# Patient Record
Sex: MALE | Race: OTHER | HISPANIC OR LATINO
[De-identification: names, ages, dates, MRNs, and addresses within clinical notes are randomized per-mention and may not be internally consistent; named-entity substitution may affect disease eponyms.]

---

## 2014-06-04 RX ORDER — INSULIN GLARGINE 100 [IU]/ML
21 INJECTION, SOLUTION SUBCUTANEOUS
Qty: 0 | Refills: 0 | DISCHARGE
Start: 2014-06-04

## 2017-01-04 ENCOUNTER — APPOINTMENT (OUTPATIENT)
Dept: CARDIOLOGY | Facility: CLINIC | Age: 77
End: 2017-01-04

## 2017-01-04 ENCOUNTER — NON-APPOINTMENT (OUTPATIENT)
Age: 77
End: 2017-01-04

## 2017-01-04 VITALS
SYSTOLIC BLOOD PRESSURE: 142 MMHG | WEIGHT: 176 LBS | HEART RATE: 68 BPM | OXYGEN SATURATION: 98 % | DIASTOLIC BLOOD PRESSURE: 68 MMHG | BODY MASS INDEX: 30.05 KG/M2 | HEIGHT: 64 IN

## 2017-01-05 ENCOUNTER — APPOINTMENT (OUTPATIENT)
Dept: FAMILY MEDICINE | Facility: CLINIC | Age: 77
End: 2017-01-05

## 2017-01-05 VITALS
HEIGHT: 64 IN | WEIGHT: 176 LBS | BODY MASS INDEX: 30.05 KG/M2 | OXYGEN SATURATION: 98 % | SYSTOLIC BLOOD PRESSURE: 124 MMHG | DIASTOLIC BLOOD PRESSURE: 76 MMHG | HEART RATE: 68 BPM | RESPIRATION RATE: 13 BRPM

## 2017-01-05 DIAGNOSIS — Z23 ENCOUNTER FOR IMMUNIZATION: ICD-10-CM

## 2017-01-05 LAB
BILIRUB UR QL STRIP: NEGATIVE
CLARITY UR: NORMAL
COLLECTION METHOD: NORMAL
GLUCOSE UR-MCNC: NEGATIVE
HCG UR QL: 0.2 EU/DL
HGB UR QL STRIP.AUTO: NORMAL
KETONES UR-MCNC: NEGATIVE
LEUKOCYTE ESTERASE UR QL STRIP: NORMAL
NITRITE UR QL STRIP: POSITIVE
PH UR STRIP: 5.5
PROT UR STRIP-MCNC: NORMAL
SP GR UR STRIP: 1.01

## 2017-01-05 RX ORDER — RIVAROXABAN 20 MG/1
20 TABLET, FILM COATED ORAL
Qty: 90 | Refills: 3 | Status: DISCONTINUED | COMMUNITY
Start: 2017-01-04 | End: 2017-01-05

## 2017-01-07 LAB
24R-OH-CALCIDIOL SERPL-MCNC: 15.5 PG/ML
ALBUMIN SERPL ELPH-MCNC: 4.4 G/DL
ALP BLD-CCNC: 75 U/L
ALT SERPL-CCNC: 4 U/L
ANION GAP SERPL CALC-SCNC: 19 MMOL/L
AST SERPL-CCNC: 8 U/L
BASOPHILS # BLD AUTO: 0.03 K/UL
BASOPHILS NFR BLD AUTO: 0.3 %
BILIRUB SERPL-MCNC: 0.3 MG/DL
BUN SERPL-MCNC: 44 MG/DL
CALCIUM SERPL-MCNC: 9.5 MG/DL
CHLORIDE SERPL-SCNC: 103 MMOL/L
CHOLEST SERPL-MCNC: 136 MG/DL
CHOLEST/HDLC SERPL: 3.7 RATIO
CO2 SERPL-SCNC: 20 MMOL/L
CREAT SERPL-MCNC: 2.76 MG/DL
EOSINOPHIL # BLD AUTO: 0.21 K/UL
EOSINOPHIL NFR BLD AUTO: 2 %
ERYTHROCYTE [SEDIMENTATION RATE] IN BLOOD BY WESTERGREN METHOD: 14 MM/HR
FOLATE SERPL-MCNC: 7.8 NG/ML
GLUCOSE SERPL-MCNC: 147 MG/DL
HBA1C MFR BLD HPLC: 9.4 %
HCT VFR BLD CALC: 35.8 %
HDLC SERPL-MCNC: 37 MG/DL
HGB BLD-MCNC: 11.4 G/DL
IMM GRANULOCYTES NFR BLD AUTO: 0.2 %
LDLC SERPL CALC-MCNC: 77 MG/DL
LYMPHOCYTES # BLD AUTO: 2 K/UL
LYMPHOCYTES NFR BLD AUTO: 18.9 %
MAN DIFF?: NORMAL
MCHC RBC-ENTMCNC: 28.3 PG
MCHC RBC-ENTMCNC: 31.8 GM/DL
MCV RBC AUTO: 88.8 FL
MONOCYTES # BLD AUTO: 0.99 K/UL
MONOCYTES NFR BLD AUTO: 9.4 %
NEUTROPHILS # BLD AUTO: 7.33 K/UL
NEUTROPHILS NFR BLD AUTO: 69.2 %
PLATELET # BLD AUTO: 247 K/UL
POTASSIUM SERPL-SCNC: 4.2 MMOL/L
PROT SERPL-MCNC: 7.3 G/DL
RBC # BLD: 4.03 M/UL
RBC # FLD: 13.6 %
SODIUM SERPL-SCNC: 142 MMOL/L
T3 SERPL-MCNC: 106 NG/DL
T4 SERPL-MCNC: 10.3 UG/DL
TRIGL SERPL-MCNC: 108 MG/DL
TSH SERPL-ACNC: 6.98 UU/ML
VIT B12 SERPL-MCNC: 334 PG/ML
WBC # FLD AUTO: 10.58 K/UL

## 2017-01-08 LAB — GLYCOMARK.: 3.7 UG/ML

## 2017-01-17 ENCOUNTER — APPOINTMENT (OUTPATIENT)
Dept: ELECTROPHYSIOLOGY | Facility: CLINIC | Age: 77
End: 2017-01-17

## 2017-01-23 ENCOUNTER — APPOINTMENT (OUTPATIENT)
Dept: CARDIOLOGY | Facility: CLINIC | Age: 77
End: 2017-01-23

## 2017-02-17 ENCOUNTER — APPOINTMENT (OUTPATIENT)
Dept: ELECTROPHYSIOLOGY | Facility: CLINIC | Age: 77
End: 2017-02-17

## 2017-02-21 ENCOUNTER — RX RENEWAL (OUTPATIENT)
Age: 77
End: 2017-02-21

## 2017-02-27 ENCOUNTER — APPOINTMENT (OUTPATIENT)
Dept: CARDIOLOGY | Facility: CLINIC | Age: 77
End: 2017-02-27

## 2017-03-02 ENCOUNTER — APPOINTMENT (OUTPATIENT)
Dept: CARDIOLOGY | Facility: CLINIC | Age: 77
End: 2017-03-02

## 2017-03-17 ENCOUNTER — APPOINTMENT (OUTPATIENT)
Dept: ELECTROPHYSIOLOGY | Facility: CLINIC | Age: 77
End: 2017-03-17

## 2017-04-07 ENCOUNTER — APPOINTMENT (OUTPATIENT)
Dept: ELECTROPHYSIOLOGY | Facility: CLINIC | Age: 77
End: 2017-04-07

## 2017-04-12 ENCOUNTER — APPOINTMENT (OUTPATIENT)
Dept: CARDIOLOGY | Facility: CLINIC | Age: 77
End: 2017-04-12

## 2017-04-24 ENCOUNTER — RX RENEWAL (OUTPATIENT)
Age: 77
End: 2017-04-24

## 2017-05-01 ENCOUNTER — RX RENEWAL (OUTPATIENT)
Age: 77
End: 2017-05-01

## 2017-05-08 ENCOUNTER — APPOINTMENT (OUTPATIENT)
Dept: ELECTROPHYSIOLOGY | Facility: CLINIC | Age: 77
End: 2017-05-08

## 2017-05-22 ENCOUNTER — APPOINTMENT (OUTPATIENT)
Dept: FAMILY MEDICINE | Facility: CLINIC | Age: 77
End: 2017-05-22

## 2017-05-24 ENCOUNTER — APPOINTMENT (OUTPATIENT)
Dept: FAMILY MEDICINE | Facility: CLINIC | Age: 77
End: 2017-05-24

## 2017-05-24 ENCOUNTER — LABORATORY RESULT (OUTPATIENT)
Age: 77
End: 2017-05-24

## 2017-05-24 VITALS
BODY MASS INDEX: 30.22 KG/M2 | OXYGEN SATURATION: 98 % | HEIGHT: 64 IN | DIASTOLIC BLOOD PRESSURE: 75 MMHG | TEMPERATURE: 97.7 F | SYSTOLIC BLOOD PRESSURE: 138 MMHG | RESPIRATION RATE: 12 BRPM | HEART RATE: 67 BPM | WEIGHT: 177 LBS

## 2017-05-25 LAB
ALBUMIN SERPL ELPH-MCNC: 4 G/DL
ALP BLD-CCNC: 80 U/L
ALT SERPL-CCNC: 5 U/L
ANION GAP SERPL CALC-SCNC: 23 MMOL/L
APPEARANCE: ABNORMAL
AST SERPL-CCNC: 7 U/L
BASOPHILS # BLD AUTO: 0.02 K/UL
BASOPHILS NFR BLD AUTO: 0.2 %
BILIRUB SERPL-MCNC: 0.3 MG/DL
BILIRUBIN URINE: NEGATIVE
BLOOD URINE: ABNORMAL
BUN SERPL-MCNC: 53 MG/DL
CALCIUM SERPL-MCNC: 9.1 MG/DL
CHLORIDE SERPL-SCNC: 98 MMOL/L
CHOLEST SERPL-MCNC: 112 MG/DL
CHOLEST/HDLC SERPL: 3.2 RATIO
CO2 SERPL-SCNC: 17 MMOL/L
COLOR: YELLOW
CREAT SERPL-MCNC: 3.09 MG/DL
EOSINOPHIL # BLD AUTO: 0.14 K/UL
EOSINOPHIL NFR BLD AUTO: 1.2 %
ERYTHROCYTE [SEDIMENTATION RATE] IN BLOOD BY WESTERGREN METHOD: 31 MM/HR
GLUCOSE QUALITATIVE U: NORMAL MG/DL
GLUCOSE SERPL-MCNC: 106 MG/DL
HBA1C MFR BLD HPLC: 9.7 %
HCT VFR BLD CALC: 32.6 %
HDLC SERPL-MCNC: 35 MG/DL
HGB BLD-MCNC: 10.5 G/DL
IMM GRANULOCYTES NFR BLD AUTO: 0.3 %
KETONES URINE: NEGATIVE
LDLC SERPL CALC-MCNC: 58 MG/DL
LEUKOCYTE ESTERASE URINE: ABNORMAL
LYMPHOCYTES # BLD AUTO: 1.15 K/UL
LYMPHOCYTES NFR BLD AUTO: 9.8 %
MAN DIFF?: NORMAL
MCHC RBC-ENTMCNC: 27.8 PG
MCHC RBC-ENTMCNC: 32.2 GM/DL
MCV RBC AUTO: 86.2 FL
MONOCYTES # BLD AUTO: 1.21 K/UL
MONOCYTES NFR BLD AUTO: 10.3 %
NEUTROPHILS # BLD AUTO: 9.18 K/UL
NEUTROPHILS NFR BLD AUTO: 78.2 %
NITRITE URINE: NEGATIVE
PH URINE: 6.5
PLATELET # BLD AUTO: 221 K/UL
POTASSIUM SERPL-SCNC: 4.9 MMOL/L
PROT SERPL-MCNC: 7.4 G/DL
PROTEIN URINE: 100 MG/DL
RBC # BLD: 3.78 M/UL
RBC # FLD: 14.1 %
SODIUM SERPL-SCNC: 138 MMOL/L
SPECIFIC GRAVITY URINE: 1.01
TRIGL SERPL-MCNC: 94 MG/DL
TSH SERPL-ACNC: 4.98 UIU/ML
UROBILINOGEN URINE: NORMAL MG/DL
WBC # FLD AUTO: 11.73 K/UL

## 2017-06-09 ENCOUNTER — APPOINTMENT (OUTPATIENT)
Dept: ELECTROPHYSIOLOGY | Facility: CLINIC | Age: 77
End: 2017-06-09

## 2017-06-13 ENCOUNTER — APPOINTMENT (OUTPATIENT)
Dept: CARDIOLOGY | Facility: CLINIC | Age: 77
End: 2017-06-13
Payer: MEDICARE

## 2017-06-13 ENCOUNTER — NON-APPOINTMENT (OUTPATIENT)
Age: 77
End: 2017-06-13

## 2017-06-13 VITALS
HEIGHT: 64 IN | DIASTOLIC BLOOD PRESSURE: 60 MMHG | OXYGEN SATURATION: 98 % | SYSTOLIC BLOOD PRESSURE: 142 MMHG | BODY MASS INDEX: 29.37 KG/M2 | HEART RATE: 60 BPM | WEIGHT: 172 LBS

## 2017-06-13 VITALS — SYSTOLIC BLOOD PRESSURE: 140 MMHG | HEART RATE: 60 BPM | DIASTOLIC BLOOD PRESSURE: 60 MMHG

## 2017-06-13 VITALS — SYSTOLIC BLOOD PRESSURE: 140 MMHG | DIASTOLIC BLOOD PRESSURE: 60 MMHG

## 2017-06-13 PROCEDURE — 93000 ELECTROCARDIOGRAM COMPLETE: CPT

## 2017-06-13 PROCEDURE — 99215 OFFICE O/P EST HI 40 MIN: CPT | Mod: 25

## 2017-06-19 ENCOUNTER — APPOINTMENT (OUTPATIENT)
Dept: FAMILY MEDICINE | Facility: CLINIC | Age: 77
End: 2017-06-19

## 2017-06-19 VITALS
WEIGHT: 173 LBS | DIASTOLIC BLOOD PRESSURE: 82 MMHG | TEMPERATURE: 98 F | SYSTOLIC BLOOD PRESSURE: 142 MMHG | HEIGHT: 64 IN | RESPIRATION RATE: 12 BRPM | BODY MASS INDEX: 29.53 KG/M2 | OXYGEN SATURATION: 98 % | HEART RATE: 62 BPM

## 2017-07-03 ENCOUNTER — RX RENEWAL (OUTPATIENT)
Age: 77
End: 2017-07-03

## 2017-07-10 ENCOUNTER — APPOINTMENT (OUTPATIENT)
Dept: ELECTROPHYSIOLOGY | Facility: CLINIC | Age: 77
End: 2017-07-10

## 2017-08-11 ENCOUNTER — APPOINTMENT (OUTPATIENT)
Dept: ELECTROPHYSIOLOGY | Facility: CLINIC | Age: 77
End: 2017-08-11
Payer: MEDICARE

## 2017-08-11 PROCEDURE — 93299: CPT

## 2017-08-11 PROCEDURE — 93298 REM INTERROG DEV EVAL SCRMS: CPT

## 2017-09-05 ENCOUNTER — RX RENEWAL (OUTPATIENT)
Age: 77
End: 2017-09-05

## 2017-09-11 ENCOUNTER — RX RENEWAL (OUTPATIENT)
Age: 77
End: 2017-09-11

## 2017-09-15 ENCOUNTER — APPOINTMENT (OUTPATIENT)
Dept: ELECTROPHYSIOLOGY | Facility: CLINIC | Age: 77
End: 2017-09-15
Payer: MEDICARE

## 2017-09-15 PROCEDURE — 93299: CPT

## 2017-09-15 PROCEDURE — 93298 REM INTERROG DEV EVAL SCRMS: CPT

## 2017-09-27 ENCOUNTER — LABORATORY RESULT (OUTPATIENT)
Age: 77
End: 2017-09-27

## 2017-09-27 ENCOUNTER — APPOINTMENT (OUTPATIENT)
Dept: FAMILY MEDICINE | Facility: CLINIC | Age: 77
End: 2017-09-27
Payer: MEDICARE

## 2017-09-27 VITALS
BODY MASS INDEX: 28.16 KG/M2 | RESPIRATION RATE: 12 BRPM | SYSTOLIC BLOOD PRESSURE: 148 MMHG | OXYGEN SATURATION: 98 % | HEIGHT: 65 IN | HEART RATE: 53 BPM | TEMPERATURE: 98.9 F | DIASTOLIC BLOOD PRESSURE: 80 MMHG | WEIGHT: 169 LBS

## 2017-09-27 PROCEDURE — G0008: CPT | Mod: 59

## 2017-09-27 PROCEDURE — 90662 IIV NO PRSV INCREASED AG IM: CPT

## 2017-09-27 PROCEDURE — 99214 OFFICE O/P EST MOD 30 MIN: CPT | Mod: 25

## 2017-09-27 PROCEDURE — 36415 COLL VENOUS BLD VENIPUNCTURE: CPT

## 2017-09-28 LAB
ALBUMIN SERPL ELPH-MCNC: 4.8 G/DL
ALP BLD-CCNC: 75 U/L
ALT SERPL-CCNC: 8 U/L
ANION GAP SERPL CALC-SCNC: 19 MMOL/L
APPEARANCE: ABNORMAL
AST SERPL-CCNC: 12 U/L
BASOPHILS # BLD AUTO: 0.02 K/UL
BASOPHILS NFR BLD AUTO: 0.3 %
BILIRUB SERPL-MCNC: 0.2 MG/DL
BILIRUBIN URINE: NEGATIVE
BLOOD URINE: ABNORMAL
BUN SERPL-MCNC: 42 MG/DL
CALCIUM SERPL-MCNC: 9.4 MG/DL
CHLORIDE SERPL-SCNC: 94 MMOL/L
CHOLEST SERPL-MCNC: 149 MG/DL
CHOLEST/HDLC SERPL: 3.6 RATIO
CO2 SERPL-SCNC: 20 MMOL/L
COLOR: YELLOW
CREAT SERPL-MCNC: 2.78 MG/DL
EOSINOPHIL # BLD AUTO: 0.2 K/UL
EOSINOPHIL NFR BLD AUTO: 2.6 %
ERYTHROCYTE [SEDIMENTATION RATE] IN BLOOD BY WESTERGREN METHOD: 9 MM/HR
GLUCOSE QUALITATIVE U: NORMAL MG/DL
GLUCOSE SERPL-MCNC: 144 MG/DL
HBA1C MFR BLD HPLC: 8.2 %
HCT VFR BLD CALC: 36.3 %
HDLC SERPL-MCNC: 41 MG/DL
HGB BLD-MCNC: 11.8 G/DL
IMM GRANULOCYTES NFR BLD AUTO: 0.5 %
KETONES URINE: NEGATIVE
LDLC SERPL CALC-MCNC: 89 MG/DL
LEUKOCYTE ESTERASE URINE: ABNORMAL
LYMPHOCYTES # BLD AUTO: 1.55 K/UL
LYMPHOCYTES NFR BLD AUTO: 20.3 %
MAN DIFF?: NORMAL
MCHC RBC-ENTMCNC: 28.4 PG
MCHC RBC-ENTMCNC: 32.5 GM/DL
MCV RBC AUTO: 87.3 FL
MONOCYTES # BLD AUTO: 0.73 K/UL
MONOCYTES NFR BLD AUTO: 9.6 %
NEUTROPHILS # BLD AUTO: 5.09 K/UL
NEUTROPHILS NFR BLD AUTO: 66.7 %
NITRITE URINE: POSITIVE
PH URINE: 6.5
PLATELET # BLD AUTO: 200 K/UL
POTASSIUM SERPL-SCNC: 4.7 MMOL/L
PROT SERPL-MCNC: 7.7 G/DL
PROTEIN URINE: 30 MG/DL
RBC # BLD: 4.16 M/UL
RBC # FLD: 13.9 %
SODIUM SERPL-SCNC: 133 MMOL/L
SPECIFIC GRAVITY URINE: 1.01
TRIGL SERPL-MCNC: 96 MG/DL
TSH SERPL-ACNC: 6.54 UIU/ML
UROBILINOGEN URINE: NORMAL MG/DL
WBC # FLD AUTO: 7.63 K/UL

## 2017-10-07 ENCOUNTER — APPOINTMENT (OUTPATIENT)
Dept: FAMILY MEDICINE | Facility: CLINIC | Age: 77
End: 2017-10-07

## 2017-10-20 ENCOUNTER — APPOINTMENT (OUTPATIENT)
Dept: ELECTROPHYSIOLOGY | Facility: CLINIC | Age: 77
End: 2017-10-20
Payer: MEDICARE

## 2017-10-20 PROCEDURE — 93299: CPT

## 2017-10-20 PROCEDURE — 93298 REM INTERROG DEV EVAL SCRMS: CPT

## 2017-11-16 ENCOUNTER — RX RENEWAL (OUTPATIENT)
Age: 77
End: 2017-11-16

## 2017-11-20 ENCOUNTER — APPOINTMENT (OUTPATIENT)
Dept: ELECTROPHYSIOLOGY | Facility: CLINIC | Age: 77
End: 2017-11-20
Payer: MEDICARE

## 2017-11-20 ENCOUNTER — RX RENEWAL (OUTPATIENT)
Age: 77
End: 2017-11-20

## 2017-11-20 PROCEDURE — 93298 REM INTERROG DEV EVAL SCRMS: CPT

## 2017-11-20 PROCEDURE — 93299: CPT

## 2017-12-11 ENCOUNTER — RX RENEWAL (OUTPATIENT)
Age: 77
End: 2017-12-11

## 2017-12-22 ENCOUNTER — APPOINTMENT (OUTPATIENT)
Dept: ELECTROPHYSIOLOGY | Facility: CLINIC | Age: 77
End: 2017-12-22
Payer: MEDICARE

## 2017-12-22 PROCEDURE — 93299: CPT

## 2017-12-22 PROCEDURE — 93298 REM INTERROG DEV EVAL SCRMS: CPT

## 2018-01-03 ENCOUNTER — APPOINTMENT (OUTPATIENT)
Dept: CARDIOLOGY | Facility: CLINIC | Age: 78
End: 2018-01-03
Payer: MEDICARE

## 2018-01-03 ENCOUNTER — NON-APPOINTMENT (OUTPATIENT)
Age: 78
End: 2018-01-03

## 2018-01-03 VITALS
HEIGHT: 65 IN | BODY MASS INDEX: 28.49 KG/M2 | WEIGHT: 171 LBS | DIASTOLIC BLOOD PRESSURE: 66 MMHG | HEART RATE: 64 BPM | SYSTOLIC BLOOD PRESSURE: 151 MMHG | OXYGEN SATURATION: 98 %

## 2018-01-03 PROCEDURE — 93000 ELECTROCARDIOGRAM COMPLETE: CPT

## 2018-01-03 PROCEDURE — 99215 OFFICE O/P EST HI 40 MIN: CPT

## 2018-01-08 ENCOUNTER — RX RENEWAL (OUTPATIENT)
Age: 78
End: 2018-01-08

## 2018-01-22 ENCOUNTER — APPOINTMENT (OUTPATIENT)
Dept: ELECTROPHYSIOLOGY | Facility: CLINIC | Age: 78
End: 2018-01-22
Payer: MEDICARE

## 2018-01-22 PROCEDURE — 93298 REM INTERROG DEV EVAL SCRMS: CPT

## 2018-01-22 PROCEDURE — 93299: CPT

## 2018-01-27 ENCOUNTER — RX RENEWAL (OUTPATIENT)
Age: 78
End: 2018-01-27

## 2018-02-09 ENCOUNTER — RX RENEWAL (OUTPATIENT)
Age: 78
End: 2018-02-09

## 2018-02-16 ENCOUNTER — RX RENEWAL (OUTPATIENT)
Age: 78
End: 2018-02-16

## 2018-02-19 ENCOUNTER — RX RENEWAL (OUTPATIENT)
Age: 78
End: 2018-02-19

## 2018-02-22 ENCOUNTER — RX RENEWAL (OUTPATIENT)
Age: 78
End: 2018-02-22

## 2018-02-23 ENCOUNTER — APPOINTMENT (OUTPATIENT)
Dept: CARDIOLOGY | Facility: CLINIC | Age: 78
End: 2018-02-23
Payer: MEDICARE

## 2018-02-23 ENCOUNTER — APPOINTMENT (OUTPATIENT)
Dept: ELECTROPHYSIOLOGY | Facility: CLINIC | Age: 78
End: 2018-02-23
Payer: MEDICARE

## 2018-02-23 PROCEDURE — 93880 EXTRACRANIAL BILAT STUDY: CPT

## 2018-02-23 PROCEDURE — 93299: CPT

## 2018-02-23 PROCEDURE — 93306 TTE W/DOPPLER COMPLETE: CPT

## 2018-02-23 PROCEDURE — 93298 REM INTERROG DEV EVAL SCRMS: CPT

## 2018-03-02 ENCOUNTER — LABORATORY RESULT (OUTPATIENT)
Age: 78
End: 2018-03-02

## 2018-03-02 ENCOUNTER — APPOINTMENT (OUTPATIENT)
Dept: FAMILY MEDICINE | Facility: CLINIC | Age: 78
End: 2018-03-02
Payer: MEDICARE

## 2018-03-02 VITALS
HEART RATE: 83 BPM | TEMPERATURE: 97.7 F | DIASTOLIC BLOOD PRESSURE: 78 MMHG | HEIGHT: 65 IN | OXYGEN SATURATION: 97 % | SYSTOLIC BLOOD PRESSURE: 152 MMHG | RESPIRATION RATE: 12 BRPM | WEIGHT: 171 LBS | BODY MASS INDEX: 28.49 KG/M2

## 2018-03-02 LAB — HBA1C MFR BLD HPLC: 9.1

## 2018-03-02 PROCEDURE — 69210 REMOVE IMPACTED EAR WAX UNI: CPT | Mod: 59

## 2018-03-02 PROCEDURE — 83036 HEMOGLOBIN GLYCOSYLATED A1C: CPT | Mod: QW

## 2018-03-02 PROCEDURE — 36415 COLL VENOUS BLD VENIPUNCTURE: CPT

## 2018-03-02 PROCEDURE — 99214 OFFICE O/P EST MOD 30 MIN: CPT | Mod: 25

## 2018-03-02 RX ORDER — AZITHROMYCIN 250 MG/1
250 TABLET, FILM COATED ORAL
Qty: 6 | Refills: 0 | Status: DISCONTINUED | COMMUNITY
Start: 2017-05-24 | End: 2018-03-02

## 2018-03-04 LAB
ALBUMIN SERPL ELPH-MCNC: 4.5 G/DL
ALP BLD-CCNC: 84 U/L
ALT SERPL-CCNC: 8 U/L
ANION GAP SERPL CALC-SCNC: 19 MMOL/L
AST SERPL-CCNC: 13 U/L
BASOPHILS # BLD AUTO: 0.02 K/UL
BASOPHILS NFR BLD AUTO: 0.2 %
BILIRUB SERPL-MCNC: 0.2 MG/DL
BUN SERPL-MCNC: 45 MG/DL
CALCIUM SERPL-MCNC: 9.3 MG/DL
CHLORIDE SERPL-SCNC: 98 MMOL/L
CHOLEST SERPL-MCNC: 153 MG/DL
CHOLEST/HDLC SERPL: 3.3 RATIO
CO2 SERPL-SCNC: 20 MMOL/L
CREAT SERPL-MCNC: 3.02 MG/DL
EOSINOPHIL # BLD AUTO: 0.15 K/UL
EOSINOPHIL NFR BLD AUTO: 1.4 %
ERYTHROCYTE [SEDIMENTATION RATE] IN BLOOD BY WESTERGREN METHOD: 22 MM/HR
GLUCOSE SERPL-MCNC: 72 MG/DL
HCT VFR BLD CALC: 35.7 %
HDLC SERPL-MCNC: 46 MG/DL
HGB BLD-MCNC: 11.8 G/DL
IMM GRANULOCYTES NFR BLD AUTO: 0.2 %
LDLC SERPL CALC-MCNC: 87 MG/DL
LYMPHOCYTES # BLD AUTO: 1.22 K/UL
LYMPHOCYTES NFR BLD AUTO: 11.4 %
MAN DIFF?: NORMAL
MCHC RBC-ENTMCNC: 28.9 PG
MCHC RBC-ENTMCNC: 33.1 GM/DL
MCV RBC AUTO: 87.5 FL
MONOCYTES # BLD AUTO: 1.05 K/UL
MONOCYTES NFR BLD AUTO: 9.8 %
NEUTROPHILS # BLD AUTO: 8.26 K/UL
NEUTROPHILS NFR BLD AUTO: 77 %
PLATELET # BLD AUTO: 221 K/UL
POTASSIUM SERPL-SCNC: 4.3 MMOL/L
PROT SERPL-MCNC: 7.6 G/DL
RBC # BLD: 4.08 M/UL
RBC # FLD: 13.3 %
SODIUM SERPL-SCNC: 137 MMOL/L
TRIGL SERPL-MCNC: 98 MG/DL
TSH SERPL-ACNC: 5.94 UIU/ML
WBC # FLD AUTO: 10.72 K/UL

## 2018-03-16 ENCOUNTER — APPOINTMENT (OUTPATIENT)
Dept: FAMILY MEDICINE | Facility: CLINIC | Age: 78
End: 2018-03-16

## 2018-03-26 ENCOUNTER — APPOINTMENT (OUTPATIENT)
Dept: ELECTROPHYSIOLOGY | Facility: CLINIC | Age: 78
End: 2018-03-26
Payer: MEDICARE

## 2018-03-26 PROCEDURE — 93298 REM INTERROG DEV EVAL SCRMS: CPT

## 2018-03-26 PROCEDURE — 93299: CPT

## 2018-04-05 LAB — HBA1C MFR BLD HPLC: 9.1

## 2018-04-07 ENCOUNTER — RX RENEWAL (OUTPATIENT)
Age: 78
End: 2018-04-07

## 2018-04-27 ENCOUNTER — APPOINTMENT (OUTPATIENT)
Dept: ELECTROPHYSIOLOGY | Facility: CLINIC | Age: 78
End: 2018-04-27
Payer: MEDICARE

## 2018-04-27 PROCEDURE — 93299: CPT

## 2018-04-27 PROCEDURE — 93298 REM INTERROG DEV EVAL SCRMS: CPT

## 2018-05-11 ENCOUNTER — RX RENEWAL (OUTPATIENT)
Age: 78
End: 2018-05-11

## 2018-05-14 ENCOUNTER — RX RENEWAL (OUTPATIENT)
Age: 78
End: 2018-05-14

## 2018-05-21 ENCOUNTER — RX RENEWAL (OUTPATIENT)
Age: 78
End: 2018-05-21

## 2018-05-29 ENCOUNTER — APPOINTMENT (OUTPATIENT)
Dept: ELECTROPHYSIOLOGY | Facility: CLINIC | Age: 78
End: 2018-05-29
Payer: MEDICARE

## 2018-05-29 ENCOUNTER — RX RENEWAL (OUTPATIENT)
Age: 78
End: 2018-05-29

## 2018-05-29 PROCEDURE — 93299: CPT

## 2018-05-29 PROCEDURE — 93298 REM INTERROG DEV EVAL SCRMS: CPT

## 2018-05-30 ENCOUNTER — RX RENEWAL (OUTPATIENT)
Age: 78
End: 2018-05-30

## 2018-05-31 ENCOUNTER — RX RENEWAL (OUTPATIENT)
Age: 78
End: 2018-05-31

## 2018-06-05 ENCOUNTER — RX RENEWAL (OUTPATIENT)
Age: 78
End: 2018-06-05

## 2018-06-13 ENCOUNTER — RX RENEWAL (OUTPATIENT)
Age: 78
End: 2018-06-13

## 2018-06-16 ENCOUNTER — APPOINTMENT (OUTPATIENT)
Dept: FAMILY MEDICINE | Facility: CLINIC | Age: 78
End: 2018-06-16
Payer: MEDICARE

## 2018-06-16 VITALS
HEIGHT: 65 IN | OXYGEN SATURATION: 99 % | TEMPERATURE: 98 F | BODY MASS INDEX: 28.99 KG/M2 | HEART RATE: 70 BPM | WEIGHT: 174 LBS | SYSTOLIC BLOOD PRESSURE: 162 MMHG | DIASTOLIC BLOOD PRESSURE: 78 MMHG | RESPIRATION RATE: 12 BRPM

## 2018-06-16 DIAGNOSIS — Z86.69 PERSONAL HISTORY OF OTHER DISEASES OF THE NERVOUS SYSTEM AND SENSE ORGANS: ICD-10-CM

## 2018-06-16 PROCEDURE — 99214 OFFICE O/P EST MOD 30 MIN: CPT

## 2018-06-16 NOTE — REVIEW OF SYSTEMS
[Patient Intake Form Reviewed] : Patient intake form was reviewed [Negative] : Cardiovascular [FreeTextEntry5] : HTN,HLD [FreeTextEntry7] : GERD [FreeTextEntry8] : BPH w LUTS, Chronic Renal Failure [FreeTextEntry1] : DM, Low Vit. d

## 2018-06-29 ENCOUNTER — APPOINTMENT (OUTPATIENT)
Dept: ELECTROPHYSIOLOGY | Facility: CLINIC | Age: 78
End: 2018-06-29
Payer: MEDICARE

## 2018-06-29 PROCEDURE — 93298 REM INTERROG DEV EVAL SCRMS: CPT

## 2018-06-29 PROCEDURE — 93299: CPT

## 2018-06-30 ENCOUNTER — APPOINTMENT (OUTPATIENT)
Dept: FAMILY MEDICINE | Facility: CLINIC | Age: 78
End: 2018-06-30
Payer: MEDICARE

## 2018-06-30 ENCOUNTER — LABORATORY RESULT (OUTPATIENT)
Age: 78
End: 2018-06-30

## 2018-06-30 VITALS
HEART RATE: 70 BPM | SYSTOLIC BLOOD PRESSURE: 140 MMHG | TEMPERATURE: 98.1 F | RESPIRATION RATE: 12 BRPM | HEIGHT: 65 IN | WEIGHT: 160 LBS | OXYGEN SATURATION: 97 % | BODY MASS INDEX: 26.66 KG/M2 | DIASTOLIC BLOOD PRESSURE: 70 MMHG

## 2018-06-30 PROCEDURE — 99214 OFFICE O/P EST MOD 30 MIN: CPT

## 2018-07-02 LAB
APPEARANCE: ABNORMAL
BILIRUBIN URINE: NEGATIVE
BLOOD URINE: ABNORMAL
COLOR: YELLOW
GLUCOSE QUALITATIVE U: NEGATIVE MG/DL
KETONES URINE: NEGATIVE
LEUKOCYTE ESTERASE URINE: ABNORMAL
NITRITE URINE: POSITIVE
PH URINE: 5.5
PROTEIN URINE: 100 MG/DL
SPECIFIC GRAVITY URINE: 1.01
UROBILINOGEN URINE: NEGATIVE MG/DL

## 2018-07-03 LAB — URINE CULTURE <10: ABNORMAL

## 2018-07-13 ENCOUNTER — APPOINTMENT (OUTPATIENT)
Dept: CARDIOLOGY | Facility: CLINIC | Age: 78
End: 2018-07-13

## 2018-07-17 ENCOUNTER — APPOINTMENT (OUTPATIENT)
Dept: FAMILY MEDICINE | Facility: CLINIC | Age: 78
End: 2018-07-17

## 2018-07-27 ENCOUNTER — APPOINTMENT (OUTPATIENT)
Dept: FAMILY MEDICINE | Facility: CLINIC | Age: 78
End: 2018-07-27
Payer: MEDICARE

## 2018-07-27 VITALS
HEART RATE: 69 BPM | RESPIRATION RATE: 13 BRPM | BODY MASS INDEX: 26.66 KG/M2 | WEIGHT: 160 LBS | DIASTOLIC BLOOD PRESSURE: 76 MMHG | HEIGHT: 65 IN | SYSTOLIC BLOOD PRESSURE: 158 MMHG | TEMPERATURE: 99 F | OXYGEN SATURATION: 98 %

## 2018-07-27 LAB
BILIRUB UR QL STRIP: NORMAL
CLARITY UR: CLEAR
COLLECTION METHOD: NORMAL
GLUCOSE UR-MCNC: NORMAL
HCG UR QL: 0.2 EU/DL
HGB UR QL STRIP.AUTO: NORMAL
KETONES UR-MCNC: NORMAL
LEUKOCYTE ESTERASE UR QL STRIP: NORMAL
NITRITE UR QL STRIP: POSITIVE
PH UR STRIP: 6
PROT UR STRIP-MCNC: NORMAL
SP GR UR STRIP: 1.01

## 2018-07-27 PROCEDURE — 81003 URINALYSIS AUTO W/O SCOPE: CPT | Mod: QW

## 2018-07-27 PROCEDURE — 99214 OFFICE O/P EST MOD 30 MIN: CPT | Mod: 25

## 2018-07-27 RX ORDER — CIPROFLOXACIN HYDROCHLORIDE 500 MG/1
500 TABLET, FILM COATED ORAL TWICE DAILY
Qty: 14 | Refills: 1 | Status: DISCONTINUED | COMMUNITY
Start: 2018-06-30 | End: 2018-07-27

## 2018-07-30 ENCOUNTER — APPOINTMENT (OUTPATIENT)
Dept: ELECTROPHYSIOLOGY | Facility: CLINIC | Age: 78
End: 2018-07-30
Payer: MEDICARE

## 2018-07-30 PROCEDURE — 93298 REM INTERROG DEV EVAL SCRMS: CPT

## 2018-07-30 PROCEDURE — 93299: CPT

## 2018-08-01 LAB
ALBUMIN SERPL ELPH-MCNC: 4.5 G/DL
ALP BLD-CCNC: 70 U/L
ALT SERPL-CCNC: 6 U/L
ANION GAP SERPL CALC-SCNC: 18 MMOL/L
AST SERPL-CCNC: 12 U/L
BASOPHILS # BLD AUTO: 0.01 K/UL
BASOPHILS NFR BLD AUTO: 0.1 %
BILIRUB SERPL-MCNC: <0.2 MG/DL
BUN SERPL-MCNC: 47 MG/DL
CALCIUM SERPL-MCNC: 8.7 MG/DL
CHLORIDE SERPL-SCNC: 93 MMOL/L
CO2 SERPL-SCNC: 20 MMOL/L
CREAT SERPL-MCNC: 3.02 MG/DL
EOSINOPHIL # BLD AUTO: 0.04 K/UL
EOSINOPHIL NFR BLD AUTO: 0.5 %
GLUCOSE SERPL-MCNC: 170 MG/DL
HBA1C MFR BLD HPLC: 7.5 %
HCT VFR BLD CALC: 31.6 %
HGB BLD-MCNC: 10.4 G/DL
IMM GRANULOCYTES NFR BLD AUTO: 0.4 %
LYMPHOCYTES # BLD AUTO: 0.74 K/UL
LYMPHOCYTES NFR BLD AUTO: 9.3 %
MAN DIFF?: NORMAL
MCHC RBC-ENTMCNC: 27.5 PG
MCHC RBC-ENTMCNC: 32.9 GM/DL
MCV RBC AUTO: 83.6 FL
MONOCYTES # BLD AUTO: 1.04 K/UL
MONOCYTES NFR BLD AUTO: 13 %
NEUTROPHILS # BLD AUTO: 6.11 K/UL
NEUTROPHILS NFR BLD AUTO: 76.7 %
PLATELET # BLD AUTO: 192 K/UL
POTASSIUM SERPL-SCNC: 4.5 MMOL/L
PROT SERPL-MCNC: 7 G/DL
RBC # BLD: 3.78 M/UL
RBC # FLD: 13.6 %
SODIUM SERPL-SCNC: 131 MMOL/L
WBC # FLD AUTO: 7.97 K/UL

## 2018-08-06 ENCOUNTER — RX RENEWAL (OUTPATIENT)
Age: 78
End: 2018-08-06

## 2018-08-08 ENCOUNTER — RX RENEWAL (OUTPATIENT)
Age: 78
End: 2018-08-08

## 2018-08-10 ENCOUNTER — LABORATORY RESULT (OUTPATIENT)
Age: 78
End: 2018-08-10

## 2018-08-10 ENCOUNTER — APPOINTMENT (OUTPATIENT)
Dept: FAMILY MEDICINE | Facility: CLINIC | Age: 78
End: 2018-08-10
Payer: MEDICARE

## 2018-08-10 VITALS
RESPIRATION RATE: 13 BRPM | BODY MASS INDEX: 26.66 KG/M2 | HEART RATE: 76 BPM | DIASTOLIC BLOOD PRESSURE: 78 MMHG | SYSTOLIC BLOOD PRESSURE: 132 MMHG | WEIGHT: 160 LBS | HEIGHT: 65 IN | OXYGEN SATURATION: 98 %

## 2018-08-10 PROCEDURE — 36415 COLL VENOUS BLD VENIPUNCTURE: CPT

## 2018-08-10 PROCEDURE — 99214 OFFICE O/P EST MOD 30 MIN: CPT | Mod: 25

## 2018-08-15 LAB
ALBUMIN SERPL ELPH-MCNC: 5 G/DL
ALP BLD-CCNC: 81 U/L
ALT SERPL-CCNC: 7 U/L
ANION GAP SERPL CALC-SCNC: 20 MMOL/L
APPEARANCE: ABNORMAL
AST SERPL-CCNC: 14 U/L
BASOPHILS # BLD AUTO: 0.05 K/UL
BASOPHILS NFR BLD AUTO: 0.7 %
BILIRUB SERPL-MCNC: 0.2 MG/DL
BILIRUBIN URINE: NEGATIVE
BLOOD URINE: ABNORMAL
BUN SERPL-MCNC: 75 MG/DL
CALCIUM SERPL-MCNC: 9.1 MG/DL
CHLORIDE SERPL-SCNC: 99 MMOL/L
CO2 SERPL-SCNC: 16 MMOL/L
COLOR: YELLOW
CREAT SERPL-MCNC: 4.4 MG/DL
EOSINOPHIL # BLD AUTO: 0.25 K/UL
EOSINOPHIL NFR BLD AUTO: 3.6 %
GLUCOSE QUALITATIVE U: NEGATIVE MG/DL
GLUCOSE SERPL-MCNC: 103 MG/DL
HCT VFR BLD CALC: 32 %
HGB BLD-MCNC: 10.4 G/DL
IMM GRANULOCYTES NFR BLD AUTO: 0.3 %
KETONES URINE: NEGATIVE
LEUKOCYTE ESTERASE URINE: ABNORMAL
LYMPHOCYTES # BLD AUTO: 0.83 K/UL
LYMPHOCYTES NFR BLD AUTO: 11.8 %
MAN DIFF?: NORMAL
MCHC RBC-ENTMCNC: 28 PG
MCHC RBC-ENTMCNC: 32.5 GM/DL
MCV RBC AUTO: 86 FL
MONOCYTES # BLD AUTO: 1.04 K/UL
MONOCYTES NFR BLD AUTO: 14.8 %
NEUTROPHILS # BLD AUTO: 4.84 K/UL
NEUTROPHILS NFR BLD AUTO: 68.8 %
NITRITE URINE: POSITIVE
PH URINE: 5.5
PLATELET # BLD AUTO: 177 K/UL
POTASSIUM SERPL-SCNC: 4.8 MMOL/L
PROT SERPL-MCNC: 7.3 G/DL
PROTEIN URINE: 100 MG/DL
RBC # BLD: 3.72 M/UL
RBC # FLD: 14.7 %
SODIUM SERPL-SCNC: 135 MMOL/L
SPECIFIC GRAVITY URINE: 1.01
UROBILINOGEN URINE: NEGATIVE MG/DL
WBC # FLD AUTO: 7.03 K/UL

## 2018-08-17 NOTE — PLAN
[FreeTextEntry1] : Patient comes in with wife for follow up of labs and repeat for CRF and DM HTN HLD djd, Care plan and meds reviewed\par Diet and hydration feels fatigued has appointment with renal rending no CP or SOB \par Meds and care plan reviewed

## 2018-08-17 NOTE — HISTORY OF PRESENT ILLNESS
[FreeTextEntry1] : Patient comes in with wife for follow up of labs and repeat for CRF and DM HTN HLD djd, Care plan and meds reviewed\par Diet and hydration feels fatigued has appointment with renal rending no CP or SOB

## 2018-08-17 NOTE — COUNSELING
[Weight management counseling provided] : Weight management [Healthy eating counseling provided] : healthy eating [Activity counseling provided] : activity [Behavioral health counseling provided] : behavioral health  [Weight Self Once Weekly] : Weight self once weekly [Keep Food Diary] : Keep food diary [Decrease Portions] : Decrease food portions [Low Salt Diet] : Low salt diet [Walking] : Walking [Engage in a relaxing activity] : Engage in a relaxing activity [Plan in advance] : Plan in advance [None] : None

## 2018-08-17 NOTE — REVIEW OF SYSTEMS
[Fatigue] : fatigue [Diarrhea] : diarrhea [Incontinence] : incontinence [Hesitancy] : hesitancy [Nocturia] : nocturia [Poor Libido] : poor libido [Joint Pain] : joint pain [Back Pain] : back pain [Negative] : Heme/Lymph [Fever] : no fever [Chills] : no chills [Night Sweats] : no night sweats [Discharge] : no discharge [Pain] : no pain [Vision Problems] : no vision problems [Itching] : no itching [Chest Pain] : no chest pain [Palpitations] : no palpitations [Lower Ext Edema] : no lower extremity edema [Shortness Of Breath] : no shortness of breath [Dyspnea on Exertion] : not dyspnea on exertion [Abdominal Pain] : no abdominal pain [Nausea] : no nausea [Constipation] : no constipation [Vomiting] : no vomiting [Heartburn] : no heartburn [Dysuria] : no dysuria [Impotence] : no impotency [Joint Stiffness] : no joint stiffness [Mole Changes] : no mole changes [Nail Changes] : no nail changes [Hair Changes] : no hair changes

## 2018-08-17 NOTE — HEALTH RISK ASSESSMENT
[No falls in past year] : Patient reported no falls in the past year [0] : 2) Feeling down, depressed, or hopeless: Not at all (0) [] : No [[id number]] : 0

## 2018-08-23 ENCOUNTER — APPOINTMENT (OUTPATIENT)
Dept: NEPHROLOGY | Facility: CLINIC | Age: 78
End: 2018-08-23

## 2018-08-24 ENCOUNTER — APPOINTMENT (OUTPATIENT)
Dept: FAMILY MEDICINE | Facility: CLINIC | Age: 78
End: 2018-08-24

## 2018-08-28 ENCOUNTER — APPOINTMENT (OUTPATIENT)
Dept: NEPHROLOGY | Facility: CLINIC | Age: 78
End: 2018-08-28
Payer: MEDICARE

## 2018-08-28 VITALS
SYSTOLIC BLOOD PRESSURE: 146 MMHG | HEIGHT: 65 IN | HEART RATE: 62 BPM | OXYGEN SATURATION: 98 % | BODY MASS INDEX: 27.49 KG/M2 | DIASTOLIC BLOOD PRESSURE: 70 MMHG | WEIGHT: 165 LBS

## 2018-08-28 PROCEDURE — 36415 COLL VENOUS BLD VENIPUNCTURE: CPT

## 2018-08-28 PROCEDURE — 99205 OFFICE O/P NEW HI 60 MIN: CPT | Mod: 25

## 2018-08-29 LAB
24R-OH-CALCIDIOL SERPL-MCNC: 25 PG/ML
25(OH)D3 SERPL-MCNC: 26.4 NG/ML
ALBUMIN SERPL ELPH-MCNC: 4.8 G/DL
ANION GAP SERPL CALC-SCNC: 18 MMOL/L
APPEARANCE: ABNORMAL
BACTERIA: ABNORMAL
BASOPHILS # BLD AUTO: 0.04 K/UL
BASOPHILS NFR BLD AUTO: 0.5 %
BILIRUBIN URINE: NEGATIVE
BLOOD URINE: NEGATIVE
BUN SERPL-MCNC: 46 MG/DL
CALCIUM SERPL-MCNC: 9.3 MG/DL
CALCIUM SERPL-MCNC: 9.3 MG/DL
CHLORIDE SERPL-SCNC: 100 MMOL/L
CO2 SERPL-SCNC: 21 MMOL/L
COLOR: YELLOW
CREAT SERPL-MCNC: 2.73 MG/DL
CREAT SPEC-SCNC: 34 MG/DL
CREAT/PROT UR: 1.2 RATIO
DEPRECATED KAPPA LC FREE/LAMBDA SER: 1.08 RATIO
EOSINOPHIL # BLD AUTO: 0.12 K/UL
EOSINOPHIL NFR BLD AUTO: 1.6 %
GLUCOSE QUALITATIVE U: NEGATIVE MG/DL
GLUCOSE SERPL-MCNC: 99 MG/DL
HCT VFR BLD CALC: 33.4 %
HGB BLD-MCNC: 10.5 G/DL
HYALINE CASTS: 2 /LPF
IMM GRANULOCYTES NFR BLD AUTO: 0.4 %
KAPPA LC CSF-MCNC: 5.02 MG/DL
KAPPA LC SERPL-MCNC: 5.43 MG/DL
KETONES URINE: NEGATIVE
LEUKOCYTE ESTERASE URINE: ABNORMAL
LYMPHOCYTES # BLD AUTO: 1.68 K/UL
LYMPHOCYTES NFR BLD AUTO: 22.4 %
M PROTEIN SPEC IFE-MCNC: NORMAL
MAN DIFF?: NORMAL
MCHC RBC-ENTMCNC: 27.9 PG
MCHC RBC-ENTMCNC: 31.4 GM/DL
MCV RBC AUTO: 88.6 FL
MICROSCOPIC-UA: NORMAL
MONOCYTES # BLD AUTO: 0.55 K/UL
MONOCYTES NFR BLD AUTO: 7.3 %
NEUTROPHILS # BLD AUTO: 5.07 K/UL
NEUTROPHILS NFR BLD AUTO: 67.8 %
NITRITE URINE: NEGATIVE
PARATHYROID HORMONE INTACT: 108 PG/ML
PH URINE: 6.5
PHOSPHATE SERPL-MCNC: 3.6 MG/DL
PLATELET # BLD AUTO: 203 K/UL
POTASSIUM SERPL-SCNC: 4.7 MMOL/L
PROT UR-MCNC: 41 MG/DL
PROTEIN URINE: 30 MG/DL
RBC # BLD: 3.77 M/UL
RBC # FLD: 14.8 %
RED BLOOD CELLS URINE: 2 /HPF
SODIUM SERPL-SCNC: 139 MMOL/L
SPECIFIC GRAVITY URINE: 1.01
SQUAMOUS EPITHELIAL CELLS: 0 /HPF
UROBILINOGEN URINE: NEGATIVE MG/DL
WBC # FLD AUTO: 7.49 K/UL
WHITE BLOOD CELLS URINE: 603 /HPF

## 2018-08-31 ENCOUNTER — APPOINTMENT (OUTPATIENT)
Dept: ELECTROPHYSIOLOGY | Facility: CLINIC | Age: 78
End: 2018-08-31
Payer: MEDICARE

## 2018-08-31 PROCEDURE — 93298 REM INTERROG DEV EVAL SCRMS: CPT

## 2018-08-31 PROCEDURE — 93299: CPT

## 2018-09-07 ENCOUNTER — RX RENEWAL (OUTPATIENT)
Age: 78
End: 2018-09-07

## 2018-09-24 ENCOUNTER — LABORATORY RESULT (OUTPATIENT)
Age: 78
End: 2018-09-24

## 2018-09-24 ENCOUNTER — APPOINTMENT (OUTPATIENT)
Dept: FAMILY MEDICINE | Facility: CLINIC | Age: 78
End: 2018-09-24
Payer: MEDICARE

## 2018-09-24 VITALS
RESPIRATION RATE: 13 BRPM | TEMPERATURE: 98.2 F | OXYGEN SATURATION: 98 % | WEIGHT: 165 LBS | HEIGHT: 65 IN | DIASTOLIC BLOOD PRESSURE: 76 MMHG | BODY MASS INDEX: 27.49 KG/M2 | SYSTOLIC BLOOD PRESSURE: 140 MMHG | HEART RATE: 68 BPM

## 2018-09-24 PROCEDURE — 36415 COLL VENOUS BLD VENIPUNCTURE: CPT

## 2018-09-24 PROCEDURE — 99214 OFFICE O/P EST MOD 30 MIN: CPT | Mod: 25

## 2018-09-24 PROCEDURE — 90662 IIV NO PRSV INCREASED AG IM: CPT

## 2018-09-24 PROCEDURE — G0008: CPT | Mod: 59

## 2018-09-24 RX ORDER — SULFAMETHOXAZOLE AND TRIMETHOPRIM 800; 160 MG/1; MG/1
800-160 TABLET ORAL TWICE DAILY
Qty: 28 | Refills: 0 | Status: DISCONTINUED | COMMUNITY
Start: 2018-07-27 | End: 2018-09-24

## 2018-09-24 NOTE — HISTORY OF PRESENT ILLNESS
[FreeTextEntry1] : Patient with HX DM HTN HLD and Anemia   Glucose this   feels good compliant of meds  has followed by renal for CRF needs labs \par has seen renal in past weeks not that active   advised  optho

## 2018-09-24 NOTE — PLAN
[FreeTextEntry1] : Patient with HX DM HTN HLD and Anemia   Glucose this   feels good compliant of meds  has followed by renal for CRF needs labs \par has seen renal in past weeks not that active   advised  optho \par Labs and Meds and flu shot advised follow up and needs 3 month visit

## 2018-09-24 NOTE — HEALTH RISK ASSESSMENT
[No falls in past year] : Patient reported no falls in the past year [0] : 2) Feeling down, depressed, or hopeless: Not at all (0) [] : No [[id number]] : 0

## 2018-09-24 NOTE — COUNSELING
[Activity counseling provided] : activity [Behavioral health counseling provided] : behavioral health  [Keep Food Diary] : Keep food diary [Low Salt Diet] : Low salt diet [Walking] : Walking [Engage in a relaxing activity] : Engage in a relaxing activity [None] : None

## 2018-09-25 LAB
ALBUMIN SERPL ELPH-MCNC: 4.6 G/DL
ALP BLD-CCNC: 77 U/L
ALT SERPL-CCNC: 7 U/L
ANION GAP SERPL CALC-SCNC: 17 MMOL/L
APPEARANCE: ABNORMAL
AST SERPL-CCNC: 11 U/L
BASOPHILS # BLD AUTO: 0.05 K/UL
BASOPHILS NFR BLD AUTO: 0.5 %
BILIRUB SERPL-MCNC: 0.2 MG/DL
BILIRUBIN URINE: NEGATIVE
BLOOD URINE: ABNORMAL
BUN SERPL-MCNC: 39 MG/DL
CALCIUM SERPL-MCNC: 9.3 MG/DL
CHLORIDE SERPL-SCNC: 99 MMOL/L
CHOLEST SERPL-MCNC: 146 MG/DL
CHOLEST/HDLC SERPL: 4.1 RATIO
CO2 SERPL-SCNC: 22 MMOL/L
COLOR: YELLOW
CREAT SERPL-MCNC: 2.41 MG/DL
CREAT SPEC-SCNC: 42 MG/DL
EOSINOPHIL # BLD AUTO: 0.23 K/UL
EOSINOPHIL NFR BLD AUTO: 2.5 %
ERYTHROCYTE [SEDIMENTATION RATE] IN BLOOD BY WESTERGREN METHOD: 28 MM/HR
GLUCOSE QUALITATIVE U: 100 MG/DL
GLUCOSE SERPL-MCNC: 195 MG/DL
HBA1C MFR BLD HPLC: 7.7 %
HCT VFR BLD CALC: 32.2 %
HDLC SERPL-MCNC: 36 MG/DL
HGB BLD-MCNC: 10 G/DL
IMM GRANULOCYTES NFR BLD AUTO: 0.3 %
KETONES URINE: NEGATIVE
LDLC SERPL CALC-MCNC: 82 MG/DL
LEUKOCYTE ESTERASE URINE: ABNORMAL
LYMPHOCYTES # BLD AUTO: 1.69 K/UL
LYMPHOCYTES NFR BLD AUTO: 18.4 %
MAN DIFF?: NORMAL
MCHC RBC-ENTMCNC: 26.5 PG
MCHC RBC-ENTMCNC: 31.1 GM/DL
MCV RBC AUTO: 85.2 FL
MICROALBUMIN 24H UR DL<=1MG/L-MCNC: 56.3 MG/DL
MICROALBUMIN/CREAT 24H UR-RTO: 1355 MG/G
MONOCYTES # BLD AUTO: 0.73 K/UL
MONOCYTES NFR BLD AUTO: 8 %
NEUTROPHILS # BLD AUTO: 6.44 K/UL
NEUTROPHILS NFR BLD AUTO: 70.3 %
NITRITE URINE: POSITIVE
PH URINE: 6
PLATELET # BLD AUTO: 236 K/UL
POTASSIUM SERPL-SCNC: 4.7 MMOL/L
PROT SERPL-MCNC: 7.4 G/DL
PROTEIN URINE: 100 MG/DL
RBC # BLD: 3.78 M/UL
RBC # FLD: 15.1 %
SODIUM SERPL-SCNC: 138 MMOL/L
SPECIFIC GRAVITY URINE: 1.01
TRIGL SERPL-MCNC: 138 MG/DL
TSH SERPL-ACNC: 5.28 UIU/ML
UROBILINOGEN URINE: NEGATIVE MG/DL
WBC # FLD AUTO: 9.17 K/UL

## 2018-10-01 ENCOUNTER — APPOINTMENT (OUTPATIENT)
Dept: ELECTROPHYSIOLOGY | Facility: CLINIC | Age: 78
End: 2018-10-01
Payer: MEDICARE

## 2018-10-01 PROCEDURE — 93299: CPT

## 2018-10-01 PROCEDURE — 93298 REM INTERROG DEV EVAL SCRMS: CPT

## 2018-10-02 ENCOUNTER — APPOINTMENT (OUTPATIENT)
Dept: NEPHROLOGY | Facility: CLINIC | Age: 78
End: 2018-10-02

## 2018-10-03 ENCOUNTER — APPOINTMENT (OUTPATIENT)
Dept: FAMILY MEDICINE | Facility: CLINIC | Age: 78
End: 2018-10-03
Payer: MEDICARE

## 2018-10-03 VITALS
DIASTOLIC BLOOD PRESSURE: 70 MMHG | SYSTOLIC BLOOD PRESSURE: 126 MMHG | OXYGEN SATURATION: 99 % | RESPIRATION RATE: 13 BRPM | WEIGHT: 165 LBS | HEART RATE: 76 BPM | TEMPERATURE: 98.6 F | HEIGHT: 65 IN | BODY MASS INDEX: 27.49 KG/M2

## 2018-10-03 PROCEDURE — 99214 OFFICE O/P EST MOD 30 MIN: CPT | Mod: 25

## 2018-10-03 NOTE — HISTORY OF PRESENT ILLNESS
[No episodes] : No hypoglycemic episodes since the last visit. [Check glucose ___ x/day] : Patient checks  blood glucose [unfilled]  times a day [Regular podiatrist visits] : Patient had regular podiatrist visits [Understanding of foot care] : Patient expressed understanding of foot care [Most Recent A1C: ___] : Most recent A1C was [unfilled] [Near target goal] : A1C near target goal [EyeExamDate] : 03/11 [Spouse] : spouse [de-identified] : Patient presents for follow up of health care. Dietis good and drinking water daily. Compliant with medications.

## 2018-10-03 NOTE — HEALTH RISK ASSESSMENT
[No falls in past year] : Patient reported no falls in the past year [0] : 2) Feeling down, depressed, or hopeless: Not at all (0) [] : No [de-identified] : social

## 2018-10-22 ENCOUNTER — RX RENEWAL (OUTPATIENT)
Age: 78
End: 2018-10-22

## 2018-10-25 ENCOUNTER — RX RENEWAL (OUTPATIENT)
Age: 78
End: 2018-10-25

## 2018-11-01 ENCOUNTER — RX RENEWAL (OUTPATIENT)
Age: 78
End: 2018-11-01

## 2018-11-02 ENCOUNTER — APPOINTMENT (OUTPATIENT)
Dept: ELECTROPHYSIOLOGY | Facility: CLINIC | Age: 78
End: 2018-11-02
Payer: MEDICARE

## 2018-11-02 PROCEDURE — 93299: CPT

## 2018-11-02 PROCEDURE — 93298 REM INTERROG DEV EVAL SCRMS: CPT

## 2018-11-05 ENCOUNTER — RX RENEWAL (OUTPATIENT)
Age: 78
End: 2018-11-05

## 2018-11-13 ENCOUNTER — APPOINTMENT (OUTPATIENT)
Dept: CARDIOLOGY | Facility: CLINIC | Age: 78
End: 2018-11-13

## 2018-12-03 ENCOUNTER — APPOINTMENT (OUTPATIENT)
Dept: ELECTROPHYSIOLOGY | Facility: CLINIC | Age: 78
End: 2018-12-03
Payer: MEDICARE

## 2018-12-03 ENCOUNTER — RX RENEWAL (OUTPATIENT)
Age: 78
End: 2018-12-03

## 2018-12-03 PROCEDURE — 93299: CPT

## 2018-12-03 PROCEDURE — 93298 REM INTERROG DEV EVAL SCRMS: CPT

## 2018-12-05 ENCOUNTER — RX RENEWAL (OUTPATIENT)
Age: 78
End: 2018-12-05

## 2018-12-19 ENCOUNTER — APPOINTMENT (OUTPATIENT)
Dept: CARDIOLOGY | Facility: CLINIC | Age: 78
End: 2018-12-19

## 2019-01-04 ENCOUNTER — APPOINTMENT (OUTPATIENT)
Dept: ELECTROPHYSIOLOGY | Facility: CLINIC | Age: 79
End: 2019-01-04
Payer: MEDICARE

## 2019-01-04 PROCEDURE — 93299: CPT

## 2019-01-04 PROCEDURE — 93298 REM INTERROG DEV EVAL SCRMS: CPT

## 2019-01-11 ENCOUNTER — RX RENEWAL (OUTPATIENT)
Age: 79
End: 2019-01-11

## 2019-01-18 ENCOUNTER — NON-APPOINTMENT (OUTPATIENT)
Age: 79
End: 2019-01-18

## 2019-01-18 ENCOUNTER — APPOINTMENT (OUTPATIENT)
Dept: CARDIOLOGY | Facility: CLINIC | Age: 79
End: 2019-01-18
Payer: MEDICARE

## 2019-01-18 VITALS
SYSTOLIC BLOOD PRESSURE: 177 MMHG | HEIGHT: 64 IN | HEART RATE: 75 BPM | RESPIRATION RATE: 18 BRPM | OXYGEN SATURATION: 100 % | DIASTOLIC BLOOD PRESSURE: 75 MMHG

## 2019-01-18 VITALS — DIASTOLIC BLOOD PRESSURE: 69 MMHG | SYSTOLIC BLOOD PRESSURE: 178 MMHG

## 2019-01-18 VITALS — WEIGHT: 166 LBS | BODY MASS INDEX: 28.49 KG/M2

## 2019-01-18 PROCEDURE — 93000 ELECTROCARDIOGRAM COMPLETE: CPT

## 2019-01-18 PROCEDURE — 99215 OFFICE O/P EST HI 40 MIN: CPT

## 2019-01-18 NOTE — PHYSICAL EXAM
[General Appearance - Well Developed] : well developed [Normal Appearance] : normal appearance [Well Groomed] : well groomed [General Appearance - Well Nourished] : well nourished [No Deformities] : no deformities [General Appearance - In No Acute Distress] : no acute distress [Normal Conjunctiva] : the conjunctiva exhibited no abnormalities [Eyelids - No Xanthelasma] : the eyelids demonstrated no xanthelasmas [Normal Oral Mucosa] : normal oral mucosa [No Oral Pallor] : no oral pallor [No Oral Cyanosis] : no oral cyanosis [Normal Jugular Venous A Waves Present] : normal jugular venous A waves present [Normal Jugular Venous V Waves Present] : normal jugular venous V waves present [No Jugular Venous Perez A Waves] : no jugular venous perez A waves [Respiration, Rhythm And Depth] : normal respiratory rhythm and effort [Exaggerated Use Of Accessory Muscles For Inspiration] : no accessory muscle use [Auscultation Breath Sounds / Voice Sounds] : lungs were clear to auscultation bilaterally [Heart Rate And Rhythm] : heart rate and rhythm were normal [Heart Sounds] : normal S1 and S2 [Edema] : no peripheral edema present [Veins - Varicosity Changes] : no varicosital changes were noted in the lower extremities [Abdomen Soft] : soft [Abdomen Tenderness] : non-tender [Abdomen Mass (___ Cm)] : no abdominal mass palpated [Nail Clubbing] : no clubbing of the fingernails [Cyanosis, Localized] : no localized cyanosis [Petechial Hemorrhages (___cm)] : no petechial hemorrhages [Skin Color & Pigmentation] : normal skin color and pigmentation [] : no rash [No Venous Stasis] : no venous stasis [Skin Lesions] : no skin lesions [No Skin Ulcers] : no skin ulcer [No Xanthoma] : no  xanthoma was observed [Oriented To Time, Place, And Person] : oriented to person, place, and time [Affect] : the affect was normal [Mood] : the mood was normal [No Anxiety] : not feeling anxious [FreeTextEntry1] : gait insuffienct for exercise due to arhtritis.

## 2019-01-18 NOTE — REASON FOR VISIT
[Follow-Up - From Hospitalization] : follow-up of a recent hospitalization for [FreeTextEntry2] : HTN, atrial fibrillation [FreeTextEntry1] : HTN, atrial fibrillation

## 2019-01-18 NOTE — CARDIOLOGY SUMMARY
[LVEF ___%] : LVEF [unfilled]% [None] : normal LV function [Enlarged] : enlarged LA size [Mild] : mild mitral regurgitation [___] : [unfilled] [de-identified] : feb 2018 : carotid US: mild atherosclerosis. no stenosis.

## 2019-01-18 NOTE — DISCUSSION/SUMMARY
[Patient] : the patient [Risks] : risks [Benefits] : benefits [Alternatives] : alternatives [___ Week(s)] : [unfilled] week(s) [With ___] : with [unfilled] [FreeTextEntry1] : 75 M with HTN, DM, HLD, paroxsymal atrial fibrillation with Supra therapeutic INR and acute severe GI bleeding now off coumadin on ILR to evaluate for Afib burden.\par 1) Paroxysmal Afib: s/p cardioversion jan 2015, CHADSVASC >=3..  =  xarelto not approved for life long or high co pay.  no coumadin as patient had bleeding on supra therapeutic INR.  ct  aspirin 325  mg daily. F/u ILR. low afib burdern. ct follow up with ILR. \par 2) HTN: Uncontrolled. ct hydralazine 25 Q8 ct  chlorthalidone 25 mg daily, ct cardizem 120 Daily.  intiate losartan 50 mg daily. f/u in 2 weeks. BP check and BMP check. \par 4) GI bleeding. F/u with his PMD. no more bleeding. \par 5) CAD evaluation: risk factors for CAD. HTN, Uncontrolled DM, CKD and HLD aspirin, statins. nuclear stress test and echo. \par 6) MODerate aortic stenosis: 2 D echo. asymtomatic. repeat echo. \par 7_ Bilateral carotid bruitL: US carotid arteries. : mild atherosclerosis. no stenosis. likely aortic stensis murmur. however, exam appears holosystolic and more bruit than radiation. repeat US carotid Arteries. \par \par patient does not remember his meds. did not brin g his meds. Wife said he will bring the meds next time.

## 2019-01-18 NOTE — HISTORY OF PRESENT ILLNESS
[FreeTextEntry1] : HTN, atrial fibrillation\par compliant with meds. no syncope. nod izzines.s no palptiatiosn. no falls. no bleeding. no headaches. no chest pain,. no dyspena. \par \par \par ol dnote: no chest pain. no dyspnea. no headaches. no dizziness. \par \par old note: no chest pain. no dyspnea. routine follow up. compliant with meds. no fall. no bleeding. \par \par \par prior note: last visit, did not bring his pills. BP high, added Benica-HCTZ.  Compliant with emds. Did not bring his pills today again.  Also, said that did not  Benicar-HCTZ due to high co pay. Only taking one BP pill. Not compliant with diet. \par No chest pain. No dyspnea.\par \par OLD NOTE: This is a 74 year old man with history of HTN, DM, CKD (Stage 3) Atrial fibrillation, prior pneumonia and pleural effusions and pericardial effusion, recently admitted to hospital for rectal bleeding and Elevated INR Hb < 4 , INR =8 and acute on chronic renal failure. CT abd showed, diverticular disease.  Colonoscopy showed, diverticulitis, EGD showed  Patient is off AC. \par

## 2019-01-28 ENCOUNTER — APPOINTMENT (OUTPATIENT)
Dept: CARDIOLOGY | Facility: CLINIC | Age: 79
End: 2019-01-28
Payer: MEDICARE

## 2019-02-04 ENCOUNTER — APPOINTMENT (OUTPATIENT)
Dept: CARDIOLOGY | Facility: CLINIC | Age: 79
End: 2019-02-04
Payer: MEDICARE

## 2019-02-04 PROCEDURE — 78452 HT MUSCLE IMAGE SPECT MULT: CPT

## 2019-02-04 PROCEDURE — A9500: CPT

## 2019-02-04 PROCEDURE — 93015 CV STRESS TEST SUPVJ I&R: CPT

## 2019-02-05 ENCOUNTER — RX RENEWAL (OUTPATIENT)
Age: 79
End: 2019-02-05

## 2019-02-08 ENCOUNTER — APPOINTMENT (OUTPATIENT)
Dept: CARDIOLOGY | Facility: CLINIC | Age: 79
End: 2019-02-08
Payer: MEDICARE

## 2019-02-08 PROCEDURE — 93306 TTE W/DOPPLER COMPLETE: CPT

## 2019-02-08 PROCEDURE — 93880 EXTRACRANIAL BILAT STUDY: CPT

## 2019-02-11 ENCOUNTER — APPOINTMENT (OUTPATIENT)
Dept: ELECTROPHYSIOLOGY | Facility: CLINIC | Age: 79
End: 2019-02-11
Payer: MEDICARE

## 2019-02-11 PROCEDURE — 93299: CPT

## 2019-02-11 PROCEDURE — 93298 REM INTERROG DEV EVAL SCRMS: CPT

## 2019-02-12 ENCOUNTER — APPOINTMENT (OUTPATIENT)
Dept: CARDIOLOGY | Facility: CLINIC | Age: 79
End: 2019-02-12
Payer: MEDICARE

## 2019-02-12 VITALS
WEIGHT: 169 LBS | SYSTOLIC BLOOD PRESSURE: 187 MMHG | DIASTOLIC BLOOD PRESSURE: 64 MMHG | BODY MASS INDEX: 28.85 KG/M2 | OXYGEN SATURATION: 100 % | HEIGHT: 64 IN | HEART RATE: 70 BPM

## 2019-02-12 VITALS — SYSTOLIC BLOOD PRESSURE: 160 MMHG | DIASTOLIC BLOOD PRESSURE: 64 MMHG

## 2019-02-12 PROCEDURE — 99214 OFFICE O/P EST MOD 30 MIN: CPT

## 2019-02-15 ENCOUNTER — APPOINTMENT (OUTPATIENT)
Dept: FAMILY MEDICINE | Facility: CLINIC | Age: 79
End: 2019-02-15
Payer: MEDICARE

## 2019-02-15 ENCOUNTER — LABORATORY RESULT (OUTPATIENT)
Age: 79
End: 2019-02-15

## 2019-02-15 VITALS
TEMPERATURE: 98 F | BODY MASS INDEX: 28.85 KG/M2 | RESPIRATION RATE: 14 BRPM | HEIGHT: 64 IN | SYSTOLIC BLOOD PRESSURE: 120 MMHG | WEIGHT: 169 LBS | OXYGEN SATURATION: 98 % | HEART RATE: 73 BPM | DIASTOLIC BLOOD PRESSURE: 70 MMHG

## 2019-02-15 PROCEDURE — 99214 OFFICE O/P EST MOD 30 MIN: CPT | Mod: 25

## 2019-02-15 PROCEDURE — 36415 COLL VENOUS BLD VENIPUNCTURE: CPT

## 2019-02-15 NOTE — REVIEW OF SYSTEMS
[Fever] : no fever [Chills] : no chills [Fatigue] : fatigue [Night Sweats] : no night sweats [Discharge] : no discharge [Pain] : no pain [Vision Problems] : no vision problems [Itching] : no itching [Chest Pain] : no chest pain [Palpitations] : no palpitations [Lower Ext Edema] : no lower extremity edema [Shortness Of Breath] : no shortness of breath [Dyspnea on Exertion] : not dyspnea on exertion [Abdominal Pain] : no abdominal pain [Nausea] : no nausea [Constipation] : no constipation [Diarrhea] : diarrhea [Vomiting] : no vomiting [Heartburn] : no heartburn [Dysuria] : no dysuria [Incontinence] : incontinence [Hesitancy] : hesitancy [Nocturia] : nocturia [Impotence] : no impotency [Poor Libido] : poor libido [Joint Pain] : joint pain [Joint Stiffness] : no joint stiffness [Back Pain] : back pain [Mole Changes] : no mole changes [Nail Changes] : no nail changes [Hair Changes] : no hair changes [Negative] : Heme/Lymph

## 2019-02-15 NOTE — HISTORY OF PRESENT ILLNESS
[Spouse] : spouse [FreeTextEntry1] : Patient in for follow up visit  for CAD HTN HLD Valvular heart disease DM, Afib, BPH and DJD, comes with wife no pain or palpitation , needs meds and labs

## 2019-02-15 NOTE — HEALTH RISK ASSESSMENT
[] : No [No falls in past year] : Patient reported no falls in the past year [0] : 2) Feeling down, depressed, or hopeless: Not at all (0) [[id number]] : 0

## 2019-02-15 NOTE — PLAN
[FreeTextEntry1] : Patient in for follow up visit  for CAD HTN HLD Valvular heart disease DM, Afib, BPH and DJD, comes with wife no pain or palpitation , needs meds and labs \par \par Labs and diabetic care plan reviewed  Patient declines Pneumonia shot will consider optho \par RTC 3 month

## 2019-02-16 LAB
ALBUMIN SERPL ELPH-MCNC: 4.4 G/DL
ALP BLD-CCNC: 77 U/L
ALT SERPL-CCNC: 7 U/L
ANION GAP SERPL CALC-SCNC: 13 MMOL/L
APPEARANCE: ABNORMAL
AST SERPL-CCNC: 10 U/L
BASOPHILS # BLD AUTO: 0.06 K/UL
BASOPHILS NFR BLD AUTO: 0.6 %
BILIRUB SERPL-MCNC: 0.2 MG/DL
BILIRUBIN URINE: NEGATIVE
BLOOD URINE: ABNORMAL
BUN SERPL-MCNC: 60 MG/DL
CALCIUM SERPL-MCNC: 8.6 MG/DL
CHLORIDE SERPL-SCNC: 108 MMOL/L
CHOLEST SERPL-MCNC: 113 MG/DL
CHOLEST/HDLC SERPL: 3.3 RATIO
CO2 SERPL-SCNC: 19 MMOL/L
COLOR: YELLOW
CREAT SERPL-MCNC: 2.94 MG/DL
CREAT SPEC-SCNC: 38 MG/DL
EOSINOPHIL # BLD AUTO: 0.38 K/UL
EOSINOPHIL NFR BLD AUTO: 3.8 %
ERYTHROCYTE [SEDIMENTATION RATE] IN BLOOD BY WESTERGREN METHOD: 35 MM/HR
GLUCOSE QUALITATIVE U: NEGATIVE MG/DL
GLUCOSE SERPL-MCNC: 85 MG/DL
HBA1C MFR BLD HPLC: 12.4 %
HCT VFR BLD CALC: 29.2 %
HDLC SERPL-MCNC: 34 MG/DL
HGB BLD-MCNC: 9.2 G/DL
IMM GRANULOCYTES NFR BLD AUTO: 0.2 %
KETONES URINE: NEGATIVE
LDLC SERPL CALC-MCNC: 65 MG/DL
LEUKOCYTE ESTERASE URINE: ABNORMAL
LYMPHOCYTES # BLD AUTO: 1.27 K/UL
LYMPHOCYTES NFR BLD AUTO: 12.7 %
MAN DIFF?: NORMAL
MCHC RBC-ENTMCNC: 26.2 PG
MCHC RBC-ENTMCNC: 31.5 GM/DL
MCV RBC AUTO: 83.2 FL
MICROALBUMIN 24H UR DL<=1MG/L-MCNC: 35.5 MG/DL
MICROALBUMIN/CREAT 24H UR-RTO: 935 MG/G
MONOCYTES # BLD AUTO: 1.31 K/UL
MONOCYTES NFR BLD AUTO: 13.1 %
NEUTROPHILS # BLD AUTO: 6.97 K/UL
NEUTROPHILS NFR BLD AUTO: 69.6 %
NITRITE URINE: NEGATIVE
PH URINE: 6
PLATELET # BLD AUTO: 234 K/UL
POTASSIUM SERPL-SCNC: 4.3 MMOL/L
PROT SERPL-MCNC: 6.8 G/DL
PROTEIN URINE: 100 MG/DL
RBC # BLD: 3.51 M/UL
RBC # FLD: 14.6 %
SODIUM SERPL-SCNC: 140 MMOL/L
SPECIFIC GRAVITY URINE: 1.01
TRIGL SERPL-MCNC: 68 MG/DL
UROBILINOGEN URINE: NEGATIVE MG/DL
WBC # FLD AUTO: 10.01 K/UL

## 2019-02-18 LAB
PSA SERPL-MCNC: 0.62 NG/ML
TSH SERPL-ACNC: 6.05 UIU/ML

## 2019-02-19 LAB
ALBUMIN SERPL ELPH-MCNC: 4.6 G/DL
ALP BLD-CCNC: 79 U/L
ALT SERPL-CCNC: 8 U/L
ANION GAP SERPL CALC-SCNC: 11 MMOL/L
AST SERPL-CCNC: 10 U/L
BILIRUB SERPL-MCNC: 0.2 MG/DL
BUN SERPL-MCNC: 62 MG/DL
CALCIUM SERPL-MCNC: 8.6 MG/DL
CHLORIDE SERPL-SCNC: 105 MMOL/L
CO2 SERPL-SCNC: 19 MMOL/L
CREAT SERPL-MCNC: 2.99 MG/DL
GLUCOSE SERPL-MCNC: 348 MG/DL
POTASSIUM SERPL-SCNC: 4.4 MMOL/L
PROT SERPL-MCNC: 6.8 G/DL
SODIUM SERPL-SCNC: 135 MMOL/L

## 2019-03-15 ENCOUNTER — APPOINTMENT (OUTPATIENT)
Dept: ELECTROPHYSIOLOGY | Facility: CLINIC | Age: 79
End: 2019-03-15
Payer: MEDICARE

## 2019-03-15 PROCEDURE — 93299: CPT

## 2019-03-15 PROCEDURE — 93298 REM INTERROG DEV EVAL SCRMS: CPT

## 2019-03-18 ENCOUNTER — APPOINTMENT (OUTPATIENT)
Dept: ELECTROPHYSIOLOGY | Facility: CLINIC | Age: 79
End: 2019-03-18

## 2019-03-18 ENCOUNTER — RX RENEWAL (OUTPATIENT)
Age: 79
End: 2019-03-18

## 2019-03-18 ENCOUNTER — APPOINTMENT (OUTPATIENT)
Dept: FAMILY MEDICINE | Facility: CLINIC | Age: 79
End: 2019-03-18
Payer: MEDICARE

## 2019-03-18 VITALS
TEMPERATURE: 98 F | DIASTOLIC BLOOD PRESSURE: 62 MMHG | RESPIRATION RATE: 13 BRPM | OXYGEN SATURATION: 99 % | SYSTOLIC BLOOD PRESSURE: 110 MMHG | BODY MASS INDEX: 28.85 KG/M2 | HEIGHT: 64 IN | HEART RATE: 78 BPM | WEIGHT: 169 LBS

## 2019-03-18 PROCEDURE — 36415 COLL VENOUS BLD VENIPUNCTURE: CPT

## 2019-03-18 PROCEDURE — 99214 OFFICE O/P EST MOD 30 MIN: CPT | Mod: 25

## 2019-03-18 NOTE — REVIEW OF SYSTEMS
[Fever] : no fever [Chills] : no chills [Night Sweats] : no night sweats [Fatigue] : fatigue [Discharge] : no discharge [Pain] : no pain [Vision Problems] : no vision problems [Chest Pain] : no chest pain [Itching] : no itching [Palpitations] : no palpitations [Lower Ext Edema] : no lower extremity edema [Shortness Of Breath] : no shortness of breath [Abdominal Pain] : no abdominal pain [Dyspnea on Exertion] : not dyspnea on exertion [Nausea] : no nausea [Constipation] : no constipation [Diarrhea] : diarrhea [Vomiting] : no vomiting [Heartburn] : no heartburn [Dysuria] : no dysuria [Incontinence] : incontinence [Hesitancy] : hesitancy [Nocturia] : nocturia [Poor Libido] : poor libido [Impotence] : no impotency [Joint Pain] : joint pain [Back Pain] : back pain [Joint Stiffness] : no joint stiffness [Mole Changes] : no mole changes [Nail Changes] : no nail changes [Hair Changes] : no hair changes [Negative] : Heme/Lymph

## 2019-03-18 NOTE — HEALTH RISK ASSESSMENT
[] : No [No falls in past year] : Patient reported no falls in the past year [0] : 2) Feeling down, depressed, or hopeless: Not at all (0) [[id number]] : 0

## 2019-03-18 NOTE — PHYSICAL EXAM
[No Acute Distress] : no acute distress [Well Nourished] : well nourished [Well-Appearing] : well-appearing [Well Developed] : well developed [Normal Sclera/Conjunctiva] : normal sclera/conjunctiva [PERRL] : pupils equal round and reactive to light [EOMI] : extraocular movements intact [Normal Outer Ear/Nose] : the outer ears and nose were normal in appearance [Normal Oropharynx] : the oropharynx was normal [No JVD] : no jugular venous distention [Supple] : supple [No Lymphadenopathy] : no lymphadenopathy [Thyroid Normal, No Nodules] : the thyroid was normal and there were no nodules present [No Respiratory Distress] : no respiratory distress  [Clear to Auscultation] : lungs were clear to auscultation bilaterally [No Accessory Muscle Use] : no accessory muscle use [Normal Rate] : normal rate  [Regular Rhythm] : with a regular rhythm [Normal S1, S2] : normal S1 and S2 [No Murmur] : no murmur heard [No Carotid Bruits] : no carotid bruits [No Abdominal Bruit] : a ~M bruit was not heard ~T in the abdomen [Pedal Pulses Present] : the pedal pulses are present [No Varicosities] : no varicosities [No Edema] : there was no peripheral edema [No Palpable Aorta] : no palpable aorta [No Extremity Clubbing/Cyanosis] : no extremity clubbing/cyanosis [Non Tender] : non-tender [Soft] : abdomen soft [Non-distended] : non-distended [No Masses] : no abdominal mass palpated [No HSM] : no HSM [Normal Bowel Sounds] : normal bowel sounds [Normal Posterior Cervical Nodes] : no posterior cervical lymphadenopathy [Normal Anterior Cervical Nodes] : no anterior cervical lymphadenopathy [No CVA Tenderness] : no CVA  tenderness [No Spinal Tenderness] : no spinal tenderness [No Joint Swelling] : no joint swelling [Grossly Normal Strength/Tone] : grossly normal strength/tone [No Rash] : no rash [Coordination Grossly Intact] : coordination grossly intact [Normal Gait] : normal gait [No Focal Deficits] : no focal deficits [Deep Tendon Reflexes (DTR)] : deep tendon reflexes were 2+ and symmetric [Normal Affect] : the affect was normal [Normal Insight/Judgement] : insight and judgment were intact

## 2019-03-18 NOTE — HISTORY OF PRESENT ILLNESS
[FreeTextEntry1] : Patient in for poor control of DM HGA!C  12.4 wife states patient not eating much and has glucose in 100s Patient not always injecting, not eating well. only 2 meals  wife made aware of worsening  diabetic control

## 2019-03-18 NOTE — PLAN
[FreeTextEntry1] : Patient in for poor control of DM HGA!C  12.4 wife states patient not eating much and has glucose in 100s Patient not always injecting, not eating well. only 2 meals  wife made aware of worsening  diabetic control\par Care plan reinforced Diabetic testing advised  consider care MGT

## 2019-03-19 LAB
ALBUMIN SERPL ELPH-MCNC: 4.4 G/DL
ALP BLD-CCNC: 77 U/L
ALT SERPL-CCNC: 7 U/L
ANION GAP SERPL CALC-SCNC: 18 MMOL/L
AST SERPL-CCNC: 12 U/L
BASOPHILS # BLD AUTO: 0.08 K/UL
BASOPHILS NFR BLD AUTO: 0.9 %
BILIRUB SERPL-MCNC: <0.2 MG/DL
BUN SERPL-MCNC: 56 MG/DL
CALCIUM SERPL-MCNC: 8.6 MG/DL
CHLORIDE SERPL-SCNC: 104 MMOL/L
CO2 SERPL-SCNC: 16 MMOL/L
CREAT SERPL-MCNC: 3.19 MG/DL
EOSINOPHIL # BLD AUTO: 0.3 K/UL
EOSINOPHIL NFR BLD AUTO: 3.5 %
GLUCOSE SERPL-MCNC: 146 MG/DL
HCT VFR BLD CALC: 29.1 %
HGB BLD-MCNC: 8.3 G/DL
IMM GRANULOCYTES NFR BLD AUTO: 0.3 %
LYMPHOCYTES # BLD AUTO: 1.26 K/UL
LYMPHOCYTES NFR BLD AUTO: 14.5 %
MAN DIFF?: NORMAL
MCHC RBC-ENTMCNC: 25.2 PG
MCHC RBC-ENTMCNC: 28.5 GM/DL
MCV RBC AUTO: 88.2 FL
MONOCYTES # BLD AUTO: 0.82 K/UL
MONOCYTES NFR BLD AUTO: 9.5 %
NEUTROPHILS # BLD AUTO: 6.17 K/UL
NEUTROPHILS NFR BLD AUTO: 71.3 %
PLATELET # BLD AUTO: 231 K/UL
POTASSIUM SERPL-SCNC: 5.9 MMOL/L
PROT SERPL-MCNC: 7 G/DL
RBC # BLD: 3.3 M/UL
RBC # FLD: 14.9 %
SODIUM SERPL-SCNC: 138 MMOL/L
WBC # FLD AUTO: 8.66 K/UL

## 2019-03-20 ENCOUNTER — EMERGENCY (EMERGENCY)
Facility: HOSPITAL | Age: 79
LOS: 1 days | Discharge: HOME | End: 2019-03-20
Attending: EMERGENCY MEDICINE
Payer: MEDICARE

## 2019-03-20 ENCOUNTER — APPOINTMENT (OUTPATIENT)
Dept: FAMILY MEDICINE | Facility: CLINIC | Age: 79
End: 2019-03-20
Payer: MEDICARE

## 2019-03-20 VITALS
SYSTOLIC BLOOD PRESSURE: 193 MMHG | TEMPERATURE: 98 F | DIASTOLIC BLOOD PRESSURE: 75 MMHG | HEIGHT: 64 IN | WEIGHT: 169.98 LBS | OXYGEN SATURATION: 100 % | RESPIRATION RATE: 18 BRPM | HEART RATE: 71 BPM

## 2019-03-20 VITALS
RESPIRATION RATE: 13 BRPM | OXYGEN SATURATION: 98 % | HEART RATE: 62 BPM | BODY MASS INDEX: 28.85 KG/M2 | DIASTOLIC BLOOD PRESSURE: 72 MMHG | SYSTOLIC BLOOD PRESSURE: 116 MMHG | HEIGHT: 64 IN | TEMPERATURE: 97.7 F | WEIGHT: 169 LBS

## 2019-03-20 VITALS
HEART RATE: 74 BPM | OXYGEN SATURATION: 99 % | RESPIRATION RATE: 18 BRPM | DIASTOLIC BLOOD PRESSURE: 84 MMHG | SYSTOLIC BLOOD PRESSURE: 172 MMHG

## 2019-03-20 DIAGNOSIS — Z96.0 PRESENCE OF UROGENITAL IMPLANTS: Chronic | ICD-10-CM

## 2019-03-20 LAB
ALBUMIN SERPL ELPH-MCNC: 4.5 G/DL — SIGNIFICANT CHANGE UP (ref 3.3–5.2)
ALP SERPL-CCNC: 74 U/L — SIGNIFICANT CHANGE UP (ref 40–120)
ALT FLD-CCNC: 6 U/L — SIGNIFICANT CHANGE UP
ANION GAP SERPL CALC-SCNC: 13 MMOL/L — SIGNIFICANT CHANGE UP (ref 5–17)
APPEARANCE UR: ABNORMAL
AST SERPL-CCNC: 9 U/L — SIGNIFICANT CHANGE UP
BACTERIA # UR AUTO: ABNORMAL
BILIRUB SERPL-MCNC: <0.2 MG/DL — LOW (ref 0.4–2)
BILIRUB UR-MCNC: NEGATIVE — SIGNIFICANT CHANGE UP
BUN SERPL-MCNC: 45
BUN SERPL-MCNC: 52 MG/DL — HIGH (ref 8–20)
CA-I SERPL-SCNC: 1.1
CALCIUM SERPL-MCNC: 8.6 MG/DL — SIGNIFICANT CHANGE UP (ref 8.6–10.2)
CHLORIDE SERPL-SCNC: 106 MMOL/L — SIGNIFICANT CHANGE UP (ref 98–107)
CHLORIDE SERPL-SCNC: 107
CO2 BLDA-SCNC: 19
CO2 SERPL-SCNC: 18 MMOL/L — LOW (ref 22–29)
COLOR SPEC: YELLOW — SIGNIFICANT CHANGE UP
CREAT ?TM UR-MCNC: 26 MG/DL — SIGNIFICANT CHANGE UP
CREAT SERPL-MCNC: 3.02 MG/DL — HIGH (ref 0.5–1.3)
CREAT SERPL-MCNC: 3.2
DIFF PNL FLD: ABNORMAL
EPI CELLS # UR: SIGNIFICANT CHANGE UP
GLUCOSE SERPL-MCNC: 124
GLUCOSE SERPL-MCNC: 87 MG/DL — SIGNIFICANT CHANGE UP (ref 70–115)
GLUCOSE UR QL: NEGATIVE MG/DL — SIGNIFICANT CHANGE UP
HCT VFR BLD CALC: 26.2 % — LOW (ref 42–52)
HEMOGLOBIN: 8.5
HGB BLD-MCNC: 8.4 G/DL — LOW (ref 14–18)
KETONES UR-MCNC: NEGATIVE — SIGNIFICANT CHANGE UP
LEUKOCYTE ESTERASE UR-ACNC: ABNORMAL
MCHC RBC-ENTMCNC: 25.7 PG — LOW (ref 27–31)
MCHC RBC-ENTMCNC: 32.1 G/DL — SIGNIFICANT CHANGE UP (ref 32–36)
MCV RBC AUTO: 80.1 FL — SIGNIFICANT CHANGE UP (ref 80–94)
NITRITE UR-MCNC: NEGATIVE — SIGNIFICANT CHANGE UP
NT-PROBNP SERPL-MCNC: NORMAL
OSMOLALITY UR: 227 MOSM/KG — LOW (ref 300–1000)
PH UR: 6 — SIGNIFICANT CHANGE UP (ref 5–8)
PLATELET # BLD AUTO: 202 K/UL — SIGNIFICANT CHANGE UP (ref 150–400)
POTASSIUM SERPL-MCNC: 4.7 MMOL/L — SIGNIFICANT CHANGE UP (ref 3.5–5.3)
POTASSIUM SERPL-SCNC: 4.7 MMOL/L — SIGNIFICANT CHANGE UP (ref 3.5–5.3)
POTASSIUM SERPL-SCNC: 4.9
PROT SERPL-MCNC: 7.2 G/DL — SIGNIFICANT CHANGE UP (ref 6.6–8.7)
PROT UR-MCNC: 100 MG/DL
RBC # BLD: 3.27 M/UL — LOW (ref 4.6–6.2)
RBC # FLD: 14.8 % — SIGNIFICANT CHANGE UP (ref 11–15.6)
RBC CASTS # UR COMP ASSIST: ABNORMAL /HPF (ref 0–4)
SODIUM SERPL-SCNC: 137
SODIUM SERPL-SCNC: 137 MMOL/L — SIGNIFICANT CHANGE UP (ref 135–145)
SODIUM UR-SCNC: 62 MMOL/L — SIGNIFICANT CHANGE UP
SP GR SPEC: 1 — LOW (ref 1.01–1.02)
UROBILINOGEN FLD QL: NEGATIVE MG/DL — SIGNIFICANT CHANGE UP
WBC # BLD: 7.6 K/UL — SIGNIFICANT CHANGE UP (ref 4.8–10.8)
WBC # FLD AUTO: 7.6 K/UL — SIGNIFICANT CHANGE UP (ref 4.8–10.8)
WBC UR QL: >50

## 2019-03-20 PROCEDURE — 80053 COMPREHEN METABOLIC PANEL: CPT

## 2019-03-20 PROCEDURE — 36415 COLL VENOUS BLD VENIPUNCTURE: CPT

## 2019-03-20 PROCEDURE — 83935 ASSAY OF URINE OSMOLALITY: CPT

## 2019-03-20 PROCEDURE — 99284 EMERGENCY DEPT VISIT MOD MDM: CPT | Mod: 25

## 2019-03-20 PROCEDURE — 83735 ASSAY OF MAGNESIUM: CPT

## 2019-03-20 PROCEDURE — 93010 ELECTROCARDIOGRAM REPORT: CPT

## 2019-03-20 PROCEDURE — 80047 BASIC METABLC PNL IONIZED CA: CPT | Mod: QW

## 2019-03-20 PROCEDURE — 99214 OFFICE O/P EST MOD 30 MIN: CPT | Mod: 25

## 2019-03-20 PROCEDURE — 85027 COMPLETE CBC AUTOMATED: CPT

## 2019-03-20 PROCEDURE — 93005 ELECTROCARDIOGRAM TRACING: CPT

## 2019-03-20 PROCEDURE — 81001 URINALYSIS AUTO W/SCOPE: CPT

## 2019-03-20 PROCEDURE — 82570 ASSAY OF URINE CREATININE: CPT

## 2019-03-20 PROCEDURE — 93000 ELECTROCARDIOGRAM COMPLETE: CPT | Mod: 59

## 2019-03-20 PROCEDURE — 99284 EMERGENCY DEPT VISIT MOD MDM: CPT

## 2019-03-20 PROCEDURE — 83930 ASSAY OF BLOOD OSMOLALITY: CPT

## 2019-03-20 PROCEDURE — 84300 ASSAY OF URINE SODIUM: CPT

## 2019-03-20 NOTE — ED PROVIDER NOTE - PROGRESS NOTE DETAILS
no hyperkalemia, Cr near baseline, improved from recent lab work. chronic anemia not symptomatic. will d/c with renal Follow up -Dion DO spoke to emily, labs ok for discharge, will see in office

## 2019-03-20 NOTE — ED STATDOCS - NS_ ATTENDINGSCRIBEDETAILS _ED_A_ED_FT
I, Irvin Johnson, performed the initial face to face bedside interview with this patient regarding history of present illness, review of symptoms and relevant past medical, social and family history.  I completed an independent physical examination.    The history, relevant review of systems, past medical and surgical history, medical decision making, and physical examination was documented by the scribe in my presence and I attest to the accuracy of the documentation.

## 2019-03-20 NOTE — ED STATDOCS - PROGRESS NOTE DETAILS
79 y/o M pt with hx of ADI, BPH, CKD, DM, HLD, HTN, hyperkalemia presents to ED by PCP for abnormal lab results of kidney function with elevated K of 5.1 as well as BUN, Cr and decreased blood count of 8. Pt states he is currently feeling okay, mostly asymptomatic. His wife states pt memory is not that great. Denies swelling of LE, urinary retention, dizziness, syncope, CP or palpitation. Pt will be sent to Main to monitor kidney function, current BP of 193/75. Order EKG

## 2019-03-20 NOTE — ED STATDOCS - PMH
ADI (acute kidney injury)    BPH (benign prostatic hyperplasia)    CKD (chronic kidney disease)    Diabetes    Hyperkalemia    Hyperlipemia    Hypertension

## 2019-03-20 NOTE — ED PROVIDER NOTE - ATTENDING CONTRIBUTION TO CARE
I, Rayne Ashley, performed a face to face bedside interview with this patient regarding history of present illness, review of symptoms and relevant past medical, social and family history.  I completed an independent physical examination. Medical decision making, follow-up on ordered tests (ie labs, radiologic studies) and re-evaluation of the patient's status has been communicated to the ACP.  Disposition of the patient will be based on test outcome and response to ED interventions.     79yo M with HTN, CKD, recemntly started on losartan, PCP noticed elevation in Cr and K told to stop. patient has no complaints. normal urination. no weakness. no CP/SOB     Gen: NAD, AOx3  Head: NCAT  HEENT: PERRL, EOMI, oral mucosa moist, normal conjunctiva, neck supple  Lung: CTAB, no respiratory distress  CV: rrr, no murmur, Normal perfusion  Abd: soft, NTND  MSK: No edema, no visible deformities  Neuro: No focal neurologic deficits  Skin: No rash   Psych: normal affect     will repeat labs, urine testing, treat HyperK if needed. reasses

## 2019-03-20 NOTE — ED ADULT NURSE NOTE - OBJECTIVE STATEMENT
78 yom presents to ed from md office pt was told to be evaluated at hospital for elevated potassium and BUN and Cr levels. pt does not report n/v/d denies headache denies fever denies chills denies burning on urination moves all extremities. denies frequency with void.

## 2019-03-21 LAB
BASOPHILS # BLD AUTO: 0.07 K/UL
BASOPHILS NFR BLD AUTO: 0.8 %
EOSINOPHIL # BLD AUTO: 0.26 K/UL
EOSINOPHIL NFR BLD AUTO: 3.1 %
HCT VFR BLD CALC: 28.5 %
HGB BLD-MCNC: 8.3 G/DL
IMM GRANULOCYTES NFR BLD AUTO: 0.4 %
LYMPHOCYTES # BLD AUTO: 1.27 K/UL
LYMPHOCYTES NFR BLD AUTO: 15.2 %
MAN DIFF?: NORMAL
MCHC RBC-ENTMCNC: 25.2 PG
MCHC RBC-ENTMCNC: 29.1 GM/DL
MCV RBC AUTO: 86.6 FL
MONOCYTES # BLD AUTO: 0.87 K/UL
MONOCYTES NFR BLD AUTO: 10.4 %
NEUTROPHILS # BLD AUTO: 5.84 K/UL
NEUTROPHILS NFR BLD AUTO: 70.1 %
PLATELET # BLD AUTO: 232 K/UL
RBC # BLD: 3.29 M/UL
RBC # FLD: 14.7 %
WBC # FLD AUTO: 8.34 K/UL

## 2019-03-21 RX ORDER — CEPHALEXIN 500 MG
1 CAPSULE ORAL
Qty: 10 | Refills: 0
Start: 2019-03-21 | End: 2019-03-25

## 2019-03-24 NOTE — PLAN
[FreeTextEntry1] : Patient in for follow up CRF going to stage 3 to 4 Patient with elevated K and prior EKG changes, wife states that she and  not managing his DM well needs follow up of labs from [facility] ER  patient to be re evaluated by RENAL  ISTAT reviewed

## 2019-03-24 NOTE — HISTORY OF PRESENT ILLNESS
[FreeTextEntry1] : Patient in for follow up CRF going to stage 3 to 4 Patient with elevated K and prior EKG changes, wife states that she and  not managing his DM well needs follow up of labs from [facility] ER  patient to be re evaluated by RENAL

## 2019-03-24 NOTE — HEALTH RISK ASSESSMENT
[] : No [No falls in past year] : Patient reported no falls in the past year [0] : 2) Feeling down, depressed, or hopeless: Not at all (0) [[id number]] : 0

## 2019-03-25 ENCOUNTER — APPOINTMENT (OUTPATIENT)
Dept: FAMILY MEDICINE | Facility: CLINIC | Age: 79
End: 2019-03-25
Payer: MEDICARE

## 2019-03-25 VITALS
RESPIRATION RATE: 13 BRPM | HEIGHT: 64 IN | BODY MASS INDEX: 28.85 KG/M2 | SYSTOLIC BLOOD PRESSURE: 122 MMHG | HEART RATE: 60 BPM | DIASTOLIC BLOOD PRESSURE: 70 MMHG | TEMPERATURE: 97.2 F | WEIGHT: 169 LBS | OXYGEN SATURATION: 98 %

## 2019-03-25 PROCEDURE — 99214 OFFICE O/P EST MOD 30 MIN: CPT | Mod: 25

## 2019-03-25 PROCEDURE — 36415 COLL VENOUS BLD VENIPUNCTURE: CPT

## 2019-03-25 NOTE — HEALTH RISK ASSESSMENT
[] : No [No falls in past year] : Patient reported no falls in the past year [0] : 2) Feeling down, depressed, or hopeless: Not at all (0) [[id number]] : 0

## 2019-03-25 NOTE — HISTORY OF PRESENT ILLNESS
[FreeTextEntry1] : Patient in for follow up of DM and CRF in [facility] ER was sent for elevated K, also has Renal eval , Wife says DM has improved with diet and Insulin

## 2019-03-25 NOTE — PLAN
[FreeTextEntry1] : Patient in for follow up of DM and CRF in [facility] ER was sent for elevated K, also has Renal eval , Wife says DM has improved with diet and Insulin \par Labs and Care plan reviewed  Hold K supplement  RTC 1 week

## 2019-03-27 LAB
BASOPHILS # BLD AUTO: 0.05 K/UL
BASOPHILS NFR BLD AUTO: 0.6 %
EOSINOPHIL # BLD AUTO: 0.31 K/UL
EOSINOPHIL NFR BLD AUTO: 3.6 %
GLYCOMARK.: 2.9 UG/ML
HCT VFR BLD CALC: 26.3 %
HGB BLD-MCNC: 8 G/DL
IMM GRANULOCYTES NFR BLD AUTO: 0.5 %
LYMPHOCYTES # BLD AUTO: 1.33 K/UL
LYMPHOCYTES NFR BLD AUTO: 15.3 %
MAN DIFF?: NORMAL
MCHC RBC-ENTMCNC: 26.1 PG
MCHC RBC-ENTMCNC: 30.4 GM/DL
MCV RBC AUTO: 85.9 FL
MONOCYTES # BLD AUTO: 0.87 K/UL
MONOCYTES NFR BLD AUTO: 10 %
NEUTROPHILS # BLD AUTO: 6.08 K/UL
NEUTROPHILS NFR BLD AUTO: 70 %
PLATELET # BLD AUTO: 216 K/UL
RBC # BLD: 3.06 M/UL
RBC # FLD: 14.6 %
WBC # FLD AUTO: 8.68 K/UL

## 2019-04-02 LAB
ALBUMIN SERPL ELPH-MCNC: 4.6 G/DL
ALP BLD-CCNC: 80 U/L
ALT SERPL-CCNC: 7 U/L
ANION GAP SERPL CALC-SCNC: 16 MMOL/L
AST SERPL-CCNC: 12 U/L
BILIRUB SERPL-MCNC: <0.2 MG/DL
BUN SERPL-MCNC: 48 MG/DL
CALCIUM SERPL-MCNC: 8.8 MG/DL
CHLORIDE SERPL-SCNC: 99 MMOL/L
CO2 SERPL-SCNC: 18 MMOL/L
CREAT SERPL-MCNC: 3.12 MG/DL
GLUCOSE SERPL-MCNC: 230 MG/DL
POTASSIUM SERPL-SCNC: 4.8 MMOL/L
PROT SERPL-MCNC: 7 G/DL
SODIUM SERPL-SCNC: 133 MMOL/L

## 2019-04-04 LAB
PLAT MORPH BLD: NORMAL
RBC BLD AUTO: NORMAL

## 2019-04-09 ENCOUNTER — APPOINTMENT (OUTPATIENT)
Dept: NEPHROLOGY | Facility: CLINIC | Age: 79
End: 2019-04-09
Payer: MEDICARE

## 2019-04-09 VITALS
WEIGHT: 174 LBS | HEART RATE: 69 BPM | SYSTOLIC BLOOD PRESSURE: 140 MMHG | BODY MASS INDEX: 29.71 KG/M2 | OXYGEN SATURATION: 97 % | DIASTOLIC BLOOD PRESSURE: 72 MMHG | HEIGHT: 64 IN

## 2019-04-09 PROCEDURE — 99215 OFFICE O/P EST HI 40 MIN: CPT

## 2019-04-11 LAB
APPEARANCE: ABNORMAL
BACTERIA: ABNORMAL
BILIRUBIN URINE: NEGATIVE
BLOOD URINE: ABNORMAL
COLOR: ABNORMAL
CREAT SPEC-SCNC: 39 MG/DL
CREAT/PROT UR: 2.9 RATIO
GLUCOSE QUALITATIVE U: NORMAL
HYALINE CASTS: 4 /LPF
KETONES URINE: NEGATIVE
LEUKOCYTE ESTERASE URINE: ABNORMAL
MICROSCOPIC-UA: NORMAL
NITRITE URINE: NEGATIVE
PH URINE: 5.5
PROT UR-MCNC: 113 MG/DL
PROTEIN URINE: ABNORMAL
RED BLOOD CELLS URINE: 24 /HPF
SPECIFIC GRAVITY URINE: 1.01
SQUAMOUS EPITHELIAL CELLS: 5 /HPF
UROBILINOGEN URINE: NORMAL
WHITE BLOOD CELLS URINE: 588 /HPF

## 2019-04-15 ENCOUNTER — APPOINTMENT (OUTPATIENT)
Dept: ELECTROPHYSIOLOGY | Facility: CLINIC | Age: 79
End: 2019-04-15
Payer: MEDICARE

## 2019-04-15 PROCEDURE — 93299: CPT

## 2019-04-15 PROCEDURE — 93298 REM INTERROG DEV EVAL SCRMS: CPT

## 2019-04-19 ENCOUNTER — APPOINTMENT (OUTPATIENT)
Dept: ELECTROPHYSIOLOGY | Facility: CLINIC | Age: 79
End: 2019-04-19

## 2019-05-14 ENCOUNTER — APPOINTMENT (OUTPATIENT)
Dept: CARDIOLOGY | Facility: CLINIC | Age: 79
End: 2019-05-14
Payer: MEDICARE

## 2019-05-14 ENCOUNTER — APPOINTMENT (OUTPATIENT)
Dept: NEPHROLOGY | Facility: CLINIC | Age: 79
End: 2019-05-14
Payer: MEDICARE

## 2019-05-14 ENCOUNTER — NON-APPOINTMENT (OUTPATIENT)
Age: 79
End: 2019-05-14

## 2019-05-14 VITALS
RESPIRATION RATE: 18 BRPM | HEART RATE: 70 BPM | HEIGHT: 64 IN | SYSTOLIC BLOOD PRESSURE: 124 MMHG | OXYGEN SATURATION: 98 % | DIASTOLIC BLOOD PRESSURE: 62 MMHG | BODY MASS INDEX: 29.37 KG/M2 | WEIGHT: 172 LBS

## 2019-05-14 VITALS
HEIGHT: 64 IN | DIASTOLIC BLOOD PRESSURE: 60 MMHG | BODY MASS INDEX: 30.39 KG/M2 | SYSTOLIC BLOOD PRESSURE: 162 MMHG | HEART RATE: 72 BPM | WEIGHT: 178 LBS

## 2019-05-14 PROCEDURE — 93000 ELECTROCARDIOGRAM COMPLETE: CPT

## 2019-05-14 PROCEDURE — 99215 OFFICE O/P EST HI 40 MIN: CPT | Mod: 25

## 2019-05-14 PROCEDURE — 99215 OFFICE O/P EST HI 40 MIN: CPT

## 2019-05-14 PROCEDURE — 36415 COLL VENOUS BLD VENIPUNCTURE: CPT

## 2019-05-14 RX ORDER — LOSARTAN POTASSIUM 50 MG/1
50 TABLET, FILM COATED ORAL DAILY
Qty: 90 | Refills: 3 | Status: DISCONTINUED | COMMUNITY
Start: 2019-01-18 | End: 2019-05-14

## 2019-05-14 NOTE — REVIEW OF SYSTEMS
[Chills] : no chills [Eye Pain] : no eye pain [Fever] : no fever [Loss Of Hearing] : no hearing loss [Red Eyes] : eyes not red [Earache] : no earache [Wheezing] : no wheezing [Heart Rate Is Fast] : the heart rate was not fast [Shortness Of Breath] : no shortness of breath [Heart Rate Is Slow] : the heart rate was not slow [Vomiting] : no vomiting [Abdominal Pain] : no abdominal pain [Arthralgias] : no arthralgias [Skin Lesions] : skin lesion [Joint Pain] : no joint pain [Hot Flashes] : no hot flashes [Proptosis] : no proptosis [Itching] : itching [Easy Bleeding] : no tendency for easy bleeding [Easy Bruising] : no tendency for easy bruising [Negative] : Heme/Lymph

## 2019-05-14 NOTE — CARDIOLOGY SUMMARY
[No Ischemia] : no Ischemia [LVEF ___%] : LVEF [unfilled]% [None] : normal LV function [Enlarged] : enlarged LA size [Mild] : mild mitral regurgitation [___] : [unfilled] [de-identified] : feb 2018 : carotid US: mild atherosclerosis. no stenosis.

## 2019-05-14 NOTE — HISTORY OF PRESENT ILLNESS
[FreeTextEntry1] : Patient is a 77 year old male with history of IDDM for 20-25 years, HTN, arthritis; AFib; here for initial evaluation of CKD. Patient has had "stable" CKD 4 with Cr of 3 for past 2-3 years; presenting in early August with Cr of 4. Pt accompanied by his wife; no other complaints at this time; no edema; no SOB. Feels well. GFR ~12 Pt seen and examined; no complaints; feels well. Was at [facility] ER March 20th for hyperkalemia; was recently restarted on losartan 25mg daily by cardiology in January; found to have K of 6; repeat 5.9 at ER; taken off. BP stable; taken off losartan; however pt and wife don’t have complete medication list with them to verify which meds he is taking/not taking; they state he has been off losartan since ER visit.\par \par Current: Pt seen/examined; no complaints; brought in medications; reviewed and reconciled.

## 2019-05-14 NOTE — PHYSICAL EXAM
[General Appearance - Alert] : alert [Sclera] : the sclera and conjunctiva were normal [General Appearance - Well Nourished] : well nourished [General Appearance - In No Acute Distress] : in no acute distress [Neck Appearance] : the appearance of the neck was normal [Outer Ear] : the ears and nose were normal in appearance [Respiration, Rhythm And Depth] : normal respiratory rhythm and effort [] : no respiratory distress [Heart Sounds] : normal S1 and S2 [Auscultation Breath Sounds / Voice Sounds] : lungs were clear to auscultation bilaterally [Heart Rate And Rhythm] : heart rate was normal and rhythm regular [Bowel Sounds] : normal bowel sounds [Abdomen Soft] : soft [Arterial Pulses Carotid] : carotid pulses were normal with no bruits [Abdomen Tenderness] : non-tender [Abnormal Walk] : normal gait [Cervical Lymph Nodes Enlarged Posterior Bilaterally] : posterior cervical [No CVA Tenderness] : no ~M costovertebral angle tenderness [Cranial Nerves] : cranial nerves 2-12 were intact [Skin Color & Pigmentation] : normal skin color and pigmentation [Skin Turgor] : normal skin turgor [Oriented To Time, Place, And Person] : oriented to person, place, and time

## 2019-05-14 NOTE — PHYSICAL EXAM
[General Appearance - Well Developed] : well developed [Normal Appearance] : normal appearance [Well Groomed] : well groomed [General Appearance - In No Acute Distress] : no acute distress [No Deformities] : no deformities [General Appearance - Well Nourished] : well nourished [Eyelids - No Xanthelasma] : the eyelids demonstrated no xanthelasmas [Normal Oral Mucosa] : normal oral mucosa [Normal Conjunctiva] : the conjunctiva exhibited no abnormalities [No Oral Pallor] : no oral pallor [Normal Jugular Venous A Waves Present] : normal jugular venous A waves present [No Oral Cyanosis] : no oral cyanosis [Normal Jugular Venous V Waves Present] : normal jugular venous V waves present [No Jugular Venous Perez A Waves] : no jugular venous perez A waves [Respiration, Rhythm And Depth] : normal respiratory rhythm and effort [Exaggerated Use Of Accessory Muscles For Inspiration] : no accessory muscle use [Auscultation Breath Sounds / Voice Sounds] : lungs were clear to auscultation bilaterally [Heart Rate And Rhythm] : heart rate and rhythm were normal [Heart Sounds] : normal S1 and S2 [Edema] : no peripheral edema present [Veins - Varicosity Changes] : no varicosital changes were noted in the lower extremities [Abdomen Soft] : soft [Abdomen Tenderness] : non-tender [Abdomen Mass (___ Cm)] : no abdominal mass palpated [Nail Clubbing] : no clubbing of the fingernails [Petechial Hemorrhages (___cm)] : no petechial hemorrhages [Cyanosis, Localized] : no localized cyanosis [FreeTextEntry1] : 2+ Bilateral Edema  [Skin Color & Pigmentation] : normal skin color and pigmentation [] : no rash [Skin Lesions] : no skin lesions [No Venous Stasis] : no venous stasis [No Skin Ulcers] : no skin ulcer [No Xanthoma] : no  xanthoma was observed [Oriented To Time, Place, And Person] : oriented to person, place, and time [Affect] : the affect was normal [Mood] : the mood was normal [No Anxiety] : not feeling anxious

## 2019-05-14 NOTE — ASSESSMENT
[FreeTextEntry1] : 1) ATN ?new baseline \par 2) CKD IV\par 3) HTN\par 4) IDDM\par \par Patient here with worsening of SCr;\par Brought meds\par I am stopping pantoprazole;\par I am stopping chlorthalidone\par Starting lasix 40mg po daily\par Starting metoprolol 25mg ER daily;\par Will start on sodium bicarbonate 650mg TID ; re-sending; pt still not started\par May require AVF/vascular consult by next visit\par To get Renal US\par \par RTC 1 month

## 2019-05-14 NOTE — DISCUSSION/SUMMARY
[Patient] : the patient [Risks] : risks [Benefits] : benefits [Alternatives] : alternatives [___ Month(s)] : [unfilled] month(s) [With ___] : with [unfilled] [FreeTextEntry1] : 75 M with HTN, DM, HLD, paroxsymal atrial fibrillation with Supra therapeutic INR and acute severe GI bleeding now off coumadin on ILR to evaluate for Afib burden.\par 1) Bilateral LE edema: Unclear cause. Patient is not short of breathe. CKD. lasix 40 mg daily. BNP check. HFPEF.  f/u in 1 mths,  US venous Duplex of legs with refflux. Elevated legs.\par 2) Paroxysmal Afib: s/p cardioversion jan 2015, CHADSVASC >=3..  =  xarelto not approved for life long or high co pay.  no coumadin as patient had bleeding on supra therapeutic INR.  ct  aspirin 325  mg daily. F/u ILR. low afib burdern. ct follow up with ILR. \par 3) HTN: controlled. ct hydralazine 25 Q8 ct  chlorthalidone 25 mg daily, ct cardizem 120 Daily.  off losartan  due to high potassium. \par 4) CAD evaluation: risk factors for CAD. HTN, Uncontrolled DM, CKD and HLD aspirin, statins. nuclear stress test and echo. \par 5) MODerate aortic stenosis: asymptaomtic. \par 6_ Bilateral carotid bruitL: US carotid arteries. : mild atherosclerosis. no stenosis

## 2019-05-14 NOTE — HISTORY OF PRESENT ILLNESS
[FreeTextEntry1] : HTN, atrial fibrillation\par \par No syncope. nod izzines.s no palpitations. no falls. no bleeding. no headaches. no chest pain,. no dyspena. \par : compliant with meds.  patient had worsneing potassium, and now off losartna. BP controlled. \par \par \par old note: compliant with meds. no syncope. nod izzines.s no palptiatiosn. no falls. no bleeding. no headaches. no chest pain,. no dyspena. \par \par \par ol dnote: no chest pain. no dyspnea. no headaches. no dizziness. \par \par old note: no chest pain. no dyspnea. routine follow up. compliant with meds. no fall. no bleeding. \par \par \par prior note: last visit, did not bring his pills. BP high, added Benica-HCTZ.  Compliant with emds. Did not bring his pills today again.  Also, said that did not  Benicar-HCTZ due to high co pay. Only taking one BP pill. Not compliant with diet. \par No chest pain. No dyspnea.\par \par OLD NOTE: This is a 74 year old man with history of HTN, DM, CKD (Stage 3) Atrial fibrillation, prior pneumonia and pleural effusions and pericardial effusion, recently admitted to hospital for rectal bleeding and Elevated INR Hb < 4 , INR =8 and acute on chronic renal failure. CT abd showed, diverticular disease.  Colonoscopy showed, diverticulitis, EGD showed  Patient is off AC. \par

## 2019-05-16 LAB
24R-OH-CALCIDIOL SERPL-MCNC: 32.1 PG/ML
25(OH)D3 SERPL-MCNC: 22.8 NG/ML
ALBUMIN SERPL ELPH-MCNC: 4.5 G/DL
ANION GAP SERPL CALC-SCNC: 14 MMOL/L
BASOPHILS # BLD AUTO: 0.09 K/UL
BASOPHILS NFR BLD AUTO: 1.2 %
BUN SERPL-MCNC: 51 MG/DL
CALCIUM SERPL-MCNC: 8.7 MG/DL
CHLORIDE SERPL-SCNC: 103 MMOL/L
CO2 SERPL-SCNC: 19 MMOL/L
CREAT SERPL-MCNC: 3.06 MG/DL
EOSINOPHIL # BLD AUTO: 0.25 K/UL
EOSINOPHIL NFR BLD AUTO: 3.3 %
GLUCOSE SERPL-MCNC: 85 MG/DL
HCT VFR BLD CALC: 26.1 %
HGB BLD-MCNC: 7.7 G/DL
IMM GRANULOCYTES NFR BLD AUTO: 0.3 %
LYMPHOCYTES # BLD AUTO: 1.24 K/UL
LYMPHOCYTES NFR BLD AUTO: 16.5 %
MAN DIFF?: NORMAL
MCHC RBC-ENTMCNC: 24.2 PG
MCHC RBC-ENTMCNC: 29.5 GM/DL
MCV RBC AUTO: 82.1 FL
MONOCYTES # BLD AUTO: 0.94 K/UL
MONOCYTES NFR BLD AUTO: 12.5 %
NEUTROPHILS # BLD AUTO: 4.97 K/UL
NEUTROPHILS NFR BLD AUTO: 66.2 %
PHOSPHATE SERPL-MCNC: 4 MG/DL
PLATELET # BLD AUTO: 246 K/UL
POTASSIUM SERPL-SCNC: 4.9 MMOL/L
RBC # BLD: 3.18 M/UL
RBC # FLD: 13.9 %
SODIUM SERPL-SCNC: 136 MMOL/L
WBC # FLD AUTO: 7.51 K/UL

## 2019-05-17 ENCOUNTER — APPOINTMENT (OUTPATIENT)
Dept: ELECTROPHYSIOLOGY | Facility: CLINIC | Age: 79
End: 2019-05-17
Payer: MEDICARE

## 2019-05-17 PROCEDURE — 93298 REM INTERROG DEV EVAL SCRMS: CPT

## 2019-05-17 PROCEDURE — 93299: CPT

## 2019-05-28 ENCOUNTER — APPOINTMENT (OUTPATIENT)
Dept: CARDIOLOGY | Facility: CLINIC | Age: 79
End: 2019-05-28

## 2019-05-29 ENCOUNTER — RX RENEWAL (OUTPATIENT)
Age: 79
End: 2019-05-29

## 2019-05-31 ENCOUNTER — APPOINTMENT (OUTPATIENT)
Dept: FAMILY MEDICINE | Facility: CLINIC | Age: 79
End: 2019-05-31

## 2019-06-09 ENCOUNTER — RX RENEWAL (OUTPATIENT)
Age: 79
End: 2019-06-09

## 2019-06-11 ENCOUNTER — RX RENEWAL (OUTPATIENT)
Age: 79
End: 2019-06-11

## 2019-06-13 ENCOUNTER — RX RENEWAL (OUTPATIENT)
Age: 79
End: 2019-06-13

## 2019-06-17 ENCOUNTER — APPOINTMENT (OUTPATIENT)
Dept: ELECTROPHYSIOLOGY | Facility: CLINIC | Age: 79
End: 2019-06-17
Payer: MEDICARE

## 2019-06-17 PROCEDURE — 93299: CPT

## 2019-06-17 PROCEDURE — 93298 REM INTERROG DEV EVAL SCRMS: CPT

## 2019-06-18 ENCOUNTER — APPOINTMENT (OUTPATIENT)
Dept: NEPHROLOGY | Facility: CLINIC | Age: 79
End: 2019-06-18

## 2019-06-19 ENCOUNTER — NON-APPOINTMENT (OUTPATIENT)
Age: 79
End: 2019-06-19

## 2019-06-19 ENCOUNTER — APPOINTMENT (OUTPATIENT)
Dept: CARDIOLOGY | Facility: CLINIC | Age: 79
End: 2019-06-19
Payer: MEDICARE

## 2019-06-19 VITALS
BODY MASS INDEX: 29.7 KG/M2 | OXYGEN SATURATION: 99 % | SYSTOLIC BLOOD PRESSURE: 150 MMHG | DIASTOLIC BLOOD PRESSURE: 70 MMHG | RESPIRATION RATE: 18 BRPM | HEART RATE: 69 BPM | WEIGHT: 173 LBS

## 2019-06-19 PROCEDURE — 99215 OFFICE O/P EST HI 40 MIN: CPT

## 2019-06-19 PROCEDURE — 93000 ELECTROCARDIOGRAM COMPLETE: CPT

## 2019-06-19 RX ORDER — METOPROLOL SUCCINATE 25 MG/1
25 TABLET, EXTENDED RELEASE ORAL DAILY
Qty: 90 | Refills: 3 | Status: DISCONTINUED | COMMUNITY
Start: 2019-05-14 | End: 2019-06-19

## 2019-06-19 RX ORDER — DILTIAZEM HYDROCHLORIDE 120 MG/1
120 TABLET ORAL
Qty: 30 | Refills: 3 | Status: DISCONTINUED | COMMUNITY
Start: 2019-02-12 | End: 2019-06-19

## 2019-06-19 NOTE — CARDIOLOGY SUMMARY
[No Ischemia] : no Ischemia [LVEF ___%] : LVEF [unfilled]% [Enlarged] : enlarged LA size [None] : normal LV function [Mild] : mild mitral regurgitation [___] : [unfilled] [de-identified] : feb 2018 : carotid US: mild atherosclerosis. no stenosis. \par feb 2019: Mild plaque . no stenosis.

## 2019-06-19 NOTE — DISCUSSION/SUMMARY
[Patient] : the patient [Risks] : risks [Benefits] : benefits [___ Month(s)] : [unfilled] month(s) [Alternatives] : alternatives [With ___] : with [unfilled] [FreeTextEntry1] : 75 M with HTN, DM, HLD, paroxsymal atrial fibrillation with Supra therapeutic INR and acute severe GI bleeding now off coumadin on ILR to evaluate for Afib burden.\par 1) LE skin changes: ELENA to evaluate for Peripheral vascular disease \par 2) Bilateral LE edema: Unclear cause. Patient is not short of breathe. CKD. lasix 40 mg daily.  CKD. HFPEF.  \par 2) Paroxysmal Afib: s/p cardioversion jan 2015, CHADSVASC >=3..  =  xarelto not approved for life long or high co pay.  no coumadin as patient had bleeding on supra therapeutic INR.  ct  aspirin 325  mg daily. F/u ILR. low afib burden. ct follow up with ILR. \par 3) HTN: Uncontrolled. ct hydralazine 25 Q8 hr.   off losartan  due to high potassium. change toprol to coreg 12.5 Q12 for better blood pressure control \par 4) CAD evaluation: risk factors for CAD. HTN, controlled DM, CKD and HLD aspirin, statins. nuclear stress test and echo. : no ischemia. \par 5) MODerate aortic stenosis: asymptaomtic. \par 6_ Bilateral carotid bruitL: US carotid arteries. : mild atherosclerosis. no stenosis

## 2019-06-19 NOTE — HISTORY OF PRESENT ILLNESS
[FreeTextEntry1] : HTN, atrial fibrillation\par \par no headaches. no LE edema. No dyspnea. no chest pain. no dizziness. walks. physicall active. \par \par old note: No syncope. nod izzines.s no palpitations. no falls. no bleeding. no headaches. no chest pain,. no dyspena. \par : compliant with meds.  patient had worsneing potassium, and now off losartna. BP controlled. \par \par \par old note: compliant with meds. no syncope. nod izzines.s no palptiatiosn. no falls. no bleeding. no headaches. no chest pain,. no dyspena. \par \par \par ol dnote: no chest pain. no dyspnea. no headaches. no dizziness. \par \par old note: no chest pain. no dyspnea. routine follow up. compliant with meds. no fall. no bleeding. \par \par \par prior note: last visit, did not bring his pills. BP high, added Benica-HCTZ.  Compliant with emds. Did not bring his pills today again.  Also, said that did not  Benicar-HCTZ due to high co pay. Only taking one BP pill. Not compliant with diet. \par No chest pain. No dyspnea.\par \par OLD NOTE: This is a 74 year old man with history of HTN, DM, CKD (Stage 3) Atrial fibrillation, prior pneumonia and pleural effusions and pericardial effusion, recently admitted to hospital for rectal bleeding and Elevated INR Hb < 4 , INR =8 and acute on chronic renal failure. CT abd showed, diverticular disease.  Colonoscopy showed, diverticulitis, EGD showed  Patient is off AC. \par

## 2019-06-19 NOTE — PHYSICAL EXAM
[Normal Appearance] : normal appearance [General Appearance - Well Developed] : well developed [No Deformities] : no deformities [General Appearance - Well Nourished] : well nourished [Well Groomed] : well groomed [Normal Conjunctiva] : the conjunctiva exhibited no abnormalities [General Appearance - In No Acute Distress] : no acute distress [Eyelids - No Xanthelasma] : the eyelids demonstrated no xanthelasmas [Normal Oral Mucosa] : normal oral mucosa [No Oral Pallor] : no oral pallor [No Oral Cyanosis] : no oral cyanosis [Normal Jugular Venous A Waves Present] : normal jugular venous A waves present [Normal Jugular Venous V Waves Present] : normal jugular venous V waves present [No Jugular Venous Perez A Waves] : no jugular venous perez A waves [Respiration, Rhythm And Depth] : normal respiratory rhythm and effort [Exaggerated Use Of Accessory Muscles For Inspiration] : no accessory muscle use [Auscultation Breath Sounds / Voice Sounds] : lungs were clear to auscultation bilaterally [Heart Rate And Rhythm] : heart rate and rhythm were normal [Heart Sounds] : normal S1 and S2 [Edema] : no peripheral edema present [Veins - Varicosity Changes] : no varicosital changes were noted in the lower extremities [Abdomen Tenderness] : non-tender [Abdomen Soft] : soft [Abdomen Mass (___ Cm)] : no abdominal mass palpated [Nail Clubbing] : no clubbing of the fingernails [Cyanosis, Localized] : no localized cyanosis [Petechial Hemorrhages (___cm)] : no petechial hemorrhages [Skin Color & Pigmentation] : normal skin color and pigmentation [] : no rash [No Skin Ulcers] : no skin ulcer [Oriented To Time, Place, And Person] : oriented to person, place, and time [Affect] : the affect was normal [Mood] : the mood was normal [No Anxiety] : not feeling anxious [FreeTextEntry1] : Dry hairless skin lack of lustere in the legs . Edema.

## 2019-06-26 ENCOUNTER — APPOINTMENT (OUTPATIENT)
Dept: NEPHROLOGY | Facility: CLINIC | Age: 79
End: 2019-06-26
Payer: MEDICARE

## 2019-06-26 VITALS
BODY MASS INDEX: 30.73 KG/M2 | HEART RATE: 69 BPM | HEIGHT: 64 IN | WEIGHT: 180 LBS | SYSTOLIC BLOOD PRESSURE: 149 MMHG | DIASTOLIC BLOOD PRESSURE: 52 MMHG

## 2019-06-26 PROCEDURE — 36415 COLL VENOUS BLD VENIPUNCTURE: CPT

## 2019-06-26 PROCEDURE — 99215 OFFICE O/P EST HI 40 MIN: CPT | Mod: 25

## 2019-06-26 NOTE — HISTORY OF PRESENT ILLNESS
[FreeTextEntry1] : Patient is a 77 year old male with history of IDDM for 20-25 years, HTN, arthritis; AFib; here for initial evaluation of CKD. Patient has had "stable" CKD 4 with Cr of 3 for past 2-3 years; presenting in early August with Cr of 4. Pt accompanied by his wife; no other complaints at this time; no edema; no SOB. Feels well. GFR ~12 Pt seen and examined; no complaints; feels well. Was at [facility] ER March 20th for hyperkalemia; was recently restarted on losartan 25mg daily by cardiology in January; found to have K of 6; repeat 5.9 at ER; taken off. BP stable; taken off losartan; however pt and wife don’t have complete medication list with them to verify which meds he is taking/not taking; they state he has been off losartan since ER visit.\par \par Current: Pt seen/examined; no complaints; brought in medications; reviewed and reconciled.

## 2019-06-26 NOTE — PHYSICAL EXAM
[General Appearance - Alert] : alert [General Appearance - In No Acute Distress] : in no acute distress [General Appearance - Well Nourished] : well nourished [Sclera] : the sclera and conjunctiva were normal [Outer Ear] : the ears and nose were normal in appearance [Neck Appearance] : the appearance of the neck was normal [] : no respiratory distress [Respiration, Rhythm And Depth] : normal respiratory rhythm and effort [Auscultation Breath Sounds / Voice Sounds] : lungs were clear to auscultation bilaterally [Heart Rate And Rhythm] : heart rate was normal and rhythm regular [Heart Sounds] : normal S1 and S2 [Arterial Pulses Carotid] : carotid pulses were normal with no bruits [Bowel Sounds] : normal bowel sounds [Abdomen Soft] : soft [Abdomen Tenderness] : non-tender [Cervical Lymph Nodes Enlarged Posterior Bilaterally] : posterior cervical [No CVA Tenderness] : no ~M costovertebral angle tenderness [Abnormal Walk] : normal gait [Skin Color & Pigmentation] : normal skin color and pigmentation [Skin Turgor] : normal skin turgor [Cranial Nerves] : cranial nerves 2-12 were intact [Oriented To Time, Place, And Person] : oriented to person, place, and time

## 2019-06-26 NOTE — ASSESSMENT
[FreeTextEntry1] : 1) CKD V; \par 2) Vitamin D def\par 3) HTN\par 4) IDDM\par 5) Acidosis\par \par Stopped  pantoprazole;\par Stopped chlorthalidone\par Starting lasix 40mg po daily\par cw metoprolol 25mg ER daily;\par Will start on sodium bicarbonate 650mg BID; did not start yet\par Will require AVF; family and pt in denial still; held long discussion re: need for it\par Still has not had renal sonogram\par Increasing lasix to 80mg daily from 6/27-7/4\par Starting vitamin d 50k units for 6 wks\par \par RTC 1 month

## 2019-06-26 NOTE — REVIEW OF SYSTEMS
[Fever] : no fever [Chills] : no chills [Eye Pain] : no eye pain [Red Eyes] : eyes not red [Earache] : no earache [Loss Of Hearing] : no hearing loss [Heart Rate Is Slow] : the heart rate was not slow [Heart Rate Is Fast] : the heart rate was not fast [Shortness Of Breath] : no shortness of breath [Wheezing] : no wheezing [Abdominal Pain] : no abdominal pain [Vomiting] : no vomiting [Arthralgias] : no arthralgias [Joint Pain] : no joint pain [Skin Lesions] : skin lesion [Itching] : itching [Proptosis] : no proptosis [Hot Flashes] : no hot flashes [Easy Bleeding] : no tendency for easy bleeding [Easy Bruising] : no tendency for easy bruising [Negative] : Heme/Lymph

## 2019-07-03 ENCOUNTER — APPOINTMENT (OUTPATIENT)
Dept: CARDIOLOGY | Facility: CLINIC | Age: 79
End: 2019-07-03

## 2019-07-15 ENCOUNTER — RX RENEWAL (OUTPATIENT)
Age: 79
End: 2019-07-15

## 2019-07-16 ENCOUNTER — RX RENEWAL (OUTPATIENT)
Age: 79
End: 2019-07-16

## 2019-07-18 ENCOUNTER — RX CHANGE (OUTPATIENT)
Age: 79
End: 2019-07-18

## 2019-07-19 ENCOUNTER — APPOINTMENT (OUTPATIENT)
Dept: ELECTROPHYSIOLOGY | Facility: CLINIC | Age: 79
End: 2019-07-19

## 2019-08-06 ENCOUNTER — APPOINTMENT (OUTPATIENT)
Dept: NEPHROLOGY | Facility: CLINIC | Age: 79
End: 2019-08-06

## 2019-08-19 ENCOUNTER — APPOINTMENT (OUTPATIENT)
Dept: ELECTROPHYSIOLOGY | Facility: CLINIC | Age: 79
End: 2019-08-19

## 2019-09-17 LAB
ALBUMIN SERPL ELPH-MCNC: 4.5 G/DL
ANION GAP SERPL CALC-SCNC: 14 MMOL/L
APPEARANCE: ABNORMAL
BACTERIA: ABNORMAL
BASOPHILS # BLD AUTO: 0.03 K/UL
BASOPHILS NFR BLD AUTO: 0.4 %
BILIRUBIN URINE: NEGATIVE
BLOOD URINE: ABNORMAL
BUN SERPL-MCNC: 61 MG/DL
CALCIUM SERPL-MCNC: 8.7 MG/DL
CHLORIDE SERPL-SCNC: 101 MMOL/L
CO2 SERPL-SCNC: 20 MMOL/L
COLOR: NORMAL
CREAT SERPL-MCNC: 3.09 MG/DL
CREAT SPEC-SCNC: 31 MG/DL
CREAT/PROT UR: 1.5 RATIO
EOSINOPHIL # BLD AUTO: 0.22 K/UL
EOSINOPHIL NFR BLD AUTO: 3.2 %
GLUCOSE QUALITATIVE U: NEGATIVE
GLUCOSE SERPL-MCNC: 131 MG/DL
HCT VFR BLD CALC: 24.3 %
HGB BLD-MCNC: 7.2 G/DL
HYALINE CASTS: 1 /LPF
IMM GRANULOCYTES NFR BLD AUTO: 0.4 %
KETONES URINE: NEGATIVE
LEUKOCYTE ESTERASE URINE: ABNORMAL
LYMPHOCYTES # BLD AUTO: 1.54 K/UL
LYMPHOCYTES NFR BLD AUTO: 22.2 %
MAN DIFF?: NORMAL
MCHC RBC-ENTMCNC: 23.5 PG
MCHC RBC-ENTMCNC: 29.6 GM/DL
MCV RBC AUTO: 79.2 FL
MICROSCOPIC-UA: NORMAL
MONOCYTES # BLD AUTO: 1.03 K/UL
MONOCYTES NFR BLD AUTO: 14.8 %
NEUTROPHILS # BLD AUTO: 4.1 K/UL
NEUTROPHILS NFR BLD AUTO: 59 %
NITRITE URINE: POSITIVE
PH URINE: 6
PHOSPHATE SERPL-MCNC: 4.2 MG/DL
PLATELET # BLD AUTO: 212 K/UL
POTASSIUM SERPL-SCNC: 5 MMOL/L
PROT UR-MCNC: 47 MG/DL
PROTEIN URINE: ABNORMAL
RBC # BLD: 3.07 M/UL
RBC # FLD: 15.1 %
RED BLOOD CELLS URINE: 2 /HPF
SODIUM SERPL-SCNC: 134 MMOL/L
SPECIFIC GRAVITY URINE: 1.01
SQUAMOUS EPITHELIAL CELLS: 1 /HPF
UROBILINOGEN URINE: NORMAL
WBC # FLD AUTO: 6.95 K/UL
WHITE BLOOD CELLS URINE: 380 /HPF

## 2019-09-18 ENCOUNTER — RX RENEWAL (OUTPATIENT)
Age: 79
End: 2019-09-18

## 2019-09-20 ENCOUNTER — APPOINTMENT (OUTPATIENT)
Dept: ELECTROPHYSIOLOGY | Facility: CLINIC | Age: 79
End: 2019-09-20

## 2019-09-27 ENCOUNTER — RX RENEWAL (OUTPATIENT)
Age: 79
End: 2019-09-27

## 2019-10-21 ENCOUNTER — APPOINTMENT (OUTPATIENT)
Dept: ELECTROPHYSIOLOGY | Facility: CLINIC | Age: 79
End: 2019-10-21

## 2019-11-08 ENCOUNTER — APPOINTMENT (OUTPATIENT)
Dept: FAMILY MEDICINE | Facility: CLINIC | Age: 79
End: 2019-11-08
Payer: MEDICARE

## 2019-11-08 ENCOUNTER — LABORATORY RESULT (OUTPATIENT)
Age: 79
End: 2019-11-08

## 2019-11-08 VITALS
HEART RATE: 86 BPM | BODY MASS INDEX: 30.73 KG/M2 | DIASTOLIC BLOOD PRESSURE: 68 MMHG | WEIGHT: 180 LBS | OXYGEN SATURATION: 95 % | HEIGHT: 64 IN | SYSTOLIC BLOOD PRESSURE: 122 MMHG | RESPIRATION RATE: 13 BRPM

## 2019-11-08 PROCEDURE — 99214 OFFICE O/P EST MOD 30 MIN: CPT | Mod: 25

## 2019-11-08 PROCEDURE — 36415 COLL VENOUS BLD VENIPUNCTURE: CPT

## 2019-11-08 NOTE — REVIEW OF SYSTEMS
[Fatigue] : fatigue [Diarrhea] : diarrhea [Incontinence] : incontinence [Hesitancy] : hesitancy [Nocturia] : nocturia [Joint Pain] : joint pain [Poor Libido] : poor libido [Back Pain] : back pain [Negative] : Psychiatric [Fever] : no fever [Night Sweats] : no night sweats [Chills] : no chills [Discharge] : no discharge [Vision Problems] : no vision problems [Pain] : no pain [Itching] : no itching [Chest Pain] : no chest pain [Palpitations] : no palpitations [Lower Ext Edema] : no lower extremity edema [Shortness Of Breath] : no shortness of breath [Abdominal Pain] : no abdominal pain [Nausea] : no nausea [Dyspnea on Exertion] : not dyspnea on exertion [Vomiting] : no vomiting [Constipation] : no constipation [Dysuria] : no dysuria [Heartburn] : no heartburn [Impotence] : no impotency [Nail Changes] : no nail changes [Mole Changes] : no mole changes [Joint Stiffness] : no joint stiffness [Hair Changes] : no hair changes

## 2019-11-08 NOTE — PLAN
[FreeTextEntry1] : Patient in for follow up of DM and CRF i Wife says DM has improved with diet and Insulin But now has memory issues wants testing and neuro, has strong family hx of Dementia \par Neuro eval \par \par Labs and Care plan reviewed  \par Neuro eval all labs done

## 2019-11-08 NOTE — HEALTH RISK ASSESSMENT
[No falls in past year] : Patient reported no falls in the past year [0] : 2) Feeling down, depressed, or hopeless: Not at all (0) [[id number]] : 0 [] : No

## 2019-11-08 NOTE — HISTORY OF PRESENT ILLNESS
[FreeTextEntry1] : Patient in for follow up of DM and CRF i Wife says DM has improved with diet and Insulin But now has memory issues wants testing and neuro, has strong family hx of Dementia

## 2019-11-08 NOTE — PHYSICAL EXAM
[Well Nourished] : well nourished [No Acute Distress] : no acute distress [Well-Appearing] : well-appearing [Well Developed] : well developed [Normal Sclera/Conjunctiva] : normal sclera/conjunctiva [PERRL] : pupils equal round and reactive to light [Normal Outer Ear/Nose] : the outer ears and nose were normal in appearance [EOMI] : extraocular movements intact [No JVD] : no jugular venous distention [Normal Oropharynx] : the oropharynx was normal [No Lymphadenopathy] : no lymphadenopathy [Supple] : supple [Thyroid Normal, No Nodules] : the thyroid was normal and there were no nodules present [No Accessory Muscle Use] : no accessory muscle use [Clear to Auscultation] : lungs were clear to auscultation bilaterally [No Respiratory Distress] : no respiratory distress  [Regular Rhythm] : with a regular rhythm [Normal Rate] : normal rate  [No Murmur] : no murmur heard [Normal S1, S2] : normal S1 and S2 [No Carotid Bruits] : no carotid bruits [No Abdominal Bruit] : a ~M bruit was not heard ~T in the abdomen [No Varicosities] : no varicosities [Pedal Pulses Present] : the pedal pulses are present [No Edema] : there was no peripheral edema [No Extremity Clubbing/Cyanosis] : no extremity clubbing/cyanosis [No Palpable Aorta] : no palpable aorta [Soft] : abdomen soft [Non Tender] : non-tender [Non-distended] : non-distended [No Masses] : no abdominal mass palpated [No HSM] : no HSM [Normal Bowel Sounds] : normal bowel sounds [Normal Posterior Cervical Nodes] : no posterior cervical lymphadenopathy [Normal Anterior Cervical Nodes] : no anterior cervical lymphadenopathy [No CVA Tenderness] : no CVA  tenderness [No Spinal Tenderness] : no spinal tenderness [Grossly Normal Strength/Tone] : grossly normal strength/tone [No Joint Swelling] : no joint swelling [Coordination Grossly Intact] : coordination grossly intact [No Rash] : no rash [Normal Gait] : normal gait [No Focal Deficits] : no focal deficits [Deep Tendon Reflexes (DTR)] : deep tendon reflexes were 2+ and symmetric [Normal Insight/Judgement] : insight and judgment were intact [Normal Affect] : the affect was normal

## 2019-11-08 NOTE — COUNSELING
[Fall prevention counseling provided] : Fall prevention counseling provided [No throw rugs] : No throw rugs [Adequate lighting] : Adequate lighting [Use proper foot wear] : Use proper foot wear [Use recommended devices] : Use recommended devices [None] : None [Engage in a relaxing activity] : Engage in a relaxing activity [Good understanding] : Patient has a good understanding of lifestyle changes and steps needed to achieve self management goal

## 2019-11-11 LAB
ALBUMIN SERPL ELPH-MCNC: 4.1 G/DL
ALP BLD-CCNC: 64 U/L
ALT SERPL-CCNC: 6 U/L
ANION GAP SERPL CALC-SCNC: 14 MMOL/L
AST SERPL-CCNC: 9 U/L
B BURGDOR IGG+IGM SER QL IB: NORMAL
BASOPHILS # BLD AUTO: 0.07 K/UL
BASOPHILS NFR BLD AUTO: 0.9 %
BILIRUB SERPL-MCNC: 0.2 MG/DL
BUN SERPL-MCNC: 39 MG/DL
C TRACH RRNA SPEC QL NAA+PROBE: NOT DETECTED
CALCIUM SERPL-MCNC: 8.7 MG/DL
CHLORIDE SERPL-SCNC: 104 MMOL/L
CHOLEST SERPL-MCNC: 132 MG/DL
CHOLEST/HDLC SERPL: 3.3 RATIO
CO2 SERPL-SCNC: 22 MMOL/L
CREAT SERPL-MCNC: 3.01 MG/DL
CREAT SPEC-SCNC: 51 MG/DL
EOSINOPHIL # BLD AUTO: 0.22 K/UL
EOSINOPHIL NFR BLD AUTO: 3 %
ERYTHROCYTE [SEDIMENTATION RATE] IN BLOOD BY WESTERGREN METHOD: 30 MM/HR
ESTIMATED AVERAGE GLUCOSE: 200 MG/DL
FERRITIN SERPL-MCNC: 17 NG/ML
FOLATE SERPL-MCNC: 9 NG/ML
GLUCOSE SERPL-MCNC: 88 MG/DL
HBA1C MFR BLD HPLC: 8.6 %
HCT VFR BLD CALC: 30.9 %
HDLC SERPL-MCNC: 40 MG/DL
HGB BLD-MCNC: 8.9 G/DL
HIV1+2 AB SPEC QL IA.RAPID: NONREACTIVE
HSV 1+2 IGG SER IA-IMP: NEGATIVE
HSV 1+2 IGG SER IA-IMP: NEGATIVE
HSV1 IGG SER QL: 0.59 INDEX
HSV2 IGG SER QL: 0.16 INDEX
IMM GRANULOCYTES NFR BLD AUTO: 0.3 %
IRON SATN MFR SERPL: 8 %
IRON SERPL-MCNC: 27 UG/DL
LDLC SERPL CALC-MCNC: 81 MG/DL
LYMPHOCYTES # BLD AUTO: 1.61 K/UL
LYMPHOCYTES NFR BLD AUTO: 21.8 %
MAN DIFF?: NORMAL
MCHC RBC-ENTMCNC: 22.5 PG
MCHC RBC-ENTMCNC: 28.8 GM/DL
MCV RBC AUTO: 78 FL
MICROALBUMIN 24H UR DL<=1MG/L-MCNC: 167 MG/DL
MICROALBUMIN/CREAT 24H UR-RTO: 3276 MG/G
MONOCYTES # BLD AUTO: 1.09 K/UL
MONOCYTES NFR BLD AUTO: 14.8 %
N GONORRHOEA RRNA SPEC QL NAA+PROBE: NOT DETECTED
NEUTROPHILS # BLD AUTO: 4.37 K/UL
NEUTROPHILS NFR BLD AUTO: 59.2 %
NT-PROBNP SERPL-MCNC: ABNORMAL PG/ML
PLATELET # BLD AUTO: 211 K/UL
POTASSIUM SERPL-SCNC: 4.4 MMOL/L
PROT SERPL-MCNC: 6.8 G/DL
RBC # BLD: 3.96 M/UL
RBC # FLD: 18.4 %
RHEUMATOID FACT SER QL: <10 IU/ML
SODIUM SERPL-SCNC: 140 MMOL/L
SOURCE AMPLIFICATION: NORMAL
T PALLIDUM AB SER QL IA: NEGATIVE
TIBC SERPL-MCNC: 333 UG/DL
TRIGL SERPL-MCNC: 53 MG/DL
TSH SERPL-ACNC: 7.55 UIU/ML
UIBC SERPL-MCNC: 306 UG/DL
VIT B12 SERPL-MCNC: 298 PG/ML
WBC # FLD AUTO: 7.38 K/UL

## 2019-11-12 LAB
ANAPLASMA PHAGOCYTO IGM COMENT: NORMAL
ANAPLASMA PHAGOCYTO IGM COMMENT: NORMAL
ANAPLASMA PHAGOCYTOPHILIA IGG ANTIBODIES: NORMAL
ANAPLASMA PHAGOCYTOPHILIA IGM ANTIBODIES: NORMAL
B MICROTI AB SER QL: NORMAL
BABESIA ANTIBODIES, IGG: NORMAL
BABESIA ANTIBODIES, IGM: NORMAL
EHRLICIA CHAFFEENIS IGG ANTIBODIES: NORMAL
EHRLICIA CHAFFEENIS IGG COMMENT: NORMAL
EHRLICIA CHAFFEENIS IGG INTERP: NORMAL
EHRLICIA CHAFFEENIS IGM ANTIBODIES: NORMAL
R RICKETTSI IGG CSF-ACNC: NEGATIVE
R RICKETTSI IGM CSF-ACNC: 0.27 INDEX

## 2019-11-13 ENCOUNTER — APPOINTMENT (OUTPATIENT)
Dept: CARDIOLOGY | Facility: CLINIC | Age: 79
End: 2019-11-13

## 2019-11-18 ENCOUNTER — APPOINTMENT (OUTPATIENT)
Dept: CARDIOLOGY | Facility: CLINIC | Age: 79
End: 2019-11-18

## 2019-11-18 ENCOUNTER — APPOINTMENT (OUTPATIENT)
Dept: FAMILY MEDICINE | Facility: CLINIC | Age: 79
End: 2019-11-18

## 2019-11-18 LAB
ANA PAT FLD IF-IMP: ABNORMAL
ANA SER IF-ACNC: ABNORMAL

## 2019-11-22 ENCOUNTER — APPOINTMENT (OUTPATIENT)
Dept: ELECTROPHYSIOLOGY | Facility: CLINIC | Age: 79
End: 2019-11-22

## 2019-11-25 ENCOUNTER — APPOINTMENT (OUTPATIENT)
Dept: CARDIOLOGY | Facility: CLINIC | Age: 79
End: 2019-11-25
Payer: MEDICARE

## 2019-11-25 PROCEDURE — 93923 UPR/LXTR ART STDY 3+ LVLS: CPT

## 2019-12-03 ENCOUNTER — APPOINTMENT (OUTPATIENT)
Dept: NEPHROLOGY | Facility: CLINIC | Age: 79
End: 2019-12-03
Payer: MEDICARE

## 2019-12-03 VITALS
HEIGHT: 64 IN | DIASTOLIC BLOOD PRESSURE: 83 MMHG | HEART RATE: 83 BPM | BODY MASS INDEX: 31.58 KG/M2 | SYSTOLIC BLOOD PRESSURE: 174 MMHG | WEIGHT: 185 LBS

## 2019-12-03 PROCEDURE — 99215 OFFICE O/P EST HI 40 MIN: CPT

## 2019-12-03 NOTE — ASSESSMENT
[FreeTextEntry1] : 1) CKD V; \par 2) Vitamin D def\par 3) HTN\par 4) IDDM\par 5) Acidosis\par \par Uncontrolled HTN\par cw lasix 40mg po daily\par cw carvedilol per cards\par Starting amlodipine 10mg daily\par cw sodium bicarbonate 650mg BID; unsure of compliance; however acidosis improved on BMP\par Will require AVF; family and pt in denial still; held long discussion re: need for it\par Still has not had renal sonogram\par Urine culture; abx\par \par RTC 1 month

## 2019-12-03 NOTE — PHYSICAL EXAM
[General Appearance - Alert] : alert [General Appearance - In No Acute Distress] : in no acute distress [Sclera] : the sclera and conjunctiva were normal [General Appearance - Well Nourished] : well nourished [Neck Appearance] : the appearance of the neck was normal [Outer Ear] : the ears and nose were normal in appearance [] : the neck was supple [Auscultation Breath Sounds / Voice Sounds] : lungs were clear to auscultation bilaterally [Respiration, Rhythm And Depth] : normal respiratory rhythm and effort [Arterial Pulses Carotid] : carotid pulses were normal with no bruits [Heart Rate And Rhythm] : heart rate was normal and rhythm regular [Heart Sounds] : normal S1 and S2 [Abdomen Soft] : soft [Bowel Sounds] : normal bowel sounds [Abdomen Tenderness] : non-tender [No CVA Tenderness] : no ~M costovertebral angle tenderness [Cervical Lymph Nodes Enlarged Posterior Bilaterally] : posterior cervical [Skin Turgor] : normal skin turgor [Abnormal Walk] : normal gait [Skin Color & Pigmentation] : normal skin color and pigmentation [Oriented To Time, Place, And Person] : oriented to person, place, and time [Cranial Nerves] : cranial nerves 2-12 were intact

## 2019-12-03 NOTE — REVIEW OF SYSTEMS
[Fever] : no fever [Chills] : no chills [Eye Pain] : no eye pain [Earache] : no earache [Red Eyes] : eyes not red [Loss Of Hearing] : no hearing loss [Heart Rate Is Slow] : the heart rate was not slow [Heart Rate Is Fast] : the heart rate was not fast [Shortness Of Breath] : no shortness of breath [Wheezing] : no wheezing [Abdominal Pain] : no abdominal pain [Vomiting] : no vomiting [Arthralgias] : no arthralgias [Joint Pain] : no joint pain [Skin Lesions] : skin lesion [Itching] : itching [Hot Flashes] : no hot flashes [Proptosis] : no proptosis [Easy Bleeding] : no tendency for easy bleeding [Easy Bruising] : no tendency for easy bruising [Negative] : Heme/Lymph

## 2019-12-03 NOTE — HISTORY OF PRESENT ILLNESS
[FreeTextEntry1] : Patient is a 77 year old male with history of IDDM for 20-25 years, HTN, arthritis; AFib; here for initial evaluation of CKD. Patient has had "stable" CKD 4 with Cr of 3 for past 2-3 years; presenting in early August with Cr of 4. Pt accompanied by his wife; no other complaints at this time; no edema; no SOB. Feels well. GFR ~12 Pt seen and examined; no complaints; feels well. Was at [facility] ER March 20th for hyperkalemia; was recently restarted on losartan 25mg daily by cardiology in January; found to have K of 6; repeat 5.9 at ER; taken off. BP stable; taken off losartan; however pt and wife don’t have complete medication list with them to verify which meds he is taking/not taking; they state he has been off losartan since ER visit. Pt seen/examined; no complaints; brought in medications; reviewed and reconciled. \par \par Current: Pt seen/examined; having urinary incontinence ; cloudy foul smelling urine per wife;

## 2019-12-09 ENCOUNTER — INPATIENT (INPATIENT)
Facility: HOSPITAL | Age: 79
LOS: 6 days | Discharge: HOME | DRG: 291 | End: 2019-12-16
Attending: INTERNAL MEDICINE | Admitting: HOSPITALIST
Payer: MEDICARE

## 2019-12-09 VITALS
SYSTOLIC BLOOD PRESSURE: 207 MMHG | TEMPERATURE: 98 F | HEIGHT: 64 IN | RESPIRATION RATE: 30 BRPM | HEART RATE: 74 BPM | OXYGEN SATURATION: 100 % | WEIGHT: 179.9 LBS | DIASTOLIC BLOOD PRESSURE: 91 MMHG

## 2019-12-09 DIAGNOSIS — Z96.0 PRESENCE OF UROGENITAL IMPLANTS: Chronic | ICD-10-CM

## 2019-12-09 PROCEDURE — 99285 EMERGENCY DEPT VISIT HI MDM: CPT

## 2019-12-09 RX ORDER — SODIUM CHLORIDE 9 MG/ML
3 INJECTION INTRAMUSCULAR; INTRAVENOUS; SUBCUTANEOUS ONCE
Refills: 0 | Status: COMPLETED | OUTPATIENT
Start: 2019-12-09 | End: 2019-12-09

## 2019-12-09 RX ADMIN — SODIUM CHLORIDE 3 MILLILITER(S): 9 INJECTION INTRAMUSCULAR; INTRAVENOUS; SUBCUTANEOUS at 23:59

## 2019-12-09 NOTE — ED PROVIDER NOTE - CARE PLAN
Principal Discharge DX:	CHF (congestive heart failure)  Secondary Diagnosis:	Pneumonia  Secondary Diagnosis:	Uncontrolled hypertension

## 2019-12-09 NOTE — ED PROVIDER NOTE - OBJECTIVE STATEMENT
78 y/o M pt with significant PMHx of  HLD, BPH, CKD, HTN, presents to the ED c/o SOB with associated cough and edema that started a week ago. Pt's wife is concerned he may have pneumonia again, and that he almost  of it 3 years ago. Denies fever, chills, CP, HA and sick contacts. No further complaints at this time.

## 2019-12-09 NOTE — ED ADULT TRIAGE NOTE - CHIEF COMPLAINT QUOTE
Patient A&Ox4 complaining of shortness of breath that began this evening. Presents with labored respirations, positive accessory muscle use, difficulty speaking in full sentences. EMS reports patient O2 sat upon their arrival was 77% room air, placed on O2 & increased to 98%. EMS stated patients BP was 260/160. Remains hypertensive in ED. Has edema to BLLE. 20g to right hand by EMS. No medications other than oxygen administered as per report. Patient placed in critical care area, Dr. Farmer at bedside for evaluation.

## 2019-12-09 NOTE — ED PROVIDER NOTE - MUSCULOSKELETAL, MLM
Spine appears normal, range of motion is not limited, no muscle or joint tenderness 2+ lower extremity edema

## 2019-12-09 NOTE — ED PROVIDER NOTE - PROGRESS NOTE DETAILS
Labs as noted. elevated BnP and possible pneumonia, pleural effusion on CXR.  Case d/w Hospitalist and will Rx BP, start IV Abx and IV Lasix

## 2019-12-10 DIAGNOSIS — I16.0 HYPERTENSIVE URGENCY: ICD-10-CM

## 2019-12-10 DIAGNOSIS — I50.9 HEART FAILURE, UNSPECIFIED: ICD-10-CM

## 2019-12-10 DIAGNOSIS — N18.9 CHRONIC KIDNEY DISEASE, UNSPECIFIED: ICD-10-CM

## 2019-12-10 DIAGNOSIS — D64.9 ANEMIA, UNSPECIFIED: ICD-10-CM

## 2019-12-10 DIAGNOSIS — E11.9 TYPE 2 DIABETES MELLITUS WITHOUT COMPLICATIONS: ICD-10-CM

## 2019-12-10 DIAGNOSIS — J18.9 PNEUMONIA, UNSPECIFIED ORGANISM: ICD-10-CM

## 2019-12-10 DIAGNOSIS — J96.91 RESPIRATORY FAILURE, UNSPECIFIED WITH HYPOXIA: ICD-10-CM

## 2019-12-10 LAB
ALBUMIN SERPL ELPH-MCNC: 3.8 G/DL — SIGNIFICANT CHANGE UP (ref 3.3–5.2)
ALP SERPL-CCNC: 80 U/L — SIGNIFICANT CHANGE UP (ref 40–120)
ALT FLD-CCNC: 16 U/L — SIGNIFICANT CHANGE UP
ANION GAP SERPL CALC-SCNC: 16 MMOL/L — SIGNIFICANT CHANGE UP (ref 5–17)
ANION GAP SERPL CALC-SCNC: 22 MMOL/L — HIGH (ref 5–17)
APPEARANCE: ABNORMAL
APTT BLD: 31.6 SEC — SIGNIFICANT CHANGE UP (ref 27.5–36.3)
AST SERPL-CCNC: 24 U/L — SIGNIFICANT CHANGE UP
BACTERIA UR CULT: ABNORMAL
BACTERIA: ABNORMAL
BASE EXCESS BLDA CALC-SCNC: -3.8 MMOL/L — LOW (ref -2–2)
BASOPHILS # BLD AUTO: 0.06 K/UL — SIGNIFICANT CHANGE UP (ref 0–0.2)
BASOPHILS NFR BLD AUTO: 0.5 % — SIGNIFICANT CHANGE UP (ref 0–2)
BILIRUB SERPL-MCNC: 0.2 MG/DL — LOW (ref 0.4–2)
BILIRUBIN URINE: NEGATIVE
BLOOD GAS COMMENTS ARTERIAL: SIGNIFICANT CHANGE UP
BLOOD URINE: ABNORMAL
BUN SERPL-MCNC: 41 MG/DL — HIGH (ref 8–20)
BUN SERPL-MCNC: 46 MG/DL — HIGH (ref 8–20)
CALCIUM SERPL-MCNC: 8.2 MG/DL — LOW (ref 8.6–10.2)
CALCIUM SERPL-MCNC: 8.3 MG/DL — LOW (ref 8.6–10.2)
CHLORIDE SERPL-SCNC: 101 MMOL/L — SIGNIFICANT CHANGE UP (ref 98–107)
CHLORIDE SERPL-SCNC: 104 MMOL/L — SIGNIFICANT CHANGE UP (ref 98–107)
CK SERPL-CCNC: 79 U/L — SIGNIFICANT CHANGE UP (ref 30–200)
CO2 SERPL-SCNC: 16 MMOL/L — LOW (ref 22–29)
CO2 SERPL-SCNC: 19 MMOL/L — LOW (ref 22–29)
COLOR: NORMAL
CREAT SERPL-MCNC: 2.75 MG/DL — HIGH (ref 0.5–1.3)
CREAT SERPL-MCNC: 2.97 MG/DL — HIGH (ref 0.5–1.3)
EOSINOPHIL # BLD AUTO: 0.26 K/UL — SIGNIFICANT CHANGE UP (ref 0–0.5)
EOSINOPHIL NFR BLD AUTO: 2.4 % — SIGNIFICANT CHANGE UP (ref 0–6)
GAS PNL BLDA: SIGNIFICANT CHANGE UP
GLUCOSE BLDC GLUCOMTR-MCNC: 113 MG/DL — HIGH (ref 70–99)
GLUCOSE BLDC GLUCOMTR-MCNC: 114 MG/DL — HIGH (ref 70–99)
GLUCOSE BLDC GLUCOMTR-MCNC: 119 MG/DL — HIGH (ref 70–99)
GLUCOSE BLDC GLUCOMTR-MCNC: 146 MG/DL — HIGH (ref 70–99)
GLUCOSE BLDC GLUCOMTR-MCNC: 204 MG/DL — HIGH (ref 70–99)
GLUCOSE QUALITATIVE U: ABNORMAL
GLUCOSE SERPL-MCNC: 189 MG/DL — HIGH (ref 70–115)
GLUCOSE SERPL-MCNC: 223 MG/DL — HIGH (ref 70–115)
HCO3 BLDA-SCNC: 21 MMOL/L — SIGNIFICANT CHANGE UP (ref 20–26)
HCT VFR BLD CALC: 27.2 % — LOW (ref 39–50)
HCT VFR BLD CALC: 30.2 % — LOW (ref 39–50)
HGB BLD-MCNC: 7.9 G/DL — LOW (ref 13–17)
HGB BLD-MCNC: 8.8 G/DL — LOW (ref 13–17)
HOROWITZ INDEX BLDA+IHG-RTO: SIGNIFICANT CHANGE UP
HYALINE CASTS: 0 /LPF
IMM GRANULOCYTES NFR BLD AUTO: 0.5 % — SIGNIFICANT CHANGE UP (ref 0–1.5)
INR BLD: 1.14 RATIO — SIGNIFICANT CHANGE UP (ref 0.88–1.16)
KETONES URINE: NEGATIVE
LACTATE BLDV-MCNC: 0.6 MMOL/L — SIGNIFICANT CHANGE UP (ref 0.5–2)
LEUKOCYTE ESTERASE URINE: ABNORMAL
LYMPHOCYTES # BLD AUTO: 1.28 K/UL — SIGNIFICANT CHANGE UP (ref 1–3.3)
LYMPHOCYTES # BLD AUTO: 11.6 % — LOW (ref 13–44)
MAGNESIUM SERPL-MCNC: 2.2 MG/DL — SIGNIFICANT CHANGE UP (ref 1.8–2.6)
MCHC RBC-ENTMCNC: 22.4 PG — LOW (ref 27–34)
MCHC RBC-ENTMCNC: 23.1 PG — LOW (ref 27–34)
MCHC RBC-ENTMCNC: 29 GM/DL — LOW (ref 32–36)
MCHC RBC-ENTMCNC: 29.1 GM/DL — LOW (ref 32–36)
MCV RBC AUTO: 77.3 FL — LOW (ref 80–100)
MCV RBC AUTO: 79.3 FL — LOW (ref 80–100)
MICROSCOPIC-UA: NORMAL
MONOCYTES # BLD AUTO: 0.92 K/UL — HIGH (ref 0–0.9)
MONOCYTES NFR BLD AUTO: 8.4 % — SIGNIFICANT CHANGE UP (ref 2–14)
NEUTROPHILS # BLD AUTO: 8.43 K/UL — HIGH (ref 1.8–7.4)
NEUTROPHILS NFR BLD AUTO: 76.6 % — SIGNIFICANT CHANGE UP (ref 43–77)
NITRITE URINE: POSITIVE
NT-PROBNP SERPL-SCNC: HIGH PG/ML (ref 0–300)
PCO2 BLDA: 35 MMHG — SIGNIFICANT CHANGE UP (ref 35–45)
PH BLDA: 7.38 — SIGNIFICANT CHANGE UP (ref 7.35–7.45)
PH URINE: 6
PHOSPHATE SERPL-MCNC: 3.9 MG/DL — SIGNIFICANT CHANGE UP (ref 2.4–4.7)
PLATELET # BLD AUTO: 161 K/UL — SIGNIFICANT CHANGE UP (ref 150–400)
PLATELET # BLD AUTO: 181 K/UL — SIGNIFICANT CHANGE UP (ref 150–400)
PO2 BLDA: 90 MMHG — SIGNIFICANT CHANGE UP (ref 83–108)
POTASSIUM SERPL-MCNC: 4.3 MMOL/L — SIGNIFICANT CHANGE UP (ref 3.5–5.3)
POTASSIUM SERPL-MCNC: 4.6 MMOL/L — SIGNIFICANT CHANGE UP (ref 3.5–5.3)
POTASSIUM SERPL-SCNC: 4.3 MMOL/L — SIGNIFICANT CHANGE UP (ref 3.5–5.3)
POTASSIUM SERPL-SCNC: 4.6 MMOL/L — SIGNIFICANT CHANGE UP (ref 3.5–5.3)
PROCALCITONIN SERPL-MCNC: 0.18 NG/ML — HIGH (ref 0.02–0.1)
PROT SERPL-MCNC: 7 G/DL — SIGNIFICANT CHANGE UP (ref 6.6–8.7)
PROTEIN URINE: ABNORMAL
PROTHROM AB SERPL-ACNC: 13.2 SEC — HIGH (ref 10–12.9)
RBC # BLD: 3.52 M/UL — LOW (ref 4.2–5.8)
RBC # BLD: 3.81 M/UL — LOW (ref 4.2–5.8)
RBC # FLD: 17.3 % — HIGH (ref 10.3–14.5)
RBC # FLD: 17.3 % — HIGH (ref 10.3–14.5)
RED BLOOD CELLS URINE: 5 /HPF
SAO2 % BLDA: 96 % — SIGNIFICANT CHANGE UP (ref 95–99)
SODIUM SERPL-SCNC: 139 MMOL/L — SIGNIFICANT CHANGE UP (ref 135–145)
SODIUM SERPL-SCNC: 139 MMOL/L — SIGNIFICANT CHANGE UP (ref 135–145)
SPECIFIC GRAVITY URINE: 1.02
SQUAMOUS EPITHELIAL CELLS: NORMAL
TROPONIN T SERPL-MCNC: 0.03 NG/ML — SIGNIFICANT CHANGE UP (ref 0–0.06)
TROPONIN T SERPL-MCNC: 0.03 NG/ML — SIGNIFICANT CHANGE UP (ref 0–0.06)
UROBILINOGEN URINE: NORMAL
WBC # BLD: 11 K/UL — HIGH (ref 3.8–10.5)
WBC # BLD: 8.74 K/UL — SIGNIFICANT CHANGE UP (ref 3.8–10.5)
WBC # FLD AUTO: 11 K/UL — HIGH (ref 3.8–10.5)
WBC # FLD AUTO: 8.74 K/UL — SIGNIFICANT CHANGE UP (ref 3.8–10.5)
WHITE BLOOD CELLS URINE: >720 /HPF

## 2019-12-10 PROCEDURE — 71250 CT THORAX DX C-: CPT | Mod: 26

## 2019-12-10 PROCEDURE — 93306 TTE W/DOPPLER COMPLETE: CPT | Mod: 26

## 2019-12-10 PROCEDURE — 93010 ELECTROCARDIOGRAM REPORT: CPT

## 2019-12-10 PROCEDURE — 71045 X-RAY EXAM CHEST 1 VIEW: CPT | Mod: 26

## 2019-12-10 PROCEDURE — 99223 1ST HOSP IP/OBS HIGH 75: CPT

## 2019-12-10 PROCEDURE — 12345: CPT | Mod: NC

## 2019-12-10 PROCEDURE — 76775 US EXAM ABDO BACK WALL LIM: CPT | Mod: 26

## 2019-12-10 RX ORDER — CARVEDILOL PHOSPHATE 80 MG/1
12.5 CAPSULE, EXTENDED RELEASE ORAL EVERY 12 HOURS
Refills: 0 | Status: DISCONTINUED | OUTPATIENT
Start: 2019-12-10 | End: 2019-12-12

## 2019-12-10 RX ORDER — HEPARIN SODIUM 5000 [USP'U]/ML
5000 INJECTION INTRAVENOUS; SUBCUTANEOUS EVERY 8 HOURS
Refills: 0 | Status: DISCONTINUED | OUTPATIENT
Start: 2019-12-10 | End: 2019-12-16

## 2019-12-10 RX ORDER — HYDRALAZINE HCL 50 MG
10 TABLET ORAL EVERY 6 HOURS
Refills: 0 | Status: DISCONTINUED | OUTPATIENT
Start: 2019-12-10 | End: 2019-12-16

## 2019-12-10 RX ORDER — DEXTROSE 50 % IN WATER 50 %
12.5 SYRINGE (ML) INTRAVENOUS ONCE
Refills: 0 | Status: DISCONTINUED | OUTPATIENT
Start: 2019-12-10 | End: 2019-12-16

## 2019-12-10 RX ORDER — SODIUM BICARBONATE 1 MEQ/ML
650 SYRINGE (ML) INTRAVENOUS
Refills: 0 | Status: DISCONTINUED | OUTPATIENT
Start: 2019-12-10 | End: 2019-12-16

## 2019-12-10 RX ORDER — ASPIRIN/CALCIUM CARB/MAGNESIUM 324 MG
325 TABLET ORAL DAILY
Refills: 0 | Status: DISCONTINUED | OUTPATIENT
Start: 2019-12-10 | End: 2019-12-16

## 2019-12-10 RX ORDER — INSULIN LISPRO 100/ML
VIAL (ML) SUBCUTANEOUS AT BEDTIME
Refills: 0 | Status: DISCONTINUED | OUTPATIENT
Start: 2019-12-10 | End: 2019-12-16

## 2019-12-10 RX ORDER — HYDRALAZINE HCL 50 MG
20 TABLET ORAL ONCE
Refills: 0 | Status: COMPLETED | OUTPATIENT
Start: 2019-12-10 | End: 2019-12-10

## 2019-12-10 RX ORDER — IPRATROPIUM/ALBUTEROL SULFATE 18-103MCG
3 AEROSOL WITH ADAPTER (GRAM) INHALATION ONCE
Refills: 0 | Status: COMPLETED | OUTPATIENT
Start: 2019-12-10 | End: 2019-12-10

## 2019-12-10 RX ORDER — INSULIN LISPRO 100/ML
VIAL (ML) SUBCUTANEOUS
Refills: 0 | Status: DISCONTINUED | OUTPATIENT
Start: 2019-12-10 | End: 2019-12-16

## 2019-12-10 RX ORDER — DEXTROSE 50 % IN WATER 50 %
25 SYRINGE (ML) INTRAVENOUS ONCE
Refills: 0 | Status: DISCONTINUED | OUTPATIENT
Start: 2019-12-10 | End: 2019-12-16

## 2019-12-10 RX ORDER — OXYBUTYNIN CHLORIDE 5 MG
5 TABLET ORAL THREE TIMES A DAY
Refills: 0 | Status: DISCONTINUED | OUTPATIENT
Start: 2019-12-10 | End: 2019-12-14

## 2019-12-10 RX ORDER — PIPERACILLIN AND TAZOBACTAM 4; .5 G/20ML; G/20ML
3.38 INJECTION, POWDER, LYOPHILIZED, FOR SOLUTION INTRAVENOUS ONCE
Refills: 0 | Status: COMPLETED | OUTPATIENT
Start: 2019-12-10 | End: 2019-12-10

## 2019-12-10 RX ORDER — FUROSEMIDE 40 MG
40 TABLET ORAL THREE TIMES A DAY
Refills: 0 | Status: DISCONTINUED | OUTPATIENT
Start: 2019-12-10 | End: 2019-12-10

## 2019-12-10 RX ORDER — HYDRALAZINE HCL 50 MG
50 TABLET ORAL ONCE
Refills: 0 | Status: COMPLETED | OUTPATIENT
Start: 2019-12-10 | End: 2019-12-10

## 2019-12-10 RX ORDER — INSULIN GLARGINE 100 [IU]/ML
10 INJECTION, SOLUTION SUBCUTANEOUS AT BEDTIME
Refills: 0 | Status: DISCONTINUED | OUTPATIENT
Start: 2019-12-10 | End: 2019-12-12

## 2019-12-10 RX ORDER — SODIUM CHLORIDE 9 MG/ML
1000 INJECTION, SOLUTION INTRAVENOUS
Refills: 0 | Status: DISCONTINUED | OUTPATIENT
Start: 2019-12-10 | End: 2019-12-16

## 2019-12-10 RX ORDER — CARVEDILOL PHOSPHATE 80 MG/1
6.25 CAPSULE, EXTENDED RELEASE ORAL EVERY 12 HOURS
Refills: 0 | Status: DISCONTINUED | OUTPATIENT
Start: 2019-12-10 | End: 2019-12-10

## 2019-12-10 RX ORDER — VANCOMYCIN HCL 1 G
1000 VIAL (EA) INTRAVENOUS ONCE
Refills: 0 | Status: COMPLETED | OUTPATIENT
Start: 2019-12-10 | End: 2019-12-10

## 2019-12-10 RX ORDER — AMLODIPINE BESYLATE 2.5 MG/1
10 TABLET ORAL DAILY
Refills: 0 | Status: DISCONTINUED | OUTPATIENT
Start: 2019-12-10 | End: 2019-12-16

## 2019-12-10 RX ORDER — INSULIN GLARGINE 100 [IU]/ML
10 INJECTION, SOLUTION SUBCUTANEOUS ONCE
Refills: 0 | Status: COMPLETED | OUTPATIENT
Start: 2019-12-10 | End: 2019-12-10

## 2019-12-10 RX ORDER — HYDRALAZINE HCL 50 MG
25 TABLET ORAL ONCE
Refills: 0 | Status: COMPLETED | OUTPATIENT
Start: 2019-12-10 | End: 2019-12-10

## 2019-12-10 RX ORDER — IPRATROPIUM/ALBUTEROL SULFATE 18-103MCG
3 AEROSOL WITH ADAPTER (GRAM) INHALATION EVERY 6 HOURS
Refills: 0 | Status: DISCONTINUED | OUTPATIENT
Start: 2019-12-10 | End: 2019-12-16

## 2019-12-10 RX ORDER — FUROSEMIDE 40 MG
60 TABLET ORAL ONCE
Refills: 0 | Status: COMPLETED | OUTPATIENT
Start: 2019-12-10 | End: 2019-12-10

## 2019-12-10 RX ORDER — DEXTROSE 50 % IN WATER 50 %
15 SYRINGE (ML) INTRAVENOUS ONCE
Refills: 0 | Status: DISCONTINUED | OUTPATIENT
Start: 2019-12-10 | End: 2019-12-16

## 2019-12-10 RX ORDER — GLUCAGON INJECTION, SOLUTION 0.5 MG/.1ML
1 INJECTION, SOLUTION SUBCUTANEOUS ONCE
Refills: 0 | Status: DISCONTINUED | OUTPATIENT
Start: 2019-12-10 | End: 2019-12-16

## 2019-12-10 RX ORDER — HYDRALAZINE HCL 50 MG
10 TABLET ORAL ONCE
Refills: 0 | Status: COMPLETED | OUTPATIENT
Start: 2019-12-10 | End: 2019-12-10

## 2019-12-10 RX ORDER — FUROSEMIDE 40 MG
40 TABLET ORAL
Refills: 0 | Status: DISCONTINUED | OUTPATIENT
Start: 2019-12-11 | End: 2019-12-12

## 2019-12-10 RX ORDER — CARVEDILOL PHOSPHATE 80 MG/1
6.25 CAPSULE, EXTENDED RELEASE ORAL ONCE
Refills: 0 | Status: COMPLETED | OUTPATIENT
Start: 2019-12-10 | End: 2019-12-10

## 2019-12-10 RX ORDER — HYDRALAZINE HCL 50 MG
25 TABLET ORAL EVERY 8 HOURS
Refills: 0 | Status: DISCONTINUED | OUTPATIENT
Start: 2019-12-10 | End: 2019-12-11

## 2019-12-10 RX ORDER — ATORVASTATIN CALCIUM 80 MG/1
10 TABLET, FILM COATED ORAL AT BEDTIME
Refills: 0 | Status: DISCONTINUED | OUTPATIENT
Start: 2019-12-10 | End: 2019-12-16

## 2019-12-10 RX ADMIN — Medication 40 MILLIGRAM(S): at 06:00

## 2019-12-10 RX ADMIN — Medication 10 MILLIGRAM(S): at 01:02

## 2019-12-10 RX ADMIN — HEPARIN SODIUM 5000 UNIT(S): 5000 INJECTION INTRAVENOUS; SUBCUTANEOUS at 06:00

## 2019-12-10 RX ADMIN — Medication 650 MILLIGRAM(S): at 18:57

## 2019-12-10 RX ADMIN — HEPARIN SODIUM 5000 UNIT(S): 5000 INJECTION INTRAVENOUS; SUBCUTANEOUS at 21:13

## 2019-12-10 RX ADMIN — Medication 250 MILLIGRAM(S): at 03:15

## 2019-12-10 RX ADMIN — Medication 25 MILLIGRAM(S): at 21:14

## 2019-12-10 RX ADMIN — Medication 3 MILLILITER(S): at 03:27

## 2019-12-10 RX ADMIN — PIPERACILLIN AND TAZOBACTAM 200 GRAM(S): 4; .5 INJECTION, POWDER, LYOPHILIZED, FOR SOLUTION INTRAVENOUS at 01:24

## 2019-12-10 RX ADMIN — Medication 20 MILLIGRAM(S): at 01:35

## 2019-12-10 RX ADMIN — Medication 25 MILLIGRAM(S): at 06:06

## 2019-12-10 RX ADMIN — CARVEDILOL PHOSPHATE 6.25 MILLIGRAM(S): 80 CAPSULE, EXTENDED RELEASE ORAL at 18:57

## 2019-12-10 RX ADMIN — ATORVASTATIN CALCIUM 10 MILLIGRAM(S): 80 TABLET, FILM COATED ORAL at 21:13

## 2019-12-10 RX ADMIN — Medication 650 MILLIGRAM(S): at 06:06

## 2019-12-10 RX ADMIN — Medication 3 MILLILITER(S): at 08:39

## 2019-12-10 RX ADMIN — Medication 25 MILLIGRAM(S): at 15:33

## 2019-12-10 RX ADMIN — Medication 5 MILLIGRAM(S): at 21:14

## 2019-12-10 RX ADMIN — Medication 60 MILLIGRAM(S): at 01:02

## 2019-12-10 RX ADMIN — CARVEDILOL PHOSPHATE 6.25 MILLIGRAM(S): 80 CAPSULE, EXTENDED RELEASE ORAL at 06:00

## 2019-12-10 RX ADMIN — AMLODIPINE BESYLATE 10 MILLIGRAM(S): 2.5 TABLET ORAL at 06:06

## 2019-12-10 RX ADMIN — Medication 50 MILLIGRAM(S): at 01:35

## 2019-12-10 RX ADMIN — CARVEDILOL PHOSPHATE 6.25 MILLIGRAM(S): 80 CAPSULE, EXTENDED RELEASE ORAL at 21:13

## 2019-12-10 RX ADMIN — HEPARIN SODIUM 5000 UNIT(S): 5000 INJECTION INTRAVENOUS; SUBCUTANEOUS at 15:33

## 2019-12-10 RX ADMIN — INSULIN GLARGINE 10 UNIT(S): 100 INJECTION, SOLUTION SUBCUTANEOUS at 22:49

## 2019-12-10 RX ADMIN — Medication 40 MILLIGRAM(S): at 15:33

## 2019-12-10 RX ADMIN — INSULIN GLARGINE 10 UNIT(S): 100 INJECTION, SOLUTION SUBCUTANEOUS at 03:15

## 2019-12-10 RX ADMIN — Medication 3 MILLILITER(S): at 20:01

## 2019-12-10 NOTE — H&P ADULT - NSHPREVIEWOFSYSTEMS_GEN_ALL_CORE
REVIEW OF SYSTEMS:    CONSTITUTIONAL: No weakness, fevers or chills  EYES/ENT: No visual changes;  No vertigo or throat pain   NECK: No pain or stiffness  RESPIRATORY: see HPI  CARDIOVASCULAR: No chest pain or palpitations  GASTROINTESTINAL: No abdominal or epigastric pain. No nausea, vomiting, or hematemesis; No diarrhea or constipation. No melena or hematochezia.  GENITOURINARY: No dysuria, frequency or hematuria  NEUROLOGICAL: No numbness or weakness  SKIN: No itching, burning, rashes, or lesions   All other review of systems is negative unless indicated above.

## 2019-12-10 NOTE — H&P ADULT - ASSESSMENT
79M hx CKD IV, HTN, DM2, BPH presents with worsening shortness of breath found to be hypoxic with pulmonary edema. 79M hx CKD IV, HTN, DM2, BPH presents with worsening shortness of breath found to be hypoxic with likely pulm edema 2/2 chf, hypertensive urgency.

## 2019-12-10 NOTE — H&P ADULT - NSHPLABSRESULTS_GEN_ALL_CORE
8.8    11.00 )-----------( 181      ( 10 Dec 2019 00:15 )             30.2       12-10    139  |  104  |  41.0<H>  ----------------------------<  189<H>  4.6   |  19.0<L>  |  2.75<H>    Ca    8.3<L>      10 Dec 2019 00:15    TPro  7.0  /  Alb  3.8  /  TBili  0.2<L>  /  DBili  x   /  AST  24  /  ALT  16  /  AlkPhos  80  12-10            PT/INR - ( 10 Dec 2019 00:15 )   PT: 13.2 sec;   INR: 1.14 ratio         PTT - ( 10 Dec 2019 00:15 )  PTT:31.6 sec    Lactate Trend      CARDIAC MARKERS ( 10 Dec 2019 00:15 )  x     / 0.03 ng/mL / 79 U/L / x     / x            CAPILLARY BLOOD GLUCOSE      POCT Blood Glucose.: 204 mg/dL (10 Dec 2019 02:53)        RADIOLOGY, EKG & ADDITIONAL TESTS: Reviewed.     EKG- nsr, no st changes or twi    CXR- b/l pleural effusions and pulm edema

## 2019-12-10 NOTE — H&P ADULT - NSICDXPASTMEDICALHX_GEN_ALL_CORE_FT
PAST MEDICAL HISTORY:  ADI (acute kidney injury)     BPH (benign prostatic hyperplasia)     CKD (chronic kidney disease)     Diabetes     Hyperkalemia     Hyperlipemia     Hypertension

## 2019-12-10 NOTE — CONSULT NOTE ADULT - ASSESSMENT
Pt is a 78 y/o male with medical history of DM, HTN, CKD, BPH, pAfib with no AC, who presents to [facility]-ED for c/o SOB. Pt is not a good historian d/t possible dementia, wife at bedside. Pt wife states that pt has been experiencing SOB for past week. He has been unable to lie flat to sleep. Pt wife also noticed that pt has lower extremity swelling. Yesterday pt verbalized to wife that he could not breathe, and she brought him to [facility]-ED. Pt denies fever, chills, cough, phlegm production, chest pain,  palpitations, inausea, vomiting, diarrhea melena,  lightheadedness, dizziness, syncope, sick contacts. At baseline, pt can walk one flight of stairs 2-3x a day with no assistive ambulation devices as per spouse.     Plan:     1.SOB  -BNP- 28520  -Tele/ECG- SR HR @ 75  - Chest Xray- result pending, visual assessment of film, bilateral infiltrate noted  -CT Chest-pending  -Lasix 40mh IVP TID currently ordered by primary team  -Monitor Potassium, Maintain above 4, Replenish PRN  -Echo ordered for evaluation of worsening AS, or EF  -ILR to be interrogated to r/o arrythmia event    2. HTN  -recent /78  -Continue current antihypertensive medication regimen     3. CKD  -BUN/CreatninE- 46/2.97  -Consider Nephrology consult d/t increased trend Pt is a 78 y/o male with medical history of DM, HTN, CKD, BPH, pAfib with no AC, who presents to [facility]-ED for c/o SOB. Pt is not a good historian d/t possible dementia, wife at bedside. Pt wife states that pt has been experiencing SOB for past week. He has been unable to lie flat to sleep. Pt wife also noticed that pt has lower extremity swelling. Yesterday pt verbalized to wife that he could not breathe, and she brought him to [facility]-ED. Pt denies fever, chills, cough, phlegm production, chest pain,  palpitations, inausea, vomiting, diarrhea melena,  lightheadedness, dizziness, syncope, sick contacts. At baseline, pt can walk one flight of stairs 2-3x a day with no assistive ambulation devices as per spouse.     Plan:     1.SOB  -BNP- 56009  -Tele/ECG- SR HR @ 75  - Chest Xray- small pleural effusions, air space disease in right lung  -CT Chest-groundglass opacities in both upper lobes, moderate bilateral effusions  - Lasix 40mh IVP BID  -Monitor Potassium, Maintain above 4, Replenish PRN  -Echo -EF 60-65%, mod-severe AS, possible SCAR as per attending   -ILR to be interrogated to r/o arrythmia event    2. HTN  -recent /78  -Continue current antihypertensive medication regimen     3. CKD  -BUN/CreatninE- 46/2.97  -Consider Nephrology consult d/t increased trend Pt is a 78 y/o male with medical history of DM, HTN, CKD, BPH, pAfib with no AC, who presents to [facility]-ED for c/o SOB. Pt is not a good historian d/t possible dementia, wife at bedside. Pt wife states that pt has been experiencing SOB for past week. He has been unable to lie flat to sleep. Pt wife also noticed that pt has lower extremity swelling. Yesterday pt verbalized to wife that he could not breathe, and she brought him to [facility]-ED. Pt denies fever, chills, cough, phlegm production, chest pain,  palpitations, inausea, vomiting, diarrhea melena,  lightheadedness, dizziness, syncope, sick contacts. At baseline, pt can walk one flight of stairs 2-3x a day with no assistive ambulation devices as per spouse.     Plan:     1. Aortic Stenosis with SOB  -BNP- 17391  -Tele/ECG- SR HR @ 75  - Chest Xray- small pleural effusions, air space disease in right lung  -CT Chest-groundglass opacities in both upper lobes, moderate bilateral effusions  - Lasix 40mh IVP BID  -Monitor Potassium, Maintain above 4, Replenish PRN  -Echo -EF 60-65%, mod-severe AS, possible SCAR as per attending   -ILR to be interrogated to r/o arrythmia event    2. HTN  -recent /78  -Continue current antihypertensive medication regimen     3. CKD  -BUN/CreatninE- 46/2.97  -Consider Nephrology consult d/t increased trend

## 2019-12-10 NOTE — CONSULT NOTE ADULT - PROBLEM SELECTOR RECOMMENDATION 9
2/2 acute decompensated heart failure/pulmonary edema. BNP elevated (2505). Received 60mg IV Lasix and placed on BiPAP w/ improvement in respiratory status. Transitioned from BiPAP to 3L NC. Obtain ABG. Continue w/ Lasix and BiPAP PRN. Obtain TTE. Cardiology consult. 2/2 acute decompensated heart failure/pulmonary edema. BNP elevated (2505). Received 60mg IV Lasix and placed on BiPAP w/ improvement in respiratory status. Transitioned from BiPAP to 3L NC. ABG 7.38/35/90/21. Continue w/ Lasix and BiPAP PRN. Obtain TTE. Cardiology consult. 2/2 acute decompensated heart failure/pulmonary edema. BNP elevated (2505). Received 60mg IV Lasix and placed on BiPAP w/ improvement in respiratory status. Transitioned from BiPAP to 3L NC. ABG 7.38/35/90/21. re-evaluated patient 1 hour off of bipap and he is resting comfortably. "belly breathing" likely due to OHS. Continue w/ Lasix and BiPAP PRN. Obtain TTE. Cardiology consult.

## 2019-12-10 NOTE — H&P ADULT - PROBLEM SELECTOR PLAN 2
hypertensive urgency now improving s/p IV hydralazine  will restart patient on home amlodipine, hydralazine and carvedilol  if remains elevated consider adding IMDUR

## 2019-12-10 NOTE — H&P ADULT - HISTORY OF PRESENT ILLNESS
79M hx CKD IV, HTN, DM2, BPH presents with worsening shortness of breath. According to wife patient has had difficulty breathing for last week especially at night. He has also had dry cough. Pt also with worsening LE edema. Breathing worse with laying down. Today breathing suddenly worsened and EMS called. No fever, chill, chest pain, n/v/d, abdominal pain or sick contacts. No recent travel.     In ED, pt afebrile, pulse 74, bp elevated to 239/105, pt given hydralazine 10 mg iv, then 20 mg iv and 50 mg PO with improvement of bp to 182/84. Pt given lasix 60 mg iv. Per EMS pt saturating 77% on room air, Improved to 96% on 3L NC.

## 2019-12-10 NOTE — ED ADULT NURSE REASSESSMENT NOTE - NS ED NURSE REASSESS COMMENT FT1
Pt sitting upright in stretcher in no apparent signs of distress. Pt remains on cardiac monitor, PIV sites WNL. Pt status improved, work of breathing decreased. Refer to flowsheet and chart, pt ID band in place, pt safety maintained, pt hemodynamically stable, updated on plan of care. Awaiting clean bed. Call light provided, fall safety reinforced. Will continue to monitor Pt sitting upright in stretcher in no apparent signs of distress. Pt remains on cardiac monitor, PIV sites WNL. Pt status improved, work of breathing decreased. Refer to flowsheet and chart, pt ID band in place, pt safety maintained, pt hemodynamically stable, updated on plan of care. Awaiting clean bed. Call light provided, fall safety reinforced. Report given to CDU RN GHULAM. Pt to be moved to main.

## 2019-12-10 NOTE — CONSULT NOTE ADULT - SUBJECTIVE AND OBJECTIVE BOX
[facility]                                                               Office:  [address] [address]                                                              Telephone: [phone]. Fax:[phone]                                                                        CARDIOLOGY CONSULTATION NOTE                                                                                             Consult requested by:  Dr. Rashid  Reason for Consultation: Pulmonary Edema  History obtained by: Patient and medical record   obtained: No  Primary Cardiologist: Dr. Faustin  Chief complaint:    Patient is a 79y old  Male who presents with a chief complaint of pulmonary edema (10 Dec 2019 05:53)        HPI: Pt is a 78 y/o male with medical history of DM, HTN, CKD, BPH, pAfib with no AC, who presents to [facility]-ED for c/o SOB. Pt is not a good historian d/t possible dementia, wife at bedside. Pt wife states that pt has been experiencing SOB for past week. He has been unable to lie flat to sleep. Pt wife also noticed that pt has lower extremity swelling. Yesterday pt verbalized to wife that he could not breathe, and she brought him to [facility]-ED. Pt denies fever, chills, cough, phlegm production, chest pain,  palpitations, inausea, vomiting, diarrhea melena,  lightheadedness, dizziness, syncope, sick contacts. At baseline, pt can walk one flight of stairs 2-3x a day with no assistive ambulation devices as per spouse.       REVIEW OF SYMPTOMS:     CONSTITUTIONAL: No fever, weight loss, or fatigue  ENMT:  No difficulty hearing, tinnitus, vertigo; No sinus or throat pain  NECK: No pain or stiffness  CARDIOVASCULAR: No chest pain, dyspnea, syncope, palpitations, dizziness, Orthopnea, Paroxsymal nocturnal dyspnea  RESPIRATORY: No Dyspnea on exertion, Shortness of breath, cough, wheezing  : No dysuria, no hematuria   GI: No dark color stool, no melena, no diarrhea, no constipation, no abdominal pain   NEURO: No headache, no dizziness, no slurred speech   MUSCULOSKELETAL: No joint pain or swelling; No muscle, back, or extremity pain  PSYCH: No agitation, no anxiety.    ALL OTHER REVIEW OF SYSTEMS ARE NEGATIVE.      PREVIOUS DIAGNOSTIC TESTING  ECHO FINDINGS: 2019- EF 73%, no ischemia, moderate AS, mild pulm HTN    CAROTID U/S: Mild plaque bilaterally        ALLERGIES: Allergies    No Known Allergies    Intolerances          PAST MEDICAL HISTORY  Hyperkalemia  ADI (acute kidney injury)  BPH (benign prostatic hyperplasia)  CKD (chronic kidney disease)  Hyperlipemia  Hypertension  Diabetes      PAST SURGICAL HISTORY  Ureteral stent retained      FAMILY HISTORY:  Mother- alive and well, HTN, DM  Sister-  age 85 DM, HTN  Borhter- Alive age 74, DM, HTN, lower extremity amputation       SOCIAL HISTORY:  Denies smoking/alcohol/drugs  CIGARETTES:   past as a teenager  ALCOHOL: previous ETOH abuse (hard liquor), quit one year ago  DRUGS: none      CURRENT MEDICATIONS:  amLODIPine   Tablet 10 milliGRAM(s) Oral daily  carvedilol 6.25 milliGRAM(s) Oral every 12 hours  furosemide   Injectable 40 milliGRAM(s) IV Push three times a day  hydrALAZINE 25 milliGRAM(s) Oral every 8 hours  albuterol/ipratropium for Nebulization   atorvastatin  dextrose 5%.  dextrose 50% Injectable  dextrose 50% Injectable  dextrose 50% Injectable  heparin  Injectable  insulin glargine Injectable (LANTUS)  insulin lispro (HumaLOG) corrective regimen sliding scale  insulin lispro (HumaLOG) corrective regimen sliding scale  sodium bicarbonate        HOME MEDICATIONS:    amLODIPine 10 mg oral tablet: 1 tab(s) orally once a day (10 Dec 2019 02:16)  carvedilol 12.5 mg oral tablet: 1 tab(s) orally 2 times a day (10 Dec 2019 02:16)  furosemide 40 mg oral tablet: 1 tab(s) orally once a day (10 Dec 2019 02:16)  hydrALAZINE 25 mg oral tablet: 1 tab(s) orally 2 times a day (10 Dec 2019 02:16)  insulin glargine 100 units/mL subcutaneous solution: 21 unit(s) subcutaneous once a day (at bedtime) (10 Dec 2019 02:16)  insulin lispro 100 units/mL subcutaneous solution: 3 unit(s) subcutaneous 3 times a day (before meals) ; 0 (10 Dec 2019 02:16)  pravastatin 40 mg oral tablet: 1 tab(s) orally once a day (10 Dec 2019 02:16)  sodium bicarbonate 650 mg oral tablet: 1 tab(s) orally 2 times a day (10 Dec 2019 02:16)      Vital Signs Last 24 Hrs  T(C): 36.1 (10 Dec 2019 09:37), Max: 36.6 (09 Dec 2019 23:39)  T(F): 96.9 (10 Dec 2019 09:37), Max: 97.9 (09 Dec 2019 23:39)  HR: 70 (10 Dec 2019 09:37) (64 - 80)  BP: 157/78 (10 Dec 2019 09:37) (157/78 - 239/105)  RR: 22 (10 Dec 2019 09:37) (20 - 30)  SpO2: 98% (10 Dec 2019 09:37) (96% - 100%)      PHYSICAL EXAM:  Constitutional: Comfortable . No acute distress.   HEENT: Atraumatic and normocephalic , neck is supple . no JVD. No carotid bruit. PEERL   CNS: A&Ox1-2. No focal deficits. EOMI.   Lymph Nodes: Cervical : Not palpable.  Respiratory: Left lower lobe rales, cleared with cough  Cardiovascular: + Grade 3 systolic mumur,   Gastrointestinal: +distended, Soft non-tender, +Bowel sounds. negative Shirley's sign.  Extremities: +bilateral lower extremity edema  Psychiatric: Calm . no agitation.  Skin: No skin rash/ulcers visualized to face, hands or feet.    Intake and output:    @ 07:01  -  12-10 @ 07:00  --------------------------------------------------------  IN: 60 mL / OUT: 0 mL / NET: 60 mL        LABS:                        7.9    8.74  )-----------( 161      ( 10 Dec 2019 07:36 )             27.2     12-10    139  |  101  |  46.0<H>  ----------------------------<  223<H>  4.3   |  16.0<L>  |  2.97<H>    Ca    8.2<L>      10 Dec 2019 06:32  Phos  3.9     12-10  Mg     2.2     12-10    TPro  7.0  /  Alb  3.8  /  TBili  0.2<L>  /  DBili  x   /  AST  24  /  ALT  16  /  AlkPhos  80  12-10    CARDIAC MARKERS ( 10 Dec 2019 06:32 )  x     / 0.03 ng/mL / x     / x     / x      CARDIAC MARKERS ( 10 Dec 2019 00:15 )  x     / 0.03 ng/mL / 79 U/L / x     / x        ;p-BNP=Serum Pro-Brain Natriuretic Peptide: 53833 pg/mL (-10 @ 00:15)    PT/INR - ( 10 Dec 2019 00:15 )   PT: 13.2 sec;   INR: 1.14 ratio         PTT - ( 10 Dec 2019 00:15 )  PTT:31.6 sec      INTERPRETATION OF TELEMETRY: Reviewed by me.   ECG: SR HR @ 75    RADIOLOGY & ADDITIONAL STUDIES:    X-ray:  Results pending   CT scan: pending [facility]                                                               Office:  [address] [address]                                                              Telephone: [phone]. Fax:[phone]                                                                        CARDIOLOGY CONSULTATION NOTE                                                                                             Consult requested by:  Dr. Rashid  Reason for Consultation: Pulmonary Edema  History obtained by: Patient and medical record   obtained: No  Primary Cardiologist: Dr. Faustin  Chief complaint:    Patient is a 79y old  Male who presents with a chief complaint of pulmonary edema (10 Dec 2019 05:53)        HPI: Pt is a 80 y/o male with medical history of DM, HTN, CKD, BPH, pAfib with no AC, who presents to [facility]-ED for c/o SOB. Pt is not a good historian d/t possible dementia, wife at bedside. Pt wife states that pt has been experiencing SOB for past week. He has been unable to lie flat to sleep. Pt wife also noticed that pt has lower extremity swelling. Yesterday pt verbalized to wife that he could not breathe, and she brought him to [facility]-ED. Pt denies fever, chills, cough, phlegm production, chest pain,  palpitations, inausea, vomiting, diarrhea melena,  lightheadedness, dizziness, syncope, sick contacts. At baseline, pt can walk one flight of stairs 2-3x a day with no assistive ambulation devices as per spouse.       REVIEW OF SYMPTOMS:     CONSTITUTIONAL: No fever, weight loss, or fatigue  ENMT:  No difficulty hearing, tinnitus, vertigo; No sinus or throat pain  NECK: No pain or stiffness  CARDIOVASCULAR: No chest pain, dyspnea, syncope, palpitations, dizziness, Orthopnea, Paroxsymal nocturnal dyspnea  RESPIRATORY: No Dyspnea on exertion, Shortness of breath, cough, wheezing  : No dysuria, no hematuria   GI: No dark color stool, no melena, no diarrhea, no constipation, no abdominal pain   NEURO: No headache, no dizziness, no slurred speech   MUSCULOSKELETAL: No joint pain or swelling; No muscle, back, or extremity pain  PSYCH: No agitation, no anxiety.    ALL OTHER REVIEW OF SYSTEMS ARE NEGATIVE.      PREVIOUS DIAGNOSTIC TESTING  ECHO FINDINGS: < from: TTE Echo Complete w/Doppler (12.10.19 @ 13:21) >  Summary:   1. Technically difficult study.   2. Normal global left ventricular systolic function.   3. Left ventricular ejection fraction, by visual estimation, is 60 to   65%.   4. Spectral Doppler shows pseudonormal pattern of left ventricular   myocardial filling (Grade II diastolic dysfunction).   5. Elevated mean left atrial pressure. Mean LAP estimated at 22 mmHg.   6. Mild mitral annular calcification.   7. Mild thickening and calcification of the anterior and posterior   mitralvalve leaflets.   8. Mild mitral valve regurgitation.   9. Mild aortic regurgitation.  10. Moderate to severe aortic valve stenosis. Recommend SCAR for further   evaluation.  11. Moderate pleural effusion in both left and right lateral regions.  12. There is no evidence of pericardial effusion.      < end of copied text >    CAROTID U/S: Mild plaque bilaterally        ALLERGIES: Allergies    No Known Allergies    Intolerances          PAST MEDICAL HISTORY  Hyperkalemia  ADI (acute kidney injury)  BPH (benign prostatic hyperplasia)  CKD (chronic kidney disease)  Hyperlipemia  Hypertension  Diabetes      PAST SURGICAL HISTORY  Ureteral stent retained      FAMILY HISTORY:  Mother- alive and well, HTN, DM  Sister-  age 85 DM, HTN  Borhter- Alive age 74, DM, HTN, lower extremity amputation       SOCIAL HISTORY:  Denies smoking/alcohol/drugs  CIGARETTES:   past as a teenager  ALCOHOL: previous ETOH abuse (hard liquor), quit one year ago  DRUGS: none      CURRENT MEDICATIONS:  amLODIPine   Tablet 10 milliGRAM(s) Oral daily  carvedilol 6.25 milliGRAM(s) Oral every 12 hours  furosemide   Injectable 40 milliGRAM(s) IV Push three times a day  hydrALAZINE 25 milliGRAM(s) Oral every 8 hours  albuterol/ipratropium for Nebulization   atorvastatin  dextrose 5%.  dextrose 50% Injectable  dextrose 50% Injectable  dextrose 50% Injectable  heparin  Injectable  insulin glargine Injectable (LANTUS)  insulin lispro (HumaLOG) corrective regimen sliding scale  insulin lispro (HumaLOG) corrective regimen sliding scale  sodium bicarbonate        HOME MEDICATIONS:    amLODIPine 10 mg oral tablet: 1 tab(s) orally once a day (10 Dec 2019 02:16)  carvedilol 12.5 mg oral tablet: 1 tab(s) orally 2 times a day (10 Dec 2019 02:16)  furosemide 40 mg oral tablet: 1 tab(s) orally once a day (10 Dec 2019 02:16)  hydrALAZINE 25 mg oral tablet: 1 tab(s) orally 2 times a day (10 Dec 2019 02:16)  insulin glargine 100 units/mL subcutaneous solution: 21 unit(s) subcutaneous once a day (at bedtime) (10 Dec 2019 02:16)  insulin lispro 100 units/mL subcutaneous solution: 3 unit(s) subcutaneous 3 times a day (before meals) ; 0 (10 Dec 2019 02:16)  pravastatin 40 mg oral tablet: 1 tab(s) orally once a day (10 Dec 2019 02:16)  sodium bicarbonate 650 mg oral tablet: 1 tab(s) orally 2 times a day (10 Dec 2019 02:16)      Vital Signs Last 24 Hrs  T(C): 36.1 (10 Dec 2019 09:37), Max: 36.6 (09 Dec 2019 23:39)  T(F): 96.9 (10 Dec 2019 09:37), Max: 97.9 (09 Dec 2019 23:39)  HR: 70 (10 Dec 2019 09:37) (64 - 80)  BP: 157/78 (10 Dec 2019 09:37) (157/78 - 239/105)  RR: 22 (10 Dec 2019 09:37) (20 - 30)  SpO2: 98% (10 Dec 2019 09:37) (96% - 100%)      PHYSICAL EXAM:  Constitutional: Comfortable . No acute distress.   HEENT: Atraumatic and normocephalic , neck is supple . no JVD. No carotid bruit. PEERL   CNS: A&Ox1-2. No focal deficits. EOMI.   Lymph Nodes: Cervical : Not palpable.  Respiratory: Left lower lobe rales, cleared with cough  Cardiovascular: + Grade 3 systolic mumur,   Gastrointestinal: +distended, Soft non-tender, +Bowel sounds. negative Shirley's sign.  Extremities: +bilateral lower extremity edema  Psychiatric: Calm . no agitation.  Skin: No skin rash/ulcers visualized to face, hands or feet.    Intake and output:    @ 07:01  -  12-10 @ 07:00  --------------------------------------------------------  IN: 60 mL / OUT: 0 mL / NET: 60 mL        LABS:                        7.9    8.74  )-----------( 161      ( 10 Dec 2019 07:36 )             27.2     12-10    139  |  101  |  46.0<H>  ----------------------------<  223<H>  4.3   |  16.0<L>  |  2.97<H>    Ca    8.2<L>      10 Dec 2019 06:32  Phos  3.9     12-10  Mg     2.2     12-10    TPro  7.0  /  Alb  3.8  /  TBili  0.2<L>  /  DBili  x   /  AST  24  /  ALT  16  /  AlkPhos  80  12-10    CARDIAC MARKERS ( 10 Dec 2019 06:32 )  x     / 0.03 ng/mL / x     / x     / x      CARDIAC MARKERS ( 10 Dec 2019 00:15 )  x     / 0.03 ng/mL / 79 U/L / x     / x        ;p-BNP=Serum Pro-Brain Natriuretic Peptide: 66683 pg/mL (12-10 @ 00:15)    PT/INR - ( 10 Dec 2019 00:15 )   PT: 13.2 sec;   INR: 1.14 ratio         PTT - ( 10 Dec 2019 00:15 )  PTT:31.6 sec      INTERPRETATION OF TELEMETRY: Reviewed by me.   ECG: SR HR @ 75    RADIOLOGY & ADDITIONAL STUDIES:      X-ray: < from: Xray Chest 1 View- PORTABLE-Urgent (12.10.19 @ 00:51) >  Impression:  Cardiomegaly. Small bilateral pleural effusions. Airspace disease in the   right lung..    < end of copied text >    CT scan: < from: CT Chest No Cont (12.10.19 @ 12:39) >  IMPRESSION:     Moderate bilateral pleural effusions, larger on the right with associated   compressive atelectasis in both lower lobes.    Groundglass opacities in both upper lobes with additional areas of   nodularity in the right upper lobe; differential includes pulmonary edema   or less likely infection.    < end of copied text >

## 2019-12-10 NOTE — CONSULT NOTE ADULT - SUBJECTIVE AND OBJECTIVE BOX
Patient is a 79y old  Male who presents with a chief complaint of pulmonary edema (10 Dec 2019 03:25)    BRIEF HOSPITAL COURSE: 80 y/o M w/ a PMHx of type II DM, HTN, CKD, and BPH who presented to the ED c/o of SOB. Hx obtained from pt's daughter. Pt's daughter states that pt has been feeling SOB for approx 1 week. SOB worsens when lying down. Pt also w/ increasing lower extremity edema. On arrival to the ED, pt was hypertensive (239/105) and hypoxic. Pt received a total of 30mg IV and 50mg PO hydralazine w/ improvement in BP (182/84). Pt was placed on 3L nasal cannula w/ improvement in SpO2 (96%). Pt received 60mg IV Lasix and was placed on BiPAP. MICU consult called.    Events last 24 hours: Pt seen and evaluated at the bedside in the ED. Upon my evaluation, pt appears comfortable on BiPAP and was able to speak in full sentences. Pt states that his breathing has improved since his arrival to the ED. Pt transitioned off BiPAP to 3L nasal cannula. Pt's SpO2 remained in the 90s.    PAST MEDICAL & SURGICAL HISTORY:  Hyperkalemia  ADI (acute kidney injury)  BPH (benign prostatic hyperplasia)  CKD (chronic kidney disease)  Hyperlipemia  Hypertension  Diabetes  Ureteral stent retained    Allergies  No Known Allergies    FAMILY HISTORY:  Family history otherwise noncontributory.    Review of Systems:  CONSTITUTIONAL: No fever, chills, or fatigue  EYES: No eye pain, visual disturbances, or discharge  ENMT:  No difficulty hearing, tinnitus, vertigo; No sinus or throat pain  NECK: No pain or stiffness  RESPIRATORY: (+) SOB, orthopnea No cough, wheezing, chills or hemoptysis.  CARDIOVASCULAR: No chest pain, palpitations, dizziness, or leg swelling  GASTROINTESTINAL: No abdominal or epigastric pain. No nausea, vomiting, or hematemesis; No diarrhea or constipation. No melena or hematochezia.  GENITOURINARY: No dysuria, frequency, hematuria, or incontinence  NEUROLOGICAL: No headaches, memory loss, loss of strength, numbness, or tremors  SKIN: No itching, burning, rashes, or lesions   MUSCULOSKELETAL: No joint pain or swelling; No muscle, back, or extremity pain  PSYCHIATRIC: No depression, anxiety, mood swings, or difficulty sleeping    All other review of systems negative except as mentioned in HPI.    Social History:  Briefly smoked tobacco in his teenage years. Denies hx of EtOH and illicit drug abuse.    Medications:  amLODIPine   Tablet 10 milliGRAM(s) Oral daily  carvedilol 6.25 milliGRAM(s) Oral every 12 hours  furosemide   Injectable 40 milliGRAM(s) IV Push three times a day  hydrALAZINE 25 milliGRAM(s) Oral every 8 hours  albuterol/ipratropium for Nebulization 3 milliLiter(s) Nebulizer every 6 hours  heparin  Injectable 5000 Unit(s) SubCutaneous every 8 hours  atorvastatin 10 milliGRAM(s) Oral at bedtime  dextrose 40% Gel 15 Gram(s) Oral once PRN  dextrose 50% Injectable 12.5 Gram(s) IV Push once  dextrose 50% Injectable 25 Gram(s) IV Push once  dextrose 50% Injectable 25 Gram(s) IV Push once  glucagon  Injectable 1 milliGRAM(s) IntraMuscular once PRN  insulin glargine Injectable (LANTUS) 10 Unit(s) SubCutaneous at bedtime  insulin lispro (HumaLOG) corrective regimen sliding scale   SubCutaneous three times a day before meals  insulin lispro (HumaLOG) corrective regimen sliding scale   SubCutaneous at bedtime  dextrose 5%. 1000 milliLiter(s) IV Continuous <Continuous>  sodium bicarbonate 650 milliGRAM(s) Oral two times a day    ICU Vital Signs Last 24 Hrs  T(C): 36.6 (09 Dec 2019 23:39), Max: 36.6 (09 Dec 2019 23:39)  T(F): 97.9 (09 Dec 2019 23:39), Max: 97.9 (09 Dec 2019 23:39)  HR: 75 (10 Dec 2019 05:21) (74 - 78)  BP: 197/91 (10 Dec 2019 04:21) (182/84 - 239/105)  BP(mean): --  ABP: --  ABP(mean): --  RR: 20 (10 Dec 2019 02:29) (20 - 30)  SpO2: 98% (10 Dec 2019 05:21) (96% - 100%)    Vital Signs Last 24 Hrs  T(C): 36.6 (09 Dec 2019 23:39), Max: 36.6 (09 Dec 2019 23:39)  T(F): 97.9 (09 Dec 2019 23:39), Max: 97.9 (09 Dec 2019 23:39)  HR: 75 (10 Dec 2019 05:21) (74 - 78)  BP: 197/91 (10 Dec 2019 04:21) (182/84 - 239/105)  BP(mean): --  RR: 20 (10 Dec 2019 02:29) (20 - 30)  SpO2: 98% (10 Dec 2019 05:21) (96% - 100%)    I&O's Detail    LABS:                        8.8    11.00 )-----------( 181      ( 10 Dec 2019 00:15 )             30.2     12-10    139  |  104  |  41.0<H>  ----------------------------<  189<H>  4.6   |  19.0<L>  |  2.75<H>    Ca    8.3<L>      10 Dec 2019 00:15    TPro  7.0  /  Alb  3.8  /  TBili  0.2<L>  /  DBili  x   /  AST  24  /  ALT  16  /  AlkPhos  80  12-10    CARDIAC MARKERS ( 10 Dec 2019 00:15 )  x     / 0.03 ng/mL / 79 U/L / x     / x        CAPILLARY BLOOD GLUCOSE    POCT Blood Glucose.: 204 mg/dL (10 Dec 2019 02:53)    PT/INR - ( 10 Dec 2019 00:15 )   PT: 13.2 sec;   INR: 1.14 ratio    PTT - ( 10 Dec 2019 00:15 )  PTT:31.6 sec    CULTURES:    Physical Examination:    GENERAL: Appears comfortable on BiPAP, able to speak in full sentences.    EYES: Pupils equal, reactive to light. Symmetric.    EARS, NOSE, THROAT: Normal; supple neck, no lymphadenopathy; trachea midline.    PULM: Clear to auscultation bilaterally, no significant sputum production.    CVS: Regular rate and rhythm. No murmurs, rubs, or gallops appreciated.    GI: Soft, nondistended, nontender, normoactive bowel sounds, no masses, no guarding    EXTREMITIES: 2+ pitting pedal edema B/L. DP 2+ B/L.    SKIN: Warm and well perfused, no rashes noted.    NEURO: A&Ox2, interactive, nonfocal.    DEVICES: X    RADIOLOGY:  Pending CXR.

## 2019-12-10 NOTE — CONSULT NOTE ADULT - ASSESSMENT
78 y/o M w/ a PMHx of type II DM, HTN, CKD, and BPH admitted to medicine w/ acute hypoxic respiratory failure 2/2 acute decompensated heart failure/pulmonary edema, hypertensive urgency, and right middle lobe PNA. 80 y/o M w/ a PMHx of type II DM, HTN, CKD, and BPH admitted to medicine w/ acute hypoxic respiratory failure 2/2 acute decompensated heart failure/pulmonary edema, hypertensive urgency, and right middle lobe PNA.    At this time, pt does not require ICU level of care. Please reconsult should pt deteriorate. 80 y/o M w/ a PMHx of type II DM, HTN, CKD, and BPH admitted to medicine w/ acute hypoxic respiratory failure 2/2 acute decompensated heart failure/pulmonary edema, hypertensive urgency, and right middle lobe PNA.    At this time, pt does not require ICU level of care. Please reconsult should pt deteriorate.    discussed with eICU attending Dr. Gillespie.

## 2019-12-10 NOTE — PATIENT PROFILE ADULT - FUNCTIONAL SCREEN CURRENT LEVEL: EATING, MLM
0 = independent Island Pedicle Flap Text: The defect edges were debeveled with a #15 scalpel blade.  Given the location of the defect, shape of the defect and the proximity to free margins an island pedicle advancement flap was deemed most appropriate.  Using a sterile surgical marker, an appropriate advancement flap was drawn incorporating the defect, outlining the appropriate donor tissue and placing the expected incisions within the relaxed skin tension lines where possible.    The area thus outlined was incised deep to adipose tissue with a #15 scalpel blade.  The skin margins were undermined to an appropriate distance in all directions around the primary defect and laterally outward around the island pedicle utilizing iris scissors.  There was minimal undermining beneath the pedicle flap.

## 2019-12-10 NOTE — H&P ADULT - NSHPPHYSICALEXAM_GEN_ALL_CORE
Vital Signs Last 24 Hrs  T(C): 36.6 (09 Dec 2019 23:39), Max: 36.6 (09 Dec 2019 23:39)  T(F): 97.9 (09 Dec 2019 23:39), Max: 97.9 (09 Dec 2019 23:39)  HR: 78 (10 Dec 2019 02:29) (74 - 78)  BP: 182/84 (10 Dec 2019 02:29) (182/84 - 239/105)  BP(mean): --  RR: 20 (10 Dec 2019 02:29) (20 - 30)  SpO2: 98% (10 Dec 2019 02:29) (96% - 100%)    GENERAL: labored breathing  HEENT:  Atraumatic, Normocephalic, MMM, no oropharyngeal lesions  EYES: EOMI, PERRLA, conjunctiva and sclera clear  NECK: Supple, No JVD, no throat tenderness.  CHEST/LUNG: absent BS at bases, b/l rales worse in rll  HEART: Regular rate and rhythm; No murmurs, rubs, or gallops  ABDOMEN: Soft, Nontender, Nondistended; Bowel sounds present  EXTREMITIES:  2+ Peripheral Pulses ,b/l 2+ pitting edema  PSYCH: AAOx3, normal affect  NEUROLOGY: moves all extremities spontaneously. no sensory deficits  SKIN: No rashes or lesions Vital Signs Last 24 Hrs  T(C): 36.6 (09 Dec 2019 23:39), Max: 36.6 (09 Dec 2019 23:39)  T(F): 97.9 (09 Dec 2019 23:39), Max: 97.9 (09 Dec 2019 23:39)  HR: 78 (10 Dec 2019 02:29) (74 - 78)  BP: 182/84 (10 Dec 2019 02:29) (182/84 - 239/105)  BP(mean): --  RR: 20 (10 Dec 2019 02:29) (20 - 30)  SpO2: 98% (10 Dec 2019 02:29) (96% - 100%)    GENERAL: labored breathing  HEENT:  Atraumatic, Normocephalic, MMM, no oropharyngeal lesions  EYES: EOMI, PERRLA, conjunctiva and sclera clear  NECK: Supple, No JVD, no throat tenderness.  CHEST/LUNG: absent BS at bases, b/l rales worse in rll, +accessory muscle use  HEART: Regular rate and rhythm; No murmurs, rubs, or gallops  ABDOMEN: Soft, Nontender, Nondistended; Bowel sounds present  EXTREMITIES:  2+ Peripheral Pulses ,b/l 2+ pitting edema  PSYCH: AAOx3, normal affect  NEUROLOGY: moves all extremities spontaneously. no sensory deficits  SKIN: No rashes or lesions

## 2019-12-10 NOTE — CHART NOTE - NSCHARTNOTEFT_GEN_A_CORE
Pt seen/ examined at bedside before 3PM, admitted early morning for respiratory failure 2/2 CHF.   Pt clinically feels better, less SOB, still has 2+ LE edema. No other complaints.   Exam- Vitals noted- BP not at goal, AOX3, resting comfortably, heart sounds regular- + murmur, crackles on lung exam, LE edema 2+, abd soft, obese.   >Respiratory failure 2/2 CHF- Clinically improving, on O2 NC now, C/w CHF pathway with IV diuresis. Cardiology/ MICU consult noted. No signs of PNA- opacities likely related to effusion. Monitor off Abx for now.   >HTN- BP not at goal, increased coreg dose, add hydralazine IVP as needed.  Please refer to early morning H&P for full details.

## 2019-12-10 NOTE — H&P ADULT - PROBLEM SELECTOR PLAN 1
pt with hypoxia, pulm edema, elevated bnp likely 2/2 ADHF  last tte in 2016 with stage ii diastolic dysfunction and moderate AS  will repeat TTE  c/w lasix 40 mg iv bid, oxygen supplementation, strict I's and O's  monitor electrolytes  if remains tachypneic may need bipap.   [facility] with hypoxia, pulm edema, elevated bnp likely 2/2 ADHF  last tte in 2016 with stage ii diastolic dysfunction and moderate AS  will repeat TTE  c/w lasix 40 mg iv bid, oxygen supplementation, strict I's and O's  monitor electrolytes  will consult [place] cardiology  given tachypnea will start patient on bipap  get chest ct to assess for pna, check procalcitonin

## 2019-12-11 DIAGNOSIS — I50.9 HEART FAILURE, UNSPECIFIED: ICD-10-CM

## 2019-12-11 DIAGNOSIS — E78.5 HYPERLIPIDEMIA, UNSPECIFIED: ICD-10-CM

## 2019-12-11 DIAGNOSIS — I35.0 NONRHEUMATIC AORTIC (VALVE) STENOSIS: ICD-10-CM

## 2019-12-11 DIAGNOSIS — R39.81 FUNCTIONAL URINARY INCONTINENCE: ICD-10-CM

## 2019-12-11 LAB
ALBUMIN SERPL ELPH-MCNC: 3.4 G/DL — SIGNIFICANT CHANGE UP (ref 3.3–5.2)
ALP SERPL-CCNC: 63 U/L — SIGNIFICANT CHANGE UP (ref 40–120)
ALT FLD-CCNC: 11 U/L — SIGNIFICANT CHANGE UP
ANION GAP SERPL CALC-SCNC: 15 MMOL/L — SIGNIFICANT CHANGE UP (ref 5–17)
AST SERPL-CCNC: 13 U/L — SIGNIFICANT CHANGE UP
BILIRUB SERPL-MCNC: 0.3 MG/DL — LOW (ref 0.4–2)
BUN SERPL-MCNC: 46 MG/DL — HIGH (ref 8–20)
CALCIUM SERPL-MCNC: 8.3 MG/DL — LOW (ref 8.6–10.2)
CHLORIDE SERPL-SCNC: 104 MMOL/L — SIGNIFICANT CHANGE UP (ref 98–107)
CO2 SERPL-SCNC: 23 MMOL/L — SIGNIFICANT CHANGE UP (ref 22–29)
CREAT SERPL-MCNC: 3.02 MG/DL — HIGH (ref 0.5–1.3)
FERRITIN SERPL-MCNC: 25 NG/ML — LOW (ref 30–400)
FOLATE SERPL-MCNC: 9.1 NG/ML — SIGNIFICANT CHANGE UP
GLUCOSE BLDC GLUCOMTR-MCNC: 101 MG/DL — HIGH (ref 70–99)
GLUCOSE BLDC GLUCOMTR-MCNC: 143 MG/DL — HIGH (ref 70–99)
GLUCOSE BLDC GLUCOMTR-MCNC: 158 MG/DL — HIGH (ref 70–99)
GLUCOSE BLDC GLUCOMTR-MCNC: 224 MG/DL — HIGH (ref 70–99)
GLUCOSE BLDC GLUCOMTR-MCNC: 227 MG/DL — HIGH (ref 70–99)
GLUCOSE BLDC GLUCOMTR-MCNC: 36 MG/DL — CRITICAL LOW (ref 70–99)
GLUCOSE SERPL-MCNC: 34 MG/DL — CRITICAL LOW (ref 70–115)
HBA1C BLD-MCNC: 8.1 % — HIGH (ref 4–5.6)
HCT VFR BLD CALC: 26.4 % — LOW (ref 39–50)
HGB BLD-MCNC: 8.1 G/DL — LOW (ref 13–17)
IRON SATN MFR SERPL: 25 UG/DL — LOW (ref 59–158)
IRON SATN MFR SERPL: 9 % — LOW (ref 16–55)
MCHC RBC-ENTMCNC: 23.3 PG — LOW (ref 27–34)
MCHC RBC-ENTMCNC: 30.7 GM/DL — LOW (ref 32–36)
MCV RBC AUTO: 75.9 FL — LOW (ref 80–100)
PLATELET # BLD AUTO: 183 K/UL — SIGNIFICANT CHANGE UP (ref 150–400)
POTASSIUM SERPL-MCNC: 3.6 MMOL/L — SIGNIFICANT CHANGE UP (ref 3.5–5.3)
POTASSIUM SERPL-SCNC: 3.6 MMOL/L — SIGNIFICANT CHANGE UP (ref 3.5–5.3)
PROT SERPL-MCNC: 6.1 G/DL — LOW (ref 6.6–8.7)
RAPID RVP RESULT: SIGNIFICANT CHANGE UP
RBC # BLD: 3.48 M/UL — LOW (ref 4.2–5.8)
RBC # FLD: 17.2 % — HIGH (ref 10.3–14.5)
SODIUM SERPL-SCNC: 142 MMOL/L — SIGNIFICANT CHANGE UP (ref 135–145)
TIBC SERPL-MCNC: 285 UG/DL — SIGNIFICANT CHANGE UP (ref 220–430)
TRANSFERRIN SERPL-MCNC: 199 MG/DL — SIGNIFICANT CHANGE UP (ref 180–329)
VIT B12 SERPL-MCNC: 306 PG/ML — SIGNIFICANT CHANGE UP (ref 232–1245)
WBC # BLD: 7.86 K/UL — SIGNIFICANT CHANGE UP (ref 3.8–10.5)
WBC # FLD AUTO: 7.86 K/UL — SIGNIFICANT CHANGE UP (ref 3.8–10.5)

## 2019-12-11 PROCEDURE — 99223 1ST HOSP IP/OBS HIGH 75: CPT

## 2019-12-11 PROCEDURE — 99233 SBSQ HOSP IP/OBS HIGH 50: CPT

## 2019-12-11 PROCEDURE — 99231 SBSQ HOSP IP/OBS SF/LOW 25: CPT

## 2019-12-11 RX ORDER — ERYTHROPOIETIN 10000 [IU]/ML
8000 INJECTION, SOLUTION INTRAVENOUS; SUBCUTANEOUS
Refills: 0 | Status: DISCONTINUED | OUTPATIENT
Start: 2019-12-11 | End: 2019-12-11

## 2019-12-11 RX ORDER — CEFPODOXIME PROXETIL 100 MG
200 TABLET ORAL EVERY 12 HOURS
Refills: 0 | Status: DISCONTINUED | OUTPATIENT
Start: 2019-12-11 | End: 2019-12-16

## 2019-12-11 RX ORDER — ISOSORBIDE DINITRATE 5 MG/1
5 TABLET ORAL THREE TIMES A DAY
Refills: 0 | Status: DISCONTINUED | OUTPATIENT
Start: 2019-12-11 | End: 2019-12-12

## 2019-12-11 RX ORDER — DEXTROSE 50 % IN WATER 50 %
15 SYRINGE (ML) INTRAVENOUS ONCE
Refills: 0 | Status: COMPLETED | OUTPATIENT
Start: 2019-12-11 | End: 2019-12-12

## 2019-12-11 RX ORDER — HYDRALAZINE HCL 50 MG
50 TABLET ORAL THREE TIMES A DAY
Refills: 0 | Status: DISCONTINUED | OUTPATIENT
Start: 2019-12-11 | End: 2019-12-16

## 2019-12-11 RX ORDER — DEXTROSE 50 % IN WATER 50 %
25 SYRINGE (ML) INTRAVENOUS ONCE
Refills: 0 | Status: COMPLETED | OUTPATIENT
Start: 2019-12-11 | End: 2019-12-11

## 2019-12-11 RX ADMIN — AMLODIPINE BESYLATE 10 MILLIGRAM(S): 2.5 TABLET ORAL at 06:41

## 2019-12-11 RX ADMIN — Medication 40 MILLIGRAM(S): at 16:29

## 2019-12-11 RX ADMIN — Medication 25 MILLIGRAM(S): at 11:45

## 2019-12-11 RX ADMIN — Medication 40 MILLIGRAM(S): at 06:41

## 2019-12-11 RX ADMIN — ATORVASTATIN CALCIUM 10 MILLIGRAM(S): 80 TABLET, FILM COATED ORAL at 22:03

## 2019-12-11 RX ADMIN — CARVEDILOL PHOSPHATE 12.5 MILLIGRAM(S): 80 CAPSULE, EXTENDED RELEASE ORAL at 16:29

## 2019-12-11 RX ADMIN — HEPARIN SODIUM 5000 UNIT(S): 5000 INJECTION INTRAVENOUS; SUBCUTANEOUS at 22:05

## 2019-12-11 RX ADMIN — Medication 325 MILLIGRAM(S): at 11:47

## 2019-12-11 RX ADMIN — Medication 3 MILLILITER(S): at 02:57

## 2019-12-11 RX ADMIN — Medication 50 MILLIGRAM(S): at 22:03

## 2019-12-11 RX ADMIN — ISOSORBIDE DINITRATE 5 MILLIGRAM(S): 5 TABLET ORAL at 22:03

## 2019-12-11 RX ADMIN — Medication 650 MILLIGRAM(S): at 06:41

## 2019-12-11 RX ADMIN — ISOSORBIDE DINITRATE 5 MILLIGRAM(S): 5 TABLET ORAL at 16:29

## 2019-12-11 RX ADMIN — HEPARIN SODIUM 5000 UNIT(S): 5000 INJECTION INTRAVENOUS; SUBCUTANEOUS at 06:41

## 2019-12-11 RX ADMIN — Medication 3 MILLILITER(S): at 08:31

## 2019-12-11 RX ADMIN — Medication 5 MILLIGRAM(S): at 11:45

## 2019-12-11 RX ADMIN — Medication 650 MILLIGRAM(S): at 16:29

## 2019-12-11 RX ADMIN — Medication 5 MILLIGRAM(S): at 06:41

## 2019-12-11 RX ADMIN — Medication 200 MILLIGRAM(S): at 18:36

## 2019-12-11 RX ADMIN — Medication 25 MILLIGRAM(S): at 06:41

## 2019-12-11 RX ADMIN — HEPARIN SODIUM 5000 UNIT(S): 5000 INJECTION INTRAVENOUS; SUBCUTANEOUS at 11:45

## 2019-12-11 RX ADMIN — Medication 5 MILLIGRAM(S): at 22:03

## 2019-12-11 RX ADMIN — Medication 4: at 16:29

## 2019-12-11 RX ADMIN — Medication 25 GRAM(S): at 07:22

## 2019-12-11 RX ADMIN — INSULIN GLARGINE 10 UNIT(S): 100 INJECTION, SOLUTION SUBCUTANEOUS at 22:05

## 2019-12-11 RX ADMIN — Medication 3 MILLILITER(S): at 15:28

## 2019-12-11 NOTE — PROGRESS NOTE ADULT - ASSESSMENT
Pt is a 78 y/o male with medical history of DM, HTN, CKD, BPH, pAfib with no AC, who presents to [facility]-ED for c/o SOB. Pt is not a good historian d/t possible dementia, wife at bedside. Pt wife states that pt has been experiencing SOB for past week. He has been unable to lie flat to sleep. Pt wife also noticed that pt has lower extremity swelling. Yesterday pt verbalized to wife that he could not breathe, and she brought him to [facility]-ED. Pt denies fever, chills, cough, phlegm production, chest pain,  palpitations, inausea, vomiting, diarrhea melena,  lightheadedness, dizziness, syncope, sick contacts. At baseline, pt can walk one flight of stairs 2-3x a day with no assistive ambulation devices as per spouse.   As of 12/11/19, pt has no c/o SOB, chest pain, palpitations. Pt was hypoglycemic this AM, but asymptomatic. Hypoglycemic protocol activated and is now resolved. Pt yielding clear straw colored urine in  hollis catheter.         Plan:     1. Aortic Stenosis with SOB  -BNP- 58052  -Tele/ECG- SR HR @ 75  - Chest Xray- small pleural effusions, air space disease in right lung  -CT Chest-groundglass opacities in both upper lobes, moderate bilateral effusions  - Continue Lasix 40mh IVP BID  -Monitor Potassium, Maintain above 4, Replenish PRN  -Echo -EF 60-65%, mod-severe AS,   -ILR to be interrogated to r/o arrythmia event    2. HTN  -recent /71  -Continue Amlodipine 10mg   - Increase Hydralizine to 50mg TID  -Start Isosorbide Dinitrate 5MG TID    3. CKD  -BUN/CreatninE- 46/3.02  -Pt nephrologist made aware of pt hospitalization and plans to round today as per pt wife Pt is a 78 y/o male with medical history of DM, HTN, CKD, BPH, pAfib with no AC, who presents to [facility]-ED for c/o SOB. Pt is not a good historian d/t possible dementia, wife at bedside. Pt wife states that pt has been experiencing SOB for past week. He has been unable to lie flat to sleep. Pt wife also noticed that pt has lower extremity swelling. Yesterday pt verbalized to wife that he could not breathe, and she brought him to [facility]-ED. Pt denies fever, chills, cough, phlegm production, chest pain,  palpitations, inausea, vomiting, diarrhea melena,  lightheadedness, dizziness, syncope, sick contacts. At baseline, pt can walk one flight of stairs 2-3x a day with no assistive ambulation devices as per spouse.   As of 12/11/19, pt has no c/o SOB, chest pain, palpitations. Pt was hypoglycemic this AM, but asymptomatic. Hypoglycemic protocol activated and is now resolved. Pt yielding clear straw colored urine in  hollis catheter.         Plan:     1. Aortic Stenosis with SOB  -BNP- 06049  -Tele/ECG- SR HR @ 75  - Chest Xray- small pleural effusions, air space disease in right lung  -CT Chest-groundglass opacities in both upper lobes, moderate bilateral effusions  - Continue Lasix 40mh IVP BID  -Monitor Potassium, Maintain above 4, Replenish PRN  -Echo -EF 60-65%, mod-severe AS,   -ILR to be interrogated to r/o arrythmia event  -F/U outpatient cardiac catheterization for ischemic evaluation and evaluation of aortic valve-will perform procedure once kidney function stable  -F/U CT SX outpatient to evaluate for possible valve replacement  -F/U  GI for endoscopy  2. HTN  -recent /71  -Continue Amlodipine 10mg   - Increase Hydralizine to 50mg TID  -Start Isosorbide Dinitrate 5MG TID    3. CKD  -BUN/CreatninE- 46/3.02  -Pt nephrologist made aware of pt hospitalization and plans to round today as per pt wife

## 2019-12-11 NOTE — CONSULT NOTE ADULT - SUBJECTIVE AND OBJECTIVE BOX
[facility]  INFECTIOUS DISEASES AND INTERNAL MEDICINE at [place]  =======================================================  Donato Banegas MD  Diplomates American Board of Internal Medicine and Infectious Diseases  Tel: [phone]      Fax: [phone]  =======================================================      N-[id number]  JEMIMA SANTOS     CC: Patient is a 79y old  Male who presents with a chief complaint of pulmonary edema (11 Dec 2019 15:42)    78y/o  Male with h/o CKD IV, HTN, DM2, BPH. Patient comes in with worsening shortness of breath x 1 week. Worse at night. He has also had dry cough and worsening LE edema. Breathing worse with laying down. No sick contacts. No fever or chills. In ED he was afebrile, no leukocytosis, hypertensive, hypoxic saturating 77% on room air, Improved to 96% on 3L NC. He was seen by nephrology on 12/3 and had UA and urine culture done. UA with pyuria and urine culture with klebsiella. He was started on Levaquin which he has been taking. ID input requested for UTI.       Past Medical & Surgical Hx:  Hyperkalemia  ADI (acute kidney injury)  BPH (benign prostatic hyperplasia)  CKD (chronic kidney disease)  Hyperlipemia  Hypertension  Diabetes  Ureteral stent retained      Social Hx:  DM - mother and brother  CAD - mother  CVA - brother and sister  HTN - Sister  HLD- sister       FAMILY HISTORY:  No pertinent family history in first degree relatives      Allergies  No Known Allergies      ANTIBIOTICS:   Vancomycin x1  Zosyn x1       REVIEW OF SYSTEMS:  CONSTITUTIONAL:  No Fever or chills  HEENT:  No diplopia or blurred vision.  No earache, sore throat or runny nose.  CARDIOVASCULAR:  No pressure, squeezing, strangling, tightness, heaviness or aching about the chest, neck, axilla or epigastrium.  RESPIRATORY: + shortness of breath  GASTROINTESTINAL:  No nausea, vomiting   GENITOURINARY:  + Urinary retention  MUSCULOSKELETAL:  no joint aches, no muscle pain  SKIN:  No change in skin, hair or nails.  NEUROLOGIC:  No Headaches, seizures   PSYCHIATRIC:  No disorder of thought or mood.  ENDOCRINE:  No heat or cold intolerance  HEMATOLOGICAL:  No easy bruising or bleeding.       Physical Exam:  Vital Signs Last 24 Hrs  T(C): 36.2 (11 Dec 2019 16:19), Max: 36.8 (10 Dec 2019 21:07)  T(F): 97.1 (11 Dec 2019 16:19), Max: 98.2 (10 Dec 2019 21:07)  HR: 73 (11 Dec 2019 16:19) (58 - 74)  BP: 164/76 (11 Dec 2019 16:19) (153/71 - 182/84)  RR: 18 (11 Dec 2019 16:19) (16 - 24)  SpO2: 100% (11 Dec 2019 16:19) (98% - 100%)      GEN: NAD, pleasant  HEENT: normocephalic and atraumatic. EOMI. PERRL.  Anicteric  NECK: Supple.   LUNGS: Decreased basal BS B/L  HEART: Regular rate and rhythm   ABDOMEN: Soft, nontender, and nondistended.  Positive bowel sounds.    : No CVA tenderness  EXTREMITIES: trace edema.  MSK: No joint swelling  NEUROLOGIC: No Focal Deficits  PSYCHIATRIC: Appropriate affect .  SKIN: No Rash      Labs:  12-11    142  |  104  |  46.0<H>  ----------------------------<  34<LL>  3.6   |  23.0  |  3.02<H>    Ca    8.3<L>      11 Dec 2019 06:47  Phos  3.9     12-10  Mg     2.2     12-10    TPro  6.1<L>  /  Alb  3.4  /  TBili  0.3<L>  /  DBili  x   /  AST  13  /  ALT  11  /  AlkPhos  63  12-11                          8.1    7.86  )-----------( 183      ( 11 Dec 2019 06:47 )             26.4       PT/INR - ( 10 Dec 2019 00:15 )   PT: 13.2 sec;   INR: 1.14 ratio         PTT - ( 10 Dec 2019 00:15 )  PTT:31.6 sec    LIVER FUNCTIONS - ( 11 Dec 2019 06:47 )  Alb: 3.4 g/dL / Pro: 6.1 g/dL / ALK PHOS: 63 U/L / ALT: 11 U/L / AST: 13 U/L / GGT: x           CARDIAC MARKERS ( 10 Dec 2019 06:32 )  x     / 0.03 ng/mL / x     / x     / x      CARDIAC MARKERS ( 10 Dec 2019 00:15 )  x     / 0.03 ng/mL / 79 U/L / x     / x          ABG - ( 10 Dec 2019 06:16 )  pH, Arterial: 7.38  pH, Blood: x     /  pCO2: 35    /  pO2: 90    / HCO3: 21    / Base Excess: -3.8  /  SaO2: 96            < from: CT Chest No Cont (12.10.19 @ 12:39) >  EXAM:  CT CHEST                          PROCEDURE DATE:  12/10/2019      INTERPRETATION:  CLINICAL INFORMATION: History of chronic kidney disease.   Shortness of breath. Pulmonary edema.    COMPARISON: Chest x-ray 12/10/2019 and CT chest 1/11/2015    PROCEDURE:   CT of the Chest was performed without intravenous contrast.  Sagittal and coronal reformats were performed.    FINDINGS:    LUNGS AND AIRWAYS: Patent central airways.  Compressive atelectasis in   both lower lobes. Evaluation of the lungs is mildly degraded by   respiratory motion. Groundglass opacities in both upper lobes with   additional areas of nodularity in the right upper lobe.    PLEURA: Moderate bilateral pleural effusions, larger on the right.    MEDIASTINUM AND PRUDENCIO: No lymphadenopathy.    VESSELS: Thoracic aorta normal in caliber with mild calcified plaque.   Coronary artery calcium secretions.    HEART: Heart size is normal. No pericardial effusion. Aortic root   calcification.    CHEST WALL AND LOWER NECK:A loop recorder is noted. No enlarged axillary   lymph nodes. Bilateral gynecomastia.    VISUALIZED UPPER ABDOMEN: Unremarkable.    BONES: Degenerative changes in the spine. Old fractures of the left   third-fifth ribs.    IMPRESSION:     Moderate bilateral pleural effusions, larger on the right with associated   compressive atelectasis in both lower lobes.    Groundglass opacities in both upper lobes with additional areas of   nodularity in the right upper lobe; differential includes pulmonary edema   or less likely infection.    < end of copied text >

## 2019-12-11 NOTE — ED ADULT NURSE REASSESSMENT NOTE - NS ED NURSE REASSESS COMMENT FT1
Received pt from [facility]. PT resting in stretcher. PT in NAD at this time. Family at bedside. CTM

## 2019-12-11 NOTE — PROGRESS NOTE ADULT - SUBJECTIVE AND OBJECTIVE BOX
[facility]                                                               Office: [address] [address]                                                              Telephone: [phone]. Fax:[phone]                                                                             PROGRESS NOTE  Reason for follow up:   Overnight: No new events.   Update: Pt was asymptomatic hypoglycemic this morning-resolved, Assessed by attending,     Subjective: " I'm ok"      Pt is a 78 y/o male with medical history of DM, HTN, CKD, BPH, pAfib with no AC, who presents to [facility]ED for c/o SOB. Pt is not a good historian d/t possible dementia, wife at bedside. Pt wife states that pt has been experiencing SOB for past week. He has been unable to lie flat to sleep. Pt wife also noticed that pt has lower extremity swelling. Yesterday pt verbalized to wife that he could not breathe, and she brought him to [facility]ED. Pt denies fever, chills, cough, phlegm production, chest pain,  palpitations, inausea, vomiting, diarrhea melena,  lightheadedness, dizziness, syncope, sick contacts. At baseline, pt can walk one flight of stairs 2-3x a day with no assistive ambulation devices as per spouse.  As of 12/11/19, pt has no c/o SOB, chest pain, palpitations. Pt was hypoglycemic this AM, but asymptomatic. Hypoglycemic protocol activated and is now resolved. Pt yielding clear straw colored urine in  hollis catheter.       	  Vitals:  T(C): 36.8 (12-11-19 @ 04:07), Max: 36.8 (12-10-19 @ 21:07)  HR: 67 (12-11-19 @ 11:44) (58 - 74)  BP: 158/72 (12-11-19 @ 11:44) (153/71 - 182/84)  RR: 18 (12-11-19 @ 11:44) (16 - 24)  SpO2: 100% (12-11-19 @ 11:44) (98% - 100%)  Wt(kg): --  I&O's Summary    10 Dec 2019 07:01  -  11 Dec 2019 07:00  --------------------------------------------------------  IN: 0 mL / OUT: 2225 mL / NET: -2225 mL      Weight (kg): 81.6 (12-09 @ 23:39)      PHYSICAL EXAM:  Appearance: Comfortable. No acute distress  HEENT:  Head and neck: Atraumatic. Normocephalic.  Normal oral mucosa, PERRL, Neck is supple. No JVD, No carotid bruit.   Neurologic: A & O x 3, no focal deficits. EOMI.  Lymphatic: No cervical lymphadenopathy  Cardiovascular: +grade III systolic murmur, Normal S1 S2,   Respiratory: Lungs rales left lower lobe noted  Gastrointestinal:  Soft, Non-tender, + BS  Lower Extremities: +1/+1 bilateral edema  Psychiatry: Patient is calm. No agitation. Mood & affect appropriate  Skin: No rashes/ ecchymoses/cyanosis/ulcers visualized on the face, hands or feet.      CURRENT MEDICATIONS:  amLODIPine   Tablet 10 milliGRAM(s) Oral daily  carvedilol 12.5 milliGRAM(s) Oral every 12 hours  furosemide   Injectable 40 milliGRAM(s) IV Push two times a day  hydrALAZINE 25 milliGRAM(s) Oral every 8 hours  hydrALAZINE Injectable 10 milliGRAM(s) IV Push every 6 hours PRN  albuterol/ipratropium for Nebulization  aspirin  atorvastatin  dextrose 50% Injectable  dextrose 50% Injectable  dextrose 50% Injectable  insulin glargine Injectable (LANTUS)  insulin lispro (HumaLOG) corrective regimen sliding scale  insulin lispro (HumaLOG) corrective regimen sliding scale  dextrose 5%.  heparin  Injectable  oxybutynin  sodium bicarbonate      DIAGNOSTIC TESTING:  [ ] Echocardiogram: < from: TTE Echo Complete w/Doppler (12.10.19 @ 13:21) >  Summary:   1. Technically difficult study.   2. Normal global left ventricular systolic function.   3. Left ventricular ejection fraction, by visual estimation, is 60 to   65%.   4. Spectral Doppler shows pseudonormal pattern of left ventricular   myocardial filling (Grade II diastolic dysfunction).   5. Elevated mean left atrial pressure. Mean LAP estimated at 22 mmHg.   6. Mild mitral annular calcification.   7. Mild thickening and calcification of the anterior and posterior   mitralvalve leaflets.   8. Mild mitral valve regurgitation.   9. Mild aortic regurgitation.  10. Moderate to severe aortic valve stenosis. Recommend SCAR for further   evaluation.  11. Moderate pleural effusion in both left and right lateral regions.  12. There is no evidence of pericardial effusion.    < end of copied text >    OTHER: 	  U/S: < from: US Renal (12.10.19 @ 13:25) >  IMPRESSION:    No hydronephrosis.    Kidneys increased in echogenicity suggestive of medical renal disease.    < end of copied text >    CT: < from: CT Chest No Cont (12.10.19 @ 12:39) >  IMPRESSION:     Moderate bilateral pleural effusions, larger on the right with associated   compressive atelectasis in both lower lobes.    Groundglass opacities in both upper lobes with additional areas of   nodularity in the right upper lobe; differential includes pulmonary edema   or less likely infection.    < end of copied text >    < from: CT Head w/o Cont (01.08.16 @ 12:59) >  IMPRESSION: Moderate to severe chronic microvascular changes without   evidence of an acute transcortical infarction or hemorrhage. MR is a more   sensitive imaging modality for the evaluation of an acute infarction.    < end of copied text >      LABS:	 	  CARDIAC MARKERS ( 10 Dec 2019 06:32 )  x     / 0.03 ng/mL / x     / x     / x      p-BNP 10 Dec 2019 06:32: x    , CARDIAC MARKERS ( 10 Dec 2019 00:15 )  x     / 0.03 ng/mL / 79 U/L / x     / x      p-BNP 10 Dec 2019 00:15: 39324 pg/mL                          8.1    7.86  )-----------( 183      ( 11 Dec 2019 06:47 )             26.4     12-11    142  |  104  |  46.0<H>  ----------------------------<  34<LL>  3.6   |  23.0  |  3.02<H>    Ca    8.3<L>      11 Dec 2019 06:47  Phos  3.9     12-10  Mg     2.2     12-10    TPro  6.1<L>  /  Alb  3.4  /  TBili  0.3<L>  /  DBili  x   /  AST  13  /  ALT  11  /  AlkPhos  63  12-11    proBNP: Serum Pro-Brain Natriuretic Peptide: 98284 pg/mL (12-10 @ 00:15)    Lipid Profile:   HgA1c: Hemoglobin A1C, Whole Blood: 8.1 %    TSH:       TELEMETRY:   ECG:  SR HR @ 75

## 2019-12-11 NOTE — CONSULT NOTE ADULT - ASSESSMENT
Advanced CKD,    Fluid Overload, Anemia,    Diuresing well,    Anemia, Uncontrolled  HTN,      Will Optimize,

## 2019-12-11 NOTE — PROGRESS NOTE ADULT - SUBJECTIVE AND OBJECTIVE BOX
1) CKD V;   2) Vitamin D def  3) HTN  4) IDDM  5) Acidosis    Uncontrolled HTN  cw lasix 40mg po daily  cw carvedilol per cards  Starting amlodipine 10mg daily  cw sodium bicarbonate 650mg BID; unsure of compliance; however acidosis improved on BMP  Will require AVF; family and pt in denial still; held long discussion re: need for it  Still has not had renal sonogram  Urine culture; abx    RTC 1 month.     Above office note per Dr. Adams, Never maintained OP F.U,    D/W Wife, Pharmacy,    To Begin Anemia Management,     US Kidneys & Arrange for an AVF During this admission,

## 2019-12-11 NOTE — CONSULT NOTE ADULT - SUBJECTIVE AND OBJECTIVE BOX
Patient is a 79y old  Male who presents with a chief complaint of pulmonary edema (11 Dec 2019 12:50)    HPI:  79M hx CKD IV, HTN, DM2, BPH presents with worsening shortness of breath. According to wife patient has had difficulty breathing for last week especially at night. He has also had dry cough. Pt also with worsening LE edema. Breathing worse with laying down. Today breathing suddenly worsened and EMS called. No fever, chill, chest pain, n/v/d, abdominal pain or sick contacts. No recent travel.     In ED, pt afebrile, pulse 74, bp elevated to 239/105, pt given hydralazine 10 mg iv, then 20 mg iv and 50 mg PO with improvement of bp to 182/84. Pt given lasix 60 mg iv. Per EMS pt saturating 77% on room air, Improved to 96% on 3L NC. (10 Dec 2019 03:25)    PAST MEDICAL & SURGICAL HISTORY:  Hyperkalemia  ADI (acute kidney injury)  BPH (benign prostatic hyperplasia)  CKD (chronic kidney disease)  Hyperlipemia  Hypertension  Diabetes    Ureteral stent retained    FAMILY HISTORY:  No pertinent family history in first degree relatives    Social History: Non Smoker,     MEDICATIONS  (STANDING):  albuterol/ipratropium for Nebulization 3 milliLiter(s) Nebulizer every 6 hours  amLODIPine   Tablet 10 milliGRAM(s) Oral daily  aspirin 325 milliGRAM(s) Oral daily  atorvastatin 10 milliGRAM(s) Oral at bedtime  carvedilol 12.5 milliGRAM(s) Oral every 12 hours  dextrose 5%. 1000 milliLiter(s) (50 mL/Hr) IV Continuous <Continuous>  dextrose 50% Injectable 12.5 Gram(s) IV Push once  dextrose 50% Injectable 25 Gram(s) IV Push once  dextrose 50% Injectable 25 Gram(s) IV Push once  furosemide   Injectable 40 milliGRAM(s) IV Push two times a day  heparin  Injectable 5000 Unit(s) SubCutaneous every 8 hours  hydrALAZINE 50 milliGRAM(s) Oral three times a day  insulin glargine Injectable (LANTUS) 10 Unit(s) SubCutaneous at bedtime  insulin lispro (HumaLOG) corrective regimen sliding scale   SubCutaneous three times a day before meals  insulin lispro (HumaLOG) corrective regimen sliding scale   SubCutaneous at bedtime  isosorbide   dinitrate Tablet (ISORDIL) 5 milliGRAM(s) Oral three times a day  oxybutynin 5 milliGRAM(s) Oral three times a day  sodium bicarbonate 650 milliGRAM(s) Oral two times a day    MEDICATIONS  (PRN):  dextrose 40% Gel 15 Gram(s) Oral once PRN Blood Glucose LESS THAN 70 milliGRAM(s)/deciliter  dextrose 40% Gel 15 Gram(s) Oral once PRN Blood Glucose LESS THAN 70 milliGRAM(s)/deciliter  glucagon  Injectable 1 milliGRAM(s) IntraMuscular once PRN Glucose LESS THAN 70 milligrams/deciliter  hydrALAZINE Injectable 10 milliGRAM(s) IV Push every 6 hours PRN SBP>170    Allergies    No Known Allergies    REVIEW OF SYSTEMS:    CONSTITUTIONAL: No fever, weight loss, or fatigue  EYES: No eye pain, visual disturbances, or discharge  ENMT:  No difficulty hearing, tinnitus, vertigo; No sinus or throat pain  NECK: No pain or stiffness    RESPIRATORY: + cough, wheezing,  No chills or hemoptysis; + shortness of breath  CARDIOVASCULAR: No chest pain, palpitations, dizziness, ++leg swelling  GASTROINTESTINAL: No abdominal or epigastric pain. No nausea, vomiting, or hematemesis; No diarrhea or constipation. No melena or hematochezia.  GENITOURINARY: No dysuria, frequency, hematuria, or incontinence  NEUROLOGICAL: No headaches, memory loss, loss of strength, numbness, or tremors  SKIN: No itching, burning, rashes, or lesions   LYMPH NODES: No enlarged glands  ENDOCRINE: No heat or cold intolerance; No hair loss  MUSCULOSKELETAL: No joint pain or swelling; No muscle, back, or extremity pain  PSYCHIATRIC: No depression, anxiety, mood swings, or difficulty sleeping  HEME/LYMPH: No easy bruising, or bleeding gums  ALLERGY AND IMMUNOLOGIC: No hives or eczema    Vital Signs Last 24 Hrs  T(C): 36.8 (11 Dec 2019 04:07), Max: 36.8 (10 Dec 2019 21:07)  T(F): 98.2 (11 Dec 2019 04:07), Max: 98.2 (10 Dec 2019 21:07)  HR: 67 (11 Dec 2019 11:44) (58 - 74)  BP: 158/72 (11 Dec 2019 11:44) (153/71 - 182/84)  RR: 18 (11 Dec 2019 11:44) (16 - 24)  SpO2: 100% (11 Dec 2019 11:44) (98% - 100%)    PHYSICAL EXAM:    GENERAL: NAD, well-groomed, well-developed  HEAD:  Atraumatic, Normocephalic  EYES: EOMI, PERRLA, conjunctiva and sclera clear  ENMT: No tonsillar erythema, exudates, or enlargement; Moist mucous membranes, Good dentition, No lesions  NECK: Supple, No JVD, Normal thyroid  NERVOUS SYSTEM:  Alert & Oriented X3, Good concentration; Motor Strength 5/5 B/L upper and lower extremities; DTRs 2+ intact and symmetric  CHEST/LUNG: Clear to percussion bilaterally; No rales, rhonchi, wheezing, or rubs  HEART: Regular rate and rhythm; No murmurs, rubs, or gallops  ABDOMEN: Soft, Nontender, Nondistended; Bowel sounds present  EXTREMITIES:  2+ Peripheral Pulses, No clubbing, cyanosis, or edema  LYMPH: No lymphadenopathy noted  SKIN: No rashes or lesions      LABS:                        8.1    7.86  )-----------( 183      ( 11 Dec 2019 06:47 )             26.4     12-11    142  |  104  |  46.0<H>  ----------------------------<  34<LL>  3.6   |  23.0  |  3.02<H>    Ca    8.3<L>      11 Dec 2019 06:47  Phos  3.9     12-10  Mg     2.2     12-10    TPro  6.1<L>  /  Alb  3.4  /  TBili  0.3<L>  /  DBili  x   /  AST  13  /  ALT  11  /  AlkPhos  63  12-11    PT/INR - ( 10 Dec 2019 00:15 )   PT: 13.2 sec;   INR: 1.14 ratio         PTT - ( 10 Dec 2019 00:15 )  PTT:31.6 sec    RADIOLOGY & ADDITIONAL TESTS: 40

## 2019-12-11 NOTE — CONSULT NOTE ADULT - CONSULT REASON
[FreeTextEntry1] : 40 yo M with ED\par \par - Discussed the underlying mechanism for erections, pathophysiology of erectile dysfunction (ED) and treatment options. Role of smoking, diabetes, hypertension, hyperlipidemia, coronary artery disease and treatment for benign and malignant prostate conditions was discussed as some of the more common causes of ED. Exercise, weight loss and a healthy lifestyle can be beneficial. Medications in this category include Viagra, Levitra, Staxyn, Cialis and Stendra. As needed versus daily dosing was discussed. Also, use of vacuum erection device, urethral suppository (MUSE), intracavernosal injections (Edex, Trimix, Caverject) and surgical placement of inflatable penile prosthesis were discussed in detail. Side effects of each treatment was reviewed and questions were answered.\par - Sidlenafil rx transmitted. Side effect profile reviewed\par  CKD

## 2019-12-11 NOTE — PROVIDER CONTACT NOTE (CRITICAL VALUE NOTIFICATION) - ACTION/TREATMENT ORDERED:
Followed hypoglycemic protocol as per MD order. MD Saldana made aware. Patient stable. Will continue to monitor.

## 2019-12-11 NOTE — PROGRESS NOTE ADULT - ASSESSMENT
Pt is a 80 y/o male with medical history of DM, HTN, CKD stage 3, BPH, pAfib with no AC, Aortic stenosis, HFpEF, memory problem with possible dementia and not good historian, presents to ED with c/o SOB, orthopnea, leg edema. In ED pt had uncontrolled BP and hypoxia to 77%. CT chest with effusion. Pt admitted for respiratory failure 2/2 HFpEF with underlying severe AS.     >Acute hypoxic respiratory failure:  - 2/2 HFpEF and underlying moderate to severe AS.   - C/w CHF pathway, I/Os, daily weight, fluid restriction   - IV Diuresis lasix 40 mg IVP BID, coreg, Imdur hydralazine.  - Supplements o2 as needed, bronchodilator.    - Cardiology on board.   - Seen by CT surgery for TAVR work up in future as o/p.     >HTN urgency:  - Elevated on admission, BP better now but no at goal.  - C/w Coreg, Norvasc, hydralazine. Imdur.   - Cardiology yon board.     >DM- C/w Lantus, ZEINAB + FS monitoring.     >CKD stage 3- Creatinine stable, nephrology on board.     >Anemia- Likely AOCD, hgb stable.     >DVT ppx- Heparin

## 2019-12-11 NOTE — PROGRESS NOTE ADULT - SUBJECTIVE AND OBJECTIVE BOX
[facility] For Visit  JEMIMA SANTOS is a 79 year old male being seen for an initial evaluation.      History of Present Illness  Patient is a 77 year old male with history of IDDM for 20-25 years, HTN, arthritis; AFib; here for initial evaluation of CKD. Patient has had "stable" CKD 4 with Cr of 3 for past 2-3 years; presenting in early August with Cr of 4. Pt accompanied by his wife; no other complaints at this time; no edema; no SOB. Feels well. GFR ~12 Pt seen and examined; no complaints; feels well. Was at [facility] ER March 20th for hyperkalemia; was recently restarted on losartan 25mg daily by cardiology in January; found to have K of 6; repeat 5.9 at ER; taken off. BP stable; taken off losartan; however pt and wife don’t have complete medication list with them to verify which meds he is taking/not taking; they state he has been off losartan since ER visit. Pt seen/examined; no complaints; brought in medications; reviewed and reconciled.     Current: Pt seen/examined; having urinary incontinence ; cloudy foul smelling urine per wife;      Active Problems  Anemia (285.9) (D64.9)  Atrial fibrillation, currently in sinus rhythm (427.31) (Z86.79)  Bilateral lower extremity edema (782.3) (R60.0)  Bruit of left carotid artery (785.9) (R09.89)  Bruit of right carotid artery (785.9) (R09.89)  Change of skin color (782.9) (R23.8)  Changes in skin texture (782.8) (R23.4)  Chronic GERD (530.81) (K21.9)  Chronic kidney disease (CKD) (585.9) (N18.9)  Chronic renal failure, stage 3 (moderate) (585.3) (N18.3)  Dementia (294.20) (F03.90)  Enlarged prostate without lower urinary tract symptoms (luts) (600.00) (N40.0)  Heart failure with preserved left ventricular function (HFpEF) (428.9) (I50.30)  Hyperkalemia (276.7) (E87.5)  Hyperlipidemia (272.4) (E78.5)  Hypertension (401.9) (I10)  Hypertrophy, bladder (596.89) (N32.89)  IDDM (insulin dependent diabetes mellitus) (250.00,V58.67) (E11.9,Z79.4)  Moderate aortic stenosis (424.1) (I35.0)  Onychomycosis (110.1) (B35.1)  Status post placement of implantable loop recorder (V45.09) (Z95.818)    Past Medical History  History of Chronic UTI (599.0) (N39.0)  History of Flu vaccine need (V04.81) (Z23)  History of acute bronchitis (V12.69) (Z87.09)  History of hematuria (V13.09) (Z87.448)  History of influenza vaccination (V49.89) (Z92.29)  History of influenza vaccination (V49.89) (Z92.29)  History of pneumonia (V12.61) (Z87.01)  History of Hydroureteronephrosis (591) (N13.30)  History of Kidney infection (590.9) (N15.9)  History of UTI symptoms (788.99) (R39.9)    Current Meds  Aspirin 325 MG Oral Tablet  BD Pen Needle Miriam U/F 32G X 4 MM; USE 1 NEW PEN NEEDLE ONE TIME DAILY  Carvedilol 12.5 MG Oral Tablet; TAKE 1 TABLET TWICE DAILY WITH MEALS  Furosemide 40 MG Oral Tablet; TAKE 1 TABLET DAILY  hydrALAZINE HCl - 25 MG Oral Tablet; TAKE 1 TABLET BY MOUTH IN THE AM AND 2  TABLETS IN PM  NovoFine 32G X 6 MM; USE AS DIRECTED  OneTouch Ultra Blue In Vitro Strip; TEST THREE TO FOUR TIMES A DAY  Oxybutynin Chloride 5 MG Oral Tablet; TAKE 1 TABLET BY MOUTH EVERYDAY AT  BEDTIME  Pravastatin Sodium 40 MG Oral Tablet; TAKE 1 TABLET BY MOUTH EVERY DAY  Sodium Bicarbonate 650 MG Oral Tablet; Take 1 tablet 2 times daily with meals  Tresiba FlexTouch 200 UNIT/ML Subcutaneous Solution Pen-injector; INJECT 44 UNITS  AT BEDTIME SUBCUTANEOUSLY  Vitamin D (Ergocalciferol) 1.25 MG (45731 UT) Oral Capsule; TAKE 1 CAPSULE WEEKLY    Allergies  No Known Allergies    Review of Systems    Constitutional: no fever and no chills.   Eyes: no eye pain and eyes not red.   ENT: no earache and no hearing loss.   Cardiovascular: the heart rate was not slow and the heart rate was not fast.   Respiratory: no shortness of breath and no wheezing.   Gastrointestinal: no abdominal pain and no vomiting.   Musculoskeletal: no arthralgias and no joint pain.   Integumentary: skin lesion and itching.   Endocrine: no proptosis and no hot flashes.   Hematologic/Lymphatic: no tendency for easy bleeding and no tendency for easy bruising.   Constitutional, Eyes, ENT, Cardiovascular, Respiratory, Gastrointestinal, Musculoskeletal, Neurological, Endocrine and Heme/Lymph are otherwise negative.      Vitals  Vital Signs   	Recorded: 26Res7133 02:52PM	  Systolic	174, RUE, Sitting	  Diastolic	83, RUE, Sitting	  Height	5 ft 4 in	  Weight	185 lb 	  BMI Calculated	31.76	  BSA Calculated	1.89	  Height Comment	Stated	  Heart Rate	83	    Physical Exam  Constitutional: alert, in no acute distress and well nourished.   Eyes: the sclera and conjunctiva were normal.   ENT: the ears and nose were normal in appearance.   Neck: the appearance of the neck was normal and the neck was supple.   Pulmonary: no respiratory distress, normal respiratory rhythm and effort and lungs were clear to auscultation bilaterally.   Heart: heart rate was normal and rhythm regular and normal S1 and S2.   Vascular: carotid pulses were normal with no bruits.   Abdomen: normal bowel sounds, soft and non-tender.   Lymphatics: The posterior cervical nodes were non-tender and normal size.   Back: no costovertebral angle tenderness.   Musculoskeletal: normal gait.   Skin: normal skin color and pigmentation and normal skin turgor.   Neurological: cranial nerves 2-12 were intact.   Psychiatric: oriented to person, place, and time.      Assessment    1) CKD V;   2) Vitamin D def  3) HTN  4) IDDM  5) Acidosis    Uncontrolled HTN  cw lasix 40mg po daily  cw carvedilol per cards  Starting amlodipine 10mg daily  cw sodium bicarbonate 650mg BID; unsure of compliance; however acidosis improved on BMP  Will require AVF; family and pt in denial still; held long discussion re: need for it  Still has not had renal sonogram  Urine culture; abx    RTC 1 month.     [id number]                             PROC: Urine Culture                     SOURCE: .Urine       COL: 12/03/2019 15:06         REC: 12/04/2019 05:49                   Freetext:         ACC: [id number]         *** FINAL REPORT ***         ----Final Report----       Verified:12/06/2019 09:18       >100,000 CFU/ml Klebsiella pneumoniae         ***Susceptibility***                                    Klebsiella                                    pneumoniae                                      MDIL      MINT                                    ____      ______       Amikacin                     <=16      S       Ampicillin(1)                >16       R       Ampicillin/Sulbactam(2)      <=8/4     S       Aztreonam                    <=4       S       Cefazolin(3)                 <=8       S       Cefepime                     <=4       S       Cefoxitin                    <=8       S       Ceftriaxone(4)               <=1       S       Ciprofloxacin                <=1       S       Gentamicin                   <=4       S       Imipenem                     <=1       S       Levofloxacin                 <=2       S       Meropenem                    <=1       S       Nitrofurantoin(5)            <=32      S       Piperacillin/Tazobactam      <=16      S       Trimethoprim/Sulfa           >2/38     R       Tigecycline                  <=2       S       Tobramycin                   <=4       S                                                       PROC: Urine Culture                     SOURCE: .Urine       COL: 12/03/2019 15:06         REC: 12/04/2019 05:49                   Freetext:         ACC: [id number]           S=Sensitive, R=Resistant, I=Intermediate, N/A=Not Applicable         *** FOOTNOTES ***       (1)       These ampicillin results predict results for amoxicillin       (2)       Enterobacter, Citrobacter, and Serratia may develop       resistance during prolonged therapy (3-4 days)       (3)       (MIC_CL_COM_ENTERIC_CEFAZU) For uncomplicated UTI with K.       pneumoniae, E. coli, or P. mirablis: JOSE DANIEL <=16       is sensitive and JOSE DANIEL >=32 is resistant. This also predicts       results for oral agents cefaclor, cefdinir,       cefpodoxime, cefprozil, cefuroxime axetil, cephalexin and       locarbef for uncomplicated UTI. Note that some       isolates may be susceptible to these agents while testing       resistant to cefazolin.       (4)       Enterobacter, Citrobacter, and Serratia may develop       resistance during prolonged therapy       (5)       Should not be used to treat pyelonephritis      OUTPATIENT NOTE FROM 12/3 ; DR GALINDO TO F/U TODAY ONCE SEEN

## 2019-12-11 NOTE — CONSULT NOTE ADULT - ASSESSMENT
78y/o  Male with h/o CKD IV, HTN, DM2, BPH. Patient comes in with worsening shortness of breath x 1 week. Worse at night. He has also had dry cough and worsening LE edema. Breathing worse with laying down. No sick contacts. No fever or chills. In ED he was afebrile, no leukocytosis, hypertensive, hypoxic saturating 77% on room air, Improved to 96% on 3L NC. He was seen by nephrology on 12/3 and had UA and urine culture done. UA with pyuria and urine culture with klebsiella. He was started on Levaquin which he has been taking. ID input requested for UTI.       UTI with klebsiella  Urinary retention   Acute hypoxic respiratory failure  Fluid overload  ADI on CKD stage 4      - Outpatient UA with pyuria   - Outpatient Urine culture with Klebsiella  - Blood cultures pending  - Start Vantin 200mg PO BID  - RVP ordered  - CT Chest with pulm edema  - nephrology following  - Cardiology following  - Trend Fever  - Trend Leukocytosis      Will Follow

## 2019-12-11 NOTE — CONSULT NOTE ADULT - ATTENDING COMMENTS
Patient seen and examined by me.  I have discussed my recommendation with the PA which are outlined above.  Will be followed by Dr Faustin in am.
Above H& P reviewed and independently verified. He was admitted with shortness of breath. He has multiple comorbidities including advanced CKD. He has moderate to severe aortic stenosis on echo.     He will need further workup including SCAR to assess aortic valve. He is currently being admitted to optimize his medical condition.    This was discussed with the patient in detail, and all questions were answered.

## 2019-12-11 NOTE — PROGRESS NOTE ADULT - SUBJECTIVE AND OBJECTIVE BOX
JEMIMA SANTOS Male 79y [id number]    Patient is a 79y old  Male who presents with a chief complaint of pulmonary edema (11 Dec 2019 14:56)      Subjective/objective:  Pt seen and examined at bedside, no over night event reported by night staff. Pt slowly improving, still has SOB, leg edema improving, no other complaints. Pt has some memory problem lately per wife. Hypoglycemia noted in morning.     Review of system:  No fever, chills, nausea, vomiting, headache, dizziness, chest pain.      PHYSICAL EXAM:    Vital Signs Last 24 Hrs  T(C): 36.8 (11 Dec 2019 04:07), Max: 36.8 (10 Dec 2019 21:07)  T(F): 98.2 (11 Dec 2019 04:07), Max: 98.2 (10 Dec 2019 21:07)  HR: 67 (11 Dec 2019 11:44) (58 - 74)  BP: 158/72 (11 Dec 2019 11:44) (153/71 - 182/84)  BP(mean): --  RR: 18 (11 Dec 2019 11:44) (16 - 24)  SpO2: 100% (11 Dec 2019 11:44) (98% - 100%)    GENERAL: Pt lying comfortably, NAD.  CHEST/LUNG: Rales +, good air entry.   HEART: S1S2+, Regular rate and rhythm; + Murmur.   ABDOMEN: Soft, Nontender, Nondistended; Bowel sounds present.  Extremities: LE edema +, pulses+  NEURO: AAOX3, no focal deficits, no motor r sensory loss.  PSYCH: normal mood.          MEDICATIONS  (STANDING):  albuterol/ipratropium for Nebulization 3 milliLiter(s) Nebulizer every 6 hours  amLODIPine   Tablet 10 milliGRAM(s) Oral daily  aspirin 325 milliGRAM(s) Oral daily  atorvastatin 10 milliGRAM(s) Oral at bedtime  carvedilol 12.5 milliGRAM(s) Oral every 12 hours  dextrose 5%. 1000 milliLiter(s) (50 mL/Hr) IV Continuous <Continuous>  dextrose 50% Injectable 12.5 Gram(s) IV Push once  dextrose 50% Injectable 25 Gram(s) IV Push once  dextrose 50% Injectable 25 Gram(s) IV Push once  epoetin nereida Injectable 8000 Unit(s) SubCutaneous <User Schedule>  furosemide   Injectable 40 milliGRAM(s) IV Push two times a day  heparin  Injectable 5000 Unit(s) SubCutaneous every 8 hours  hydrALAZINE 50 milliGRAM(s) Oral three times a day  insulin glargine Injectable (LANTUS) 10 Unit(s) SubCutaneous at bedtime  insulin lispro (HumaLOG) corrective regimen sliding scale   SubCutaneous three times a day before meals  insulin lispro (HumaLOG) corrective regimen sliding scale   SubCutaneous at bedtime  isosorbide   dinitrate Tablet (ISORDIL) 5 milliGRAM(s) Oral three times a day  oxybutynin 5 milliGRAM(s) Oral three times a day  sodium bicarbonate 650 milliGRAM(s) Oral two times a day    MEDICATIONS  (PRN):  dextrose 40% Gel 15 Gram(s) Oral once PRN Blood Glucose LESS THAN 70 milliGRAM(s)/deciliter  dextrose 40% Gel 15 Gram(s) Oral once PRN Blood Glucose LESS THAN 70 milliGRAM(s)/deciliter  glucagon  Injectable 1 milliGRAM(s) IntraMuscular once PRN Glucose LESS THAN 70 milligrams/deciliter  hydrALAZINE Injectable 10 milliGRAM(s) IV Push every 6 hours PRN SBP>170        Labs:  LABS:                        8.1    7.86  )-----------( 183      ( 11 Dec 2019 06:47 )             26.4     12-11    142  |  104  |  46.0<H>  ----------------------------<  34<LL>  3.6   |  23.0  |  3.02<H>    Ca    8.3<L>      11 Dec 2019 06:47  Phos  3.9     12-10  Mg     2.2     12-10    TPro  6.1<L>  /  Alb  3.4  /  TBili  0.3<L>  /  DBili  x   /  AST  13  /  ALT  11  /  AlkPhos  63  12-11    PT/INR - ( 10 Dec 2019 00:15 )   PT: 13.2 sec;   INR: 1.14 ratio         PTT - ( 10 Dec 2019 00:15 )  PTT:31.6 sec    LIVER FUNCTIONS - ( 11 Dec 2019 06:47 )  Alb: 3.4 g/dL / Pro: 6.1 g/dL / ALK PHOS: 63 U/L / ALT: 11 U/L / AST: 13 U/L / GGT: x             ABG - ( 10 Dec 2019 06:16 )  pH, Arterial: 7.38  pH, Blood: x     /  pCO2: 35    /  pO2: 90    / HCO3: 21    / Base Excess: -3.8  /  SaO2: 96                CARDIAC MARKERS ( 10 Dec 2019 06:32 )  x     / 0.03 ng/mL / x     / x     / x      CARDIAC MARKERS ( 10 Dec 2019 00:15 )  x     / 0.03 ng/mL / 79 U/L / x     / x

## 2019-12-11 NOTE — CONSULT NOTE ADULT - SUBJECTIVE AND OBJECTIVE BOX
Surgeon:  Gabino 	    Consult requesting by: William     PCP: Dr Garza  Uro: Dr wells   Cardio: William     HISTORY OF PRESENT ILLNESS:  79M hx CKD IV, HTN, DM2, BPH presents with worsening shortness of breath. According to wife patient has had difficulty breathing for last week especially at night. He has also had dry cough. Pt also with worsening LE edema. Breathing worse with laying down. Today breathing suddenly worsened and EMS called. No fever, chill, chest pain, n/v/d, abdominal pain or sick contacts. No recent travel.     In ED, pt afebrile, pulse 74, bp elevated to 239/105, pt given hydralazine 10 mg iv, then 20 mg iv and 50 mg PO with improvement of bp to 182/84. Pt given lasix 60 mg iv. Per EMS pt saturating 77% on room air, Improved to 96% on 3L NC.     Pt had ECHO  < from: TTE Echo Complete w/Doppler (12.10.19 @ 13:21) >  Summary:   1. Technically difficult study.   2. Normal global left ventricular systolic function.   3. Left ventricular ejection fraction, by visual estimation, is 60 to   65%.   4. Spectral Doppler shows pseudonormal pattern of left ventricular   myocardial filling (Grade II diastolic dysfunction).   5. Elevated mean left atrial pressure. Mean LAP estimated at 22 mmHg.   6. Mild mitral annular calcification.   7. Mild thickening and calcification of the anterior and posterior   mitralvalve leaflets.   8. Mild mitral valve regurgitation.   9. Mild aortic regurgitation.  10. Moderate to severe aortic valve stenosis. Recommend SCAR for further   evaluation.  11. Moderate pleural effusion in both left and right lateral regions.  12. There is no evidence of pericardial effusion.    < end of copied text >      PAST MEDICAL & SURGICAL HISTORY:  Hyperkalemia  ADI (acute kidney injury)  BPH (benign prostatic hyperplasia)  CKD (chronic kidney disease)  Hyperlipemia  Hypertension  Diabetes  Ureteral stent retained      MEDICATIONS  (STANDING):  albuterol/ipratropium for Nebulization 3 milliLiter(s) Nebulizer every 6 hours  amLODIPine   Tablet 10 milliGRAM(s) Oral daily  aspirin 325 milliGRAM(s) Oral daily  atorvastatin 10 milliGRAM(s) Oral at bedtime  carvedilol 12.5 milliGRAM(s) Oral every 12 hours  dextrose 5%. 1000 milliLiter(s) (50 mL/Hr) IV Continuous <Continuous>  dextrose 50% Injectable 12.5 Gram(s) IV Push once  dextrose 50% Injectable 25 Gram(s) IV Push once  dextrose 50% Injectable 25 Gram(s) IV Push once  epoetin nereida Injectable 8000 Unit(s) SubCutaneous <User Schedule>  furosemide   Injectable 40 milliGRAM(s) IV Push two times a day  heparin  Injectable 5000 Unit(s) SubCutaneous every 8 hours  hydrALAZINE 50 milliGRAM(s) Oral three times a day  insulin glargine Injectable (LANTUS) 10 Unit(s) SubCutaneous at bedtime  insulin lispro (HumaLOG) corrective regimen sliding scale   SubCutaneous three times a day before meals  insulin lispro (HumaLOG) corrective regimen sliding scale   SubCutaneous at bedtime  isosorbide   dinitrate Tablet (ISORDIL) 5 milliGRAM(s) Oral three times a day  oxybutynin 5 milliGRAM(s) Oral three times a day  sodium bicarbonate 650 milliGRAM(s) Oral two times a day    MEDICATIONS  (PRN):  dextrose 40% Gel 15 Gram(s) Oral once PRN Blood Glucose LESS THAN 70 milliGRAM(s)/deciliter  dextrose 40% Gel 15 Gram(s) Oral once PRN Blood Glucose LESS THAN 70 milliGRAM(s)/deciliter  glucagon  Injectable 1 milliGRAM(s) IntraMuscular once PRN Glucose LESS THAN 70 milligrams/deciliter  hydrALAZINE Injectable 10 milliGRAM(s) IV Push every 6 hours PRN SBP>170    Antiplatelet therapy:   HSQ ASA                          Last dose/amt:    Allergies    No Known Allergies    Intolerances        SOCIAL HISTORY:  Smoker: [ ] Yes  [x ] No        PACK YEARS:                         WHEN QUIT?  ETOH use: [x ] Yes  [ ] No              FREQUENCY / QUANTITY:  Quit 1 year ago   Ilicit Drug use:  [ ] Yes  [x ] No  Occupation: n/a  Live with: wife   Assisted device use: no      FAMILY HISTORY:  No pertinent family history in first degree relatives      Review of Systems  CONSTITUTIONAL:  Fevers[ ] chills[ ] sweats[ ] fatigue[ ] weight loss[ ] weight gain [ ]                                     NEGATIVE [ x]   NEURO:  parathesias[ ] seizures [ ]  syncope [ ]  confusion [ ]                                                                                NEGATIVE[x ]   EYES: glasses[ ]  blurry vision[ ]  discharge[ ] pain[ ] glaucoma [ ]                                                                          NEGATIVE[ x]   ENMT:  difficulty hearing [ ]  vertigo[ ]  dysphagia[ ] epistaxis[ ] recent dental work [ ]                                    NEGATIVE[ x]   CV:  chest pain[ ] palpitations[ ] DEJESUS [x ] diaphoresis [ ]                                                                                           NEGATIVE[ ]   RESPIRATORY:  wheezing[ ] SOB[x ] cough [ ] sputum[ ] hemoptysis[ ]                                                                  NEGATIVE[ ]   GI:  nausea[ ]  vommiting [ ]  diarrhea[ ] constipation [ ] melena [ ]                                                                      NEGATIVE[x ]   : hematuria[ ]  dysuria[ ] urgency[ ] incontinence[ x]                                                                                            NEGATIVE[ ]   MUSKULOSKELETAL:  arthritis[ ]  joint swelling [ ] muscle weakness [ ]                                                                NEGATIVE[x ]   SKIN/BREAST:  rash[ ] itching [ ]  hair loss[ ] masses[ ]                                                                                              NEGATIVE[x ]   PSYCH:  dementia [ ] depresion [ ] anxiety[ ]                                                                                                               NEGATIVE[ x]   HEME/LYMPH:  bruises easily[ ] enlarged lymph nodes[ ] tender lymph nodes[ ]                                               NEGATIVE[ x]   ENDOCRINE:  cold intolerance[ ] heat intolerance[ ] polydipsia[ ]                                                                          NEGATIVE[x ]     PHYSICAL EXAM  Vital Signs Last 24 Hrs  T(C): 36.8 (11 Dec 2019 04:07), Max: 36.8 (10 Dec 2019 21:07)  T(F): 98.2 (11 Dec 2019 04:07), Max: 98.2 (10 Dec 2019 21:07)  HR: 67 (11 Dec 2019 11:44) (58 - 74)  BP: 158/72 (11 Dec 2019 11:44) (153/71 - 182/84)  BP(mean): --  RR: 18 (11 Dec 2019 11:44) (16 - 24)  SpO2: 100% (11 Dec 2019 11:44) (98% - 100%)    CONSTITUTIONAL:                                                                          WNL[ x]   Neuro: WNL[ x] Normal exam oriented to person/place & time with no focal motor or sensory  deficits. Other                     Eyes: WNL[x ]   Normal exam of conjunctiva & lids, pupils equally reactive. Other     ENT: WNL[ x]    Normal exam of nasal/oral mucosa with absence of cyanosis. Other  Neck: WNL[x ]  Normal exam of jugular veins, trachea & thyroid. Other  Chest: WNL[x ] Normal lung exam with good air movement absence of wheezes, rales, or rhonchi: Other                                                                                CV:  Auscultation: normal [x ] S3[ ] S4[ ] Irregular [ ] Rub[ ] Clicks[ ]    Murmurs none:[ ]systolic [x ]  diastolic [ ] holosystolic [ ]  Carotids: No Bruits[ x] Other                 Abdominal Aorta: normal [ ] nonpalpable[x ]Other                                                                                      GI:           WNL[ x] Normal exam of abdomen, liver & spleen with no noted masses or tenderness. Other                                                                                                        Extremities: WNL[ x] Normal no evidence of cyanosis or deformity Edema: none[ ]trace[ ]1+[ x]2+[ ]3+[ ]4+[ ]  Lower Extremity Pulses: Right[2+ ] Left[2+ ]Varicosities[ ]  SKIN :WNL[x ] Normal exam to inspection & palation. Other:                                                          LABS:                        8.1    7.86  )-----------( 183      ( 11 Dec 2019 06:47 )             26.4     12-11    142  |  104  |  46.0<H>  ----------------------------<  34<LL>  3.6   |  23.0  |  3.02<H>    Ca    8.3<L>      11 Dec 2019 06:47  Phos  3.9     12-10  Mg     2.2     12-10    TPro  6.1<L>  /  Alb  3.4  /  TBili  0.3<L>  /  DBili  x   /  AST  13  /  ALT  11  /  AlkPhos  63  12-11    PT/INR - ( 10 Dec 2019 00:15 )   PT: 13.2 sec;   INR: 1.14 ratio         PTT - ( 10 Dec 2019 00:15 )  PTT:31.6 sec    CARDIAC MARKERS ( 10 Dec 2019 06:32 )  x     / 0.03 ng/mL / x     / x     / x      CARDIAC MARKERS ( 10 Dec 2019 00:15 )  x     / 0.03 ng/mL / 79 U/L / x     / x              Cardiac Cath: NA    TTE / SCAR:    < from: TTE Echo Complete w/Doppler (12.10.19 @ 13:21) >  Summary:   1. Technically difficult study.   2. Normal global left ventricular systolic function.   3. Left ventricular ejection fraction, by visual estimation, is 60 to   65%.   4. Spectral Doppler shows pseudonormal pattern of left ventricular   myocardial filling (Grade II diastolic dysfunction).   5. Elevated mean left atrial pressure. Mean LAP estimated at 22 mmHg.   6. Mild mitral annular calcification.   7. Mild thickening and calcification of the anterior and posterior   mitralvalve leaflets.   8. Mild mitral valve regurgitation.   9. Mild aortic regurgitation.  10. Moderate to severe aortic valve stenosis. Recommend SCAR for further   evaluation.  11. Moderate pleural effusion in both left and right lateral regions.  12. There is no evidence of pericardial effusion.    < end of copied text > Surgeon:  Gabino 	    Consult requesting by: Roxie     PCP: Dr Garza  Uro: Dr wells   Cardio: William     HISTORY OF PRESENT ILLNESS:  79M hx CKD IV, HTN, DM2, BPH presents with worsening shortness of breath. According to wife patient has had difficulty breathing for last week especially at night. He has also had dry cough. Pt also with worsening LE edema. Breathing worse with laying down. Today breathing suddenly worsened and EMS called. No fever, chill, chest pain, n/v/d, abdominal pain or sick contacts. No recent travel.     In ED, pt afebrile, pulse 74, bp elevated to 239/105, pt given hydralazine 10 mg iv, then 20 mg iv and 50 mg PO with improvement of bp to 182/84. Pt given lasix 60 mg iv. Per EMS pt saturating 77% on room air, Improved to 96% on 3L NC.     Pt had ECHO  < from: TTE Echo Complete w/Doppler (12.10.19 @ 13:21) >  Summary:   1. Technically difficult study.   2. Normal global left ventricular systolic function.   3. Left ventricular ejection fraction, by visual estimation, is 60 to   65%.   4. Spectral Doppler shows pseudonormal pattern of left ventricular   myocardial filling (Grade II diastolic dysfunction).   5. Elevated mean left atrial pressure. Mean LAP estimated at 22 mmHg.   6. Mild mitral annular calcification.   7. Mild thickening and calcification of the anterior and posterior   mitralvalve leaflets.   8. Mild mitral valve regurgitation.   9. Mild aortic regurgitation.  10. Moderate to severe aortic valve stenosis. Recommend SCAR for further   evaluation.  11. Moderate pleural effusion in both left and right lateral regions.  12. There is no evidence of pericardial effusion.    < end of copied text >      PAST MEDICAL & SURGICAL HISTORY:  Hyperkalemia  ADI (acute kidney injury)  BPH (benign prostatic hyperplasia)  CKD (chronic kidney disease)  Hyperlipemia  Hypertension  Diabetes  Ureteral stent retained      MEDICATIONS  (STANDING):  albuterol/ipratropium for Nebulization 3 milliLiter(s) Nebulizer every 6 hours  amLODIPine   Tablet 10 milliGRAM(s) Oral daily  aspirin 325 milliGRAM(s) Oral daily  atorvastatin 10 milliGRAM(s) Oral at bedtime  carvedilol 12.5 milliGRAM(s) Oral every 12 hours  dextrose 5%. 1000 milliLiter(s) (50 mL/Hr) IV Continuous <Continuous>  dextrose 50% Injectable 12.5 Gram(s) IV Push once  dextrose 50% Injectable 25 Gram(s) IV Push once  dextrose 50% Injectable 25 Gram(s) IV Push once  epoetin nereida Injectable 8000 Unit(s) SubCutaneous <User Schedule>  furosemide   Injectable 40 milliGRAM(s) IV Push two times a day  heparin  Injectable 5000 Unit(s) SubCutaneous every 8 hours  hydrALAZINE 50 milliGRAM(s) Oral three times a day  insulin glargine Injectable (LANTUS) 10 Unit(s) SubCutaneous at bedtime  insulin lispro (HumaLOG) corrective regimen sliding scale   SubCutaneous three times a day before meals  insulin lispro (HumaLOG) corrective regimen sliding scale   SubCutaneous at bedtime  isosorbide   dinitrate Tablet (ISORDIL) 5 milliGRAM(s) Oral three times a day  oxybutynin 5 milliGRAM(s) Oral three times a day  sodium bicarbonate 650 milliGRAM(s) Oral two times a day    MEDICATIONS  (PRN):  dextrose 40% Gel 15 Gram(s) Oral once PRN Blood Glucose LESS THAN 70 milliGRAM(s)/deciliter  dextrose 40% Gel 15 Gram(s) Oral once PRN Blood Glucose LESS THAN 70 milliGRAM(s)/deciliter  glucagon  Injectable 1 milliGRAM(s) IntraMuscular once PRN Glucose LESS THAN 70 milligrams/deciliter  hydrALAZINE Injectable 10 milliGRAM(s) IV Push every 6 hours PRN SBP>170    Antiplatelet therapy:   HSQ ASA                          Last dose/amt:    Allergies    No Known Allergies    Intolerances        SOCIAL HISTORY:  Smoker: [ ] Yes  [x ] No        PACK YEARS:                         WHEN QUIT?  ETOH use: [x ] Yes  [ ] No              FREQUENCY / QUANTITY:  Quit 1 year ago   Ilicit Drug use:  [ ] Yes  [x ] No  Occupation: n/a  Live with: wife   Assisted device use: no      FAMILY HISTORY:  No pertinent family history in first degree relatives      Review of Systems  CONSTITUTIONAL:  Fevers[ ] chills[ ] sweats[ ] fatigue[ ] weight loss[ ] weight gain [ ]                                     NEGATIVE [ x]   NEURO:  parathesias[ ] seizures [ ]  syncope [ ]  confusion [ ]                                                                                NEGATIVE[x ]   EYES: glasses[ ]  blurry vision[ ]  discharge[ ] pain[ ] glaucoma [ ]                                                                          NEGATIVE[ x]   ENMT:  difficulty hearing [ ]  vertigo[ ]  dysphagia[ ] epistaxis[ ] recent dental work [ ]                                    NEGATIVE[ x]   CV:  chest pain[ ] palpitations[ ] DEJESUS [x ] diaphoresis [ ]                                                                                           NEGATIVE[ ]   RESPIRATORY:  wheezing[ ] SOB[x ] cough [ ] sputum[ ] hemoptysis[ ]                                                                  NEGATIVE[ ]   GI:  nausea[ ]  vommiting [ ]  diarrhea[ ] constipation [ ] melena [ ]                                                                      NEGATIVE[x ]   : hematuria[ ]  dysuria[ ] urgency[ ] incontinence[ x]                                                                                            NEGATIVE[ ]   MUSKULOSKELETAL:  arthritis[ ]  joint swelling [ ] muscle weakness [ ]                                                                NEGATIVE[x ]   SKIN/BREAST:  rash[ ] itching [ ]  hair loss[ ] masses[ ]                                                                                              NEGATIVE[x ]   PSYCH:  dementia [ ] depresion [ ] anxiety[ ]                                                                                                               NEGATIVE[ x]   HEME/LYMPH:  bruises easily[ ] enlarged lymph nodes[ ] tender lymph nodes[ ]                                               NEGATIVE[ x]   ENDOCRINE:  cold intolerance[ ] heat intolerance[ ] polydipsia[ ]                                                                          NEGATIVE[x ]     PHYSICAL EXAM  Vital Signs Last 24 Hrs  T(C): 36.8 (11 Dec 2019 04:07), Max: 36.8 (10 Dec 2019 21:07)  T(F): 98.2 (11 Dec 2019 04:07), Max: 98.2 (10 Dec 2019 21:07)  HR: 67 (11 Dec 2019 11:44) (58 - 74)  BP: 158/72 (11 Dec 2019 11:44) (153/71 - 182/84)  BP(mean): --  RR: 18 (11 Dec 2019 11:44) (16 - 24)  SpO2: 100% (11 Dec 2019 11:44) (98% - 100%)    CONSTITUTIONAL:                                                                          WNL[ x]   Neuro: WNL[ x] Normal exam oriented to person/place & time with no focal motor or sensory  deficits. Other                     Eyes: WNL[x ]   Normal exam of conjunctiva & lids, pupils equally reactive. Other     ENT: WNL[ x]    Normal exam of nasal/oral mucosa with absence of cyanosis. Other  Neck: WNL[x ]  Normal exam of jugular veins, trachea & thyroid. Other  Chest: WNL[x ] Normal lung exam with good air movement absence of wheezes, rales, or rhonchi: Other                                                                                CV:  Auscultation: normal [x ] S3[ ] S4[ ] Irregular [ ] Rub[ ] Clicks[ ]    Murmurs none:[ ]systolic [x ]  diastolic [ ] holosystolic [ ]  Carotids: No Bruits[ x] Other                 Abdominal Aorta: normal [ ] nonpalpable[x ]Other                                                                                      GI:           WNL[ x] Normal exam of abdomen, liver & spleen with no noted masses or tenderness. Other                                                                                                        Extremities: WNL[ x] Normal no evidence of cyanosis or deformity Edema: none[ ]trace[ ]1+[ x]2+[ ]3+[ ]4+[ ]  Lower Extremity Pulses: Right[2+ ] Left[2+ ]Varicosities[ ]  SKIN :WNL[x ] Normal exam to inspection & palation. Other:                                                          LABS:                        8.1    7.86  )-----------( 183      ( 11 Dec 2019 06:47 )             26.4     12-11    142  |  104  |  46.0<H>  ----------------------------<  34<LL>  3.6   |  23.0  |  3.02<H>    Ca    8.3<L>      11 Dec 2019 06:47  Phos  3.9     12-10  Mg     2.2     12-10    TPro  6.1<L>  /  Alb  3.4  /  TBili  0.3<L>  /  DBili  x   /  AST  13  /  ALT  11  /  AlkPhos  63  12-11    PT/INR - ( 10 Dec 2019 00:15 )   PT: 13.2 sec;   INR: 1.14 ratio         PTT - ( 10 Dec 2019 00:15 )  PTT:31.6 sec    CARDIAC MARKERS ( 10 Dec 2019 06:32 )  x     / 0.03 ng/mL / x     / x     / x      CARDIAC MARKERS ( 10 Dec 2019 00:15 )  x     / 0.03 ng/mL / 79 U/L / x     / x              Cardiac Cath: NA    TTE / SCAR:    < from: TTE Echo Complete w/Doppler (12.10.19 @ 13:21) >  Summary:   1. Technically difficult study.   2. Normal global left ventricular systolic function.   3. Left ventricular ejection fraction, by visual estimation, is 60 to   65%.   4. Spectral Doppler shows pseudonormal pattern of left ventricular   myocardial filling (Grade II diastolic dysfunction).   5. Elevated mean left atrial pressure. Mean LAP estimated at 22 mmHg.   6. Mild mitral annular calcification.   7. Mild thickening and calcification of the anterior and posterior   mitralvalve leaflets.   8. Mild mitral valve regurgitation.   9. Mild aortic regurgitation.  10. Moderate to severe aortic valve stenosis. Recommend SCAR for further   evaluation.  11. Moderate pleural effusion in both left and right lateral regions.  12. There is no evidence of pericardial effusion.    < end of copied text >

## 2019-12-12 LAB
24R-OH-CALCIDIOL SERPL-MCNC: 20.3 NG/ML — LOW (ref 30–80)
ANION GAP SERPL CALC-SCNC: 15 MMOL/L — SIGNIFICANT CHANGE UP (ref 5–17)
BUN SERPL-MCNC: 50 MG/DL — HIGH (ref 8–20)
CALCIUM SERPL-MCNC: 8.3 MG/DL — LOW (ref 8.4–10.5)
CALCIUM SERPL-MCNC: 8.3 MG/DL — LOW (ref 8.6–10.2)
CHLORIDE SERPL-SCNC: 103 MMOL/L — SIGNIFICANT CHANGE UP (ref 98–107)
CO2 SERPL-SCNC: 23 MMOL/L — SIGNIFICANT CHANGE UP (ref 22–29)
CORTIS AM PEAK SERPL-MCNC: 12.1 UG/DL — SIGNIFICANT CHANGE UP (ref 6–18.4)
CREAT SERPL-MCNC: 3.26 MG/DL — HIGH (ref 0.5–1.3)
GLUCOSE BLDC GLUCOMTR-MCNC: 117 MG/DL — HIGH (ref 70–99)
GLUCOSE BLDC GLUCOMTR-MCNC: 159 MG/DL — HIGH (ref 70–99)
GLUCOSE BLDC GLUCOMTR-MCNC: 46 MG/DL — LOW (ref 70–99)
GLUCOSE BLDC GLUCOMTR-MCNC: 47 MG/DL — LOW (ref 70–99)
GLUCOSE BLDC GLUCOMTR-MCNC: 98 MG/DL — SIGNIFICANT CHANGE UP (ref 70–99)
GLUCOSE SERPL-MCNC: 73 MG/DL — SIGNIFICANT CHANGE UP (ref 70–115)
HCT VFR BLD CALC: 26.6 % — LOW (ref 39–50)
HGB BLD-MCNC: 7.9 G/DL — LOW (ref 13–17)
MCHC RBC-ENTMCNC: 22.8 PG — LOW (ref 27–34)
MCHC RBC-ENTMCNC: 29.7 GM/DL — LOW (ref 32–36)
MCV RBC AUTO: 76.7 FL — LOW (ref 80–100)
PLATELET # BLD AUTO: 157 K/UL — SIGNIFICANT CHANGE UP (ref 150–400)
POTASSIUM SERPL-MCNC: 3.9 MMOL/L — SIGNIFICANT CHANGE UP (ref 3.5–5.3)
POTASSIUM SERPL-SCNC: 3.9 MMOL/L — SIGNIFICANT CHANGE UP (ref 3.5–5.3)
PTH-INTACT FLD-MCNC: 164 PG/ML — HIGH (ref 15–65)
RBC # BLD: 3.47 M/UL — LOW (ref 4.2–5.8)
RBC # FLD: 16.9 % — HIGH (ref 10.3–14.5)
SODIUM SERPL-SCNC: 141 MMOL/L — SIGNIFICANT CHANGE UP (ref 135–145)
WBC # BLD: 7.72 K/UL — SIGNIFICANT CHANGE UP (ref 3.8–10.5)
WBC # FLD AUTO: 7.72 K/UL — SIGNIFICANT CHANGE UP (ref 3.8–10.5)

## 2019-12-12 PROCEDURE — 99232 SBSQ HOSP IP/OBS MODERATE 35: CPT

## 2019-12-12 PROCEDURE — 99233 SBSQ HOSP IP/OBS HIGH 50: CPT

## 2019-12-12 PROCEDURE — 93880 EXTRACRANIAL BILAT STUDY: CPT | Mod: 26

## 2019-12-12 PROCEDURE — 76770 US EXAM ABDO BACK WALL COMP: CPT | Mod: 26

## 2019-12-12 RX ORDER — ERYTHROPOIETIN 10000 [IU]/ML
8000 INJECTION, SOLUTION INTRAVENOUS; SUBCUTANEOUS
Refills: 0 | Status: DISCONTINUED | OUTPATIENT
Start: 2019-12-12 | End: 2019-12-16

## 2019-12-12 RX ORDER — CARVEDILOL PHOSPHATE 80 MG/1
12.5 CAPSULE, EXTENDED RELEASE ORAL ONCE
Refills: 0 | Status: COMPLETED | OUTPATIENT
Start: 2019-12-12 | End: 2019-12-12

## 2019-12-12 RX ORDER — ISOSORBIDE DINITRATE 5 MG/1
10 TABLET ORAL THREE TIMES A DAY
Refills: 0 | Status: DISCONTINUED | OUTPATIENT
Start: 2019-12-12 | End: 2019-12-16

## 2019-12-12 RX ORDER — FUROSEMIDE 40 MG
40 TABLET ORAL DAILY
Refills: 0 | Status: DISCONTINUED | OUTPATIENT
Start: 2019-12-13 | End: 2019-12-13

## 2019-12-12 RX ORDER — CARVEDILOL PHOSPHATE 80 MG/1
25 CAPSULE, EXTENDED RELEASE ORAL EVERY 12 HOURS
Refills: 0 | Status: DISCONTINUED | OUTPATIENT
Start: 2019-12-12 | End: 2019-12-16

## 2019-12-12 RX ADMIN — Medication 325 MILLIGRAM(S): at 13:13

## 2019-12-12 RX ADMIN — ERYTHROPOIETIN 8000 UNIT(S): 10000 INJECTION, SOLUTION INTRAVENOUS; SUBCUTANEOUS at 13:15

## 2019-12-12 RX ADMIN — Medication 2: at 18:29

## 2019-12-12 RX ADMIN — CARVEDILOL PHOSPHATE 25 MILLIGRAM(S): 80 CAPSULE, EXTENDED RELEASE ORAL at 18:29

## 2019-12-12 RX ADMIN — HEPARIN SODIUM 5000 UNIT(S): 5000 INJECTION INTRAVENOUS; SUBCUTANEOUS at 05:32

## 2019-12-12 RX ADMIN — HEPARIN SODIUM 5000 UNIT(S): 5000 INJECTION INTRAVENOUS; SUBCUTANEOUS at 13:15

## 2019-12-12 RX ADMIN — Medication 5 MILLIGRAM(S): at 05:31

## 2019-12-12 RX ADMIN — CARVEDILOL PHOSPHATE 12.5 MILLIGRAM(S): 80 CAPSULE, EXTENDED RELEASE ORAL at 05:31

## 2019-12-12 RX ADMIN — CARVEDILOL PHOSPHATE 12.5 MILLIGRAM(S): 80 CAPSULE, EXTENDED RELEASE ORAL at 13:14

## 2019-12-12 RX ADMIN — Medication 650 MILLIGRAM(S): at 18:29

## 2019-12-12 RX ADMIN — ISOSORBIDE DINITRATE 5 MILLIGRAM(S): 5 TABLET ORAL at 05:31

## 2019-12-12 RX ADMIN — Medication 15 GRAM(S): at 09:06

## 2019-12-12 RX ADMIN — HEPARIN SODIUM 5000 UNIT(S): 5000 INJECTION INTRAVENOUS; SUBCUTANEOUS at 23:48

## 2019-12-12 RX ADMIN — AMLODIPINE BESYLATE 10 MILLIGRAM(S): 2.5 TABLET ORAL at 05:32

## 2019-12-12 RX ADMIN — ATORVASTATIN CALCIUM 10 MILLIGRAM(S): 80 TABLET, FILM COATED ORAL at 23:49

## 2019-12-12 RX ADMIN — Medication 3 MILLILITER(S): at 20:35

## 2019-12-12 RX ADMIN — Medication 40 MILLIGRAM(S): at 05:32

## 2019-12-12 RX ADMIN — ISOSORBIDE DINITRATE 10 MILLIGRAM(S): 5 TABLET ORAL at 23:48

## 2019-12-12 RX ADMIN — Medication 650 MILLIGRAM(S): at 05:31

## 2019-12-12 RX ADMIN — Medication 200 MILLIGRAM(S): at 18:29

## 2019-12-12 RX ADMIN — Medication 5 MILLIGRAM(S): at 23:48

## 2019-12-12 RX ADMIN — Medication 50 MILLIGRAM(S): at 13:15

## 2019-12-12 RX ADMIN — ISOSORBIDE DINITRATE 10 MILLIGRAM(S): 5 TABLET ORAL at 13:16

## 2019-12-12 RX ADMIN — Medication 200 MILLIGRAM(S): at 05:31

## 2019-12-12 RX ADMIN — Medication 50 MILLIGRAM(S): at 05:31

## 2019-12-12 RX ADMIN — Medication 50 MILLIGRAM(S): at 23:48

## 2019-12-12 NOTE — ED ADULT NURSE REASSESSMENT NOTE - NS ED NURSE REASSESS COMMENT FT1
Assumed pt care at 1130, handoff report received.  pt received resting in bed offering no complaints at this time.  Medicated as ordered, educated on epogen injections.  Lunch provided.  Family and patient aware of plan of care.  Will continue to monitor and reassess.

## 2019-12-12 NOTE — ED ADULT NURSE REASSESSMENT NOTE - NS ED NURSE REASSESS COMMENT FT1
Assuming care from previous RN, pt A&O x's 4, resp even and unlabored, tolerating room air well, denies chest pain, denies SOB, NAD noted, color good, showing NSR on monitor, offers no complaints at this time, pt rec'd with 16Fr hollis in place draining clear, yellow urine, denies pain at site, site clean and dry, awaiting admit bed, pt aware of plan of care and verbalizes understanding, call bell provided to pt, will continue to monitor for safety and comfort

## 2019-12-12 NOTE — PROGRESS NOTE ADULT - SUBJECTIVE AND OBJECTIVE BOX
[place] [facility]                                                               Office: [address] [address]                                                              Telephone: [phone]. Fax:[phone]                                                                             PROGRESS NOTE  Reason for follow up: pulmonary edema  Overnight: No new events.   Update: Pt states that he feels betetr than    Subjective: " I feel good"    Pt is a 80 y/o male with medical history of DM, HTN, CKD, BPH, pAfib with no AC, who presents to [facility]-ED for c/o SOB. Pt is not a good historian d/t possible dementia, wife at bedside. Pt wife states that pt has been experiencing SOB for past week. He has been unable to lie flat to sleep. Pt wife also noticed that pt has lower extremity swelling. Yesterday pt verbalized to wife that he could not breathe, and she brought him to [facility]-ED. Pt denies fever, chills, cough, phlegm production, chest pain,  palpitations, inausea, vomiting, diarrhea melena,  lightheadedness, dizziness, syncope, sick contacts. At baseline, pt can walk one flight of stairs 2-3x a day with no assistive ambulation devices as per spouse.     	  Vitals:  T(C): 36.7 (12-12-19 @ 11:57), Max: 36.9 (12-11-19 @ 19:53)  HR: 67 (12-12-19 @ 11:57) (64 - 77)  BP: 137/62 (12-12-19 @ 11:57) (137/62 - 182/84)  RR: 18 (12-12-19 @ 11:57) (17 - 20)  SpO2: 98% (12-12-19 @ 11:57) (96% - 100%)  Wt(kg): --  I&O's Summary    11 Dec 2019 07:01  -  12 Dec 2019 07:00  --------------------------------------------------------  IN: 0 mL / OUT: 1350 mL / NET: -1350 mL    12 Dec 2019 07:01  -  12 Dec 2019 12:31  --------------------------------------------------------  IN: 0 mL / OUT: 1200 mL / NET: -1200 mL      Weight (kg): 81.6 (12-09 @ 23:39)      PHYSICAL EXAM:  Appearance: Comfortable. No acute distress  HEENT:  Head and neck: Atraumatic. Normocephalic.  Normal oral mucosa, PERRL, Neck is supple. No JVD, No carotid bruit.   Neurologic: A & O x 3, no focal deficits. EOMI.  Lymphatic: No cervical lymphadenopathy  Cardiovascular: Normal S1 S2, No murmur, rubs/gallops. No JVD, No edema  Respiratory: Lungs clear to auscultation  Gastrointestinal:  Soft, Non-tender, + BS  Lower Extremities: No edema  Psychiatry: Patient is calm. No agitation. Mood & affect appropriate  Skin: No rashes/ ecchymoses/cyanosis/ulcers visualized on the face, hands or feet.      CURRENT MEDICATIONS:  amLODIPine   Tablet 10 milliGRAM(s) Oral daily  carvedilol 25 milliGRAM(s) Oral every 12 hours  carvedilol 12.5 milliGRAM(s) Oral once  furosemide   Injectable 40 milliGRAM(s) IV Push two times a day  hydrALAZINE 50 milliGRAM(s) Oral three times a day  hydrALAZINE Injectable 10 milliGRAM(s) IV Push every 6 hours PRN  isosorbide   dinitrate Tablet (ISORDIL) 10 milliGRAM(s) Oral three times a day    albuterol/ipratropium for Nebulization  cefpodoxime  aspirin  atorvastatin  dextrose 50% Injectable  dextrose 50% Injectable  dextrose 50% Injectable  insulin glargine Injectable (LANTUS)  insulin lispro (HumaLOG) corrective regimen sliding scale  insulin lispro (HumaLOG) corrective regimen sliding scale  dextrose 5%.  epoetin nereida Injectable  heparin  Injectable  oxybutynin  sodium bicarbonate      DIAGNOSTIC TESTING:  [ ] Echocardiogram:   [ ]  Catheterization:  [ ] Stress Test:    OTHER: 	      LABS:	 	  CARDIAC MARKERS ( 10 Dec 2019 06:32 )  x     / 0.03 ng/mL / x     / x     / x      p-BNP 10 Dec 2019 06:32: x    , CARDIAC MARKERS ( 10 Dec 2019 00:15 )  x     / 0.03 ng/mL / 79 U/L / x     / x      p-BNP 10 Dec 2019 00:15: 61062 pg/mL                          7.9    7.72  )-----------( 157      ( 12 Dec 2019 05:34 )             26.6     12-12    141  |  103  |  50.0<H>  ----------------------------<  73  3.9   |  23.0  |  3.26<H>    Ca    8.3<L>      12 Dec 2019 05:34    TPro  6.1<L>  /  Alb  3.4  /  TBili  0.3<L>  /  DBili  x   /  AST  13  /  ALT  11  /  AlkPhos  63  12-11    proBNP: Serum Pro-Brain Natriuretic Peptide: 12064 pg/mL (12-10 @ 00:15)    Lipid Profile:   HgA1c: Hemoglobin A1C, Whole Blood: 8.1 %    TSH:       TELEMETRY: Reviewed    ECG:  Reviewed by me. [place] [facility]                                                               Office: [address] [address]                                                              Telephone: [phone]. Fax:[phone]                                                                             PROGRESS NOTE  Reason for follow up: pulmonary edema  Overnight: No new events.   Update: Pt states that he feels betetr than    Subjective: " I feel good"    Pt is a 80 y/o male with medical history of DM, HTN, CKD, BPH, pAfib with no AC, who presents to [facility]-ED for c/o SOB. Pt is not a good historian d/t possible dementia, wife at bedside. Pt wife states that pt has been experiencing SOB for past week. He has been unable to lie flat to sleep. Pt wife also noticed that pt has lower extremity swelling. Yesterday pt verbalized to wife that he could not breathe, and she brought him to [facility]-ED. Pt denies fever, chills, cough, phlegm production, chest pain,  palpitations, inausea, vomiting, diarrhea melena,  lightheadedness, dizziness, syncope, sick contacts. At baseline, pt can walk one flight of stairs 2-3x a day with no assistive ambulation devices as per spouse.   As of 12/12/19, pt has no c/o SOB, chest pain, palpitations. Pt yielding clear straw colored urine in  hollis catheter. Significant improvement in bilateral lower extremity swelling.  	  Vitals:  T(C): 36.7 (12-12-19 @ 11:57), Max: 36.9 (12-11-19 @ 19:53)  HR: 67 (12-12-19 @ 11:57) (64 - 77)  BP: 137/62 (12-12-19 @ 11:57) (137/62 - 182/84)  RR: 18 (12-12-19 @ 11:57) (17 - 20)  SpO2: 98% (12-12-19 @ 11:57) (96% - 100%)  I&O's Summary    11 Dec 2019 07:01  -  12 Dec 2019 07:00  --------------------------------------------------------  IN: 0 mL / OUT: 1350 mL / NET: -1350 mL    12 Dec 2019 07:01  -  12 Dec 2019 12:31  --------------------------------------------------------  IN: 0 mL / OUT: 1200 mL / NET: -1200 mL      Weight (kg): 81.6 (12-09 @ 23:39)      PHYSICAL EXAM:  Appearance: Comfortable. No acute distress  HEENT:  Head and neck: Atraumatic. Normocephalic.  Normal oral mucosa, PERRL, Neck is supple. No JVD, No carotid bruit.   Neurologic: A & O x 3, no focal deficits. EOMI.  Lymphatic: No cervical lymphadenopathy  Cardiovascular: Normal S1 S2, No murmur, rubs/gallops. No JVD, No edema  Respiratory: Lungs clear to auscultation  Gastrointestinal:  Soft, Non-tender, + BS  Lower Extremities: No edema  Psychiatry: Patient is calm. No agitation. Mood & affect appropriate  Skin: No rashes/ ecchymoses/cyanosis/ulcers visualized on the face, hands or feet.      CURRENT MEDICATIONS:  amLODIPine   Tablet 10 milliGRAM(s) Oral daily  carvedilol 25 milliGRAM(s) Oral every 12 hours  carvedilol 12.5 milliGRAM(s) Oral once  furosemide   Injectable 40 milliGRAM(s) IV Push two times a day  hydrALAZINE 50 milliGRAM(s) Oral three times a day  hydrALAZINE Injectable 10 milliGRAM(s) IV Push every 6 hours PRN  isosorbide   dinitrate Tablet (ISORDIL) 10 milliGRAM(s) Oral three times a day  albuterol/ipratropium for Nebulization  cefpodoxime  aspirin  atorvastatin  dextrose 50% Injectable  dextrose 50% Injectable  dextrose 50% Injectable  insulin glargine Injectable (LANTUS)  insulin lispro (HumaLOG) corrective regimen sliding scale  insulin lispro (HumaLOG) corrective regimen sliding scale  dextrose 5%.  epoetin nereida Injectable  heparin  Injectable  oxybutynin  sodium bicarbonate      DIAGNOSTIC TESTING:  [ ] Echocardiogram: < from: TTE Echo Complete w/Doppler (12.10.19 @ 13:21) >  Summary:   1. Technically difficult study.   2. Normal global left ventricular systolic function.   3. Left ventricular ejection fraction, by visual estimation, is 60 to   65%.   4. Spectral Doppler shows pseudonormal pattern of left ventricular   myocardial filling (Grade II diastolic dysfunction).   5. Elevated mean left atrial pressure. Mean LAP estimated at 22 mmHg.   6. Mild mitral annular calcification.   7. Mild thickening and calcification of the anterior and posterior   mitralvalve leaflets.   8. Mild mitral valve regurgitation.   9. Mild aortic regurgitation.  10. Moderate to severe aortic valve stenosis. Recommend SCAR for further   evaluation.  11. Moderate pleural effusion in both left and right lateral regions.  12. There is no evidence of pericardial effusion.    < end of copied text >      OTHER:     XRAY: < from: Xray Chest 1 View- PORTABLE-Urgent (12.10.19 @ 00:51) >  Impression:  Cardiomegaly. Small bilateral pleural effusions. Airspace disease in the   right lung..    < end of copied text >  	  U/S: < from: US Kidney and Bladder (12.12.19 @ 11:00) >  MPRESSION:     Findings compatible with medical renal disease. No hydronephrosis.      < end of copied text >  < from: US Renal (12.10.19 @ 13:25) >  IMPRESSION:    No hydronephrosis.    Kidneys increased in echogenicity suggestive of medical renal disease.    < end of copied text >      LABS:	 	  CARDIAC MARKERS ( 10 Dec 2019 06:32 )  x     / 0.03 ng/mL / x     / x     / x      p-BNP 10 Dec 2019 06:32: x    , CARDIAC MARKERS ( 10 Dec 2019 00:15 )  x     / 0.03 ng/mL / 79 U/L / x     / x      p-BNP 10 Dec 2019 00:15: 16320 pg/mL                          7.9    7.72  )-----------( 157      ( 12 Dec 2019 05:34 )             26.6     12-12    141  |  103  |  50.0<H>  ----------------------------<  73  3.9   |  23.0  |  3.26<H>    Ca    8.3<L>      12 Dec 2019 05:34    TPro  6.1<L>  /  Alb  3.4  /  TBili  0.3<L>  /  DBili  x   /  AST  13  /  ALT  11  /  AlkPhos  63  12-11    proBNP: Serum Pro-Brain Natriuretic Peptide: 18067 pg/mL (12-10 @ 00:15)    Lipid Profile:   HgA1c: Hemoglobin A1C, Whole Blood: 8.1 %          TELEMETRY: SR HR @ 64 [place] [facility]                                                               Office: [address] [address]                                                              Telephone: [phone]. Fax:[phone]                                                                             PROGRESS NOTE  Reason for follow up: pulmonary edema  Overnight: No new events.   Update: Pt states that he feels betetr than    Subjective: " I feel good"    Pt is a 78 y/o male with medical history of DM, HTN, CKD, BPH, pAfib with no AC, who presents to [facility]-ED for c/o SOB. Pt is not a good historian d/t possible dementia, wife at bedside. Pt wife states that pt has been experiencing SOB for past week. He has been unable to lie flat to sleep. Pt wife also noticed that pt has lower extremity swelling. Yesterday pt verbalized to wife that he could not breathe, and she brought him to [facility]-ED. Pt denies fever, chills, cough, phlegm production, chest pain,  palpitations, inausea, vomiting, diarrhea melena,  lightheadedness, dizziness, syncope, sick contacts. At baseline, pt can walk one flight of stairs 2-3x a day with no assistive ambulation devices as per spouse.   As of 12/12/19, pt has no c/o SOB, chest pain, palpitations. Pt yielding clear straw colored urine in  hollis catheter. Significant improvement in bilateral lower extremity swelling.  	  Vitals:  T(C): 36.7 (12-12-19 @ 11:57), Max: 36.9 (12-11-19 @ 19:53)  HR: 67 (12-12-19 @ 11:57) (64 - 77)  BP: 137/62 (12-12-19 @ 11:57) (137/62 - 182/84)  RR: 18 (12-12-19 @ 11:57) (17 - 20)  SpO2: 98% (12-12-19 @ 11:57) (96% - 100%)  I&O's Summary    11 Dec 2019 07:01  -  12 Dec 2019 07:00  --------------------------------------------------------  IN: 0 mL / OUT: 1350 mL / NET: -1350 mL    12 Dec 2019 07:01  -  12 Dec 2019 12:31  --------------------------------------------------------  IN: 0 mL / OUT: 1200 mL / NET: -1200 mL      Weight (kg): 81.6 (12-09 @ 23:39)      PHYSICAL EXAM:  Appearance: Comfortable. No acute distress  HEENT:  Head and neck: Atraumatic. Normocephalic.  Normal oral mucosa, PERRL, Neck is supple. No JVD, No carotid bruit.   Neurologic: A & O x 3, no focal deficits. EOMI.  Lymphatic: No cervical lymphadenopathy  Cardiovascular: Normal S1 S2, No murmur, rubs/gallops. No JVD, No edema  Respiratory: bilateral rales lower lobes-resolving  Gastrointestinal:  Soft, Non-tender, + BS  Lower Extremities: No edema  Psychiatry: Patient is calm. No agitation. Mood & affect appropriate  Skin: No rashes/ ecchymoses/cyanosis/ulcers visualized on the face, hands or feet.      CURRENT MEDICATIONS:  amLODIPine   Tablet 10 milliGRAM(s) Oral daily  carvedilol 25 milliGRAM(s) Oral every 12 hours  carvedilol 12.5 milliGRAM(s) Oral once  furosemide   Injectable 40 milliGRAM(s) IV Push two times a day  hydrALAZINE 50 milliGRAM(s) Oral three times a day  hydrALAZINE Injectable 10 milliGRAM(s) IV Push every 6 hours PRN  isosorbide   dinitrate Tablet (ISORDIL) 10 milliGRAM(s) Oral three times a day  albuterol/ipratropium for Nebulization  cefpodoxime  aspirin  atorvastatin  dextrose 50% Injectable  dextrose 50% Injectable  dextrose 50% Injectable  insulin glargine Injectable (LANTUS)  insulin lispro (HumaLOG) corrective regimen sliding scale  insulin lispro (HumaLOG) corrective regimen sliding scale  dextrose 5%.  epoetin nereida Injectable  heparin  Injectable  oxybutynin  sodium bicarbonate      DIAGNOSTIC TESTING:  [ ] Echocardiogram: < from: TTE Echo Complete w/Doppler (12.10.19 @ 13:21) >  Summary:   1. Technically difficult study.   2. Normal global left ventricular systolic function.   3. Left ventricular ejection fraction, by visual estimation, is 60 to   65%.   4. Spectral Doppler shows pseudonormal pattern of left ventricular   myocardial filling (Grade II diastolic dysfunction).   5. Elevated mean left atrial pressure. Mean LAP estimated at 22 mmHg.   6. Mild mitral annular calcification.   7. Mild thickening and calcification of the anterior and posterior   mitralvalve leaflets.   8. Mild mitral valve regurgitation.   9. Mild aortic regurgitation.  10. Moderate to severe aortic valve stenosis. Recommend SCAR for further   evaluation.  11. Moderate pleural effusion in both left and right lateral regions.  12. There is no evidence of pericardial effusion.    < end of copied text >      OTHER:     XRAY: < from: Xray Chest 1 View- PORTABLE-Urgent (12.10.19 @ 00:51) >  Impression:  Cardiomegaly. Small bilateral pleural effusions. Airspace disease in the   right lung..    < end of copied text >  	  U/S: < from: US Kidney and Bladder (12.12.19 @ 11:00) >  MPRESSION:     Findings compatible with medical renal disease. No hydronephrosis.      < end of copied text >  < from: US Renal (12.10.19 @ 13:25) >  IMPRESSION:    No hydronephrosis.    Kidneys increased in echogenicity suggestive of medical renal disease.    < end of copied text >      LABS:	 	  CARDIAC MARKERS ( 10 Dec 2019 06:32 )  x     / 0.03 ng/mL / x     / x     / x      p-BNP 10 Dec 2019 06:32: x    , CARDIAC MARKERS ( 10 Dec 2019 00:15 )  x     / 0.03 ng/mL / 79 U/L / x     / x      p-BNP 10 Dec 2019 00:15: 44900 pg/mL                          7.9    7.72  )-----------( 157      ( 12 Dec 2019 05:34 )             26.6     12-12    141  |  103  |  50.0<H>  ----------------------------<  73  3.9   |  23.0  |  3.26<H>    Ca    8.3<L>      12 Dec 2019 05:34    TPro  6.1<L>  /  Alb  3.4  /  TBili  0.3<L>  /  DBili  x   /  AST  13  /  ALT  11  /  AlkPhos  63  12-11    proBNP: Serum Pro-Brain Natriuretic Peptide: 52078 pg/mL (12-10 @ 00:15)    Lipid Profile:   HgA1c: Hemoglobin A1C, Whole Blood: 8.1 %          TELEMETRY: SR HR @ 64

## 2019-12-12 NOTE — PROGRESS NOTE ADULT - ASSESSMENT
80y/o  Male with h/o CKD IV, HTN, DM2, BPH. Patient comes in with worsening shortness of breath x 1 week. Worse at night. He has also had dry cough and worsening LE edema. Breathing worse with laying down. No sick contacts. No fever or chills. In ED he was afebrile, no leukocytosis, hypertensive, hypoxic saturating 77% on room air, Improved to 96% on 3L NC. He was seen by nephrology on 12/3 and had UA and urine culture done. UA with pyuria and urine culture with klebsiella. He was started on Levaquin which he has been taking. ID input requested for UTI.       UTI with klebsiella  Urinary retention   Acute hypoxic respiratory failure  Fluid overload  ADI on CKD stage 4      - Outpatient UA with pyuria   - Outpatient Urine culture with Klebsiella  - Blood cultures pending  - Continue Vantin 200mg PO BID till 12/17/19  - RVP ordered  - CT Chest with pulm edema  - nephrology following  - Cardiology following  - Trend Fever  - Trend Leukocytosis      Will Follow 80y/o  Male with h/o CKD IV, HTN, DM2, BPH. Patient comes in with worsening shortness of breath x 1 week. Worse at night. He has also had dry cough and worsening LE edema. Breathing worse with laying down. No sick contacts. No fever or chills. In ED he was afebrile, no leukocytosis, hypertensive, hypoxic saturating 77% on room air, Improved to 96% on 3L NC. He was seen by nephrology on 12/3 and had UA and urine culture done. UA with pyuria and urine culture with klebsiella. He was started on Levaquin which he has been taking. ID input requested for UTI.       UTI with klebsiella  Urinary retention   Acute hypoxic respiratory failure  Fluid overload  ADI on CKD stage 4      - Outpatient UA with pyuria   - Outpatient Urine culture with Klebsiella  - Blood cultures no growth  - Continue Vantin 200mg PO BID till 12/17/19  - RVP negative  - CT Chest with pulm edema  - SOB improved with Lasix  - Follows with Dr Gómez (Urology)    - nephrology following  - Cardiology following  - Trend Fever  - Trend Leukocytosis      Will Follow

## 2019-12-12 NOTE — ED ADULT NURSE REASSESSMENT NOTE - NS ED NURSE REASSESS COMMENT FT1
Pt with low BS and medicated as per MD orders, call placed to MD and awaiting call back, pt provided with breakfast tray, offers no complaints, pt A&O x's 4, denies dizziness

## 2019-12-12 NOTE — CHART NOTE - NSCHARTNOTEFT_GEN_A_CORE
79M with moderate to severe AS a/w acute decompensated heart failure needs preop workup for TAVR.  -carotid ultrasound and PFTs ordered  -PT frailty evaluation ordered  -MRSA and labs ordered  -Will need SCAR  -Will need cath and TAVR CTA pending creatinine stabilizations  -D/W Dr Lloyd  -Will continue to follow

## 2019-12-12 NOTE — PROGRESS NOTE ADULT - SUBJECTIVE AND OBJECTIVE BOX
JEMIMA SANTOS Male 79y [id number]    Patient is a 79y old  Male who presents with a chief complaint of pulmonary edema (12 Dec 2019 12:30)      Subjective/objective:  Pt seen and examined at bedside before 12PM, no over night event reported by night staff. SOB/ leg edema improving.  Hypoglycemia in morning noted.     Review of system:  No fever, chills, nausea, vomiting, headache, dizziness, chest pain.      PHYSICAL EXAM:    Vital Signs Last 24 Hrs  T(C): 36.8 (12 Dec 2019 15:28), Max: 36.9 (11 Dec 2019 19:53)  T(F): 98.3 (12 Dec 2019 15:28), Max: 98.5 (11 Dec 2019 19:53)  HR: 67 (12 Dec 2019 15:28) (64 - 77)  BP: 137/71 (12 Dec 2019 15:28) (137/62 - 182/84)  BP(mean): --  RR: 20 (12 Dec 2019 15:28) (17 - 20)  SpO2: 93% (12 Dec 2019 15:28) (93% - 100%)    GENERAL: Pt lying comfortably, NAD.  CHEST/LUNG: Good air entry, no Rales today.    HEART: S1S2+, Regular rate and rhythm; + Murmur.   ABDOMEN: Soft, Nontender, Nondistended; Bowel sounds present.  Extremities: Trace LE edema, pulses+  NEURO: AAOX3, no focal deficits, no motor r sensory loss.  PSYCH: normal mood.            MEDICATIONS  (STANDING):  albuterol/ipratropium for Nebulization 3 milliLiter(s) Nebulizer every 6 hours  amLODIPine   Tablet 10 milliGRAM(s) Oral daily  aspirin 325 milliGRAM(s) Oral daily  atorvastatin 10 milliGRAM(s) Oral at bedtime  carvedilol 25 milliGRAM(s) Oral every 12 hours  cefpodoxime 200 milliGRAM(s) Oral every 12 hours  dextrose 5%. 1000 milliLiter(s) (50 mL/Hr) IV Continuous <Continuous>  dextrose 50% Injectable 12.5 Gram(s) IV Push once  dextrose 50% Injectable 25 Gram(s) IV Push once  dextrose 50% Injectable 25 Gram(s) IV Push once  epoetin nereida Injectable 8000 Unit(s) SubCutaneous <User Schedule>  heparin  Injectable 5000 Unit(s) SubCutaneous every 8 hours  hydrALAZINE 50 milliGRAM(s) Oral three times a day  insulin lispro (HumaLOG) corrective regimen sliding scale   SubCutaneous three times a day before meals  insulin lispro (HumaLOG) corrective regimen sliding scale   SubCutaneous at bedtime  isosorbide   dinitrate Tablet (ISORDIL) 10 milliGRAM(s) Oral three times a day  oxybutynin 5 milliGRAM(s) Oral three times a day  sodium bicarbonate 650 milliGRAM(s) Oral two times a day    MEDICATIONS  (PRN):  dextrose 40% Gel 15 Gram(s) Oral once PRN Blood Glucose LESS THAN 70 milliGRAM(s)/deciliter  glucagon  Injectable 1 milliGRAM(s) IntraMuscular once PRN Glucose LESS THAN 70 milligrams/deciliter  hydrALAZINE Injectable 10 milliGRAM(s) IV Push every 6 hours PRN SBP>170        Labs:  LABS:                        7.9    7.72  )-----------( 157      ( 12 Dec 2019 05:34 )             26.6     12-12    141  |  103  |  50.0<H>  ----------------------------<  73  3.9   |  23.0  |  3.26<H>    Ca    8.3<L>      12 Dec 2019 05:34    TPro  6.1<L>  /  Alb  3.4  /  TBili  0.3<L>  /  DBili  x   /  AST  13  /  ALT  11  /  AlkPhos  63  12-11        LIVER FUNCTIONS - ( 11 Dec 2019 06:47 )  Alb: 3.4 g/dL / Pro: 6.1 g/dL / ALK PHOS: 63 U/L / ALT: 11 U/L / AST: 13 U/L / GGT: x

## 2019-12-12 NOTE — PROGRESS NOTE ADULT - SUBJECTIVE AND OBJECTIVE BOX
1) CKD V;   2) Vitamin D def  3) HTN  4) IDDM  5) Acidosis    Uncontrolled HTN  cw lasix 40mg po daily  cw carvedilol per cards  Starting amlodipine 10mg daily  cw sodium bicarbonate 650mg BID; unsure of compliance; however acidosis improved on BMP  Will require AVF; family and pt in denial still; held long discussion re: need for it  Still has not had renal sonogram  Urine culture; abx    RTC 1 month.     Above office note per Dr. Adams, Never maintained OP F.U,    HPI:  79M hx CKD IV, HTN, DM2, BPH presents 12/10/19 with worsening shortness of breath. According to wife patient has had difficulty breathing for last week especially at night. He has also had dry cough. Pt also with worsening LE edema. Breathing worse with laying down. Today breathing suddenly worsened and EMS called. No fever, chill, chest pain, n/v/d, abdominal pain or sick contacts. No recent travel.   In ED, pt afebrile, pulse 74, bp elevated to 239/105, pt given hydralazine 10 mg iv, then 20 mg iv and 50 mg PO with improvement of bp to 182/84. Pt given lasix 60 mg iv. Per EMS pt saturating 77% on room air, Improved to 96% on 3L NC. (10 Dec 2019 03:25)    PAST MEDICAL & SURGICAL HISTORY:  Hyperkalemia  ADI (acute kidney injury)  BPH (benign prostatic hyperplasia)  CKD (chronic kidney disease)  Hyperlipemia  Hypertension  Diabetes  Ureteral stent retained    FAMILY HISTORY:  No pertinent family history in first degree relatives    SOCIAL HISTORY:  Non Smoker,     MEDICATIONS  (STANDING):  albuterol/ipratropium for Nebulization 3 milliLiter(s) Nebulizer every 6 hours  amLODIPine   Tablet 10 milliGRAM(s) Oral daily  aspirin 325 milliGRAM(s) Oral daily  atorvastatin 10 milliGRAM(s) Oral at bedtime  carvedilol 12.5 milliGRAM(s) Oral every 12 hours  cefpodoxime 200 milliGRAM(s) Oral every 12 hours  dextrose 5%. 1000 milliLiter(s) (50 mL/Hr) IV Continuous <Continuous>  dextrose 50% Injectable 12.5 Gram(s) IV Push once  dextrose 50% Injectable 25 Gram(s) IV Push once  dextrose 50% Injectable 25 Gram(s) IV Push once  epoetin nereida Injectable 8000 Unit(s) SubCutaneous <User Schedule>  furosemide   Injectable 40 milliGRAM(s) IV Push two times a day  heparin  Injectable 5000 Unit(s) SubCutaneous every 8 hours  hydrALAZINE 50 milliGRAM(s) Oral three times a day  insulin glargine Injectable (LANTUS) 10 Unit(s) SubCutaneous at bedtime  insulin lispro (HumaLOG) corrective regimen sliding scale   SubCutaneous three times a day before meals  insulin lispro (HumaLOG) corrective regimen sliding scale   SubCutaneous at bedtime  isosorbide   dinitrate Tablet (ISORDIL) 5 milliGRAM(s) Oral three times a day  oxybutynin 5 milliGRAM(s) Oral three times a day  sodium bicarbonate 650 milliGRAM(s) Oral two times a day    MEDICATIONS  (PRN):  dextrose 40% Gel 15 Gram(s) Oral once PRN Blood Glucose LESS THAN 70 milliGRAM(s)/deciliter  glucagon  Injectable 1 milliGRAM(s) IntraMuscular once PRN Glucose LESS THAN 70 milligrams/deciliter  hydrALAZINE Injectable 10 milliGRAM(s) IV Push every 6 hours PRN SBP>170    Allergies  No Known Allergies    REVIEW OF SYSTEMS:  CONSTITUTIONAL: No fever, weight loss, or fatigue  EYES: No eye pain, visual disturbances, or discharge  ENMT:  No difficulty hearing, tinnitus, vertigo  NECK: No pain or stiffness  RESPIRATORY: + cough, wheezing,  No chills or hemoptysis; + shortness of breath  CARDIOVASCULAR: No chest pain, palpitations, dizziness, leg swelling  GASTROINTESTINAL: No abdominal or epigastric pain. No nausea, vomiting, or hematemesis; No diarrhea or constipation. No melena or hematochezia.  GENITOURINARY: No dysuria, frequency, hematuria, or incontinence  NEUROLOGICAL: No headaches, memory loss, loss of strength, numbness, or tremors  SKIN: No itching, burning, rashes, or lesions   LYMPH NODES: No enlarged glands  ENDOCRINE: No heat or cold intolerance; No hair loss  MUSCULOSKELETAL: No joint pain or swelling; No muscle, back, or extremity pain  PSYCHIATRIC: No depression, anxiety, mood swings, or difficulty sleeping  HEME/LYMPH: No easy bruising, or bleeding gums  ALLERGY AND IMMUNOLOGIC: No hives or eczema    Vital Signs Last 24 Hrs  T(C): 36.7 (12 Dec 2019 05:30), Max: 36.9 (11 Dec 2019 19:53)  T(F): 98.1 (12 Dec 2019 05:30), Max: 98.5 (11 Dec 2019 19:53)  HR: 64 (12 Dec 2019 08:46) (64 - 77)  BP: 144/66 (12 Dec 2019 08:46) (141/64 - 182/84)  RR: 20 (12 Dec 2019 08:46) (17 - 20)  SpO2: 96% (12 Dec 2019 08:46) (96% - 100%)    PHYSICAL EXAM:  GENERAL: NAD, well-groomed, well-developed  HEAD:  Atraumatic, Normocephalic  EYES: EOMI, PERRLA, conjunctiva and sclera clear  ENMT:  Moist mucous membranes, Good dentition, No lesions  NECK: Supple, No JVD, Normal thyroid  NERVOUS SYSTEM:  Alert & Oriented X3, Good concentration; Motor Strength 5/5 B/L upper and lower extremities; DTRs 2+ intact and symmetric  CHEST/LUNG: Clear to percussion bilaterally; No rales, rhonchi, wheezing, or rubs  HEART: Regular rate and rhythm; 3/6 systolic murmur. No rubs or gallops  ABDOMEN: Soft, Nontender, Nondistended; Bowel sounds present  GENITOURINARY +Medina catheter. Clear, yellow urine  EXTREMITIES:  2+ Peripheral Pulses, No clubbing, cyanosis, or edema  LYMPH: No lymphadenopathy noted  SKIN: No rashes or lesions    LABS:  Basic Metabolic Panel in AM (12.12.19 @ 05:34)    Sodium, Serum: 141 mmol/L    Potassium, Serum: 3.9 mmol/L    Chloride, Serum: 103 mmol/L    Carbon Dioxide, Serum: 23.0 mmol/L    Anion Gap, Serum: 15 mmol/L    Blood Urea Nitrogen, Serum: 50.0 mg/dL    Creatinine, Serum: 3.26 mg/dL    Glucose, Serum: 73 mg/dL    Calcium, Total Serum: 8.3 mg/dL    eGFR if Non African American: 17    Creatinine Trend:  Creatinine, Serum: 3.26 mg/dL (12.12.19 @ 05:34)  Creatinine, Serum: 3.02 mg/dL (12.11.19 @ 06:47)  Creatinine, Serum: 2.97 mg/dL (12.10.19 @ 06:32)    Complete Blood Count in AM (12.12.19 @ 05:34)    WBC Count: 7.72 K/uL    RBC Count: 3.47 M/uL    Hemoglobin: 7.9 g/dL    Hematocrit: 26.6 %    Mean Cell Volume: 76.7 fl    Mean Cell Hemoglobin: 22.8 pg    Mean Cell Hemoglobin Conc: 29.7 gm/dL    Red Cell Distrib Width: 16.9 %    Platelet Count - Automated: 157 K/uL    Transferrin, Serum (12.11.19 @ 17:50)   Transferrin, Serum: 199 mg/dL  Vitamin B12, Serum (12.11.19 @ 17:50)    Vitamin B12, Serum: 306 pg/mL  Folate, Serum (12.11.19 @ 17:50)    Folate, Serum: 9.1 ng/mL  Ferritin, Serum (12.11.19 @ 17:50)    Ferritin, Serum: 25 ng/mL  Iron with Total Binding Capacity (12.11.19 @ 17:50)    Iron - Total Binding Capacity.: 285 ug/dL    % Saturation, Iron: 9 %    Iron Total, Serum: 25 ug/dL 1) CKD V;   2) Vitamin D def  3) HTN  4) IDDM  5) Acidosis    Uncontrolled HTN  cw lasix 40mg po daily  cw carvedilol per cards  Starting amlodipine 10mg daily  cw sodium bicarbonate 650mg BID; unsure of compliance; however acidosis improved on BMP  Will require AVF; family and pt in denial still; held long discussion re: need for it  Still has not had renal sonogram  Urine culture; abx    RTC 1 month.     Above office note per Dr. Adams, Never maintained OP F.U,    HPI:  79M hx CKD IV, HTN, DM2, BPH presents 12/10/19 with worsening shortness of breath. According to wife patient has had difficulty breathing for last week especially at night. He has also had dry cough. Pt also with worsening LE edema. Breathing worse with laying down. Today breathing suddenly worsened and EMS called. No fever, chill, chest pain, n/v/d, abdominal pain or sick contacts. No recent travel.   In ED, pt afebrile, pulse 74, bp elevated to 239/105, pt given hydralazine 10 mg iv, then 20 mg iv and 50 mg PO with improvement of bp to 182/84. Pt given lasix 60 mg iv. Per EMS pt saturating 77% on room air, Improved to 96% on 3L NC. (10 Dec 2019 03:25)    PAST MEDICAL & SURGICAL HISTORY:  Hyperkalemia  ADI (acute kidney injury)  BPH (benign prostatic hyperplasia)  CKD (chronic kidney disease)  Hyperlipemia  Hypertension  Diabetes  Ureteral stent retained    FAMILY HISTORY:  No pertinent family history in first degree relatives    SOCIAL HISTORY:  Non Smoker,     MEDICATIONS  (STANDING):  albuterol/ipratropium for Nebulization 3 milliLiter(s) Nebulizer every 6 hours  amLODIPine   Tablet 10 milliGRAM(s) Oral daily  aspirin 325 milliGRAM(s) Oral daily  atorvastatin 10 milliGRAM(s) Oral at bedtime  carvedilol 12.5 milliGRAM(s) Oral every 12 hours  cefpodoxime 200 milliGRAM(s) Oral every 12 hours  dextrose 5%. 1000 milliLiter(s) (50 mL/Hr) IV Continuous <Continuous>  dextrose 50% Injectable 12.5 Gram(s) IV Push once  dextrose 50% Injectable 25 Gram(s) IV Push once  dextrose 50% Injectable 25 Gram(s) IV Push once  epoetin nereida Injectable 8000 Unit(s) SubCutaneous <User Schedule>  furosemide   Injectable 40 milliGRAM(s) IV Push two times a day  heparin  Injectable 5000 Unit(s) SubCutaneous every 8 hours  hydrALAZINE 50 milliGRAM(s) Oral three times a day  insulin glargine Injectable (LANTUS) 10 Unit(s) SubCutaneous at bedtime  insulin lispro (HumaLOG) corrective regimen sliding scale   SubCutaneous three times a day before meals  insulin lispro (HumaLOG) corrective regimen sliding scale   SubCutaneous at bedtime  isosorbide   dinitrate Tablet (ISORDIL) 5 milliGRAM(s) Oral three times a day  oxybutynin 5 milliGRAM(s) Oral three times a day  sodium bicarbonate 650 milliGRAM(s) Oral two times a day    MEDICATIONS  (PRN):  dextrose 40% Gel 15 Gram(s) Oral once PRN Blood Glucose LESS THAN 70 milliGRAM(s)/deciliter  glucagon  Injectable 1 milliGRAM(s) IntraMuscular once PRN Glucose LESS THAN 70 milligrams/deciliter  hydrALAZINE Injectable 10 milliGRAM(s) IV Push every 6 hours PRN SBP>170    Allergies  No Known Allergies    REVIEW OF SYSTEMS:  CONSTITUTIONAL: No fever, weight loss, or fatigue  EYES: No eye pain, visual disturbances, or discharge  ENMT:  No difficulty hearing, tinnitus, vertigo  NECK: No pain or stiffness  RESPIRATORY: + cough, wheezing,  No chills or hemoptysis; + shortness of breath  CARDIOVASCULAR: No chest pain, palpitations, dizziness, leg swelling  GASTROINTESTINAL: No abdominal or epigastric pain. No nausea, vomiting, or hematemesis; No diarrhea or constipation. No melena or hematochezia.  GENITOURINARY: No dysuria, frequency, hematuria, or incontinence  NEUROLOGICAL: No headaches, memory loss, loss of strength, numbness, or tremors  SKIN: No itching, burning, rashes, or lesions   LYMPH NODES: No enlarged glands  ENDOCRINE: No heat or cold intolerance; No hair loss  MUSCULOSKELETAL: No joint pain or swelling; No muscle, back, or extremity pain  PSYCHIATRIC: No depression, anxiety, mood swings, or difficulty sleeping  HEME/LYMPH: No easy bruising, or bleeding gums  ALLERGY AND IMMUNOLOGIC: No hives or eczema    Vital Signs Last 24 Hrs  T(C): 36.7 (12 Dec 2019 05:30), Max: 36.9 (11 Dec 2019 19:53)  T(F): 98.1 (12 Dec 2019 05:30), Max: 98.5 (11 Dec 2019 19:53)  HR: 64 (12 Dec 2019 08:46) (64 - 77)  BP: 144/66 (12 Dec 2019 08:46) (141/64 - 182/84)  RR: 20 (12 Dec 2019 08:46) (17 - 20)  SpO2: 96% (12 Dec 2019 08:46) (96% - 100%)    PHYSICAL EXAM:  GENERAL: NAD, well-groomed, well-developed  HEAD:  Atraumatic, Normocephalic  EYES: EOMI, PERRLA, conjunctiva and sclera clear  ENMT:  Moist mucous membranes, Good dentition, No lesions  NECK: Supple, No JVD  NERVOUS SYSTEM:  Alert & Oriented X3, Good concentration; Motor intact  CHEST/LUNG: Clear to auscultation bilaterally; No rales, rhonchi, wheezing, or rubs  HEART: Regular rate and rhythm; 3/6 systolic murmur. No rubs or gallops  ABDOMEN: Soft, Nontender, Nondistended; Bowel sounds present  GENITOURINARY +Medina catheter. Clear, yellow urine  EXTREMITIES:  2+ Peripheral Pulses, No clubbing, cyanosis, or edema  LYMPH: No lymphadenopathy noted  SKIN: No rashes or lesions    LABS:  Basic Metabolic Panel in AM (12.12.19 @ 05:34)    Sodium, Serum: 141 mmol/L    Potassium, Serum: 3.9 mmol/L    Chloride, Serum: 103 mmol/L    Carbon Dioxide, Serum: 23.0 mmol/L    Anion Gap, Serum: 15 mmol/L    Blood Urea Nitrogen, Serum: 50.0 mg/dL    Creatinine, Serum: 3.26 mg/dL    Glucose, Serum: 73 mg/dL    Calcium, Total Serum: 8.3 mg/dL    eGFR if Non African American: 17    Creatinine Trend:  Creatinine, Serum: 3.26 mg/dL (12.12.19 @ 05:34)  Creatinine, Serum: 3.02 mg/dL (12.11.19 @ 06:47)  Creatinine, Serum: 2.97 mg/dL (12.10.19 @ 06:32)    Complete Blood Count in AM (12.12.19 @ 05:34)    WBC Count: 7.72 K/uL    RBC Count: 3.47 M/uL    Hemoglobin: 7.9 g/dL    Hematocrit: 26.6 %    Mean Cell Volume: 76.7 fl    Mean Cell Hemoglobin: 22.8 pg    Mean Cell Hemoglobin Conc: 29.7 gm/dL    Red Cell Distrib Width: 16.9 %    Platelet Count - Automated: 157 K/uL    Transferrin, Serum (12.11.19 @ 17:50)   Transferrin, Serum: 199 mg/dL  Vitamin B12, Serum (12.11.19 @ 17:50)    Vitamin B12, Serum: 306 pg/mL  Folate, Serum (12.11.19 @ 17:50)    Folate, Serum: 9.1 ng/mL  Ferritin, Serum (12.11.19 @ 17:50)    Ferritin, Serum: 25 ng/mL  Iron with Total Binding Capacity (12.11.19 @ 17:50)    Iron - Total Binding Capacity.: 285 ug/dL    % Saturation, Iron: 9 %    Iron Total, Serum: 25 ug/dL

## 2019-12-12 NOTE — PROGRESS NOTE ADULT - ASSESSMENT
Pt is a 80 y/o male with medical history of DM, HTN, CKD, BPH, pAfib with no AC, who presents to [facility]-ED for c/o SOB. Pt is not a good historian d/t possible dementia, wife at bedside. Pt wife states that pt has been experiencing SOB for past week. He has been unable to lie flat to sleep. Pt wife also noticed that pt has lower extremity swelling. Yesterday pt verbalized to wife that he could not breathe, and she brought him to [facility]-ED. Pt denies fever, chills, cough, phlegm production, chest pain,  palpitations, inausea, vomiting, diarrhea melena,  lightheadedness, dizziness, syncope, sick contacts. At baseline, pt can walk one flight of stairs 2-3x a day with no assistive ambulation devices as per spouse.     Plan:     1. Aortic Stenosis with SOB  -BNP- 55391  -Tele/ECG- SR HR @ 75  - Chest Xray- small pleural effusions, air space disease in right lung  -CT Chest-groundglass opacities in both upper lobes, moderate bilateral effusions  - Lasix 40mh IVP BID  -Monitor Potassium, Maintain above 4, Replenish PRN  -Echo -EF 60-65%, mod-severe AS, possible SCAR as per attending   -ILR to be interrogated to r/o arrythmia event    2. HTN  -recent /78  -Continue current antihypertensive medication regimen     3. CKD  -BUN/CreatninE- 46/2.97  -Consider Nephrology consult d/t increased trend Pt is a 80 y/o male with medical history of DM, HTN, CKD, BPH, pAfib with no AC, who presents to [facility]-ED for c/o SOB. Pt is not a good historian d/t possible dementia, wife at bedside. Pt wife states that pt has been experiencing SOB for past week. He has been unable to lie flat to sleep. Pt wife also noticed that pt has lower extremity swelling. Yesterday pt verbalized to wife that he could not breathe, and she brought him to [facility]-ED. Pt denies fever, chills, cough, phlegm production, chest pain,  palpitations, inausea, vomiting, diarrhea melena,  lightheadedness, dizziness, syncope, sick contacts. At baseline, pt can walk one flight of stairs 2-3x a day with no assistive ambulation devices as per spouse.   As of 12/12/19, pt has no c/o SOB, chest pain, palpitations. Pt yielding clear straw colored urine in  hollis catheter. Significant improvement in bilateral lower extremity swelling.         Plan:     1. Aortic Stenosis with SOB  -BNP- 87823  -Tele/ECG- SR HR @ 75  - Chest Xray- small pleural effusions, air space disease in right lung  -CT Chest-groundglass opacities in both upper lobes, moderate bilateral effusions  - Continue Lasix 40mh IVP once daily  -DC on 40mg PO BID  -Monitor Potassium, Maintain above 4, Replenish PRN  -Echo -EF 60-65%, mod-severe AS,   -ILR to be interrogated - device CARLOS since April 2019  -F/U outpatient cardiac catheterization for ischemic evaluation and evaluation of aortic valve-will perform procedure once kidney function stable  -F/U CT SX outpatient TAVR workup  -F/U  GI for endoscopy     2. HTN  -recent /62  -Continue Amlodipine 10mg   - Continue Hydralizine to 50mg TID  -Continue Isosorbide Dinitrate 5MG TID    3. CKD  -BUN/CreatninE- 50/3.26  -Pt nephrologist made aware of pt hospitalization and plans to round today as per pt wife

## 2019-12-12 NOTE — PROGRESS NOTE ADULT - ASSESSMENT
DX: CKD 4    PLAN:  US Kidneys & Arrange for an AVF During this admission  Anemia: Epogen ordered  HTN: Continue current medications  Metabolic acidosis: Resolved- Continue Na bicarbonate  Fluid overload: Maintain 1 L fluid restriction, Continue Lasix, Strict Is and Os DX: CKD 4    PLAN:  US Kidneys & Arrange for an AVF During this admission  Anemia: Epogen ordered  HTN: Continue current medications  Metabolic acidosis: Resolved- Continue Na bicarbonate  Fluid overload: Maintain 1 L fluid restriction, Continue Lasix, Strict Is and Os, Continue to monitor GFR

## 2019-12-12 NOTE — PROGRESS NOTE ADULT - ASSESSMENT
Pt is a 78 y/o male with medical history of DM, HTN, CKD stage 3, BPH, pAfib with no AC, Aortic stenosis, HFpEF, memory problem with possible dementia and not good historian, presents to ED with c/o SOB, orthopnea, leg edema. In ED pt had uncontrolled BP and hypoxia to 77%. CT chest with effusion. Pt admitted for respiratory failure 2/2 HFpEF with underlying severe AS. Pt kept on IV diuresis and all other home medication resumed. Seen in consultation with cardiology.     >Acute hypoxic respiratory failure: Improving.  - 2/2 HFpEF and underlying moderate to severe AS.   - C/w CHF pathway, I/Os, daily weight, fluid restriction   - Decrease IV Diuresis lasix 40 mg IVP BID-->QD, c/w coreg, Imdur, hydralazine.  - Supplements o2 as needed, bronchodilator.    - Cardiology on board.   - Seen by CT surgery for TAVR work up in future as o/p.     >HTN urgency:  - BP not at goal yet.   - Increase coreg 25bid, increase Imdur to 10 mg, c/w Norvasc, hydralazine.    - Cardiology yon board.     >UTI:  - O/p urine cx positive in nephrology office.   - Seen by ID here- On vantin 12/17/19    >DM- Hypoglycemia noted today. D/c Lantus, c/w ZEINAB + FS monitoring.     >CKD stage 3- Creatinine stable, nephrology on board.     >Anemia- Likely AOCD, hgb stable.     >DVT ppx- Heparin

## 2019-12-12 NOTE — PROGRESS NOTE ADULT - SUBJECTIVE AND OBJECTIVE BOX
[facility]  INFECTIOUS DISEASES AND INTERNAL MEDICINE at [place]  =======================================================  Donato Banegas MD  Diplomates American Board of Internal Medicine and Infectious Diseases  Tel: [phone]      Fax: [phone]  =======================================================    JEMIMA SANTOS [id number]    Follow up:    Allergies:  No Known Allergies      Medications:  albuterol/ipratropium for Nebulization 3 milliLiter(s) Nebulizer every 6 hours  amLODIPine   Tablet 10 milliGRAM(s) Oral daily  aspirin 325 milliGRAM(s) Oral daily  atorvastatin 10 milliGRAM(s) Oral at bedtime  carvedilol 12.5 milliGRAM(s) Oral every 12 hours  cefpodoxime 200 milliGRAM(s) Oral every 12 hours  dextrose 40% Gel 15 Gram(s) Oral once PRN  dextrose 5%. 1000 milliLiter(s) IV Continuous <Continuous>  dextrose 50% Injectable 12.5 Gram(s) IV Push once  dextrose 50% Injectable 25 Gram(s) IV Push once  dextrose 50% Injectable 25 Gram(s) IV Push once  epoetin nereida Injectable 8000 Unit(s) SubCutaneous <User Schedule>  furosemide   Injectable 40 milliGRAM(s) IV Push two times a day  glucagon  Injectable 1 milliGRAM(s) IntraMuscular once PRN  heparin  Injectable 5000 Unit(s) SubCutaneous every 8 hours  hydrALAZINE 50 milliGRAM(s) Oral three times a day  hydrALAZINE Injectable 10 milliGRAM(s) IV Push every 6 hours PRN  insulin glargine Injectable (LANTUS) 10 Unit(s) SubCutaneous at bedtime  insulin lispro (HumaLOG) corrective regimen sliding scale   SubCutaneous three times a day before meals  insulin lispro (HumaLOG) corrective regimen sliding scale   SubCutaneous at bedtime  isosorbide   dinitrate Tablet (ISORDIL) 5 milliGRAM(s) Oral three times a day  oxybutynin 5 milliGRAM(s) Oral three times a day  sodium bicarbonate 650 milliGRAM(s) Oral two times a day      REVIEW OF SYSTEMS:  CONSTITUTIONAL:  No Fever or chills  HEENT:  No diplopia or blurred vision.  No earache, sore throat or runny nose.  CARDIOVASCULAR:  No pressure, squeezing, strangling, tightness, heaviness or aching about the chest, neck, axilla or epigastrium.  RESPIRATORY: + shortness of breath  GASTROINTESTINAL:  No nausea, vomiting   GENITOURINARY:  + Urinary retention  MUSCULOSKELETAL:  no joint aches, no muscle pain  SKIN:  No change in skin, hair or nails.  NEUROLOGIC:  No Headaches, seizures   PSYCHIATRIC:  No disorder of thought or mood.  ENDOCRINE:  No heat or cold intolerance  HEMATOLOGICAL:  No easy bruising or bleeding.       Physical Exam:  Vital Signs Last 24 Hrs  T(C): 36.7 (12 Dec 2019 05:30), Max: 36.9 (11 Dec 2019 19:53)  T(F): 98.1 (12 Dec 2019 05:30), Max: 98.5 (11 Dec 2019 19:53)  HR: 64 (12 Dec 2019 08:46) (64 - 77)  BP: 144/66 (12 Dec 2019 08:46) (141/64 - 182/84)  RR: 20 (12 Dec 2019 08:46) (17 - 20)  SpO2: 96% (12 Dec 2019 08:46) (96% - 100%)      GEN: NAD, pleasant  HEENT: normocephalic and atraumatic. EOMI. PERRL.  Anicteric  NECK: Supple.   LUNGS: Decreased basal BS B/L  HEART: Regular rate and rhythm   ABDOMEN: Soft, nontender, and nondistended.  Positive bowel sounds.    : No CVA tenderness  EXTREMITIES: trace edema.  MSK: No joint swelling  NEUROLOGIC: No Focal Deficits  PSYCHIATRIC: Appropriate affect .  SKIN: No Rash      Labs:  12-12    141  |  103  |  50.0<H>  ----------------------------<  73  3.9   |  23.0  |  3.26<H>    Ca    8.3<L>      12 Dec 2019 05:34    TPro  6.1<L>  /  Alb  3.4  /  TBili  0.3<L>  /  DBili  x   /  AST  13  /  ALT  11  /  AlkPhos  63  12-11                          7.9    7.72  )-----------( 157      ( 12 Dec 2019 05:34 )             26.6           LIVER FUNCTIONS - ( 11 Dec 2019 06:47 )  Alb: 3.4 g/dL / Pro: 6.1 g/dL / ALK PHOS: 63 U/L / ALT: 11 U/L / AST: 13 U/L / GGT: x               RECENT CULTURES:  12-11 @ 18:33    [facility]  [facility]      12-10 @ 01:33 .Blood     No growth at 48 hours      12-10 @ 01:32 .Blood     No growth at 48 hours [facility]  INFECTIOUS DISEASES AND INTERNAL MEDICINE [facility]  =======================================================  Donato Banegas MD  Diplomates American Board of Internal Medicine and Infectious Diseases  Tel: [phone]      Fax: [phone]  =======================================================    JEMIMA SANTOS [id number]    Follow up: UTI with klebsiella    No fever or chills  No diarrhea  Improved SOB      Allergies:  No Known Allergies      Antibiotics:  cefpodoxime 200 milliGRAM(s) Oral every 12 hours      REVIEW OF SYSTEMS:  CONSTITUTIONAL:  No Fever or chills  HEENT:  No diplopia or blurred vision.  No earache, sore throat or runny nose.  CARDIOVASCULAR:  No pressure, squeezing, strangling, tightness, heaviness or aching about the chest, neck, axilla or epigastrium.  RESPIRATORY: + shortness of breath  GASTROINTESTINAL:  No nausea, vomiting   GENITOURINARY:  + Urinary retention  MUSCULOSKELETAL:  no joint aches, no muscle pain  SKIN:  No change in skin, hair or nails.  NEUROLOGIC:  No Headaches, seizures   PSYCHIATRIC:  No disorder of thought or mood.  ENDOCRINE:  No heat or cold intolerance  HEMATOLOGICAL:  No easy bruising or bleeding.       Physical Exam:  Vital Signs Last 24 Hrs  T(C): 36.7 (12 Dec 2019 05:30), Max: 36.9 (11 Dec 2019 19:53)  T(F): 98.1 (12 Dec 2019 05:30), Max: 98.5 (11 Dec 2019 19:53)  HR: 64 (12 Dec 2019 08:46) (64 - 77)  BP: 144/66 (12 Dec 2019 08:46) (141/64 - 182/84)  RR: 20 (12 Dec 2019 08:46) (17 - 20)  SpO2: 96% (12 Dec 2019 08:46) (96% - 100%)      GEN: NAD, pleasant  HEENT: normocephalic and atraumatic. EOMI. PERRL.  Anicteric  NECK: Supple.   LUNGS: Decreased basal BS B/L  HEART: Regular rate and rhythm   ABDOMEN: Soft, nontender, and nondistended.  Positive bowel sounds.    : No CVA tenderness  EXTREMITIES: trace edema.  MSK: No joint swelling  NEUROLOGIC: No Focal Deficits  PSYCHIATRIC: Appropriate affect .  SKIN: No Rash      Labs:  12-12    141  |  103  |  50.0<H>  ----------------------------<  73  3.9   |  23.0  |  3.26<H>    Ca    8.3<L>      12 Dec 2019 05:34    TPro  6.1<L>  /  Alb  3.4  /  TBili  0.3<L>  /  DBili  x   /  AST  13  /  ALT  11  /  AlkPhos  63  12-11                          7.9    7.72  )-----------( 157      ( 12 Dec 2019 05:34 )             26.6           LIVER FUNCTIONS - ( 11 Dec 2019 06:47 )  Alb: 3.4 g/dL / Pro: 6.1 g/dL / ALK PHOS: 63 U/L / ALT: 11 U/L / AST: 13 U/L / GGT: x               RECENT CULTURES:  12-11 @ 18:33    RVP  Not[facility]      12-10 @ 01:33 .Blood     No growth at 48 hours      12-10 @ 01:32 .Blood     No growth at 48 hours

## 2019-12-13 ENCOUNTER — APPOINTMENT (OUTPATIENT)
Dept: CARDIOLOGY | Facility: CLINIC | Age: 79
End: 2019-12-13

## 2019-12-13 LAB
ANION GAP SERPL CALC-SCNC: 14 MMOL/L — SIGNIFICANT CHANGE UP (ref 5–17)
BUN SERPL-MCNC: 57 MG/DL — HIGH (ref 8–20)
CALCIUM SERPL-MCNC: 8.2 MG/DL — LOW (ref 8.6–10.2)
CHLORIDE SERPL-SCNC: 101 MMOL/L — SIGNIFICANT CHANGE UP (ref 98–107)
CO2 SERPL-SCNC: 24 MMOL/L — SIGNIFICANT CHANGE UP (ref 22–29)
CREAT SERPL-MCNC: 3.3 MG/DL — HIGH (ref 0.5–1.3)
GLUCOSE BLDC GLUCOMTR-MCNC: 126 MG/DL — HIGH (ref 70–99)
GLUCOSE BLDC GLUCOMTR-MCNC: 158 MG/DL — HIGH (ref 70–99)
GLUCOSE BLDC GLUCOMTR-MCNC: 181 MG/DL — HIGH (ref 70–99)
GLUCOSE BLDC GLUCOMTR-MCNC: 187 MG/DL — HIGH (ref 70–99)
GLUCOSE BLDC GLUCOMTR-MCNC: 216 MG/DL — HIGH (ref 70–99)
GLUCOSE SERPL-MCNC: 127 MG/DL — HIGH (ref 70–115)
HCT VFR BLD CALC: 26 % — LOW (ref 39–50)
HGB BLD-MCNC: 7.9 G/DL — LOW (ref 13–17)
MCHC RBC-ENTMCNC: 23.2 PG — LOW (ref 27–34)
MCHC RBC-ENTMCNC: 30.4 GM/DL — LOW (ref 32–36)
MCV RBC AUTO: 76.2 FL — LOW (ref 80–100)
PLATELET # BLD AUTO: 163 K/UL — SIGNIFICANT CHANGE UP (ref 150–400)
POTASSIUM SERPL-MCNC: 4.1 MMOL/L — SIGNIFICANT CHANGE UP (ref 3.5–5.3)
POTASSIUM SERPL-SCNC: 4.1 MMOL/L — SIGNIFICANT CHANGE UP (ref 3.5–5.3)
PREALB SERPL-MCNC: 15 MG/DL — LOW (ref 18–38)
RBC # BLD: 3.41 M/UL — LOW (ref 4.2–5.8)
RBC # FLD: 17 % — HIGH (ref 10.3–14.5)
SODIUM SERPL-SCNC: 139 MMOL/L — SIGNIFICANT CHANGE UP (ref 135–145)
T3 SERPL-MCNC: 83 NG/DL — SIGNIFICANT CHANGE UP (ref 80–200)
T4 AB SER-ACNC: 8.5 UG/DL — SIGNIFICANT CHANGE UP (ref 4.5–12)
TSH SERPL-MCNC: 6.14 UIU/ML — HIGH (ref 0.27–4.2)
WBC # BLD: 6 K/UL — SIGNIFICANT CHANGE UP (ref 3.8–10.5)
WBC # FLD AUTO: 6 K/UL — SIGNIFICANT CHANGE UP (ref 3.8–10.5)

## 2019-12-13 PROCEDURE — 99232 SBSQ HOSP IP/OBS MODERATE 35: CPT

## 2019-12-13 PROCEDURE — 99233 SBSQ HOSP IP/OBS HIGH 50: CPT

## 2019-12-13 RX ORDER — TAMSULOSIN HYDROCHLORIDE 0.4 MG/1
0.4 CAPSULE ORAL AT BEDTIME
Refills: 0 | Status: DISCONTINUED | OUTPATIENT
Start: 2019-12-13 | End: 2019-12-14

## 2019-12-13 RX ORDER — FUROSEMIDE 40 MG
40 TABLET ORAL DAILY
Refills: 0 | Status: DISCONTINUED | OUTPATIENT
Start: 2019-12-14 | End: 2019-12-14

## 2019-12-13 RX ORDER — IRON SUCROSE 20 MG/ML
100 INJECTION, SOLUTION INTRAVENOUS EVERY 24 HOURS
Refills: 0 | Status: DISCONTINUED | OUTPATIENT
Start: 2019-12-13 | End: 2019-12-16

## 2019-12-13 RX ORDER — ERGOCALCIFEROL 1.25 MG/1
50000 CAPSULE ORAL
Refills: 0 | Status: DISCONTINUED | OUTPATIENT
Start: 2019-12-13 | End: 2019-12-16

## 2019-12-13 RX ADMIN — CARVEDILOL PHOSPHATE 25 MILLIGRAM(S): 80 CAPSULE, EXTENDED RELEASE ORAL at 17:02

## 2019-12-13 RX ADMIN — Medication 3 MILLILITER(S): at 15:15

## 2019-12-13 RX ADMIN — Medication 200 MILLIGRAM(S): at 17:02

## 2019-12-13 RX ADMIN — Medication 3 MILLILITER(S): at 19:44

## 2019-12-13 RX ADMIN — Medication 4: at 11:52

## 2019-12-13 RX ADMIN — TAMSULOSIN HYDROCHLORIDE 0.4 MILLIGRAM(S): 0.4 CAPSULE ORAL at 11:45

## 2019-12-13 RX ADMIN — Medication 5 MILLIGRAM(S): at 14:44

## 2019-12-13 RX ADMIN — HEPARIN SODIUM 5000 UNIT(S): 5000 INJECTION INTRAVENOUS; SUBCUTANEOUS at 22:24

## 2019-12-13 RX ADMIN — CARVEDILOL PHOSPHATE 25 MILLIGRAM(S): 80 CAPSULE, EXTENDED RELEASE ORAL at 05:42

## 2019-12-13 RX ADMIN — HEPARIN SODIUM 5000 UNIT(S): 5000 INJECTION INTRAVENOUS; SUBCUTANEOUS at 05:43

## 2019-12-13 RX ADMIN — Medication 50 MILLIGRAM(S): at 14:44

## 2019-12-13 RX ADMIN — ISOSORBIDE DINITRATE 10 MILLIGRAM(S): 5 TABLET ORAL at 22:11

## 2019-12-13 RX ADMIN — Medication 650 MILLIGRAM(S): at 05:42

## 2019-12-13 RX ADMIN — Medication 325 MILLIGRAM(S): at 11:42

## 2019-12-13 RX ADMIN — ISOSORBIDE DINITRATE 10 MILLIGRAM(S): 5 TABLET ORAL at 05:42

## 2019-12-13 RX ADMIN — Medication 5 MILLIGRAM(S): at 05:42

## 2019-12-13 RX ADMIN — Medication 3 MILLILITER(S): at 09:17

## 2019-12-13 RX ADMIN — Medication 5 MILLIGRAM(S): at 22:11

## 2019-12-13 RX ADMIN — Medication 40 MILLIGRAM(S): at 11:41

## 2019-12-13 RX ADMIN — IRON SUCROSE 210 MILLIGRAM(S): 20 INJECTION, SOLUTION INTRAVENOUS at 11:38

## 2019-12-13 RX ADMIN — ERGOCALCIFEROL 50000 UNIT(S): 1.25 CAPSULE ORAL at 11:42

## 2019-12-13 RX ADMIN — ATORVASTATIN CALCIUM 10 MILLIGRAM(S): 80 TABLET, FILM COATED ORAL at 22:24

## 2019-12-13 RX ADMIN — Medication 50 MILLIGRAM(S): at 22:11

## 2019-12-13 RX ADMIN — Medication 50 MILLIGRAM(S): at 05:43

## 2019-12-13 RX ADMIN — Medication 3 MILLILITER(S): at 04:15

## 2019-12-13 RX ADMIN — AMLODIPINE BESYLATE 10 MILLIGRAM(S): 2.5 TABLET ORAL at 05:42

## 2019-12-13 RX ADMIN — HEPARIN SODIUM 5000 UNIT(S): 5000 INJECTION INTRAVENOUS; SUBCUTANEOUS at 14:44

## 2019-12-13 RX ADMIN — Medication 650 MILLIGRAM(S): at 17:02

## 2019-12-13 RX ADMIN — ISOSORBIDE DINITRATE 10 MILLIGRAM(S): 5 TABLET ORAL at 14:44

## 2019-12-13 RX ADMIN — Medication 200 MILLIGRAM(S): at 05:42

## 2019-12-13 RX ADMIN — Medication 2: at 16:56

## 2019-12-13 NOTE — PROGRESS NOTE ADULT - SUBJECTIVE AND OBJECTIVE BOX
[facility]  INFECTIOUS DISEASES AND INTERNAL MEDICINE at [place]  =======================================================  Donato Banegas MD  Diplomates American Board of Internal Medicine and Infectious Diseases  Tel: [phone]      Fax: [phone]  =======================================================    JEMIMA SANTOS [id number]    Follow up: UTI with klebsiella    No fever or chills  No diarrhea  Improved SOB      Allergies:  No Known Allergies      Antibiotics:  cefpodoxime 200 milliGRAM(s) Oral every 12 hours      REVIEW OF SYSTEMS:  CONSTITUTIONAL:  No Fever or chills  HEENT:  No diplopia or blurred vision.  No earache, sore throat or runny nose.  CARDIOVASCULAR:  No pressure, squeezing, strangling, tightness, heaviness or aching about the chest, neck, axilla or epigastrium.  RESPIRATORY: + shortness of breath  GASTROINTESTINAL:  No nausea, vomiting   GENITOURINARY:  + Urinary retention  MUSCULOSKELETAL:  no joint aches, no muscle pain  SKIN:  No change in skin, hair or nails.  NEUROLOGIC:  No Headaches, seizures   PSYCHIATRIC:  No disorder of thought or mood.  ENDOCRINE:  No heat or cold intolerance  HEMATOLOGICAL:  No easy bruising or bleeding.       Physical Exam:  Vital Signs Last 24 Hrs  T(C): 36.4 (13 Dec 2019 08:40), Max: 36.9 (12 Dec 2019 19:12)  T(F): 97.5 (13 Dec 2019 08:40), Max: 98.4 (12 Dec 2019 19:12)  HR: 63 (13 Dec 2019 09:18) (61 - 72)  BP: 134/63 (13 Dec 2019 08:40) (127/62 - 165/80)  RR: 18 (13 Dec 2019 08:40) (18 - 20)  SpO2: 96% (13 Dec 2019 09:18) (93% - 98%)      GEN: NAD, pleasant  HEENT: normocephalic and atraumatic. EOMI. PERRL.  Anicteric  NECK: Supple.   LUNGS: Decreased basal BS B/L  HEART: Regular rate and rhythm   ABDOMEN: Soft, nontender, and nondistended.  Positive bowel sounds.    : No CVA tenderness  EXTREMITIES: trace edema.  MSK: No joint swelling  NEUROLOGIC: No Focal Deficits  PSYCHIATRIC: Appropriate affect .  SKIN: No Rash      Labs:  12-13    139  |  101  |  57.0<H>  ----------------------------<  127<H>  4.1   |  24.0  |  3.30<H>    Ca    8.2<L>      13 Dec 2019 07:42                            7.9    6.00  )-----------( 163      ( 13 Dec 2019 07:42 )             26.0         RECENT CULTURES:  12-11 @ 18:33    [facility]      12-10 @ 01:33 .Blood     No growth at 48 hours      12-10 @ 01:32 .Blood     No growth at 48 hours

## 2019-12-13 NOTE — PROGRESS NOTE ADULT - ASSESSMENT
Pt is a 78 y/o male with medical history of DM, HTN, CKD stage 3, BPH, pAfib with no AC, Aortic stenosis, HFpEF, memory problem with possible dementia and not good historian, presents to ED with c/o SOB, orthopnea, leg edema. In ED pt had uncontrolled BP and hypoxia to 77%. CT chest with effusion. Pt admitted for respiratory failure 2/2 HFpEF with underlying severe AS. Pt kept on IV diuresis and all other home medication resumed. Seen in consultation with cardiology.     >Acute hypoxic respiratory failure: Improving.  - 2/2 HFpEF and underlying moderate to severe AS.   - C/w CHF pathway, I/Os, daily weight, fluid restriction   - Iv lasix today and changed to PO in AM per cardiology. C/w coreg, Imdur, hydralazine.  - Supplements o2 as needed, bronchodilator.    - Cardiology on board.   - Seen by CT surgery for TAVR work up in future as o/p- discussed with Dr proctor- work up will be done o/p in future, not on this admission.     >HTN urgency:  - BP better now  - c/w coreg 25bid, Imdur 10 mg, Norvasc, hydralazine.    - Cardiology yon board.     >UTI:  - O/p urine cx positive in nephrology office.   - Seen by ID here- On vantin 12/17/19    >Urinary retention with BPH- [place] in place, start Flomax. Pt follows with o/p urology.     >DM- No more Hypoglycemia, C/w ZEINAB + FS monitoring.     >CKD stage 3- Creatinine stable, nephrology on board.     >Anemia- Likely AOCD, hgb stable.     >DVT ppx- Heparin     >dispo- Home, anticipate d/c in AM

## 2019-12-13 NOTE — PHYSICAL THERAPY INITIAL EVALUATION ADULT - ADDITIONAL COMMENTS
Pt reports lives in 2 story house, but has one bedroom on main level that he can stay in, 3 steps to enter home wit 2 rails, lives with wife and she is able to assist as needed.  Pt does not own and DME.

## 2019-12-13 NOTE — PROGRESS NOTE ADULT - ASSESSMENT
US Kidneys & consider an AVF During this admission  Anemia: Epogen ordered  HTN: Continue current medications  Metabolic acidosis: Resolved- Continue Na bicarbonate  Fluid overload: Maintain 1 L fluid restriction, Continue Lasix, Strict Is and Os, Continue to monitor GFR

## 2019-12-13 NOTE — PROGRESS NOTE ADULT - SUBJECTIVE AND OBJECTIVE BOX
[facility] DIVISION OF KIDNEY DISEASES AND HYPERTENSION -- FOLLOW UP NOTE  --------------------------------------------------------------------------------  Chief Complaint:  CKD IV    24 hour events/subjective:  Pt seen/examined  Lying in bed in NAD  BP improved;      PAST HISTORY  --------------------------------------------------------------------------------  No significant changes to PMH, PSH, FHx, SHx, unless otherwise noted    ALLERGIES & MEDICATIONS  --------------------------------------------------------------------------------  Allergies    No Known Allergies    Intolerances      Standing Inpatient Medications  albuterol/ipratropium for Nebulization 3 milliLiter(s) Nebulizer every 6 hours  amLODIPine   Tablet 10 milliGRAM(s) Oral daily  aspirin 325 milliGRAM(s) Oral daily  atorvastatin 10 milliGRAM(s) Oral at bedtime  carvedilol 25 milliGRAM(s) Oral every 12 hours  cefpodoxime 200 milliGRAM(s) Oral every 12 hours  dextrose 5%. 1000 milliLiter(s) IV Continuous <Continuous>  dextrose 50% Injectable 12.5 Gram(s) IV Push once  dextrose 50% Injectable 25 Gram(s) IV Push once  dextrose 50% Injectable 25 Gram(s) IV Push once  epoetin nereida Injectable 8000 Unit(s) SubCutaneous <User Schedule>  ergocalciferol 31645 Unit(s) Oral every week  heparin  Injectable 5000 Unit(s) SubCutaneous every 8 hours  hydrALAZINE 50 milliGRAM(s) Oral three times a day  insulin lispro (HumaLOG) corrective regimen sliding scale   SubCutaneous three times a day before meals  insulin lispro (HumaLOG) corrective regimen sliding scale   SubCutaneous at bedtime  iron sucrose IVPB 100 milliGRAM(s) IV Intermittent every 24 hours  isosorbide   dinitrate Tablet (ISORDIL) 10 milliGRAM(s) Oral three times a day  oxybutynin 5 milliGRAM(s) Oral three times a day  sodium bicarbonate 650 milliGRAM(s) Oral two times a day  tamsulosin 0.4 milliGRAM(s) Oral at bedtime    PRN Inpatient Medications  dextrose 40% Gel 15 Gram(s) Oral once PRN  glucagon  Injectable 1 milliGRAM(s) IntraMuscular once PRN  hydrALAZINE Injectable 10 milliGRAM(s) IV Push every 6 hours PRN      REVIEW OF SYSTEMS  --------------------------------------------------------------------------------  Gen: No weight changes, fatigue, fevers/chills, weakness  Skin: No rashes  Head/Eyes/Ears/Mouth: No headache; Normal hearing; Normal vision w/o blurriness; No sinus pain/discomfort, sore throat  Respiratory: No dyspnea, cough, wheezing, hemoptysis  CV: No chest pain, PND, orthopnea  GI: No abdominal pain, diarrhea, constipation, nausea, vomiting, melena, hematochezia  : No increased frequency, dysuria, hematuria, nocturia  MSK: No joint pain/swelling; no back pain; no edema  Neuro: No dizziness/lightheadedness, weakness, seizures, numbness, tingling  Heme: No easy bruising or bleeding  Endo: No heat/cold intolerance  Psych: No significant nervousness, anxiety, stress, depression    All other systems were reviewed and are negative, except as noted.    VITALS/PHYSICAL EXAM  --------------------------------------------------------------------------------  T(C): 36.4 (12-13-19 @ 18:00), Max: 36.9 (12-12-19 @ 23:20)  HR: 64 (12-13-19 @ 19:45) (61 - 71)  BP: 148/73 (12-13-19 @ 18:00) (127/62 - 160/71)  RR: 18 (12-13-19 @ 18:00) (16 - 18)  SpO2: 94% (12-13-19 @ 19:45) (92% - 96%)  Wt(kg): --        12-12-19 @ 07:01  -  12-13-19 @ 07:00  --------------------------------------------------------  IN: 240 mL / OUT: 1550 mL / NET: -1310 mL      Physical Exam:  	Gen: NAD, well-appearing  	HEENT: PERRL, supple neck, clear oropharynx  	Pulm: CTA B/L  	CV: RRR, S1S2; no rub  	Back: No spinal or CVA tenderness; no sacral edema  	Abd: +BS, soft, nontender/nondistended  	: No suprapubic tenderness  	UE: Warm, FROM, no clubbing, intact strength; no edema; no asterixis  	LE: Warm, FROM, no clubbing, intact strength; no edema  	Neuro: No focal deficits, intact gait  	Psych: Normal affect and mood  	Skin: Warm, without rashes  	Vascular access:    LABS/STUDIES  --------------------------------------------------------------------------------              7.9    6.00  >-----------<  163      [12-13-19 @ 07:42]              26.0     139  |  101  |  57.0  ----------------------------<  127      [12-13-19 @ 07:42]  4.1   |  24.0  |  3.30        Ca     8.2     [12-13-19 @ 07:42]            Creatinine Trend:  SCr 3.30 [12-13 @ 07:42]  SCr 3.26 [12-12 @ 05:34]  SCr 3.02 [12-11 @ 06:47]  SCr 2.97 [12-10 @ 06:32]  SCr 2.75 [12-10 @ 00:15]        Iron 25, TIBC 285, %sat 9      [12-11-19 @ 17:50]  Ferritin 25      [12-11-19 @ 17:50]  PTH -- (Ca 8.3)      [12-12-19 @ 19:36]   164  Vitamin D (25OH) 20.3      [12-12-19 @ 19:36]  HbA1c 8.1      [12-11-19 @ 06:47]  TSH 6.14      [12-13-19 @ 07:42]

## 2019-12-13 NOTE — PROGRESS NOTE ADULT - ASSESSMENT
80y/o  Male with h/o CKD IV, HTN, DM2, BPH. Patient comes in with worsening shortness of breath x 1 week. Worse at night. He has also had dry cough and worsening LE edema. Breathing worse with laying down. No sick contacts. No fever or chills. In ED he was afebrile, no leukocytosis, hypertensive, hypoxic saturating 77% on room air, Improved to 96% on 3L NC. He was seen by nephrology on 12/3 and had UA and urine culture done. UA with pyuria and urine culture with klebsiella. He was started on Levaquin which he has been taking. ID input requested for UTI.       UTI with klebsiella  Urinary retention   Acute hypoxic respiratory failure  Fluid overload  ADI on CKD stage 4      - Outpatient UA with pyuria   - Outpatient Urine culture with Klebsiella  - Blood cultures no growth  - Continue Vantin 200mg PO BID till 12/17/19  - RVP negative  - CT Chest with pulm edema  - SOB improved with Lasix  - Follows with Dr Gómez (Urology)    - nephrology following  - Cardiology following  - Trend Fever  - Trend Leukocytosis      Will sign off. Please call PRN.

## 2019-12-13 NOTE — PHYSICAL THERAPY INITIAL EVALUATION ADULT - GENERAL OBSERVATIONS, REHAB EVAL
Pt received lying in stretcher, (+) IV lock, NAD, agreeable to PT, wife present at bedside Pt received lying in stretcher, (+) IV lock, (+) hollis, NAD, agreeable to PT, wife present at bedside

## 2019-12-13 NOTE — PROGRESS NOTE ADULT - SUBJECTIVE AND OBJECTIVE BOX
JEMIMA SANTOS Male 79y [id number]    Patient is a 79y old  Male who presents with a chief complaint of pulmonary edema (12 Dec 2019 16:33)      Subjective/objective:  Pt seen and examined at bedside, no over night event reported by night staff. Pt clinically improved, less SOB and leg edema improving.     Review of system:  No fever, chills, nausea, vomiting, headache, dizziness, chest pain.      PHYSICAL EXAM:    Vital Signs Last 24 Hrs  T(C): 36.3 (13 Dec 2019 12:53), Max: 36.9 (12 Dec 2019 19:12)  T(F): 97.4 (13 Dec 2019 12:53), Max: 98.4 (12 Dec 2019 19:12)  HR: 63 (13 Dec 2019 15:15) (61 - 72)  BP: 146/63 (13 Dec 2019 12:53) (127/62 - 165/80)  BP(mean): --  RR: 16 (13 Dec 2019 12:53) (16 - 18)  SpO2: 92% (13 Dec 2019 15:15) (92% - 96%)    GENERAL: Pt lying comfortably, NAD.  CHEST/LUNG: Good air entry, no Rales today.    HEART: S1S2+, Regular rate and rhythm; + Murmur.   ABDOMEN: Soft, Nontender, Nondistended; Bowel sounds present.  Extremities: Trace LE edema, pulses+  NEURO: AAOX3, no focal deficits, no motor r sensory loss.  PSYCH: normal mood.      MEDICATIONS  (STANDING):  albuterol/ipratropium for Nebulization 3 milliLiter(s) Nebulizer every 6 hours  amLODIPine   Tablet 10 milliGRAM(s) Oral daily  aspirin 325 milliGRAM(s) Oral daily  atorvastatin 10 milliGRAM(s) Oral at bedtime  carvedilol 25 milliGRAM(s) Oral every 12 hours  cefpodoxime 200 milliGRAM(s) Oral every 12 hours  dextrose 5%. 1000 milliLiter(s) (50 mL/Hr) IV Continuous <Continuous>  dextrose 50% Injectable 12.5 Gram(s) IV Push once  dextrose 50% Injectable 25 Gram(s) IV Push once  dextrose 50% Injectable 25 Gram(s) IV Push once  epoetin nereida Injectable 8000 Unit(s) SubCutaneous <User Schedule>  ergocalciferol 73573 Unit(s) Oral every week  furosemide   Injectable 40 milliGRAM(s) IV Push daily  heparin  Injectable 5000 Unit(s) SubCutaneous every 8 hours  hydrALAZINE 50 milliGRAM(s) Oral three times a day  insulin lispro (HumaLOG) corrective regimen sliding scale   SubCutaneous three times a day before meals  insulin lispro (HumaLOG) corrective regimen sliding scale   SubCutaneous at bedtime  iron sucrose IVPB 100 milliGRAM(s) IV Intermittent every 24 hours  isosorbide   dinitrate Tablet (ISORDIL) 10 milliGRAM(s) Oral three times a day  oxybutynin 5 milliGRAM(s) Oral three times a day  sodium bicarbonate 650 milliGRAM(s) Oral two times a day  tamsulosin 0.4 milliGRAM(s) Oral at bedtime    MEDICATIONS  (PRN):  dextrose 40% Gel 15 Gram(s) Oral once PRN Blood Glucose LESS THAN 70 milliGRAM(s)/deciliter  glucagon  Injectable 1 milliGRAM(s) IntraMuscular once PRN Glucose LESS THAN 70 milligrams/deciliter  hydrALAZINE Injectable 10 milliGRAM(s) IV Push every 6 hours PRN SBP>170        Labs:  LABS:                        7.9    6.00  )-----------( 163      ( 13 Dec 2019 07:42 )             26.0     12-13    139  |  101  |  57.0<H>  ----------------------------<  127<H>  4.1   |  24.0  |  3.30<H>    Ca    8.2<L>      13 Dec 2019 07:42

## 2019-12-13 NOTE — PHYSICAL THERAPY INITIAL EVALUATION ADULT - GAIT DISTANCE, PT EVAL
trial 1: 50 feet no device supervision with (+) LOB with self correction, 75 feet x2 modified independent with RW

## 2019-12-14 LAB
ANION GAP SERPL CALC-SCNC: 13 MMOL/L — SIGNIFICANT CHANGE UP (ref 5–17)
APPEARANCE UR: CLEAR — SIGNIFICANT CHANGE UP
BACTERIA # UR AUTO: ABNORMAL
BILIRUB UR-MCNC: NEGATIVE — SIGNIFICANT CHANGE UP
BUN SERPL-MCNC: 60 MG/DL — HIGH (ref 8–20)
CALCIUM SERPL-MCNC: 8.2 MG/DL — LOW (ref 8.6–10.2)
CHLORIDE SERPL-SCNC: 97 MMOL/L — LOW (ref 98–107)
CO2 SERPL-SCNC: 25 MMOL/L — SIGNIFICANT CHANGE UP (ref 22–29)
COLOR SPEC: YELLOW — SIGNIFICANT CHANGE UP
CREAT ?TM UR-MCNC: 56 MG/DL — SIGNIFICANT CHANGE UP
CREAT SERPL-MCNC: 3.48 MG/DL — HIGH (ref 0.5–1.3)
DIFF PNL FLD: ABNORMAL
EPI CELLS # UR: SIGNIFICANT CHANGE UP
GLUCOSE BLDC GLUCOMTR-MCNC: 136 MG/DL — HIGH (ref 70–99)
GLUCOSE BLDC GLUCOMTR-MCNC: 201 MG/DL — HIGH (ref 70–99)
GLUCOSE BLDC GLUCOMTR-MCNC: 238 MG/DL — HIGH (ref 70–99)
GLUCOSE BLDC GLUCOMTR-MCNC: 245 MG/DL — HIGH (ref 70–99)
GLUCOSE SERPL-MCNC: 145 MG/DL — HIGH (ref 70–115)
GLUCOSE UR QL: 50 MG/DL
KETONES UR-MCNC: NEGATIVE — SIGNIFICANT CHANGE UP
LEUKOCYTE ESTERASE UR-ACNC: ABNORMAL
MRSA PCR RESULT.: SIGNIFICANT CHANGE UP
NITRITE UR-MCNC: NEGATIVE — SIGNIFICANT CHANGE UP
PH UR: 6.5 — SIGNIFICANT CHANGE UP (ref 5–8)
PHOSPHATE SERPL-MCNC: 4.6 MG/DL — SIGNIFICANT CHANGE UP (ref 2.4–4.7)
POTASSIUM SERPL-MCNC: 4.3 MMOL/L — SIGNIFICANT CHANGE UP (ref 3.5–5.3)
POTASSIUM SERPL-SCNC: 4.3 MMOL/L — SIGNIFICANT CHANGE UP (ref 3.5–5.3)
PROT ?TM UR-MCNC: 95 MG/DL — HIGH (ref 0–12)
PROT UR-MCNC: 100 MG/DL
PROT/CREAT UR-RTO: 1.7 RATIO — HIGH
RBC CASTS # UR COMP ASSIST: ABNORMAL /HPF (ref 0–4)
S AUREUS DNA NOSE QL NAA+PROBE: SIGNIFICANT CHANGE UP
SODIUM SERPL-SCNC: 135 MMOL/L — SIGNIFICANT CHANGE UP (ref 135–145)
SP GR SPEC: 1.01 — SIGNIFICANT CHANGE UP (ref 1.01–1.02)
UROBILINOGEN FLD QL: NEGATIVE MG/DL — SIGNIFICANT CHANGE UP
WBC UR QL: ABNORMAL

## 2019-12-14 PROCEDURE — 99233 SBSQ HOSP IP/OBS HIGH 50: CPT

## 2019-12-14 PROCEDURE — 99232 SBSQ HOSP IP/OBS MODERATE 35: CPT

## 2019-12-14 RX ORDER — DOXAZOSIN MESYLATE 4 MG
4 TABLET ORAL AT BEDTIME
Refills: 0 | Status: DISCONTINUED | OUTPATIENT
Start: 2019-12-14 | End: 2019-12-16

## 2019-12-14 RX ADMIN — Medication 200 MILLIGRAM(S): at 05:35

## 2019-12-14 RX ADMIN — Medication 325 MILLIGRAM(S): at 12:45

## 2019-12-14 RX ADMIN — Medication 4: at 12:44

## 2019-12-14 RX ADMIN — ISOSORBIDE DINITRATE 10 MILLIGRAM(S): 5 TABLET ORAL at 05:35

## 2019-12-14 RX ADMIN — CARVEDILOL PHOSPHATE 25 MILLIGRAM(S): 80 CAPSULE, EXTENDED RELEASE ORAL at 17:15

## 2019-12-14 RX ADMIN — Medication 50 MILLIGRAM(S): at 22:14

## 2019-12-14 RX ADMIN — Medication 5 MILLIGRAM(S): at 05:35

## 2019-12-14 RX ADMIN — Medication 4 MILLIGRAM(S): at 22:29

## 2019-12-14 RX ADMIN — Medication 50 MILLIGRAM(S): at 15:07

## 2019-12-14 RX ADMIN — Medication 200 MILLIGRAM(S): at 17:15

## 2019-12-14 RX ADMIN — Medication 650 MILLIGRAM(S): at 05:35

## 2019-12-14 RX ADMIN — IRON SUCROSE 210 MILLIGRAM(S): 20 INJECTION, SOLUTION INTRAVENOUS at 15:06

## 2019-12-14 RX ADMIN — ISOSORBIDE DINITRATE 10 MILLIGRAM(S): 5 TABLET ORAL at 15:06

## 2019-12-14 RX ADMIN — HEPARIN SODIUM 5000 UNIT(S): 5000 INJECTION INTRAVENOUS; SUBCUTANEOUS at 22:14

## 2019-12-14 RX ADMIN — Medication 650 MILLIGRAM(S): at 17:15

## 2019-12-14 RX ADMIN — ATORVASTATIN CALCIUM 10 MILLIGRAM(S): 80 TABLET, FILM COATED ORAL at 22:14

## 2019-12-14 RX ADMIN — Medication 3 MILLILITER(S): at 03:04

## 2019-12-14 RX ADMIN — Medication 3 MILLILITER(S): at 21:04

## 2019-12-14 RX ADMIN — HEPARIN SODIUM 5000 UNIT(S): 5000 INJECTION INTRAVENOUS; SUBCUTANEOUS at 15:07

## 2019-12-14 RX ADMIN — ERYTHROPOIETIN 8000 UNIT(S): 10000 INJECTION, SOLUTION INTRAVENOUS; SUBCUTANEOUS at 15:09

## 2019-12-14 RX ADMIN — Medication 50 MILLIGRAM(S): at 05:35

## 2019-12-14 RX ADMIN — Medication 3 MILLILITER(S): at 09:10

## 2019-12-14 RX ADMIN — AMLODIPINE BESYLATE 10 MILLIGRAM(S): 2.5 TABLET ORAL at 05:35

## 2019-12-14 RX ADMIN — HEPARIN SODIUM 5000 UNIT(S): 5000 INJECTION INTRAVENOUS; SUBCUTANEOUS at 05:38

## 2019-12-14 RX ADMIN — Medication 4: at 17:15

## 2019-12-14 RX ADMIN — ISOSORBIDE DINITRATE 10 MILLIGRAM(S): 5 TABLET ORAL at 22:14

## 2019-12-14 RX ADMIN — Medication 40 MILLIGRAM(S): at 05:39

## 2019-12-14 RX ADMIN — CARVEDILOL PHOSPHATE 25 MILLIGRAM(S): 80 CAPSULE, EXTENDED RELEASE ORAL at 05:36

## 2019-12-14 RX ADMIN — Medication 3 MILLILITER(S): at 15:26

## 2019-12-14 NOTE — PROGRESS NOTE ADULT - ASSESSMENT
Pt is a 80 y/o male with medical history of DM, HTN, CKD stage 3, BPH, pAfib with no AC, Aortic stenosis, HFpEF, memory problem with possible dementia and not good historian, presents to ED with c/o SOB, orthopnea, leg edema. In ED pt had uncontrolled BP and hypoxia to 77%. CT chest with effusion. Pt admitted for respiratory failure 2/2 HFpEF with underlying severe AS. Pt kept on IV diuresis and all other home medication resumed. Seen in consultation with cardiology, clinically improving. Course complicated by ADI on ckd, seen in consultation with nephrology.      >Acute hypoxic respiratory failure: Improving.  - 2/2 HFpEF and underlying moderate to severe AS. Non-complaints with fluid/ diet and medication  - C/w CHF pathway, I/Os, daily weight, fluid restriction   - Lasix held by nephrology due to worsening renal function.   - C/w coreg, Imdur, hydralazine.  - Supplements o2 as needed, bronchodilator.    - Cardiology on board.   - Seen by CT surgery for TAVR work up in future as o/p- discussed with Dr Faustin- work up will be done o/p in future, not on this admission.     >HTN urgency:  - BP better now  - c/w coreg 25 BID, Imdur 10 mg, Norvasc, hydralazine.    - Cardiology yon board.     >UTI:  - O/p urine cx positive in nephrology office.   - Seen by ID here- On Vantin  12/17/19    >Urinary retention with BPH- TOV today, Flomax changed to Cardura by renal. Pt follows with o/p urology.     >DM- No more Hypoglycemia, C/w ZEINAB + FS monitoring.     >CKD stage 3- Creatinine stable, nephrology on board.     >Anemia- Likely AOCD, hgb stable. On IV Venofer by renal.      >DVT ppx- Heparin     >Dispo- Eventual Home, ADI on CKD- Creatinine 2.7 to 3.4 today, discussed with renal, not cleared by renal today- discussed with nephrology.

## 2019-12-14 NOTE — PROGRESS NOTE ADULT - ASSESSMENT
Pt is a 78 y/o male with medical history of DM, HTN, CKD, BPH, pAfib with no AC, who presents to [facility]ED for c/o SOB. Pt is not a good historian d/t possible dementia, wife at bedside. Pt wife states that pt has been experiencing SOB for past week. He has been unable to lie flat to sleep. Pt wife also noticed that pt has lower extremity swelling. Yesterday pt verbalized to wife that he could not breathe, and she brought him to [facility]ED. Pt denies fever, chills, cough, phlegm production, chest pain,  palpitations, inausea, vomiting, diarrhea melena,  lightheadedness, dizziness, syncope, sick contacts. At baseline, pt can walk one flight of stairs 2-3x a day with no assistive ambulation devices as per spouse.   As of 12/12/19, pt has no c/o SOB, chest pain, palpitations. Pt yielding clear straw colored urine in  hollis catheter. Significant improvement in bilateral lower extremity swelling.         Plan:     1. Aortic Stenosis with SOB  off lasi   -Echo -EF 60-65%, mod-severe AS,   -ILR to be interrogated - device CARLOS since April 2019  -     2. HTN  -recent /62  -Continue Amlodipine 10mg   - Continue Hydralizine to 50mg TID  -Continue Isosorbide Dinitrate 5MG TID    3. CKD  -BUN/CreatninE- 50/3.26  -Pt nephrologist made aware of pt hospitalization and plans to round today as per pt wife

## 2019-12-14 NOTE — PROGRESS NOTE ADULT - SUBJECTIVE AND OBJECTIVE BOX
JEMIMA SANTOS Male 79y [id number]    Patient is a 79y old  Male who presents with a chief complaint of pulmonary edema (14 Dec 2019 13:59)      Subjective/objective:  Pt seen and examined at bedside, no over night event reported by night staff. Pt clinically better, reports no more SOB, leg edema also improved. Renal function worsening.      Review of system:  No fever, chills, nausea, vomiting, headache, dizziness, chest pain.      PHYSICAL EXAM:    Vital Signs Last 24 Hrs  T(C): 36.8 (14 Dec 2019 11:52), Max: 36.8 (14 Dec 2019 11:52)  T(F): 98.3 (14 Dec 2019 11:52), Max: 98.3 (14 Dec 2019 11:52)  HR: 65 (14 Dec 2019 11:52) (63 - 74)  BP: 138/63 (14 Dec 2019 11:52) (138/63 - 155/72)  BP(mean): --  RR: 20 (14 Dec 2019 11:52) (16 - 20)  SpO2: 99% (14 Dec 2019 11:52) (92% - 99%)    GENERAL: Pt lying comfortably, NAD.  CHEST/LUNG: Good air entry, no Rales today.    HEART: S1S2+, Regular rate and rhythm; + Murmur.   ABDOMEN: Soft, Nontender, Nondistended; Bowel sounds present.  Extremities: Trace LE edema, pulses+  NEURO: AAOX3, no focal deficits, no motor r sensory loss.  PSYCH: normal mood.      MEDICATIONS  (STANDING):  albuterol/ipratropium for Nebulization 3 milliLiter(s) Nebulizer every 6 hours  amLODIPine   Tablet 10 milliGRAM(s) Oral daily  aspirin 325 milliGRAM(s) Oral daily  atorvastatin 10 milliGRAM(s) Oral at bedtime  carvedilol 25 milliGRAM(s) Oral every 12 hours  cefpodoxime 200 milliGRAM(s) Oral every 12 hours  dextrose 5%. 1000 milliLiter(s) (50 mL/Hr) IV Continuous <Continuous>  dextrose 50% Injectable 12.5 Gram(s) IV Push once  dextrose 50% Injectable 25 Gram(s) IV Push once  dextrose 50% Injectable 25 Gram(s) IV Push once  doxazosin 4 milliGRAM(s) Oral at bedtime  epoetin nereida Injectable 8000 Unit(s) SubCutaneous <User Schedule>  ergocalciferol 55580 Unit(s) Oral every week  heparin  Injectable 5000 Unit(s) SubCutaneous every 8 hours  hydrALAZINE 50 milliGRAM(s) Oral three times a day  insulin lispro (HumaLOG) corrective regimen sliding scale   SubCutaneous three times a day before meals  insulin lispro (HumaLOG) corrective regimen sliding scale   SubCutaneous at bedtime  iron sucrose IVPB 100 milliGRAM(s) IV Intermittent every 24 hours  isosorbide   dinitrate Tablet (ISORDIL) 10 milliGRAM(s) Oral three times a day  sodium bicarbonate 650 milliGRAM(s) Oral two times a day    MEDICATIONS  (PRN):  dextrose 40% Gel 15 Gram(s) Oral once PRN Blood Glucose LESS THAN 70 milliGRAM(s)/deciliter  glucagon  Injectable 1 milliGRAM(s) IntraMuscular once PRN Glucose LESS THAN 70 milligrams/deciliter  hydrALAZINE Injectable 10 milliGRAM(s) IV Push every 6 hours PRN SBP>170        Labs:  LABS:                        7.9    6.00  )-----------( 163      ( 13 Dec 2019 07:42 )             26.0     12-14    135  |  97<L>  |  60.0<H>  ----------------------------<  145<H>  4.3   |  25.0  |  3.48<H>    Ca    8.2<L>      14 Dec 2019 06:45  Phos  4.6     12-14            Urinalysis Basic - ( 14 Dec 2019 07:12 )    Color: Yellow / Appearance: Clear / S.010 / pH: x  Gluc: x / Ketone: Negative  / Bili: Negative / Urobili: Negative mg/dL   Blood: x / Protein: 100 mg/dL / Nitrite: Negative   Leuk Esterase: Moderate / RBC: 3-5 /HPF / WBC 26-50   Sq Epi: x / Non Sq Epi: Few / Bacteria: Few

## 2019-12-14 NOTE — PROGRESS NOTE ADULT - ASSESSMENT
US Kidneys reviewed & consider an AVF During this admission  Anemia: Epogen ordered  HTN: Continue current medications  Metabolic acidosis: Resolved- Continue Na bicarbonate  Fluid overload: Holding lasix now; improved respiratory status;   Urinary incontinence and some degree of retention: Will hold BOTH oxybutynin and flomax at this time. Will start doxazosin 4mg daily for HTN control; had UTI from retention; will find acceptable balance;    d/w family and Dr Saldana

## 2019-12-14 NOTE — PROGRESS NOTE ADULT - SUBJECTIVE AND OBJECTIVE BOX
[facility] DIVISION OF KIDNEY DISEASES AND HYPERTENSION -- FOLLOW UP NOTE  --------------------------------------------------------------------------------  Chief Complaint:  ADI on CKD    24 hour events/subjective:  Pt seen/examined  Held long discussion with patient, wife, iris Barcenas at bedside;  Has been on oxybutynin for years; still with urinary incontinence;  Also on flomax for urinary retention;  Now holding both;      PAST HISTORY  --------------------------------------------------------------------------------  No significant changes to PMH, PSH, FHx, SHx, unless otherwise noted    ALLERGIES & MEDICATIONS  --------------------------------------------------------------------------------  Allergies    No Known Allergies    Intolerances      Standing Inpatient Medications  albuterol/ipratropium for Nebulization 3 milliLiter(s) Nebulizer every 6 hours  amLODIPine   Tablet 10 milliGRAM(s) Oral daily  aspirin 325 milliGRAM(s) Oral daily  atorvastatin 10 milliGRAM(s) Oral at bedtime  carvedilol 25 milliGRAM(s) Oral every 12 hours  cefpodoxime 200 milliGRAM(s) Oral every 12 hours  dextrose 5%. 1000 milliLiter(s) IV Continuous <Continuous>  dextrose 50% Injectable 12.5 Gram(s) IV Push once  dextrose 50% Injectable 25 Gram(s) IV Push once  dextrose 50% Injectable 25 Gram(s) IV Push once  doxazosin 4 milliGRAM(s) Oral at bedtime  epoetin nereida Injectable 8000 Unit(s) SubCutaneous <User Schedule>  ergocalciferol 94643 Unit(s) Oral every week  heparin  Injectable 5000 Unit(s) SubCutaneous every 8 hours  hydrALAZINE 50 milliGRAM(s) Oral three times a day  insulin lispro (HumaLOG) corrective regimen sliding scale   SubCutaneous three times a day before meals  insulin lispro (HumaLOG) corrective regimen sliding scale   SubCutaneous at bedtime  iron sucrose IVPB 100 milliGRAM(s) IV Intermittent every 24 hours  isosorbide   dinitrate Tablet (ISORDIL) 10 milliGRAM(s) Oral three times a day  sodium bicarbonate 650 milliGRAM(s) Oral two times a day    PRN Inpatient Medications  dextrose 40% Gel 15 Gram(s) Oral once PRN  glucagon  Injectable 1 milliGRAM(s) IntraMuscular once PRN  hydrALAZINE Injectable 10 milliGRAM(s) IV Push every 6 hours PRN      REVIEW OF SYSTEMS  --------------------------------------------------------------------------------  Gen: No weight changes, fatigue, fevers/chills, weakness  Skin: No rashes  Head/Eyes/Ears/Mouth: No headache; Normal hearing; Normal vision w/o blurriness; No sinus pain/discomfort, sore throat  Respiratory: No dyspnea, cough, wheezing, hemoptysis  CV: No chest pain, PND, orthopnea  GI: No abdominal pain, diarrhea, constipation, nausea, vomiting, melena, hematochezia  : No increased frequency, dysuria, hematuria, nocturia  MSK: No joint pain/swelling; no back pain; no edema  Neuro: No dizziness/lightheadedness, weakness, seizures, numbness, tingling  Heme: No easy bruising or bleeding  Endo: No heat/cold intolerance  Psych: No significant nervousness, anxiety, stress, depression    All other systems were reviewed and are negative, except as noted.    VITALS/PHYSICAL EXAM  --------------------------------------------------------------------------------  T(C): 36.8 (12-14-19 @ 11:52), Max: 36.8 (12-14-19 @ 11:52)  HR: 65 (12-14-19 @ 11:52) (63 - 74)  BP: 138/63 (12-14-19 @ 11:52) (138/63 - 155/72)  RR: 20 (12-14-19 @ 11:52) (16 - 20)  SpO2: 99% (12-14-19 @ 11:52) (92% - 99%)  Wt(kg): --        12-13-19 @ 07:01  -  12-14-19 @ 07:00  --------------------------------------------------------  IN: 180 mL / OUT: 900 mL / NET: -720 mL      Physical Exam:  	Gen: NAD, well-appearing  	HEENT: PERRL, supple neck, clear oropharynx  	Pulm: CTA B/L  	CV: RRR, S1S2; no rub  	Back: No spinal or CVA tenderness; no sacral edema  	Abd: +BS, soft, nontender/nondistended  	: No suprapubic tenderness  	UE: Warm, FROM, no clubbing, intact strength; no edema; no asterixis  	LE: Warm, FROM, no clubbing, intact strength; no edema  	Neuro: No focal deficits, intact gait  	Psych: Normal affect and mood  	Skin: Warm, without rashes  	Vascular access:    LABS/STUDIES  --------------------------------------------------------------------------------              7.9    6.00  >-----------<  163      [12-13-19 @ 07:42]              26.0     135  |  97  |  60.0  ----------------------------<  145      [12-14-19 @ 06:45]  4.3   |  25.0  |  3.48        Ca     8.2     [12-14-19 @ 06:45]      Phos  4.6     [12-14-19 @ 06:45]            Creatinine Trend:  SCr 3.48 [12-14 @ 06:45]  SCr 3.30 [12-13 @ 07:42]  SCr 3.26 [12-12 @ 05:34]  SCr 3.02 [12-11 @ 06:47]  SCr 2.97 [12-10 @ 06:32]    Urinalysis - [12-14-19 @ 07:12]      Color Yellow / Appearance Clear / SG 1.010 / pH 6.5      Gluc 50 / Ketone Negative  / Bili Negative / Urobili Negative       Blood Trace / Protein 100 / Leuk Est Moderate / Nitrite Negative      RBC 3-5 / WBC 26-50 / Hyaline  / Gran  / Sq Epi  / Non Sq Epi Few / Bacteria Few    Urine Creatinine 56      [12-14-19 @ 07:10]  Urine Protein 95.0      [12-14-19 @ 07:10]    Iron 25, TIBC 285, %sat 9      [12-11-19 @ 17:50]  Ferritin 25      [12-11-19 @ 17:50]  PTH -- (Ca 8.3)      [12-12-19 @ 19:36]   164  Vitamin D (25OH) 20.3      [12-12-19 @ 19:36]  HbA1c 8.1      [12-11-19 @ 06:47]  TSH 6.14      [12-13-19 @ 07:42]

## 2019-12-14 NOTE — PROGRESS NOTE ADULT - SUBJECTIVE AND OBJECTIVE BOX
[place] [facility]                                                       [facility]                                                               Office: [address] [address]                                                              Telephone: [phone]. Fax:[phone]                                                                             PROGRESS NOTE  Reason for follow up: pulmonary edema; aortic stneosis.   Overnight: No new events.  worsneing renal failure. diuretics on hold.   Update: Pt states that he feels betetr than    Subjective: " I feel good"  'Comp[lauren: No headaches. no new symptoms.   ROS:   cardiac: no ches tpain. dyspnea improvedl  Resp: No cough.   CNS: no headaches. no dizzines.     est pain, palpitations. Pt yielding clear straw colored urine in  hollis catheter. Significant improvement in bilateral lower extremity swelling.  	  Vitals:  [email or web address]       PHYSICAL EXAM:  Appearance: Comfortable. No acute distress  HEENT:  Head and neck: Atraumatic. Normocephalic.  Normal oral mucosa, PERRL, Neck is supple. No JVD, No carotid bruit.   Neurologic: A & O x 3, no focal deficits. EOMI.  Lymphatic: No cervical lymphadenopathy  Cardiovascular: Normal S1 S2, No murmur, rubs/gallops. No JVD, No edema  Respiratory: bilateral rales lower lobes-resolving  Gastrointestinal:  Soft, Non-tender, + BS  Lower Extremities: No edema  Psychiatry: Patient is calm. No agitation. Mood & affect appropriate  Skin: No rashes/ ecchymoses/cyanosis/ulcers visualized on the face, hands or feet.      CURRENT MEDICATIONS:   meds.     DIAGNOSTIC TESTING:  [ ] Echocardiogram: < from: TTE Echo Complete w/Doppler (12.10.19 @ 13:21) >  Summary:   1. Technically difficult study.   2. Normal global left ventricular systolic function.   3. Left ventricular ejection fraction, by visual estimation, is 60 to   65%.   4. Spectral Doppler shows pseudonormal pattern of left ventricular   myocardial filling (Grade II diastolic dysfunction).   5. Elevated mean left atrial pressure. Mean LAP estimated at 22 mmHg.   6. Mild mitral annular calcification.   7. Mild thickening and calcification of the anterior and posterior   mitralvalve leaflets.   8. Mild mitral valve regurgitation.   9. Mild aortic regurgitation.  10. Moderate to severe aortic valve stenosis. Recommend SCAR for further   evaluation.  11. Moderate pleural effusion in both left and right lateral regions.  12. There is no evidence of pericardial effusion.    < end of copied text >      OTHER:     XRAY: < from: Xray Chest 1 View- PORTABLE-Urgent (12.10.19 @ 00:51) >  Impression:  Cardiomegaly. Small bilateral pleural effusions. Airspace disease in the   right lung..    < end of copied text >  	  U/S: < from: US Kidney and Bladder (12.12.19 @ 11:00) >  MPRESSION:     Findings compatible with medical renal disease. No hydronephrosis.      < end of copied text >  < from: US Renal (12.10.19 @ 13:25) >  IMPRESSION:    No hydronephrosis.    Kidneys increased in echogenicity suggestive of medical renal disease.    < end of copied text >       labs   reviewed/.       Lipid Profile:   HgA1c: Hemoglobin A1C, Whole Blood: 8.1 %          TELEMETRY: SR HR @ 64

## 2019-12-15 LAB
ANION GAP SERPL CALC-SCNC: 14 MMOL/L — SIGNIFICANT CHANGE UP (ref 5–17)
BUN SERPL-MCNC: 60 MG/DL — HIGH (ref 8–20)
CALCIUM SERPL-MCNC: 8.2 MG/DL — LOW (ref 8.6–10.2)
CHLORIDE SERPL-SCNC: 95 MMOL/L — LOW (ref 98–107)
CO2 SERPL-SCNC: 24 MMOL/L — SIGNIFICANT CHANGE UP (ref 22–29)
CREAT SERPL-MCNC: 3.28 MG/DL — HIGH (ref 0.5–1.3)
CULTURE RESULTS: SIGNIFICANT CHANGE UP
CULTURE RESULTS: SIGNIFICANT CHANGE UP
GLUCOSE BLDC GLUCOMTR-MCNC: 208 MG/DL — HIGH (ref 70–99)
GLUCOSE BLDC GLUCOMTR-MCNC: 208 MG/DL — HIGH (ref 70–99)
GLUCOSE BLDC GLUCOMTR-MCNC: 238 MG/DL — HIGH (ref 70–99)
GLUCOSE BLDC GLUCOMTR-MCNC: 347 MG/DL — HIGH (ref 70–99)
GLUCOSE SERPL-MCNC: 233 MG/DL — HIGH (ref 70–115)
POTASSIUM SERPL-MCNC: 4.3 MMOL/L — SIGNIFICANT CHANGE UP (ref 3.5–5.3)
POTASSIUM SERPL-SCNC: 4.3 MMOL/L — SIGNIFICANT CHANGE UP (ref 3.5–5.3)
SODIUM SERPL-SCNC: 133 MMOL/L — LOW (ref 135–145)
SPECIMEN SOURCE: SIGNIFICANT CHANGE UP
SPECIMEN SOURCE: SIGNIFICANT CHANGE UP

## 2019-12-15 PROCEDURE — 99233 SBSQ HOSP IP/OBS HIGH 50: CPT

## 2019-12-15 PROCEDURE — 99232 SBSQ HOSP IP/OBS MODERATE 35: CPT

## 2019-12-15 RX ADMIN — Medication 3 MILLILITER(S): at 08:40

## 2019-12-15 RX ADMIN — Medication 3 MILLILITER(S): at 20:37

## 2019-12-15 RX ADMIN — CARVEDILOL PHOSPHATE 25 MILLIGRAM(S): 80 CAPSULE, EXTENDED RELEASE ORAL at 05:17

## 2019-12-15 RX ADMIN — Medication 4 MILLIGRAM(S): at 21:43

## 2019-12-15 RX ADMIN — Medication 200 MILLIGRAM(S): at 05:16

## 2019-12-15 RX ADMIN — Medication 650 MILLIGRAM(S): at 17:29

## 2019-12-15 RX ADMIN — AMLODIPINE BESYLATE 10 MILLIGRAM(S): 2.5 TABLET ORAL at 05:16

## 2019-12-15 RX ADMIN — Medication 3 MILLILITER(S): at 15:19

## 2019-12-15 RX ADMIN — Medication 50 MILLIGRAM(S): at 21:43

## 2019-12-15 RX ADMIN — ISOSORBIDE DINITRATE 10 MILLIGRAM(S): 5 TABLET ORAL at 13:00

## 2019-12-15 RX ADMIN — Medication 4: at 13:00

## 2019-12-15 RX ADMIN — Medication 50 MILLIGRAM(S): at 13:00

## 2019-12-15 RX ADMIN — HEPARIN SODIUM 5000 UNIT(S): 5000 INJECTION INTRAVENOUS; SUBCUTANEOUS at 21:43

## 2019-12-15 RX ADMIN — Medication 3 MILLILITER(S): at 03:30

## 2019-12-15 RX ADMIN — Medication 325 MILLIGRAM(S): at 09:55

## 2019-12-15 RX ADMIN — ISOSORBIDE DINITRATE 10 MILLIGRAM(S): 5 TABLET ORAL at 05:16

## 2019-12-15 RX ADMIN — ISOSORBIDE DINITRATE 10 MILLIGRAM(S): 5 TABLET ORAL at 21:43

## 2019-12-15 RX ADMIN — Medication 50 MILLIGRAM(S): at 05:16

## 2019-12-15 RX ADMIN — CARVEDILOL PHOSPHATE 25 MILLIGRAM(S): 80 CAPSULE, EXTENDED RELEASE ORAL at 17:29

## 2019-12-15 RX ADMIN — Medication 8: at 17:30

## 2019-12-15 RX ADMIN — Medication 650 MILLIGRAM(S): at 05:17

## 2019-12-15 RX ADMIN — HEPARIN SODIUM 5000 UNIT(S): 5000 INJECTION INTRAVENOUS; SUBCUTANEOUS at 13:00

## 2019-12-15 RX ADMIN — HEPARIN SODIUM 5000 UNIT(S): 5000 INJECTION INTRAVENOUS; SUBCUTANEOUS at 05:17

## 2019-12-15 RX ADMIN — ATORVASTATIN CALCIUM 10 MILLIGRAM(S): 80 TABLET, FILM COATED ORAL at 21:43

## 2019-12-15 RX ADMIN — Medication 4: at 08:57

## 2019-12-15 RX ADMIN — Medication 200 MILLIGRAM(S): at 17:30

## 2019-12-15 RX ADMIN — IRON SUCROSE 210 MILLIGRAM(S): 20 INJECTION, SOLUTION INTRAVENOUS at 13:00

## 2019-12-15 NOTE — DIETITIAN INITIAL EVALUATION ADULT. - ADD RECOMMEND
RX: Add glucerna BID to optimize po intake and provide an additional 220kcal, 10g protein per serving, encourage po intake, monitor weights and labs

## 2019-12-15 NOTE — DIETITIAN INITIAL EVALUATION ADULT. - PROBLEM SELECTOR PLAN 1
pt with hypoxia, pulm edema, elevated bnp likely 2/2 ADHF  last tte in 2016 with stage ii diastolic dysfunction and moderate AS  will repeat TTE  c/w lasix 40 mg iv bid, oxygen supplementation, strict I's and O's  monitor electrolytes  will consult [facility]  given tachypnea will start patient on bipap  get chest ct to assess for pna, check procalcitonin

## 2019-12-15 NOTE — DIETITIAN INITIAL EVALUATION ADULT. - MALNUTRITION
NFPE performed. Moderate muscle wasting at temples. Moderate fat loss in orbital region, buccal pads. Severe (acute)

## 2019-12-15 NOTE — PROGRESS NOTE ADULT - SUBJECTIVE AND OBJECTIVE BOX
[place] [facility]                                                        Office: [address] [address]                                                       Telephone: [phone]. Fax:[phone]                                                                             PROGRESS NOTE   Reason for follow up: Pulmonary Edema.                              Overnight: No new events.   Update:   Kidney function has stabilized and may discharge Lasix 40mo po daily and Lasix 40mg po bid on the weekends.   Telemetry ST 60's no acute alarms.   Pedal edema improving.    Subjective: "I doing Ok"   Complains of:  Nothing  Review of symptoms: Cardiac:  No chest pain. No dyspnea. No palpitations.  Respiratory: no cough. No dyspnea  Gastrointestinal: No diarrhea. No abdominal pain. No bleeding.     Past medical history: No updates.   Chronic conditions: HEALTH ISSUES - PROBLEM Dx:  Functional urinary incontinence: Functional urinary incontinence  Hyperlipemia: Hyperlipemia  Aortic stenosis: Aortic stenosis  Pneumonia: Pneumonia  Respiratory failure with hypoxia: Respiratory failure with hypoxia  Anemia: Anemia  Diabetes: Diabetes  CKD (chronic kidney disease): CKD (chronic kidney disease)  Hypertensive urgency: Hypertensive urgency  Acute decompensated heart failure: Acute decompensated heart failure    Vitals:  T(C): 36.6 (12-15-19 @ 08:35), Max: 36.8 (12-14-19 @ 22:00)  HR: 62 (12-15-19 @ 08:41) (62 - 76)  BP: 122/57 (12-15-19 @ 08:35) (122/50 - 168/68)  RR: 20 (12-15-19 @ 08:35) (18 - 24)  SpO2: 97% (12-15-19 @ 08:35) (95% - 98%)  I&O's Summary  14 Dec 2019 07:01  -  15 Dec 2019 07:00  --------------------------------------------------------  IN: 760 mL / OUT: 1375 mL / NET: -615 mL  15 Dec 2019 07:01  -  15 Dec 2019 13:02  --------------------------------------------------------  IN: 0 mL / OUT: 475 mL / NET: -475 mL    PHYSICAL EXAM:  Appearance: Comfortable. No acute distress  HEENT:  Head and neck: Atraumatic. Normocephalic.  Normal oral mucosa, PERRL, Neck is supple. No JVD, No carotid bruit.   Neurologic: A & O x 3, no focal deficits. EOMI , Cranial nerves are intact.  Lymphatic: No cervical lymphadenopathy  Cardiovascular: Normal S1 S2, No murmur, rubs/gallops. No JVD, No edema  Respiratory: Lungs clear to auscultation  Gastrointestinal:  Soft, Non-tender, + BS  Lower Extremities: + pedial edema  Psychiatry: Patient is calm. No agitation. Mood & affect appropriate  Skin: No rashes/ ecchymoses/cyanosis/ulcers visualized on the face, hands or feet.    CURRENT MEDICATIONS:  amLODIPine   Tablet 10 milliGRAM(s) Oral daily  carvedilol 25 milliGRAM(s) Oral every 12 hours  doxazosin 4 milliGRAM(s) Oral at bedtime  hydrALAZINE 50 milliGRAM(s) Oral three times a day  hydrALAZINE Injectable 10 milliGRAM(s) IV Push every 6 hours PRN  isosorbide   dinitrate Tablet (ISORDIL) 10 milliGRAM(s) Oral three times a day  albuterol/ipratropium for Nebulization  cefpodoxime  aspirin  atorvastatin  insulin lispro (HumaLOG) corrective regimen sliding scale  dextrose 5%.  epoetin nereida Injectable  ergocalciferol  heparin  Injectable  iron sucrose IVPB  sodium bicarbonate    LABS:	 	    12-15  133<L>  |  95<L>  |  60.0<H>  ----------------------------<  233<H>  4.3   |  24.0  |  3.28<H>  Ca    8.2<L>      15 Dec 2019 06:36  Phos  4.6     12-14  HgA1c: Hemoglobin A1C, Whole Blood: 8.1 %  TSH: Thyroid Stimulating Hormone, Serum: 6.14 uIU/mL    TELEMETRY: Reviewed   SR 60's no acute alarms.    :

## 2019-12-15 NOTE — DIETITIAN INITIAL EVALUATION ADULT. - OTHER INFO
Pt is a 80 y/o male with medical history of DM, HTN, CKD stage 3, BPH, pAfib with no AC, Aortic stenosis, HFpEF, memory problem with possible dementia and not good historian, presents to ED with c/o SOB, orthopnea, leg edema. Pt lethargic during interview. Nursing reported pt is lethargic at baseline and only is consuming 25-30% of meals. Aware of trace generalized edema left and right leg.

## 2019-12-15 NOTE — PROGRESS NOTE ADULT - ASSESSMENT
Pt is a 78 y/o male with medical history of DM, HTN, CKD, BPH, pAfib with no AC, who presents to [facility]ED for c/o SOB. Pt is not a good historian d/t possible dementia, wife at bedside. Pt wife states that pt has been experiencing SOB for past week. He has been unable to lie flat to sleep. Pt wife also noticed that pt has lower extremity swelling. Yesterday pt verbalized to wife that he could not breathe, and she brought him to [facility]ED. Pt denies fever, chills, cough, phlegm production, chest pain,  palpitations, nausea, vomiting, diarrhea melena,  lightheadedness, dizziness, syncope, sick contacts. At baseline, pt can walk one flight of stairs 2-3x a day with no assistive ambulation devices as per spouse.  Kidney function has stabilized and may discharge Lasix 40mo po daily and Lasix 40mg po bid on the weekends.   Telemetry ST 60's no acute alarms.   Pedal edema improving.      Pulmonary Edema - improvement  May discharge home on Lasix 40mg po daily weeks days, and 40mg po bid weekends.  Follow up with cardiologist outpatient.      1. Aortic Stenosis  -Tele/ECG- SR 60' no acute alarms noted.    -Monitor Potassium, Maintain above 4, Replenish PRN.  4.3  -Echo -EF 60-65%, mod-severe AS,   -ILR to be interrogated - device CARLOS since April 2019  -F/U outpatient cardiac catheterization for ischemic evaluation and evaluation of aortic valve-will perform procedure once kidney function stable  -F/U CT SX outpatient TAVR workup  -F/U  GI for endoscopy     2. HTN  -recent /50  -Continue Amlodipine 10mg   - Continue Hydralizine to 50mg TID  -Continue Isosorbide Dinitrate 5MG TID    3. CKD  -BUN/CreatninE- 60/3.28 stabilized    -Plan as per Nephrologist.

## 2019-12-15 NOTE — PROGRESS NOTE ADULT - ASSESSMENT
Pt is a 78 y/o male with medical history of DM, HTN, CKD stage 3, BPH, pAfib with no AC, Aortic stenosis, HFpEF, memory problem with possible dementia and not good historian, presents to ED with c/o SOB, orthopnea, leg edema. In ED pt had uncontrolled BP and hypoxia to 77%. CT chest with effusion. Pt admitted for respiratory failure 2/2 HFpEF with underlying severe AS. Pt kept on IV diuresis and all other home medication resumed. Seen in consultation with cardiology, clinically improving. Course complicated by ADI on ckd, seen in consultation with nephrology.      >Acute hypoxic respiratory failure: Improving. turn off O2 on 12/15 and assess need for Home O2  - 2/2 HFpEF and underlying moderate to severe AS. Non-complaints with fluid/ diet and medication  - C/w CHF pathway, I/Os, daily weight, fluid restriction   - Lasix held by nephrology due to worsening renal function.   - C/w coreg, Imdur, hydralazine.  - Supplements o2 as needed, bronchodilator.    - Cardiology on board.   - Seen by CT surgery for TAVR work up in future as o/p- discussed with Dr Faustin- work up will be done o/p in future, not on this admission.     > ADI sec to diuresing  - trending down Crt off diuretics    >HTN urgency:  - Normotensive now  - c/w coreg 25 BID, Imdur 10 mg, Norvasc, hydralazine.    - Cardiology yon board.     >UTI:  - O/p urine cx positive in nephrology office.   - Seen by ID here- On Vantin  12/17/19    >Urinary retention with BPH- TOV today, Flomax changed to Cardura by renal. Pt follows with o/p urology.     >DM- No more Hypoglycemia, C/w ZEINAB + FS monitoring.     >CKD stage 3- Creatinine stable, nephrology on board.     >Anemia- Likely AOCD, hgb stable. On IV Venofer by renal. Change to PO at discharge    >DVT ppx- Heparin     >Dispo- Eventual Home, possibly 1-2 days

## 2019-12-15 NOTE — CHART NOTE - NSCHARTNOTEFT_GEN_A_CORE
Upon Nutritional Assessment by the Registered Dietitian your patient was determined to meet criteria / has evidence of the following diagnosis/diagnoses:            [x ] Severe Protein Calorie Malnutrition        Findings as based on:  •  Comprehensive nutrition assessment and consultation  •  Calorie counts (nutrient intake analysis)  •  Food acceptance and intake status from observations by staff  •  Follow up  •  Patient education  •  Intervention secondary to interdisciplinary rounds  •   concerns      Treatment:    The following diet has been recommended:      PROVIDER Section:     By signing this assessment you are acknowledging and agree with the diagnosis/diagnoses assigned by the Registered Dietitian    Comments:    RX: Add glucerna BID to optimize po intake and provide an additional 220kcal, 10g protein per serving, encourage po intake, monitor weights and labs

## 2019-12-15 NOTE — DIETITIAN INITIAL EVALUATION ADULT. - PERTINENT LABORATORY DATA
12-15 Na133 mmol/L<L> Glu 233 mg/dL<H> K+ 4.3 mmol/L Cr  3.28 mg/dL<H> BUN 60.0 mg/dL<H> Phos n/a   Alb n/a   PAB n/a

## 2019-12-15 NOTE — PROGRESS NOTE ADULT - SUBJECTIVE AND OBJECTIVE BOX
HEALTH ISSUES - PROBLEM Dx:    Acute hypoxic respiratory failure    INTERVAL HPI/ OVERNIGHT EVENTS:    comfortable lying in bed  states he does not use home O2  turned off O2 today    REVIEW OF SYSTEMS:    denies SOB/ cough    Vital Signs Last 24 Hrs  T(C): 36.6 (15 Dec 2019 08:35), Max: 36.8 (14 Dec 2019 22:00)  T(F): 97.8 (15 Dec 2019 08:35), Max: 98.3 (14 Dec 2019 22:00)  HR: 64 (15 Dec 2019 13:03) (62 - 76)  BP: 130/50 (15 Dec 2019 13:03) (122/50 - 168/68)  BP(mean): --  RR: 20 (15 Dec 2019 08:35) (18 - 24)  SpO2: 97% (15 Dec 2019 08:35) (95% - 98%)    PHYSICAL EXAM-  GENERAL: Pt lying comfortably, NAD.  CHEST/LUNG: Good air entry, no Rales today.    HEART: S1S2+, Regular rate and rhythm; + Murmur.   ABDOMEN: Soft, Nontender, Nondistended; Bowel sounds present.  Extremities: Trace LE edema, pulses+  NEURO: AAOX3, no focal deficits, no motor r sensory loss.  PSYCH: normal mood.    MEDICATIONS  (STANDING):  albuterol/ipratropium for Nebulization 3 milliLiter(s) Nebulizer every 6 hours  amLODIPine   Tablet 10 milliGRAM(s) Oral daily  aspirin 325 milliGRAM(s) Oral daily  atorvastatin 10 milliGRAM(s) Oral at bedtime  carvedilol 25 milliGRAM(s) Oral every 12 hours  cefpodoxime 200 milliGRAM(s) Oral every 12 hours  dextrose 5%. 1000 milliLiter(s) (50 mL/Hr) IV Continuous <Continuous>  dextrose 50% Injectable 12.5 Gram(s) IV Push once  dextrose 50% Injectable 25 Gram(s) IV Push once  dextrose 50% Injectable 25 Gram(s) IV Push once  doxazosin 4 milliGRAM(s) Oral at bedtime  epoetin nereida Injectable 8000 Unit(s) SubCutaneous <User Schedule>  ergocalciferol 18081 Unit(s) Oral every week  heparin  Injectable 5000 Unit(s) SubCutaneous every 8 hours  hydrALAZINE 50 milliGRAM(s) Oral three times a day  insulin lispro (HumaLOG) corrective regimen sliding scale   SubCutaneous three times a day before meals  insulin lispro (HumaLOG) corrective regimen sliding scale   SubCutaneous at bedtime  iron sucrose IVPB 100 milliGRAM(s) IV Intermittent every 24 hours  isosorbide   dinitrate Tablet (ISORDIL) 10 milliGRAM(s) Oral three times a day  sodium bicarbonate 650 milliGRAM(s) Oral two times a day    MEDICATIONS  (PRN):  dextrose 40% Gel 15 Gram(s) Oral once PRN Blood Glucose LESS THAN 70 milliGRAM(s)/deciliter  glucagon  Injectable 1 milliGRAM(s) IntraMuscular once PRN Glucose LESS THAN 70 milligrams/deciliter  hydrALAZINE Injectable 10 milliGRAM(s) IV Push every 6 hours PRN SBP>170      LABS:    12-15    133<L>  |  95<L>  |  60.0<H>  ----------------------------<  233<H>  4.3   |  24.0  |  3.28<H>    Ca    8.2<L>      15 Dec 2019 06:36  Phos  4.6     12-14        Urinalysis Basic - ( 14 Dec 2019 07:12 )    Color: Yellow / Appearance: Clear / S.010 / pH: x  Gluc: x / Ketone: Negative  / Bili: Negative / Urobili: Negative mg/dL   Blood: x / Protein: 100 mg/dL / Nitrite: Negative   Leuk Esterase: Moderate / RBC: 3-5 /HPF / WBC 26-50   Sq Epi: x / Non Sq Epi: Few / Bacteria: Few

## 2019-12-15 NOTE — DIETITIAN INITIAL EVALUATION ADULT. - ETIOLOGY
related to inability to meet sufficient protein-energy in setting of poor po intake secondary to lethargic state

## 2019-12-16 ENCOUNTER — TRANSCRIPTION ENCOUNTER (OUTPATIENT)
Age: 79
End: 2019-12-16

## 2019-12-16 ENCOUNTER — INBOUND DOCUMENT (OUTPATIENT)
Age: 79
End: 2019-12-16

## 2019-12-16 VITALS — DIASTOLIC BLOOD PRESSURE: 957 MMHG | HEART RATE: 62 BPM | SYSTOLIC BLOOD PRESSURE: 14 MMHG

## 2019-12-16 LAB
ANION GAP SERPL CALC-SCNC: 15 MMOL/L — SIGNIFICANT CHANGE UP (ref 5–17)
BUN SERPL-MCNC: 54 MG/DL — HIGH (ref 8–20)
CALCIUM SERPL-MCNC: 8.5 MG/DL — LOW (ref 8.6–10.2)
CHLORIDE SERPL-SCNC: 96 MMOL/L — LOW (ref 98–107)
CO2 SERPL-SCNC: 23 MMOL/L — SIGNIFICANT CHANGE UP (ref 22–29)
CREAT SERPL-MCNC: 3.24 MG/DL — HIGH (ref 0.5–1.3)
GLUCOSE BLDC GLUCOMTR-MCNC: 167 MG/DL — HIGH (ref 70–99)
GLUCOSE BLDC GLUCOMTR-MCNC: 208 MG/DL — HIGH (ref 70–99)
GLUCOSE BLDC GLUCOMTR-MCNC: 241 MG/DL — HIGH (ref 70–99)
GLUCOSE BLDC GLUCOMTR-MCNC: 265 MG/DL — HIGH (ref 70–99)
GLUCOSE SERPL-MCNC: 158 MG/DL — HIGH (ref 70–115)
HCT VFR BLD CALC: 26.7 % — LOW (ref 39–50)
HGB BLD-MCNC: 8.1 G/DL — LOW (ref 13–17)
MCHC RBC-ENTMCNC: 23.4 PG — LOW (ref 27–34)
MCHC RBC-ENTMCNC: 30.3 GM/DL — LOW (ref 32–36)
MCV RBC AUTO: 77.2 FL — LOW (ref 80–100)
PLATELET # BLD AUTO: 169 K/UL — SIGNIFICANT CHANGE UP (ref 150–400)
POTASSIUM SERPL-MCNC: 4.6 MMOL/L — SIGNIFICANT CHANGE UP (ref 3.5–5.3)
POTASSIUM SERPL-SCNC: 4.6 MMOL/L — SIGNIFICANT CHANGE UP (ref 3.5–5.3)
RBC # BLD: 3.46 M/UL — LOW (ref 4.2–5.8)
RBC # FLD: 17.1 % — HIGH (ref 10.3–14.5)
SODIUM SERPL-SCNC: 134 MMOL/L — LOW (ref 135–145)
WBC # BLD: 7.68 K/UL — SIGNIFICANT CHANGE UP (ref 3.8–10.5)
WBC # FLD AUTO: 7.68 K/UL — SIGNIFICANT CHANGE UP (ref 3.8–10.5)

## 2019-12-16 PROCEDURE — 76775 US EXAM ABDO BACK WALL LIM: CPT

## 2019-12-16 PROCEDURE — 87798 DETECT AGENT NOS DNA AMP: CPT

## 2019-12-16 PROCEDURE — 84480 ASSAY TRIIODOTHYRONINE (T3): CPT

## 2019-12-16 PROCEDURE — 84436 ASSAY OF TOTAL THYROXINE: CPT

## 2019-12-16 PROCEDURE — 81001 URINALYSIS AUTO W/SCOPE: CPT

## 2019-12-16 PROCEDURE — 84156 ASSAY OF PROTEIN URINE: CPT

## 2019-12-16 PROCEDURE — 99285 EMERGENCY DEPT VISIT HI MDM: CPT | Mod: 25

## 2019-12-16 PROCEDURE — 87640 STAPH A DNA AMP PROBE: CPT

## 2019-12-16 PROCEDURE — 94010 BREATHING CAPACITY TEST: CPT

## 2019-12-16 PROCEDURE — 87641 MR-STAPH DNA AMP PROBE: CPT

## 2019-12-16 PROCEDURE — 96374 THER/PROPH/DIAG INJ IV PUSH: CPT

## 2019-12-16 PROCEDURE — 87633 RESP VIRUS 12-25 TARGETS: CPT

## 2019-12-16 PROCEDURE — 82570 ASSAY OF URINE CREATININE: CPT

## 2019-12-16 PROCEDURE — 87040 BLOOD CULTURE FOR BACTERIA: CPT

## 2019-12-16 PROCEDURE — 83550 IRON BINDING TEST: CPT

## 2019-12-16 PROCEDURE — 99239 HOSP IP/OBS DSCHRG MGMT >30: CPT

## 2019-12-16 PROCEDURE — 93005 ELECTROCARDIOGRAM TRACING: CPT

## 2019-12-16 PROCEDURE — 84100 ASSAY OF PHOSPHORUS: CPT

## 2019-12-16 PROCEDURE — 97163 PT EVAL HIGH COMPLEX 45 MIN: CPT

## 2019-12-16 PROCEDURE — 87486 CHLMYD PNEUM DNA AMP PROBE: CPT

## 2019-12-16 PROCEDURE — 84484 ASSAY OF TROPONIN QUANT: CPT

## 2019-12-16 PROCEDURE — 82310 ASSAY OF CALCIUM: CPT

## 2019-12-16 PROCEDURE — 84443 ASSAY THYROID STIM HORMONE: CPT

## 2019-12-16 PROCEDURE — 99233 SBSQ HOSP IP/OBS HIGH 50: CPT

## 2019-12-16 PROCEDURE — 82728 ASSAY OF FERRITIN: CPT

## 2019-12-16 PROCEDURE — 82306 VITAMIN D 25 HYDROXY: CPT

## 2019-12-16 PROCEDURE — 82607 VITAMIN B-12: CPT

## 2019-12-16 PROCEDURE — 83880 ASSAY OF NATRIURETIC PEPTIDE: CPT

## 2019-12-16 PROCEDURE — 93306 TTE W/DOPPLER COMPLETE: CPT

## 2019-12-16 PROCEDURE — 85027 COMPLETE CBC AUTOMATED: CPT

## 2019-12-16 PROCEDURE — 80048 BASIC METABOLIC PNL TOTAL CA: CPT

## 2019-12-16 PROCEDURE — 93880 EXTRACRANIAL BILAT STUDY: CPT

## 2019-12-16 PROCEDURE — 71045 X-RAY EXAM CHEST 1 VIEW: CPT

## 2019-12-16 PROCEDURE — 82533 TOTAL CORTISOL: CPT

## 2019-12-16 PROCEDURE — 80053 COMPREHEN METABOLIC PANEL: CPT

## 2019-12-16 PROCEDURE — 84145 PROCALCITONIN (PCT): CPT

## 2019-12-16 PROCEDURE — 82962 GLUCOSE BLOOD TEST: CPT

## 2019-12-16 PROCEDURE — 83970 ASSAY OF PARATHORMONE: CPT

## 2019-12-16 PROCEDURE — 83540 ASSAY OF IRON: CPT

## 2019-12-16 PROCEDURE — 85730 THROMBOPLASTIN TIME PARTIAL: CPT

## 2019-12-16 PROCEDURE — 71250 CT THORAX DX C-: CPT

## 2019-12-16 PROCEDURE — 94660 CPAP INITIATION&MGMT: CPT

## 2019-12-16 PROCEDURE — 84466 ASSAY OF TRANSFERRIN: CPT

## 2019-12-16 PROCEDURE — 96375 TX/PRO/DX INJ NEW DRUG ADDON: CPT

## 2019-12-16 PROCEDURE — 94640 AIRWAY INHALATION TREATMENT: CPT

## 2019-12-16 PROCEDURE — 83605 ASSAY OF LACTIC ACID: CPT

## 2019-12-16 PROCEDURE — 82550 ASSAY OF CK (CPK): CPT

## 2019-12-16 PROCEDURE — 83036 HEMOGLOBIN GLYCOSYLATED A1C: CPT

## 2019-12-16 PROCEDURE — 87581 M.PNEUMON DNA AMP PROBE: CPT

## 2019-12-16 PROCEDURE — 83735 ASSAY OF MAGNESIUM: CPT

## 2019-12-16 PROCEDURE — 36415 COLL VENOUS BLD VENIPUNCTURE: CPT

## 2019-12-16 PROCEDURE — 82530 CORTISOL FREE: CPT

## 2019-12-16 PROCEDURE — 85610 PROTHROMBIN TIME: CPT

## 2019-12-16 PROCEDURE — 76770 US EXAM ABDO BACK WALL COMP: CPT

## 2019-12-16 PROCEDURE — 84134 ASSAY OF PREALBUMIN: CPT

## 2019-12-16 PROCEDURE — 82803 BLOOD GASES ANY COMBINATION: CPT

## 2019-12-16 PROCEDURE — 82746 ASSAY OF FOLIC ACID SERUM: CPT

## 2019-12-16 RX ORDER — ISOSORBIDE DINITRATE 5 MG/1
1 TABLET ORAL
Qty: 0 | Refills: 0 | DISCHARGE
Start: 2019-12-16

## 2019-12-16 RX ORDER — CARVEDILOL PHOSPHATE 80 MG/1
1 CAPSULE, EXTENDED RELEASE ORAL
Qty: 0 | Refills: 0 | DISCHARGE
Start: 2019-12-16

## 2019-12-16 RX ORDER — FUROSEMIDE 40 MG
1 TABLET ORAL
Qty: 0 | Refills: 0 | DISCHARGE
Start: 2019-12-16

## 2019-12-16 RX ORDER — CARVEDILOL PHOSPHATE 80 MG/1
1 CAPSULE, EXTENDED RELEASE ORAL
Qty: 60 | Refills: 0
Start: 2019-12-16 | End: 2020-01-14

## 2019-12-16 RX ORDER — CARVEDILOL PHOSPHATE 80 MG/1
1 CAPSULE, EXTENDED RELEASE ORAL
Qty: 0 | Refills: 0 | DISCHARGE

## 2019-12-16 RX ORDER — FUROSEMIDE 40 MG
1 TABLET ORAL
Qty: 10 | Refills: 0
Start: 2019-12-16 | End: 2019-12-20

## 2019-12-16 RX ORDER — ERGOCALCIFEROL 1.25 MG/1
1 CAPSULE ORAL
Qty: 0 | Refills: 0 | DISCHARGE
Start: 2019-12-16

## 2019-12-16 RX ORDER — ASPIRIN/CALCIUM CARB/MAGNESIUM 324 MG
1 TABLET ORAL
Qty: 30 | Refills: 0
Start: 2019-12-16 | End: 2020-01-14

## 2019-12-16 RX ORDER — FUROSEMIDE 40 MG
1 TABLET ORAL
Qty: 0 | Refills: 0 | DISCHARGE

## 2019-12-16 RX ORDER — DOXAZOSIN MESYLATE 4 MG
1 TABLET ORAL
Qty: 0 | Refills: 0 | DISCHARGE
Start: 2019-12-16

## 2019-12-16 RX ORDER — CHOLECALCIFEROL (VITAMIN D3) 125 MCG
2000 CAPSULE ORAL DAILY
Refills: 0 | Status: DISCONTINUED | OUTPATIENT
Start: 2019-12-16 | End: 2019-12-16

## 2019-12-16 RX ORDER — ASPIRIN/CALCIUM CARB/MAGNESIUM 324 MG
1 TABLET ORAL
Qty: 0 | Refills: 0 | DISCHARGE
Start: 2019-12-16

## 2019-12-16 RX ORDER — FERROUS SULFATE 325(65) MG
1 TABLET ORAL
Qty: 0 | Refills: 0 | DISCHARGE

## 2019-12-16 RX ORDER — ISOSORBIDE DINITRATE 5 MG/1
1 TABLET ORAL
Qty: 90 | Refills: 0
Start: 2019-12-16 | End: 2020-01-14

## 2019-12-16 RX ORDER — CEFPODOXIME PROXETIL 100 MG
1 TABLET ORAL
Qty: 0 | Refills: 0 | DISCHARGE
Start: 2019-12-16

## 2019-12-16 RX ORDER — INSULIN LISPRO 100/ML
0 VIAL (ML) SUBCUTANEOUS
Qty: 0 | Refills: 0 | DISCHARGE
Start: 2019-12-16

## 2019-12-16 RX ORDER — DOXAZOSIN MESYLATE 4 MG
1 TABLET ORAL
Qty: 30 | Refills: 0
Start: 2019-12-16 | End: 2020-01-14

## 2019-12-16 RX ORDER — ERGOCALCIFEROL 1.25 MG/1
1 CAPSULE ORAL
Qty: 30 | Refills: 0
Start: 2019-12-16 | End: 2020-01-14

## 2019-12-16 RX ORDER — TAMSULOSIN HYDROCHLORIDE 0.4 MG/1
1 CAPSULE ORAL
Qty: 30 | Refills: 0
Start: 2019-12-16 | End: 2020-01-14

## 2019-12-16 RX ORDER — FUROSEMIDE 40 MG
40 TABLET ORAL
Refills: 0 | Status: DISCONTINUED | OUTPATIENT
Start: 2019-12-16 | End: 2019-12-16

## 2019-12-16 RX ORDER — FERROUS SULFATE 325(65) MG
1 TABLET ORAL
Qty: 30 | Refills: 0
Start: 2019-12-16 | End: 2020-01-14

## 2019-12-16 RX ORDER — TAMSULOSIN HYDROCHLORIDE 0.4 MG/1
0.4 CAPSULE ORAL AT BEDTIME
Refills: 0 | Status: DISCONTINUED | OUTPATIENT
Start: 2019-12-16 | End: 2019-12-16

## 2019-12-16 RX ORDER — FUROSEMIDE 40 MG
40 TABLET ORAL DAILY
Refills: 0 | Status: DISCONTINUED | OUTPATIENT
Start: 2019-12-16 | End: 2019-12-16

## 2019-12-16 RX ORDER — CEFPODOXIME PROXETIL 100 MG
1 TABLET ORAL
Qty: 2 | Refills: 0
Start: 2019-12-16 | End: 2019-12-16

## 2019-12-16 RX ORDER — TAMSULOSIN HYDROCHLORIDE 0.4 MG/1
1 CAPSULE ORAL
Qty: 0 | Refills: 0 | DISCHARGE
Start: 2019-12-16

## 2019-12-16 RX ORDER — HYDRALAZINE HCL 50 MG
1 TABLET ORAL
Qty: 0 | Refills: 0 | DISCHARGE
Start: 2019-12-16

## 2019-12-16 RX ORDER — HYDRALAZINE HCL 50 MG
1 TABLET ORAL
Qty: 0 | Refills: 0 | DISCHARGE

## 2019-12-16 RX ADMIN — Medication 4: at 17:24

## 2019-12-16 RX ADMIN — Medication 50 MILLIGRAM(S): at 05:30

## 2019-12-16 RX ADMIN — Medication 50 MILLIGRAM(S): at 15:35

## 2019-12-16 RX ADMIN — Medication 650 MILLIGRAM(S): at 05:30

## 2019-12-16 RX ADMIN — ISOSORBIDE DINITRATE 10 MILLIGRAM(S): 5 TABLET ORAL at 15:35

## 2019-12-16 RX ADMIN — Medication 40 MILLIGRAM(S): at 17:25

## 2019-12-16 RX ADMIN — HEPARIN SODIUM 5000 UNIT(S): 5000 INJECTION INTRAVENOUS; SUBCUTANEOUS at 14:00

## 2019-12-16 RX ADMIN — Medication 200 MILLIGRAM(S): at 05:30

## 2019-12-16 RX ADMIN — Medication 2: at 09:26

## 2019-12-16 RX ADMIN — IRON SUCROSE 210 MILLIGRAM(S): 20 INJECTION, SOLUTION INTRAVENOUS at 13:42

## 2019-12-16 RX ADMIN — Medication 650 MILLIGRAM(S): at 17:23

## 2019-12-16 RX ADMIN — Medication 6: at 13:38

## 2019-12-16 RX ADMIN — Medication 3 MILLILITER(S): at 09:11

## 2019-12-16 RX ADMIN — Medication 200 MILLIGRAM(S): at 17:23

## 2019-12-16 RX ADMIN — Medication 325 MILLIGRAM(S): at 09:26

## 2019-12-16 RX ADMIN — ISOSORBIDE DINITRATE 10 MILLIGRAM(S): 5 TABLET ORAL at 05:30

## 2019-12-16 RX ADMIN — CARVEDILOL PHOSPHATE 25 MILLIGRAM(S): 80 CAPSULE, EXTENDED RELEASE ORAL at 17:24

## 2019-12-16 RX ADMIN — HEPARIN SODIUM 5000 UNIT(S): 5000 INJECTION INTRAVENOUS; SUBCUTANEOUS at 05:30

## 2019-12-16 RX ADMIN — CARVEDILOL PHOSPHATE 25 MILLIGRAM(S): 80 CAPSULE, EXTENDED RELEASE ORAL at 05:30

## 2019-12-16 RX ADMIN — AMLODIPINE BESYLATE 10 MILLIGRAM(S): 2.5 TABLET ORAL at 05:30

## 2019-12-16 NOTE — PROGRESS NOTE ADULT - ASSESSMENT
Advanced CKD, Fluid Overload, Anemia, HTN    Vitamin D deficiency  Vit D supplementation    Anemia  Epogen with dialysis    Urinary Retention  Continue Flomax and Cardura  Monitor PVR    HTN  continue current antihypertensives Advanced CKD  Vitamin D def  Anemia  HTN      Advanced CKD  follows with Dr. Wade  Scr elevated/ stable      Vitamin D deficiency  Vit D supplementation    Anemia  Epogen and Iv iron    Urinary Retention  Continue Flomax and Cardura  Monitor PVR    HTN  continue current antihypertensives

## 2019-12-16 NOTE — PROGRESS NOTE ADULT - PROBLEM SELECTOR PLAN 3
Anemia of chronic renal disease  consider checking iron stores  (Microcytic anemia)  Creat appears stable  to be recheck once diuretics are restarted

## 2019-12-16 NOTE — DISCHARGE NOTE PROVIDER - NSDCCPCAREPLAN_GEN_ALL_CORE_FT
PRINCIPAL DISCHARGE DIAGNOSIS  Diagnosis: CHF (congestive heart failure)  Assessment and Plan of Treatment:       SECONDARY DISCHARGE DIAGNOSES  Diagnosis: Diabetes  Assessment and Plan of Treatment: Diabetes    Diagnosis: Hypertensive urgency  Assessment and Plan of Treatment: Hypertensive urgency    Diagnosis: CKD (chronic kidney disease)  Assessment and Plan of Treatment: CKD (chronic kidney disease)    Diagnosis: Respiratory failure with hypoxia  Assessment and Plan of Treatment: Respiratory failure with hypoxia    Diagnosis: Aortic stenosis  Assessment and Plan of Treatment: Aortic stenosis    Diagnosis: Functional urinary incontinence  Assessment and Plan of Treatment: Functional urinary incontinence    Diagnosis: Uncontrolled hypertension  Assessment and Plan of Treatment:     Diagnosis: Pneumonia  Assessment and Plan of Treatment:

## 2019-12-16 NOTE — DISCHARGE NOTE NURSING/CASE MANAGEMENT/SOCIAL WORK - PATIENT PORTAL LINK FT
You can access the FollowMyHealth Patient Portal offered by [facility] by registering at the following website: [email or web address]. By joining [facility]’s FollowMyHealth portal, you will also be able to view your health information using other applications (apps) compatible with our system.

## 2019-12-16 NOTE — DISCHARGE NOTE PROVIDER - NSDCFUADDINST_GEN_ALL_CORE_FT
Follow with cardiology in < 5 days and assess the diuretics and outpatient CTS eval  Follow with renal in < 5 days and assess the diuretics  Follow with PMD and monitor DM management

## 2019-12-16 NOTE — PROGRESS NOTE ADULT - SUBJECTIVE AND OBJECTIVE BOX
[place] [facility]  [facility]  [address] [address]    REASON FOR VISIT:  Follow up  UPDATE:  TELEMETRY MONITORIN-16    134<L>  |  96<L>  |  54.0<H>  ----------------------------<  158<H>  4.6   |  23.0  |  3.24<H>    Ca    8.5<L>      16 Dec 2019 06:16    12-15-19 @ 07:01  -  19 @ 07:00  --------------------------------------------------------  IN: 240 mL / OUT: 955 mL / NET: -715 mL    19 @ 07:01  19 @ 10:00  --------------------------------------------------------  IN: 0 mL / OUT: 410 mL / NET: -410 mL    MEDICATIONS  (STANDING):  albuterol/ipratropium for Nebulization 3 milliLiter(s) Nebulizer every 6 hours  amLODIPine   Tablet 10 milliGRAM(s) Oral daily  aspirin 325 milliGRAM(s) Oral daily  atorvastatin 10 milliGRAM(s) Oral at bedtime  carvedilol 25 milliGRAM(s) Oral every 12 hours  cefpodoxime 200 milliGRAM(s) Oral every 12 hours  doxazosin 4 milliGRAM(s) Oral at bedtime  epoetin nereida Injectable 8000 Unit(s) SubCutaneous <User Schedule>  ergocalciferol 14784 Unit(s) Oral every week  heparin  Injectable 5000 Unit(s) SubCutaneous every 8 hours  hydrALAZINE 50 milliGRAM(s) Oral three times a day  insulin lispro (HumaLOG) corrective regimen sliding scale   SubCutaneous three times a day before meals  insulin lispro (HumaLOG) corrective regimen sliding scale   SubCutaneous at bedtime  iron sucrose IVPB 100 milliGRAM(s) IV Intermittent every 24 hours  isosorbide   dinitrate Tablet (ISORDIL) 10 milliGRAM(s) Oral three times a day  sodium bicarbonate 650 milliGRAM(s) Oral two times a day    ROS:  No fever chills  Cardiac  No cp sob or palp  Resp  no cough no mucus production  Gi  no abd pain no melana  Ext No calf tenderness, no edema    Vital Signs Last 24 Hrs  T(C): 36.6 (16 Dec 2019 04:07), Max: 36.7 (15 Dec 2019 21:41)  T(F): 97.8 (16 Dec 2019 04:07), Max: 98 (15 Dec 2019 21:41)  HR: 63 (16 Dec 2019 09:12) (63 - 67)  BP: 141/60 (16 Dec 2019 04:07) (130/50 - 147/63)  BP(mean): --  RR: 18 (16 Dec 2019 04:07) (18 - 20)  SpO2: 95% (16 Dec 2019 09:12) (92% - 100%)  T(C): 36.6 (19 @ 04:07), Max: 36.7 (12-15-19 @ 21:41)  HR: 63 (19 @ 09:12) (63 - 67)  BP: 141/60 (19 @ 04:07) (130/50 - 147/63)  RR: 18 (19 @ 04:07) (18 - 20)  SpO2: 95% (19 @ 09:12) (92% - 100%)    HEENT Head atraumatic eomi, oral mucosa moist  CV S1&S2      No r/g/ m   RESP    GI  Soft active bowel sounds non tender  EXT  No clubbing/Cyanosis /Edema  NEURO  Alert oriented  No gross motor or sensory deficits [place] [facility]  [facility]  [address], [place] [address]    REASON FOR VISIT:  Follow up congestive heart failure  UPDATE:  Diuretics on hold, unfortunately no improvement in renal function  TELEMETRY MONITORING:  Sinus at 63  IN: 240 mL / OUT: 955 mL / NET: -715 mL      12-16    134<L>  |  96<L>  |  54.0<H>  ----------------------------<  158<H>  4.6   |  23.0  |  3.24<H>    Ca    8.5<L>      16 Dec 2019 06:16    12-15-19 @ 07:01  -  12-16-19 @ 07:00  --------------------------------------------------------  IN: 240 mL / OUT: 955 mL / NET: -715 mL    12-16-19 @ 07:01  -  12-16-19 @ 10:00  --------------------------------------------------------  IN: 0 mL / OUT: 410 mL / NET: -410 mL    MEDICATIONS  (STANDING):  albuterol/ipratropium for Nebulization 3 milliLiter(s) Nebulizer every 6 hours  amLODIPine   Tablet 10 milliGRAM(s) Oral daily  aspirin 325 milliGRAM(s) Oral daily  atorvastatin 10 milliGRAM(s) Oral at bedtime  carvedilol 25 milliGRAM(s) Oral every 12 hours  cefpodoxime 200 milliGRAM(s) Oral every 12 hours  doxazosin 4 milliGRAM(s) Oral at bedtime  epoetin nereida Injectable 8000 Unit(s) SubCutaneous <User Schedule>  ergocalciferol 21812 Unit(s) Oral every week  heparin  Injectable 5000 Unit(s) SubCutaneous every 8 hours  hydrALAZINE 50 milliGRAM(s) Oral three times a day  insulin lispro (HumaLOG) corrective regimen sliding scale   SubCutaneous three times a day before meals  insulin lispro (HumaLOG) corrective regimen sliding scale   SubCutaneous at bedtime  iron sucrose IVPB 100 milliGRAM(s) IV Intermittent every 24 hours  isosorbide   dinitrate Tablet (ISORDIL) 10 milliGRAM(s) Oral three times a day  sodium bicarbonate 650 milliGRAM(s) Oral two times a day    ROS:  No fever chills  Cardiac  No cp sob or palp  Resp  no cough no mucus production  Gi  no abd pain no melana  Ext No calf tenderness, no edema    Vital Signs Last 24 Hrs  T(C): 36.6 (16 Dec 2019 04:07), Max: 36.7 (15 Dec 2019 21:41)  T(F): 97.8 (16 Dec 2019 04:07), Max: 98 (15 Dec 2019 21:41)  HR: 63 (16 Dec 2019 09:12) (63 - 67)  BP: 141/60 (16 Dec 2019 04:07) (130/50 - 147/63)  BP(mean): --  RR: 18 (16 Dec 2019 04:07) (18 - 20)  SpO2: 95% (16 Dec 2019 09:12) (92% - 100%)  T(C): 36.6 (12-16-19 @ 04:07), Max: 36.7 (12-15-19 @ 21:41)  HR: 63 (12-16-19 @ 09:12) (63 - 67)  BP: 141/60 (12-16-19 @ 04:07) (130/50 - 147/63)  RR: 18 (12-16-19 @ 04:07) (18 - 20)  SpO2: 95% (12-16-19 @ 09:12) (92% - 100%)    HEENT Head atraumatic eomi, oral mucosa moist  CV S1&S2  regular  RESP  CTA  GI  Soft active bowel sounds non tender  EXT  No clubbing/Cyanosis /Edema  NEURO  Alert oriented  No gross motor or sensory deficits    < from: TTE Echo Complete w/Doppler (12.10.19 @ 13:21) >   1. Technically difficult study.   2. Normal global left ventricular systolic function.   3. Left ventricular ejection fraction, by visual estimation, is 60 to   65%.   4. Spectral Doppler shows pseudonormal pattern of left ventricular   myocardial filling (Grade II diastolic dysfunction).   5. Elevated mean left atrial pressure. Mean LAP estimated at 22 mmHg.   6. Mild mitral annular calcification.   7. Mild thickening and calcification of the anterior and posterior   mitralvalve leaflets.   8. Mild mitral valve regurgitation.   9. Mild aortic regurgitation.  10. Moderate to severe aortic valve stenosis. Recommend SCAR for further   evaluation.  11. Moderate pleural effusion in both left and right lateral regions.  12. There is no evidence of pericardial effusion. [place] [facility]  [facility]  [address], [place] [address]    REASON FOR VISIT:  Follow up congestive heart failure  UPDATE:  Diuretics on hold, unfortunately no improvement in renal function  TELEMETRY MONITORING:  Sinus at 63  IN: 240 mL / OUT: 955 mL / NET: -715 mL  12-16    134<L>  |  96<L>  |  54.0<H>  ----------------------------<  158<H>  4.6   |  23.0  |  3.24<H>    Ca    8.5<L>      16 Dec 2019 06:16    12-15-19 @ 07:01  -  12-16-19 @ 07:00  --------------------------------------------------------  IN: 240 mL / OUT: 955 mL / NET: -715 mL    12-16-19 @ 07:01  -  12-16-19 @ 10:00  --------------------------------------------------------  IN: 0 mL / OUT: 410 mL / NET: -410 mL    MEDICATIONS  (STANDING):  albuterol/ipratropium for Nebulization 3 milliLiter(s) Nebulizer every 6 hours  amLODIPine   Tablet 10 milliGRAM(s) Oral daily  aspirin 325 milliGRAM(s) Oral daily  atorvastatin 10 milliGRAM(s) Oral at bedtime  carvedilol 25 milliGRAM(s) Oral every 12 hours  cefpodoxime 200 milliGRAM(s) Oral every 12 hours  doxazosin 4 milliGRAM(s) Oral at bedtime  epoetin nereida Injectable 8000 Unit(s) SubCutaneous <User Schedule>  ergocalciferol 07492 Unit(s) Oral every week  heparin  Injectable 5000 Unit(s) SubCutaneous every 8 hours  hydrALAZINE 50 milliGRAM(s) Oral three times a day  insulin lispro (HumaLOG) corrective regimen sliding scale   SubCutaneous three times a day before meals  insulin lispro (HumaLOG) corrective regimen sliding scale   SubCutaneous at bedtime  iron sucrose IVPB 100 milliGRAM(s) IV Intermittent every 24 hours  isosorbide   dinitrate Tablet (ISORDIL) 10 milliGRAM(s) Oral three times a day  sodium bicarbonate 650 milliGRAM(s) Oral two times a day    ROS:  No fever chills  Cardiac  No cp sob or palp  Resp  no cough no mucus production  Gi  no abd pain no melana  Ext No calf tenderness, no edema    Vital Signs Last 24 Hrs  T(C): 36.6 (16 Dec 2019 04:07), Max: 36.7 (15 Dec 2019 21:41)  T(F): 97.8 (16 Dec 2019 04:07), Max: 98 (15 Dec 2019 21:41)  HR: 63 (16 Dec 2019 09:12) (63 - 67)  BP: 141/60 (16 Dec 2019 04:07) (130/50 - 147/63)  BP(mean): --  RR: 18 (16 Dec 2019 04:07) (18 - 20)  SpO2: 95% (16 Dec 2019 09:12) (92% - 100%)  T(C): 36.6 (12-16-19 @ 04:07), Max: 36.7 (12-15-19 @ 21:41)  HR: 63 (12-16-19 @ 09:12) (63 - 67)  BP: 141/60 (12-16-19 @ 04:07) (130/50 - 147/63)  RR: 18 (12-16-19 @ 04:07) (18 - 20)  SpO2: 95% (12-16-19 @ 09:12) (92% - 100%)    HEENT Head atraumatic eomi, oral mucosa moist  CV S1&S2  regular  RESP  CTA  GI  Soft active bowel sounds non tender  EXT  No clubbing/Cyanosis /Edema  NEURO  Alert oriented  No gross motor or sensory deficits    < from: TTE Echo Complete w/Doppler (12.10.19 @ 13:21) >   1. Technically difficult study.   2. Normal global left ventricular systolic function.   3. Left ventricular ejection fraction, by visual estimation, is 60 to   65%.   4. Spectral Doppler shows pseudonormal pattern of left ventricular   myocardial filling (Grade II diastolic dysfunction).   5. Elevated mean left atrial pressure. Mean LAP estimated at 22 mmHg.   6. Mild mitral annular calcification.   7. Mild thickening and calcification of the anterior and posterior   mitralvalve leaflets.   8. Mild mitral valve regurgitation.   9. Mild aortic regurgitation.  10. Moderate to severe aortic valve stenosis. Recommend SCAR for further   evaluation.  11. Moderate pleural effusion in both left and right lateral regions.  12. There is no evidence of pericardial effusion. [place] [facility]  [facility]  [address], [place] [address]    REASON FOR VISIT:  Follow up congestive heart failure  UPDATE:  Diuretics on hold, unfortunately no improvement in renal function  Requiring straight cath for urination.  off flomax  TELEMETRY MONITORING:  Sinus at 63  IN: 240 mL / OUT: 955 mL / NET: -715 mL      12-16    134<L>  |  96<L>  |  54.0<H>  ----------------------------<  158<H>  4.6   |  23.0  |  3.24<H>    Ca    8.5<L>      16 Dec 2019 06:16    12-15-19 @ 07:01  -  12-16-19 @ 07:00  --------------------------------------------------------  IN: 240 mL / OUT: 955 mL / NET: -715 mL    12-16-19 @ 07:01  -  12-16-19 @ 10:00  --------------------------------------------------------  IN: 0 mL / OUT: 410 mL / NET: -410 mL    MEDICATIONS  (STANDING):  albuterol/ipratropium for Nebulization 3 milliLiter(s) Nebulizer every 6 hours  amLODIPine   Tablet 10 milliGRAM(s) Oral daily  aspirin 325 milliGRAM(s) Oral daily  atorvastatin 10 milliGRAM(s) Oral at bedtime  carvedilol 25 milliGRAM(s) Oral every 12 hours  cefpodoxime 200 milliGRAM(s) Oral every 12 hours  doxazosin 4 milliGRAM(s) Oral at bedtime  epoetin nereida Injectable 8000 Unit(s) SubCutaneous <User Schedule>  ergocalciferol 03391 Unit(s) Oral every week  heparin  Injectable 5000 Unit(s) SubCutaneous every 8 hours  hydrALAZINE 50 milliGRAM(s) Oral three times a day  insulin lispro (HumaLOG) corrective regimen sliding scale   SubCutaneous three times a day before meals  insulin lispro (HumaLOG) corrective regimen sliding scale   SubCutaneous at bedtime  iron sucrose IVPB 100 milliGRAM(s) IV Intermittent every 24 hours  isosorbide   dinitrate Tablet (ISORDIL) 10 milliGRAM(s) Oral three times a day  sodium bicarbonate 650 milliGRAM(s) Oral two times a day    ROS:  No fever chills  Cardiac  No cp sob or palp  Resp  no cough no mucus production  Gi  no abd pain no melana  Ext No calf tenderness, no edema    Vital Signs Last 24 Hrs  T(C): 36.6 (16 Dec 2019 04:07), Max: 36.7 (15 Dec 2019 21:41)  T(F): 97.8 (16 Dec 2019 04:07), Max: 98 (15 Dec 2019 21:41)  HR: 63 (16 Dec 2019 09:12) (63 - 67)  BP: 141/60 (16 Dec 2019 04:07) (130/50 - 147/63)  BP(mean): --  RR: 18 (16 Dec 2019 04:07) (18 - 20)  SpO2: 95% (16 Dec 2019 09:12) (92% - 100%)  T(C): 36.6 (12-16-19 @ 04:07), Max: 36.7 (12-15-19 @ 21:41)  HR: 63 (12-16-19 @ 09:12) (63 - 67)  BP: 141/60 (12-16-19 @ 04:07) (130/50 - 147/63)  RR: 18 (12-16-19 @ 04:07) (18 - 20)  SpO2: 95% (12-16-19 @ 09:12) (92% - 100%)    HEENT Head atraumatic eomi, oral mucosa moist  CV S1&S2  regular  RESP  CTA  GI  Soft active bowel sounds non tender  EXT  No clubbing/Cyanosis /Edema  NEURO  Alert oriented  No gross motor or sensory deficits    < from: TTE Echo Complete w/Doppler (12.10.19 @ 13:21) >   1. Technically difficult study.   2. Normal global left ventricular systolic function.   3. Left ventricular ejection fraction, by visual estimation, is 60 to   65%.   4. Spectral Doppler shows pseudonormal pattern of left ventricular   myocardial filling (Grade II diastolic dysfunction).   5. Elevated mean left atrial pressure. Mean LAP estimated at 22 mmHg.   6. Mild mitral annular calcification.   7. Mild thickening and calcification of the anterior and posterior   mitralvalve leaflets.   8. Mild mitral valve regurgitation.   9. Mild aortic regurgitation.  10. Moderate to severe aortic valve stenosis. Recommend SCAR for further   evaluation.  11. Moderate pleural effusion in both left and right lateral regions.  12. There is no evidence of pericardial effusion.

## 2019-12-16 NOTE — DISCHARGE NOTE PROVIDER - PROVIDER TOKENS
PROVIDER:[TOKEN:[[id number]]],PROVIDER:[TOKEN:[[id number]]],PROVIDER:[TOKEN:[[id number]]]

## 2019-12-16 NOTE — PROGRESS NOTE ADULT - SUBJECTIVE AND OBJECTIVE BOX
[facility] DIVISION OF KIDNEY DISEASES AND HYPERTENSION -- FOLLOW UP NOTE  --------------------------------------------------------------------------------  Chief Complaint:   ADI on CKD    24 hour events/subjective:    PAST HISTORY  --------------------------------------------------------------------------------  No significant changes to PMH, PSH, FHx, SHx, unless otherwise noted    ALLERGIES & MEDICATIONS  --------------------------------------------------------------------------------  Allergies  No Known Allergies    Standing Inpatient Medications  albuterol/ipratropium for Nebulization 3 milliLiter(s) Nebulizer every 6 hours  amLODIPine   Tablet 10 milliGRAM(s) Oral daily  aspirin 325 milliGRAM(s) Oral daily  atorvastatin 10 milliGRAM(s) Oral at bedtime  carvedilol 25 milliGRAM(s) Oral every 12 hours  cefpodoxime 200 milliGRAM(s) Oral every 12 hours  dextrose 5%. 1000 milliLiter(s) IV Continuous <Continuous>  dextrose 50% Injectable 12.5 Gram(s) IV Push once  dextrose 50% Injectable 25 Gram(s) IV Push once  dextrose 50% Injectable 25 Gram(s) IV Push once  doxazosin 4 milliGRAM(s) Oral at bedtime  epoetin nereida Injectable 8000 Unit(s) SubCutaneous <User Schedule>  ergocalciferol 80201 Unit(s) Oral every week  furosemide    Tablet 40 milliGRAM(s) Oral two times a day  heparin  Injectable 5000 Unit(s) SubCutaneous every 8 hours  hydrALAZINE 50 milliGRAM(s) Oral three times a day  insulin lispro (HumaLOG) corrective regimen sliding scale   SubCutaneous three times a day before meals  insulin lispro (HumaLOG) corrective regimen sliding scale   SubCutaneous at bedtime  iron sucrose IVPB 100 milliGRAM(s) IV Intermittent every 24 hours  isosorbide   dinitrate Tablet (ISORDIL) 10 milliGRAM(s) Oral three times a day  sodium bicarbonate 650 milliGRAM(s) Oral two times a day  tamsulosin 0.4 milliGRAM(s) Oral at bedtime    PRN Inpatient Medications  dextrose 40% Gel 15 Gram(s) Oral once PRN  glucagon  Injectable 1 milliGRAM(s) IntraMuscular once PRN  hydrALAZINE Injectable 10 milliGRAM(s) IV Push every 6 hours PRN    REVIEW OF SYSTEMS  --------------------------------------------------------------------------------  Gen: No weight changes, fatigue, fevers/chills, weakness  Skin: No rashes  Head/Eyes/Ears/Mouth: No headache; Normal hearing; Normal vision w/o blurriness;   Respiratory: No dyspnea, cough, wheezing, hemoptysis  CV: No chest pain, PND, orthopnea  GI: No abdominal pain, diarrhea, constipation, nausea, vomiting, melena, hematochezia  : No increased frequency, dysuria, hematuria, nocturia  MSK: No joint pain/swelling; no back pain; no edema  Neuro: No dizziness/lightheadedness, weakness, seizures, numbness, tingling  Heme: No easy bruising or bleeding  Endo: No heat/cold intolerance  Psych: No significant nervousness, anxiety, stress, depression    All other systems were reviewed and are negative, except as noted.    VITALS/PHYSICAL EXAM  --------------------------------------------------------------------------------  T(C): 36.3 (12-16-19 @ 10:30), Max: 36.7 (12-15-19 @ 21:41)  HR: 62 (12-16-19 @ 15:33) (62 - 67)  BP: 160/68 (12-16-19 @ 15:33) (140/60 - 160/68)  RR: 18 (12-16-19 @ 10:30) (18 - 18)  SpO2: 93% (12-16-19 @ 10:30) (92% - 100%)      12-15-19 @ 07:01  -  12-16-19 @ 07:00  --------------------------------------------------------  IN: 240 mL / OUT: 955 mL / NET: -715 mL    12-16-19 @ 07:01  -  12-16-19 @ 16:21  --------------------------------------------------------  IN: 240 mL / OUT: 410 mL / NET: -170 mL    Physical Exam:  Gen: NAD, well-appearing  HEENT: PERRL, supple neck  Pulm: CTA B/L  CV: RRR, S1S2; no rub  Back: No spinal or CVA tenderness; no sacral edema  Abd: +BS, soft, nontender/nondistended  : No suprapubic tenderness  UE: Warm, no clubbing, no edema; no asterixis  LE: Warm, no clubbing, no edema  Neuro: No focal deficits, intact gait  Psych: Normal affect and mood  Skin: Warm, without rashes  Vascular access:    LABS/STUDIES  --------------------------------------------------------------------------------              8.1    7.68  >-----------<  169      [12-16-19 @ 06:16]              26.7     134  |  96  |  54.0  ----------------------------<  158      [12-16-19 @ 06:16]  4.6   |  23.0  |  3.24        Ca     8.5     [12-16-19 @ 06:16]    Creatinine Trend:  SCr 3.24 [12-16 @ 06:16]  SCr 3.28 [12-15 @ 06:36]  SCr 3.48 [12-14 @ 06:45]  SCr 3.30 [12-13 @ 07:42]  SCr 3.26 [12-12 @ 05:34]    Urinalysis - [12-14-19 @ 07:12]      Color Yellow / Appearance Clear / SG 1.010 / pH 6.5      Gluc 50 / Ketone Negative  / Bili Negative / Urobili Negative       Blood Trace / Protein 100 / Leuk Est Moderate / Nitrite Negative      RBC 3-5 / WBC 26-50 / Hyaline  / Gran  / Sq Epi  / Non Sq Epi Few / Bacteria Few    Urine Creatinine 56      [12-14-19 @ 07:10]  Urine Protein 95.0      [12-14-19 @ 07:10]    Iron 25, TIBC 285, %sat 9      [12-11-19 @ 17:50]  Ferritin 25      [12-11-19 @ 17:50    PTH -- (Ca 8.3)      [12-12-19 @ 19:36]   164  Vitamin D (25OH) 20.3      [12-12-19 @ 19:36]    HbA1c 8.1      [12-11-19 @ 06:47]  TSH 6.14      [12-13-19 @ 07:42] [facility] DIVISION OF KIDNEY DISEASES AND HYPERTENSION -- FOLLOW UP NOTE  --------------------------------------------------------------------------------  Chief Complaint:   ADI on CKD    24 hour events/subjective:  Pt seen and examined with wife at bed side  Pt states he feels well      PAST HISTORY  --------------------------------------------------------------------------------  No significant changes to PMH, PSH, FHx, SHx, unless otherwise noted    ALLERGIES & MEDICATIONS  --------------------------------------------------------------------------------  Allergies  No Known Allergies    Standing Inpatient Medications  albuterol/ipratropium for Nebulization 3 milliLiter(s) Nebulizer every 6 hours  amLODIPine   Tablet 10 milliGRAM(s) Oral daily  aspirin 325 milliGRAM(s) Oral daily  atorvastatin 10 milliGRAM(s) Oral at bedtime  carvedilol 25 milliGRAM(s) Oral every 12 hours  cefpodoxime 200 milliGRAM(s) Oral every 12 hours  dextrose 5%. 1000 milliLiter(s) IV Continuous <Continuous>  dextrose 50% Injectable 12.5 Gram(s) IV Push once  dextrose 50% Injectable 25 Gram(s) IV Push once  dextrose 50% Injectable 25 Gram(s) IV Push once  doxazosin 4 milliGRAM(s) Oral at bedtime  epoetin nereida Injectable 8000 Unit(s) SubCutaneous <User Schedule>  ergocalciferol 76503 Unit(s) Oral every week  furosemide    Tablet 40 milliGRAM(s) Oral two times a day  heparin  Injectable 5000 Unit(s) SubCutaneous every 8 hours  hydrALAZINE 50 milliGRAM(s) Oral three times a day  insulin lispro (HumaLOG) corrective regimen sliding scale   SubCutaneous three times a day before meals  insulin lispro (HumaLOG) corrective regimen sliding scale   SubCutaneous at bedtime  iron sucrose IVPB 100 milliGRAM(s) IV Intermittent every 24 hours  isosorbide   dinitrate Tablet (ISORDIL) 10 milliGRAM(s) Oral three times a day  sodium bicarbonate 650 milliGRAM(s) Oral two times a day  tamsulosin 0.4 milliGRAM(s) Oral at bedtime    PRN Inpatient Medications  dextrose 40% Gel 15 Gram(s) Oral once PRN  glucagon  Injectable 1 milliGRAM(s) IntraMuscular once PRN  hydrALAZINE Injectable 10 milliGRAM(s) IV Push every 6 hours PRN    REVIEW OF SYSTEMS  --------------------------------------------------------------------------------  Gen: No weight changes, fatigue, fevers/chills, weakness  Skin: No rashes  Head/Eyes/Ears/Mouth: No headache; Normal hearing; Normal vision w/o blurriness;   Respiratory: No dyspnea, cough, wheezing, hemoptysis  CV: No chest pain, PND, orthopnea  GI: No abdominal pain, diarrhea, constipation, nausea, vomiting, melena, hematochezia  : No increased frequency, dysuria, hematuria, nocturia  MSK: No joint pain/swelling; no back pain; no edema  Neuro: No dizziness/lightheadedness, weakness, seizures, numbness, tingling  Heme: No easy bruising or bleeding  Endo: No heat/cold intolerance  Psych: No significant nervousness, anxiety, stress, depression    All other systems were reviewed and are negative, except as noted.    VITALS/PHYSICAL EXAM  --------------------------------------------------------------------------------  T(C): 36.3 (12-16-19 @ 10:30), Max: 36.7 (12-15-19 @ 21:41)  HR: 62 (12-16-19 @ 15:33) (62 - 67)  BP: 160/68 (12-16-19 @ 15:33) (140/60 - 160/68)  RR: 18 (12-16-19 @ 10:30) (18 - 18)  SpO2: 93% (12-16-19 @ 10:30) (92% - 100%)      12-15-19 @ 07:01  -  12-16-19 @ 07:00  --------------------------------------------------------  IN: 240 mL / OUT: 955 mL / NET: -715 mL    12-16-19 @ 07:01  -  12-16-19 @ 16:21  --------------------------------------------------------  IN: 240 mL / OUT: 410 mL / NET: -170 mL    Physical Exam:  Gen: NAD, well-appearing  HEENT: PERRL, supple neck  Pulm: left lung field crackles  CV: RRR, S1S2; no rub  Back: No spinal or CVA tenderness; no sacral edema  Abd: +BS, soft, nontender/nondistended  : No suprapubic tenderness  UE: Warm, no clubbing, no edema; no asterixis  LE: Warm, no clubbing, no edema  Neuro: No focal deficits  Psych: Normal affect and mood  Skin: Warm, without rashes      LABS/STUDIES  --------------------------------------------------------------------------------              8.1    7.68  >-----------<  169      [12-16-19 @ 06:16]              26.7     134  |  96  |  54.0  ----------------------------<  158      [12-16-19 @ 06:16]  4.6   |  23.0  |  3.24        Ca     8.5     [12-16-19 @ 06:16]    Creatinine Trend:  SCr 3.24 [12-16 @ 06:16]  SCr 3.28 [12-15 @ 06:36]  SCr 3.48 [12-14 @ 06:45]  SCr 3.30 [12-13 @ 07:42]  SCr 3.26 [12-12 @ 05:34]    Urinalysis - [12-14-19 @ 07:12]      Color Yellow / Appearance Clear / SG 1.010 / pH 6.5      Gluc 50 / Ketone Negative  / Bili Negative / Urobili Negative       Blood Trace / Protein 100 / Leuk Est Moderate / Nitrite Negative      RBC 3-5 / WBC 26-50 / Hyaline  / Gran  / Sq Epi  / Non Sq Epi Few / Bacteria Few    Urine Creatinine 56      [12-14-19 @ 07:10]  Urine Protein 95.0      [12-14-19 @ 07:10]    Iron 25, TIBC 285, %sat 9      [12-11-19 @ 17:50]  Ferritin 25      [12-11-19 @ 17:50    PTH -- (Ca 8.3)      [12-12-19 @ 19:36]   164  Vitamin D (25OH) 20.3      [12-12-19 @ 19:36]    HbA1c 8.1      [12-11-19 @ 06:47]  TSH 6.14      [12-13-19 @ 07:42]

## 2019-12-16 NOTE — DISCHARGE NOTE PROVIDER - CARE PROVIDER_API CALL
Roz Faustin)  Cardiology; Internal Medicine  [address] [facility], [address]  [place], [place] [address]  Phone: [phone]  Fax: [phone]  Follow Up Time:     Lior Adams ()  Internal Medicine  [address] [place] [address], [address] [place] [address]  Phone: [phone]  Fax: [phone]  Follow Up Time:     Christiano Torres)  [address], [facility]  [place] [address]  Phone: [phone]  Fax: [phone]  Follow Up Time:

## 2019-12-16 NOTE — DISCHARGE NOTE PROVIDER - HOSPITAL COURSE
admitted with hypoxic resp failure 2/2 HFpEF and underlying moderate to severe AS. Non-complaints with fluid/ diet and medication. course complicated by ADI sec to diuresis, urinary retention- now resolved.        Medically stable and agreeable with discharge and follow up plan. Patient was advised to return to ED if any symptoms occur or worsen.        time 40 mins admitted with hypoxic resp failure 2/2 HFpEF and underlying moderate to severe AS. Non-complaints with fluid/ diet and medication. course complicated by ADI sec to diuresis, urinary retention- Voiding well. pt known to have urinary retention with residual urine post void. This is chronic and is comfortable without any symptoms.         Medically stable and agreeable with discharge and follow up plan. Patient was advised to return to ED if any symptoms occur or worsen.        time 40 mins

## 2019-12-16 NOTE — PROGRESS NOTE ADULT - ASSESSMENT
78 y/o with PMH of  DM, HTN, CKD, BPH, pAfib (no AC) and cognitive dysfunction who presents with f c/o SOB. CT of chest with Moderate bilateral pleural effusions with ground glass infiltrates upper lobe  Echo with EF 50%, moderate to severe AS with elevated Mean left atrial pressure at 22 mm hg grade one diastolic dysfunction and Bnp > 25,000 80 y/o with PMH of  DM, HTN, CKD, BPH, pAfib (no AC) and cognitive dysfunction who presents with f c/o SOB. CT of chest with Moderate bilateral pleural effusions with ground glass infiltrates upper lobe  Echo with EF 50%, moderate to severe AS with elevated Mean left atrial pressure at 22 mm hg grade one diastolic dysfunction and Bnp > 25,000

## 2019-12-16 NOTE — DISCHARGE NOTE PROVIDER - CARE PROVIDERS DIRECT ADDRESSES
[email or web address]

## 2019-12-16 NOTE — PROGRESS NOTE ADULT - ATTENDING COMMENTS
Moderate to severe aortic stenosis.   congestive heart failure . decrease diuresis lasix 40 iv daily. PO BID on discharge.   outpatient tavr work up.  Hypoglycemia, - PMD to follow up.
moderate to sevre aortic stneosis.  acute on chronic renal failure.  Diuretis on hold.  patient bed bound  Out of Bed to Chair ambulate.   restart diuretic on discharge  outpaitent SCAR and tavr work up.
congestive heart failure diastolic , CKD stage 4. UTI.- moderate to severe aortic stenosis.   ct IV diuresis.  BP control. Isosorbide. ct CCBs and beta-blocker not on ARB due to unstable cr.   Patient has proteinuria. May benefit from low dose ARB once cr is stable. discuss with nephrology.  UTI- did not tolerate flomax well. had episodes of orthostasis.  Called CT surgery Dr vernon for outpaitent TAVR evaluation and follow up. once cr is stable and patient had a cath.    Probably some where in jan-feb
Moderate to severe aortic stenosis.   congestive heart failure . decrease diuresis lasix 40 iv daily. PO BID on discharge.   outpatient tavr work up.  Hypoglycemia, - PMD to follow up.
restart direusis PO daily and BID on the weekend.s  Outpaitnet cath and SCAR for tavr work up.  Out of Bed to Chair ambulate.   No further in-patient cardiac work-up/management is needed.  Follow-up in cardiology office in 2 weeks.

## 2019-12-16 NOTE — DISCHARGE NOTE PROVIDER - NSDCFUSCHEDAPPT_GEN_ALL_CORE_FT
JEMIMA SANTOS ; 12/23/2019 ; [facility] Cardio Electro [address]  JEMIMA SANTOS ; 01/02/2020 ; [facility]PP Neurology [address]  JEMIMA SANTOS ; 01/08/2020 ; NPP Nephro [address]

## 2019-12-16 NOTE — DISCHARGE NOTE PROVIDER - NSDCMRMEDTOKEN_GEN_ALL_CORE_FT
amLODIPine 10 mg oral tablet: 1 tab(s) orally once a day  aspirin 325 mg oral tablet: 1 tab(s) orally once a day  carvedilol 25 mg oral tablet: 1 tab(s) orally every 12 hours  doxazosin 4 mg oral tablet: 1 tab(s) orally once a day (at bedtime)  ergocalciferol 2000 intl units oral capsule: 1 cap(s) orally once a day  ferrous sulfate 325 mg (65 mg elemental iron) oral tablet: 1 tab(s) orally once a day with meals  furosemide 40 mg oral tablet: 1 tab(s) orally once a day  hydrALAZINE 50 mg oral tablet: 1 tab(s) orally 3 times a day  insulin lispro 100 units/mL injectable solution: 3 times a day before meals    2 Unit(s) if Glucose 151 - 200  4 Unit(s) if Glucose 201 - 250  6 Unit(s) if Glucose 251 - 300  8 Unit(s) if Glucose 301 - 350  10 Unit(s) if Glucose 351 - 400  12 Unit(s) if Glucose Greater Than 400  isosorbide dinitrate 10 mg oral tablet: 1 tab(s) orally 3 times a day  pravastatin 40 mg oral tablet: 1 tab(s) orally once a day  sodium bicarbonate 650 mg oral tablet: 1 tab(s) orally 2 times a day  tamsulosin 0.4 mg oral capsule: 1 cap(s) orally once a day (at bedtime) amLODIPine 10 mg oral tablet: 1 tab(s) orally once a day  aspirin 325 mg oral tablet: 1 tab(s) orally once a day  carvedilol 25 mg oral tablet: 1 tab(s) orally every 12 hours  doxazosin 4 mg oral tablet: 1 tab(s) orally once a day (at bedtime)  ergocalciferol 2000 intl units oral capsule: 1 cap(s) orally once a day  ferrous sulfate 325 mg (65 mg elemental iron) oral tablet: 1 tab(s) orally once a day with meals  furosemide 40 mg oral tablet: 1 tab(s) orally once a day on weekdays  and 1 tablet bid on week ends    hydrALAZINE 50 mg oral tablet: 1 tab(s) orally 3 times a day  insulin lispro 100 units/mL injectable solution: 3 times a day before meals    2 Unit(s) if Glucose 151 - 200  4 Unit(s) if Glucose 201 - 250  6 Unit(s) if Glucose 251 - 300  8 Unit(s) if Glucose 301 - 350  10 Unit(s) if Glucose 351 - 400  12 Unit(s) if Glucose Greater Than 400  isosorbide dinitrate 10 mg oral tablet: 1 tab(s) orally 3 times a day  pravastatin 40 mg oral tablet: 1 tab(s) orally once a day  sodium bicarbonate 650 mg oral tablet: 1 tab(s) orally 2 times a day  tamsulosin 0.4 mg oral capsule: 1 cap(s) orally once a day (at bedtime)

## 2019-12-16 NOTE — DISCHARGE NOTE PROVIDER - NSDCPNSUBOBJ_GEN_ALL_CORE
HEALTH ISSUES - PROBLEM Dx:        Acute hypoxic respiratory failure        INTERVAL HPI/ OVERNIGHT EVENTS:        comfortable lying in bed    states he does not use home O2    turned off O2 today        REVIEW OF SYSTEMS:        denies SOB/ cough        ICU Vital Signs Last 24 Hrs    T(C): 36.3 (16 Dec 2019 10:30), Max: 36.7 (15 Dec 2019 21:41)    T(F): 97.4 (16 Dec 2019 10:30), Max: 98 (15 Dec 2019 21:41)    HR: 65 (16 Dec 2019 10:30) (63 - 67)    BP: 142/64 (16 Dec 2019 10:30) (130/50 - 147/63)    BP(mean): --    ABP: --    ABP(mean): --    RR: 18 (16 Dec 2019 10:30) (18 - 20)    SpO2: 93% (16 Dec 2019 10:30) (92% - 100%)            PHYSICAL EXAM-    GENERAL: Pt lying comfortably, NAD.    CHEST/LUNG: Good air entry, no Rales today.      HEART: S1S2+, Regular rate and rhythm; + Murmur.     ABDOMEN: Soft, Nontender, Nondistended; Bowel sounds present.    Extremities: Trace LE edema, pulses+    NEURO: AAOX3, no focal deficits, no motor r sensory loss.    PSYCH: normal mood.        MEDICATIONS  (STANDING):    albuterol/ipratropium for Nebulization 3 milliLiter(s) Nebulizer every 6 hours    amLODIPine   Tablet 10 milliGRAM(s) Oral daily    aspirin 325 milliGRAM(s) Oral daily    atorvastatin 10 milliGRAM(s) Oral at bedtime    carvedilol 25 milliGRAM(s) Oral every 12 hours    cefpodoxime 200 milliGRAM(s) Oral every 12 hours    dextrose 5%. 1000 milliLiter(s) (50 mL/Hr) IV Continuous <Continuous>    dextrose 50% Injectable 12.5 Gram(s) IV Push once    dextrose 50% Injectable 25 Gram(s) IV Push once    dextrose 50% Injectable 25 Gram(s) IV Push once    doxazosin 4 milliGRAM(s) Oral at bedtime    epoetin nereida Injectable 8000 Unit(s) SubCutaneous <User Schedule>    ergocalciferol 87082 Unit(s) Oral every week    heparin  Injectable 5000 Unit(s) SubCutaneous every 8 hours    hydrALAZINE 50 milliGRAM(s) Oral three times a day    insulin lispro (HumaLOG) corrective regimen sliding scale   SubCutaneous three times a day before meals    insulin lispro (HumaLOG) corrective regimen sliding scale   SubCutaneous at bedtime    iron sucrose IVPB 100 milliGRAM(s) IV Intermittent every 24 hours    isosorbide   dinitrate Tablet (ISORDIL) 10 milliGRAM(s) Oral three times a day    sodium bicarbonate 650 milliGRAM(s) Oral two times a day        MEDICATIONS  (PRN):    dextrose 40% Gel 15 Gram(s) Oral once PRN Blood Glucose LESS THAN 70 milliGRAM(s)/deciliter    glucagon  Injectable 1 milliGRAM(s) IntraMuscular once PRN Glucose LESS THAN 70 milligrams/deciliter    hydrALAZINE Injectable 10 milliGRAM(s) IV Push every 6 hours PRN SBP>170            LABS:        12-15        133<L>  |  95<L>  |  60.0<H>    ----------------------------<  233<H>    4.3   |  24.0  |  3.28<H>        Ca    8.2<L>      15 Dec 2019 06:36    Phos  4.6                     Urinalysis Basic - ( 14 Dec 2019 07:12 )        Color: Yellow / Appearance: Clear / S.010 / pH: x    Gluc: x / Ketone: Negative  / Bili: Negative / Urobili: Negative mg/dL     Blood: x / Protein: 100 mg/dL / Nitrite: Negative     Leuk Esterase: Moderate / RBC: 3-5 /HPF / WBC 26-50     Sq Epi: x / Non Sq Epi: Few / Bacteria: Few                Assessment and Plan:     · Assessment	    Pt is a 80 y/o male with medical history of DM, HTN, CKD stage 3, BPH, pAfib with no AC, Aortic stenosis, HFpEF, memory problem with possible dementia and not good historian, presents to ED with c/o SOB, orthopnea, leg edema. In ED pt had uncontrolled BP and hypoxia to 77%. CT chest with effusion. Pt admitted for respiratory failure 2/2 HFpEF with underlying severe AS. Pt kept on IV diuresis and all other home medication resumed. Seen in consultation with cardiology, clinically improving. Course complicated by ADI on ckd, seen in consultation with nephrology.          >Acute hypoxic respiratory failure: Improving. turn off O2 on 12/15 and assess need for Home O2    - 2/2 HFpEF and underlying moderate to severe AS. Non-complaints with fluid/ diet and medication    - C/w CHF pathway, I/Os, daily weight, fluid restriction     - Lasix held by nephrology due to worsening renal function.     - C/w coreg, Imdur, hydralazine.    - Supplements o2 as needed, bronchodilator.      - Cardiology on board.     - Seen by CT surgery for TAVR work up in future as o/p- discussed with Dr Faustin- work up will be done o/p in future, not on this admission.         > ADI sec to diuresing    - trending down Crt off diuretics    - resume daily dosing at home        >HTN urgency:    - Normotensive now    - c/w coreg 25 BID, Imdur 10 mg, Norvasc, hydralazine.      - Cardiology yon board.         >UTI:    - O/p urine cx positive in nephrology office.     - Seen by ID here- On Vantin  19        >Urinary retention with BPH- TOV today, Flomax changed to Cardura by renal. Pt follows with o/p urology.         >DM- No more Hypoglycemia, C/w ZEINAB + FS monitoring.         >CKD stage 3- Creatinine stable, nephrology on board.         >Anemia- Likely AOCD, hgb stable. On IV Venofer by renal. Change to PO at discharge        >DVT ppx- Heparin         >Dispo- home with home care HEALTH ISSUES - PROBLEM Dx:        Acute hypoxic respiratory failure        INTERVAL HPI/ OVERNIGHT EVENTS:        comfortable lying in bed    states he does not use home O2    turned off O2 today        REVIEW OF SYSTEMS:        denies SOB/ cough        ICU Vital Signs Last 24 Hrs    T(C): 36.3 (16 Dec 2019 10:30), Max: 36.7 (15 Dec 2019 21:41)    T(F): 97.4 (16 Dec 2019 10:30), Max: 98 (15 Dec 2019 21:41)    HR: 65 (16 Dec 2019 10:30) (63 - 67)    BP: 142/64 (16 Dec 2019 10:30) (130/50 - 147/63)    BP(mean): --    ABP: --    ABP(mean): --    RR: 18 (16 Dec 2019 10:30) (18 - 20)    SpO2: 93% (16 Dec 2019 10:30) (92% - 100%)            PHYSICAL EXAM-    GENERAL: Pt lying comfortably, NAD.    CHEST/LUNG: Good air entry, no Rales today.      HEART: S1S2+, Regular rate and rhythm; + Murmur.     ABDOMEN: Soft, Nontender, Nondistended; Bowel sounds present.    Extremities: Trace LE edema, pulses+    NEURO: AAOX3, no focal deficits, no motor r sensory loss.    PSYCH: normal mood.        MEDICATIONS  (STANDING):    albuterol/ipratropium for Nebulization 3 milliLiter(s) Nebulizer every 6 hours    amLODIPine   Tablet 10 milliGRAM(s) Oral daily    aspirin 325 milliGRAM(s) Oral daily    atorvastatin 10 milliGRAM(s) Oral at bedtime    carvedilol 25 milliGRAM(s) Oral every 12 hours    cefpodoxime 200 milliGRAM(s) Oral every 12 hours    dextrose 5%. 1000 milliLiter(s) (50 mL/Hr) IV Continuous <Continuous>    dextrose 50% Injectable 12.5 Gram(s) IV Push once    dextrose 50% Injectable 25 Gram(s) IV Push once    dextrose 50% Injectable 25 Gram(s) IV Push once    doxazosin 4 milliGRAM(s) Oral at bedtime    epoetin nereida Injectable 8000 Unit(s) SubCutaneous <User Schedule>    ergocalciferol 95357 Unit(s) Oral every week    heparin  Injectable 5000 Unit(s) SubCutaneous every 8 hours    hydrALAZINE 50 milliGRAM(s) Oral three times a day    insulin lispro (HumaLOG) corrective regimen sliding scale   SubCutaneous three times a day before meals    insulin lispro (HumaLOG) corrective regimen sliding scale   SubCutaneous at bedtime    iron sucrose IVPB 100 milliGRAM(s) IV Intermittent every 24 hours    isosorbide   dinitrate Tablet (ISORDIL) 10 milliGRAM(s) Oral three times a day    sodium bicarbonate 650 milliGRAM(s) Oral two times a day        MEDICATIONS  (PRN):    dextrose 40% Gel 15 Gram(s) Oral once PRN Blood Glucose LESS THAN 70 milliGRAM(s)/deciliter    glucagon  Injectable 1 milliGRAM(s) IntraMuscular once PRN Glucose LESS THAN 70 milligrams/deciliter    hydrALAZINE Injectable 10 milliGRAM(s) IV Push every 6 hours PRN SBP>170            LABS:        12-15        133<L>  |  95<L>  |  60.0<H>    ----------------------------<  233<H>    4.3   |  24.0  |  3.28<H>        Ca    8.2<L>      15 Dec 2019 06:36    Phos  4.6                     Urinalysis Basic - ( 14 Dec 2019 07:12 )        Color: Yellow / Appearance: Clear / S.010 / pH: x    Gluc: x / Ketone: Negative  / Bili: Negative / Urobili: Negative mg/dL     Blood: x / Protein: 100 mg/dL / Nitrite: Negative     Leuk Esterase: Moderate / RBC: 3-5 /HPF / WBC 26-50     Sq Epi: x / Non Sq Epi: Few / Bacteria: Few                Assessment and Plan:     · Assessment	    Pt is a 80 y/o male with medical history of DM, HTN, CKD stage 3, BPH, pAfib with no AC, Aortic stenosis, HFpEF, memory problem with possible dementia and not good historian, presents to ED with c/o SOB, orthopnea, leg edema. In ED pt had uncontrolled BP and hypoxia to 77%. CT chest with effusion. Pt admitted for respiratory failure 2/2 HFpEF with underlying severe AS. Pt kept on IV diuresis and all other home medication resumed. Seen in consultation with cardiology, clinically improving. Course complicated by ADI on ckd, seen in consultation with nephrology.          >Acute hypoxic respiratory failure: Improving. turn off O2 on 12/15 and assess need for Home O2    - 2/2 HFpEF and underlying moderate to severe AS. Non-complaints with fluid/ diet and medication    - C/w CHF pathway, I/Os, daily weight, fluid restriction     - Lasix held by nephrology due to worsening renal function.     - C/w coreg, Imdur, hydralazine.    - Supplements o2 as needed, bronchodilator.      - Cardiology on board.     - Seen by CT surgery for TAVR work up in future as o/p- discussed with Dr Faustin- work up will be done o/p in future, not on this admission.         > ADI sec to diuresing    - trending down Crt off diuretics    - resume daily dosing of lasix at home        >HTN urgency:    - Normotensive now    - c/w coreg 25 BID, Imdur 10 mg, Norvasc, hydralazine.      - Cardiology yon board.         >UTI:    - O/p urine cx positive in nephrology office.     - Seen by ID here- On Vantin  19        >Urinary retention with BPH-  Flomax changed to Cardura by renal. Pt follows with o/p urology. He is voidign well now and is known to have post void residual        >DM- No more Hypoglycemia, C/w ZEINAB + FS monitoring.         >CKD stage 3- Creatinine stable, nephrology on board.         >Anemia- Likely AOCD, hgb stable. On IV Venofer by renal. Change to PO at discharge        >DVT ppx- Heparin         >Dispo- home with home care

## 2019-12-16 NOTE — PROGRESS NOTE ADULT - REASON FOR ADMISSION
Pulmonary Edema
Pulmonary edema/heart failure
pulmonary edema

## 2019-12-18 LAB
CORT UR RND CORT/CREAT RATIO: 7.8 MCG/G CR — SIGNIFICANT CHANGE UP (ref 1–119)
CORT UR RNDM CREAT CONC: 49 MG/DL — SIGNIFICANT CHANGE UP

## 2019-12-19 ENCOUNTER — APPOINTMENT (OUTPATIENT)
Dept: FAMILY MEDICINE | Facility: CLINIC | Age: 79
End: 2019-12-19
Payer: MEDICARE

## 2019-12-19 ENCOUNTER — LABORATORY RESULT (OUTPATIENT)
Age: 79
End: 2019-12-19

## 2019-12-19 VITALS
OXYGEN SATURATION: 97 % | SYSTOLIC BLOOD PRESSURE: 140 MMHG | HEIGHT: 64 IN | BODY MASS INDEX: 31.24 KG/M2 | HEART RATE: 79 BPM | RESPIRATION RATE: 12 BRPM | TEMPERATURE: 97.6 F | DIASTOLIC BLOOD PRESSURE: 60 MMHG | WEIGHT: 183 LBS

## 2019-12-19 PROCEDURE — 99496 TRANSJ CARE MGMT HIGH F2F 7D: CPT

## 2019-12-19 RX ORDER — LEVOFLOXACIN 250 MG/1
250 TABLET, FILM COATED ORAL DAILY
Qty: 7 | Refills: 0 | Status: COMPLETED | COMMUNITY
Start: 2017-10-04 | End: 2019-12-19

## 2019-12-19 NOTE — HISTORY OF PRESENT ILLNESS
[Spouse] : spouse [Post-hospitalization from ___ Hospital] : Post-hospitalization from [unfilled] Hospital [Admitted on: ___] : The patient was admitted on [unfilled] [Discharged on ___] : discharged on [unfilled] [Discharge Med List] : discharge medication list [Patient Contacted By: ____] : and contacted by [unfilled] [FreeTextEntry3] : Patient admitted for CHF, DM, Hypertensive Crisis, CKD, Resp. Failure w/hypoxia, Aortic stenosis, urinary incontinence, uncontrolled htn, and pneumonia [FreeTextEntry2] : Patient has had extreme fluctuating blood sugars since he is home.  Wife states his sugars yesterday were 195 in morning, 249 lunchtime, and bedtime 459.  Home care nurse called office yesterday stating that wife had given Tresiba 3x/day to try and control sugars stating that it's the only thing that works.  Meds are a financial burden also. [de-identified] : Patient has had the last two days extreme fluctuating blood sugars.  Before breakfast yesterday 195, lunchtime 249, before bed 459.  Patient's wife states he takes just Tresiba, but is taking it 3x/day.  It's either  [FreeTextEntry1] : Patient in for follow up of DM and CRF

## 2019-12-19 NOTE — HEALTH RISK ASSESSMENT
[No falls in past year] : Patient reported no falls in the past year [Intercurrent hospitalizations] : was admitted to the hospital  [Never (0 pts)] : Never (0 points) [No] : In the past 12 months have you used drugs other than those required for medical reasons? No [0] : 2) Feeling down, depressed, or hopeless: Not at all (0) [Patient reported colonoscopy was normal] : Patient reported colonoscopy was normal [Change in mental status noted] : Change in mental status noted [Learning/Retaining New Information] : difficulty learning/retaining new information [Access to Medications] : access to medications [Health Literacy] : health literacy [With Significant Other] : lives with significant other [# of Members in Household ___] :  household currently consist of [unfilled] member(s) [Retired] : retired [Less Than High School] : less than high school [] :  [# Of Children ___] : has [unfilled] children [Feels Safe at Home] : Feels safe at home [Smoke Detector] : smoke detector [Carbon Monoxide Detector] : carbon monoxide detector [Safety elements used in home] : safety elements used in home [Seat Belt] :  uses seat belt [Sunscreen] : uses sunscreen [Reviewed no changes] : Reviewed no changes [Audit-CScore] : 0 [de-identified] : Rice, beans, does not eat much meat [de-identified] : Patient tripped and fell last night over throw rug, states left knee is sore today [[id number]] : 0 [Language] : denies difficulty with language [Behavior] : denies difficulty with behavior [Handling Complex Tasks] : denies difficulty handling complex tasks [Reasoning] : denies difficulty with reasoning [Reports changes in hearing] : Reports no changes in hearing [Spatial Ability and Orientation] : denies difficulty with spatial ability and orientation [Reports normal functional visual acuity (ie: able to read med bottle)] : Reports poor functional visual acuity.  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

## 2019-12-19 NOTE — HEALTH RISK ASSESSMENT
[No falls in past year] : Patient reported no falls in the past year [Intercurrent hospitalizations] : was admitted to the hospital  [Never (0 pts)] : Never (0 points) [No] : In the past 12 months have you used drugs other than those required for medical reasons? No [0] : 2) Feeling down, depressed, or hopeless: Not at all (0) [Patient reported colonoscopy was normal] : Patient reported colonoscopy was normal [Change in mental status noted] : Change in mental status noted [Learning/Retaining New Information] : difficulty learning/retaining new information [Access to Medications] : access to medications [With Significant Other] : lives with significant other [Health Literacy] : health literacy [# of Members in Household ___] :  household currently consist of [unfilled] member(s) [Retired] : retired [Less Than High School] : less than high school [] :  [# Of Children ___] : has [unfilled] children [Feels Safe at Home] : Feels safe at home [Smoke Detector] : smoke detector [Carbon Monoxide Detector] : carbon monoxide detector [Safety elements used in home] : safety elements used in home [Seat Belt] :  uses seat belt [Sunscreen] : uses sunscreen [Reviewed no changes] : Reviewed no changes [Audit-CScore] : 0 [de-identified] : Rice, beans, does not eat much meat [de-identified] : Patient tripped and fell last night over throw rug, states left knee is sore today [[id number]] : 0 [Behavior] : denies difficulty with behavior [Language] : denies difficulty with language [Handling Complex Tasks] : denies difficulty handling complex tasks [Reasoning] : denies difficulty with reasoning [Spatial Ability and Orientation] : denies difficulty with spatial ability and orientation [Reports changes in hearing] : Reports no changes in hearing [Reports changes in dental health] : Reports no changes in dental health [Reports normal functional visual acuity (ie: able to read med bottle)] : Reports poor functional visual acuity.  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

## 2019-12-19 NOTE — HISTORY OF PRESENT ILLNESS
[Spouse] : spouse [Post-hospitalization from ___ Hospital] : Post-hospitalization from [unfilled] Hospital [Admitted on: ___] : The patient was admitted on [unfilled] [Discharged on ___] : discharged on [unfilled] [Discharge Med List] : discharge medication list [Patient Contacted By: ____] : and contacted by [unfilled] [FreeTextEntry3] : Patient admitted for CHF, DM, Hypertensive Crisis, CKD, Resp. Failure w/hypoxia, Aortic stenosis, urinary incontinence, uncontrolled htn, and pneumonia [FreeTextEntry2] : Patient has had extreme fluctuating blood sugars since he is home.  Wife states his sugars yesterday were 195 in morning, 249 lunchtime, and bedtime 459.  Home care nurse called office yesterday stating that wife had given Tresiba 3x/day to try and control sugars stating that it's the only thing that works.  Meds are a financial burden also. [de-identified] : Patient has had the last two days extreme fluctuating blood sugars.  Before breakfast yesterday 195, lunchtime 249, before bed 459.  Patient's wife states he takes just Tresiba, but is taking it 3x/day.  It's either  [FreeTextEntry1] : Patient in for follow up of DM and CRF

## 2019-12-19 NOTE — COUNSELING
[Adequate lighting] : Adequate lighting [Use proper foot wear] : Use proper foot wear [Use recommended devices] : Use recommended devices [Engage in a relaxing activity] : Engage in a relaxing activity [Fall prevention counseling provided] : Fall prevention counseling provided [No throw rugs] : No throw rugs [Benefits of weight loss discussed] : Benefits of weight loss discussed [Encouraged to increase physical activity] : Encouraged to increase physical activity [None] : None [Encouraged to use exercise tracking device] : Encouraged to use exercise tracking device [Good understanding] : Patient has a good understanding of lifestyle changes and steps needed to achieve self management goal

## 2019-12-19 NOTE — PHYSICAL EXAM
[No Acute Distress] : no acute distress [Well Developed] : well developed [Well Nourished] : well nourished [Normal Sclera/Conjunctiva] : normal sclera/conjunctiva [PERRL] : pupils equal round and reactive to light [Well-Appearing] : well-appearing [EOMI] : extraocular movements intact [Normal Outer Ear/Nose] : the outer ears and nose were normal in appearance [Normal Oropharynx] : the oropharynx was normal [No JVD] : no jugular venous distention [No Lymphadenopathy] : no lymphadenopathy [Supple] : supple [Thyroid Normal, No Nodules] : the thyroid was normal and there were no nodules present [No Respiratory Distress] : no respiratory distress  [No Accessory Muscle Use] : no accessory muscle use [Clear to Auscultation] : lungs were clear to auscultation bilaterally [Normal Rate] : normal rate  [Regular Rhythm] : with a regular rhythm [Normal S1, S2] : normal S1 and S2 [No Murmur] : no murmur heard [No Carotid Bruits] : no carotid bruits [No Abdominal Bruit] : a ~M bruit was not heard ~T in the abdomen [No Varicosities] : no varicosities [Pedal Pulses Present] : the pedal pulses are present [No Edema] : there was no peripheral edema [No Extremity Clubbing/Cyanosis] : no extremity clubbing/cyanosis [Soft] : abdomen soft [No Palpable Aorta] : no palpable aorta [Non-distended] : non-distended [Non Tender] : non-tender [No Masses] : no abdominal mass palpated [No HSM] : no HSM [Normal Anterior Cervical Nodes] : no anterior cervical lymphadenopathy [Normal Posterior Cervical Nodes] : no posterior cervical lymphadenopathy [Normal Bowel Sounds] : normal bowel sounds [No CVA Tenderness] : no CVA  tenderness [No Spinal Tenderness] : no spinal tenderness [No Joint Swelling] : no joint swelling [Grossly Normal Strength/Tone] : grossly normal strength/tone [No Rash] : no rash [Coordination Grossly Intact] : coordination grossly intact [No Focal Deficits] : no focal deficits [Normal Gait] : normal gait [Deep Tendon Reflexes (DTR)] : deep tendon reflexes were 2+ and symmetric [Normal Affect] : the affect was normal [Normal Insight/Judgement] : insight and judgment were intact

## 2019-12-19 NOTE — PHYSICAL EXAM
[No Acute Distress] : no acute distress [Well Developed] : well developed [Well Nourished] : well nourished [PERRL] : pupils equal round and reactive to light [Well-Appearing] : well-appearing [Normal Sclera/Conjunctiva] : normal sclera/conjunctiva [EOMI] : extraocular movements intact [No JVD] : no jugular venous distention [Normal Outer Ear/Nose] : the outer ears and nose were normal in appearance [Normal Oropharynx] : the oropharynx was normal [No Lymphadenopathy] : no lymphadenopathy [Thyroid Normal, No Nodules] : the thyroid was normal and there were no nodules present [Supple] : supple [No Accessory Muscle Use] : no accessory muscle use [No Respiratory Distress] : no respiratory distress  [Clear to Auscultation] : lungs were clear to auscultation bilaterally [Normal Rate] : normal rate  [Regular Rhythm] : with a regular rhythm [Normal S1, S2] : normal S1 and S2 [No Murmur] : no murmur heard [No Carotid Bruits] : no carotid bruits [No Abdominal Bruit] : a ~M bruit was not heard ~T in the abdomen [No Varicosities] : no varicosities [Pedal Pulses Present] : the pedal pulses are present [No Edema] : there was no peripheral edema [Soft] : abdomen soft [No Palpable Aorta] : no palpable aorta [No Extremity Clubbing/Cyanosis] : no extremity clubbing/cyanosis [Non Tender] : non-tender [Non-distended] : non-distended [No Masses] : no abdominal mass palpated [No HSM] : no HSM [Normal Anterior Cervical Nodes] : no anterior cervical lymphadenopathy [Normal Bowel Sounds] : normal bowel sounds [Normal Posterior Cervical Nodes] : no posterior cervical lymphadenopathy [No CVA Tenderness] : no CVA  tenderness [No Spinal Tenderness] : no spinal tenderness [No Joint Swelling] : no joint swelling [Grossly Normal Strength/Tone] : grossly normal strength/tone [No Rash] : no rash [Coordination Grossly Intact] : coordination grossly intact [No Focal Deficits] : no focal deficits [Normal Gait] : normal gait [Deep Tendon Reflexes (DTR)] : deep tendon reflexes were 2+ and symmetric [Normal Affect] : the affect was normal [Normal Insight/Judgement] : insight and judgment were intact

## 2019-12-19 NOTE — HEALTH RISK ASSESSMENT
[No falls in past year] : Patient reported no falls in the past year [Intercurrent hospitalizations] : was admitted to the hospital  [Never (0 pts)] : Never (0 points) [No] : In the past 12 months have you used drugs other than those required for medical reasons? No [0] : 2) Feeling down, depressed, or hopeless: Not at all (0) [Patient reported colonoscopy was normal] : Patient reported colonoscopy was normal [Change in mental status noted] : Change in mental status noted [Learning/Retaining New Information] : difficulty learning/retaining new information [Access to Medications] : access to medications [Health Literacy] : health literacy [With Significant Other] : lives with significant other [Retired] : retired [# of Members in Household ___] :  household currently consist of [unfilled] member(s) [Less Than High School] : less than high school [] :  [# Of Children ___] : has [unfilled] children [Feels Safe at Home] : Feels safe at home [Smoke Detector] : smoke detector [Carbon Monoxide Detector] : carbon monoxide detector [Seat Belt] :  uses seat belt [Safety elements used in home] : safety elements used in home [Sunscreen] : uses sunscreen [Reviewed no changes] : Reviewed no changes [de-identified] : Rice, beans, does not eat much meat [Audit-CScore] : 0 [de-identified] : Patient tripped and fell last night over throw rug, states left knee is sore today [[id number]] : 0 [Behavior] : denies difficulty with behavior [Language] : denies difficulty with language [Handling Complex Tasks] : denies difficulty handling complex tasks [Reasoning] : denies difficulty with reasoning [Spatial Ability and Orientation] : denies difficulty with spatial ability and orientation [Reports changes in hearing] : Reports no changes in hearing [Reports normal functional visual acuity (ie: able to read med bottle)] : Reports poor functional visual acuity.  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

## 2019-12-19 NOTE — PHYSICAL EXAM
[No Acute Distress] : no acute distress [Well Developed] : well developed [Well Nourished] : well nourished [Well-Appearing] : well-appearing [Normal Sclera/Conjunctiva] : normal sclera/conjunctiva [PERRL] : pupils equal round and reactive to light [EOMI] : extraocular movements intact [No JVD] : no jugular venous distention [Normal Outer Ear/Nose] : the outer ears and nose were normal in appearance [Normal Oropharynx] : the oropharynx was normal [No Lymphadenopathy] : no lymphadenopathy [Thyroid Normal, No Nodules] : the thyroid was normal and there were no nodules present [Supple] : supple [No Accessory Muscle Use] : no accessory muscle use [No Respiratory Distress] : no respiratory distress  [Normal Rate] : normal rate  [Clear to Auscultation] : lungs were clear to auscultation bilaterally [Normal S1, S2] : normal S1 and S2 [Regular Rhythm] : with a regular rhythm [No Murmur] : no murmur heard [No Carotid Bruits] : no carotid bruits [No Abdominal Bruit] : a ~M bruit was not heard ~T in the abdomen [Pedal Pulses Present] : the pedal pulses are present [No Varicosities] : no varicosities [No Edema] : there was no peripheral edema [No Palpable Aorta] : no palpable aorta [Soft] : abdomen soft [No Extremity Clubbing/Cyanosis] : no extremity clubbing/cyanosis [Non Tender] : non-tender [Non-distended] : non-distended [No Masses] : no abdominal mass palpated [No HSM] : no HSM [Normal Anterior Cervical Nodes] : no anterior cervical lymphadenopathy [Normal Posterior Cervical Nodes] : no posterior cervical lymphadenopathy [Normal Bowel Sounds] : normal bowel sounds [No CVA Tenderness] : no CVA  tenderness [No Spinal Tenderness] : no spinal tenderness [No Joint Swelling] : no joint swelling [Grossly Normal Strength/Tone] : grossly normal strength/tone [No Rash] : no rash [Coordination Grossly Intact] : coordination grossly intact [No Focal Deficits] : no focal deficits [Normal Gait] : normal gait [Deep Tendon Reflexes (DTR)] : deep tendon reflexes were 2+ and symmetric [Normal Affect] : the affect was normal [Normal Insight/Judgement] : insight and judgment were intact

## 2019-12-19 NOTE — COUNSELING
[Adequate lighting] : Adequate lighting [Use recommended devices] : Use recommended devices [Use proper foot wear] : Use proper foot wear [Engage in a relaxing activity] : Engage in a relaxing activity [Fall prevention counseling provided] : Fall prevention counseling provided [No throw rugs] : No throw rugs [Benefits of weight loss discussed] : Benefits of weight loss discussed [Encouraged to increase physical activity] : Encouraged to increase physical activity [Encouraged to use exercise tracking device] : Encouraged to use exercise tracking device [None] : None [Good understanding] : Patient has a good understanding of lifestyle changes and steps needed to achieve self management goal

## 2019-12-19 NOTE — REVIEW OF SYSTEMS
[Poor Libido] : poor libido [Negative] : Heme/Lymph [Fatigue] : fatigue [Hot Flashes] : no hot flashes [Recent Change In Weight] : ~T no recent weight change [Redness] : no redness [Dryness] : no dryness [Earache] : no earache [Nosebleeds] : no nosebleeds [Hearing Loss] : no hearing loss [Postnasal Drip] : no postnasal drip [Nasal Discharge] : no nasal discharge [Sore Throat] : no sore throat [Hoarseness] : no hoarseness [Claudication] : no  leg claudication [Orthopena] : no orthopnea [Cough] : no cough [Wheezing] : no wheezing [Diarrhea] : no diarrhea [Melena] : no melena [Hesitancy] : no hesitancy [Incontinence] : incontinence [Nocturia] : no nocturia [Hematuria] : no hematuria [Joint Pain] : no joint pain [Frequency] : no frequency [Muscle Pain] : no muscle pain [Joint Swelling] : no joint swelling [Muscle Weakness] : muscle weakness [Back Pain] : no back pain [Itching] : no itching [Skin Rash] : no skin rash [Headache] : no headache [Fainting] : no fainting [Confusion] : confusion [Dizziness] : no dizziness [Memory Loss] : memory loss [Unsteady Walk] : ataxia [Insomnia] : no insomnia [Suicidal] : not suicidal [Anxiety] : no anxiety [Depression] : no depression [Easy Bleeding] : no easy bleeding [Easy Bruising] : no easy bruising [Swollen Glands] : no swollen glands [de-identified] : dry skin legs [Fever] : no fever [Chills] : no chills [Discharge] : no discharge [Night Sweats] : no night sweats [Pain] : no pain [Itching] : no itching [Vision Problems] : no vision problems [Chest Pain] : no chest pain [Palpitations] : no palpitations [Lower Ext Edema] : no lower extremity edema [Shortness Of Breath] : no shortness of breath [Dyspnea on Exertion] : not dyspnea on exertion [Constipation] : no constipation [Nausea] : no nausea [Abdominal Pain] : no abdominal pain [Vomiting] : no vomiting [Impotence] : no impotency [Heartburn] : no heartburn [Dysuria] : no dysuria [Joint Stiffness] : no joint stiffness [Mole Changes] : no mole changes [Nail Changes] : no nail changes [Hair Changes] : no hair changes

## 2019-12-19 NOTE — HISTORY OF PRESENT ILLNESS
[Spouse] : spouse [Post-hospitalization from ___ Hospital] : Post-hospitalization from [unfilled] Hospital [Admitted on: ___] : The patient was admitted on [unfilled] [Discharged on ___] : discharged on [unfilled] [Discharge Med List] : discharge medication list [Patient Contacted By: ____] : and contacted by [unfilled] [FreeTextEntry3] : Patient admitted for CHF, DM, Hypertensive Crisis, CKD, Resp. Failure w/hypoxia, Aortic stenosis, urinary incontinence, uncontrolled htn, and pneumonia [FreeTextEntry2] : Patient has had extreme fluctuating blood sugars since he is home.  Wife states his sugars yesterday were 195 in morning, 249 lunchtime, and bedtime 459.  Home care nurse called office yesterday stating that wife had given Tresiba 3x/day to try and control sugars stating that it's the only thing that works.  Meds are a financial burden also. [de-identified] : Patient has had the last two days extreme fluctuating blood sugars.  Before breakfast yesterday 195, lunchtime 249, before bed 459.  Patient's wife states he takes just Tresiba, but is taking it 3x/day.  It's either  [FreeTextEntry1] : Patient in for follow up of DM and CRF

## 2019-12-19 NOTE — REVIEW OF SYSTEMS
[Poor Libido] : poor libido [Negative] : Heme/Lymph [Fatigue] : fatigue [Hot Flashes] : no hot flashes [Recent Change In Weight] : ~T no recent weight change [Redness] : no redness [Dryness] : no dryness [Earache] : no earache [Nosebleeds] : no nosebleeds [Hearing Loss] : no hearing loss [Postnasal Drip] : no postnasal drip [Nasal Discharge] : no nasal discharge [Sore Throat] : no sore throat [Hoarseness] : no hoarseness [Claudication] : no  leg claudication [Orthopena] : no orthopnea [Wheezing] : no wheezing [Cough] : no cough [Diarrhea] : no diarrhea [Melena] : no melena [Incontinence] : incontinence [Hesitancy] : no hesitancy [Hematuria] : no hematuria [Nocturia] : no nocturia [Joint Pain] : no joint pain [Frequency] : no frequency [Muscle Pain] : no muscle pain [Joint Swelling] : no joint swelling [Muscle Weakness] : muscle weakness [Back Pain] : no back pain [Itching] : no itching [Skin Rash] : no skin rash [Headache] : no headache [Fainting] : no fainting [Dizziness] : no dizziness [Confusion] : confusion [Memory Loss] : memory loss [Unsteady Walk] : ataxia [Suicidal] : not suicidal [Insomnia] : no insomnia [Anxiety] : no anxiety [Easy Bleeding] : no easy bleeding [Depression] : no depression [Easy Bruising] : no easy bruising [Swollen Glands] : no swollen glands [de-identified] : dry skin legs [Fever] : no fever [Chills] : no chills [Discharge] : no discharge [Night Sweats] : no night sweats [Pain] : no pain [Vision Problems] : no vision problems [Itching] : no itching [Chest Pain] : no chest pain [Palpitations] : no palpitations [Lower Ext Edema] : no lower extremity edema [Shortness Of Breath] : no shortness of breath [Dyspnea on Exertion] : not dyspnea on exertion [Abdominal Pain] : no abdominal pain [Nausea] : no nausea [Constipation] : no constipation [Vomiting] : no vomiting [Impotence] : no impotency [Heartburn] : no heartburn [Dysuria] : no dysuria [Joint Stiffness] : no joint stiffness [Mole Changes] : no mole changes [Nail Changes] : no nail changes [Hair Changes] : no hair changes

## 2019-12-19 NOTE — COUNSELING
[Adequate lighting] : Adequate lighting [Use recommended devices] : Use recommended devices [Use proper foot wear] : Use proper foot wear [Engage in a relaxing activity] : Engage in a relaxing activity [Fall prevention counseling provided] : Fall prevention counseling provided [No throw rugs] : No throw rugs [Benefits of weight loss discussed] : Benefits of weight loss discussed [Encouraged to increase physical activity] : Encouraged to increase physical activity [None] : None [Encouraged to use exercise tracking device] : Encouraged to use exercise tracking device [Good understanding] : Patient has a good understanding of lifestyle changes and steps needed to achieve self management goal

## 2019-12-19 NOTE — REVIEW OF SYSTEMS
[Poor Libido] : poor libido [Negative] : Heme/Lymph [Fatigue] : fatigue [Hot Flashes] : no hot flashes [Recent Change In Weight] : ~T no recent weight change [Redness] : no redness [Dryness] : no dryness [Earache] : no earache [Hearing Loss] : no hearing loss [Nosebleeds] : no nosebleeds [Postnasal Drip] : no postnasal drip [Nasal Discharge] : no nasal discharge [Sore Throat] : no sore throat [Hoarseness] : no hoarseness [Claudication] : no  leg claudication [Orthopena] : no orthopnea [Wheezing] : no wheezing [Cough] : no cough [Diarrhea] : no diarrhea [Melena] : no melena [Incontinence] : incontinence [Hesitancy] : no hesitancy [Hematuria] : no hematuria [Nocturia] : no nocturia [Frequency] : no frequency [Joint Pain] : no joint pain [Muscle Pain] : no muscle pain [Muscle Weakness] : muscle weakness [Joint Swelling] : no joint swelling [Back Pain] : no back pain [Itching] : no itching [Skin Rash] : no skin rash [Headache] : no headache [Dizziness] : no dizziness [Confusion] : confusion [Fainting] : no fainting [Unsteady Walk] : ataxia [Memory Loss] : memory loss [Insomnia] : no insomnia [Suicidal] : not suicidal [Anxiety] : no anxiety [Easy Bleeding] : no easy bleeding [Depression] : no depression [Easy Bruising] : no easy bruising [Swollen Glands] : no swollen glands [de-identified] : dry skin legs [Chills] : no chills [Fever] : no fever [Discharge] : no discharge [Night Sweats] : no night sweats [Pain] : no pain [Itching] : no itching [Vision Problems] : no vision problems [Chest Pain] : no chest pain [Lower Ext Edema] : no lower extremity edema [Palpitations] : no palpitations [Shortness Of Breath] : no shortness of breath [Dyspnea on Exertion] : not dyspnea on exertion [Nausea] : no nausea [Abdominal Pain] : no abdominal pain [Constipation] : no constipation [Vomiting] : no vomiting [Heartburn] : no heartburn [Impotence] : no impotency [Dysuria] : no dysuria [Joint Stiffness] : no joint stiffness [Mole Changes] : no mole changes [Nail Changes] : no nail changes [Hair Changes] : no hair changes

## 2019-12-20 ENCOUNTER — LABORATORY RESULT (OUTPATIENT)
Age: 79
End: 2019-12-20

## 2019-12-20 LAB
ALBUMIN SERPL ELPH-MCNC: 3.9 G/DL
ALP BLD-CCNC: 77 U/L
ALT SERPL-CCNC: 37 U/L
ANION GAP SERPL CALC-SCNC: 15 MMOL/L
APPEARANCE: CLEAR
AST SERPL-CCNC: 39 U/L
BILIRUB SERPL-MCNC: 0.2 MG/DL
BILIRUBIN URINE: NEGATIVE
BLOOD URINE: ABNORMAL
BUN SERPL-MCNC: 59 MG/DL
CALCIUM SERPL-MCNC: 8.3 MG/DL
CHLORIDE SERPL-SCNC: 94 MMOL/L
CO2 SERPL-SCNC: 24 MMOL/L
COLOR: NORMAL
CREAT SERPL-MCNC: 3 MG/DL
ESTIMATED AVERAGE GLUCOSE: 180 MG/DL
GLUCOSE QUALITATIVE U: NEGATIVE
GLUCOSE SERPL-MCNC: 109 MG/DL
HBA1C MFR BLD HPLC: 7.9 %
KETONES URINE: NEGATIVE
LEUKOCYTE ESTERASE URINE: ABNORMAL
NITRITE URINE: NEGATIVE
PH URINE: 6.5
POTASSIUM SERPL-SCNC: 4.7 MMOL/L
PROT SERPL-MCNC: 6.6 G/DL
PROTEIN URINE: ABNORMAL
SODIUM SERPL-SCNC: 133 MMOL/L
SPECIFIC GRAVITY URINE: 1.01
UROBILINOGEN URINE: NORMAL

## 2019-12-23 ENCOUNTER — APPOINTMENT (OUTPATIENT)
Dept: ELECTROPHYSIOLOGY | Facility: CLINIC | Age: 79
End: 2019-12-23

## 2019-12-25 ENCOUNTER — RESULT REVIEW (OUTPATIENT)
Age: 79
End: 2019-12-25

## 2019-12-25 ENCOUNTER — INPATIENT (INPATIENT)
Facility: HOSPITAL | Age: 79
LOS: 14 days | Discharge: HOME | DRG: 264 | End: 2020-01-09
Attending: INTERNAL MEDICINE | Admitting: INTERNAL MEDICINE
Payer: MEDICARE

## 2019-12-25 VITALS
RESPIRATION RATE: 18 BRPM | DIASTOLIC BLOOD PRESSURE: 63 MMHG | WEIGHT: 179.9 LBS | SYSTOLIC BLOOD PRESSURE: 139 MMHG | OXYGEN SATURATION: 100 % | HEIGHT: 64 IN | HEART RATE: 56 BPM

## 2019-12-25 DIAGNOSIS — Z96.0 PRESENCE OF UROGENITAL IMPLANTS: Chronic | ICD-10-CM

## 2019-12-25 DIAGNOSIS — R06.02 SHORTNESS OF BREATH: ICD-10-CM

## 2019-12-25 LAB
ALBUMIN SERPL ELPH-MCNC: 3.9 G/DL — SIGNIFICANT CHANGE UP (ref 3.3–5.2)
ALP SERPL-CCNC: 88 U/L — SIGNIFICANT CHANGE UP (ref 40–120)
ALT FLD-CCNC: 34 U/L — SIGNIFICANT CHANGE UP
ANION GAP SERPL CALC-SCNC: 20 MMOL/L — HIGH (ref 5–17)
APPEARANCE UR: CLEAR — SIGNIFICANT CHANGE UP
AST SERPL-CCNC: 50 U/L — HIGH
B PERT IGG+IGM PNL SER: CLEAR — SIGNIFICANT CHANGE UP
BASOPHILS # BLD AUTO: 0.02 K/UL — SIGNIFICANT CHANGE UP (ref 0–0.2)
BASOPHILS NFR BLD AUTO: 0.3 % — SIGNIFICANT CHANGE UP (ref 0–2)
BILIRUB SERPL-MCNC: 0.3 MG/DL — LOW (ref 0.4–2)
BILIRUB UR-MCNC: NEGATIVE — SIGNIFICANT CHANGE UP
BUN SERPL-MCNC: 68 MG/DL — HIGH (ref 8–20)
CALCIUM SERPL-MCNC: 8 MG/DL — LOW (ref 8.6–10.2)
CHLORIDE SERPL-SCNC: 96 MMOL/L — LOW (ref 98–107)
CK SERPL-CCNC: 73 U/L — SIGNIFICANT CHANGE UP (ref 30–200)
CO2 SERPL-SCNC: 18 MMOL/L — LOW (ref 22–29)
COLOR FLD: YELLOW
COLOR SPEC: YELLOW — SIGNIFICANT CHANGE UP
CREAT SERPL-MCNC: 3.69 MG/DL — HIGH (ref 0.5–1.3)
DIFF PNL FLD: ABNORMAL
EOSINOPHIL # BLD AUTO: 0.02 K/UL — SIGNIFICANT CHANGE UP (ref 0–0.5)
EOSINOPHIL NFR BLD AUTO: 0.3 % — SIGNIFICANT CHANGE UP (ref 0–6)
FLUID INTAKE SUBSTANCE CLASS: SIGNIFICANT CHANGE UP
FLUID SEGMENTED GRANULOCYTES: 22 % — SIGNIFICANT CHANGE UP
GLUCOSE SERPL-MCNC: 117 MG/DL — HIGH (ref 70–115)
GLUCOSE UR QL: NEGATIVE MG/DL — SIGNIFICANT CHANGE UP
HCT VFR BLD CALC: 29.3 % — LOW (ref 39–50)
HGB BLD-MCNC: 9 G/DL — LOW (ref 13–17)
IMM GRANULOCYTES NFR BLD AUTO: 0.3 % — SIGNIFICANT CHANGE UP (ref 0–1.5)
KETONES UR-MCNC: NEGATIVE — SIGNIFICANT CHANGE UP
LACTATE BLDV-MCNC: 1.1 MMOL/L — SIGNIFICANT CHANGE UP (ref 0.5–2)
LEUKOCYTE ESTERASE UR-ACNC: ABNORMAL
LYMPHOCYTES # BLD AUTO: 0.63 K/UL — LOW (ref 1–3.3)
LYMPHOCYTES # BLD AUTO: 9.2 % — LOW (ref 13–44)
LYMPHOCYTES # FLD: 18 % — SIGNIFICANT CHANGE UP
MCHC RBC-ENTMCNC: 24.4 PG — LOW (ref 27–34)
MCHC RBC-ENTMCNC: 30.7 GM/DL — LOW (ref 32–36)
MCV RBC AUTO: 79.4 FL — LOW (ref 80–100)
MESOTHL CELL # FLD: 8 % — SIGNIFICANT CHANGE UP
MONOCYTES # BLD AUTO: 0.35 K/UL — SIGNIFICANT CHANGE UP (ref 0–0.9)
MONOCYTES NFR BLD AUTO: 5.1 % — SIGNIFICANT CHANGE UP (ref 2–14)
MONOS+MACROS # FLD: 51 % — SIGNIFICANT CHANGE UP
NEUTROPHILS # BLD AUTO: 5.82 K/UL — SIGNIFICANT CHANGE UP (ref 1.8–7.4)
NEUTROPHILS NFR BLD AUTO: 84.8 % — HIGH (ref 43–77)
NITRITE UR-MCNC: NEGATIVE — SIGNIFICANT CHANGE UP
NT-PROBNP SERPL-SCNC: HIGH PG/ML (ref 0–300)
OTHER CELLS FLD MANUAL: 1 % — SIGNIFICANT CHANGE UP
PH FLD: 8 — SIGNIFICANT CHANGE UP
PH UR: 5 — SIGNIFICANT CHANGE UP (ref 5–8)
PLATELET # BLD AUTO: 186 K/UL — SIGNIFICANT CHANGE UP (ref 150–400)
POTASSIUM SERPL-MCNC: 4.5 MMOL/L — SIGNIFICANT CHANGE UP (ref 3.5–5.3)
POTASSIUM SERPL-SCNC: 4.5 MMOL/L — SIGNIFICANT CHANGE UP (ref 3.5–5.3)
PROT SERPL-MCNC: 7 G/DL — SIGNIFICANT CHANGE UP (ref 6.6–8.7)
PROT UR-MCNC: 100 MG/DL
RAPID RVP RESULT: SIGNIFICANT CHANGE UP
RBC # BLD: 3.69 M/UL — LOW (ref 4.2–5.8)
RBC # FLD: 19.9 % — HIGH (ref 10.3–14.5)
RCV VOL RI: 29 /UL — HIGH (ref 0–0)
SODIUM SERPL-SCNC: 134 MMOL/L — LOW (ref 135–145)
SP GR SPEC: 1.02 — SIGNIFICANT CHANGE UP (ref 1.01–1.02)
SPECIMEN SOURCE FLD: SIGNIFICANT CHANGE UP
SPECIMEN SOURCE FLD: SIGNIFICANT CHANGE UP
T3 SERPL-MCNC: 60 NG/DL — LOW (ref 80–200)
T4 AB SER-ACNC: 9.1 UG/DL — SIGNIFICANT CHANGE UP (ref 4.5–12)
TOTAL NUCLEATED CELL COUNT, BODY FLUID: 82 /UL — SIGNIFICANT CHANGE UP
TROPONIN T SERPL-MCNC: 0.02 NG/ML — SIGNIFICANT CHANGE UP (ref 0–0.06)
TSH SERPL-MCNC: 6.54 UIU/ML — HIGH (ref 0.27–4.2)
TUBE TYPE: SIGNIFICANT CHANGE UP
UROBILINOGEN FLD QL: NEGATIVE MG/DL — SIGNIFICANT CHANGE UP
WBC # BLD: 6.86 K/UL — SIGNIFICANT CHANGE UP (ref 3.8–10.5)
WBC # FLD AUTO: 6.86 K/UL — SIGNIFICANT CHANGE UP (ref 3.8–10.5)

## 2019-12-25 PROCEDURE — 99285 EMERGENCY DEPT VISIT HI MDM: CPT

## 2019-12-25 PROCEDURE — 99223 1ST HOSP IP/OBS HIGH 75: CPT

## 2019-12-25 PROCEDURE — 99231 SBSQ HOSP IP/OBS SF/LOW 25: CPT | Mod: 25

## 2019-12-25 PROCEDURE — 93010 ELECTROCARDIOGRAM REPORT: CPT

## 2019-12-25 PROCEDURE — 88112 CYTOPATH CELL ENHANCE TECH: CPT | Mod: 26

## 2019-12-25 PROCEDURE — 88305 TISSUE EXAM BY PATHOLOGIST: CPT | Mod: 26

## 2019-12-25 PROCEDURE — 71045 X-RAY EXAM CHEST 1 VIEW: CPT | Mod: 26,77

## 2019-12-25 PROCEDURE — 71045 X-RAY EXAM CHEST 1 VIEW: CPT | Mod: 26

## 2019-12-25 RX ORDER — DOXAZOSIN MESYLATE 4 MG
4 TABLET ORAL AT BEDTIME
Refills: 0 | Status: DISCONTINUED | OUTPATIENT
Start: 2019-12-25 | End: 2020-01-09

## 2019-12-25 RX ORDER — INSULIN LISPRO 100/ML
VIAL (ML) SUBCUTANEOUS
Refills: 0 | Status: DISCONTINUED | OUTPATIENT
Start: 2019-12-25 | End: 2020-01-09

## 2019-12-25 RX ORDER — CARVEDILOL PHOSPHATE 80 MG/1
25 CAPSULE, EXTENDED RELEASE ORAL EVERY 12 HOURS
Refills: 0 | Status: DISCONTINUED | OUTPATIENT
Start: 2019-12-25 | End: 2020-01-09

## 2019-12-25 RX ORDER — CHOLECALCIFEROL (VITAMIN D3) 125 MCG
1000 CAPSULE ORAL DAILY
Refills: 0 | Status: DISCONTINUED | OUTPATIENT
Start: 2019-12-25 | End: 2020-01-09

## 2019-12-25 RX ORDER — ISOSORBIDE MONONITRATE 60 MG/1
30 TABLET, EXTENDED RELEASE ORAL DAILY
Refills: 0 | Status: DISCONTINUED | OUTPATIENT
Start: 2019-12-25 | End: 2020-01-09

## 2019-12-25 RX ORDER — HYDRALAZINE HCL 50 MG
50 TABLET ORAL THREE TIMES A DAY
Refills: 0 | Status: DISCONTINUED | OUTPATIENT
Start: 2019-12-25 | End: 2020-01-09

## 2019-12-25 RX ORDER — LIDOCAINE HCL 20 MG/ML
25 VIAL (ML) INJECTION ONCE
Refills: 0 | Status: COMPLETED | OUTPATIENT
Start: 2019-12-25 | End: 2019-12-25

## 2019-12-25 RX ORDER — DEXTROSE 50 % IN WATER 50 %
25 SYRINGE (ML) INTRAVENOUS ONCE
Refills: 0 | Status: DISCONTINUED | OUTPATIENT
Start: 2019-12-25 | End: 2020-01-09

## 2019-12-25 RX ORDER — GLUCAGON INJECTION, SOLUTION 0.5 MG/.1ML
1 INJECTION, SOLUTION SUBCUTANEOUS ONCE
Refills: 0 | Status: DISCONTINUED | OUTPATIENT
Start: 2019-12-25 | End: 2020-01-09

## 2019-12-25 RX ORDER — AMLODIPINE BESYLATE 2.5 MG/1
10 TABLET ORAL DAILY
Refills: 0 | Status: DISCONTINUED | OUTPATIENT
Start: 2019-12-25 | End: 2020-01-09

## 2019-12-25 RX ORDER — HEPARIN SODIUM 5000 [USP'U]/ML
5000 INJECTION INTRAVENOUS; SUBCUTANEOUS EVERY 12 HOURS
Refills: 0 | Status: DISCONTINUED | OUTPATIENT
Start: 2019-12-25 | End: 2020-01-09

## 2019-12-25 RX ORDER — OXYCODONE AND ACETAMINOPHEN 5; 325 MG/1; MG/1
1 TABLET ORAL ONCE
Refills: 0 | Status: DISCONTINUED | OUTPATIENT
Start: 2019-12-25 | End: 2019-12-26

## 2019-12-25 RX ORDER — SODIUM BICARBONATE 1 MEQ/ML
650 SYRINGE (ML) INTRAVENOUS
Refills: 0 | Status: DISCONTINUED | OUTPATIENT
Start: 2019-12-25 | End: 2020-01-09

## 2019-12-25 RX ORDER — DEXTROSE 50 % IN WATER 50 %
12.5 SYRINGE (ML) INTRAVENOUS ONCE
Refills: 0 | Status: DISCONTINUED | OUTPATIENT
Start: 2019-12-25 | End: 2020-01-09

## 2019-12-25 RX ORDER — DEXTROSE 50 % IN WATER 50 %
15 SYRINGE (ML) INTRAVENOUS ONCE
Refills: 0 | Status: DISCONTINUED | OUTPATIENT
Start: 2019-12-25 | End: 2020-01-09

## 2019-12-25 RX ORDER — ATORVASTATIN CALCIUM 80 MG/1
10 TABLET, FILM COATED ORAL AT BEDTIME
Refills: 0 | Status: DISCONTINUED | OUTPATIENT
Start: 2019-12-25 | End: 2020-01-09

## 2019-12-25 RX ORDER — SODIUM CHLORIDE 9 MG/ML
1000 INJECTION, SOLUTION INTRAVENOUS
Refills: 0 | Status: DISCONTINUED | OUTPATIENT
Start: 2019-12-25 | End: 2020-01-09

## 2019-12-25 RX ORDER — FERROUS SULFATE 325(65) MG
325 TABLET ORAL DAILY
Refills: 0 | Status: DISCONTINUED | OUTPATIENT
Start: 2019-12-25 | End: 2020-01-09

## 2019-12-25 RX ORDER — CEFTRIAXONE 500 MG/1
1000 INJECTION, POWDER, FOR SOLUTION INTRAMUSCULAR; INTRAVENOUS ONCE
Refills: 0 | Status: COMPLETED | OUTPATIENT
Start: 2019-12-25 | End: 2019-12-25

## 2019-12-25 RX ORDER — ASPIRIN/CALCIUM CARB/MAGNESIUM 324 MG
325 TABLET ORAL DAILY
Refills: 0 | Status: DISCONTINUED | OUTPATIENT
Start: 2019-12-25 | End: 2020-01-03

## 2019-12-25 RX ORDER — FUROSEMIDE 40 MG
40 TABLET ORAL ONCE
Refills: 0 | Status: COMPLETED | OUTPATIENT
Start: 2019-12-25 | End: 2019-12-25

## 2019-12-25 RX ADMIN — Medication 50 MILLIGRAM(S): at 22:03

## 2019-12-25 RX ADMIN — Medication 325 MILLIGRAM(S): at 15:21

## 2019-12-25 RX ADMIN — CARVEDILOL PHOSPHATE 25 MILLIGRAM(S): 80 CAPSULE, EXTENDED RELEASE ORAL at 16:15

## 2019-12-25 RX ADMIN — ATORVASTATIN CALCIUM 10 MILLIGRAM(S): 80 TABLET, FILM COATED ORAL at 21:59

## 2019-12-25 RX ADMIN — Medication 40 MILLIGRAM(S): at 12:20

## 2019-12-25 RX ADMIN — Medication 25 MILLILITER(S): at 18:05

## 2019-12-25 RX ADMIN — Medication 50 MILLIGRAM(S): at 15:21

## 2019-12-25 RX ADMIN — CEFTRIAXONE 100 MILLIGRAM(S): 500 INJECTION, POWDER, FOR SOLUTION INTRAMUSCULAR; INTRAVENOUS at 13:17

## 2019-12-25 RX ADMIN — Medication 650 MILLIGRAM(S): at 16:16

## 2019-12-25 RX ADMIN — Medication 1000 UNIT(S): at 15:21

## 2019-12-25 RX ADMIN — Medication 4 MILLIGRAM(S): at 21:59

## 2019-12-25 RX ADMIN — HEPARIN SODIUM 5000 UNIT(S): 5000 INJECTION INTRAVENOUS; SUBCUTANEOUS at 16:16

## 2019-12-25 NOTE — ED ADULT NURSE REASSESSMENT NOTE - NS ED NURSE REASSESS COMMENT FT1
rcvd pt A&OX2- oriented to self and place, resp even and unlabored no distress noted, cardiac monitor in place, pt denies any pain, has no complaints and appears comfortable laying in stretcher, crystal hugger in place, will continue to monitor rcvd pt A&OX2- oriented to self and place, as per wife at bedside pt at baseline,  resp even and unlabored no distress noted, cardiac monitor in place, pt denies any pain, has no complaints and appears comfortable laying in stretcher, crystal hugger in place, will continue to monitor

## 2019-12-25 NOTE — ED PROVIDER NOTE - OBJECTIVE STATEMENT
This patient is a 79 year old man with hx of CKD, HTN, HLD and DM recently discharged from [facility] 1 week ago with CHF who presents to the ER with his eife via EMS c/o SOB.  Patient wife reports that the patient was not well upon discharge and still had SOB.  His symptoms worsened yesterday.  Today he had generalized weakness and was unable to dress himself of stand and appeared very SOB.  His finger stick reading at home was 70 and he was given orange juice by his wife.  Patient has no complaints.  His wife reports a 7 pound weight gain in 1 day.

## 2019-12-25 NOTE — CONSULT NOTE ADULT - ASSESSMENT
Pt is a 80 y/o male with medical history of DM, HTN, CKD, BPH, pAfib with no AC, who presents to [facility]-ED for c/o SOB. Pt is not a good historian d/t possible dementia, wife at bedside. Pt wife noticed pt has increased SOB even after discharge from [facility]. Pt also started to have frequent chills, but no known fever. Pt family further assess that "he is not himself", not as energetic and responsive to family. Pt was brought to [facility]-ED and was found to be hypothermic at 91.2, HR @ 53 SB, BP @ 139/63. On previous admission, pt had similar symptoms and was advised to be evaluated for AVR as outpatient. Pt Denies fever, cough, phlegm production,  PND, edema, chest pain, pressure, palpitations, irregular, fast heart beat, nausea, vomiting, melena, rectal bleed, hematuria, lightheadedness, dizziness, syncope, near syncope.     Plan:     1. Aortic Stenosis with SOB  -BNP- 10,372  -Tele- SB HR @ 52  - Chest Xray 12/25/19- Probable congestive heart failure  -CT Chest 12/10/19 -groundglass opacities in both upper lobes, moderate bilateral effusions  - Continue Lasix 40mh IVP BID  -Monitor Potassium, Maintain above 4, Replenish PRN  -Echo 12/10/19 -EF 60-65%, mod-severe AS, possible SCAR as per attending   -Consider CT SX consult to re-evaluate for AVR (possible sooner surgical date?) and possible pleural effusion drainage  -Plan for eventual SCAR       2. HTN  -recent /60  -Continue current antihypertensive medication regimen     3. CKD  -BUN/CreatninE- 68/3.69  -Consider Nephrology consult d/t increased trend

## 2019-12-25 NOTE — CONSULT NOTE ADULT - SUBJECTIVE AND OBJECTIVE BOX
[facility]                                                               Office:  [address] [address]                                                              Telephone: [phone]. Fax:[phone]                                                                        CARDIOLOGY CONSULTATION NOTE                                                                                             Consult requested by:  Dr. Scott  Reason for Consultation:   History obtained by: Patient and medical record   obtained: No    Chief complaint:    Patient is a 79y old  Male who presents with a chief complaint of SOB      HPI:         REVIEW OF SYMPTOMS:     CONSTITUTIONAL: No fever, weight loss, or fatigue  ENMT:  No difficulty hearing, tinnitus, vertigo; No sinus or throat pain  NECK: No pain or stiffness  CARDIOVASCULAR: No chest pain, dyspnea, syncope, palpitations, dizziness, Orthopnea, Paroxsymal nocturnal dyspnea  RESPIRATORY: No Dyspnea on exertion, Shortness of breath, cough, wheezing  : No dysuria, no hematuria   GI: No dark color stool, no melena, no diarrhea, no constipation, no abdominal pain   NEURO: No headache, no dizziness, no slurred speech   MUSCULOSKELETAL: No joint pain or swelling; No muscle, back, or extremity pain  PSYCH: No agitation, no anxiety.    ALL OTHER REVIEW OF SYSTEMS ARE NEGATIVE.      PREVIOUS DIAGNOSTIC TESTING  ECHO FINDINGS:      STRESS FINDINGS:      CATHETERIZATION FINDINGS:         ALLERGIES: Allergies    No Known Allergies    Intolerances          PAST MEDICAL HISTORY  Hyperkalemia  ADI (acute kidney injury)  BPH (benign prostatic hyperplasia)  CKD (chronic kidney disease)  Hyperlipemia  Hypertension  Diabetes      PAST SURGICAL HISTORY  Ureteral stent retained      FAMILY HISTORY:  No pertinent family history in first degree relatives      SOCIAL HISTORY:  Denies smoking/alcohol/drugs  CIGARETTES:     ALCOHOL:  DRUGS:      CURRENT MEDICATIONS:  amLODIPine   Tablet 10 milliGRAM(s) Oral daily  carvedilol 25 milliGRAM(s) Oral every 12 hours  doxazosin 4 milliGRAM(s) Oral at bedtime  hydrALAZINE 50 milliGRAM(s) Oral three times a day  isosorbide   mononitrate ER Tablet (IMDUR) 30 milliGRAM(s) Oral daily     aspirin  atorvastatin  cholecalciferol  dextrose 5%.  dextrose 50% Injectable  dextrose 50% Injectable  dextrose 50% Injectable  ferrous    sulfate  heparin  Injectable  insulin lispro (HumaLOG) corrective regimen sliding scale  sodium bicarbonate        HOME MEDICATIONS:      Vital Signs Last 24 Hrs  T(C): 33.5 (25 Dec 2019 12:47), Max: 33.5 (25 Dec 2019 12:47)  T(F): 92.3 (25 Dec 2019 12:47), Max: 92.3 (25 Dec 2019 12:47)  HR: 52 (25 Dec 2019 12:47) (52 - 56)  BP: 122/60 (25 Dec 2019 12:47) (122/60 - 139/63)  BP(mean): --  RR: 20 (25 Dec 2019 12:47) (18 - 20)  SpO2: 97% (25 Dec 2019 12:47) (97% - 100%)      PHYSICAL EXAM:  Constitutional: Comfortable . No acute distress.   HEENT: Atraumatic and normocephalic , neck is supple . no JVD. No carotid bruit. PEERL   CNS: A&Ox3. No focal deficits. EOMI. Cranial nerves II-IX are intact.   Lymph Nodes: Cervical : Not palpable.  Respiratory: CTAB  Cardiovascular: S1S2 RRR. No murmur/rubs or gallop.  Gastrointestinal: Soft non-tender and non distended . +Bowel sounds. negative Shirley's sign.  Extremities: No edema.   Psychiatric: Calm . no agitation.  Skin: No skin rash/ulcers visualized to face, hands or feet.    Intake and output:     LABS:                        9.0    6.86  )-----------( 186      ( 25 Dec 2019 11:57 )             29.3         134<L>  |  96<L>  |  68.0<H>  ----------------------------<  117<H>  4.5   |  18.0<L>  |  3.69<H>    Ca    8.0<L>      25 Dec 2019 11:57    TPro  7.0  /  Alb  3.9  /  TBili  0.3<L>  /  DBili  x   /  AST  50<H>  /  ALT  34  /  AlkPhos  88      CARDIAC MARKERS ( 25 Dec 2019 11:57 )  x     / 0.02 ng/mL / 73 U/L / x     / x        ;p-BNP=Serum Pro-Brain Natriuretic Peptide: 60279 pg/mL ( @ 11:57)      Urinalysis Basic - ( 25 Dec 2019 12:13 )    Color: Yellow / Appearance: Clear / S.025 / pH: x  Gluc: x / Ketone: Negative  / Bili: Negative / Urobili: Negative mg/dL   Blood: x / Protein: 100 mg/dL / Nitrite: Negative   Leuk Esterase: Moderate / RBC: 3-5 /HPF / WBC 6-10   Sq Epi: x / Non Sq Epi: Occasional / Bacteria: Negative        INTERPRETATION OF TELEMETRY: Reviewed by me.   ECG: Reviewed by me.     RADIOLOGY & ADDITIONAL STUDIES:    X-ray:  reviewed by me.   CT scan:   MRI: [facility]                                                               Office:  [address] [address]                                                              Telephone: [phone]. Fax:[phone]                                                                        CARDIOLOGY CONSULTATION NOTE                                                                                             Consult requested by:  Dr. Scott  Reason for Consultation:   History obtained by: Patient and medical record   obtained: No    Chief complaint:    Patient is a 79y old  Male who presents with a chief complaint of SOB      HPI: Pt is a 80 y/o male with medical history of DM, HTN, CKD, BPH, pAfib with no AC, who presents to [facility]-ED for c/o SOB. Pt is not a good historian d/t possible dementia, wife at bedside. Pt wife noticed pt has increased SOB even after discharge from [facility]. Pt also started to have frequent chills, but no known fever. Pt family further assess that "he is not himself", not as energetic and responsive to family. Pt was brought to [facility]-ED and was found to be hypothermic at 91.2, HR @ 53 SB, BP @ 139/63. On previous admission, pt had similar symptoms and was advised to be evaluated for AVR as outpatient. Pt Denies fever, cough, phlegm production,  PND, edema, chest pain, pressure, palpitations, irregular, fast heart beat, nausea, vomiting, melena, rectal bleed, hematuria, lightheadedness, dizziness, syncope, near syncope.       REVIEW OF SYMPTOMS:     CONSTITUTIONAL: No fever, weight loss, or fatigue  ENMT:  No difficulty hearing, tinnitus, vertigo; No sinus or throat pain  NECK: No pain or stiffness  CARDIOVASCULAR: See HPI  RESPIRATORY: No Dyspnea on exertion, Shortness of breath, cough, wheezing  : No dysuria, no hematuria   GI: No dark color stool, no melena, no diarrhea, no constipation, no abdominal pain   NEURO: No headache, no dizziness, no slurred speech   MUSCULOSKELETAL: No joint pain or swelling; No muscle, back, or extremity pain  PSYCH: No agitation, no anxiety.    ALL OTHER REVIEW OF SYSTEMS ARE NEGATIVE.      PREVIOUS DIAGNOSTIC TESTING  ECHO FINDINGS:< from: TTE Echo Complete w/Doppler (12.10.19 @ 13:21) >  Summary:   1. Technically difficult study.   2. Normal global left ventricular systolic function.   3. Left ventricular ejection fraction, by visual estimation, is 60 to   65%.   4. Spectral Doppler shows pseudonormal pattern of left ventricular   myocardial filling (Grade II diastolic dysfunction).   5. Elevated mean left atrial pressure. Mean LAP estimated at 22 mmHg.   6. Mild mitral annular calcification.   7. Mild thickening and calcification of the anterior and posterior   mitralvalve leaflets.   8. Mild mitral valve regurgitation.   9. Mild aortic regurgitation.  10. Moderate to severe aortic valve stenosis. Recommend SCAR for further   evaluation.  11. Moderate pleural effusion in both left and right lateral regions.  12. There is no evidence of pericardial effusion.      < end of copied text >        ALLERGIES: Allergies    No Known Allergies    Intolerances      PAST MEDICAL HISTORY  Hyperkalemia  ADI (acute kidney injury)  BPH (benign prostatic hyperplasia)  CKD (chronic kidney disease)  Hyperlipemia  Hypertension  Diabetes      PAST SURGICAL HISTORY  Ureteral stent retained      FAMILY HISTORY:  No pertinent family history in first degree relatives      SOCIAL HISTORY:  Denies smoking/alcohol/drugs  CIGARETTES:     ALCOHOL:  DRUGS:      CURRENT MEDICATIONS:  amLODIPine   Tablet 10 milliGRAM(s) Oral daily  carvedilol 25 milliGRAM(s) Oral every 12 hours  doxazosin 4 milliGRAM(s) Oral at bedtime  hydrALAZINE 50 milliGRAM(s) Oral three times a day  isosorbide   mononitrate ER Tablet (IMDUR) 30 milliGRAM(s) Oral daily  aspirin  atorvastatin  cholecalciferol  dextrose 5%.  dextrose 50% Injectable  dextrose 50% Injectable  dextrose 50% Injectable  ferrous    sulfate  heparin  Injectable  insulin lispro (HumaLOG) corrective regimen sliding scale  sodium bicarbonate        HOME MEDICATIONS:    amLODIPine 10 mg oral tablet: 1 tab(s) orally once a day (10 Dec 2019 02:16)  furosemide 40 mg oral tablet: 1 tab(s) orally once a day on weekdays  and 1 tablet bid on week ends   (16 Dec 2019 17:42)  hydrALAZINE 50 mg oral tablet: 1 tab(s) orally 3 times a day (16 Dec 2019 11:30)  insulin lispro 100 units/mL injectable solution: 3 times a day before meals    Vital Signs Last 24 Hrs  T(C): 33.5 (25 Dec 2019 12:47), Max: 33.5 (25 Dec 2019 12:47)  T(F): 92.3 (25 Dec 2019 12:47), Max: 92.3 (25 Dec 2019 12:47)  HR: 52 (25 Dec 2019 12:47) (52 - 56)  BP: 122/60 (25 Dec 2019 12:47) (122/60 - 139/63)  RR: 20 (25 Dec 2019 12:47) (18 - 20)  SpO2: 97% (25 Dec 2019 12:47) (97% - 100%)      PHYSICAL EXAM:  Constitutional: Comfortable . No acute distress.   HEENT: Atraumatic and normocephalic , neck is supple . no JVD. No carotid bruit. PEERL   CNS: A&Ox3. No focal deficits. EOMI.   Lymph Nodes: Cervical : Not palpable.  Respiratory: CTAB  Cardiovascular: S1S2, RRR. +systolic murmur grade IV  Gastrointestinal: Soft non-tender and non distended . +Bowel sounds. negative Shirley's sign.  Extremities: +3/+3 pitting bilateral edema   Psychiatric: Calm, no agitation  Skin: No skin rash/ulcers visualized to face, hands or feet.    Intake and output:     LABS:                        9.0    6.86  )-----------( 186      ( 25 Dec 2019 11:57 )             29.3         134<L>  |  96<L>  |  68.0<H>  ----------------------------<  117<H>  4.5   |  18.0<L>  |  3.69<H>    Ca    8.0<L>      25 Dec 2019 11:57    TPro  7.0  /  Alb  3.9  /  TBili  0.3<L>  /  DBili  x   /  AST  50<H>  /  ALT  34  /  AlkPhos  88      CARDIAC MARKERS ( 25 Dec 2019 11:57 )  x     / 0.02 ng/mL / 73 U/L / x     / x        ;p-BNP=Serum Pro-Brain Natriuretic Peptide: 06252 pg/mL ( @ 11:57)      Urinalysis Basic - ( 25 Dec 2019 12:13 )    Color: Yellow / Appearance: Clear / S.025 / pH: x  Gluc: x / Ketone: Negative  / Bili: Negative / Urobili: Negative mg/dL   Blood: x / Protein: 100 mg/dL / Nitrite: Negative   Leuk Esterase: Moderate / RBC: 3-5 /HPF / WBC 6-10   Sq Epi: x / Non Sq Epi: Occasional / Bacteria: Negative        INTERPRETATION OF TELEMETRY: SB HR @ 56       RADIOLOGY & ADDITIONAL STUDIES:    X-ray:  < from: Xray Chest 1 View-PORTABLE IMMEDIATE (19 @ 11:39) >  IMPRESSION:     Probable congestive heart failure.    < end of copied text >    CT scan: < from: CT Chest No Cont (12.10.19 @ 12:39) >  IMPRESSION:     Moderate bilateral pleural effusions, larger on the right with associated   compressive atelectasis in both lower lobes.    Groundglass opacities in both upper lobes with additional areas of   nodularity in the right upper lobe; differential includes pulmonary edema   or less likely infection.    < end of copied text >

## 2019-12-25 NOTE — H&P ADULT - ASSESSMENT
The patient is a 79 year old male with a history of CKD stage 4, hypertension, chronic diastolic CHF, bph and moderate-severe AS who presented to the ER with complaints of difficulty breathing. According to the patient and his wife at bedside he was admitted to [facility] on 12/10 for acute hypoxic respiratory failure secondary to acute on chronic diastolic CHF, hypertensive urgency and UTI. He was discharged home on PO diuretics. Since being home he initially felt better however his wife about a week ago he started to have symptoms of orthopnea and dyspnea at rest with worsening lower extremity edema. A house physician came to evaluate him over the weekend and increased the dose of lasix from 40mg PO OD to BID with some improvement. However, last night he suddenly felt like he could not breath and was brought to the ER. In the ED, improved with a dose of IV lasix 40mg x 1. Noted to be hypothermic, rectal temperature of 91.3 with a heart rate of 53. Started on a bear hugger with improvement to 92.3. Lactate was negative, no leukocytosis> Chest xray with pulmonary edema and elevated BNP. Given a dose of empiric rocephin x 1.     Assessment/Plan:    1. Acute on chronic diastolic CHF: IV lasix BID  Strict I&os   Daily weight  Cardio evaluation  On Coreg   Continue imdur and hydralazine with parameters    2. ADI with CKD stage with metabolic acidosis: Continue IV diuresis   Monitor BMP    3. Hypothermia: Unclear cause  Rule out infectious etiology- blood cultures x 2, Urine cultures sent. Given an empiric dose of IV rocephin  Check TFTs    4. Diabetes mellitus type 2: HSS   Monitor BSL    5. Hypertension: Continue norvasc with parameters    6. Severe AS: Evaluated by CT surgery prior admission; was supposed to be evaluated as outpatient for TAVR    7. BPH on doxazosin     VTE_ Heparin subcut

## 2019-12-25 NOTE — CONSULT NOTE ADULT - ASSESSMENT
79 year old male with a history of CKD stage 4, hypertension, chronic diastolic CHF, bph and moderate-severe AS who presented to the ER with complaints of difficulty breathing. According to the patient and his wife at bedside he was admitted to [facility] on 12/10 for acute hypoxic respiratory failure secondary to acute on chronic diastolic CHF, hypertensive urgency and UTI. He was discharged home on PO diuretics. Since being home he initially felt better however his wife about a week ago he started to have symptoms of orthopnea and dyspnea at rest with worsening lower extremity edema. A house physician came to evaluate him over the weekend and increased the dose of lasix from 40mg PO OD to BID with some improvement. However, last night he suddenly felt like he could not breath and was brought to the ER. In the ED, improved with a dose of IV lasix 40mg x 1. Noted to be hypothermic, rectal temperature of 91.3 with a heart rate of 53. Started on a bear hugger with improvement to 92.3. Lactate was negative, no leukocytosis> Chest xray with pulmonary edema and elevated BNP. Given a dose of empiric rocephin x 1. since consulted by cardiology for cardiac surgery possible tavr in the setting of severe AS/ multicomorbdities and thoracic surgery for pleural effusions       Assessment/Plan:    1. Acute on chronic diastolic CHF:  as per primary team     2. ADI with CKD stage with metabolic acidosis:   Continue IV diuresis   Monitor BMP  this complicates TAVR work up   consider nephrology involvement     3. Hypothermia: Unclear cause  Rule out infectious etiology- blood cultures x 2, Urine cultures sent. Given an empiric dose of IV rocephin  Check TFTs  ordered RVP       E/M TIME SPENT:   43 minutes of noncontinuous time spent   Evaluating/treating patient, reviewing data/labs/imaging, discussing case with multidisciplinary team, discussing plan/goals of care with patient/family about patient's condition . Non-inclusive of procedure time.   greater than 50% of time spent educating patient about condition, medication and prognosis.

## 2019-12-25 NOTE — ED ADULT TRIAGE NOTE - CHIEF COMPLAINT QUOTE
Pt states "I gained 7 pounds since yesterday", pt with edema to B/L lower extremities and c/o difficulty breathing, denies chest pain, pt with unlabored breathing, MD Barillas called to bedside for eval

## 2019-12-25 NOTE — CONSULT NOTE ADULT - SUBJECTIVE AND OBJECTIVE BOX
SUBJECTIVE   "i was short of breath before, I am okay now"   HPI as below     INTERIM HISTORY SIGNIFICANT FOR   patient since admitted with SOB found to have acute on chronic heart failure exacerbation complicated by pleural effusions and acute on chronic kidney failure   patient seen by service as a Cardiac surgery consult for possible TAVR in future pending full work up   now being consulted from a thoracic surgery standpoint for pleural effusions     social history   non smoker, none drinker  extensive family support at bedside     Patient is a 79y old  Male who presents with a chief complaint of Difficulty breathing (25 Dec 2019 14:46)    HPI:  The patient is a 79 year old male with a history of CKD stage 4, hypertension, chronic diastolic CHF, bph and moderate-severe AS who presented to the ER with complaints of difficulty breathing. According to the patient and his wife at bedside he was admitted to [facility] on 12/10 for acute hypoxic respiratory failure secondary to acute on chronic diastolic CHF, hypertensive urgency and UTI. He was discharged home on PO diuretics. Since being home he initially felt better however his wife about a week ago he started to have symptoms of orthopnea and dyspnea at rest with worsening lower extremity edema. A house physician came to evaluate him over the weekend and increased the dose of lasix from 40mg PO OD to BID with some improvement. However, last night he suddenly felt like he could not breath and was brought to the ER. In the ED, improved with a dose of IV lasix 40mg x 1. Noted to be hypothermic, rectal temperature of 91.3 with a heart rate of 53. Started on a bear hugger with improvement to 92.3. Lactate was negative, no leukocytosis> Chest xray with pulmonary edema and elevated BNP. Given a dose of empiric rocephin x 1. (25 Dec 2019 14:46)    OBJECTIVE  PAST MEDICAL & SURGICAL HISTORY:  Hyperkalemia  ADI (acute kidney injury)  BPH (benign prostatic hyperplasia)  CKD (chronic kidney disease)  Hyperlipemia  Hypertension  Diabetes  Ureteral stent retained    No Known Allergies    Home Medications:  amLODIPine 10 mg oral tablet: 1 tab(s) orally once a day (10 Dec 2019 02:16)  furosemide 40 mg oral tablet: 1 tab(s) orally once a day on weekdays  and 1 tablet bid on week ends   (16 Dec 2019 17:42)  hydrALAZINE 50 mg oral tablet: 1 tab(s) orally 3 times a day (16 Dec 2019 11:30)  insulin lispro 100 units/mL injectable solution: 3 times a day before meals    2 Unit(s) if Glucose 151 - 200  4 Unit(s) if Glucose 201 - 250  6 Unit(s) if Glucose 251 - 300  8 Unit(s) if Glucose 301 - 350  10 Unit(s) if Glucose 351 - 400  12 Unit(s) if Glucose Greater Than 400 (16 Dec 2019 11:30)  pravastatin 40 mg oral tablet: 1 tab(s) orally once a day (10 Dec 2019 02:16)  sodium bicarbonate 650 mg oral tablet: 1 tab(s) orally 2 times a day (10 Dec 2019 02:16)    VITALS  ICU Vital Signs Last 24 Hrs  T(C): 36.9 (25 Dec 2019 15:15), Max: 36.9 (25 Dec 2019 15:15)  T(F): 98.5 (25 Dec 2019 15:15), Max: 98.5 (25 Dec 2019 15:15)  HR: 63 (25 Dec 2019 15:15) (52 - 63)  BP: 121/55 (25 Dec 2019 15:15) (121/55 - 139/63)  RR: 19 (25 Dec 2019 15:15) (18 - 20)  SpO2: 95% (25 Dec 2019 15:15) (95% - 100%)      LABS                        9.0    6.86  )-----------( 186      ( 25 Dec 2019 11:57 )             29.3   12-25    134<L>  |  96<L>  |  68.0<H>  ----------------------------<  117<H>  4.5   |  18.0<L>  |  3.69<H>    Ca    8.0<L>      25 Dec 2019 11:57    TPro  7.0  /  Alb  3.9  /  TBili  0.3<L>  /  DBili  x   /  AST  50<H>  /  ALT  34  /  AlkPhos  88  12-25  CAPILLARY BLOOD GLUCOSE      POCT Blood Glucose.: 91 mg/dL (25 Dec 2019 16:14)  CARDIAC MARKERS ( 25 Dec 2019 11:57 )  x     / 0.02 ng/mL / 73 U/L / x     / x        Serum Pro-Brain Natriuretic Peptide: 49528 pg/mL (12-25-19 @ 11:57)        IN/OUT    IMAGING  personally reviewed imaging   Xray Chest 1 View-PORTABLE IMMEDIATE:    EXAM:  XR CHEST PORTABLE IMMED 1V                          PROCEDURE DATE:  12/25/2019          INTERPRETATION:  HISTORY: ; ; SOB;  TECHNIQUE: Portable frontal view of the chest, 1 view.  COMPARISON: 12/10/2019.  FINDINGS:   Cardiac device over the left chest  HEART:  Enlarged  LUNGS: There are bibasilar opacities compatible with effusion/infiltrate.    OSSEOUS STRUCTURES:: degenerative changes    IMPRESSION:     Probable congestive heart failure.                ABDIEL TIMMONS M.D., ATTENDING RADIOLOGIST  This document has been electronically signed. Dec 25 2019 11:45AM               (12-25-19 @ 11:39)    CURRENT MEDICATIONS  MEDICATIONS  (STANDING):  amLODIPine   Tablet 10 milliGRAM(s) Oral daily  aspirin 325 milliGRAM(s) Oral daily  atorvastatin 10 milliGRAM(s) Oral at bedtime  carvedilol 25 milliGRAM(s) Oral every 12 hours  cholecalciferol 1000 Unit(s) Oral daily  dextrose 5%. 1000 milliLiter(s) (50 mL/Hr) IV Continuous <Continuous>  dextrose 50% Injectable 12.5 Gram(s) IV Push once  dextrose 50% Injectable 25 Gram(s) IV Push once  dextrose 50% Injectable 25 Gram(s) IV Push once  doxazosin 4 milliGRAM(s) Oral at bedtime  ferrous    sulfate 325 milliGRAM(s) Oral daily  heparin  Injectable 5000 Unit(s) SubCutaneous every 12 hours  hydrALAZINE 50 milliGRAM(s) Oral three times a day  insulin lispro (HumaLOG) corrective regimen sliding scale   SubCutaneous three times a day before meals  isosorbide   mononitrate ER Tablet (IMDUR) 30 milliGRAM(s) Oral daily  sodium bicarbonate 650 milliGRAM(s) Oral two times a day    MEDICATIONS  (PRN):  dextrose 40% Gel 15 Gram(s) Oral once PRN Blood Glucose LESS THAN 70 milliGRAM(s)/deciliter  glucagon  Injectable 1 milliGRAM(s) IntraMuscular once PRN Glucose LESS THAN 70 milligrams/deciliter

## 2019-12-25 NOTE — ED ADULT NURSE NOTE - NSIMPLEMENTINTERV_GEN_ALL_ED
Implemented All Fall Risk Interventions:  [place] to call system. Call bell, personal items and telephone within reach. Instruct patient to call for assistance. Room bathroom lighting operational. Non-slip footwear when patient is off stretcher. Physically safe environment: no spills, clutter or unnecessary equipment. Stretcher in lowest position, wheels locked, appropriate side rails in place. Provide visual cue, wrist band, yellow gown, etc. Monitor gait and stability. Monitor for mental status changes and reorient to person, place, and time. Review medications for side effects contributing to fall risk. Reinforce activity limits and safety measures with patient and family.

## 2019-12-25 NOTE — ED ADULT NURSE NOTE - NSFALLRSKASSESSDT_ED_ALL_ED
History of Present Illness


General


Chief Complaint:  Sore Throat


Source:  Patient





Present Illness


HPI


7 days of worsening R throat pain and swelling.  Feverish and pain with 

swallowing.  Change in voice.  Pain rated 8/10, constant, burning and pressure, 

radiates slightly towards ear and neck.  Able to swallow fluids.  Salt water 

gargles not help.





Had PTA last year.  Aspirated but "nothing came out".





No cough, dyspnea, neck stiffness, change vision, NVD, dysuria, diabetes, rashes

, joint pain.


Allergies:  


Coded Allergies:  


     OXYCODONE (Verified  Allergy, Unknown, 2/22/18)


 itchy





Patient History


Past Medical History:  see triage record


Social History:  Reports: smoking


Social History Narrative


[employer] for [employer] (works outside)


Reviewed Nursing Documentation:  PMH: Agreed, PSxH: Agreed





Nursing Documentation-PMH


Hx Asthma:  Yes - bronchitis





Review of Systems


All Other Systems:  negative except mentioned in HPI





Physical Exam





Vital Signs








  Date Time  Temp Pulse Resp B/P (MAP) Pulse Ox O2 Delivery O2 Flow Rate FiO2


 


2/22/18 19:50 100.5 104 18 128/79 98 Room Air  





 100.6       








Sp02 EP Interpretation:  reviewed, normal


General Appearance:  well appearing, no apparent distress, GCS 15, non-toxic


Head:  normocephalic


Eyes:  bilateral eye normal inspection, bilateral eye PERRL


ENT:  TMs + canals normal, moist mucus membranes, tonsillar swelling, 

pharyngeal erythema, other - PTA R


Neck:  full range of motion, supple, no meningismus


Respiratory:  lungs clear, normal breath sounds


Cardiovascular #1:  regular rate, rhythm


Cardiovascular #2:  2+ radial (R)


Gastrointestinal:  normal inspection, normal bowel sounds, non tender, no mass, 

non-distended


Musculoskeletal:  back normal, gait/station normal, normal range of motion


Neurologic:  alert, oriented x3, grossly normal


Psychiatric:  mood/affect normal


Skin:  normal inspection, no rash, warm/dry





Procedures


Incision and Drainage


Incision and Drainage :  


   Consent:  Verbal


   Blade Size:  11


   Wound Location:  other - R tonsil


   Wound's Depth, Shape:  other - tonsilar tissue


   Wound Explored:  contaminated - pus drained


   Anesthesia:  Lidocaine w/ Epi


   Volume Anesthetic (ccs):  1


   Patient Tolerated:  Well


   Complications:  None


Progress


After H2O2 prep, 1% lido with epi with 27 ga needle.  Attempt aspirate with 18 

ga spinal needle (only .5 ml obtained of pus).  More lidocaine used and tonsil 

incised.  Pus obtained.  Used Kellys to open and express more pus.  Tolerated 

fairly well (some gagging).  Rinsed with H2O2.  Improved voice and pain after 

procedure.





Medical Decision Making


Diagnostic Impression:  


 Primary Impression:  


 Peritonsillar abscess


ER Course


Patient with peritonsilar abscess.  Needs I and D and also antibiotics.  Will 

also give IV hydration.  Also will give dose of decadron.





See procedure note.





Improved with treatment.





Instructed to return if swelling recurs or not doing well.





Patient stable for outpatient observation and treatment.





Last Vital Signs








  Date Time  Temp Pulse Resp B/P (MAP) Pulse Ox O2 Delivery O2 Flow Rate FiO2


 


2/22/18 22:10 97.3 94 18 139/82 100 Room Air  





 97.3       








Status:  improved


Disposition:  HOME, SELF-CARE


Condition:  Improved


Scripts


Ibuprofen* (MOTRIN*) 600 Mg Tablet


600 MG ORAL Q6H Y for For Pain, #20 TAB


   Prov: Ulysses Berry M.D.         2/22/18 


Hydrocodone Bit/Acetaminophen 5-325* (NORCO 5-325*) 1 Each Tablet


1 TAB ORAL Q6H Y for For Pain, #10 TAB 0 Refills


   Prov: Ulysses Berry M.D.         2/22/18 


Amoxicillin/Potassium Clav 500-125 Tablet* (AUGMENTIN 500-125 TABLET*) 1 Each 

Tablet


1 TAB ORAL THREE TIMES A DAY, #21 TAB


   Prov: Ulysses Berry M.D.         2/22/18


Referrals:  


[facility],REFERRING (PCP)











Ulysses Berry M.D. Feb 22, 2018 21:21
25-Dec-2019 11:59

## 2019-12-25 NOTE — ED PROVIDER NOTE - CARE PLAN
Principal Discharge DX:	SOB (shortness of breath)  Secondary Diagnosis:	Generalized weakness  Secondary Diagnosis:	UTI (urinary tract infection)  Secondary Diagnosis:	Hypothermia

## 2019-12-25 NOTE — ED ADULT NURSE NOTE - OBJECTIVE STATEMENT
pt alert and oriented x3 with periods of confusion came in c/o SOB, and weakness. assumed care ofpatient in critical care. pt rectal temp 91.2, pt placed on bear hugger. respirations even unlabored. pt answering limited questions. pt wife at bedside states this is patients norm. respirations even unlabored. pt BLE edema pitting. pt educated on plan of care, pt able to successfully teach back plan of care to RN, RN will continue to reeducate pt during hospital stay.

## 2019-12-25 NOTE — CONSULT NOTE ADULT - ATTENDING COMMENTS
pt with recurrent dyspnea and evidence of volume overload, including large pleural effusions. Also hypothermia.   likely will need thoracentesis (therapeutic and diagnostic given hypothermia), and expedited TAVR workup.   Cath likely to be deferred given ADI  aggressive diuresis  infectious workup with  blood cultures, diagnostic thoracentesis, and empiric antibiotics  CTS eval re TAVR as well.

## 2019-12-25 NOTE — H&P ADULT - HISTORY OF PRESENT ILLNESS
The patient is a 79 year old male with a history of CKD stage 4, hypertension, chronic diastolic CHF, bph and moderate-severe AS who presented to the ER with complaints of difficulty breathing. According to the patient and his wife at bedside he was admitted to [facility] on 12/10 for acute hypoxic respiratory failure secondary to acute on chronic diastolic CHF, hypertensive urgency and UTI. He was discharged home on PO diuretics. Since being home he initially felt better however his wife about a week ago he started to have symptoms of orthopnea and dyspnea at rest with worsening lower extremity edema. A house physician came to evaluate him over the weekend and increased the dose of lasix from 40mg PO OD to BID with some improvement. However, last night he suddenly felt like he could not breath and was brought to the ER. In the ED, improved with a dose of IV lasix 40mg x 1. Noted to be hypothermic, rectal temperature of 91.3 with a heart rate of 53. Started on a bear hugger with improvement to 92.3. Lactate was negative, no leukocytosis> Chest xray with pulmonary edema and elevated BNP. Given a dose of empiric rocephin x 1.

## 2019-12-25 NOTE — PATIENT PROFILE ADULT - NSTRANSFERBELONGINGSDISPO_GEN_A_NUR
[email or web address] Emergency Department Lab result notification [Adult-Female]    [place] ED lab result protocol used  Chlamydia Protocol    Reason for call  Notify of lab results, assess symptoms,  review ED providers recommendations/discharge instructions (if necessary) and advise per ED lab result f/u protocol    Lab Result (including Rx patient on, if applicable)  Final N. Gonorrhoeae PCR is [NEGATIVE] AND Chlamydia T PCR is [POSITIVE]  Patient was treated for N. Gonorrhea and Chlamydia T in the ED  [Yes or No]:  No       If no treatment initiated in the [place] ED, treat per [place] ED Lab Result protocol.    Information table from ED Provider visit on 12/12/19-12/13/19  Symptoms reported at ED visit (Chief complaint, HPI) Rossy Lennon is a 49 year old female who presents with vaginal bleeding. The patient reports onset of bleeding on 11/28, constant since then. She states that her bleeding has been heavier, with clots, than her typical periods, which she normally wears pads for, but she has also been using tampons. She denies any pain here, but has lightheadedness and dizziness upon standing. No reported history of heavy or irregular bleeding. The patient does not have an established OB, as she is new to the area. Denies any chance for pregnancy.    Significant Medical hx, if applicable (i.e. CKD, diabetes) None   Allergies No Known Allergies   Weight, if applicable Wt Readings from Last 2 Encounters:   12/12/19 61.2 kg (134 lb 14.7 oz)      Coumadin/Warfarin [Yes /No] No   Creatinine Level (mg/dl) Creatinine   Date Value Ref Range Status   12/12/2019 0.65 0.52 - 1.04 mg/dL Final      Creatinine clearance (ml/min), if applicable Creatinine clearance cannot be calculated (Unknown ideal weight.)   Pregnant (Yes/No/NA) No   Breastfeeding (Yes/No/NA) No   ED providers Impression and Plan (applicable information) Rossy Lennon is a 49 year old female primarily Setswana speaking who  presents to the emergency department with a prolonged heavier period than her typical. She has otherwise had regular periods and this is new for her. Examination today is consistent with a thickened endometrium and mild anemia. She is not tachycardic or hypotensive. She is not pregnant. She does not have any abdominal pain or tenderness. As she is not unstable or having worse exsanguination. She was appropriate for discharge home with consultation with Dr. Whittington of OB/Gyn. Dr. Whittington recommended Megace 40 mg daily up to twice daily. If initial daily dosing does not control bleeding in a few days, recommend iron supplementation, which she can get over the counter. Recommended follow-up within 1 week with [facility] for biopsy. The patient is aware of the necessity of a biopsy and follow-up. Recommend that she return should her symptoms worsen. All questions were answered prior to discharge.    ED diagnosis Excessive vaginal bleeding   ED provider  Chelsea Reddy MD      RN Assessment (Patient s current Symptoms), include time called.  [Insert Left message here if message left]  Msg left per  at 3:32 pm requesting return phone call.  Did receive return phone call from Rossy using her aunt as , did confirm she was consenting to use family member as .   Advised of results and recommendations.  Advised to continue with plan of care, and continue with med prescribed per ED.       STD Patient Instructions:    We recommend that you contact any recent sexual partners within the last 2 months and have them evaluated by a physician.    Avoid sexual activity for 7 to 10 days or until both your and your partner(s) have completed all antibiotic medications.    We advise that you consider following up with your PCP at approximately 3 months for retesting to be sure the infection has cleared.     RN Recommendations/Instructions per [place] ED lab result protocol  Patient  notified of lab result and treatment recommendations.  Rx for Zithromax sent to [Pharmacy - [facility] in [place]].  RN reviewed information about taking Zofran 1 hour prior to Zithromax.      Please Contact your PCP clinic or return to the Emergency department if your:    Symptoms return.    Symptoms do not improve after 3 days on antibiotic.    Symptoms do not resolve after completing antibiotic.    Symptoms worsen or other concerning symptom's.    PCP follow-up Questions asked: YES       Haydee Mann RN    [employer]   Lung Nodule and ED Lab Results F/U RN  Epic pool (ED late result f/u RN) : P [id number]   # [phone]    Copy of Lab result   Order   Chlamydia trachomatis PCR [[id number]] (Order [id number])   Order Requisition     Chlamydia trachomatis PCR (Order #[id number]) on 12/12/19   Exam Information     Exam Date Exam Time Accession # Results    12/12/19  9:38 PM [id number]    Specimen Information: Vagina        Component Value Flag Ref Range Units Status Collected Lab   Specimen Description Vagina     Final 12/12/2019  9:38 PM [facility] Lab   Chlamydia Trachomatis PCR Positive  Abnormal   NEG^Negative  Final 12/12/2019  9:38    Comment:        with patient

## 2019-12-26 LAB
ALBUMIN FLD-MCNC: 1.2 G/DL — SIGNIFICANT CHANGE UP
ANION GAP SERPL CALC-SCNC: 17 MMOL/L — SIGNIFICANT CHANGE UP (ref 5–17)
BUN SERPL-MCNC: 66 MG/DL — HIGH (ref 8–20)
CALCIUM SERPL-MCNC: 7.6 MG/DL — LOW (ref 8.6–10.2)
CHLORIDE SERPL-SCNC: 98 MMOL/L — SIGNIFICANT CHANGE UP (ref 98–107)
CO2 SERPL-SCNC: 19 MMOL/L — LOW (ref 22–29)
CREAT SERPL-MCNC: 3.77 MG/DL — HIGH (ref 0.5–1.3)
CULTURE RESULTS: NO GROWTH — SIGNIFICANT CHANGE UP
GLUCOSE FLD-MCNC: 90 MG/DL — SIGNIFICANT CHANGE UP
GLUCOSE SERPL-MCNC: 81 MG/DL — SIGNIFICANT CHANGE UP (ref 70–115)
GRAM STN FLD: SIGNIFICANT CHANGE UP
HCT VFR BLD CALC: 27.4 % — LOW (ref 39–50)
HGB BLD-MCNC: 8.4 G/DL — LOW (ref 13–17)
LDH SERPL L TO P-CCNC: 64 U/L — SIGNIFICANT CHANGE UP
MCHC RBC-ENTMCNC: 24.4 PG — LOW (ref 27–34)
MCHC RBC-ENTMCNC: 30.7 GM/DL — LOW (ref 32–36)
MCV RBC AUTO: 79.7 FL — LOW (ref 80–100)
PLATELET # BLD AUTO: 161 K/UL — SIGNIFICANT CHANGE UP (ref 150–400)
POTASSIUM SERPL-MCNC: 4.7 MMOL/L — SIGNIFICANT CHANGE UP (ref 3.5–5.3)
POTASSIUM SERPL-SCNC: 4.7 MMOL/L — SIGNIFICANT CHANGE UP (ref 3.5–5.3)
PROT FLD-MCNC: 2 G/DL — SIGNIFICANT CHANGE UP
RBC # BLD: 3.44 M/UL — LOW (ref 4.2–5.8)
RBC # FLD: 19.9 % — HIGH (ref 10.3–14.5)
SODIUM SERPL-SCNC: 134 MMOL/L — LOW (ref 135–145)
SPECIMEN SOURCE: SIGNIFICANT CHANGE UP
SPECIMEN SOURCE: SIGNIFICANT CHANGE UP
WBC # BLD: 6.2 K/UL — SIGNIFICANT CHANGE UP (ref 3.8–10.5)
WBC # FLD AUTO: 6.2 K/UL — SIGNIFICANT CHANGE UP (ref 3.8–10.5)

## 2019-12-26 PROCEDURE — 99233 SBSQ HOSP IP/OBS HIGH 50: CPT

## 2019-12-26 PROCEDURE — 99223 1ST HOSP IP/OBS HIGH 75: CPT

## 2019-12-26 PROCEDURE — 71045 X-RAY EXAM CHEST 1 VIEW: CPT | Mod: 26

## 2019-12-26 RX ORDER — FUROSEMIDE 40 MG
40 TABLET ORAL EVERY 12 HOURS
Refills: 0 | Status: DISCONTINUED | OUTPATIENT
Start: 2019-12-26 | End: 2019-12-26

## 2019-12-26 RX ORDER — FUROSEMIDE 40 MG
40 TABLET ORAL DAILY
Refills: 0 | Status: DISCONTINUED | OUTPATIENT
Start: 2019-12-26 | End: 2019-12-29

## 2019-12-26 RX ORDER — ERYTHROPOIETIN 10000 [IU]/ML
10000 INJECTION, SOLUTION INTRAVENOUS; SUBCUTANEOUS
Refills: 0 | Status: DISCONTINUED | OUTPATIENT
Start: 2019-12-26 | End: 2020-01-09

## 2019-12-26 RX ADMIN — OXYCODONE AND ACETAMINOPHEN 1 TABLET(S): 5; 325 TABLET ORAL at 00:04

## 2019-12-26 RX ADMIN — Medication 650 MILLIGRAM(S): at 17:22

## 2019-12-26 RX ADMIN — Medication 50 MILLIGRAM(S): at 16:42

## 2019-12-26 RX ADMIN — HEPARIN SODIUM 5000 UNIT(S): 5000 INJECTION INTRAVENOUS; SUBCUTANEOUS at 06:47

## 2019-12-26 RX ADMIN — Medication 40 MILLIGRAM(S): at 16:42

## 2019-12-26 RX ADMIN — Medication 50 MILLIGRAM(S): at 06:47

## 2019-12-26 RX ADMIN — CARVEDILOL PHOSPHATE 25 MILLIGRAM(S): 80 CAPSULE, EXTENDED RELEASE ORAL at 20:33

## 2019-12-26 RX ADMIN — Medication 325 MILLIGRAM(S): at 12:52

## 2019-12-26 RX ADMIN — ISOSORBIDE MONONITRATE 30 MILLIGRAM(S): 60 TABLET, EXTENDED RELEASE ORAL at 16:43

## 2019-12-26 RX ADMIN — ERYTHROPOIETIN 10000 UNIT(S): 10000 INJECTION, SOLUTION INTRAVENOUS; SUBCUTANEOUS at 16:43

## 2019-12-26 RX ADMIN — CARVEDILOL PHOSPHATE 25 MILLIGRAM(S): 80 CAPSULE, EXTENDED RELEASE ORAL at 06:47

## 2019-12-26 RX ADMIN — HEPARIN SODIUM 5000 UNIT(S): 5000 INJECTION INTRAVENOUS; SUBCUTANEOUS at 17:23

## 2019-12-26 RX ADMIN — Medication 4 MILLIGRAM(S): at 21:16

## 2019-12-26 RX ADMIN — Medication 650 MILLIGRAM(S): at 06:48

## 2019-12-26 RX ADMIN — Medication 50 MILLIGRAM(S): at 21:16

## 2019-12-26 RX ADMIN — AMLODIPINE BESYLATE 10 MILLIGRAM(S): 2.5 TABLET ORAL at 06:47

## 2019-12-26 RX ADMIN — ATORVASTATIN CALCIUM 10 MILLIGRAM(S): 80 TABLET, FILM COATED ORAL at 21:16

## 2019-12-26 RX ADMIN — Medication 1000 UNIT(S): at 12:52

## 2019-12-26 RX ADMIN — OXYCODONE AND ACETAMINOPHEN 1 TABLET(S): 5; 325 TABLET ORAL at 00:45

## 2019-12-26 NOTE — PROGRESS NOTE ADULT - SUBJECTIVE AND OBJECTIVE BOX
CC: Follow up    INTERVAL HPI/OVERNIGHT EVENTS: Patient seen and examined ,status post right chest tube placement. States he feels better today.       Vital Signs Last 24 Hrs  T(C): 36.5 (26 Dec 2019 07:19), Max: 37 (25 Dec 2019 23:38)  T(F): 97.7 (26 Dec 2019 07:19), Max: 98.6 (25 Dec 2019 23:38)  HR: 56 (26 Dec 2019 07:19) (52 - 68)  BP: 117/60 (26 Dec 2019 07:19) (117/60 - 139/67)  BP(mean): --  RR: 18 (26 Dec 2019 07:19) (18 - 20)  SpO2: 96% (26 Dec 2019 07:19) (95% - 98%)    PHYSICAL EXAM:    GENERAL: NAD, AOX3  HEAD:  Atraumatic, Normocephalic  EYES: EOMI, PERRLA, conjunctiva and sclera clear  ENMT: Moist mucous membranes  CHEST/LUNG: Bibasilar crackles + right chest tube  HEART: Regular rate and rhythm; No murmurs, rubs, or gallops  ABDOMEN: Soft, Nontender, Nondistended; Bowel sounds present  EXTREMITIES:  2+ Peripheral Pulses, No clubbing, cyanosis, or edema        MEDICATIONS  (STANDING):  amLODIPine   Tablet 10 milliGRAM(s) Oral daily  aspirin 325 milliGRAM(s) Oral daily  atorvastatin 10 milliGRAM(s) Oral at bedtime  carvedilol 25 milliGRAM(s) Oral every 12 hours  cholecalciferol 1000 Unit(s) Oral daily  dextrose 5%. 1000 milliLiter(s) (50 mL/Hr) IV Continuous <Continuous>  dextrose 50% Injectable 12.5 Gram(s) IV Push once  dextrose 50% Injectable 25 Gram(s) IV Push once  dextrose 50% Injectable 25 Gram(s) IV Push once  doxazosin 4 milliGRAM(s) Oral at bedtime  ferrous    sulfate 325 milliGRAM(s) Oral daily  furosemide   Injectable 40 milliGRAM(s) IV Push daily  heparin  Injectable 5000 Unit(s) SubCutaneous every 12 hours  hydrALAZINE 50 milliGRAM(s) Oral three times a day  insulin lispro (HumaLOG) corrective regimen sliding scale   SubCutaneous three times a day before meals  isosorbide   mononitrate ER Tablet (IMDUR) 30 milliGRAM(s) Oral daily  sodium bicarbonate 650 milliGRAM(s) Oral two times a day    MEDICATIONS  (PRN):  dextrose 40% Gel 15 Gram(s) Oral once PRN Blood Glucose LESS THAN 70 milliGRAM(s)/deciliter  glucagon  Injectable 1 milliGRAM(s) IntraMuscular once PRN Glucose LESS THAN 70 milligrams/deciliter      Allergies    No Known Allergies    Intolerances          LABS:                          8.4    6.20  )-----------( 161      ( 26 Dec 2019 05:56 )             27.4     12-    134<L>  |  98  |  66.0<H>  ----------------------------<  81  4.7   |  19.0<L>  |  3.77<H>    Ca    7.6<L>      26 Dec 2019 05:56    TPro  7.0  /  Alb  3.9  /  TBili  0.3<L>  /  DBili  x   /  AST  50<H>  /  ALT  34  /  AlkPhos  88  12-      Urinalysis Basic - ( 25 Dec 2019 12:13 )    Color: Yellow / Appearance: Clear / S.025 / pH: x  Gluc: x / Ketone: Negative  / Bili: Negative / Urobili: Negative mg/dL   Blood: x / Protein: 100 mg/dL / Nitrite: Negative   Leuk Esterase: Moderate / RBC: 3-5 /HPF / WBC 6-10   Sq Epi: x / Non Sq Epi: Occasional / Bacteria: Negative        RADIOLOGY & ADDITIONAL TESTS:

## 2019-12-26 NOTE — PROGRESS NOTE ADULT - SUBJECTIVE AND OBJECTIVE BOX
[place] [facility]                                                               Office: [address] [address]                                                              Telephone: [phone]. Fax:[phone]                                                                             PROGRESS NOTE  Reason for follow up:   Overnight: No new events.   Update:     Subjective: "  ______________________"      Pt is a 80 y/o male with medical history of DM, HTN, CKD, BPH, pAfib with no AC, who presents to [facility]-ED for c/o SOB. Pt is not a good historian d/t possible dementia, wife at bedside. Pt wife noticed pt has increased SOB even after discharge from [facility]. Pt also started to have frequent chills, but no known fever. Pt family further assess that "he is not himself", not as energetic and responsive to family. Pt was brought to [facility]-ED and was found to be hypothermic at 91.2, HR @ 53 SB, BP @ 139/63. On previous admission, pt had similar symptoms and was advised to be evaluated for AVR as outpatient. Pt Denies fever, cough, phlegm production,  PND, edema, chest pain, pressure, palpitations, irregular, fast heart beat, nausea, vomiting, melena, rectal bleed, hematuria, lightheadedness, dizziness, syncope, near syncope.   As of 12/26/19,   	  Vitals:  T(C): 36.4 (12-26-19 @ 12:01), Max: 37 (12-25-19 @ 23:38)  HR: 61 (12-26-19 @ 12:01) (52 - 68)  BP: 132/57 (12-26-19 @ 12:01) (117/60 - 139/67)  RR: 18 (12-26-19 @ 12:01) (18 - 20)  SpO2: 90% (12-26-19 @ 12:01) (90% - 98%)    I&O's Summary    25 Dec 2019 07:01  -  26 Dec 2019 07:00  --------------------------------------------------------  IN: 0 mL / OUT: 1950 mL / NET: -1950 mL    26 Dec 2019 07:01  -  26 Dec 2019 12:33  --------------------------------------------------------  IN: 0 mL / OUT: 200 mL / NET: -200 mL      Weight (kg): 81.6 (12-25 @ 11:05)      PHYSICAL EXAM:  Appearance: Comfortable. No acute distress  HEENT:  Head and neck: Atraumatic. Normocephalic.  Normal oral mucosa, PERRL, Neck is supple. No JVD, No carotid bruit.   Neurologic: A & O x 3, no focal deficits. EOMI   Lymphatic: No cervical lymphadenopathy  Cardiovascular: Normal S1 S2, No murmur, rubs/gallops. No JVD, No edema  Respiratory: Lungs inspiratory wheezing, with bilateral rales  Gastrointestinal:  Soft, Non-tender, + BS  Lower Extremities: +2/+2 bilateral lower extremity edema  Psychiatry: Patient is calm. No agitation. Mood & affect appropriate  Skin: Right chest tube-saturated bright red blood, no subcutaneous emphysema. No rashes/ ecchymoses/cyanosis/ulcers visualized on the face, hands or feet.      CURRENT MEDICATIONS:  amLODIPine   Tablet 10 milliGRAM(s) Oral daily  carvedilol 25 milliGRAM(s) Oral every 12 hours  doxazosin 4 milliGRAM(s) Oral at bedtime  furosemide   Injectable 40 milliGRAM(s) IV Push daily  hydrALAZINE 50 milliGRAM(s) Oral three times a day  isosorbide   mononitrate ER Tablet (IMDUR) 30 milliGRAM(s) Oral daily    aspirin  atorvastatin  dextrose 50% Injectable  dextrose 50% Injectable  dextrose 50% Injectable  insulin lispro (HumaLOG) corrective regimen sliding scale  cholecalciferol  dextrose 5%.  ferrous    sulfate  heparin  Injectable  sodium bicarbonate      DIAGNOSTIC TESTING:  [ ] Echocardiogram: < from: TTE Echo Complete w/Doppler (12.10.19 @ 13:21) >  Summary:   1. Technically difficult study.   2. Normal global left ventricular systolic function.   3. Left ventricular ejection fraction, by visual estimation, is 60 to   65%.   4. Spectral Doppler shows pseudonormal pattern of left ventricular   myocardial filling (Grade II diastolic dysfunction).   5. Elevated mean left atrial pressure. Mean LAP estimated at 22 mmHg.   6. Mild mitral annular calcification.   7. Mild thickening and calcification of the anterior and posterior   mitralvalve leaflets.   8. Mild mitral valve regurgitation.   9. Mild aortic regurgitation.  10. Moderate to severe aortic valve stenosis. Recommend SCAR for further   evaluation.  11. Moderate pleural effusion in both left and right lateral regions.  12. There is no evidence of pericardial effusion.    < end of copied text >    OTHER: 	  XRAY: < from: Xray Chest 1 View- PORTABLE-Routine (12.26.19 @ 09:10) >    Impression:    Persisting pulmonary edema with small left pleural fluid.    Right chest tube in place, no pneumothorax.    < end of copied text >      LABS:	 	  CARDIAC MARKERS ( 25 Dec 2019 11:57 )  x     / 0.02 ng/mL / 73 U/L / x     / x      p-BNP 25 Dec 2019 11:57: 56060 pg/mL                          8.4    6.20  )-----------( 161      ( 26 Dec 2019 05:56 )             27.4     12-26    134<L>  |  98  |  66.0<H>  ----------------------------<  81  4.7   |  19.0<L>  |  3.77<H>    Ca    7.6<L>      26 Dec 2019 05:56    TPro  7.0  /  Alb  3.9  /  TBili  0.3<L>  /  DBili  x   /  AST  50<H>  /  ALT  34  /  AlkPhos  88  12-25    proBNP: Serum Pro-Brain Natriuretic Peptide: 71345 pg/mL (12-25 @ 11:57)      TSH: Thyroid Stimulating Hormone, Serum: 6.54 uIU/mL        TELEMETRY: [place] [facility]                                                               Office: [address] [address]                                                              Telephone: [phone]. Fax:[phone]                                                                             PROGRESS NOTE  Reason for follow up: congestive heart failure   Overnight: No new events.   Update:  smita has right chest tube. 1950 ml so fatr serosangunus fluid.     medical history update: as per wife, patient has swelling of feet and was short of breathe last few days.     Subjective: "  i am better"    Review of symptoms: Cardiac:  No chest pain. + dyspnea. No palpitations.  Respiratory:no cough. +  dyspnea  Gastrointestinal: No diarrhea. No abdominal pain. No bleeding.     Pt is a 78 y/o male with medical history of DM, HTN, CKD, BPH, pAfib with no AC, who presents to [facility]-ED for c/o SOB. Pt is not a good historian d/t possible dementia, wife at bedside. Pt wife noticed pt has increased SOB even after discharge from [facility]. Pt also started to have frequent chills, but no known fever. Pt family further assess that "he is not himself", not as energetic and responsive to family. Pt was brought to [facility]-ED and was found to be hypothermic at 91.2, HR @ 53 SB, BP @ 139/63. On previous admission, pt had similar symptoms and was advised to be evaluated for AVR as outpatient. Pt Denies fever, cough, phlegm production,  PND, edema, chest pain, pressure, palpitations, irregular, fast heart beat, nausea, vomiting, melena, rectal bleed, hematuria, lightheadedness, dizziness, syncope, near syncope.   As of 12/26/19,   	  Vitals:  T(C): 36.4 (12-26-19 @ 12:01), Max: 37 (12-25-19 @ 23:38)  HR: 61 (12-26-19 @ 12:01) (52 - 68)  BP: 132/57 (12-26-19 @ 12:01) (117/60 - 139/67)  RR: 18 (12-26-19 @ 12:01) (18 - 20)  SpO2: 90% (12-26-19 @ 12:01) (90% - 98%)    I&O's Summary    25 Dec 2019 07:01  -  26 Dec 2019 07:00  --------------------------------------------------------  IN: 0 mL / OUT: 1950 mL / NET: -1950 mL    26 Dec 2019 07:01  -  26 Dec 2019 12:33  --------------------------------------------------------  IN: 0 mL / OUT: 200 mL / NET: -200 mL      Weight (kg): 81.6 (12-25 @ 11:05)      PHYSICAL EXAM:  Appearance: Comfortable. No acute distress  HEENT:  Head and neck: Atraumatic. Normocephalic.  Normal oral mucosa, PERRL, Neck is supple. No JVD, No carotid bruit.   Neurologic: A & O x 3, no focal deficits. EOMI   Lymphatic: No cervical lymphadenopathy  Cardiovascular: Normal S1 S2, No murmur, rubs/gallops. No JVD, No edema  Respiratory: Lungs inspiratory wheezing, with bilateral rales right side decrease breath sounds.  Gastrointestinal:  Soft, Non-tender, + BS  Lower Extremities: +2/+2 bilateral lower extremity edema  Psychiatry: Patient is calm. No agitation. Mood & affect appropriate  Skin: Right chest tube-saturated bright red blood, no subcutaneous emphysema. No rashes/ ecchymoses/cyanosis/ulcers visualized on the face, hands or feet.      CURRENT MEDICATIONS:  amLODIPine   Tablet 10 milliGRAM(s) Oral daily  carvedilol 25 milliGRAM(s) Oral every 12 hours  doxazosin 4 milliGRAM(s) Oral at bedtime  furosemide   Injectable 40 milliGRAM(s) IV Push daily  hydrALAZINE 50 milliGRAM(s) Oral three times a day  isosorbide   mononitrate ER Tablet (IMDUR) 30 milliGRAM(s) Oral daily    aspirin  atorvastatin  dextrose 50% Injectable  dextrose 50% Injectable  dextrose 50% Injectable  insulin lispro (HumaLOG) corrective regimen sliding scale  cholecalciferol  dextrose 5%.  ferrous    sulfate  heparin  Injectable  sodium bicarbonate      DIAGNOSTIC TESTING:  [ ] Echocardiogram: < from: TTE Echo Complete w/Doppler (12.10.19 @ 13:21) >  Summary:   1. Technically difficult study.   2. Normal global left ventricular systolic function.   3. Left ventricular ejection fraction, by visual estimation, is 60 to   65%.   4. Spectral Doppler shows pseudonormal pattern of left ventricular   myocardial filling (Grade II diastolic dysfunction).   5. Elevated mean left atrial pressure. Mean LAP estimated at 22 mmHg.   6. Mild mitral annular calcification.   7. Mild thickening and calcification of the anterior and posterior   mitralvalve leaflets.   8. Mild mitral valve regurgitation.   9. Mild aortic regurgitation.  10. Moderate to severe aortic valve stenosis. Recommend SCAR for further   evaluation.  11. Moderate pleural effusion in both left and right lateral regions.  12. There is no evidence of pericardial effusion.    < end of copied text >    OTHER: 	  XRAY: < from: Xray Chest 1 View- PORTABLE-Routine (12.26.19 @ 09:10) >    Impression:    Persisting pulmonary edema with small left pleural fluid.    Right chest tube in place, no pneumothorax.    < end of copied text >      LABS:	 	  CARDIAC MARKERS ( 25 Dec 2019 11:57 )  x     / 0.02 ng/mL / 73 U/L / x     / x      p-BNP 25 Dec 2019 11:57: 72968 pg/mL                          8.4    6.20  )-----------( 161      ( 26 Dec 2019 05:56 )             27.4     12-26    134<L>  |  98  |  66.0<H>  ----------------------------<  81  4.7   |  19.0<L>  |  3.77<H>    Ca    7.6<L>      26 Dec 2019 05:56    TPro  7.0  /  Alb  3.9  /  TBili  0.3<L>  /  DBili  x   /  AST  50<H>  /  ALT  34  /  AlkPhos  88  12-25    proBNP: Serum Pro-Brain Natriuretic Peptide: 77221 pg/mL (12-25 @ 11:57)      TSH: Thyroid Stimulating Hormone, Serum: 6.54 uIU/mL        TELEMETRY: [place] [facility]                                                               Office: [address] [address]                                                              Telephone: [phone]. Fax:[phone]                                                                             PROGRESS NOTE  Reason for follow up: congestive heart failure   Overnight: No new events.   Update:  smita has right chest tube. 1950 ml so fatr serosangunus fluid.     medical history update: as per wife, patient has swelling of feet and was short of breathe last few days.     Subjective: "  i am better"    Review of symptoms: Cardiac:  No chest pain. + dyspnea. No palpitations.  Respiratory:no cough. +  dyspnea  Gastrointestinal: No diarrhea. No abdominal pain. No bleeding.     Pt is a 80 y/o male with medical history of DM, HTN, CKD, BPH, pAfib with no AC, who presents to [facility]-ED for c/o SOB. Pt is not a good historian d/t possible dementia, wife at bedside. Pt wife noticed pt has increased SOB even after discharge from [facility]. Pt also started to have frequent chills, but no known fever. Pt family further assess that "he is not himself", not as energetic and responsive to family. Pt was brought to [facility]-ED and was found to be hypothermic at 91.2, HR @ 53 SB, BP @ 139/63. On previous admission, pt had similar symptoms and was advised to be evaluated for AVR as outpatient. Pt Denies fever, cough, phlegm production,  PND, edema, chest pain, pressure, palpitations, irregular, fast heart beat, nausea, vomiting, melena, rectal bleed, hematuria, lightheadedness, dizziness, syncope, near syncope.     As of 12/26/19, pt in no resp. distress, chest tube draining straw colored drainage. Pt insertion site dressing saturated with blood no signs of subcutaneous emphysema. Pt wheezing on inspiration with bilateral rales when auscultated.          	  Vitals:  T(C): 36.4 (12-26-19 @ 12:01), Max: 37 (12-25-19 @ 23:38)  HR: 61 (12-26-19 @ 12:01) (52 - 68)  BP: 132/57 (12-26-19 @ 12:01) (117/60 - 139/67)  RR: 18 (12-26-19 @ 12:01) (18 - 20)  SpO2: 90% (12-26-19 @ 12:01) (90% - 98%)    I&O's Summary    25 Dec 2019 07:01  -  26 Dec 2019 07:00  --------------------------------------------------------  IN: 0 mL / OUT: 1950 mL / NET: -1950 mL    26 Dec 2019 07:01  -  26 Dec 2019 12:33  --------------------------------------------------------  IN: 0 mL / OUT: 200 mL / NET: -200 mL      Weight (kg): 81.6 (12-25 @ 11:05)      PHYSICAL EXAM:  Appearance: Comfortable. No acute distress  HEENT:  Head and neck: Atraumatic. Normocephalic.  Normal oral mucosa, PERRL, Neck is supple. No JVD, No carotid bruit.   Neurologic: A & O x 3, no focal deficits. EOMI   Lymphatic: No cervical lymphadenopathy  Cardiovascular: Normal S1 S2, No murmur, rubs/gallops. No JVD, No edema  Respiratory: Lungs inspiratory wheezing, with bilateral rales right side decrease breath sounds.  Gastrointestinal:  Soft, Non-tender, + BS  Lower Extremities: +2/+2 bilateral lower extremity edema  Psychiatry: Patient is calm. No agitation. Mood & affect appropriate  Skin: Right chest tube-saturated bright red blood, no subcutaneous emphysema. No rashes/ ecchymoses/cyanosis/ulcers visualized on the face, hands or feet.      CURRENT MEDICATIONS:  amLODIPine   Tablet 10 milliGRAM(s) Oral daily  carvedilol 25 milliGRAM(s) Oral every 12 hours  doxazosin 4 milliGRAM(s) Oral at bedtime  furosemide   Injectable 40 milliGRAM(s) IV Push daily  hydrALAZINE 50 milliGRAM(s) Oral three times a day  isosorbide   mononitrate ER Tablet (IMDUR) 30 milliGRAM(s) Oral daily    aspirin  atorvastatin  dextrose 50% Injectable  dextrose 50% Injectable  dextrose 50% Injectable  insulin lispro (HumaLOG) corrective regimen sliding scale  cholecalciferol  dextrose 5%.  ferrous    sulfate  heparin  Injectable  sodium bicarbonate      DIAGNOSTIC TESTING:  [ ] Echocardiogram: < from: TTE Echo Complete w/Doppler (12.10.19 @ 13:21) >  Summary:   1. Technically difficult study.   2. Normal global left ventricular systolic function.   3. Left ventricular ejection fraction, by visual estimation, is 60 to   65%.   4. Spectral Doppler shows pseudonormal pattern of left ventricular   myocardial filling (Grade II diastolic dysfunction).   5. Elevated mean left atrial pressure. Mean LAP estimated at 22 mmHg.   6. Mild mitral annular calcification.   7. Mild thickening and calcification of the anterior and posterior   mitralvalve leaflets.   8. Mild mitral valve regurgitation.   9. Mild aortic regurgitation.  10. Moderate to severe aortic valve stenosis. Recommend SCAR for further   evaluation.  11. Moderate pleural effusion in both left and right lateral regions.  12. There is no evidence of pericardial effusion.    < end of copied text >    OTHER: 	  XRAY: < from: Xray Chest 1 View- PORTABLE-Routine (12.26.19 @ 09:10) >    Impression:    Persisting pulmonary edema with small left pleural fluid.    Right chest tube in place, no pneumothorax.    < end of copied text >      LABS:	 	  CARDIAC MARKERS ( 25 Dec 2019 11:57 )  x     / 0.02 ng/mL / 73 U/L / x     / x      p-BNP 25 Dec 2019 11:57: 05663 pg/mL                          8.4    6.20  )-----------( 161      ( 26 Dec 2019 05:56 )             27.4     12-26    134<L>  |  98  |  66.0<H>  ----------------------------<  81  4.7   |  19.0<L>  |  3.77<H>    Ca    7.6<L>      26 Dec 2019 05:56    TPro  7.0  /  Alb  3.9  /  TBili  0.3<L>  /  DBili  x   /  AST  50<H>  /  ALT  34  /  AlkPhos  88  12-25    proBNP: Serum Pro-Brain Natriuretic Peptide: 95802 pg/mL (12-25 @ 11:57)      TSH: Thyroid Stimulating Hormone, Serum: 6.54 uIU/mL

## 2019-12-26 NOTE — PROGRESS NOTE ADULT - ASSESSMENT
Pt is a 80 y/o male with medical history of DM, HTN, CKD, BPH, pAfib with no AC, who presents to [facility]-ED for c/o SOB. Pt is not a good historian d/t possible dementia, wife at bedside. Pt wife noticed pt has increased SOB even after discharge from [facility]. Pt also started to have frequent chills, but no known fever. Pt family further assess that "he is not himself", not as energetic and responsive to family. Pt was brought to [facility]-ED and was found to be hypothermic at 91.2, HR @ 53 SB, BP @ 139/63. On previous admission, pt had similar symptoms and was advised to be evaluated for AVR as outpatient. Pt Denies fever, cough, phlegm production,  PND, edema, chest pain, pressure, palpitations, irregular, fast heart beat, nausea, vomiting, melena, rectal bleed, hematuria, lightheadedness, dizziness, syncope, near syncope.     Plan:     1. Aortic Stenosis with SOB  -BNP- 10,372  -Tele- SB HR @ 52  - Chest Xray 12/25/19- Probable congestive heart failure  -CT Chest 12/10/19 -groundglass opacities in both upper lobes, moderate bilateral effusions  - Continue Lasix 40mh IVP BID  -Monitor Potassium, Maintain above 4, Replenish PRN  -Echo 12/10/19 -EF 60-65%, mod-severe AS, possible SCAR as per attending   -Consider CT SX consult to re-evaluate for AVR (possible sooner surgical date?) and possible pleural effusion drainage  -Plan for eventual SCAR       2. HTN  -recent /60  -Continue current antihypertensive medication regimen     3. CKD  -BUN/CreatninE- 68/3.69  -Consider Nephrology consult d/t increased trend Pt is a 78 y/o male with medical history of DM, HTN, CKD, BPH, pAfib with no AC, who presents to [facility]-ED for c/o SOB. Pt is not a good historian d/t possible dementia, wife at bedside. Pt wife noticed pt has increased SOB even after discharge from [facility]. Pt also started to have frequent chills, but no known fever. Pt family further assess that "he is not himself", not as energetic and responsive to family. Pt was brought to [facility]-ED and was found to be hypothermic at 91.2, HR @ 53 SB, BP @ 139/63. On previous admission, pt had similar symptoms and was advised to be evaluated for AVR as outpatient. Pt Denies fever, cough, phlegm production,  PND, edema, chest pain, pressure, palpitations, irregular, fast heart beat, nausea, vomiting, melena, rectal bleed, hematuria, lightheadedness, dizziness, syncope, near syncope.     Plan:     1. Aortic Stenosis with SOB  - Pleural effusion s/p drainage.   Unclear trigger: Likely uremia/pleuritis. vs. insufficient diuresis.   last admission on discharge diuretic dose was reduced due to concern of pre renal azotemia.  Will  Continue Lasix 40mh IV. opatient needs IV diuretics and BP control adn diet control   -Monitor Potassium, Maintain above 4, Replenish PRN  - Need aggressive diuresis and do reduce diueetic dose on disharve.       2. HTN  -recent /60  -Continue current antihypertensive medication regimen     3. CKD  -BUN/CreatninE- 68/3.69  -Consider Nephrology consult d/t increased trend Pt is a 78 y/o male with medical history of DM, HTN, CKD, BPH, pAfib with no AC, who presents to [facility]-ED for c/o SOB. Pt is not a good historian d/t possible dementia, wife at bedside. Pt wife noticed pt has increased SOB even after discharge from [facility]. Pt also started to have frequent chills, but no known fever. Pt family further assess that "he is not himself", not as energetic and responsive to family. Pt was brought to [facility]-ED and was found to be hypothermic at 91.2, HR @ 53 SB, BP @ 139/63. On previous admission, pt had similar symptoms and was advised to be evaluated for AVR as outpatient. Pt Denies fever, cough, phlegm production,  PND, edema, chest pain, pressure, palpitations, irregular, fast heart beat, nausea, vomiting, melena, rectal bleed, hematuria, lightheadedness, dizziness, syncope, near syncope.   As of 12/26/19, pt in no resp. distress, chest tube draining straw colored drainage. Pt insertion site dressing saturated with blood no signs of subcutaneous emphysema. Pt wheezing on inspiration with bilateral rales when auscultated.            Plan:     1. Aortic Stenosis with SOB  - Pleural effusion s/p drainage->2,000CC    -Unclear trigger: Likely uremia/pleuritis. vs. insufficient diuresis.   last admission on discharge diuretic dose was reduced due to concern of pre renal azotemia.  -Will  Continue Lasix 40mh IVP.   -Outpatient needs IV diuretics and BP control and diet control   -Monitor Potassium, Maintain above 4, Replenish PRN  -Need aggressive diuresis and do reduce diuretic dose on discharge.       2. HTN  -recent /62  -Continue current antihypertensive medication regimen     3. CKD  -BUN/CreatninE- 66/3.77  -Consider Nephrology consult d/t increased trend

## 2019-12-26 NOTE — CONSULT NOTE ADULT - ASSESSMENT
1) Advanced CKD  2) Volume overload/ pulmonary edema  3) Aortic stenosis  4) s/p Rt pigtail catheter for Rt pleural effusion  5) Metabolic acidosis  6) anemia of kidney disease with superimposed iron deficiency     Pt with advanced CKD; no urgent indication for HD  Continue diuresis; monitor UOP, Scr and lytes  Continue po iron, will resume KD 1) Advanced CKD  2) Volume overload/ pulmonary edema  3) Aortic stenosis  4) s/p Rt pigtail catheter for Rt pleural effusion  5) Metabolic acidosis  6) anemia of kidney disease with superimposed iron deficiency     Pt with advanced CKD; Scr elevated/ stable  no urgent indication for HD  Continue diuresis; monitor UOP, Scr and lytes  Continue po iron, will resume KD  Continue sodium bicarb po  Will follow. 1) Advanced CKD  2) Volume overload/ pulmonary edema  3) Aortic stenosis  4) s/p Rt pigtail catheter for Rt pleural effusion  5) Metabolic acidosis  6) anemia of kidney disease with superimposed iron deficiency     Pt with advanced progressive CKD  No urgent indication for HD; but will consider starting HD if TAVR planned this admission  Continue diuresis; monitor UOP, Scr and lytes  Continue po iron, will resume KD  Continue sodium bicarb po  Will follow.

## 2019-12-26 NOTE — PROGRESS NOTE ADULT - ASSESSMENT
The patient is a 79 year old male with a history of CKD stage 4, hypertension, chronic diastolic CHF, bph and moderate-severe AS who presented to the ER with complaints of difficulty breathing. According to the patient and his wife at bedside he was admitted to [facility] on 12/10 for acute hypoxic respiratory failure secondary to acute on chronic diastolic CHF, hypertensive urgency and UTI. He was discharged home on PO diuretics. Since being home he initially felt better however his wife about a week ago he started to have symptoms of orthopnea and dyspnea at rest with worsening lower extremity edema. A house physician came to evaluate him over the weekend and increased the dose of lasix from 40mg PO OD to BID with some improvement. However, last night he suddenly felt like he could not breath and was brought to the ER. In the ED, improved with a dose of IV lasix 40mg x 1. Noted to be hypothermic, rectal temperature of 91.3 with a heart rate of 53. Started on a bear hugger with improvement to 92.3. Lactate was negative, no leukocytosis> Chest xray with pulmonary edema and elevated BNP. Given a dose of empiric rocephin x 1.     Assessment/Plan:    1. Acute on chronic diastolic CHF: IV lasix OD  Status post right chest tube placement by surgery  Strict I&os   Daily weight  Cardio evaluation  On Coreg   Continue imdur and hydralazine with parameters    2. ADI with CKD stage with metabolic acidosis: Continue IV diuresis   Neprholgoy consult   Monitor BMP    3. Hypothermia: Unclear cause; now resolved  Rule out infectious etiology- blood cultures x 2, Urine cultures sent. Given an empiric dose of IV rocephin    4. Diabetes mellitus type 2: HSS   Monitor BSL    5. Hypertension: Continue norvasc with parameters    6. Severe AS: Evaluated by CT surgery prior admission; was supposed to be evaluated as outpatient for TAVR  CT surgery on consult    7. BPH on doxazosin     VTE_ Heparin subcut

## 2019-12-26 NOTE — CHART NOTE - NSCHARTNOTEFT_GEN_A_CORE
Called to evaluate pt for a saturated dressing at throrocostomy tube insertion site.     79M with moderate to severe AS a/w acute decompensated heart failure awaiting TAVR in near future presented to ED with acute on chronic diastolic heart failure yesterday. Today is day 1 s/p Right thorocostomy placement for evacuation of Right pleural effusion. 1950 ml serous drainage since placement. No airleak in chamber. On water seal. R CT site saturated with old blood and minimal clot. No active bleeding identified. Right thoracic site soft over site. No induration and non-tender. Dressing replaced with dry, clean gauze. Pt is hemodynamically stable.       Plan is to maintain R CT to waterseal  AM CXR  Will continue to follow.  Plan communicated to Patient, RN, and medical team.

## 2019-12-26 NOTE — CONSULT NOTE ADULT - SUBJECTIVE AND OBJECTIVE BOX
[facility] DIVISION OF KIDNEY DISEASES AND HYPERTENSION -- INITIAL CONSULT NOTE  --------------------------------------------------------------------------------  HPI:     79 year old male patient with  history of CKD stage 4, hypertension, chronic diastolic CHF, BPH and moderate-severe AS who presented to the ER with c/o difficulty breathing. Pt was recently admitted to [facility] on 12/10 for  acute on chronic diastolic CHF, hypertensive urgency and UTI. He was discharged home on PO diuretics and was doing good initially but recently started having worsening dyspnea, orthopnea and LE edema. Lasix dose was increased to 40 mg bid by house physician with some improvement. Pt later had acute worsening of symptoms and was brought to ED. Pt found to have elevated pro BNP and pulmonary edema on CXR. Nephrology consulted for history of CKD. Pt was seen by our team during last admission and his Scr was ~3.3 on discharge.      PAST HISTORY  --------------------------------------------------------------------------------  PAST MEDICAL & SURGICAL HISTORY:  Hyperkalemia  AID (acute kidney injury)  BPH (benign prostatic hyperplasia)  CKD (chronic kidney disease)  Hyperlipemia  Hypertension  Diabetes  Ureteral stent retained    FAMILY HISTORY:  No pertinent family history in first degree relatives    PAST SOCIAL HISTORY:    ALLERGIES & MEDICATIONS  --------------------------------------------------------------------------------  Allergies    No Known Allergies    Intolerances      Standing Inpatient Medications  amLODIPine   Tablet 10 milliGRAM(s) Oral daily  aspirin 325 milliGRAM(s) Oral daily  atorvastatin 10 milliGRAM(s) Oral at bedtime  carvedilol 25 milliGRAM(s) Oral every 12 hours  cholecalciferol 1000 Unit(s) Oral daily  dextrose 5%. 1000 milliLiter(s) IV Continuous <Continuous>  dextrose 50% Injectable 12.5 Gram(s) IV Push once  dextrose 50% Injectable 25 Gram(s) IV Push once  dextrose 50% Injectable 25 Gram(s) IV Push once  doxazosin 4 milliGRAM(s) Oral at bedtime  ferrous    sulfate 325 milliGRAM(s) Oral daily  furosemide   Injectable 40 milliGRAM(s) IV Push daily  heparin  Injectable 5000 Unit(s) SubCutaneous every 12 hours  hydrALAZINE 50 milliGRAM(s) Oral three times a day  insulin lispro (HumaLOG) corrective regimen sliding scale   SubCutaneous three times a day before meals  isosorbide   mononitrate ER Tablet (IMDUR) 30 milliGRAM(s) Oral daily  sodium bicarbonate 650 milliGRAM(s) Oral two times a day    PRN Inpatient Medications  dextrose 40% Gel 15 Gram(s) Oral once PRN  glucagon  Injectable 1 milliGRAM(s) IntraMuscular once PRN      REVIEW OF SYSTEMS  --------------------------------------------------------------------------------  Gen: No weight changes, fatigue, fevers/chills, weakness  Skin: No rashes  Head/Eyes/Ears/Mouth: No headache; Normal hearing; Normal vision w/o blurriness; No sinus pain/discomfort, sore throat  Respiratory: No dyspnea, cough, wheezing, hemoptysis  CV: No chest pain, PND, orthopnea  GI: No abdominal pain, diarrhea, constipation, nausea, vomiting, melena, hematochezia  : No increased frequency, dysuria, hematuria, nocturia  MSK: No joint pain/swelling; no back pain; no edema  Neuro: No dizziness/lightheadedness, weakness, seizures, numbness, tingling  Heme: No easy bruising or bleeding  Endo: No heat/cold intolerance  Psych: No significant nervousness, anxiety, stress, depression    All other systems were reviewed and are negative, except as noted.    VITALS/PHYSICAL EXAM  --------------------------------------------------------------------------------  T(C): 36.4 (12-26-19 @ 12:01), Max: 37 (12-25-19 @ 23:38)  HR: 61 (12-26-19 @ 12:01) (56 - 68)  BP: 132/57 (12-26-19 @ 12:01) (117/60 - 139/67)  RR: 18 (12-26-19 @ 12:01) (18 - 19)  SpO2: 90% (12-26-19 @ 12:01) (90% - 98%)  Wt(kg): --  Height (cm): 162.56 (12-25-19 @ 11:05)  Weight (kg): 81.6 (12-25-19 @ 11:05)  BMI (kg/m2): 30.9 (12-25-19 @ 11:05)  BSA (m2): 1.87 (12-25-19 @ 11:05)      12-25-19 @ 07:01  -  12-26-19 @ 07:00  --------------------------------------------------------  IN: 0 mL / OUT: 1950 mL / NET: -1950 mL    12-26-19 @ 07:01  -  12-26-19 @ 13:37  --------------------------------------------------------  IN: 0 mL / OUT: 200 mL / NET: -200 mL      Physical Exam:  	Gen: NAD, well-appearing  	HEENT: PERRL, supple neck, clear oropharynx  	Pulm: CTA B/L  	CV: RRR, S1S2; no rub  	Back: No spinal or CVA tenderness; no sacral edema  	Abd: +BS, soft, nontender/nondistended  	: No suprapubic tenderness  	UE: Warm, FROM, no clubbing, intact strength; no edema; no asterixis  	LE: Warm, FROM, no clubbing, intact strength; no edema  	Neuro: No focal deficits, intact gait  	Psych: Normal affect and mood  	Skin: Warm, without rashes  	Vascular access:    LABS/STUDIES  --------------------------------------------------------------------------------              8.4    6.20  >-----------<  161      [12-26-19 @ 05:56]              27.4     134  |  98  |  66.0  ----------------------------<  81      [12-26-19 @ 05:56]  4.7   |  19.0  |  3.77        Ca     7.6     [12-26-19 @ 05:56]    TPro  7.0  /  Alb  3.9  /  TBili  0.3  /  DBili  x   /  AST  50  /  ALT  34  /  AlkPhos  88  [12-25-19 @ 11:57]        Troponin 0.02      [12-25-19 @ 11:57]  CK 73      [12-25-19 @ 11:57]    Creatinine Trend:  SCr 3.77 [12-26 @ 05:56]  SCr 3.69 [12-25 @ 11:57]  SCr 3.24 [12-16 @ 06:16]  SCr 3.28 [12-15 @ 06:36]  SCr 3.48 [12-14 @ 06:45]    Urinalysis - [12-25-19 @ 12:13]      Color Yellow / Appearance Clear / SG 1.025 / pH 5.0      Gluc Negative / Ketone Negative  / Bili Negative / Urobili Negative       Blood Small / Protein 100 / Leuk Est Moderate / Nitrite Negative      RBC 3-5 / WBC 6-10 / Hyaline  / Gran  / Sq Epi  / Non Sq Epi Occasional / Bacteria Negative      Iron 25, TIBC 285, %sat 9      [12-11-19 @ 17:50]  Ferritin 25      [12-11-19 @ 17:50]  PTH -- (Ca 8.3)      [12-12-19 @ 19:36]   164  Vitamin D (25OH) 20.3      [12-12-19 @ 19:36]  HbA1c 8.1      [12-11-19 @ 06:47]  TSH 6.54      [12-25-19 @ 21:00]      KEYSHA: titer 1:320, pattern Speckled      [01-13-15 @ 20:50]  dsDNA <12      [01-18-15 @ 13:57]  C3 Complement 146      [01-18-15 @ 13:57]  C4 Complement 56      [01-18-15 @ 13:57]  Rheumatoid Factor 9.6      [01-18-15 @ 13:51]  ANCA: cANCA Negative, pANCA Negative, atypical ANCA Negative      [01-18-15 @ 13:57]  MPO-ANCA <5.0, interpretation: Negative      [01-18-15 @ 13:57]  PR3-ANCA <5.0, interpretation: Negative      [01-18-15 @ 13:57]  Immunofixation Serum: No Monoclonal Band Identified      [01-18-15 @ 13:57]  SPEP Interpretation: Pattern Consistent With Acute Inflammation Or Stress      [01-18-15 @ 13:57] [facility] DIVISION OF KIDNEY DISEASES AND HYPERTENSION -- INITIAL CONSULT NOTE  --------------------------------------------------------------------------------  HPI:     79 year old male patient with  history of CKD stage 4, hypertension, chronic diastolic CHF, BPH and moderate-severe AS who presented to the ER with c/o difficulty breathing. Pt was recently admitted to [facility] on 12/10 for  acute on chronic diastolic CHF, hypertensive urgency and UTI. He was discharged home on PO diuretics and was doing good initially but recently started having worsening dyspnea, orthopnea and LE edema. Lasix dose was increased to 40 mg bid by house physician with some improvement. Pt later had acute worsening of symptoms and was brought to ED. Pt found to have elevated pro BNP and pulmonary edema on CXR. Pt also s/p Rt pigtail catheter by CTS.  Nephrology consulted for history of CKD. Pt was seen by our team during last admission and his Scr was ~3.3 on discharge.      PAST HISTORY  --------------------------------------------------------------------------------  PAST MEDICAL & SURGICAL HISTORY:  Hyperkalemia  ADI (acute kidney injury)  BPH (benign prostatic hyperplasia)  CKD (chronic kidney disease)  Hyperlipemia  Hypertension  Diabetes  Ureteral stent retained    FAMILY HISTORY:  No pertinent family history in first degree relatives    PAST SOCIAL HISTORY:    ALLERGIES & MEDICATIONS  --------------------------------------------------------------------------------  Allergies    No Known Allergies    Intolerances      Standing Inpatient Medications  amLODIPine   Tablet 10 milliGRAM(s) Oral daily  aspirin 325 milliGRAM(s) Oral daily  atorvastatin 10 milliGRAM(s) Oral at bedtime  carvedilol 25 milliGRAM(s) Oral every 12 hours  cholecalciferol 1000 Unit(s) Oral daily  dextrose 5%. 1000 milliLiter(s) IV Continuous <Continuous>  dextrose 50% Injectable 12.5 Gram(s) IV Push once  dextrose 50% Injectable 25 Gram(s) IV Push once  dextrose 50% Injectable 25 Gram(s) IV Push once  doxazosin 4 milliGRAM(s) Oral at bedtime  ferrous    sulfate 325 milliGRAM(s) Oral daily  furosemide   Injectable 40 milliGRAM(s) IV Push daily  heparin  Injectable 5000 Unit(s) SubCutaneous every 12 hours  hydrALAZINE 50 milliGRAM(s) Oral three times a day  insulin lispro (HumaLOG) corrective regimen sliding scale   SubCutaneous three times a day before meals  isosorbide   mononitrate ER Tablet (IMDUR) 30 milliGRAM(s) Oral daily  sodium bicarbonate 650 milliGRAM(s) Oral two times a day    PRN Inpatient Medications  dextrose 40% Gel 15 Gram(s) Oral once PRN  glucagon  Injectable 1 milliGRAM(s) IntraMuscular once PRN      REVIEW OF SYSTEMS  --------------------------------------------------------------------------------  Gen: No weight changes, fatigue, fevers/chills, weakness  Skin: No rashes  Head/Eyes/Ears/Mouth: No headache; Normal hearing; Normal vision w/o blurriness; No sinus pain/discomfort, sore throat  Respiratory: No dyspnea, cough, wheezing, hemoptysis  CV: No chest pain, PND, orthopnea  GI: No abdominal pain, diarrhea, constipation, nausea, vomiting, melena, hematochezia  : No increased frequency, dysuria, hematuria, nocturia  MSK: No joint pain/swelling; no back pain; no edema  Neuro: No dizziness/lightheadedness, weakness, seizures, numbness, tingling  Heme: No easy bruising or bleeding  Endo: No heat/cold intolerance  Psych: No significant nervousness, anxiety, stress, depression    All other systems were reviewed and are negative, except as noted.    VITALS/PHYSICAL EXAM  --------------------------------------------------------------------------------  T(C): 36.4 (12-26-19 @ 12:01), Max: 37 (12-25-19 @ 23:38)  HR: 61 (12-26-19 @ 12:01) (56 - 68)  BP: 132/57 (12-26-19 @ 12:01) (117/60 - 139/67)  RR: 18 (12-26-19 @ 12:01) (18 - 19)  SpO2: 90% (12-26-19 @ 12:01) (90% - 98%)  Wt(kg): --  Height (cm): 162.56 (12-25-19 @ 11:05)  Weight (kg): 81.6 (12-25-19 @ 11:05)  BMI (kg/m2): 30.9 (12-25-19 @ 11:05)  BSA (m2): 1.87 (12-25-19 @ 11:05)      12-25-19 @ 07:01  -  12-26-19 @ 07:00  --------------------------------------------------------  IN: 0 mL / OUT: 1950 mL / NET: -1950 mL    12-26-19 @ 07:01  -  12-26-19 @ 13:37  --------------------------------------------------------  IN: 0 mL / OUT: 200 mL / NET: -200 mL      Physical Exam:  	Gen: NAD, well-appearing  	HEENT: PERRL, supple neck, clear oropharynx  	Pulm: CTA B/L  	CV: RRR, S1S2; no rub  	Back: No spinal or CVA tenderness; no sacral edema  	Abd: +BS, soft, nontender/nondistended  	: No suprapubic tenderness  	UE: Warm, FROM, no clubbing, intact strength; no edema; no asterixis  	LE: Warm, FROM, no clubbing, intact strength; no edema  	Neuro: No focal deficits, intact gait  	Psych: Normal affect and mood  	Skin: Warm, without rashes  	Vascular access:    LABS/STUDIES  --------------------------------------------------------------------------------              8.4    6.20  >-----------<  161      [12-26-19 @ 05:56]              27.4     134  |  98  |  66.0  ----------------------------<  81      [12-26-19 @ 05:56]  4.7   |  19.0  |  3.77        Ca     7.6     [12-26-19 @ 05:56]    TPro  7.0  /  Alb  3.9  /  TBili  0.3  /  DBili  x   /  AST  50  /  ALT  34  /  AlkPhos  88  [12-25-19 @ 11:57]        Troponin 0.02      [12-25-19 @ 11:57]  CK 73      [12-25-19 @ 11:57]    Creatinine Trend:  SCr 3.77 [12-26 @ 05:56]  SCr 3.69 [12-25 @ 11:57]  SCr 3.24 [12-16 @ 06:16]  SCr 3.28 [12-15 @ 06:36]  SCr 3.48 [12-14 @ 06:45]    Urinalysis - [12-25-19 @ 12:13]      Color Yellow / Appearance Clear / SG 1.025 / pH 5.0      Gluc Negative / Ketone Negative  / Bili Negative / Urobili Negative       Blood Small / Protein 100 / Leuk Est Moderate / Nitrite Negative      RBC 3-5 / WBC 6-10 / Hyaline  / Gran  / Sq Epi  / Non Sq Epi Occasional / Bacteria Negative      Iron 25, TIBC 285, %sat 9      [12-11-19 @ 17:50]  Ferritin 25      [12-11-19 @ 17:50]  PTH -- (Ca 8.3)      [12-12-19 @ 19:36]   164  Vitamin D (25OH) 20.3      [12-12-19 @ 19:36]  HbA1c 8.1      [12-11-19 @ 06:47]  TSH 6.54      [12-25-19 @ 21:00]      KEYSHA: titer 1:320, pattern Speckled      [01-13-15 @ 20:50]  dsDNA <12      [01-18-15 @ 13:57]  C3 Complement 146      [01-18-15 @ 13:57]  C4 Complement 56      [01-18-15 @ 13:57]  Rheumatoid Factor 9.6      [01-18-15 @ 13:51]  ANCA: cANCA Negative, pANCA Negative, atypical ANCA Negative      [01-18-15 @ 13:57]  MPO-ANCA <5.0, interpretation: Negative      [01-18-15 @ 13:57]  PR3-ANCA <5.0, interpretation: Negative      [01-18-15 @ 13:57]  Immunofixation Serum: No Monoclonal Band Identified      [01-18-15 @ 13:57]  SPEP Interpretation: Pattern Consistent With Acute Inflammation Or Stress      [01-18-15 @ 13:57] [facility] DIVISION OF KIDNEY DISEASES AND HYPERTENSION -- INITIAL CONSULT NOTE  --------------------------------------------------------------------------------  HPI:     79 year old male patient with  history of CKD stage 4, hypertension, chronic diastolic CHF, BPH and moderate-severe AS awaiting TAVR who presented to the ER with c/o difficulty breathing. Pt was recently admitted to [facility] on 12/10 for  acute on chronic diastolic CHF, hypertensive urgency and UTI. He was discharged home on PO diuretics and was doing good initially but recently started having worsening dyspnea, orthopnea and LE edema. Lasix dose was increased to 40 mg bid by house physician with some improvement. Pt later had acute worsening of symptoms and was brought to ED. Pt found to have elevated pro BNP and pulmonary edema on CXR. Pt also s/p Rt pigtail catheter by CTS.  Nephrology consulted for history of CKD. Pt was seen by our team during last admission and his Scr was ~3.3 on discharge.  Pt seen and examined; feels well. Breathing has improved. Denies any acute complaint.      PAST HISTORY  --------------------------------------------------------------------------------  PAST MEDICAL & SURGICAL HISTORY:  Hyperkalemia  ADI (acute kidney injury)  BPH (benign prostatic hyperplasia)  CKD (chronic kidney disease)  Hyperlipemia  Hypertension  Diabetes  Ureteral stent retained    FAMILY HISTORY:  No pertinent family history in first degree relatives    PAST SOCIAL HISTORY:  lives with wife    ALLERGIES & MEDICATIONS  --------------------------------------------------------------------------------  Allergies    No Known Allergies    Intolerances      Standing Inpatient Medications  amLODIPine   Tablet 10 milliGRAM(s) Oral daily  aspirin 325 milliGRAM(s) Oral daily  atorvastatin 10 milliGRAM(s) Oral at bedtime  carvedilol 25 milliGRAM(s) Oral every 12 hours  cholecalciferol 1000 Unit(s) Oral daily  dextrose 5%. 1000 milliLiter(s) IV Continuous <Continuous>  dextrose 50% Injectable 12.5 Gram(s) IV Push once  dextrose 50% Injectable 25 Gram(s) IV Push once  dextrose 50% Injectable 25 Gram(s) IV Push once  doxazosin 4 milliGRAM(s) Oral at bedtime  ferrous    sulfate 325 milliGRAM(s) Oral daily  furosemide   Injectable 40 milliGRAM(s) IV Push daily  heparin  Injectable 5000 Unit(s) SubCutaneous every 12 hours  hydrALAZINE 50 milliGRAM(s) Oral three times a day  insulin lispro (HumaLOG) corrective regimen sliding scale   SubCutaneous three times a day before meals  isosorbide   mononitrate ER Tablet (IMDUR) 30 milliGRAM(s) Oral daily  sodium bicarbonate 650 milliGRAM(s) Oral two times a day    PRN Inpatient Medications  dextrose 40% Gel 15 Gram(s) Oral once PRN  glucagon  Injectable 1 milliGRAM(s) IntraMuscular once PRN      REVIEW OF SYSTEMS  --------------------------------------------------------------------------------  Gen: No weight changes, fatigue, fevers/chills, weakness  Skin: No rashes  Head/Eyes/Ears/Mouth: No headache; Normal hearing; Normal vision  Respiratory:  dyspnea (improved), no cough,  CV: No chest pain, PND, orthopnea  GI: No abdominal pain, diarrhea, constipation, nausea, vomiting,   : No increased frequency, dysuria, hematuria, nocturia  MSK: No joint pain/swelling; no back pain; leg edema+  Neuro: No dizziness/lightheadedness, weakness  Heme: No easy bruising or bleeding  Endo: No heat/cold intolerance  Psych: No significant nervousness, anxiety, stress, depression    All other systems were reviewed and are negative, except as noted.    VITALS/PHYSICAL EXAM  --------------------------------------------------------------------------------  T(C): 36.4 (12-26-19 @ 12:01), Max: 37 (12-25-19 @ 23:38)  HR: 61 (12-26-19 @ 12:01) (56 - 68)  BP: 132/57 (12-26-19 @ 12:01) (117/60 - 139/67)  RR: 18 (12-26-19 @ 12:01) (18 - 19)  SpO2: 90% (12-26-19 @ 12:01) (90% - 98%)  Wt(kg): --  Height (cm): 162.56 (12-25-19 @ 11:05)  Weight (kg): 81.6 (12-25-19 @ 11:05)  BMI (kg/m2): 30.9 (12-25-19 @ 11:05)  BSA (m2): 1.87 (12-25-19 @ 11:05)      12-25-19 @ 07:01  -  12-26-19 @ 07:00  --------------------------------------------------------  IN: 0 mL / OUT: 1950 mL / NET: -1950 mL    12-26-19 @ 07:01  -  12-26-19 @ 13:37  --------------------------------------------------------  IN: 0 mL / OUT: 200 mL / NET: -200 mL      Physical Exam:  	Gen: NAD  	HEENT: supple neck, on room air  	Pulm: CTA B/L, Rt pigtail catheter  	CV: RRR, S1S2; no rub  	Back: unable to examine  	Abd: +BS, soft, nontender/nondistended  	: No suprapubic tenderness  	UE: Warm,  no edema  	LE: Warm, b/l LE edema  	Neuro: No focal deficits  	Psych: Normal affect and mood  	Skin: Warm, without rashes      LABS/STUDIES  --------------------------------------------------------------------------------              8.4    6.20  >-----------<  161      [12-26-19 @ 05:56]              27.4     134  |  98  |  66.0  ----------------------------<  81      [12-26-19 @ 05:56]  4.7   |  19.0  |  3.77        Ca     7.6     [12-26-19 @ 05:56]    TPro  7.0  /  Alb  3.9  /  TBili  0.3  /  DBili  x   /  AST  50  /  ALT  34  /  AlkPhos  88  [12-25-19 @ 11:57]        Troponin 0.02      [12-25-19 @ 11:57]  CK 73      [12-25-19 @ 11:57]    Creatinine Trend:  SCr 3.77 [12-26 @ 05:56]  SCr 3.69 [12-25 @ 11:57]  SCr 3.24 [12-16 @ 06:16]  SCr 3.28 [12-15 @ 06:36]  SCr 3.48 [12-14 @ 06:45]    Urinalysis - [12-25-19 @ 12:13]      Color Yellow / Appearance Clear / SG 1.025 / pH 5.0      Gluc Negative / Ketone Negative  / Bili Negative / Urobili Negative       Blood Small / Protein 100 / Leuk Est Moderate / Nitrite Negative      RBC 3-5 / WBC 6-10 / Hyaline  / Gran  / Sq Epi  / Non Sq Epi Occasional / Bacteria Negative      Iron 25, TIBC 285, %sat 9      [12-11-19 @ 17:50]  Ferritin 25      [12-11-19 @ 17:50]  PTH -- (Ca 8.3)      [12-12-19 @ 19:36]   164  Vitamin D (25OH) 20.3      [12-12-19 @ 19:36]  HbA1c 8.1      [12-11-19 @ 06:47]  TSH 6.54      [12-25-19 @ 21:00]      KEYSHA: titer 1:320, pattern Speckled      [01-13-15 @ 20:50]  dsDNA <12      [01-18-15 @ 13:57]  C3 Complement 146      [01-18-15 @ 13:57]  C4 Complement 56      [01-18-15 @ 13:57]  Rheumatoid Factor 9.6      [01-18-15 @ 13:51]  ANCA: cANCA Negative, pANCA Negative, atypical ANCA Negative      [01-18-15 @ 13:57]  MPO-ANCA <5.0, interpretation: Negative      [01-18-15 @ 13:57]  PR3-ANCA <5.0, interpretation: Negative      [01-18-15 @ 13:57]  Immunofixation Serum: No Monoclonal Band Identified      [01-18-15 @ 13:57]  SPEP Interpretation: Pattern Consistent With Acute Inflammation Or Stress      [01-18-15 @ 13:57] [facility] DIVISION OF KIDNEY DISEASES AND HYPERTENSION -- INITIAL CONSULT NOTE  --------------------------------------------------------------------------------  HPI:     79 year old male patient with  history of CKD stage 4, hypertension, chronic diastolic CHF, BPH and moderate-severe AS awaiting TAVR who presented to the ER with c/o difficulty breathing. Pt was recently admitted to [facility] on 12/10 for  acute on chronic diastolic CHF, hypertensive urgency and UTI. He was discharged home on PO diuretics and was doing good initially but recently started having worsening dyspnea, orthopnea and LE edema. Lasix dose was increased to 40 mg bid by house physician with some improvement. Pt later had acute worsening of symptoms and was brought to ED. Pt found to have elevated pro BNP and pulmonary edema on CXR. Pt also s/p Rt pigtail catheter by CTS.  Nephrology consulted for history of CKD. Pt was seen by our team during last admission and his Scr was ~3.3 on discharge.  Pt seen and examined; feels well. Breathing has improved. Denies any acute complaint.      PAST HISTORY  --------------------------------------------------------------------------------  PAST MEDICAL & SURGICAL HISTORY:  Hyperkalemia  ADI (acute kidney injury)  BPH (benign prostatic hyperplasia)  CKD (chronic kidney disease)  Hyperlipemia  Hypertension  Diabetes  Ureteral stent retained    FAMILY HISTORY:  No pertinent family history in first degree relatives    PAST SOCIAL HISTORY:  lives with wife    ALLERGIES & MEDICATIONS  --------------------------------------------------------------------------------  Allergies    No Known Allergies    Intolerances      Standing Inpatient Medications  amLODIPine   Tablet 10 milliGRAM(s) Oral daily  aspirin 325 milliGRAM(s) Oral daily  atorvastatin 10 milliGRAM(s) Oral at bedtime  carvedilol 25 milliGRAM(s) Oral every 12 hours  cholecalciferol 1000 Unit(s) Oral daily  dextrose 5%. 1000 milliLiter(s) IV Continuous <Continuous>  dextrose 50% Injectable 12.5 Gram(s) IV Push once  dextrose 50% Injectable 25 Gram(s) IV Push once  dextrose 50% Injectable 25 Gram(s) IV Push once  doxazosin 4 milliGRAM(s) Oral at bedtime  ferrous    sulfate 325 milliGRAM(s) Oral daily  furosemide   Injectable 40 milliGRAM(s) IV Push daily  heparin  Injectable 5000 Unit(s) SubCutaneous every 12 hours  hydrALAZINE 50 milliGRAM(s) Oral three times a day  insulin lispro (HumaLOG) corrective regimen sliding scale   SubCutaneous three times a day before meals  isosorbide   mononitrate ER Tablet (IMDUR) 30 milliGRAM(s) Oral daily  sodium bicarbonate 650 milliGRAM(s) Oral two times a day    PRN Inpatient Medications  dextrose 40% Gel 15 Gram(s) Oral once PRN  glucagon  Injectable 1 milliGRAM(s) IntraMuscular once PRN      REVIEW OF SYSTEMS  --------------------------------------------------------------------------------  Gen: No weight changes, fatigue, fevers/chills, weakness  Skin: No rashes  Head/Eyes/Ears/Mouth: No headache; Normal hearing; Normal vision  Respiratory:  dyspnea (improved), no cough,  CV: No chest pain, PND, orthopnea  GI: No abdominal pain, diarrhea, constipation, nausea, vomiting,   : No increased frequency, dysuria, hematuria, nocturia  MSK: No joint pain/swelling; no back pain; leg edema+  Neuro: No dizziness/lightheadedness, weakness  Heme: No easy bruising or bleeding  Endo: No heat/cold intolerance  Psych: No significant nervousness, anxiety, stress, depression    All other systems were reviewed and are negative, except as noted.    VITALS/PHYSICAL EXAM  --------------------------------------------------------------------------------  T(C): 36.4 (12-26-19 @ 12:01), Max: 37 (12-25-19 @ 23:38)  HR: 61 (12-26-19 @ 12:01) (56 - 68)  BP: 132/57 (12-26-19 @ 12:01) (117/60 - 139/67)  RR: 18 (12-26-19 @ 12:01) (18 - 19)  SpO2: 90% (12-26-19 @ 12:01) (90% - 98%)  Wt(kg): --  Height (cm): 162.56 (12-25-19 @ 11:05)  Weight (kg): 81.6 (12-25-19 @ 11:05)  BMI (kg/m2): 30.9 (12-25-19 @ 11:05)  BSA (m2): 1.87 (12-25-19 @ 11:05)      12-25-19 @ 07:01  -  12-26-19 @ 07:00  --------------------------------------------------------  IN: 0 mL / OUT: 1950 mL / NET: -1950 mL    12-26-19 @ 07:01  -  12-26-19 @ 13:37  --------------------------------------------------------  IN: 0 mL / OUT: 200 mL / NET: -200 mL      Physical Exam:  	Gen: NAD  	HEENT: supple neck, on room air  	Pulm: CTA B/L, Rt pigtail catheter  	CV: RRR, systolic murmur,  no rub  	Back: unable to examine  	Abd: +BS, soft, nontender/nondistended  	: No suprapubic tenderness  	UE: Warm,  no edema  	LE: Warm, b/l LE edema  	Neuro: No focal deficits  	Psych: Normal affect and mood  	Skin: Warm, without rashes      LABS/STUDIES  --------------------------------------------------------------------------------              8.4    6.20  >-----------<  161      [12-26-19 @ 05:56]              27.4     134  |  98  |  66.0  ----------------------------<  81      [12-26-19 @ 05:56]  4.7   |  19.0  |  3.77        Ca     7.6     [12-26-19 @ 05:56]    TPro  7.0  /  Alb  3.9  /  TBili  0.3  /  DBili  x   /  AST  50  /  ALT  34  /  AlkPhos  88  [12-25-19 @ 11:57]        Troponin 0.02      [12-25-19 @ 11:57]  CK 73      [12-25-19 @ 11:57]    Creatinine Trend:  SCr 3.77 [12-26 @ 05:56]  SCr 3.69 [12-25 @ 11:57]  SCr 3.24 [12-16 @ 06:16]  SCr 3.28 [12-15 @ 06:36]  SCr 3.48 [12-14 @ 06:45]    Urinalysis - [12-25-19 @ 12:13]      Color Yellow / Appearance Clear / SG 1.025 / pH 5.0      Gluc Negative / Ketone Negative  / Bili Negative / Urobili Negative       Blood Small / Protein 100 / Leuk Est Moderate / Nitrite Negative      RBC 3-5 / WBC 6-10 / Hyaline  / Gran  / Sq Epi  / Non Sq Epi Occasional / Bacteria Negative      Iron 25, TIBC 285, %sat 9      [12-11-19 @ 17:50]  Ferritin 25      [12-11-19 @ 17:50]  PTH -- (Ca 8.3)      [12-12-19 @ 19:36]   164  Vitamin D (25OH) 20.3      [12-12-19 @ 19:36]  HbA1c 8.1      [12-11-19 @ 06:47]  TSH 6.54      [12-25-19 @ 21:00]      KEYSHA: titer 1:320, pattern Speckled      [01-13-15 @ 20:50]  dsDNA <12      [01-18-15 @ 13:57]  C3 Complement 146      [01-18-15 @ 13:57]  C4 Complement 56      [01-18-15 @ 13:57]  Rheumatoid Factor 9.6      [01-18-15 @ 13:51]  ANCA: cANCA Negative, pANCA Negative, atypical ANCA Negative      [01-18-15 @ 13:57]  MPO-ANCA <5.0, interpretation: Negative      [01-18-15 @ 13:57]  PR3-ANCA <5.0, interpretation: Negative      [01-18-15 @ 13:57]  Immunofixation Serum: No Monoclonal Band Identified      [01-18-15 @ 13:57]  SPEP Interpretation: Pattern Consistent With Acute Inflammation Or Stress      [01-18-15 @ 13:57]

## 2019-12-27 DIAGNOSIS — J90 PLEURAL EFFUSION, NOT ELSEWHERE CLASSIFIED: ICD-10-CM

## 2019-12-27 LAB
HCT VFR BLD CALC: 28.4 % — LOW (ref 39–50)
HGB BLD-MCNC: 8.5 G/DL — LOW (ref 13–17)
MCHC RBC-ENTMCNC: 23.9 PG — LOW (ref 27–34)
MCHC RBC-ENTMCNC: 29.9 GM/DL — LOW (ref 32–36)
MCV RBC AUTO: 80 FL — SIGNIFICANT CHANGE UP (ref 80–100)
NIGHT BLUE STAIN TISS: SIGNIFICANT CHANGE UP
PLATELET # BLD AUTO: 173 K/UL — SIGNIFICANT CHANGE UP (ref 150–400)
RBC # BLD: 3.55 M/UL — LOW (ref 4.2–5.8)
RBC # FLD: 19.8 % — HIGH (ref 10.3–14.5)
SPECIMEN SOURCE: SIGNIFICANT CHANGE UP
TROPONIN T SERPL-MCNC: 0.02 NG/ML — SIGNIFICANT CHANGE UP (ref 0–0.06)
TROPONIN T SERPL-MCNC: 0.02 NG/ML — SIGNIFICANT CHANGE UP (ref 0–0.06)
TROPONIN T SERPL-MCNC: 0.03 NG/ML — SIGNIFICANT CHANGE UP (ref 0–0.06)
WBC # BLD: 12.19 K/UL — HIGH (ref 3.8–10.5)
WBC # FLD AUTO: 12.19 K/UL — HIGH (ref 3.8–10.5)

## 2019-12-27 PROCEDURE — 99233 SBSQ HOSP IP/OBS HIGH 50: CPT

## 2019-12-27 PROCEDURE — 93010 ELECTROCARDIOGRAM REPORT: CPT

## 2019-12-27 PROCEDURE — 99232 SBSQ HOSP IP/OBS MODERATE 35: CPT

## 2019-12-27 PROCEDURE — 71045 X-RAY EXAM CHEST 1 VIEW: CPT | Mod: 26

## 2019-12-27 RX ORDER — ACETAMINOPHEN 500 MG
650 TABLET ORAL EVERY 6 HOURS
Refills: 0 | Status: DISCONTINUED | OUTPATIENT
Start: 2019-12-27 | End: 2020-01-09

## 2019-12-27 RX ORDER — MORPHINE SULFATE 50 MG/1
2 CAPSULE, EXTENDED RELEASE ORAL ONCE
Refills: 0 | Status: DISCONTINUED | OUTPATIENT
Start: 2019-12-27 | End: 2019-12-27

## 2019-12-27 RX ADMIN — MORPHINE SULFATE 2 MILLIGRAM(S): 50 CAPSULE, EXTENDED RELEASE ORAL at 02:35

## 2019-12-27 RX ADMIN — Medication 325 MILLIGRAM(S): at 12:38

## 2019-12-27 RX ADMIN — Medication 50 MILLIGRAM(S): at 05:53

## 2019-12-27 RX ADMIN — ATORVASTATIN CALCIUM 10 MILLIGRAM(S): 80 TABLET, FILM COATED ORAL at 21:40

## 2019-12-27 RX ADMIN — AMLODIPINE BESYLATE 10 MILLIGRAM(S): 2.5 TABLET ORAL at 05:53

## 2019-12-27 RX ADMIN — Medication 4 MILLIGRAM(S): at 21:40

## 2019-12-27 RX ADMIN — Medication 50 MILLIGRAM(S): at 17:41

## 2019-12-27 RX ADMIN — ISOSORBIDE MONONITRATE 30 MILLIGRAM(S): 60 TABLET, EXTENDED RELEASE ORAL at 12:39

## 2019-12-27 RX ADMIN — MORPHINE SULFATE 2 MILLIGRAM(S): 50 CAPSULE, EXTENDED RELEASE ORAL at 03:08

## 2019-12-27 RX ADMIN — Medication 650 MILLIGRAM(S): at 17:41

## 2019-12-27 RX ADMIN — MORPHINE SULFATE 2 MILLIGRAM(S): 50 CAPSULE, EXTENDED RELEASE ORAL at 03:00

## 2019-12-27 RX ADMIN — Medication 1: at 09:08

## 2019-12-27 RX ADMIN — Medication 650 MILLIGRAM(S): at 12:00

## 2019-12-27 RX ADMIN — Medication 1: at 17:41

## 2019-12-27 RX ADMIN — CARVEDILOL PHOSPHATE 25 MILLIGRAM(S): 80 CAPSULE, EXTENDED RELEASE ORAL at 05:53

## 2019-12-27 RX ADMIN — CARVEDILOL PHOSPHATE 25 MILLIGRAM(S): 80 CAPSULE, EXTENDED RELEASE ORAL at 17:41

## 2019-12-27 RX ADMIN — Medication 50 MILLIGRAM(S): at 21:40

## 2019-12-27 RX ADMIN — HEPARIN SODIUM 5000 UNIT(S): 5000 INJECTION INTRAVENOUS; SUBCUTANEOUS at 17:41

## 2019-12-27 RX ADMIN — Medication 1000 UNIT(S): at 12:39

## 2019-12-27 RX ADMIN — HEPARIN SODIUM 5000 UNIT(S): 5000 INJECTION INTRAVENOUS; SUBCUTANEOUS at 05:53

## 2019-12-27 RX ADMIN — Medication 650 MILLIGRAM(S): at 05:53

## 2019-12-27 RX ADMIN — Medication 325 MILLIGRAM(S): at 12:39

## 2019-12-27 RX ADMIN — Medication 1: at 12:39

## 2019-12-27 RX ADMIN — Medication 40 MILLIGRAM(S): at 05:53

## 2019-12-27 RX ADMIN — Medication 650 MILLIGRAM(S): at 12:39

## 2019-12-27 RX ADMIN — MORPHINE SULFATE 2 MILLIGRAM(S): 50 CAPSULE, EXTENDED RELEASE ORAL at 05:49

## 2019-12-27 NOTE — PROGRESS NOTE ADULT - PROBLEM SELECTOR PLAN 1
12/25 Rt PTC (Initial drainage 1L-Transudative)  Overnight drainage: 200cc   Maintain CT to H20 seal (No gross airleak noted) and record daily output  ISS/cough and deep breathing exercise  AM CXR  Will cont to follow  Cont care as per Primary team and Cards 12/25 Rt PTC (Initial drainage 1L-Transudative)  Overnight drainage: 200cc   Maintain CT to H20 seal (No gross airleak noted) and record daily output  ISS/cough and deep breathing exercise  Cont AM CXR (Lt effusion noted-unchanged from prior study-Cont to monitor)  Will cont to follow  Cont care as per Primary team and Cards

## 2019-12-27 NOTE — PROGRESS NOTE ADULT - SUBJECTIVE AND OBJECTIVE BOX
Brief Hospital Course:   : P/W SOB & DEJESUS: +acute diastolic HF. Found to have b/l pleural effusions-Rt PTC (1L drainage)    Significant recent/past 24 hr events:  Overnight c/o Rt sided CP-STAT EKG/Trop neg for acute changes, pain relieved with IV morphine.   -Pt states having no CP this AM and SOB improving (Current: Sat 98% on RA, speaking in full sentences). CT drain 200cc overnight, to H20 seal, no air leak noted. Currently in NAD and without acute complaints. Denies N/V/D HA, dizziness, blurry vision, numbness/tingling, SOB, cough, chest pain, palpitations, abd pain or urinary sx's.      Subjective:    Review of Systems  ROS negative x 10 systems except as noted above    Patient is a 79y old  Male who presents with a chief complaint of Difficulty breathing (27 Dec 2019 09:24)    HPI:  The patient is a 79 year old male with a history of CKD stage 4, hypertension, chronic diastolic CHF, bph and moderate-severe AS who presented to the ER with complaints of difficulty breathing. According to the patient and his wife at bedside he was admitted to [facility] on 12/10 for acute hypoxic respiratory failure secondary to acute on chronic diastolic CHF, hypertensive urgency and UTI. He was discharged home on PO diuretics. Since being home he initially felt better however his wife about a week ago he started to have symptoms of orthopnea and dyspnea at rest with worsening lower extremity edema. A house physician came to evaluate him over the weekend and increased the dose of lasix from 40mg PO OD to BID with some improvement. However, last night he suddenly felt like he could not breath and was brought to the ER. In the ED, improved with a dose of IV lasix 40mg x 1. Noted to be hypothermic, rectal temperature of 91.3 with a heart rate of 53. Started on a bear hugger with improvement to 92.3. Lactate was negative, no leukocytosis> Chest xray with pulmonary edema and elevated BNP. Given a dose of empiric rocephin x 1. (25 Dec 2019 14:46)    PAST MEDICAL & SURGICAL HISTORY:  Hyperkalemia  ADI (acute kidney injury)  BPH (benign prostatic hyperplasia)  CKD (chronic kidney disease)  Hyperlipemia  Hypertension  Diabetes  Ureteral stent retained    FAMILY HISTORY:  No pertinent family history in first degree relatives    Vitals   ICU Vital Signs Last 24 Hrs  T(C): 36.4 (27 Dec 2019 09:07), Max: 36.6 (26 Dec 2019 23:40)  T(F): 97.6 (27 Dec 2019 09:07), Max: 97.8 (26 Dec 2019 23:40)  HR: 60 (27 Dec 2019 09:07) (60 - 69)  BP: 122/66 (27 Dec 2019 09:07) (120/37 - 136/62)  BP(mean): --  ABP: --  ABP(mean): --  RR: 18 (27 Dec 2019 09:07) (18 - 20)  SpO2: 93% (27 Dec 2019 09:07) (90% - 93%)    I&O's Detail    26 Dec 2019 07:01  -  27 Dec 2019 07:00  --------------------------------------------------------  IN:  Total IN: 0 mL    OUT:    Chest Tube: 430 mL  Total OUT: 430 mL    Total NET: -430 mL    LABS                        8.5    12.19 )-----------( 173      ( 27 Dec 2019 03:58 )             28.4     12-27    130<L>  |  96<L>  |  74.0<H>  ----------------------------<  182<H>  5.3   |  19.0<L>  |  3.94<H>    Ca    7.6<L>      27 Dec 2019 03:59    TPro  7.0  /  Alb  3.9  /  TBili  0.3<L>  /  DBili  x   /  AST  50<H>  /  ALT  34  /  AlkPhos  88  12-25    LIVER FUNCTIONS - ( 25 Dec 2019 11:57 )  Alb: 3.9 g/dL / Pro: 7.0 g/dL / ALK PHOS: 88 U/L / ALT: 34 U/L / AST: 50 U/L / GGT: x           CARDIAC MARKERS ( 27 Dec 2019 03:58 )  CKx     / CKMBx     / Troponin T0.02 ng/mL /    Urinalysis Basic - ( 25 Dec 2019 12:13 )    Color: Yellow / Appearance: Clear / S.025 / pH: x  Gluc: x / Ketone: Negative  / Bili: Negative / Urobili: Negative mg/dL   Blood: x / Protein: 100 mg/dL / Nitrite: Negative   Leuk Esterase: Moderate / RBC: 3-5 /HPF / WBC 6-10   Sq Epi: x / Non Sq Epi: Occasional / Bacteria: Negative    POCT Blood Glucose.: 170 mg/dL (19 @ 08:17)  POCT Blood Glucose.: 136 mg/dL (19 @ 17:13)  POCT Blood Glucose.: 87 mg/dL (19 @ 12:39)      MEDICATIONS  (STANDING):  amLODIPine   Tablet 10 milliGRAM(s) Oral daily  aspirin 325 milliGRAM(s) Oral daily  atorvastatin 10 milliGRAM(s) Oral at bedtime  carvedilol 25 milliGRAM(s) Oral every 12 hours  cholecalciferol 1000 Unit(s) Oral daily  dextrose 5%. 1000 milliLiter(s) (50 mL/Hr) IV Continuous <Continuous>  dextrose 50% Injectable 12.5 Gram(s) IV Push once  dextrose 50% Injectable 25 Gram(s) IV Push once  dextrose 50% Injectable 25 Gram(s) IV Push once  doxazosin 4 milliGRAM(s) Oral at bedtime  epoetin nereida Injectable 64041 Unit(s) SubCutaneous <User Schedule>  ferrous    sulfate 325 milliGRAM(s) Oral daily  furosemide   Injectable 40 milliGRAM(s) IV Push daily  heparin  Injectable 5000 Unit(s) SubCutaneous every 12 hours  hydrALAZINE 50 milliGRAM(s) Oral three times a day  insulin lispro (HumaLOG) corrective regimen sliding scale   SubCutaneous three times a day before meals  isosorbide   mononitrate ER Tablet (IMDUR) 30 milliGRAM(s) Oral daily  sodium bicarbonate 650 milliGRAM(s) Oral two times a day    MEDICATIONS  (PRN):  acetaminophen   Tablet .. 650 milliGRAM(s) Oral every 6 hours PRN Temp greater or equal to 38C (100.4F), Mild Pain (1 - 3)  dextrose 40% Gel 15 Gram(s) Oral once PRN Blood Glucose LESS THAN 70 milliGRAM(s)/deciliter  glucagon  Injectable 1 milliGRAM(s) IntraMuscular once PRN Glucose LESS THAN 70 milligrams/deciliter    Allergies:  No Known Allergies    Physical Exam:   Constitutional: NAD  Neck: supple,  No JVD. Trachea Midline  Respiratory: Diminished BS/crackles b/l lower lobes. No accessory muscle use noted. No rhonchi, wheezing noted b/l  Cardiovascular: Regular rate, regular rhythm, normal S1, S2; no murmurs or rub  Gastrointestinal: Soft, non-tender, non distended, normal bowel sounds  Extremities: +LE edema (+1), MORGAN x 4, no cyanosis, no clubbing   Vascular: Equal and normal pulses: 2+ peripheral pulses throughout  Neurological: A+O x 3; speech clear and intact; no gross sensory/motor deficits  Psychiatric: calm, normal mood, normal affect  Skin: warm, dry, well perfused, no rashes  Tubes/Lines: Rt pleural CT-No obvious air leak noted. Incision D/I-bandage in place

## 2019-12-27 NOTE — PROGRESS NOTE ADULT - SUBJECTIVE AND OBJECTIVE BOX
[facility]                                                               Office: [address] [address]                                                              Telephone: [phone]. Fax:[phone]                                                                             PROGRESS NOTE  Reason for follow up: congestive heart failure   Overnight: No new events.   Update:  smita has right chest tube.      Subjective: "  i am ok"    Review of symptoms: Cardiac:  No chest pain. + dyspnea. No palpitations.  Respiratory:no cough. +  dyspnea  Gastrointestinal: No diarrhea. No abdominal pain. No bleeding.      Vital Signs Last 24 Hrs  T(C): 36.4 (12-27-19 @ 09:07), Max: 36.6 (12-26-19 @ 23:40)  T(F): 97.6 (12-27-19 @ 09:07), Max: 97.8 (12-26-19 @ 23:40)  HR: 60 (12-27-19 @ 09:07) (60 - 69)  BP: 122/66 (12-27-19 @ 09:07) (120/37 - 136/62)  BP(mean): --  RR: 18 (12-27-19 @ 09:07) (18 - 20)  SpO2: 93% (12-27-19 @ 09:07) (90% - 93%)      PHYSICAL EXAM:  Appearance: Comfortable. No acute distress  HEENT:  Head and neck: Atraumatic. Normocephalic.  Normal oral mucosa, PERRL, Neck is supple   Neurologic: A & O x 3, no focal deficits. EOMI   Lymphatic: No cervical lymphadenopathy  Cardiovascular: Normal S1 S2, No murmur, rubs/gallops. No JVD, No edema  Respiratory: Lungs inspiratory wheezing, with bilateral rales right side decrease breath sounds.  Gastrointestinal:  Soft, Non-tender, + BS  Lower Extremities: trivial bilateral lower extremity edema  Psychiatry: Patient is calm. No agitation. Mood & affect appropriate  Skin: Right chest tube-saturated bright red blood, no subcutaneous emphysema. No rashes/ ecchymoses/cyanosis/ulcers visualized on the face, hands or feet.      CURRENT MEDICATIONS:  amLODIPine   Tablet 10 milliGRAM(s) Oral daily  carvedilol 25 milliGRAM(s) Oral every 12 hours  doxazosin 4 milliGRAM(s) Oral at bedtime  furosemide   Injectable 40 milliGRAM(s) IV Push daily  hydrALAZINE 50 milliGRAM(s) Oral three times a day  isosorbide   mononitrate ER Tablet (IMDUR) 30 milliGRAM(s) Oral daily    aspirin  atorvastatin  dextrose 50% Injectable  dextrose 50% Injectable  dextrose 50% Injectable  insulin lispro (HumaLOG) corrective regimen sliding scale  cholecalciferol  dextrose 5%.  ferrous    sulfate  heparin  Injectable  sodium bicarbonate      DIAGNOSTIC TESTING:  [ ] Echocardiogram: < from: TTE Echo Complete w/Doppler (12.10.19 @ 13:21) >  Summary:   1. Technically difficult study.   2. Normal global left ventricular systolic function.   3. Left ventricular ejection fraction, by visual estimation, is 60 to   65%.   4. Spectral Doppler shows pseudonormal pattern of left ventricular   myocardial filling (Grade II diastolic dysfunction).   5. Elevated mean left atrial pressure. Mean LAP estimated at 22 mmHg.   6. Mild mitral annular calcification.   7. Mild thickening and calcification of the anterior and posterior   mitralvalve leaflets.   8. Mild mitral valve regurgitation.   9. Mild aortic regurgitation.  10. Moderate to severe aortic valve stenosis. Recommend SCAR for further   evaluation.  11. Moderate pleural effusion in both left and right lateral regions.  12. There is no evidence of pericardial effusion.    < end of copied text >    OTHER: 	  XRAY: < from: Xray Chest 1 View- PORTABLE-Routine (12.26.19 @ 09:10) >    Impression:    Persisting pulmonary edema with small left pleural fluid.    Right chest tube in place, no pneumothorax.    < end of copied text >      LABS:	 	                         8.5    12.19 )-----------( 173      ( 27 Dec 2019 03:58 )             28.4   N=x    ; L=x        27 Dec 2019 03:59    130    |  96     |  74.0   ----------------------------<  182    5.3     |  19.0   |  3.94     Ca    7.6        27 Dec 2019 03:59    TPro  7.0    /  Alb  3.9    /  TBili  0.3    /  DBili  x      /  AST  50     /  ALT  34     /  AlkPhos  88     25 Dec 2019 11:57      Hepatic panel: 25 Dec 2019 11:57  7.0   | 3.9                            0.3   | 0.3  /x                              50    | 34                                /88   \par

## 2019-12-27 NOTE — PROGRESS NOTE ADULT - ASSESSMENT
12/25 Pt admitted with SOB found to have acute on chronic heart failure exacerbation complicated by pleural effusions and acute on chronic kidney failure.  -CTS consult for possible TAVR in future pending full work up.  -Thoracic surgery consult: Pleural effusions-Rt PTC placed 12/25 (1L drainage) Patient/Caregiver provided printed discharge information.

## 2019-12-27 NOTE — PROGRESS NOTE ADULT - SUBJECTIVE AND OBJECTIVE BOX
CC: Follow up    INTERVAL HPI/OVERNIGHT EVENTS: Patient seen and examined, had complaints of right sided chest discomfort last night relieved with IV morphine. EKG with no acute changes, troponin was negative. Denies chest pain this morning. SOB improved. Right chest tube in place      Vital Signs Last 24 Hrs  T(C): 36.4 (27 Dec 2019 09:07), Max: 36.6 (26 Dec 2019 23:40)  T(F): 97.6 (27 Dec 2019 09:07), Max: 97.8 (26 Dec 2019 23:40)  HR: 60 (27 Dec 2019 09:07) (60 - 69)  BP: 122/66 (27 Dec 2019 09:07) (120/37 - 136/62)  BP(mean): --  RR: 18 (27 Dec 2019 09:07) (18 - 20)  SpO2: 93% (27 Dec 2019 09:07) (90% - 93%)    PHYSICAL EXAM:    GENERAL: NAD, AOX3  HEAD:  Atraumatic, Normocephalic  EYES: EOMI, PERRLA, conjunctiva and sclera clear  ENMT: Moist mucous membranes  CHEST/LUNG: Bibasilar crackles  Right chest tube in place  HEART: Regular rate and rhythm; No murmurs, rubs, or gallops  ABDOMEN: Soft, Nontender, Nondistended; Bowel sounds present  EXTREMITIES:  2+ Peripheral Pulses, No clubbing, cyanosis,1+ pedal edema bilaterally      MEDICATIONS  (STANDING):  amLODIPine   Tablet 10 milliGRAM(s) Oral daily  aspirin 325 milliGRAM(s) Oral daily  atorvastatin 10 milliGRAM(s) Oral at bedtime  carvedilol 25 milliGRAM(s) Oral every 12 hours  cholecalciferol 1000 Unit(s) Oral daily  dextrose 5%. 1000 milliLiter(s) (50 mL/Hr) IV Continuous <Continuous>  dextrose 50% Injectable 12.5 Gram(s) IV Push once  dextrose 50% Injectable 25 Gram(s) IV Push once  dextrose 50% Injectable 25 Gram(s) IV Push once  doxazosin 4 milliGRAM(s) Oral at bedtime  epoetin nereida Injectable 97740 Unit(s) SubCutaneous <User Schedule>  ferrous    sulfate 325 milliGRAM(s) Oral daily  furosemide   Injectable 40 milliGRAM(s) IV Push daily  heparin  Injectable 5000 Unit(s) SubCutaneous every 12 hours  hydrALAZINE 50 milliGRAM(s) Oral three times a day  insulin lispro (HumaLOG) corrective regimen sliding scale   SubCutaneous three times a day before meals  isosorbide   mononitrate ER Tablet (IMDUR) 30 milliGRAM(s) Oral daily  sodium bicarbonate 650 milliGRAM(s) Oral two times a day    MEDICATIONS  (PRN):  dextrose 40% Gel 15 Gram(s) Oral once PRN Blood Glucose LESS THAN 70 milliGRAM(s)/deciliter  glucagon  Injectable 1 milliGRAM(s) IntraMuscular once PRN Glucose LESS THAN 70 milligrams/deciliter      Allergies    No Known Allergies    Intolerances          LABS:                          8.5     )-----------( 173      ( 27 Dec 2019 03:58 )             28.4     12    134<L>  |  98  |  66.0<H>  ----------------------------<  81  4.7   |  19.0<L>  |  3.77<H>    Ca    7.6<L>      26 Dec 2019 05:56    TPro  7.0  /  Alb  3.9  /  TBili  0.3<L>  /  DBili  x   /  AST  50<H>  /  ALT  34  /  AlkPhos  88  12-25      Urinalysis Basic - ( 25 Dec 2019 12:13 )    Color: Yellow / Appearance: Clear / S.025 / pH: x  Gluc: x / Ketone: Negative  / Bili: Negative / Urobili: Negative mg/dL   Blood: x / Protein: 100 mg/dL / Nitrite: Negative   Leuk Esterase: Moderate / RBC: 3-5 /HPF / WBC 6-10   Sq Epi: x / Non Sq Epi: Occasional / Bacteria: Negative        RADIOLOGY & ADDITIONAL TESTS:

## 2019-12-27 NOTE — PROGRESS NOTE ADULT - ASSESSMENT
1) Advanced CKD  2) Volume overload/ pulmonary edema  3) Aortic stenosis  4) s/p Rt pigtail catheter for Rt pleural effusion   5) Metabolic acidosis  6) anemia of kidney disease with superimposed iron deficiency     Pt with advanced progressive CKD  No urgent indication for HD; pt agreeable to starting HD post cath /volume optimization for TAVR work up  Continue diuresis; monitor UOP, Scr and lytes  Continue po iron and KD  Continue sodium bicarb po  Will follow.

## 2019-12-27 NOTE — PROGRESS NOTE ADULT - SUBJECTIVE AND OBJECTIVE BOX
[facility] DIVISION OF KIDNEY DISEASES AND HYPERTENSION -- FOLLOW UP NOTE  --------------------------------------------------------------------------------  Chief Complaint: CKD     24 hour events/subjective:  No acute events; on IV lasix  Feels better        PAST HISTORY  --------------------------------------------------------------------------------  No significant changes to PMH, PSH, FHx, SHx, unless otherwise noted    ALLERGIES & MEDICATIONS  --------------------------------------------------------------------------------  Allergies    No Known Allergies    Intolerances      Standing Inpatient Medications  amLODIPine   Tablet 10 milliGRAM(s) Oral daily  aspirin 325 milliGRAM(s) Oral daily  atorvastatin 10 milliGRAM(s) Oral at bedtime  carvedilol 25 milliGRAM(s) Oral every 12 hours  cholecalciferol 1000 Unit(s) Oral daily  dextrose 5%. 1000 milliLiter(s) IV Continuous <Continuous>  dextrose 50% Injectable 12.5 Gram(s) IV Push once  dextrose 50% Injectable 25 Gram(s) IV Push once  dextrose 50% Injectable 25 Gram(s) IV Push once  doxazosin 4 milliGRAM(s) Oral at bedtime  epoetin nereida Injectable 44701 Unit(s) SubCutaneous <User Schedule>  ferrous    sulfate 325 milliGRAM(s) Oral daily  furosemide   Injectable 40 milliGRAM(s) IV Push daily  heparin  Injectable 5000 Unit(s) SubCutaneous every 12 hours  hydrALAZINE 50 milliGRAM(s) Oral three times a day  insulin lispro (HumaLOG) corrective regimen sliding scale   SubCutaneous three times a day before meals  isosorbide   mononitrate ER Tablet (IMDUR) 30 milliGRAM(s) Oral daily  sodium bicarbonate 650 milliGRAM(s) Oral two times a day    PRN Inpatient Medications  acetaminophen   Tablet .. 650 milliGRAM(s) Oral every 6 hours PRN  dextrose 40% Gel 15 Gram(s) Oral once PRN  glucagon  Injectable 1 milliGRAM(s) IntraMuscular once PRN      REVIEW OF SYSTEMS  --------------------------------------------------------------------------------  Gen: No weight changes, fatigue, fevers/chills, weakness  Skin: No rashes  Head/Eyes/Ears/Mouth: No headache; Normal hearing; Normal vision w/o blurriness; No sinus pain/discomfort, sore throat  Respiratory: No dyspnea, cough, wheezing, hemoptysis  CV: No chest pain, PND, orthopnea  GI: No abdominal pain, diarrhea, constipation, nausea, vomiting, melena, hematochezia  : No increased frequency, dysuria, hematuria, nocturia  MSK: No joint pain/swelling; no back pain; no edema  Neuro: No dizziness/lightheadedness, weakness, seizures, numbness, tingling  Heme: No easy bruising or bleeding  Endo: No heat/cold intolerance  Psych: No significant nervousness, anxiety, stress, depression    All other systems were reviewed and are negative, except as noted.    VITALS/PHYSICAL EXAM  --------------------------------------------------------------------------------  T(C): 36.4 (12-27-19 @ 09:07), Max: 36.6 (12-26-19 @ 23:40)  HR: 60 (12-27-19 @ 12:35) (60 - 69)  BP: 125/71 (12-27-19 @ 12:35) (120/37 - 136/62)  RR: 18 (12-27-19 @ 12:35) (18 - 20)  SpO2: 93% (12-27-19 @ 09:07) (90% - 93%)  Wt(kg): --        12-26-19 @ 07:01  -  12-27-19 @ 07:00  --------------------------------------------------------  IN: 0 mL / OUT: 430 mL / NET: -430 mL    12-27-19 @ 07:01  -  12-27-19 @ 14:18  --------------------------------------------------------  IN: 0 mL / OUT: 1 mL / NET: -1 mL      Physical Exam:  	Gen: NAD  	HEENT: supple neck, on room air  	Pulm: CTA B/L, Rt pigtail catheter  	CV: RRR, systolic murmur,  no rub  	Back: unable to examine  	Abd: +BS, soft, nontender/nondistended  	: No suprapubic tenderness  	UE: Warm,  no edema  	LE: Warm, b/l LE edema (improving)  	Neuro: No focal deficits  	Psych: Normal affect and mood  	Skin: Warm, without rashes    LABS/STUDIES  --------------------------------------------------------------------------------              8.5    12.19 >-----------<  173      [12-27-19 @ 03:58]              28.4     130  |  96  |  74.0  ----------------------------<  182      [12-27-19 @ 03:59]  5.3   |  19.0  |  3.94        Ca     7.6     [12-27-19 @ 03:59]    Troponin 0.02      [12-27-19 @ 10:26]    Creatinine Trend:  SCr 3.94 [12-27 @ 03:59]  SCr 3.77 [12-26 @ 05:56]  SCr 3.69 [12-25 @ 11:57]  SCr 3.24 [12-16 @ 06:16]  SCr 3.28 [12-15 @ 06:36]    Urinalysis - [12-25-19 @ 12:13]      Color Yellow / Appearance Clear / SG 1.025 / pH 5.0      Gluc Negative / Ketone Negative  / Bili Negative / Urobili Negative       Blood Small / Protein 100 / Leuk Est Moderate / Nitrite Negative      RBC 3-5 / WBC 6-10 / Hyaline  / Gran  / Sq Epi  / Non Sq Epi Occasional / Bacteria Negative      Iron 25, TIBC 285, %sat 9      [12-11-19 @ 17:50]  Ferritin 25      [12-11-19 @ 17:50]  PTH -- (Ca 8.3)      [12-12-19 @ 19:36]   164  Vitamin D (25OH) 20.3      [12-12-19 @ 19:36]  HbA1c 8.1      [12-11-19 @ 06:47]  TSH 6.54      [12-25-19 @ 21:00]

## 2019-12-27 NOTE — CHART NOTE - NSCHARTNOTEFT_GEN_A_CORE
Medicine PA-  S- Cd. for pt. c/o CP, R. ant./substernal aching, non-radiating. Pt. admitted for SOB, has R. CT in place for effusion. Hx. severe AS, awaiting TAVR, CHF, ADI, PAF.  O- VSS- 127/66-63-18-97.8- 93% RA, SR 60's on monitor  Gen- Pt. appears comfortable, A+O x3  S1S2 ausc.  Lungs- +CT R. draining w/ sl. diminished BS @ R. base, otherwise clear  Chest- non-tender  Abd- soft, nontender  Ext- no edema  A- CP- likely 2* to CT, r/o cardiac origin   EKG- SR @ 60 w/ non-spec. T-waves, no acute changes   Stat troponin /trend troponins    Repeat EKG in a.m.   MS 2mg IVP x2 doses w/ relief, resting comfortably

## 2019-12-27 NOTE — PROGRESS NOTE ADULT - ASSESSMENT
The patient is a 79 year old male with a history of CKD stage 4, hypertension, chronic diastolic CHF, bph and moderate-severe AS who presented to the ER with complaints of difficulty breathing. According to the patient and his wife at bedside he was admitted to [facility] on 12/10 for acute hypoxic respiratory failure secondary to acute on chronic diastolic CHF, hypertensive urgency and UTI. He was discharged home on PO diuretics. Since being home he initially felt better however his wife about a week ago he started to have symptoms of orthopnea and dyspnea at rest with worsening lower extremity edema. A house physician came to evaluate him over the weekend and increased the dose of lasix from 40mg PO OD to BID with some improvement. However, last night he suddenly felt like he could not breath and was brought to the ER. In the ED, improved with a dose of IV lasix 40mg x 1. Noted to be hypothermic, rectal temperature of 91.3 with a heart rate of 53. Started on a bear hugger with improvement to 92.3. Lactate was negative, no leukocytosis> Chest xray with pulmonary edema and elevated BNP. Given a dose of empiric rocephin x 1. Cardiology and CT surgery were consulted. Started on IV diuresis. Status post right chest tube placement on 12/25    Assessment/Plan:    1. Acute on chronic diastolic CHF with a right pleural effusion: IV lasix OD  Status post right chest tube placement by surgery on 12/25  Strict I&os   Daily weight  On Coreg   Continue imdur and hydralazine with parameters    2. ADI with CKD stage with metabolic acidosis: Continue IV diuresis   Nephrology following   May require HD if he were to proceed with TAVR work up and eventual TAVR  Monitor BMP    3. Hypothermia: Unclear cause; now resolved  Rule out infectious etiology- blood cultures x 2 pending  Urine culture, RVP negative  Pleural fluid cultures thus far negative   Given an empiric dose of IV rocephin on admission     4. Diabetes mellitus type 2: HSS   Monitor BSL    5. Hypertension: Continue norvasc with parameters    6. Severe AS: CT surgery following     7. BPH on doxazosin     VTE_ Heparin subcut

## 2019-12-27 NOTE — PROGRESS NOTE ADULT - ASSESSMENT
Pt is a 80 y/o male with medical history of DM, HTN, CKD, BPH, pAfib with no AC, who presents to [facility]-ED for c/o SOB. Pt is not a good historian d/t possible dementia, wife at bedside. Pt wife noticed pt has increased SOB even after discharge from [facility]. Pt also started to have frequent chills, but no known fever. Pt family further assess that "he is not himself", not as energetic and responsive to family. Pt was brought to [facility]-ED and was found to be hypothermic at 91.2, HR @ 53 SB, BP @ 139/63. On previous admission, pt had similar symptoms and was advised to be evaluated for AVR as outpatient. Pt Denies fever, cough, phlegm production,  PND, edema, chest pain, pressure, palpitations, irregular, fast heart beat, nausea, vomiting, melena, rectal bleed, hematuria, lightheadedness, dizziness, syncope, near syncope.   As of 12/26/19, pt in no resp. distress, chest tube draining straw colored drainage. Pt insertion site dressing saturated with blood no signs of subcutaneous emphysema. Pt wheezing on inspiration with bilateral rales when auscultated.            Plan:     1. Aortic Stenosis with SOB  - Pleural effusion s/p drainage->   -Unclear trigger: Likely uremia/pleuritis. vs. insufficient diuresis.    Continue Lasix 40mh IVP.   left heart cath on monday.   May need temporary hemodialysis if needed.   Outapitent tavr work up and tavr ,.   vascualr or IR evalns emelyn hemodialysis catheter.   congestive heart failure  diuresis. ultrailtration and hemodialysis as difficult to maintaine volume and uremia.     2. HTN     -Continue current antihypertensive medication regimen     3. CKD     as above.

## 2019-12-28 LAB
ANION GAP SERPL CALC-SCNC: 17 MMOL/L — SIGNIFICANT CHANGE UP (ref 5–17)
APPEARANCE UR: ABNORMAL
BACTERIA # UR AUTO: ABNORMAL
BILIRUB UR-MCNC: NEGATIVE — SIGNIFICANT CHANGE UP
BUN SERPL-MCNC: 84 MG/DL — HIGH (ref 8–20)
CALCIUM SERPL-MCNC: 7.8 MG/DL — LOW (ref 8.6–10.2)
CHLORIDE SERPL-SCNC: 98 MMOL/L — SIGNIFICANT CHANGE UP (ref 98–107)
CO2 SERPL-SCNC: 21 MMOL/L — LOW (ref 22–29)
COLOR SPEC: YELLOW — SIGNIFICANT CHANGE UP
CREAT SERPL-MCNC: 4.27 MG/DL — HIGH (ref 0.5–1.3)
DIFF PNL FLD: ABNORMAL
GLUCOSE BLDC GLUCOMTR-MCNC: 209 MG/DL — HIGH (ref 70–99)
GLUCOSE BLDC GLUCOMTR-MCNC: 252 MG/DL — HIGH (ref 70–99)
GLUCOSE SERPL-MCNC: 175 MG/DL — HIGH (ref 70–115)
GLUCOSE UR QL: NEGATIVE MG/DL — SIGNIFICANT CHANGE UP
HCT VFR BLD CALC: 27.9 % — LOW (ref 39–50)
HGB BLD-MCNC: 8.5 G/DL — LOW (ref 13–17)
KETONES UR-MCNC: ABNORMAL
LEUKOCYTE ESTERASE UR-ACNC: ABNORMAL
MCHC RBC-ENTMCNC: 24.2 PG — LOW (ref 27–34)
MCHC RBC-ENTMCNC: 30.5 GM/DL — LOW (ref 32–36)
MCV RBC AUTO: 79.5 FL — LOW (ref 80–100)
NITRITE UR-MCNC: NEGATIVE — SIGNIFICANT CHANGE UP
PH UR: 5 — SIGNIFICANT CHANGE UP (ref 5–8)
PLATELET # BLD AUTO: 166 K/UL — SIGNIFICANT CHANGE UP (ref 150–400)
POTASSIUM SERPL-MCNC: 4.9 MMOL/L — SIGNIFICANT CHANGE UP (ref 3.5–5.3)
POTASSIUM SERPL-SCNC: 4.9 MMOL/L — SIGNIFICANT CHANGE UP (ref 3.5–5.3)
PROT UR-MCNC: 100 MG/DL
RBC # BLD: 3.51 M/UL — LOW (ref 4.2–5.8)
RBC # FLD: 19.9 % — HIGH (ref 10.3–14.5)
RBC CASTS # UR COMP ASSIST: >50 /HPF (ref 0–4)
SODIUM SERPL-SCNC: 136 MMOL/L — SIGNIFICANT CHANGE UP (ref 135–145)
SP GR SPEC: 1.01 — SIGNIFICANT CHANGE UP (ref 1.01–1.02)
UROBILINOGEN FLD QL: NEGATIVE MG/DL — SIGNIFICANT CHANGE UP
WBC # BLD: 6.83 K/UL — SIGNIFICANT CHANGE UP (ref 3.8–10.5)
WBC # FLD AUTO: 6.83 K/UL — SIGNIFICANT CHANGE UP (ref 3.8–10.5)
WBC UR QL: ABNORMAL

## 2019-12-28 PROCEDURE — 71045 X-RAY EXAM CHEST 1 VIEW: CPT | Mod: 26

## 2019-12-28 PROCEDURE — 99233 SBSQ HOSP IP/OBS HIGH 50: CPT

## 2019-12-28 PROCEDURE — 99232 SBSQ HOSP IP/OBS MODERATE 35: CPT

## 2019-12-28 RX ORDER — CEFTRIAXONE 500 MG/1
1000 INJECTION, POWDER, FOR SOLUTION INTRAMUSCULAR; INTRAVENOUS EVERY 24 HOURS
Refills: 0 | Status: COMPLETED | OUTPATIENT
Start: 2019-12-28 | End: 2020-01-01

## 2019-12-28 RX ADMIN — Medication 650 MILLIGRAM(S): at 05:19

## 2019-12-28 RX ADMIN — Medication 50 MILLIGRAM(S): at 22:03

## 2019-12-28 RX ADMIN — Medication 1: at 11:34

## 2019-12-28 RX ADMIN — Medication 1000 UNIT(S): at 11:34

## 2019-12-28 RX ADMIN — HEPARIN SODIUM 5000 UNIT(S): 5000 INJECTION INTRAVENOUS; SUBCUTANEOUS at 05:17

## 2019-12-28 RX ADMIN — Medication 40 MILLIGRAM(S): at 05:15

## 2019-12-28 RX ADMIN — Medication 4 MILLIGRAM(S): at 22:03

## 2019-12-28 RX ADMIN — Medication 2: at 17:27

## 2019-12-28 RX ADMIN — CARVEDILOL PHOSPHATE 25 MILLIGRAM(S): 80 CAPSULE, EXTENDED RELEASE ORAL at 05:20

## 2019-12-28 RX ADMIN — Medication 325 MILLIGRAM(S): at 11:36

## 2019-12-28 RX ADMIN — ATORVASTATIN CALCIUM 10 MILLIGRAM(S): 80 TABLET, FILM COATED ORAL at 22:03

## 2019-12-28 RX ADMIN — Medication 1: at 08:39

## 2019-12-28 RX ADMIN — Medication 650 MILLIGRAM(S): at 17:28

## 2019-12-28 RX ADMIN — Medication 325 MILLIGRAM(S): at 11:34

## 2019-12-28 RX ADMIN — Medication 50 MILLIGRAM(S): at 15:55

## 2019-12-28 RX ADMIN — Medication 50 MILLIGRAM(S): at 05:20

## 2019-12-28 RX ADMIN — CARVEDILOL PHOSPHATE 25 MILLIGRAM(S): 80 CAPSULE, EXTENDED RELEASE ORAL at 17:28

## 2019-12-28 RX ADMIN — AMLODIPINE BESYLATE 10 MILLIGRAM(S): 2.5 TABLET ORAL at 05:19

## 2019-12-28 RX ADMIN — ISOSORBIDE MONONITRATE 30 MILLIGRAM(S): 60 TABLET, EXTENDED RELEASE ORAL at 11:34

## 2019-12-28 RX ADMIN — CEFTRIAXONE 100 MILLIGRAM(S): 500 INJECTION, POWDER, FOR SOLUTION INTRAMUSCULAR; INTRAVENOUS at 22:04

## 2019-12-28 NOTE — PROGRESS NOTE ADULT - SUBJECTIVE AND OBJECTIVE BOX
HPI:          79 year old male patient with  history of CKD stage 4, hypertension, chronic diastolic CHF, BPH and moderate-severe AS awaiting TAVR who presented to the ER with c/o difficulty breathing. Pt was recently admitted to [facility] on 12/10 for  acute on chronic diastolic CHF, hypertensive urgency and UTI. He was discharged home on PO diuretics and was doing good initially but recently started having worsening dyspnea, orthopnea and LE edema. Lasix dose was increased to 40 mg bid by house physician with some improvement. Pt later had acute worsening of symptoms and was brought to ED. Pt found to have elevated pro BNP and pulmonary edema on CXR. Pt also s/p Rt pigtail catheter by CTS.  Nephrology consulted for history of CKD. Pt was seen by our team during last admission and his Scr was ~3.3 on discharge.  Pt seen and examined; feels well. Breathing has improved. Denies any acute complaint.    PAST HISTORY  --------------------------------------------------------------------------------  PAST MEDICAL & SURGICAL HISTORY:  Hyperkalemia  ADI (acute kidney injury)  BPH (benign prostatic hyperplasia)  CKD (chronic kidney disease)  Hyperlipemia  Hypertension  Diabetes  Ureteral stent retained    FAMILY HISTORY:  No pertinent family history in first degree relatives    PAST SOCIAL HISTORY:  lives with wife    ALLERGIES & MEDICATIONS  --------------------------------------------------------------------------------  Allergies    No Known Allergies    Standing Inpatient Medications  amLODIPine   Tablet 10 milliGRAM(s) Oral daily  aspirin 325 milliGRAM(s) Oral daily  atorvastatin 10 milliGRAM(s) Oral at bedtime  carvedilol 25 milliGRAM(s) Oral every 12 hours  cholecalciferol 1000 Unit(s) Oral daily  dextrose 5%. 1000 milliLiter(s) IV Continuous <Continuous>  dextrose 50% Injectable 12.5 Gram(s) IV Push once  dextrose 50% Injectable 25 Gram(s) IV Push once  dextrose 50% Injectable 25 Gram(s) IV Push once  doxazosin 4 milliGRAM(s) Oral at bedtime  ferrous    sulfate 325 milliGRAM(s) Oral daily  furosemide   Injectable 40 milliGRAM(s) IV Push daily  heparin  Injectable 5000 Unit(s) SubCutaneous every 12 hours  hydrALAZINE 50 milliGRAM(s) Oral three times a day  insulin lispro (HumaLOG) corrective regimen sliding scale   SubCutaneous three times a day before meals  isosorbide   mononitrate ER Tablet (IMDUR) 30 milliGRAM(s) Oral daily  sodium bicarbonate 650 milliGRAM(s) Oral two times a day    PRN Inpatient Medications  dextrose 40% Gel 15 Gram(s) Oral once PRN  glucagon  Injectable 1 milliGRAM(s) IntraMuscular once PRN      REVIEW OF SYSTEMS  --------------------------------------------------------------------------------  Gen: No weight changes, fatigue, fevers/chills, weakness  Skin: No rashes  Head/Eyes/Ears/Mouth: No headache; Normal hearing; Normal vision  Respiratory:  dyspnea (improved), no cough,  CV: No chest pain, PND, orthopnea  GI: No abdominal pain, diarrhea, constipation, nausea, vomiting,   : No increased frequency, dysuria, hematuria, nocturia  MSK: No joint pain/swelling; no back pain; leg edema+  Neuro: No dizziness/lightheadedness, weakness  Heme: No easy bruising or bleeding  Endo: No heat/cold intolerance  Psych: No significant nervousness, anxiety, stress, depression    All other systems were reviewed and are negative, except as noted.    VITALS/PHYSICAL EXAM  --------------------------------------------------------------------------------  T(C): 36.4 (12-26-19 @ 12:01), Max: 37 (12-25-19 @ 23:38)  HR: 61 (12-26-19 @ 12:01) (56 - 68)  BP: 132/57 (12-26-19 @ 12:01) (117/60 - 139/67)  RR: 18 (12-26-19 @ 12:01) (18 - 19)  SpO2: 90% (12-26-19 @ 12:01) (90% - 98%)  Height (cm): 162.56 (12-25-19 @ 11:05)  Weight (kg): 81.6 (12-25-19 @ 11:05)  BMI (kg/m2): 30.9 (12-25-19 @ 11:05)  BSA (m2): 1.87 (12-25-19 @ 11:05)    12-25-19 @ 07:01  -  12-26-19 @ 07:00  --------------------------------------------------------  IN: 0 mL / OUT: 1950 mL / NET: -1950 mL    12-26-19 @ 07:01  -  12-26-19 @ 13:37  --------------------------------------------------------  IN: 0 mL / OUT: 200 mL / NET: -200 mL    Physical Exam:  	Gen: NAD  	HEENT: supple neck, on room air  	Pulm: CTA B/L, Rt pigtail catheter  	CV: RRR, systolic murmur,  no rub  	Back: unable to examine  	Abd: +BS, soft, nontender/nondistended  	: No suprapubic tenderness  	UE: Warm,  no edema  	LE: Warm, b/l LE edema  	Neuro: No focal deficits  	Psych: Normal affect and mood  	Skin: Warm, without rashes,    LABS/STUDIES  --------------------------------------------------------------------------------              8.4    6.20  >-----------<  161      [12-26-19 @ 05:56]              27.4     134  |  98  |  66.0  ----------------------------<  81      [12-26-19 @ 05:56]  4.7   |  19.0  |  3.77        Ca     7.6     [12-26-19 @ 05:56]    TPro  7.0  /  Alb  3.9  /  TBili  0.3  /  DBili  x   /  AST  50  /  ALT  34  /  AlkPhos  88  [12-25-19 @ 11:57]    Troponin 0.02      [12-25-19 @ 11:57]  CK 73      [12-25-19 @ 11:57]    Creatinine Trend:  SCr 3.77 [12-26 @ 05:56]  SCr 3.69 [12-25 @ 11:57]  SCr 3.24 [12-16 @ 06:16]  SCr 3.28 [12-15 @ 06:36]  SCr 3.48 [12-14 @ 06:45]    Urinalysis - [12-25-19 @ 12:13]      Color Yellow / Appearance Clear / SG 1.025 / pH 5.0      Gluc Negative / Ketone Negative  / Bili Negative / Urobili Negative       Blood Small / Protein 100 / Leuk Est Moderate / Nitrite Negative      RBC 3-5 / WBC 6-10 / Hyaline  / Gran  / Sq Epi  / Non Sq Epi Occasional / Bacteria Negative    Iron 25, TIBC 285, %sat 9      [12-11-19 @ 17:50]  Ferritin 25      [12-11-19 @ 17:50]  PTH -- (Ca 8.3)      [12-12-19 @ 19:36]   164  Vitamin D (25OH) 20.3      [12-12-19 @ 19:36]  HbA1c 8.1      [12-11-19 @ 06:47]  TSH 6.54      [12-25-19 @ 21:00]    KEYSHA: titer 1:320, pattern Speckled      [01-13-15 @ 20:50]  dsDNA <12      [01-18-15 @ 13:57]  C3 Complement 146      [01-18-15 @ 13:57]  C4 Complement 56      [01-18-15 @ 13:57]  Rheumatoid Factor 9.6      [01-18-15 @ 13:51]  ANCA: cANCA Negative, pANCA Negative, atypical ANCA Negative      [01-18-15 @ 13:57]  MPO-ANCA <5.0, interpretation: Negative      [01-18-15 @ 13:57]  PR3-ANCA <5.0, interpretation: Negative      [01-18-15 @ 13:57]  Immunofixation Serum: No Monoclonal Band Identified      [01-18-15 @ 13:57]  SPEP Interpretation: Pattern Consistent With Acute Inflammation Or Stress      [01-18-15 @ 13:57]    1) Advanced CKD  2) Volume overload/ pulmonary edema  3) Aortic stenosis  4) s/p Rt pigtail catheter for Rt pleural effusion  5) Metabolic acidosis  6) anemia of kidney disease with superimposed iron deficiency     Pt with advanced progressive CKD  No urgent indication for HD; but will consider starting HD if TAVR planned this admission  Continue diuresis; monitor UOP, Scr and lytes  Continue po iron, will resume KD  Continue sodium bicarb po  Will follow.

## 2019-12-28 NOTE — PROGRESS NOTE ADULT - SUBJECTIVE AND OBJECTIVE BOX
Subjective: Patient doing well. Feeling better s/p chest tube. Denies chest pain, SOB, fever, chills, cough.     Vital Signs:  Vital Signs Last 24 Hrs  T(C): 36.7 (12-27-19 @ 23:55), Max: 36.7 (12-27-19 @ 15:55)  T(F): 98.1 (12-27-19 @ 23:55), Max: 98.1 (12-27-19 @ 23:55)  HR: 60 (12-28-19 @ 05:15) (60 - 65)  BP: 131/61 (12-28-19 @ 05:15) (121/49 - 131/61)  RR: 18 (12-27-19 @ 23:55) (18 - 18)  SpO2: 93% (12-27-19 @ 23:55) (93% - 93%) on (O2)    I&O's Detail    27 Dec 2019 07:01  -  28 Dec 2019 07:00  --------------------------------------------------------  IN:    Oral Fluid: 50 mL  Total IN: 50 mL    OUT:    Chest Tube: 570 mL    Voided: 1101 mL  Total OUT: 1671 mL    Total NET: -1621 mL            General: WN/WD NAD  Neurology: Awake, nonfocal, MORGAN x 4  Eyes: Scleras clear, PERRLA/ EOMI, Gross vision intact  ENT: Gross hearing intact, grossly patent pharynx, no stridor  Neck: Neck supple, trachea midline, No JVD  Respiratory: CTA B/L, No wheezing, rales, rhonchi  CV: S1S2, no murmurs, rubs or gallops  Abdominal: Soft, NT, ND  Extremities: No edema  Psych: Oriented x 3, normal affect  Tubes: R PTC, to WS, 570cc out in last 24H, no air leak     Relevant labs, radiology and Medications reviewed                        8.5    6.83  )-----------( 166      ( 28 Dec 2019 09:32 )             27.9     12-28    136  |  98  |  84.0<H>  ----------------------------<  175<H>  4.9   |  21.0<L>  |  4.27<H>    Ca    7.8<L>      28 Dec 2019 09:32        MEDICATIONS  (STANDING):  amLODIPine   Tablet 10 milliGRAM(s) Oral daily  aspirin 325 milliGRAM(s) Oral daily  atorvastatin 10 milliGRAM(s) Oral at bedtime  carvedilol 25 milliGRAM(s) Oral every 12 hours  cholecalciferol 1000 Unit(s) Oral daily  dextrose 5%. 1000 milliLiter(s) (50 mL/Hr) IV Continuous <Continuous>  dextrose 50% Injectable 12.5 Gram(s) IV Push once  dextrose 50% Injectable 25 Gram(s) IV Push once  dextrose 50% Injectable 25 Gram(s) IV Push once  doxazosin 4 milliGRAM(s) Oral at bedtime  epoetin nereida Injectable 44099 Unit(s) SubCutaneous <User Schedule>  ferrous    sulfate 325 milliGRAM(s) Oral daily  furosemide   Injectable 40 milliGRAM(s) IV Push daily  heparin  Injectable 5000 Unit(s) SubCutaneous every 12 hours  hydrALAZINE 50 milliGRAM(s) Oral three times a day  insulin lispro (HumaLOG) corrective regimen sliding scale   SubCutaneous three times a day before meals  isosorbide   mononitrate ER Tablet (IMDUR) 30 milliGRAM(s) Oral daily  sodium bicarbonate 650 milliGRAM(s) Oral two times a day    MEDICATIONS  (PRN):  acetaminophen   Tablet .. 650 milliGRAM(s) Oral every 6 hours PRN Temp greater or equal to 38C (100.4F), Mild Pain (1 - 3)  dextrose 40% Gel 15 Gram(s) Oral once PRN Blood Glucose LESS THAN 70 milliGRAM(s)/deciliter  glucagon  Injectable 1 milliGRAM(s) IntraMuscular once PRN Glucose LESS THAN 70 milligrams/deciliter        Assessment  79y Male  w/ PAST MEDICAL & SURGICAL HISTORY:  Hyperkalemia  ADI (acute kidney injury)  BPH (benign prostatic hyperplasia)  CKD (chronic kidney disease)  Hyperlipemia  Hypertension  Diabetes  Ureteral stent retained  admitted with complaints of Patient is a 79y old  Male who presents with a chief complaint of Difficulty breathing (27 Dec 2019 14:18)   patient underwent . Postoperative course/issues:

## 2019-12-28 NOTE — PROGRESS NOTE ADULT - ASSESSMENT
1. Right pleural effusion /Acute CHF exacerbation & Severe AS,    CT  in place ( Large Drain )    2. CRS - CKD stage 4  ( [facility] On Monday )    3. DM   continue with current regimen.    4. Anemia  likely AOCD  on iron and KD,    5. Severe AS  - W.U for Possible TAVR,     continue with current regimen .

## 2019-12-28 NOTE — PROGRESS NOTE ADULT - ASSESSMENT
12/25 Pt admitted with SOB found to have acute on chronic heart failure exacerbation complicated by pleural effusions and acute on chronic kidney failure.  -CTS consult for possible TAVR in future pending full work up.  -Thoracic surgery consult: Pleural effusions-Rt PTC placed 12/25 (1L drainage)    Discussed with Dr. Medley.     Marta STUART  Thoracic Surgery   #[phone]

## 2019-12-28 NOTE — PROGRESS NOTE ADULT - ASSESSMENT
1. Right pleural effusion /cute CHF exacerbation  chest tube in place ,CTS on board .  felt to due to CHF and severe AS.  had significant output overnight from tube.    2. ADI on CKD stage 4   creatinine still elevated .  plan is for cath on monday .  will follow recs from nephrology .    3. DM   continue with current regimen.    4. Dark urine   follow UA .    5. Anemia  likely AOCD .  will monitor.  on iron and epogen     6. Severe AS   cath on monday followed by outpatient work up.    7. DLP  statin     8. HTN   continue with current regimen .    9. DVT prophylaxis  heparin .    details discussed with patient and other family members at bedside , all concerns answered . 1. Right pleural effusion /acute CHF exacerbation  chest tube in place ,CTS on board .  felt to due to CHF and severe AS.  had significant output overnight from tube.    2. ADI on CKD stage 4   creatinine still elevated .  plan is for cath on monday .  will follow recs from nephrology .    3. DM   continue with current regimen.    4. Dark urine   follow UA .    5. Anemia  likely AOCD .  will monitor.  on iron and epogen     6. Severe AS   cath on monday followed by outpatient work up.    7. DLP  statin     8. HTN   continue with current regimen .    9. DVT prophylaxis  heparin .    details discussed with patient and other family members at bedside , all concerns answered .

## 2019-12-28 NOTE — PROGRESS NOTE ADULT - SUBJECTIVE AND OBJECTIVE BOX
CC: right pleural effusion     INTERVAL HPI/OVERNIGHT EVENTS: seen and examined , had significant output from chest tube over night , also had dark urine output , UA sent . plan is for angiogram on monday .     REVIEW OF SYSTEMS:    CONSTITUTIONAL: feels ok   RESPIRATORY: SOB   CARDIOVASCULAR: chest pain at tube site   GASTROINTESTINAL: No abdominal or epigastric pain. No nausea, vomiting  NEUROLOGICAL: No headaches, or blurry vision       Vital Signs Last 24 Hrs  T(C): 36.6 (28 Dec 2019 15:54), Max: 36.7 (27 Dec 2019 23:55)  T(F): 97.9 (28 Dec 2019 15:54), Max: 98.1 (27 Dec 2019 23:55)  HR: 57 (28 Dec 2019 15:54) (56 - 65)  BP: 132/51 (28 Dec 2019 15:54) (117/54 - 132/51)  BP(mean): --  RR: 18 (28 Dec 2019 15:54) (18 - 18)  SpO2: 94% (28 Dec 2019 15:54) (93% - 94%)    PHYSICAL EXAM:    GENERAL: NAD, resting comfortable in bed   CHEST/LUNG: diminished air entry , right chest tube in place   HEART: S1S2+, Regular rate and rhythm; 3/6 systolic murmur   ABDOMEN: Soft, Nontender, Nondistended; Bowel sounds present  EXTREMITIES:  no pitting edema , pulses palpated BL.        LABS:                        8.5    6.83  )-----------( 166      ( 28 Dec 2019 09:32 )             27.9     12-    136  |  98  |  84.0<H>  ----------------------------<  175<H>  4.9   |  21.0<L>  |  4.27<H>    Ca    7.8<L>      28 Dec 2019 09:32        Urinalysis Basic - ( 28 Dec 2019 16:39 )    Color: Yellow / Appearance: Cloudy / S.010 / pH: x  Gluc: x / Ketone: Trace  / Bili: Negative / Urobili: Negative mg/dL   Blood: x / Protein: 100 mg/dL / Nitrite: Negative   Leuk Esterase: Small / RBC: >50 /HPF / WBC 6-10   Sq Epi: x / Non Sq Epi: x / Bacteria: Few          MEDICATIONS  (STANDING):  amLODIPine   Tablet 10 milliGRAM(s) Oral daily  aspirin 325 milliGRAM(s) Oral daily  atorvastatin 10 milliGRAM(s) Oral at bedtime  carvedilol 25 milliGRAM(s) Oral every 12 hours  cholecalciferol 1000 Unit(s) Oral daily  dextrose 5%. 1000 milliLiter(s) (50 mL/Hr) IV Continuous <Continuous>  dextrose 50% Injectable 12.5 Gram(s) IV Push once  dextrose 50% Injectable 25 Gram(s) IV Push once  dextrose 50% Injectable 25 Gram(s) IV Push once  doxazosin 4 milliGRAM(s) Oral at bedtime  epoetin nereida Injectable 49042 Unit(s) SubCutaneous <User Schedule>  ferrous    sulfate 325 milliGRAM(s) Oral daily  furosemide   Injectable 40 milliGRAM(s) IV Push daily  heparin  Injectable 5000 Unit(s) SubCutaneous every 12 hours  hydrALAZINE 50 milliGRAM(s) Oral three times a day  insulin lispro (HumaLOG) corrective regimen sliding scale   SubCutaneous three times a day before meals  isosorbide   mononitrate ER Tablet (IMDUR) 30 milliGRAM(s) Oral daily  sodium bicarbonate 650 milliGRAM(s) Oral two times a day    MEDICATIONS  (PRN):  acetaminophen   Tablet .. 650 milliGRAM(s) Oral every 6 hours PRN Temp greater or equal to 38C (100.4F), Mild Pain (1 - 3)  dextrose 40% Gel 15 Gram(s) Oral once PRN Blood Glucose LESS THAN 70 milliGRAM(s)/deciliter  glucagon  Injectable 1 milliGRAM(s) IntraMuscular once PRN Glucose LESS THAN 70 milligrams/deciliter      RADIOLOGY & ADDITIONAL TESTS:

## 2019-12-28 NOTE — PROGRESS NOTE ADULT - PROBLEM SELECTOR PLAN 1
Chronic b/l effusions since 2015 secondary to renal and cardiac disease.   12/25 Rt PTC placed, Transudate. Cyto pending. CX NGTD.   Maintain CT to H20 seal and record daily output  IS/cough and deep breathing exercise/ OOB  Daily CXR   Optimize from a cardiac and renal standpoint and anticipating to remove chest tube within a few days when output decreased to <200cc/ 24H

## 2019-12-29 LAB
ANION GAP SERPL CALC-SCNC: 18 MMOL/L — HIGH (ref 5–17)
BUN SERPL-MCNC: 88 MG/DL — HIGH (ref 8–20)
CALCIUM SERPL-MCNC: 8.6 MG/DL — SIGNIFICANT CHANGE UP (ref 8.6–10.2)
CHLORIDE SERPL-SCNC: 95 MMOL/L — LOW (ref 98–107)
CO2 SERPL-SCNC: 21 MMOL/L — LOW (ref 22–29)
CREAT SERPL-MCNC: 4.43 MG/DL — HIGH (ref 0.5–1.3)
GLUCOSE BLDC GLUCOMTR-MCNC: 154 MG/DL — HIGH (ref 70–99)
GLUCOSE BLDC GLUCOMTR-MCNC: 200 MG/DL — HIGH (ref 70–99)
GLUCOSE BLDC GLUCOMTR-MCNC: 209 MG/DL — HIGH (ref 70–99)
GLUCOSE SERPL-MCNC: 225 MG/DL — HIGH (ref 70–115)
HCT VFR BLD CALC: 29.2 % — LOW (ref 39–50)
HGB BLD-MCNC: 8.7 G/DL — LOW (ref 13–17)
MCHC RBC-ENTMCNC: 24.2 PG — LOW (ref 27–34)
MCHC RBC-ENTMCNC: 29.8 GM/DL — LOW (ref 32–36)
MCV RBC AUTO: 81.1 FL — SIGNIFICANT CHANGE UP (ref 80–100)
PLATELET # BLD AUTO: 186 K/UL — SIGNIFICANT CHANGE UP (ref 150–400)
POTASSIUM SERPL-MCNC: 5.7 MMOL/L — HIGH (ref 3.5–5.3)
POTASSIUM SERPL-SCNC: 5.7 MMOL/L — HIGH (ref 3.5–5.3)
RBC # BLD: 3.6 M/UL — LOW (ref 4.2–5.8)
RBC # FLD: 20 % — HIGH (ref 10.3–14.5)
SODIUM SERPL-SCNC: 134 MMOL/L — LOW (ref 135–145)
WBC # BLD: 9.34 K/UL — SIGNIFICANT CHANGE UP (ref 3.8–10.5)
WBC # FLD AUTO: 9.34 K/UL — SIGNIFICANT CHANGE UP (ref 3.8–10.5)

## 2019-12-29 PROCEDURE — 99232 SBSQ HOSP IP/OBS MODERATE 35: CPT

## 2019-12-29 PROCEDURE — 99233 SBSQ HOSP IP/OBS HIGH 50: CPT

## 2019-12-29 PROCEDURE — 71045 X-RAY EXAM CHEST 1 VIEW: CPT | Mod: 26

## 2019-12-29 RX ORDER — SODIUM ZIRCONIUM CYCLOSILICATE 10 G/10G
10 POWDER, FOR SUSPENSION ORAL ONCE
Refills: 0 | Status: COMPLETED | OUTPATIENT
Start: 2019-12-29 | End: 2019-12-29

## 2019-12-29 RX ORDER — INSULIN LISPRO 100/ML
10 VIAL (ML) SUBCUTANEOUS
Refills: 0 | Status: DISCONTINUED | OUTPATIENT
Start: 2019-12-29 | End: 2020-01-04

## 2019-12-29 RX ADMIN — Medication 650 MILLIGRAM(S): at 17:27

## 2019-12-29 RX ADMIN — ATORVASTATIN CALCIUM 10 MILLIGRAM(S): 80 TABLET, FILM COATED ORAL at 22:58

## 2019-12-29 RX ADMIN — Medication 325 MILLIGRAM(S): at 11:56

## 2019-12-29 RX ADMIN — Medication 50 MILLIGRAM(S): at 05:38

## 2019-12-29 RX ADMIN — Medication 1: at 17:27

## 2019-12-29 RX ADMIN — Medication 650 MILLIGRAM(S): at 05:38

## 2019-12-29 RX ADMIN — HEPARIN SODIUM 5000 UNIT(S): 5000 INJECTION INTRAVENOUS; SUBCUTANEOUS at 17:27

## 2019-12-29 RX ADMIN — AMLODIPINE BESYLATE 10 MILLIGRAM(S): 2.5 TABLET ORAL at 05:38

## 2019-12-29 RX ADMIN — ISOSORBIDE MONONITRATE 30 MILLIGRAM(S): 60 TABLET, EXTENDED RELEASE ORAL at 11:56

## 2019-12-29 RX ADMIN — Medication 50 MILLIGRAM(S): at 22:58

## 2019-12-29 RX ADMIN — Medication 1: at 13:06

## 2019-12-29 RX ADMIN — Medication 10 UNIT(S): at 17:27

## 2019-12-29 RX ADMIN — Medication 4 MILLIGRAM(S): at 22:58

## 2019-12-29 RX ADMIN — SODIUM ZIRCONIUM CYCLOSILICATE 10 GRAM(S): 10 POWDER, FOR SUSPENSION ORAL at 14:56

## 2019-12-29 RX ADMIN — Medication 1000 UNIT(S): at 11:56

## 2019-12-29 RX ADMIN — Medication 10 UNIT(S): at 13:07

## 2019-12-29 RX ADMIN — CEFTRIAXONE 100 MILLIGRAM(S): 500 INJECTION, POWDER, FOR SOLUTION INTRAMUSCULAR; INTRAVENOUS at 23:20

## 2019-12-29 RX ADMIN — Medication 40 MILLIGRAM(S): at 05:38

## 2019-12-29 RX ADMIN — Medication 2: at 08:37

## 2019-12-29 NOTE — PROGRESS NOTE ADULT - PROBLEM SELECTOR PLAN 1
12/25 Rt PTC (Initial drainage 1L-Transudative)  Maintain CT to H20 seal (No gross airleak noted) and record daily output  ISS/cough and deep breathing exercise  Cont AM CXR   Will cont to follow  Cont care as per Primary team and Cards

## 2019-12-29 NOTE — PROGRESS NOTE ADULT - ASSESSMENT
1. Right pleural effusion /acute CHF exacerbation  chest tube in place ,CTS on board .  felt to due to CHF and severe AS.  still has significant output     2. ADI on CKD stage 4   creatinine worsening , lasix stopped   plan is for cath on monday .  discussed with nephrology , fluid before cath , and dialysis after if needed     3. UTI   started ceftriaxone .  urine culture ordered .    4. Hyperkalemia   treated by nephrology .    5. DM   blood sugars elevated , added humalog       6. Anemia  likely AOCD .  will monitor.  on iron and epogen     7. Severe AS   cath on monday followed by outpatient work up.    8. DLP  statin     9. HTN   continue with current regimen .    10. DVT prophylaxis  heparin .    details discussed with patient and his grand son , all concerns answered .

## 2019-12-29 NOTE — PROGRESS NOTE ADULT - SUBJECTIVE AND OBJECTIVE BOX
Subjective:  "I feel ok"  Watching TV in bed      V/S  T(C): 36.2 (12-29-19 @ 15:46), Max: 36.6 (12-28-19 @ 21:56)  HR: 54 (12-29-19 @ 15:46) (52 - 56)  BP: 116/60 (12-29-19 @ 15:46) (116/56 - 137/63)  RR: 18 (12-29-19 @ 15:46) (18 - 18)  SpO2: 91% (12-29-19 @ 15:46) (91% - 94%)                                                CHEST TUBE:   R pleural tube  OUTPUT:  serous  per 24 hours     Drainage:  serous  AIR LEAKS:  [ ] YES [x ] NO      MEDICATIONS  (STANDING):  amLODIPine   Tablet 10 milliGRAM(s) Oral daily  aspirin 325 milliGRAM(s) Oral daily  atorvastatin 10 milliGRAM(s) Oral at bedtime  carvedilol 25 milliGRAM(s) Oral every 12 hours  cefTRIAXone   IVPB 1000 milliGRAM(s) IV Intermittent every 24 hours  cholecalciferol 1000 Unit(s) Oral daily  dextrose 5%. 1000 milliLiter(s) (50 mL/Hr) IV Continuous <Continuous>  dextrose 50% Injectable 12.5 Gram(s) IV Push once  dextrose 50% Injectable 25 Gram(s) IV Push once  dextrose 50% Injectable 25 Gram(s) IV Push once  doxazosin 4 milliGRAM(s) Oral at bedtime  epoetin nereida Injectable 69219 Unit(s) SubCutaneous <User Schedule>  ferrous    sulfate 325 milliGRAM(s) Oral daily  heparin  Injectable 5000 Unit(s) SubCutaneous every 12 hours  hydrALAZINE 50 milliGRAM(s) Oral three times a day  insulin lispro (HumaLOG) corrective regimen sliding scale   SubCutaneous three times a day before meals  insulin lispro Injectable (HumaLOG) 10 Unit(s) SubCutaneous three times a day before meals  isosorbide   mononitrate ER Tablet (IMDUR) 30 milliGRAM(s) Oral daily  sodium bicarbonate 650 milliGRAM(s) Oral two times a day      12-29    134<L>  |  95<L>  |  88.0<H>  ----------------------------<  225<H>  5.7<H>   |  21.0<L>  |  4.43<H>    Ca    8.6      29 Dec 2019 06:12                                 8.7    9.34  )-----------( 186      ( 29 Dec 2019 06:12 )             29.2                 CAPILLARY BLOOD GLUCOSE      POCT Blood Glucose.: 154 mg/dL (29 Dec 2019 16:36)  POCT Blood Glucose.: 200 mg/dL (29 Dec 2019 12:33)  POCT Blood Glucose.: 209 mg/dL (29 Dec 2019 08:02)  POCT Blood Glucose.: 252 mg/dL (28 Dec 2019 20:28)           CXR: < from: Xray Chest 1 View- PORTABLE-Routine (12.29.19 @ 05:43) >  Again noted is a right pigtail chest tube. There is a small right apical pneumothorax on the current study which is new since the prior exam. The right lung is otherwise clear. Opacification at the left lung base noted,   likely representing atelectasis and/or pleural effusion..      Physical Exam:    Neurology: Awake, nonfocal, MORGAN x 4    Respiratory: CTA B/L, No wheezing, rales, rhonchi    CV: S1S2, no murmurs, rubs or gallops    Abdominal: Soft, NT, ND    Extremities: No edema    Psych: Oriented x 3, normal affect    Tubes: R PTC, to WS, no air leak          PAST MEDICAL & SURGICAL HISTORY:  Hyperkalemia  ADI (acute kidney injury)  BPH (benign prostatic hyperplasia)  CKD (chronic kidney disease)  Hyperlipemia  Hypertension  Diabetes  Ureteral stent retained

## 2019-12-29 NOTE — PROGRESS NOTE ADULT - SUBJECTIVE AND OBJECTIVE BOX
CC: renal failure , AS     INTERVAL HPI/OVERNIGHT EVENTS: seen and examined , had positive UA started antibiotics . renal function has been worsening , stopped lasix . blood sugars were elevated , added humalog .    REVIEW OF SYSTEMS:    CONSTITUTIONAL: feels ok   RESPIRATORY: No cough, wheezing, hemoptysis; No shortness of breath  CARDIOVASCULAR: No chest pain, palpitations  GASTROINTESTINAL: No abdominal or epigastric pain. No nausea, vomiting  NEUROLOGICAL: No headaches, or blurry vision       Vital Signs Last 24 Hrs  T(C): 36.2 (29 Dec 2019 15:46), Max: 36.6 (28 Dec 2019 21:56)  T(F): 97.2 (29 Dec 2019 15:46), Max: 97.9 (28 Dec 2019 21:56)  HR: 54 (29 Dec 2019 15:46) (52 - 56)  BP: 116/60 (29 Dec 2019 15:46) (116/56 - 137/63)  BP(mean): --  RR: 18 (29 Dec 2019 15:46) (18 - 18)  SpO2: 91% (29 Dec 2019 15:46) (91% - 94%)    PHYSICAL EXAM:    GENERAL: NAD, resting comfortable in bed   CHEST/LUNG: CTAB , no wheezing , rales, rhonchi heard   HEART: S1S2+, Regular rate and rhythm; 3/6 systolic murmur   ABDOMEN: Soft, Nontender, Nondistended; Bowel sounds present  EXTREMITIES:  no pitting edema , pulses palpated BL.        LABS:                        8.7    9.34  )-----------( 186      ( 29 Dec 2019 06:12 )             29.2     12-    134<L>  |  95<L>  |  88.0<H>  ----------------------------<  225<H>  5.7<H>   |  21.0<L>  |  4.43<H>    Ca    8.6      29 Dec 2019 06:12        Urinalysis Basic - ( 28 Dec 2019 16:39 )    Color: Yellow / Appearance: Cloudy / S.010 / pH: x  Gluc: x / Ketone: Trace  / Bili: Negative / Urobili: Negative mg/dL   Blood: x / Protein: 100 mg/dL / Nitrite: Negative   Leuk Esterase: Small / RBC: >50 /HPF / WBC 6-10   Sq Epi: x / Non Sq Epi: x / Bacteria: Few          MEDICATIONS  (STANDING):  amLODIPine   Tablet 10 milliGRAM(s) Oral daily  aspirin 325 milliGRAM(s) Oral daily  atorvastatin 10 milliGRAM(s) Oral at bedtime  carvedilol 25 milliGRAM(s) Oral every 12 hours  cefTRIAXone   IVPB 1000 milliGRAM(s) IV Intermittent every 24 hours  cholecalciferol 1000 Unit(s) Oral daily  dextrose 5%. 1000 milliLiter(s) (50 mL/Hr) IV Continuous <Continuous>  dextrose 50% Injectable 12.5 Gram(s) IV Push once  dextrose 50% Injectable 25 Gram(s) IV Push once  dextrose 50% Injectable 25 Gram(s) IV Push once  doxazosin 4 milliGRAM(s) Oral at bedtime  epoetin nereida Injectable 60703 Unit(s) SubCutaneous <User Schedule>  ferrous    sulfate 325 milliGRAM(s) Oral daily  heparin  Injectable 5000 Unit(s) SubCutaneous every 12 hours  hydrALAZINE 50 milliGRAM(s) Oral three times a day  insulin lispro (HumaLOG) corrective regimen sliding scale   SubCutaneous three times a day before meals  insulin lispro Injectable (HumaLOG) 10 Unit(s) SubCutaneous three times a day before meals  isosorbide   mononitrate ER Tablet (IMDUR) 30 milliGRAM(s) Oral daily  sodium bicarbonate 650 milliGRAM(s) Oral two times a day    MEDICATIONS  (PRN):  acetaminophen   Tablet .. 650 milliGRAM(s) Oral every 6 hours PRN Temp greater or equal to 38C (100.4F), Mild Pain (1 - 3)  dextrose 40% Gel 15 Gram(s) Oral once PRN Blood Glucose LESS THAN 70 milliGRAM(s)/deciliter  glucagon  Injectable 1 milliGRAM(s) IntraMuscular once PRN Glucose LESS THAN 70 milligrams/deciliter      RADIOLOGY & ADDITIONAL TESTS:

## 2019-12-29 NOTE — PROGRESS NOTE ADULT - ASSESSMENT
12/25 Pt admitted with SOB found to have acute on chronic heart failure exacerbation complicated by pleural effusions and acute on chronic kidney failure.  -CTS consult for possible TAVR in future pending full work up.  -Thoracic surgery consult: Pleural effusions-Rt PTC placed 12/25 (1L drainage)

## 2019-12-30 DIAGNOSIS — I35.0 NONRHEUMATIC AORTIC (VALVE) STENOSIS: ICD-10-CM

## 2019-12-30 DIAGNOSIS — N18.4 CHRONIC KIDNEY DISEASE, STAGE 4 (SEVERE): ICD-10-CM

## 2019-12-30 LAB
ALBUMIN SERPL ELPH-MCNC: 3.2 G/DL — LOW (ref 3.3–5.2)
ALP SERPL-CCNC: 71 U/L — SIGNIFICANT CHANGE UP (ref 40–120)
ALT FLD-CCNC: 18 U/L — SIGNIFICANT CHANGE UP
ANION GAP SERPL CALC-SCNC: 15 MMOL/L — SIGNIFICANT CHANGE UP (ref 5–17)
AST SERPL-CCNC: 20 U/L — SIGNIFICANT CHANGE UP
BILIRUB SERPL-MCNC: <0.2 MG/DL — LOW (ref 0.4–2)
BUN SERPL-MCNC: 93 MG/DL — HIGH (ref 8–20)
CALCIUM SERPL-MCNC: 8.2 MG/DL — LOW (ref 8.6–10.2)
CHLORIDE SERPL-SCNC: 96 MMOL/L — LOW (ref 98–107)
CO2 SERPL-SCNC: 23 MMOL/L — SIGNIFICANT CHANGE UP (ref 22–29)
CREAT SERPL-MCNC: 3.76 MG/DL — HIGH (ref 0.5–1.3)
CULTURE RESULTS: SIGNIFICANT CHANGE UP
GLUCOSE BLDC GLUCOMTR-MCNC: 180 MG/DL — HIGH (ref 70–99)
GLUCOSE BLDC GLUCOMTR-MCNC: 192 MG/DL — HIGH (ref 70–99)
GLUCOSE BLDC GLUCOMTR-MCNC: 192 MG/DL — HIGH (ref 70–99)
GLUCOSE BLDC GLUCOMTR-MCNC: 233 MG/DL — HIGH (ref 70–99)
GLUCOSE SERPL-MCNC: 197 MG/DL — HIGH (ref 70–115)
HCT VFR BLD CALC: 27.2 % — LOW (ref 39–50)
HGB BLD-MCNC: 8.4 G/DL — LOW (ref 13–17)
MCHC RBC-ENTMCNC: 24.7 PG — LOW (ref 27–34)
MCHC RBC-ENTMCNC: 30.9 GM/DL — LOW (ref 32–36)
MCV RBC AUTO: 80 FL — SIGNIFICANT CHANGE UP (ref 80–100)
PLATELET # BLD AUTO: 184 K/UL — SIGNIFICANT CHANGE UP (ref 150–400)
POTASSIUM SERPL-MCNC: 5.6 MMOL/L — HIGH (ref 3.5–5.3)
POTASSIUM SERPL-SCNC: 5.6 MMOL/L — HIGH (ref 3.5–5.3)
PROT SERPL-MCNC: 6 G/DL — LOW (ref 6.6–8.7)
RBC # BLD: 3.4 M/UL — LOW (ref 4.2–5.8)
RBC # FLD: 20 % — HIGH (ref 10.3–14.5)
SODIUM SERPL-SCNC: 134 MMOL/L — LOW (ref 135–145)
SPECIMEN SOURCE: SIGNIFICANT CHANGE UP
WBC # BLD: 7.49 K/UL — SIGNIFICANT CHANGE UP (ref 3.8–10.5)
WBC # FLD AUTO: 7.49 K/UL — SIGNIFICANT CHANGE UP (ref 3.8–10.5)

## 2019-12-30 PROCEDURE — 99232 SBSQ HOSP IP/OBS MODERATE 35: CPT

## 2019-12-30 PROCEDURE — 71045 X-RAY EXAM CHEST 1 VIEW: CPT | Mod: 26,77

## 2019-12-30 PROCEDURE — 90937 HEMODIALYSIS REPEATED EVAL: CPT

## 2019-12-30 PROCEDURE — 99233 SBSQ HOSP IP/OBS HIGH 50: CPT

## 2019-12-30 PROCEDURE — 71045 X-RAY EXAM CHEST 1 VIEW: CPT | Mod: 26

## 2019-12-30 RX ORDER — SODIUM BICARBONATE 1 MEQ/ML
0.06 SYRINGE (ML) INTRAVENOUS
Qty: 75 | Refills: 0 | Status: DISCONTINUED | OUTPATIENT
Start: 2019-12-30 | End: 2020-01-02

## 2019-12-30 RX ORDER — SACCHAROMYCES BOULARDII 250 MG
250 POWDER IN PACKET (EA) ORAL
Refills: 0 | Status: DISCONTINUED | OUTPATIENT
Start: 2019-12-30 | End: 2019-12-30

## 2019-12-30 RX ORDER — CHLORHEXIDINE GLUCONATE 213 G/1000ML
1 SOLUTION TOPICAL
Refills: 0 | Status: DISCONTINUED | OUTPATIENT
Start: 2019-12-30 | End: 2020-01-09

## 2019-12-30 RX ORDER — SODIUM CHLORIDE 9 MG/ML
10 INJECTION INTRAMUSCULAR; INTRAVENOUS; SUBCUTANEOUS
Refills: 0 | Status: DISCONTINUED | OUTPATIENT
Start: 2019-12-30 | End: 2020-01-09

## 2019-12-30 RX ADMIN — Medication 1000 UNIT(S): at 11:46

## 2019-12-30 RX ADMIN — Medication 325 MILLIGRAM(S): at 11:46

## 2019-12-30 RX ADMIN — Medication 50 MILLIGRAM(S): at 13:50

## 2019-12-30 RX ADMIN — HEPARIN SODIUM 5000 UNIT(S): 5000 INJECTION INTRAVENOUS; SUBCUTANEOUS at 22:43

## 2019-12-30 RX ADMIN — HEPARIN SODIUM 5000 UNIT(S): 5000 INJECTION INTRAVENOUS; SUBCUTANEOUS at 06:39

## 2019-12-30 RX ADMIN — ATORVASTATIN CALCIUM 10 MILLIGRAM(S): 80 TABLET, FILM COATED ORAL at 22:43

## 2019-12-30 RX ADMIN — Medication 50 MILLIGRAM(S): at 22:43

## 2019-12-30 RX ADMIN — Medication 50 MILLIGRAM(S): at 06:39

## 2019-12-30 RX ADMIN — Medication 325 MILLIGRAM(S): at 11:47

## 2019-12-30 RX ADMIN — CARVEDILOL PHOSPHATE 25 MILLIGRAM(S): 80 CAPSULE, EXTENDED RELEASE ORAL at 06:39

## 2019-12-30 RX ADMIN — AMLODIPINE BESYLATE 10 MILLIGRAM(S): 2.5 TABLET ORAL at 06:39

## 2019-12-30 RX ADMIN — Medication 650 MILLIGRAM(S): at 22:43

## 2019-12-30 RX ADMIN — Medication 650 MILLIGRAM(S): at 06:39

## 2019-12-30 RX ADMIN — ISOSORBIDE MONONITRATE 30 MILLIGRAM(S): 60 TABLET, EXTENDED RELEASE ORAL at 11:47

## 2019-12-30 RX ADMIN — Medication 60 MEQ/KG/HR: at 09:54

## 2019-12-30 RX ADMIN — CEFTRIAXONE 100 MILLIGRAM(S): 500 INJECTION, POWDER, FOR SOLUTION INTRAMUSCULAR; INTRAVENOUS at 22:44

## 2019-12-30 RX ADMIN — Medication 4 MILLIGRAM(S): at 22:43

## 2019-12-30 NOTE — PROGRESS NOTE ADULT - SUBJECTIVE AND OBJECTIVE BOX
CC: acute renal failure     INTERVAL HPI/OVERNIGHT EVENTS: seen and examined , has hyperkalemia , cath cancelled, augusto requested , dialysis today followed by tomorrow again . details discussed with family and grand son .     REVIEW OF SYSTEMS:    CONSTITUTIONAL: No fever, weight loss, or fatigue  RESPIRATORY: No cough, wheezing, hemoptysis; No shortness of breath  CARDIOVASCULAR: No chest pain, palpitations  GASTROINTESTINAL: No abdominal or epigastric pain. No nausea, vomiting  NEUROLOGICAL: No headaches, or blurry vision       Vital Signs Last 24 Hrs  T(C): 36.7 (30 Dec 2019 00:20), Max: 36.7 (30 Dec 2019 00:20)  T(F): 98.1 (30 Dec 2019 00:20), Max: 98.1 (30 Dec 2019 00:20)  HR: 58 (30 Dec 2019 06:30) (54 - 58)  BP: 127/54 (30 Dec 2019 06:30) (116/60 - 131/66)  BP(mean): --  RR: 18 (30 Dec 2019 00:20) (18 - 18)  SpO2: 92% (30 Dec 2019 00:20) (91% - 92%)    PHYSICAL EXAM:    GENERAL: NAD, resting comfortable in bed , right chest tube in place   CHEST/LUNG: CTAB , no wheezing , rales, rhonchi heard   HEART: S1S2+, Regular rate and rhythm; 3/6 systolic murmur   ABDOMEN: Soft, Nontender, Nondistended; Bowel sounds present  EXTREMITIES:  no pitting edema , pulses palpated BL.        LABS:                        8.4    7.49  )-----------( 184      ( 30 Dec 2019 08:59 )             27.2     12-30    134<L>  |  96<L>  |  93.0<H>  ----------------------------<  197<H>  5.6<H>   |  23.0  |  3.76<H>    Ca    8.2<L>      30 Dec 2019 08:59    TPro  6.0<L>  /  Alb  3.2<L>  /  TBili  <0.2<L>  /  DBili  x   /  AST  20  /  ALT  18  /  AlkPhos  71  12-30      Urinalysis Basic - ( 28 Dec 2019 16:39 )    Color: Yellow / Appearance: Cloudy / S.010 / pH: x  Gluc: x / Ketone: Trace  / Bili: Negative / Urobili: Negative mg/dL   Blood: x / Protein: 100 mg/dL / Nitrite: Negative   Leuk Esterase: Small / RBC: >50 /HPF / WBC 6-10   Sq Epi: x / Non Sq Epi: x / Bacteria: Few          MEDICATIONS  (STANDING):  amLODIPine   Tablet 10 milliGRAM(s) Oral daily  aspirin 325 milliGRAM(s) Oral daily  atorvastatin 10 milliGRAM(s) Oral at bedtime  carvedilol 25 milliGRAM(s) Oral every 12 hours  cefTRIAXone   IVPB 1000 milliGRAM(s) IV Intermittent every 24 hours  chlorhexidine 4% Liquid 1 Application(s) Topical <User Schedule>  cholecalciferol 1000 Unit(s) Oral daily  dextrose 5%. 1000 milliLiter(s) (50 mL/Hr) IV Continuous <Continuous>  dextrose 50% Injectable 12.5 Gram(s) IV Push once  dextrose 50% Injectable 25 Gram(s) IV Push once  dextrose 50% Injectable 25 Gram(s) IV Push once  doxazosin 4 milliGRAM(s) Oral at bedtime  epoetin nereida Injectable 74904 Unit(s) SubCutaneous <User Schedule>  ferrous    sulfate 325 milliGRAM(s) Oral daily  heparin  Injectable 5000 Unit(s) SubCutaneous every 12 hours  hydrALAZINE 50 milliGRAM(s) Oral three times a day  insulin lispro (HumaLOG) corrective regimen sliding scale   SubCutaneous three times a day before meals  insulin lispro Injectable (HumaLOG) 10 Unit(s) SubCutaneous three times a day before meals  isosorbide   mononitrate ER Tablet (IMDUR) 30 milliGRAM(s) Oral daily  sodium bicarbonate 650 milliGRAM(s) Oral two times a day  sodium bicarbonate  Infusion 0.055 mEq/kG/Hr (60 mL/Hr) IV Continuous <Continuous>    MEDICATIONS  (PRN):  acetaminophen   Tablet .. 650 milliGRAM(s) Oral every 6 hours PRN Temp greater or equal to 38C (100.4F), Mild Pain (1 - 3)  dextrose 40% Gel 15 Gram(s) Oral once PRN Blood Glucose LESS THAN 70 milliGRAM(s)/deciliter  glucagon  Injectable 1 milliGRAM(s) IntraMuscular once PRN Glucose LESS THAN 70 milligrams/deciliter  sodium chloride 0.9% lock flush 10 milliLiter(s) IV Push every 1 hour PRN Pre/post blood products, medications, blood draw, and to maintain line patency      RADIOLOGY & ADDITIONAL TESTS:

## 2019-12-30 NOTE — PROCEDURE NOTE - NSICDXPROCEDURE_GEN_ALL_CORE_FT
PROCEDURES:  US guided placement of non-tunneled central venous catheter 30-Dec-2019 14:45:28 Ernst for hemodialysis Anant Kelly

## 2019-12-30 NOTE — PROGRESS NOTE ADULT - SUBJECTIVE AND OBJECTIVE BOX
[place] [facility]                          [address] [place] [address]                       Phone: [phone]. Fax:[phone]                      ________________________________________________           Reason for follow up/Overnight events: Follow up, cath canceled 2/2 to worsening renal function.     HPI:  The patient is a 79 year old male with a history of CKD stage 4, hypertension, chronic diastolic CHF, bph and moderate-severe AS who presented to the ER with complaints of difficulty breathing. According to the patient and his wife at bedside he was admitted to [facility] on 12/10 for acute hypoxic respiratory failure secondary to acute on chronic diastolic CHF, hypertensive urgency and UTI. He was discharged home on PO diuretics. Since being home he initially felt better however his wife about a week ago he started to have symptoms of orthopnea and dyspnea at rest with worsening lower extremity edema. A house physician came to evaluate him over the weekend and increased the dose of lasix from 40mg PO OD to BID with some improvement. However, last night he suddenly felt like he could not breath and was brought to the ER. In the ED, improved with a dose of IV lasix 40mg x 1. Noted to be hypothermic, rectal temperature of 91.3 with a heart rate of 53. Started on a bear hugger with improvement to 92.3. Lactate was negative, no leukocytosis> Chest xray with pulmonary edema and elevated BNP. Given a dose of empiric rocephin x 1. (25 Dec 2019 14:46)    ROS: All review of systems negative unless indicated otherwise below.                          LAB RESULTS                   COMPLETE BLOOD COUNT( 30 Dec 2019 08:59 )                            8.4 g/dL<L>  7.49 K/uL )---------------( 184 K/uL                        27.2 %<L>      Automated Differential     Auto Basophil # - X      Auto Basophil % - X      Auto Eosinophil # - X      Auto Eosinophil % - X      Auto Immature Granulocyte # - X      Auto Immature Granulocyte % - X      Auto Lymphocyte # - X      Auto Lymphocyte % - X      Auto Monocyte # - X      Auto Monocyte % - X      Auto Neutrophil # - X      Auto Neutrophil % - X                                      CHEMISTRY                 Basic Metabolic Panel (12-30-19 @ 08:59)    134<L>  |  96<L>  |  93.0<H>  ----------------------------<  197<H>  5.6<H>   |  23.0  |  3.76<H>    Ca    8.2<L>      30 Dec 2019 08:59                    Liver Functions (12-30-19 @ 08:59))  TPro  6.0  /  Alb  3.2  /  TBili  <0.2  /  DBili  x   /  AST  20  /  ALT  18  /  AlkPhos  71     PT/INR/PTT ( 10 Dec 2019 00:15 )                        :                       :      13.2         :       31.6                  .        .                   .              .           .       1.14        .                                                                   Cardiac Enzymes   ( 27 Dec 2019 19:25 )  Troponin T  0.03 ,  CPK  X    , CKMB  X    , BNP X        , ( 27 Dec 2019 10:26 )  Troponin T  0.02 ,  CPK  X    , CKMB  X    , BNP X        , ( 27 Dec 2019 03:58 )  Troponin T  0.02 ,  CPK  X    , CKMB  X    , BNP X                              MICROBIOLOGY/CULTURES:    Culture - Blood (collected 12-25-19 @ 11:58)  Source: .Blood  Preliminary Report (12-27-19 @ 13:00):    No growth at 48 hours    Culture - Blood (collected 12-25-19 @ 11:58)  Source: .Blood  Preliminary Report (12-27-19 @ 13:00):    No growth at 48 hours    Culture - Urine (collected 12-29-19 @ 10:24)  Source: .Urine  Final Report (12-30-19 @ 09:25):    <10,000 CFU/ml Gram Positive Cocci in Clusters    Culture - Urine (collected 12-25-19 @ 12:12)  Source: .Urine  Final Report (12-26-19 @ 16:22):    No growth    Rapid RVP Result: NotDetec (12-25-19 @ 21:50)                          CARDIOLOGY RESULTS: Official Report/Preliminary Verbal Reports    ECHO:   < from: TTE Echo Complete w/Doppler (12.10.19 @ 13:21) >  Summary:   1. Technically difficult study.   2. Normal global left ventricular systolic function.   3. Left ventricular ejection fraction, by visual estimation, is 60 to   65%.   4. Spectral Doppler shows pseudonormal pattern of left ventricular   myocardial filling (Grade II diastolic dysfunction).   5. Elevated mean left atrial pressure. Mean LAP estimated at 22 mmHg.   6. Mild mitral annular calcification.   7. Mild thickening and calcification of the anterior and posterior   mitralvalve leaflets.   8. Mild mitral valve regurgitation.   9. Mild aortic regurgitation.  10. Moderate to severe aortic valve stenosis. Recommend SCAR for further   evaluation.  11. Moderate pleural effusion in both left and right lateral regions.  12. There is no evidence of pericardial effusion.    MD Danya Electronically signed on 12/10/2019 at 4:34:42 PM    < end of copied text >                               DAILY WEIGHTS - 48 HOUR TREND   not completed                             INTAKE AND OUTPUT - 48 HOUR TREND     12-28-19 @ 07:01  -  12-29-19 @ 07:00  --------------------------------------------------------  IN:  Total IN: 0 mL    OUT:    Chest Tube: 500 mL    Voided: 1 mL  Total OUT: 501 mL    Total NET: -501 mL      12-29-19 @ 07:01  -  12-30-19 @ 07:00  --------------------------------------------------------  IN:  Total IN: 0 mL    OUT:    Chest Tube: 700 mL    Voided: 300 mL  Total OUT: 1000 mL    Total NET: -1000 mL    HOME MEDICATIONS:  amLODIPine 10 mg oral tablet: 1 tab(s) orally once a day (10 Dec 2019 02:16)  furosemide 40 mg oral tablet: 1 tab(s) orally once a day on weekdays  and 1 tablet bid on week ends   (16 Dec 2019 17:42)  hydrALAZINE 50 mg oral tablet: 1 tab(s) orally 3 times a day (16 Dec 2019 11:30)  insulin lispro 100 units/mL injectable solution: 3 times a day before meals    2 Unit(s) if Glucose 151 - 200  4 Unit(s) if Glucose 201 - 250  6 Unit(s) if Glucose 251 - 300  8 Unit(s) if Glucose 301 - 350  10 Unit(s) if Glucose 351 - 400  12 Unit(s) if Glucose Greater Than 400 (16 Dec 2019 11:30)  pravastatin 40 mg oral tablet: 1 tab(s) orally once a day (10 Dec 2019 02:16)  sodium bicarbonate 650 mg oral tablet: 1 tab(s) orally 2 times a day (10 Dec 2019 02:16)                             Current Admission Active Medications    acetaminophen   Tablet .. 650 milliGRAM(s) Oral every 6 hours PRN Temp greater or equal to 38C (100.4F), Mild Pain (1 - 3)  amLODIPine   Tablet 10 milliGRAM(s) Oral daily  aspirin 325 milliGRAM(s) Oral daily  atorvastatin 10 milliGRAM(s) Oral at bedtime  carvedilol 25 milliGRAM(s) Oral every 12 hours  cefTRIAXone   IVPB 1000 milliGRAM(s) IV Intermittent every 24 hours  cholecalciferol 1000 Unit(s) Oral daily  dextrose 40% Gel 15 Gram(s) Oral once PRN Blood Glucose LESS THAN 70 milliGRAM(s)/deciliter  dextrose 5%. 1000 milliLiter(s) (50 mL/Hr) IV Continuous <Continuous>  dextrose 50% Injectable 12.5 Gram(s) IV Push once  dextrose 50% Injectable 25 Gram(s) IV Push once  dextrose 50% Injectable 25 Gram(s) IV Push once  doxazosin 4 milliGRAM(s) Oral at bedtime  epoetin nereida Injectable 66382 Unit(s) SubCutaneous <User Schedule>  ferrous    sulfate 325 milliGRAM(s) Oral daily  glucagon  Injectable 1 milliGRAM(s) IntraMuscular once PRN Glucose LESS THAN 70 milligrams/deciliter  heparin  Injectable 5000 Unit(s) SubCutaneous every 12 hours  hydrALAZINE 50 milliGRAM(s) Oral three times a day  insulin lispro (HumaLOG) corrective regimen sliding scale   SubCutaneous three times a day before meals  insulin lispro Injectable (HumaLOG) 10 Unit(s) SubCutaneous three times a day before meals  isosorbide   mononitrate ER Tablet (IMDUR) 30 milliGRAM(s) Oral daily  saccharomyces boulardii 250 milliGRAM(s) Oral two times a day  sodium bicarbonate 650 milliGRAM(s) Oral two times a day  sodium bicarbonate  Infusion 0.055 mEq/kG/Hr (60 mL/Hr) IV Continuous <Continuous>                        PHYSICAL EXAM:    Vital Signs Last 24 Hrs  T(C): 36.7 (30 Dec 2019 00:20), Max: 36.7 (30 Dec 2019 00:20)  T(F): 98.1 (30 Dec 2019 00:20), Max: 98.1 (30 Dec 2019 00:20)  HR: 58 (30 Dec 2019 06:30) (54 - 58)  BP: 127/54 (30 Dec 2019 06:30) (116/60 - 131/66)  BP(mean): --  RR: 18 (30 Dec 2019 00:20) (18 - 18)  SpO2: 92% (30 Dec 2019 00:20) (91% - 92%)    GENERAL: NAD  NECK: Supple, No JVD  NERVOUS SYSTEM:  Alert & Oriented X3, non focal neuro exam.   CHEST/LUNG: diminished lungs, RLL crackles. right pigtail in place.   HEART: Regular rate and rhythm; s1 and s2 auscultated, No murmurs, rubs, or gallops  ABDOMEN: Soft, Nontender, Nondistended; Bowel sounds present and normoactive.   EXTREMITIES:  2+ Peripheral Pulses, No clubbing, cyanosis, or edema

## 2019-12-30 NOTE — PROCEDURE NOTE - NSPROCDETAILS_GEN_ALL_CORE
Seldinger technique/ultrasound assessment of fluid (size)/secured in place/ultrasound assessment of fluid (location)/sterile dressing applied
sterile dressing applied/guidewire recovered/ultrasound guidance/lumen(s) aspirated and flushed

## 2019-12-30 NOTE — PROGRESS NOTE ADULT - PROBLEM SELECTOR PLAN 2
Renal following  Will need HD access for worsening CKD  Cath this week to assess cors as part TAVR W/U Will need HD catheter placement and initialization hemodialysis

## 2019-12-30 NOTE — DIETITIAN INITIAL EVALUATION ADULT. - OTHER INFO
79 year old male patient with  history of CKD stage 4, hypertension, chronic diastolic CHF, BPH and moderate-severe AS awaiting TAVR who presented to the ER with c/o difficulty breathing. Pt was recently admitted to [facility] on 12/10 for  acute on chronic diastolic CHF, hypertensive urgency and UTI. Pt wife present during interview. Wife reported pt has good po intake and fair-good po intake PTA. Aware of dietitian initial evaluation 12/10. Pt wife reported no recent weight changes. Wife provided with written and verbal diabetes, heart healthy and renal education. Wife receptive and reported following some guidelines at home already. Aware of right and left leg trace edema. Last documented BM 12/28 per EMR.

## 2019-12-30 NOTE — DIETITIAN INITIAL EVALUATION ADULT. - ADD RECOMMEND
RX: Nepro TID to optimize po intake and provide an additional 425 kcal, 19.1g protein per serving, monitor weights and labs, encourage po intake

## 2019-12-30 NOTE — DIETITIAN INITIAL EVALUATION ADULT. - PERTINENT LABORATORY DATA
12-30 Na134 mmol/L<L> Glu 197 mg/dL<H> K+ 5.6 mmol/L<H> Cr  3.76 mg/dL<H> BUN 93.0 mg/dL<H> Phos n/a   Alb 3.2 g/dL<L> PAB n/a

## 2019-12-30 NOTE — PROCEDURE NOTE - NSPOSTCAREGUIDE_GEN_A_CORE
Instructed patient/caregiver regarding signs and symptoms of infection/Care for catheter as per unit/ICU protocols
Care for catheter as per unit/ICU protocols

## 2019-12-30 NOTE — PROGRESS NOTE ADULT - ASSESSMENT
12/25 Pt admitted with SOB found to have acute on chronic heart failure exacerbation complicated by pleural effusions and acute on chronic kidney failure.  -CTS consult for possible TAVR in future pending full work up.  -Thoracic surgery consult: Pleural effusions-Rt PTC placed 12/25 (1L drainage)  12/30 Maintain pleural tube Will need HD cath for worsening CKD , prior to cath  Discussed with Cardiology/Nephrology

## 2019-12-30 NOTE — PROGRESS NOTE ADULT - ASSESSMENT
1. Right pleural effusion /acute CHF exacerbation  chest tube in place ,CTS on board .  felt to due to CHF and severe AS.  awaiting for output to improve.     2. ADI on CKD stage 4   with hyperkalemia .  shiley inserted by vascular , dialysis to be started .  nephrology on board .    3. UTI   on  ceftriaxone, no more hematuria  .  follow up urine culture .  afebrile.     4. Hyperkalemia   dialysis today .    5. DM   blood sugars elevated , added humalog       6. Anemia  likely AOCD .  will monitor.  on iron and epogen     7. Severe AS   cath tomorrow , further work up out patient .    8. DLP  statin     9. HTN   continue with current regimen .    10. DVT prophylaxis  heparin .    details discussed with patient and his grand son , all concerns answered .also discussed with his wife at bedside .

## 2019-12-30 NOTE — PROGRESS NOTE ADULT - SUBJECTIVE AND OBJECTIVE BOX
[facility] DIVISION OF KIDNEY DISEASES AND HYPERTENSION -- FOLLOW UP NOTE  --------------------------------------------------------------------------------  Chief Complaint: CKD     24 hour events/subjective:  K+ levels elevated  Pt seen and examined; feels well    PAST HISTORY  --------------------------------------------------------------------------------  No significant changes to PMH, PSH, FHx, SHx, unless otherwise noted    ALLERGIES & MEDICATIONS  --------------------------------------------------------------------------------  Allergies    No Known Allergies    Intolerances      Standing Inpatient Medications  amLODIPine   Tablet 10 milliGRAM(s) Oral daily  aspirin 325 milliGRAM(s) Oral daily  atorvastatin 10 milliGRAM(s) Oral at bedtime  carvedilol 25 milliGRAM(s) Oral every 12 hours  cefTRIAXone   IVPB 1000 milliGRAM(s) IV Intermittent every 24 hours  cholecalciferol 1000 Unit(s) Oral daily  dextrose 5%. 1000 milliLiter(s) IV Continuous <Continuous>  dextrose 50% Injectable 12.5 Gram(s) IV Push once  dextrose 50% Injectable 25 Gram(s) IV Push once  dextrose 50% Injectable 25 Gram(s) IV Push once  doxazosin 4 milliGRAM(s) Oral at bedtime  epoetin nereida Injectable 94661 Unit(s) SubCutaneous <User Schedule>  ferrous    sulfate 325 milliGRAM(s) Oral daily  heparin  Injectable 5000 Unit(s) SubCutaneous every 12 hours  hydrALAZINE 50 milliGRAM(s) Oral three times a day  insulin lispro (HumaLOG) corrective regimen sliding scale   SubCutaneous three times a day before meals  insulin lispro Injectable (HumaLOG) 10 Unit(s) SubCutaneous three times a day before meals  isosorbide   mononitrate ER Tablet (IMDUR) 30 milliGRAM(s) Oral daily  sodium bicarbonate 650 milliGRAM(s) Oral two times a day  sodium bicarbonate  Infusion 0.055 mEq/kG/Hr IV Continuous <Continuous>    PRN Inpatient Medications  acetaminophen   Tablet .. 650 milliGRAM(s) Oral every 6 hours PRN  dextrose 40% Gel 15 Gram(s) Oral once PRN  glucagon  Injectable 1 milliGRAM(s) IntraMuscular once PRN      REVIEW OF SYSTEMS  --------------------------------------------------------------------------------  Gen: No weight changes, fatigue, fevers/chills, weakness  Skin: No rashes  Head/Eyes/Ears/Mouth: No headache; Normal hearing; Normal vision w/o blurriness; No sinus pain/discomfort, sore throat  Respiratory: No dyspnea, cough, wheezing, hemoptysis  CV: No chest pain, PND, orthopnea  GI: No abdominal pain, diarrhea, constipation, nausea, vomiting, melena, hematochezia  : No increased frequency, dysuria, hematuria, nocturia  MSK: No joint pain/swelling; no back pain; no edema  Neuro: No dizziness/lightheadedness, weakness, seizures, numbness, tingling  Heme: No easy bruising or bleeding  Endo: No heat/cold intolerance  Psych: No significant nervousness, anxiety, stress, depression    All other systems were reviewed and are negative, except as noted.    VITALS/PHYSICAL EXAM  --------------------------------------------------------------------------------  T(C): 36.7 (12-30-19 @ 00:20), Max: 36.7 (12-30-19 @ 00:20)  HR: 58 (12-30-19 @ 06:30) (54 - 58)  BP: 127/54 (12-30-19 @ 06:30) (116/60 - 131/66)  RR: 18 (12-30-19 @ 00:20) (18 - 18)  SpO2: 92% (12-30-19 @ 00:20) (91% - 92%)  Wt(kg): --        12-29-19 @ 07:01  -  12-30-19 @ 07:00  --------------------------------------------------------  IN: 0 mL / OUT: 1000 mL / NET: -1000 mL    12-30-19 @ 07:01  -  12-30-19 @ 14:27  --------------------------------------------------------  IN: 0 mL / OUT: 400 mL / NET: -400 mL      Physical Exam:  	Gen: NAD  	HEENT: supple neck, on room air  	Pulm: CTA B/L  	CV: RRR, systolic murmur,  no rub  	Back: unable to examine  	Abd: +BS, soft, nontender/nondistended  	: No suprapubic tenderness  	UE: Warm,  no edema  	LE: Warm, no LE edema  	Neuro: No focal deficits  	Psych: Normal affect and mood  	Skin: Warm, without rashes    LABS/STUDIES  --------------------------------------------------------------------------------              8.4    7.49  >-----------<  184      [12-30-19 @ 08:59]              27.2     134  |  96  |  93.0  ----------------------------<  197      [12-30-19 @ 08:59]  5.6   |  23.0  |  3.76        Ca     8.2     [12-30-19 @ 08:59]    TPro  6.0  /  Alb  3.2  /  TBili  <0.2  /  DBili  x   /  AST  20  /  ALT  18  /  AlkPhos  71  [12-30-19 @ 08:59]          Creatinine Trend:  SCr 3.76 [12-30 @ 08:59]  SCr 4.43 [12-29 @ 06:12]  SCr 4.27 [12-28 @ 09:32]  SCr 3.94 [12-27 @ 03:59]  SCr 3.77 [12-26 @ 05:56]    Urinalysis - [12-28-19 @ 16:39]      Color Yellow / Appearance Cloudy / SG 1.010 / pH 5.0      Gluc Negative / Ketone Trace  / Bili Negative / Urobili Negative       Blood Large / Protein 100 / Leuk Est Small / Nitrite Negative      RBC >50 / WBC 6-10 / Hyaline  / Gran  / Sq Epi  / Non Sq Epi  / Bacteria Few      Iron 25, TIBC 285, %sat 9      [12-11-19 @ 17:50]  Ferritin 25      [12-11-19 @ 17:50]  PTH -- (Ca 8.3)      [12-12-19 @ 19:36]   164  Vitamin D (25OH) 20.3      [12-12-19 @ 19:36]  HbA1c 8.1      [12-11-19 @ 06:47]  TSH 6.54      [12-25-19 @ 21:00]

## 2019-12-30 NOTE — CONSULT NOTE ADULT - SUBJECTIVE AND OBJECTIVE BOX
Vascular Surgery Consult Note      The patient is a 79 year old male with a history of CKD stage 4, hypertension, chronic diastolic CHF, bph and moderate-severe AS who presented to the ER with complaints of difficulty breathing. According to the patient and his wife at bedside he was admitted to [facility] on 12/10 for acute hypoxic respiratory failure secondary to acute on chronic diastolic CHF, hypertensive urgency and UTI. He was discharged home on PO diuretics. Since being home he initially felt better however his wife about a week ago he started to have symptoms of orthopnea and dyspnea at rest with worsening lower extremity edema. A house physician came to evaluate him over the weekend and increased the dose of lasix from 40mg PO OD to BID with some improvement. However, last night he suddenly felt like he could not breath and was brought to the ER. In the ED, improved with a dose of IV lasix 40mg x 1. Noted to be hypothermic, rectal temperature of 91.3 with a heart rate of 53. Started on a bear hugger with improvement to 92.3. Lactate was negative, no leukocytosis> Chest xray with pulmonary edema and elevated BNP. Given a dose of empiric rocephin x 1.       HPI: ***    Patient denies fevers/chills, denies lightheadedness/dizziness, denies SOB/chest pain, denies nausea/vomiting, denies constipation/diarrhea.  ***    ROS: 10-system review is otherwise negative except HPI above.      PAST MEDICAL & SURGICAL HISTORY:  Hyperkalemia  ADI (acute kidney injury)  BPH (benign prostatic hyperplasia)  CKD (chronic kidney disease)  Hyperlipemia  Hypertension  Diabetes  Ureteral stent retained    FAMILY HISTORY:  No pertinent family history in first degree relatives    [] Family history not pertinent as reviewed with the patient and family    SOCIAL HISTORY:  ***    ALLERGIES: No Known Allergies      HOME MEDICATIONS: ***    CURRENT MEDICATIONS  MEDICATIONS (STANDING): amLODIPine   Tablet 10 milliGRAM(s) Oral daily  aspirin 325 milliGRAM(s) Oral daily  atorvastatin 10 milliGRAM(s) Oral at bedtime  carvedilol 25 milliGRAM(s) Oral every 12 hours  cefTRIAXone   IVPB 1000 milliGRAM(s) IV Intermittent every 24 hours  cholecalciferol 1000 Unit(s) Oral daily  dextrose 5%. 1000 milliLiter(s) IV Continuous <Continuous>  dextrose 50% Injectable 12.5 Gram(s) IV Push once  dextrose 50% Injectable 25 Gram(s) IV Push once  dextrose 50% Injectable 25 Gram(s) IV Push once  doxazosin 4 milliGRAM(s) Oral at bedtime  epoetin nereida Injectable 83013 Unit(s) SubCutaneous <User Schedule>  ferrous    sulfate 325 milliGRAM(s) Oral daily  heparin  Injectable 5000 Unit(s) SubCutaneous every 12 hours  hydrALAZINE 50 milliGRAM(s) Oral three times a day  insulin lispro (HumaLOG) corrective regimen sliding scale   SubCutaneous three times a day before meals  insulin lispro Injectable (HumaLOG) 10 Unit(s) SubCutaneous three times a day before meals  isosorbide   mononitrate ER Tablet (IMDUR) 30 milliGRAM(s) Oral daily  sodium bicarbonate 650 milliGRAM(s) Oral two times a day  sodium bicarbonate  Infusion 0.055 mEq/kG/Hr IV Continuous <Continuous>    MEDICATIONS (PRN):acetaminophen   Tablet .. 650 milliGRAM(s) Oral every 6 hours PRN Temp greater or equal to 38C (100.4F), Mild Pain (1 - 3)  dextrose 40% Gel 15 Gram(s) Oral once PRN Blood Glucose LESS THAN 70 milliGRAM(s)/deciliter  glucagon  Injectable 1 milliGRAM(s) IntraMuscular once PRN Glucose LESS THAN 70 milligrams/deciliter    --------------------------------------------------------------------------------------------    Vitals:   T(C): 36.7 (19 @ 00:20), Max: 36.7 (19 @ 00:20)  HR: 58 (19 @ 06:30) (54 - 58)  BP: 127/54 (19 @ 06:30) (116/60 - 131/66)  RR: 18 (19 @ 00:20) (18 - 18)  SpO2: 92% (19 @ 00:20) (91% - 92%)  CAPILLARY BLOOD GLUCOSE      POCT Blood Glucose.: 180 mg/dL (30 Dec 2019 11:20)  POCT Blood Glucose.: 192 mg/dL (30 Dec 2019 08:45)  POCT Blood Glucose.: 154 mg/dL (29 Dec 2019 16:36)    CAPILLARY BLOOD GLUCOSE      POCT Blood Glucose.: 180 mg/dL (30 Dec 2019 11:20)  POCT Blood Glucose.: 192 mg/dL (30 Dec 2019 08:45)  POCT Blood Glucose.: 154 mg/dL (29 Dec 2019 16:36)       @ 07:01  -   @ 07:00  --------------------------------------------------------  IN:  Total IN: 0 mL    OUT:    Chest Tube: 700 mL    Voided: 300 mL  Total OUT: 1000 mL    Total NET: -1000 mL       @ 07:01  -   @ 14:51  --------------------------------------------------------  IN:  Total IN: 0 mL    OUT:    Voided: 400 mL  Total OUT: 400 mL    Total NET: -400 mL        Height (cm): 162.56 ( @ 11:05)  Weight (kg): 81.6 ( @ 11:05)  BMI (kg/m2): 30.9 ( @ 11:05)  BSA (m2): 1.87 ( @ 11:05)    PHYSICAL EXAM: ***  General: NAD, Lying in bed comfortably  Neuro: A+Ox3  HEENT: NC/AT, EOMI  Neck: Soft, supple  Cardio: RRR, nml S1/S2  Resp: Good effort, CTA b/l  Thorax: No chest wall tenderness  Breast: No lesions/masses, no drainage  GI/Abd: Soft, NT/ND, no rebound/guarding, no masses palpated  Vascular: All 4 extremities warm.  Skin: Intact, no breakdown  Lymphatic/Nodes: No palpable lymphadenopathy  Musculoskeletal: All 4 extremities moving spontaneously, no limitations  --------------------------------------------------------------------------------------------    LABS  CBC ( 08:59)                              8.4<L>                         7.49    )----------------(  184        --    % Neutrophils, --    % Lymphocytes, ANC: --                                  27.2<L>  CBC ( @ 06:12)                              8.7<L>                         9.34    )----------------(  186        --    % Neutrophils, --    % Lymphocytes, ANC: --                                  29.2<L>    BMP ( @ 08:59)             134<L>  |  96<L>   |  93.0<H>		Ca++ --      Ca 8.2<L>             ---------------------------------( 197<H>		Mg --                 5.6<H>  |  23.0    |  3.76<H>			Ph --      BMP ( @ 06:12)             134<L>  |  95<L>   |  88.0<H>		Ca++ --      Ca 8.6                ---------------------------------( 225<H>		Mg --                 5.7<H>  |  21.0<L>  |  4.43<H>			Ph --        LFTs ( 08:59)      TPro 6.0<L> / Alb 3.2<L> / TBili <0.2<L> / DBili -- / AST 20 / ALT 18 / AlkPhos 71            --------------------------------------------------------------------------------------------    MICROBIOLOGY  Urinalysis ( @ 16:39):     Color: Yellow / Appearance: Cloudy<!> / S.010 / pH: 5.0 / Gluc: Negative / Ketones: Trace<!> / Bili: Negative / Urobili: Negative / Protein :100<!> / Nitrites: Negative / Leuk.Est: Small<!> / RBC: >50<!> / WBC: 6-10<!> / Sq Epi:  / Non Sq Epi:  / Bacteria Few<!>       -> .Urine Culture ( @ 10:24)     NG    NG    <10,000 CFU/ml Gram Positive Cocci in Clusters    -> .Body Fluid Culture ( @ 21:45)     NG    NG    Culture is being performed.    -> .Body Fluid Culture ( @ 18:16)     NG    NG    No growth to date  Culture in progress    -> .Body Fluid Culture ( @ 18:15)       Few WBC's  No organisms seen    NG    No growth at 4 days.  Culture in progress    -> .Urine Culture ( @ 12:12)     NG    NG    No growth    -> .Blood Culture ( @ 11:58)     NG    NG    No growth at 5 days.      --------------------------------------------------------------------------------------------    IMAGING  ***    ASSESSMENT: Patient is a 79y old m with ****    PLAN:  ***  -   -   -   -   - Patient seen/examined with attending.  - Plan to be discussed with Attending,  Vascular Surgery Consult Note    HPI  The patient is a 79 year old male with a history of CKD stage 4, hypertension, chronic diastolic CHF, bph and moderate-severe AS who presented to the ER with complaints of difficulty breathing. According to the patient and his wife at bedside he was admitted to [facility] on 12/10 for acute hypoxic respiratory failure secondary to acute on chronic diastolic CHF, hypertensive urgency and UTI. He was discharged home on PO diuretics. Since being home he initially felt better however his wife about a week ago he started to have symptoms of orthopnea and dyspnea at rest with worsening lower extremity edema. A house physician came to evaluate him over the weekend and increased the dose of lasix from 40mg PO OD to BID with some improvement. However, last night he suddenly felt like he could not breath and was brought to the ER. In the ED, improved with a dose of IV lasix 40mg x 1. Noted to be hypothermic, rectal temperature of 91.3 with a heart rate of 53. Started on a bear hugger with improvement to 92.3. Lactate was negative, no leukocytosis> Chest xray with pulmonary edema and elevated BNP. Given a dose of empiric rocephin x 1.     Vascular HPI:  Since admission, patient with right plueral effusion now s/p chest pig tail placement with drainage of 1L of pleural fluid. Patient reported some improvement in his respiratory status. Cardiology planning on doing a catheterization but given patient's worsening CKD, procedure has been deferred until patient able to receive HD. Vascular surgery consulted for Dialysis access.        ROS: 10-system review is otherwise negative except HPI above.      PAST MEDICAL & SURGICAL HISTORY:  Hyperkalemia  ADI (acute kidney injury)  BPH (benign prostatic hyperplasia)  CKD (chronic kidney disease)  Hyperlipemia  Hypertension  Diabetes  Ureteral stent retained    FAMILY HISTORY:  No pertinent family history in first degree relatives    [] Family history not pertinent as reviewed with the patient and family      ALLERGIES: No Known Allergies      CURRENT MEDICATIONS  MEDICATIONS (STANDING): amLODIPine   Tablet 10 milliGRAM(s) Oral daily  aspirin 325 milliGRAM(s) Oral daily  atorvastatin 10 milliGRAM(s) Oral at bedtime  carvedilol 25 milliGRAM(s) Oral every 12 hours  cefTRIAXone   IVPB 1000 milliGRAM(s) IV Intermittent every 24 hours  cholecalciferol 1000 Unit(s) Oral daily  dextrose 5%. 1000 milliLiter(s) IV Continuous <Continuous>  dextrose 50% Injectable 12.5 Gram(s) IV Push once  dextrose 50% Injectable 25 Gram(s) IV Push once  dextrose 50% Injectable 25 Gram(s) IV Push once  doxazosin 4 milliGRAM(s) Oral at bedtime  epoetin nereida Injectable 34844 Unit(s) SubCutaneous <User Schedule>  ferrous    sulfate 325 milliGRAM(s) Oral daily  heparin  Injectable 5000 Unit(s) SubCutaneous every 12 hours  hydrALAZINE 50 milliGRAM(s) Oral three times a day  insulin lispro (HumaLOG) corrective regimen sliding scale   SubCutaneous three times a day before meals  insulin lispro Injectable (HumaLOG) 10 Unit(s) SubCutaneous three times a day before meals  isosorbide   mononitrate ER Tablet (IMDUR) 30 milliGRAM(s) Oral daily  sodium bicarbonate 650 milliGRAM(s) Oral two times a day  sodium bicarbonate  Infusion 0.055 mEq/kG/Hr IV Continuous <Continuous>    MEDICATIONS (PRN):acetaminophen   Tablet .. 650 milliGRAM(s) Oral every 6 hours PRN Temp greater or equal to 38C (100.4F), Mild Pain (1 - 3)  dextrose 40% Gel 15 Gram(s) Oral once PRN Blood Glucose LESS THAN 70 milliGRAM(s)/deciliter  glucagon  Injectable 1 milliGRAM(s) IntraMuscular once PRN Glucose LESS THAN 70 milligrams/deciliter    --------------------------------------------------------------------------------------------    Vitals:   T(C): 36.7 (19 @ 00:20), Max: 36.7 (19 @ 00:20)  HR: 58 (19 @ 06:30) (54 - 58)  BP: 127/54 (19 @ 06:30) (116/60 - 131/66)  RR: 18 (19 @ 00:20) (18 - 18)  SpO2: 92% (19 @ 00:20) (91% - 92%)  CAPILLARY BLOOD GLUCOSE      POCT Blood Glucose.: 180 mg/dL (30 Dec 2019 11:20)  POCT Blood Glucose.: 192 mg/dL (30 Dec 2019 08:45)  POCT Blood Glucose.: 154 mg/dL (29 Dec 2019 16:36)    CAPILLARY BLOOD GLUCOSE      POCT Blood Glucose.: 180 mg/dL (30 Dec 2019 11:20)  POCT Blood Glucose.: 192 mg/dL (30 Dec 2019 08:45)  POCT Blood Glucose.: 154 mg/dL (29 Dec 2019 16:36)       @ 07:01  -   @ 07:00  --------------------------------------------------------  IN:  Total IN: 0 mL    OUT:    Chest Tube: 700 mL    Voided: 300 mL  Total OUT: 1000 mL    Total NET: -1000 mL       @ 07:01  -   @ 14:51  --------------------------------------------------------  IN:  Total IN: 0 mL    OUT:    Voided: 400 mL  Total OUT: 400 mL    Total NET: -400 mL        Height (cm): 162.56 ( @ 11:05)  Weight (kg): 81.6 ( @ 11:05)  BMI (kg/m2): 30.9 ( @ 11:05)  BSA (m2): 1.87 ( @ 11:05)    PHYSICAL EXAM:   General: NAD, Lying in bed comfortably  Neuro: A+Ox3  HEENT: NC/AT, EOMI  Neck: Soft, supple  Cardio: RRR, nml S1/S2  Resp: Good effort, CTA b/l  Thorax: No chest wall tenderness, right-sided chest tube with straw-colored drainage.  Breast: No lesions/masses, no drainage  GI/Abd: Soft, NT/ND, no rebound/guarding, no masses palpated  Vascular: All 4 extremities warm.  Skin: Intact, no breakdown  Lymphatic/Nodes: No palpable lymphadenopathy  Musculoskeletal: All 4 extremities moving spontaneously, no limitations  --------------------------------------------------------------------------------------------    LABS  CBC ( 08:59)                              8.4<L>                         7.49    )----------------(  184        --    % Neutrophils, --    % Lymphocytes, ANC: --                                  27.2<L>  CBC ( @ 06:12)                              8.7<L>                         9.34    )----------------(  186        --    % Neutrophils, --    % Lymphocytes, ANC: --                                  29.2<L>    BMP ( 08:59)             134<L>  |  96<L>   |  93.0<H>		Ca++ --      Ca 8.2<L>             ---------------------------------( 197<H>		Mg --                 5.6<H>  |  23.0    |  3.76<H>			Ph --      BMP ( @ 06:12)             134<L>  |  95<L>   |  88.0<H>		Ca++ --      Ca 8.6                ---------------------------------( 225<H>		Mg --                 5.7<H>  |  21.0<L>  |  4.43<H>			Ph --        LFTs ( 08:59)      TPro 6.0<L> / Alb 3.2<L> / TBili <0.2<L> / DBili -- / AST 20 / ALT 18 / AlkPhos 71            --------------------------------------------------------------------------------------------    MICROBIOLOGY  Urinalysis ( @ 16:39):     Color: Yellow / Appearance: Cloudy<!> / S.010 / pH: 5.0 / Gluc: Negative / Ketones: Trace<!> / Bili: Negative / Urobili: Negative / Protein :100<!> / Nitrites: Negative / Leuk.Est: Small<!> / RBC: >50<!> / WBC: 6-10<!> / Sq Epi:  / Non Sq Epi:  / Bacteria Few<!>       -> .Urine Culture ( @ 10:24)     NG    NG    <10,000 CFU/ml Gram Positive Cocci in Clusters    -> .Body Fluid Culture ( @ 21:45)     NG    NG    Culture is being performed.    -> .Body Fluid Culture ( @ 18:16)     NG    NG    No growth to date  Culture in progress    -> .Body Fluid Culture ( @ 18:15)       Few WBC's  No organisms seen    NG    No growth at 4 days.  Culture in progress    -> .Urine Culture ( @ 12:12)     NG    NG    No growth    -> .Blood Culture ( @ 11:58)     NG    NG    No growth at 5 days.      --------------------------------------------------------------------------------------------    IMAGING  ***

## 2019-12-30 NOTE — PROGRESS NOTE ADULT - ASSESSMENT
1. Right pleural effusion /Acute CHF exacerbation & Severe AS  s/p pigtail catheter   TAVR work up outpt    2. CRS - CKD stage 4  ( [facility] tomorrow)  Worsening uremia and hyperkalemia  Plan to initiate HD; pt consented  Vascular for HD catheter placement  HD today and tomorrow after cath    3. Volume/ HTN  BP stable; continue current regimen  UF as tolerated    4. 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

## 2019-12-30 NOTE — CONSULT NOTE ADULT - ASSESSMENT
ASSESSMENT: Patient is a 79y old m with CHF exacerbation pending cardiac catheterization delayed due to worsening CKD, Vascular Surgery Consulted for hemodialysis access.    PLAN:    - BRIEN Dukes placement for immediate HD access  - Permacath placement pending Thursday 1/2/2020  - B/L upper extremity vein mapping  - protect non-dominate upper extremity for future AVF creation   - Plan discussed with Attending, Dr. Michael

## 2019-12-30 NOTE — DIETITIAN INITIAL EVALUATION ADULT. - ETIOLOGY
related to inability to meet increased protein-energy needs in setting of CKD, pleural effusion, aortic stenosis

## 2019-12-30 NOTE — CHART NOTE - NSCHARTNOTEFT_GEN_A_CORE
Upon Nutritional Assessment by the Registered Dietitian your patient was determined to meet criteria / has evidence of the following diagnosis/diagnoses:            [ x]  Moderate Protein Calorie Malnutrition            Findings as based on:  •  Comprehensive nutrition assessment and consultation  •  Calorie counts (nutrient intake analysis)  •  Food acceptance and intake status from observations by staff  •  Follow up  •  Patient education  •  Intervention secondary to interdisciplinary rounds  •   concerns      Treatment:    The following diet has been recommended:      PROVIDER Section:     By signing this assessment you are acknowledging and agree with the diagnosis/diagnoses assigned by the Registered Dietitian    Comments:    RX: Nepro TID to optimize po intake and provide an additional 425 kcal, 19.1g protein per serving, monitor weights and labs, encourage po intake

## 2019-12-30 NOTE — PROGRESS NOTE ADULT - PROBLEM SELECTOR PLAN 1
12/25 Rt PTC (Initial drainage 1L-Transudative)  Maintain CT to H20 seal (No gross airleak noted) and record daily output  ISS/cough and deep breathing exercise  Cont AM CXR   Will cont to follow  Cont care as per Primary team and Cards TAVR workup in progress  Cath to assess coronaries

## 2019-12-30 NOTE — DIETITIAN INITIAL EVALUATION ADULT. - MALNUTRITION
NFPE performed. Moderate muscle wasting at temples. Moderate fat loss in orbital region, buccal pads. Moderate (acute)

## 2019-12-30 NOTE — PROGRESS NOTE ADULT - SUBJECTIVE AND OBJECTIVE BOX
Subjective:  "Hello"      V/S  T(C): 36.7 (12-30-19 @ 00:20), Max: 36.7 (12-30-19 @ 00:20)  HR: 58 (12-30-19 @ 06:30) (54 - 58)  BP: 127/54 (12-30-19 @ 06:30) (116/60 - 131/66)  RR: 18 (12-30-19 @ 00:20) (18 - 18)  SpO2: 92% (12-30-19 @ 00:20) (91% - 92%)  :    CHEST TUBE:    R post pigtail    OUTPUT:    500         per 24 hours     Drainage:  serous  AIR LEAKS:  [ ] YES [x ] NO      MEDICATIONS  (STANDING):  amLODIPine   Tablet 10 milliGRAM(s) Oral daily  aspirin 325 milliGRAM(s) Oral daily  atorvastatin 10 milliGRAM(s) Oral at bedtime  carvedilol 25 milliGRAM(s) Oral every 12 hours  cefTRIAXone   IVPB 1000 milliGRAM(s) IV Intermittent every 24 hours  cholecalciferol 1000 Unit(s) Oral daily  dextrose 5%. 1000 milliLiter(s) (50 mL/Hr) IV Continuous <Continuous>  dextrose 50% Injectable 12.5 Gram(s) IV Push once  dextrose 50% Injectable 25 Gram(s) IV Push once  dextrose 50% Injectable 25 Gram(s) IV Push once  doxazosin 4 milliGRAM(s) Oral at bedtime  epoetin nereida Injectable 25395 Unit(s) SubCutaneous <User Schedule>  ferrous    sulfate 325 milliGRAM(s) Oral daily  heparin  Injectable 5000 Unit(s) SubCutaneous every 12 hours  hydrALAZINE 50 milliGRAM(s) Oral three times a day  insulin lispro (HumaLOG) corrective regimen sliding scale   SubCutaneous three times a day before meals  insulin lispro Injectable (HumaLOG) 10 Unit(s) SubCutaneous three times a day before meals  isosorbide   mononitrate ER Tablet (IMDUR) 30 milliGRAM(s) Oral daily  saccharomyces boulardii 250 milliGRAM(s) Oral two times a day  sodium bicarbonate 650 milliGRAM(s) Oral two times a day  sodium bicarbonate  Infusion 0.055 mEq/kG/Hr (60 mL/Hr) IV Continuous <Continuous>      12-29    134<L>  |  95<L>  |  88.0<H>  ----------------------------<  225<H>  5.7<H>   |  21.0<L>  |  4.43<H>    Ca    8.6      29 Dec 2019 06:12                                 8.4    7.49  )-----------( 184      ( 30 Dec 2019 08:59 )             27.2                 CAPILLARY BLOOD GLUCOSE      POCT Blood Glucose.: 192 mg/dL (30 Dec 2019 08:45)  POCT Blood Glucose.: 154 mg/dL (29 Dec 2019 16:36)  POCT Blood Glucose.: 200 mg/dL (29 Dec 2019 12:33)           CXR: < from: Xray Chest 1 View- PORTABLE-Routine (12.29.19 @ 05:43) >  Again noted is a right pigtail chest tube. There is a small right apical pneumothorax on the current study which is new since the prior exam. The right lung is otherwise clear.   Opacification at the left lung base noted, likely representing atelectasis and/or pleural effusion..        Physical Exam:      Neurology: Awake, nonfocal, MORGAN x 4    Respiratory: CTA B/L, No wheezing, rales, rhonchi    CV: S1S2, no murmurs, rubs or gallops    Abdominal: Soft, NT, ND    Extremities: No edema    Psych: Oriented x 3, normal affect    Tubes: R PTC, to WS, no air leak, serous fluid output  350 ml overnite              PAST MEDICAL & SURGICAL HISTORY:  Hyperkalemia  ADI (acute kidney injury)  BPH (benign prostatic hyperplasia)  CKD (chronic kidney disease)  Hyperlipemia  Hypertension  Diabetes  Ureteral stent retained

## 2019-12-30 NOTE — PROGRESS NOTE ADULT - ASSESSMENT
Pt is a 80 y/o male with medical history of DM, HTN, CKD, BPH, pAfib with no AC, who presents to [facility]-ED for c/o SOB. Pt is not a good historian d/t possible dementia, wife at bedside. Pt wife noticed pt has increased SOB even after discharge from [facility]. Pt also started to have frequent chills, but no known fever. Pt family further assess that "he is not himself", not as energetic and responsive to family. Pt was brought to [facility]-ED and was found to be hypothermic at 91.2, HR @ 53 SB, BP @ 139/63. On previous admission, pt had similar symptoms and was advised to be evaluated for AVR as outpatient. Pt Denies fever, cough, phlegm production,  PND, edema, chest pain, pressure, palpitations, irregular, fast heart beat, nausea, vomiting, melena, rectal bleed, hematuria, lightheadedness, dizziness, syncope, near syncope.   As of 12/26/19, pt in no resp. distress, chest tube draining straw colored drainage. Pt insertion site dressing saturated with blood no signs of subcutaneous emphysema. Pt wheezing on inspiration with bilateral rales when auscultated.              1. Aortic Stenosis with SOB  - Pleural effusion s/p drainage->   - Unclear trigger: Likely uremia/pleuritis. vs. insufficient diuresis.   - left heart cath on hold 2/2 to worsening ADI w/ electrolyte abnormality   - pt to have HD and line placement first  - plan for [facility] tomorrow   - Outpatient TAVR w/u  - congestive heart failure  diuresis. ultrafiltration and hemodialysis as difficult to maintain volume and uremia.     2. HTN  -Continue current antihypertensive medication regimen     3. CKD  - Plan as per Nephrology   - tentative plan for line placement and HD  - DC'd florastor for tentative line placement.

## 2019-12-31 ENCOUNTER — APPOINTMENT (OUTPATIENT)
Dept: CARDIOLOGY | Facility: CLINIC | Age: 79
End: 2019-12-31

## 2019-12-31 LAB
ANION GAP SERPL CALC-SCNC: 15 MMOL/L — SIGNIFICANT CHANGE UP (ref 5–17)
BUN SERPL-MCNC: 62 MG/DL — HIGH (ref 8–20)
CALCIUM SERPL-MCNC: 7.6 MG/DL — LOW (ref 8.6–10.2)
CHLORIDE SERPL-SCNC: 98 MMOL/L — SIGNIFICANT CHANGE UP (ref 98–107)
CO2 SERPL-SCNC: 23 MMOL/L — SIGNIFICANT CHANGE UP (ref 22–29)
CREAT SERPL-MCNC: 3.17 MG/DL — HIGH (ref 0.5–1.3)
CULTURE RESULTS: SIGNIFICANT CHANGE UP
GLUCOSE BLDC GLUCOMTR-MCNC: 123 MG/DL — HIGH (ref 70–99)
GLUCOSE BLDC GLUCOMTR-MCNC: 141 MG/DL — HIGH (ref 70–99)
GLUCOSE BLDC GLUCOMTR-MCNC: 187 MG/DL — HIGH (ref 70–99)
GLUCOSE BLDC GLUCOMTR-MCNC: 222 MG/DL — HIGH (ref 70–99)
GLUCOSE SERPL-MCNC: 211 MG/DL — HIGH (ref 70–115)
HBV CORE AB SER-ACNC: SIGNIFICANT CHANGE UP
HBV SURFACE AB SER-ACNC: <3 MIU/ML — LOW
HBV SURFACE AB SER-ACNC: SIGNIFICANT CHANGE UP
HBV SURFACE AG SER-ACNC: SIGNIFICANT CHANGE UP
HCT VFR BLD CALC: 27 % — LOW (ref 39–50)
HCV AB S/CO SERPL IA: 0.06 S/CO — SIGNIFICANT CHANGE UP (ref 0–0.99)
HCV AB SERPL-IMP: SIGNIFICANT CHANGE UP
HGB BLD-MCNC: 8.2 G/DL — LOW (ref 13–17)
MCHC RBC-ENTMCNC: 24.6 PG — LOW (ref 27–34)
MCHC RBC-ENTMCNC: 30.4 GM/DL — LOW (ref 32–36)
MCV RBC AUTO: 81.1 FL — SIGNIFICANT CHANGE UP (ref 80–100)
NON-GYNECOLOGICAL CYTOLOGY STUDY: SIGNIFICANT CHANGE UP
PLATELET # BLD AUTO: 168 K/UL — SIGNIFICANT CHANGE UP (ref 150–400)
POTASSIUM SERPL-MCNC: 4.8 MMOL/L — SIGNIFICANT CHANGE UP (ref 3.5–5.3)
POTASSIUM SERPL-SCNC: 4.8 MMOL/L — SIGNIFICANT CHANGE UP (ref 3.5–5.3)
RBC # BLD: 3.33 M/UL — LOW (ref 4.2–5.8)
RBC # FLD: 20.4 % — HIGH (ref 10.3–14.5)
SODIUM SERPL-SCNC: 136 MMOL/L — SIGNIFICANT CHANGE UP (ref 135–145)
SPECIMEN SOURCE: SIGNIFICANT CHANGE UP
WBC # BLD: 8.15 K/UL — SIGNIFICANT CHANGE UP (ref 3.8–10.5)
WBC # FLD AUTO: 8.15 K/UL — SIGNIFICANT CHANGE UP (ref 3.8–10.5)

## 2019-12-31 PROCEDURE — 99233 SBSQ HOSP IP/OBS HIGH 50: CPT

## 2019-12-31 PROCEDURE — 99232 SBSQ HOSP IP/OBS MODERATE 35: CPT

## 2019-12-31 PROCEDURE — 99152 MOD SED SAME PHYS/QHP 5/>YRS: CPT

## 2019-12-31 PROCEDURE — 99024 POSTOP FOLLOW-UP VISIT: CPT

## 2019-12-31 PROCEDURE — 93458 L HRT ARTERY/VENTRICLE ANGIO: CPT | Mod: 26

## 2019-12-31 PROCEDURE — 90937 HEMODIALYSIS REPEATED EVAL: CPT

## 2019-12-31 RX ORDER — INSULIN GLARGINE 100 [IU]/ML
10 INJECTION, SOLUTION SUBCUTANEOUS AT BEDTIME
Refills: 0 | Status: DISCONTINUED | OUTPATIENT
Start: 2019-12-31 | End: 2020-01-04

## 2019-12-31 RX ADMIN — Medication 1000 UNIT(S): at 20:39

## 2019-12-31 RX ADMIN — ISOSORBIDE MONONITRATE 30 MILLIGRAM(S): 60 TABLET, EXTENDED RELEASE ORAL at 12:20

## 2019-12-31 RX ADMIN — HEPARIN SODIUM 5000 UNIT(S): 5000 INJECTION INTRAVENOUS; SUBCUTANEOUS at 20:39

## 2019-12-31 RX ADMIN — CHLORHEXIDINE GLUCONATE 1 APPLICATION(S): 213 SOLUTION TOPICAL at 06:03

## 2019-12-31 RX ADMIN — CARVEDILOL PHOSPHATE 25 MILLIGRAM(S): 80 CAPSULE, EXTENDED RELEASE ORAL at 06:06

## 2019-12-31 RX ADMIN — ATORVASTATIN CALCIUM 10 MILLIGRAM(S): 80 TABLET, FILM COATED ORAL at 21:16

## 2019-12-31 RX ADMIN — Medication 60 MEQ/KG/HR: at 21:59

## 2019-12-31 RX ADMIN — Medication 650 MILLIGRAM(S): at 06:06

## 2019-12-31 RX ADMIN — Medication 4 MILLIGRAM(S): at 21:16

## 2019-12-31 RX ADMIN — Medication 325 MILLIGRAM(S): at 11:54

## 2019-12-31 RX ADMIN — HEPARIN SODIUM 5000 UNIT(S): 5000 INJECTION INTRAVENOUS; SUBCUTANEOUS at 06:06

## 2019-12-31 RX ADMIN — Medication 50 MILLIGRAM(S): at 21:16

## 2019-12-31 RX ADMIN — Medication 10 UNIT(S): at 12:19

## 2019-12-31 RX ADMIN — CEFTRIAXONE 100 MILLIGRAM(S): 500 INJECTION, POWDER, FOR SOLUTION INTRAMUSCULAR; INTRAVENOUS at 21:16

## 2019-12-31 RX ADMIN — Medication 50 MILLIGRAM(S): at 06:06

## 2019-12-31 RX ADMIN — Medication 325 MILLIGRAM(S): at 20:39

## 2019-12-31 RX ADMIN — AMLODIPINE BESYLATE 10 MILLIGRAM(S): 2.5 TABLET ORAL at 06:06

## 2019-12-31 RX ADMIN — Medication 1: at 12:18

## 2019-12-31 RX ADMIN — INSULIN GLARGINE 10 UNIT(S): 100 INJECTION, SOLUTION SUBCUTANEOUS at 21:16

## 2019-12-31 RX ADMIN — Medication 650 MILLIGRAM(S): at 20:39

## 2019-12-31 NOTE — PROGRESS NOTE ADULT - SUBJECTIVE AND OBJECTIVE BOX
Vascular surgery follow up    brief PA note    Shiley catheter placed yesterday.  1st HD session successful yesterday.    repeat HD planned for today    Patient is also reportedly awaiting coronary catheterization    - Bilateral upper extremity vein mapping ordered for planning for future av-fistula    - Will continue to follow patient, reevaluate post catheterization

## 2019-12-31 NOTE — PROGRESS NOTE ADULT - ASSESSMENT
Pt is a 80 y/o male with medical history of DM, HTN, CKD, BPH, pAfib with no AC, who presents to [facility]-ED for c/o SOB. Pt is not a good historian d/t possible dementia, wife at bedside. Pt wife noticed pt has increased SOB even after discharge from [facility]. Pt also started to have frequent chills, but no known fever. Pt family further assess that "he is not himself", not as energetic and responsive to family. Pt was brought to [facility]-ED and was found to be hypothermic at 91.2, HR @ 53 SB, BP @ 139/63. On previous admission, pt had similar symptoms and was advised to be evaluated for AVR as outpatient. Pt Denies fever, cough, phlegm production,  PND, edema, chest pain, pressure, palpitations, irregular, fast heart beat, nausea, vomiting, melena, rectal bleed, hematuria, lightheadedness, dizziness, syncope, near syncope.   As of 12/26/19, pt in no resp. distress, chest tube draining straw colored drainage. Pt insertion site dressing saturated with blood no signs of subcutaneous emphysema. Pt wheezing on inspiration with bilateral rales when auscultated.              1. Aortic Stenosis with SOB  - Pleural effusion s/p drainage->   - Unclear trigger: Likely uremia/pleuritis. vs. insufficient diuresis.   - now s/p C non obstructive CAD   - Outpatient TAVR w/u  - congestive heart failure  diuresis. ultrafiltration and hemodialysis as difficult to maintain volume and uremia.     2. HTN  -Continue current antihypertensive medication regimen     3. CKD  - Plan as per Nephrology   - s/p shiley line placement and first HD  - continue HD post cath

## 2019-12-31 NOTE — PROGRESS NOTE ADULT - ASSESSMENT
Patient was seen and evaluated on dialysis.   Patient is tolerating the procedure well.   T(C): 37.1 (12-31-19 @ 00:37), Max: 37.1 (12-31-19 @ 00:37)  HR: 66 (12-31-19 @ 05:00) (58 - 66)  BP: 137/52 (12-31-19 @ 05:00) (122/57 - 141/65)  Continue dialysis:   Dialyzer:  Revaclear 300        QB: 300ml/min       QD: 400ml/min  Goal UF 0.5-1.0 L over 150 mins

## 2019-12-31 NOTE — PROGRESS NOTE ADULT - ASSESSMENT
1. Right pleural effusion /acute CHF exacerbation  chest tube in place ,to be removed today .  had output of 60 ml.  analysis pending .  CXR shows 10% PTX .    2. ADI on CKD stage 4   status post first dialysis yesterday , will undergo dialysis again after cath today .  nephrology is on board     3. questionable UTI   on  ceftriaxone, urine culture negative .  will finish course for 5 days     4. Hyperkalemia   resolved .     5. DM   blood sugars elevated , added humalog       6. Anemia  likely AOCD .  will monitor.  on iron and epogen     7. Severe AS   cath showed non obstructive CAD .  further work up for TAVR to be done outpatient     8. DLP  statin     9. HTN   continue with current regimen .    10. DVT prophylaxis  heparin .    no family members at bedside .

## 2019-12-31 NOTE — PROGRESS NOTE ADULT - SUBJECTIVE AND OBJECTIVE BOX
Cardiology Nurse Practitioner Note    79 year old male p/w SOB, acute on chronic diastolic CHF, right pleural effusion s/p pigtail placement, ESRD, HD cath placed and had 1 round of HD and known AS for cardiac cath as work up for TAVR.    VSS  Neuro: Awake, A&O X3  Lungs: right pigtail cath with crackles at base, left side very diminished   CV: RRR + murmur  Ext: + palp pulse X4    ASA 3  Mallampati 2  Bleeding risk 9.5%    -ASA given

## 2019-12-31 NOTE — PROGRESS NOTE ADULT - ASSESSMENT
Patient tolerated hemodialysis as ordered, no s/s or c/o pain observed at this time.     Patient maintains clear and patent access to right non tunneled catheter .     Report given to primary nurse, patient safety and comfort maintained.    HD In AM,

## 2019-12-31 NOTE — CHART NOTE - NSCHARTNOTEFT_GEN_A_CORE
went to d/c right chest tube- patient currently in HD   will obtain AM cxr (possible small PTX on last cxr) and most likely pull tomorrow if no air leak/ no worsening ptx   d/w team   to reeval in AM

## 2019-12-31 NOTE — PROGRESS NOTE ADULT - SUBJECTIVE AND OBJECTIVE BOX
[place] [facility]                          [address] [place] [address]                       Phone: [phone]. Fax:[phone]                      ________________________________________________           Reason for follow up/Overnight events: cath this AM     HPI:  The patient is a 79 year old male with a history of CKD stage 4, hypertension, chronic diastolic CHF, bph and moderate-severe AS who presented to the ER with complaints of difficulty breathing. According to the patient and his wife at bedside he was admitted to [facility] on 12/10 for acute hypoxic respiratory failure secondary to acute on chronic diastolic CHF, hypertensive urgency and UTI. He was discharged home on PO diuretics. Since being home he initially felt better however his wife about a week ago he started to have symptoms of orthopnea and dyspnea at rest with worsening lower extremity edema. A house physician came to evaluate him over the weekend and increased the dose of lasix from 40mg PO OD to BID with some improvement. However, last night he suddenly felt like he could not breath and was brought to the ER. In the ED, improved with a dose of IV lasix 40mg x 1. Noted to be hypothermic, rectal temperature of 91.3 with a heart rate of 53. Started on a bear hugger with improvement to 92.3. Lactate was negative, no leukocytosis> Chest xray with pulmonary edema and elevated BNP. Given a dose of empiric rocephin x 1. (25 Dec 2019 14:46)    ROS: All review of systems negative unless indicated otherwise below.                          LAB RESULTS                   COMPLETE BLOOD COUNT( 30 Dec 2019 08:59 )                            8.4 g/dL<L>  7.49 K/uL )---------------( 184 K/uL                        27.2 %<L>      Automated Differential     Auto Basophil # - X      Auto Basophil % - X      Auto Eosinophil # - X      Auto Eosinophil % - X      Auto Immature Granulocyte # - X      Auto Immature Granulocyte % - X      Auto Lymphocyte # - X      Auto Lymphocyte % - X      Auto Monocyte # - X      Auto Monocyte % - X      Auto Neutrophil # - X      Auto Neutrophil % - X                                      CHEMISTRY                 Basic Metabolic Panel (19 @ 08:59)    134<L>  |  96<L>  |  93.0<H>  ----------------------------<  197<H>  5.6<H>   |  23.0  |  3.76<H>    Ca    8.2<L>      30 Dec 2019 08:59                    Liver Functions (19 @ 08:59))  TPro  6.0  /  Alb  3.2  /  TBili  <0.2  /  DBili  x   /  AST  20  /  ALT  18  /  AlkPhos  71     PT/INR/PTT ( 10 Dec 2019 00:15 )                        :                       :      13.2         :       31.6                  .        .                   .              .           .       1.14        .                                                                   Cardiac Enzymes   ( 27 Dec 2019 19:25 )  Troponin T  0.03 ,  CPK  X    , CKMB  X    , BNP X        , ( 27 Dec 2019 10:26 )  Troponin T  0.02 ,  CPK  X    , CKMB  X    , BNP X        , ( 27 Dec 2019 03:58 )  Troponin T  0.02 ,  CPK  X    , CKMB  X    , BNP X                              MICROBIOLOGY/CULTURES:    Culture - Blood (collected 19 @ 11:58)  Source: .Blood  Preliminary Report (19 @ 13:00):    No growth at 48 hours    Culture - Blood (collected 19 @ 11:58)  Source: .Blood  Preliminary Report (19 @ 13:00):    No growth at 48 hours    Culture - Urine (collected 19 @ 10:24)  Source: .Urine  Final Report (19 @ 09:25):    <10,000 CFU/ml Gram Positive Cocci in Clusters    Culture - Urine (collected 19 @ 12:12)  Source: .Urine  Final Report (19 @ 16:22):    No growth    Rapid RVP Result: NotDetec (19 @ 21:50)                          CARDIOLOGY RESULTS: Official Report/Preliminary Verbal Reports    ECHO:   < from: TTE Echo Complete w/Doppler (12.10.19 @ 13:21) >  Summary:   1. Technically difficult study.   2. Normal global left ventricular systolic function.   3. Left ventricular ejection fraction, by visual estimation, is 60 to   65%.   4. Spectral Doppler shows pseudonormal pattern of left ventricular   myocardial filling (Grade II diastolic dysfunction).   5. Elevated mean left atrial pressure. Mean LAP estimated at 22 mmHg.   6. Mild mitral annular calcification.   7. Mild thickening and calcification of the anterior and posterior   mitralvalve leaflets.   8. Mild mitral valve regurgitation.   9. Mild aortic regurgitation.  10. Moderate to severe aortic valve stenosis. Recommend SCAR for further   evaluation.  11. Moderate pleural effusion in both left and right lateral regions.  12. There is no evidence of pericardial effusion.    MD Danya Electronically signed on 12/10/2019 at 4:34:42 PM    < end of copied text >    CATH:   19 - PROCEDURE RESULTS: S/P LHC which revealed non obs CAD per verbal report, full report to follow                               DAILY WEIGHTS - 48 HOUR TREND     Daily Weight in k.7 (31 Dec 2019 05:00), Weight in k (30 Dec 2019 19:25)                             INTAKE AND OUTPUT - 48 HOUR TREND     19 @ 07:01  -  19 @ 07:00  --------------------------------------------------------  IN:  Total IN: 0 mL    OUT:    Chest Tube: 700 mL    Voided: 300 mL  Total OUT: 1000 mL    Total NET: -1000 mL      19 @ 07:01  -  19 @ 07:00  --------------------------------------------------------  IN:  Total IN: 0 mL    OUT:    Chest Tube: 60 mL    Other: 500 mL    Voided: 900 mL  Total OUT: 1460 mL    Total NET: -1460 mL    HOME MEDICATIONS:  amLODIPine 10 mg oral tablet: 1 tab(s) orally once a day (10 Dec 2019 02:16)  furosemide 40 mg oral tablet: 1 tab(s) orally once a day on weekdays  and 1 tablet bid on week ends   (16 Dec 2019 17:42)  hydrALAZINE 50 mg oral tablet: 1 tab(s) orally 3 times a day (16 Dec 2019 11:30)  insulin lispro 100 units/mL injectable solution: 3 times a day before meals    2 Unit(s) if Glucose 151 - 200  4 Unit(s) if Glucose 201 - 250  6 Unit(s) if Glucose 251 - 300  8 Unit(s) if Glucose 301 - 350  10 Unit(s) if Glucose 351 - 400  12 Unit(s) if Glucose Greater Than 400 (16 Dec 2019 11:30)  pravastatin 40 mg oral tablet: 1 tab(s) orally once a day (10 Dec 2019 02:16)  sodium bicarbonate 650 mg oral tablet: 1 tab(s) orally 2 times a day (10 Dec 2019 02:16)                             Current Admission Active Medications    acetaminophen   Tablet .. 650 milliGRAM(s) Oral every 6 hours PRN Temp greater or equal to 38C (100.4F), Mild Pain (1 - 3)  amLODIPine   Tablet 10 milliGRAM(s) Oral daily  aspirin 325 milliGRAM(s) Oral daily  atorvastatin 10 milliGRAM(s) Oral at bedtime  carvedilol 25 milliGRAM(s) Oral every 12 hours  cefTRIAXone   IVPB 1000 milliGRAM(s) IV Intermittent every 24 hours  cholecalciferol 1000 Unit(s) Oral daily  dextrose 40% Gel 15 Gram(s) Oral once PRN Blood Glucose LESS THAN 70 milliGRAM(s)/deciliter  dextrose 5%. 1000 milliLiter(s) (50 mL/Hr) IV Continuous <Continuous>  dextrose 50% Injectable 12.5 Gram(s) IV Push once  dextrose 50% Injectable 25 Gram(s) IV Push once  dextrose 50% Injectable 25 Gram(s) IV Push once  doxazosin 4 milliGRAM(s) Oral at bedtime  epoetin nereida Injectable 22463 Unit(s) SubCutaneous <User Schedule>  ferrous    sulfate 325 milliGRAM(s) Oral daily  glucagon  Injectable 1 milliGRAM(s) IntraMuscular once PRN Glucose LESS THAN 70 milligrams/deciliter  heparin  Injectable 5000 Unit(s) SubCutaneous every 12 hours  hydrALAZINE 50 milliGRAM(s) Oral three times a day  insulin lispro (HumaLOG) corrective regimen sliding scale   SubCutaneous three times a day before meals  insulin lispro Injectable (HumaLOG) 10 Unit(s) SubCutaneous three times a day before meals  isosorbide   mononitrate ER Tablet (IMDUR) 30 milliGRAM(s) Oral daily  saccharomyces boulardii 250 milliGRAM(s) Oral two times a day  sodium bicarbonate 650 milliGRAM(s) Oral two times a day  sodium bicarbonate  Infusion 0.055 mEq/kG/Hr (60 mL/Hr) IV Continuous <Continuous>                        PHYSICAL EXAM:    Vital Signs Last 24 Hrs  T(C): 36.7 (30 Dec 2019 00:20), Max: 36.7 (30 Dec 2019 00:20)  T(F): 98.1 (30 Dec 2019 00:20), Max: 98.1 (30 Dec 2019 00:20)  HR: 58 (30 Dec 2019 06:30) (54 - 58)  BP: 127/54 (30 Dec 2019 06:30) (116/60 - 131/66)  BP(mean): --  RR: 18 (30 Dec 2019 00:20) (18 - 18)  SpO2: 92% (30 Dec 2019 00:20) (91% - 92%)    GENERAL: NAD  NECK: Supple, No JVD  NERVOUS SYSTEM:  Alert & Oriented X3, non focal neuro exam.   CHEST/LUNG: diminished lungs, RLL crackles. right pigtail in place.   HEART: Regular rate and rhythm; s1 and s2 auscultated, No murmurs, rubs, or gallops  ABDOMEN: Soft, Nontender, Nondistended; Bowel sounds present and normoactive.   EXTREMITIES:  2+ Peripheral Pulses, No clubbing, cyanosis, or edema

## 2019-12-31 NOTE — PROGRESS NOTE ADULT - ASSESSMENT
12/25 Pt admitted with SOB found to have acute on chronic heart failure exacerbation complicated by pleural effusions and acute on chronic kidney failure now pending HD. Patient also awaiting [facility].   -Outpatient follow up for TAVR work up.   -Thoracic surgery consult: Pleural effusions-Rt PTC placed 12/25 (1L drainage), plan to d/c today as minimal drainage 12/25 Pt admitted with SOB found to have acute on chronic heart failure exacerbation complicated by pleural effusions and acute on chronic kidney failure now pending HD. Patient also awaiting [facility].   -Outpatient follow up for TAVR work up.   -Thoracic surgery consult: Pleural effusions-Rt PTC placed 12/25 (1L drainage), plan to d/c today as minimal drainage  once patient returns from cath lab

## 2019-12-31 NOTE — PROGRESS NOTE ADULT - SUBJECTIVE AND OBJECTIVE BOX
[facility] DIVISION OF KIDNEY DISEASES AND HYPERTENSION -- HEMODIALYSIS NOTE  --------------------------------------------------------------------------------  Chief Complaint: ESRD/Ongoing hemodialysis requirement    24 hour events/subjective:    Shiley catheter placed yesterday.  1st HD session successful yesterday.  Repeat HD planned for today  Patient is also reportedly awaiting coronary catheterization  Bilateral upper extremity vein mapping ordered for planning for future av-fistula    PAST HISTORY  --------------------------------------------------------------------------------  No significant changes to PMH, PSH, FHx, SHx, unless otherwise noted    ALLERGIES & MEDICATIONS  --------------------------------------------------------------------------------  Allergies  No Known Allergies      Standing Inpatient Medications  amLODIPine   Tablet 10 milliGRAM(s) Oral daily  aspirin 325 milliGRAM(s) Oral daily  atorvastatin 10 milliGRAM(s) Oral at bedtime  carvedilol 25 milliGRAM(s) Oral every 12 hours  cefTRIAXone   IVPB 1000 milliGRAM(s) IV Intermittent every 24 hours  chlorhexidine 4% Liquid 1 Application(s) Topical <User Schedule>  cholecalciferol 1000 Unit(s) Oral daily  dextrose 5%. 1000 milliLiter(s) IV Continuous <Continuous>  dextrose 50% Injectable 12.5 Gram(s) IV Push once  dextrose 50% Injectable 25 Gram(s) IV Push once  dextrose 50% Injectable 25 Gram(s) IV Push once  doxazosin 4 milliGRAM(s) Oral at bedtime  epoetin nereida Injectable 21024 Unit(s) SubCutaneous <User Schedule>  ferrous    sulfate 325 milliGRAM(s) Oral daily  heparin  Injectable 5000 Unit(s) SubCutaneous every 12 hours  hydrALAZINE 50 milliGRAM(s) Oral three times a day  insulin glargine Injectable (LANTUS) 10 Unit(s) SubCutaneous at bedtime  insulin lispro (HumaLOG) corrective regimen sliding scale   SubCutaneous three times a day before meals  insulin lispro Injectable (HumaLOG) 10 Unit(s) SubCutaneous three times a day before meals  isosorbide   mononitrate ER Tablet (IMDUR) 30 milliGRAM(s) Oral daily  sodium bicarbonate 650 milliGRAM(s) Oral two times a day  sodium bicarbonate  Infusion 0.055 mEq/kG/Hr IV Continuous <Continuous>    PRN Inpatient Medications  acetaminophen   Tablet .. 650 milliGRAM(s) Oral every 6 hours PRN  dextrose 40% Gel 15 Gram(s) Oral once PRN  glucagon  Injectable 1 milliGRAM(s) IntraMuscular once PRN  sodium chloride 0.9% lock flush 10 milliLiter(s) IV Push every 1 hour PRN      REVIEW OF SYSTEMS  --------------------------------------------------------------------------------  Gen: No weight changes, fatigue, fevers/chills, weakness  Skin: No rashes  Head/Eyes/Ears/Mouth: No headache; Normal hearing; Normal vision w/o blurriness  Respiratory: No dyspnea, cough, wheezing, hemoptysis  CV: No chest pain, PND, orthopnea  GI: No abdominal pain, diarrhea, constipation, nausea, vomiting, melena, hematochezia  : No increased frequency, dysuria, hematuria, nocturia  MSK: No joint pain/swelling; no back pain; no edema  Neuro: No dizziness/lightheadedness, weakness, seizures, numbness, tingling  Heme: No easy bruising or bleeding  Endo: No heat/cold intolerance  Psych: No significant nervousness, anxiety, stress, depression    All other systems were reviewed and are negative, except as noted.    VITALS/PHYSICAL EXAM  --------------------------------------------------------------------------------  T(C): 37.1 (12-31-19 @ 00:37), Max: 37.1 (12-31-19 @ 00:37)  HR: 66 (12-31-19 @ 05:00) (58 - 66)  BP: 137/52 (12-31-19 @ 05:00) (122/57 - 141/65)  RR: 18 (12-31-19 @ 00:37) (18 - 18)  SpO2: 98% (12-31-19 @ 00:37) (91% - 98%)      Weight (kg): 79.7 (12-31-19 @ 05:00)      12-30-19 @ 07:01  -  12-31-19 @ 07:00  --------------------------------------------------------  IN: 0 mL / OUT: 1460 mL / NET: -1460 mL      Physical Exam:  	Gen: NAD, well-appearing  	HEENT: supple neck  	Pulm: CTA B/L  	CV: RRR, S1S2; no rub  	Back: No spinal or CVA tenderness;   	Abd: +BS, soft, nontender/nondistended  	: No suprapubic tenderness  	UE: Warm, no clubbing, no edema; no asterixis  	LE: Warm, no clubbing, no edema  	Neuro: No focal deficits  	Psych: Normal affect and mood  	Skin: Warm, without rashes  	Vascular access: Right IJ non tunneled dialysis catheter    LABS/STUDIES  --------------------------------------------------------------------------------              8.2    8.15  >-----------<  168      [12-31-19 @ 08:11]              27.0     134  |  96  |  93.0  ----------------------------<  197      [12-30-19 @ 08:59]  5.6   |  23.0  |  3.76        Ca     8.2     [12-30-19 @ 08:59]    TPro  6.0  /  Alb  3.2  /  TBili  <0.2  /  DBili  x   /  AST  20  /  ALT  18  /  AlkPhos  71  [12-30-19 @ 08:59]      Iron 25, TIBC 285, %sat 9      [12-11-19 @ 17:50]  Ferritin 25      [12-11-19 @ 17:50]  PTH -- (Ca 8.3)      [12-12-19 @ 19:36]   164  Vitamin D (25OH) 20.3      [12-12-19 @ 19:36]  HbA1c 8.1      [12-11-19 @ 06:47]  TSH 6.54      [12-25-19 @ 21:00] [facility] DIVISION OF KIDNEY DISEASES AND HYPERTENSION -- HEMODIALYSIS NOTE  --------------------------------------------------------------------------------  Chief Complaint: ESRD/Ongoing hemodialysis requirement    24 hour events/subjective:    Shiley catheter placed yesterday.  1st HD session successful yesterday.      PAST HISTORY  --------------------------------------------------------------------------------  No significant changes to PMH, PSH, FHx, SHx, unless otherwise noted    ALLERGIES & MEDICATIONS  --------------------------------------------------------------------------------  Allergies  No Known Allergies      Standing Inpatient Medications  amLODIPine   Tablet 10 milliGRAM(s) Oral daily  aspirin 325 milliGRAM(s) Oral daily  atorvastatin 10 milliGRAM(s) Oral at bedtime  carvedilol 25 milliGRAM(s) Oral every 12 hours  cefTRIAXone   IVPB 1000 milliGRAM(s) IV Intermittent every 24 hours  chlorhexidine 4% Liquid 1 Application(s) Topical <User Schedule>  cholecalciferol 1000 Unit(s) Oral daily  dextrose 5%. 1000 milliLiter(s) IV Continuous <Continuous>  dextrose 50% Injectable 12.5 Gram(s) IV Push once  dextrose 50% Injectable 25 Gram(s) IV Push once  dextrose 50% Injectable 25 Gram(s) IV Push once  doxazosin 4 milliGRAM(s) Oral at bedtime  epoetin nereida Injectable 48336 Unit(s) SubCutaneous <User Schedule>  ferrous    sulfate 325 milliGRAM(s) Oral daily  heparin  Injectable 5000 Unit(s) SubCutaneous every 12 hours  hydrALAZINE 50 milliGRAM(s) Oral three times a day  insulin glargine Injectable (LANTUS) 10 Unit(s) SubCutaneous at bedtime  insulin lispro (HumaLOG) corrective regimen sliding scale   SubCutaneous three times a day before meals  insulin lispro Injectable (HumaLOG) 10 Unit(s) SubCutaneous three times a day before meals  isosorbide   mononitrate ER Tablet (IMDUR) 30 milliGRAM(s) Oral daily  sodium bicarbonate 650 milliGRAM(s) Oral two times a day  sodium bicarbonate  Infusion 0.055 mEq/kG/Hr IV Continuous <Continuous>    PRN Inpatient Medications  acetaminophen   Tablet .. 650 milliGRAM(s) Oral every 6 hours PRN  dextrose 40% Gel 15 Gram(s) Oral once PRN  glucagon  Injectable 1 milliGRAM(s) IntraMuscular once PRN  sodium chloride 0.9% lock flush 10 milliLiter(s) IV Push every 1 hour PRN      REVIEW OF SYSTEMS  --------------------------------------------------------------------------------  Gen: No weight changes, fatigue, fevers/chills, weakness  Skin: No rashes  Head/Eyes/Ears/Mouth: No headache; Normal hearing; Normal vision w/o blurriness  Respiratory: No dyspnea, cough, wheezing, hemoptysis  CV: No chest pain, PND, orthopnea  GI: No abdominal pain, diarrhea, constipation, nausea, vomiting, melena, hematochezia  : No increased frequency, dysuria, hematuria, nocturia  MSK: No joint pain/swelling; no back pain; no edema  Neuro: No dizziness/lightheadedness, weakness, seizures, numbness, tingling  Heme: No easy bruising or bleeding  Endo: No heat/cold intolerance  Psych: No significant nervousness, anxiety, stress, depression    All other systems were reviewed and are negative, except as noted.    VITALS/PHYSICAL EXAM  --------------------------------------------------------------------------------  T(C): 37.1 (12-31-19 @ 00:37), Max: 37.1 (12-31-19 @ 00:37)  HR: 66 (12-31-19 @ 05:00) (58 - 66)  BP: 137/52 (12-31-19 @ 05:00) (122/57 - 141/65)  RR: 18 (12-31-19 @ 00:37) (18 - 18)  SpO2: 98% (12-31-19 @ 00:37) (91% - 98%)      Weight (kg): 79.7 (12-31-19 @ 05:00)      12-30-19 @ 07:01  -  12-31-19 @ 07:00  --------------------------------------------------------  IN: 0 mL / OUT: 1460 mL / NET: -1460 mL      Physical Exam:  	Gen: NAD, well-appearing  	HEENT: supple neck  	Pulm: CTA B/L  	CV: RRR, S1S2; no rub  	Back: No spinal or CVA tenderness;   	Abd: +BS, soft, nontender/nondistended  	: No suprapubic tenderness  	UE: Warm, no clubbing, no edema; no asterixis  	LE: Warm, no clubbing, no edema  	Neuro: No focal deficits  	Psych: Normal affect and mood  	Skin: Warm, without rashes  	Vascular access: Right IJ non tunneled dialysis catheter    LABS/STUDIES  --------------------------------------------------------------------------------              8.2    8.15  >-----------<  168      [12-31-19 @ 08:11]              27.0     134  |  96  |  93.0  ----------------------------<  197      [12-30-19 @ 08:59]  5.6   |  23.0  |  3.76        Ca     8.2     [12-30-19 @ 08:59]    TPro  6.0  /  Alb  3.2  /  TBili  <0.2  /  DBili  x   /  AST  20  /  ALT  18  /  AlkPhos  71  [12-30-19 @ 08:59]      Iron 25, TIBC 285, %sat 9      [12-11-19 @ 17:50]  Ferritin 25      [12-11-19 @ 17:50]  PTH -- (Ca 8.3)      [12-12-19 @ 19:36]   164  Vitamin D (25OH) 20.3      [12-12-19 @ 19:36]  HbA1c 8.1      [12-11-19 @ 06:47]  TSH 6.54      [12-25-19 @ 21:00]

## 2019-12-31 NOTE — PROGRESS NOTE ADULT - PROBLEM SELECTOR PLAN 3
12/25 Rt PTC (Initial drainage 1L-Transudative)  Plan to D/C today, follow up post removal CXR  ISS/cough and deep breathing exercise  Pending fluid analysis results

## 2019-12-31 NOTE — PROGRESS NOTE ADULT - SUBJECTIVE AND OBJECTIVE BOX
[facility] DIVISION OF KIDNEY DISEASES AND HYPERTENSION -- HEMODIALYSIS NOTE  --------------------------------------------------------------------------------  Chief Complaint: ESRD/Ongoing hemodialysis requirement    24 hour events/subjective: S/ P C. D/W Dr. Faustin,    PAST HISTORY  --------------------------------------------------------------------------------  No significant changes to PMH, PSH, FHx, SHx, unless otherwise noted    ALLERGIES & MEDICATIONS  --------------------------------------------------------------------------------  Allergies    No Known Allergies    Standing Inpatient Medications  amLODIPine   Tablet 10 milliGRAM(s) Oral daily  aspirin 325 milliGRAM(s) Oral daily  atorvastatin 10 milliGRAM(s) Oral at bedtime  carvedilol 25 milliGRAM(s) Oral every 12 hours  cefTRIAXone   IVPB 1000 milliGRAM(s) IV Intermittent every 24 hours  chlorhexidine 4% Liquid 1 Application(s) Topical <User Schedule>  cholecalciferol 1000 Unit(s) Oral daily  dextrose 5%. 1000 milliLiter(s) IV Continuous <Continuous>  dextrose 50% Injectable 12.5 Gram(s) IV Push once  dextrose 50% Injectable 25 Gram(s) IV Push once  dextrose 50% Injectable 25 Gram(s) IV Push once  doxazosin 4 milliGRAM(s) Oral at bedtime  epoetin nereida Injectable 87134 Unit(s) SubCutaneous <User Schedule>  ferrous    sulfate 325 milliGRAM(s) Oral daily  heparin  Injectable 5000 Unit(s) SubCutaneous every 12 hours  hydrALAZINE 50 milliGRAM(s) Oral three times a day  insulin glargine Injectable (LANTUS) 10 Unit(s) SubCutaneous at bedtime  insulin lispro (HumaLOG) corrective regimen sliding scale   SubCutaneous three times a day before meals  insulin lispro Injectable (HumaLOG) 10 Unit(s) SubCutaneous three times a day before meals  isosorbide   mononitrate ER Tablet (IMDUR) 30 milliGRAM(s) Oral daily  sodium bicarbonate 650 milliGRAM(s) Oral two times a day  sodium bicarbonate  Infusion 0.055 mEq/kG/Hr IV Continuous <Continuous>    PRN Inpatient Medications  acetaminophen   Tablet .. 650 milliGRAM(s) Oral every 6 hours PRN  dextrose 40% Gel 15 Gram(s) Oral once PRN  glucagon  Injectable 1 milliGRAM(s) IntraMuscular once PRN  sodium chloride 0.9% lock flush 10 milliLiter(s) IV Push every 1 hour PRN    REVIEW OF SYSTEMS  --------------------------------------------------------------------------------  Gen: No weight changes, fatigue, fevers/chills, weakness  Skin: No rashes  Head/Eyes/Ears/Mouth: No headache; Normal hearing; Normal vision w/o blurriness; No sinus pain/discomfort, sore throat  Respiratory: No dyspnea, cough, wheezing, hemoptysis  CV: No chest pain, PND, orthopnea  GI: No abdominal pain, diarrhea, constipation, nausea, vomiting, melena, hematochezia  : No increased frequency, dysuria, hematuria, nocturia  MSK: No joint pain/swelling; no back pain; no edema  Neuro: No dizziness/lightheadedness, weakness, seizures, numbness, tingling  Heme: No easy bruising or bleeding  Endo: No heat/cold intolerance  Psych: No significant nervousness, anxiety, stress, depression    All other systems were reviewed and are negative, except as noted.    VITALS/PHYSICAL EXAM  --------------------------------------------------------------------------------  T(C): 36.8 (01-01-20 @ 00:49), Max: 37 (12-31-19 @ 10:10)  HR: 54 (01-01-20 @ 05:09) (54 - 67)  BP: 141/64 (01-01-20 @ 05:09) (114/57 - 152/67)  RR: 18 (01-01-20 @ 00:49) (16 - 19)  SpO2: 97% (01-01-20 @ 00:49) (91% - 98%)    Weight (kg): 78.4 (01-01-20 @ 05:09)    12-30-19 @ 07:01  -  12-31-19 @ 07:00  --------------------------------------------------------  IN: 0 mL / OUT: 1460 mL / NET: -1460 mL    12-31-19 @ 07:01  -  01-01-20 @ 05:19  --------------------------------------------------------  IN: 0 mL / OUT: 1880 mL / NET: -1880 mL    Physical Exam:  	Gen: NAD, well-appearing ,  Pale,  	HEENT: PERRL, supple neck,   	Pulm: CTA B/L  	CV: RRR, S1S2; no rub  	Abd: +BS, soft, nontender/nondistended  	: No suprapubic tenderness  	UE: Warm, FROM, no clubbing, intact strength; no edema; no asterixis  	LE: Warm, FROM, no clubbing, intact strength; no edema  	Neuro: No focal deficits,   	Psych: Normal affect and mood  	Skin: Warm, without rashes  	Vascular access: NTDC,     LABS/STUDIES  --------------------------------------------------------------------------------              8.2    8.15  >-----------<  168      [12-31-19 @ 08:11]              27.0     136  |  98  |  62.0  ----------------------------<  211      [12-31-19 @ 08:11]  4.8   |  23.0  |  3.17        Ca     7.6     [12-31-19 @ 08:11]    TPro  6.0  /  Alb  3.2  /  TBili  <0.2  /  DBili  x   /  AST  20  /  ALT  18  /  AlkPhos  71  [12-30-19 @ 08:59]    Iron 25, TIBC 285, %sat 9      [12-11-19 @ 17:50]  Ferritin 25      [12-11-19 @ 17:50]  PTH - P  (Ca++ 8.3)      [12-12-19 @ 19:36]   164  Vitamin D (25OH) 20.3      [12-12-19 @ 19:36]  HbA1c 8.1      [12-11-19 @ 06:47]  TSH 6.54      [12-25-19 @ 21:00]    HBsAb <3.0      [12-31-19 @ 08:48]  HBsAb Nonreact      [12-31-19 @ 08:48]  HBsAg Nonreact      [12-31-19 @ 08:48]  HBcAb Nonreact      [12-31-19 @ 08:48]  HCV 0.06, Nonreact      [12-31-19 @ 08:48]

## 2019-12-31 NOTE — PROGRESS NOTE ADULT - SUBJECTIVE AND OBJECTIVE BOX
Nurse Practitioner Progress note:     INTERVAL HISTORY: Pt with known AS in process for TAVR work up    MEDICATIONS:  amLODIPine   Tablet 10 milliGRAM(s) Oral daily  carvedilol 25 milliGRAM(s) Oral every 12 hours  doxazosin 4 milliGRAM(s) Oral at bedtime  hydrALAZINE 50 milliGRAM(s) Oral three times a day  isosorbide   mononitrate ER Tablet (IMDUR) 30 milliGRAM(s) Oral daily  cefTRIAXone   IVPB 1000 milliGRAM(s) IV Intermittent every 24 hours  acetaminophen   Tablet .. 650 milliGRAM(s) Oral every 6 hours PRN  aspirin 325 milliGRAM(s) Oral daily  atorvastatin 10 milliGRAM(s) Oral at bedtime  dextrose 40% Gel 15 Gram(s) Oral once PRN  dextrose 50% Injectable 12.5 Gram(s) IV Push once  dextrose 50% Injectable 25 Gram(s) IV Push once  dextrose 50% Injectable 25 Gram(s) IV Push once  glucagon  Injectable 1 milliGRAM(s) IntraMuscular once PRN  insulin glargine Injectable (LANTUS) 10 Unit(s) SubCutaneous at bedtime  insulin lispro (HumaLOG) corrective regimen sliding scale   SubCutaneous three times a day before meals  insulin lispro Injectable (HumaLOG) 10 Unit(s) SubCutaneous three times a day before meals  chlorhexidine 4% Liquid 1 Application(s) Topical <User Schedule>  cholecalciferol 1000 Unit(s) Oral daily  dextrose 5%. 1000 milliLiter(s) IV Continuous <Continuous>  epoetin nereida Injectable 91287 Unit(s) SubCutaneous <User Schedule>  ferrous    sulfate 325 milliGRAM(s) Oral daily  heparin  Injectable 5000 Unit(s) SubCutaneous every 12 hours  sodium bicarbonate 650 milliGRAM(s) Oral two times a day  sodium bicarbonate  Infusion 0.055 mEq/kG/Hr IV Continuous <Continuous>  sodium chloride 0.9% lock flush 10 milliLiter(s) IV Push every 1 hour PRN      TELEMETRY: NSR    T(C): 37 (12-31-19 @ 10:10), Max: 37.1 (12-31-19 @ 00:37)  HR: 59 (12-31-19 @ 11:48) (57 - 66)  BP: 151/69 (12-31-19 @ 11:48) (121/56 - 151/69)  RR: 19 (12-31-19 @ 11:48) (16 - 19)  SpO2: 93% (12-31-19 @ 11:48) (91% - 98%)  Wt(kg): --    PHYSICAL EXAM:  Appearance: Normal	  Cardiovascular: Normal S1 S2, No JVD, + murmurs, No edema  Respiratory: Lungs decreased left base, right with pigtail  Psychiatry: A & O x 3, Mood & affect appropriate  Gastrointestinal:  Soft, Non-tender, + BS	  Neurologic: Non-focal, A&O X3.  No neuro deficits  Procedure Site: Right radial band in place.  Site benign.  No bleeding/hematoma/ecchymosis. + palp radial pulse    PROCEDURE RESULTS: S/P LHC which revealed non obs CAD per verbal report, full report to follow    ASSESSMENT/PLAN: 	  -Radial precautions  -d/C radial band in 1 hour  -Resume meds  -Site check in AM  -Please call with questions/concerns  -further orders per primary service

## 2019-12-31 NOTE — PROGRESS NOTE ADULT - ASSESSMENT
1. Right pleural effusion /Acute CHF exacerbation & Severe AS  s/p pigtail catheter   TAVR work up outpt    2. CRS - CKD stage 4  ( [facility] tomorrow)  Worsening uremia and hyperkalemia  Plan to initiate HD; pt consented  Vascular for HD catheter placement  HD today and tomorrow after cath    3. Volume/ HTN  BP stable; continue current regimen  UF as tolerated    4. 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

## 2019-12-31 NOTE — PROGRESS NOTE ADULT - SUBJECTIVE AND OBJECTIVE BOX
Subjective:    Vital Signs:  Vital Signs Last 24 Hrs  T(C): 36.9 (12-31-19 @ 08:15), Max: 37.1 (12-31-19 @ 00:37)  T(F): 98.4 (12-31-19 @ 08:15), Max: 98.8 (12-31-19 @ 00:37)  HR: 58 (12-31-19 @ 08:15) (58 - 66)  BP: 123/61 (12-31-19 @ 08:15) (122/57 - 141/65)  RR: 19 (12-31-19 @ 08:15) (18 - 19)  SpO2: 95% (12-31-19 @ 08:15) (91% - 98%) on (O2)    Relevant labs, radiology and Medications reviewed    Pertinent Physical Exam      12-30 @ 07:01  -  12-31 @ 07:00  --------------------------------------------------------  IN:  Total IN: 0 mL    OUT:    Chest Tube: 60 mL    Other: 500 mL    Voided: 900 mL  Total OUT: 1460 mL    Total NET: -1460 mL Subjective: Patient currently in cath lab awaiting cath.     Vital Signs:  Vital Signs Last 24 Hrs  T(C): 36.9 (12-31-19 @ 08:15), Max: 37.1 (12-31-19 @ 00:37)  T(F): 98.4 (12-31-19 @ 08:15), Max: 98.8 (12-31-19 @ 00:37)  HR: 58 (12-31-19 @ 08:15) (58 - 66)  BP: 123/61 (12-31-19 @ 08:15) (122/57 - 141/65)  RR: 19 (12-31-19 @ 08:15) (18 - 19)  SpO2: 95% (12-31-19 @ 08:15) (91% - 98%) on (O2)    Relevant labs, radiology and Medications reviewed    Pertinent Physical Exam  Pending    12-30 @ 07:01  -  12-31 @ 07:00  --------------------------------------------------------  IN:  Total IN: 0 mL    OUT:    Chest Tube: 60 mL    Other: 500 mL    Voided: 900 mL  Total OUT: 1460 mL    Total NET: -1460 mL

## 2019-12-31 NOTE — PROGRESS NOTE ADULT - SUBJECTIVE AND OBJECTIVE BOX
CC: ADI on CKD     INTERVAL HPI/OVERNIGHT EVENTS: seen and examined , had dialysis yesterday , underwent cath showed non obstructive CAD , will undergo dialysis again today . blood sugar elevated, lantus added to regimen .urine culture negative . chest tube to be removed today     REVIEW OF SYSTEMS:    CONSTITUTIONAL: No fever, weight loss, or fatigue  RESPIRATORY: No cough, wheezing, hemoptysis; No shortness of breath  CARDIOVASCULAR: No chest pain, palpitations  GASTROINTESTINAL: No abdominal or epigastric pain. No nausea, vomiting  NEUROLOGICAL: No headaches, or blurry vision       Vital Signs Last 24 Hrs  T(C): 37 (31 Dec 2019 10:10), Max: 37.1 (31 Dec 2019 00:37)  T(F): 98.6 (31 Dec 2019 10:10), Max: 98.8 (31 Dec 2019 00:37)  HR: 59 (31 Dec 2019 15:00) (56 - 66)  BP: 114/57 (31 Dec 2019 15:00) (114/57 - 152/67)  BP(mean): --  RR: 18 (31 Dec 2019 15:00) (16 - 19)  SpO2: 95% (31 Dec 2019 15:00) (91% - 98%)    PHYSICAL EXAM:    GENERAL: NAD, resting comfortable in bed , right chest tube in place   CHEST/LUNG: CTAB , no wheezing , rales, rhonchi heard   HEART: S1S2+, Regular rate and rhythm; 3/6 systolic murmur   ABDOMEN: Soft, Nontender, Nondistended; Bowel sounds present  EXTREMITIES:  no pitting edema , pulses palpated BL.        LABS:                        8.2    8.15  )-----------( 168      ( 31 Dec 2019 08:11 )             27.0     12-31    136  |  98  |  62.0<H>  ----------------------------<  211<H>  4.8   |  23.0  |  3.17<H>    Ca    7.6<L>      31 Dec 2019 08:11    TPro  6.0<L>  /  Alb  3.2<L>  /  TBili  <0.2<L>  /  DBili  x   /  AST  20  /  ALT  18  /  AlkPhos  71  12-30            MEDICATIONS  (STANDING):  amLODIPine   Tablet 10 milliGRAM(s) Oral daily  aspirin 325 milliGRAM(s) Oral daily  atorvastatin 10 milliGRAM(s) Oral at bedtime  carvedilol 25 milliGRAM(s) Oral every 12 hours  cefTRIAXone   IVPB 1000 milliGRAM(s) IV Intermittent every 24 hours  chlorhexidine 4% Liquid 1 Application(s) Topical <User Schedule>  cholecalciferol 1000 Unit(s) Oral daily  dextrose 5%. 1000 milliLiter(s) (50 mL/Hr) IV Continuous <Continuous>  dextrose 50% Injectable 12.5 Gram(s) IV Push once  dextrose 50% Injectable 25 Gram(s) IV Push once  dextrose 50% Injectable 25 Gram(s) IV Push once  doxazosin 4 milliGRAM(s) Oral at bedtime  epoetin nereida Injectable 76407 Unit(s) SubCutaneous <User Schedule>  ferrous    sulfate 325 milliGRAM(s) Oral daily  heparin  Injectable 5000 Unit(s) SubCutaneous every 12 hours  hydrALAZINE 50 milliGRAM(s) Oral three times a day  insulin glargine Injectable (LANTUS) 10 Unit(s) SubCutaneous at bedtime  insulin lispro (HumaLOG) corrective regimen sliding scale   SubCutaneous three times a day before meals  insulin lispro Injectable (HumaLOG) 10 Unit(s) SubCutaneous three times a day before meals  isosorbide   mononitrate ER Tablet (IMDUR) 30 milliGRAM(s) Oral daily  sodium bicarbonate 650 milliGRAM(s) Oral two times a day  sodium bicarbonate  Infusion 0.055 mEq/kG/Hr (60 mL/Hr) IV Continuous <Continuous>    MEDICATIONS  (PRN):  acetaminophen   Tablet .. 650 milliGRAM(s) Oral every 6 hours PRN Temp greater or equal to 38C (100.4F), Mild Pain (1 - 3)  dextrose 40% Gel 15 Gram(s) Oral once PRN Blood Glucose LESS THAN 70 milliGRAM(s)/deciliter  glucagon  Injectable 1 milliGRAM(s) IntraMuscular once PRN Glucose LESS THAN 70 milligrams/deciliter  sodium chloride 0.9% lock flush 10 milliLiter(s) IV Push every 1 hour PRN Pre/post blood products, medications, blood draw, and to maintain line patency      RADIOLOGY & ADDITIONAL TESTS:

## 2020-01-01 ENCOUNTER — APPOINTMENT (OUTPATIENT)
Dept: ELECTROPHYSIOLOGY | Facility: CLINIC | Age: 80
End: 2020-01-01
Payer: MEDICARE

## 2020-01-01 ENCOUNTER — APPOINTMENT (OUTPATIENT)
Dept: DISASTER EMERGENCY | Facility: CLINIC | Age: 80
End: 2020-01-01

## 2020-01-01 ENCOUNTER — NON-APPOINTMENT (OUTPATIENT)
Age: 80
End: 2020-01-01

## 2020-01-01 ENCOUNTER — TRANSCRIPTION ENCOUNTER (OUTPATIENT)
Age: 80
End: 2020-01-01

## 2020-01-01 ENCOUNTER — RX RENEWAL (OUTPATIENT)
Age: 80
End: 2020-01-01

## 2020-01-01 ENCOUNTER — APPOINTMENT (OUTPATIENT)
Dept: VASCULAR SURGERY | Facility: CLINIC | Age: 80
End: 2020-01-01
Payer: MEDICARE

## 2020-01-01 ENCOUNTER — OUTPATIENT (OUTPATIENT)
Dept: OUTPATIENT SERVICES | Facility: HOSPITAL | Age: 80
LOS: 1 days | Discharge: HOME | End: 2020-01-01
Payer: MEDICARE

## 2020-01-01 ENCOUNTER — INPATIENT (INPATIENT)
Facility: HOSPITAL | Age: 80
LOS: 0 days | Discharge: HOME | DRG: 252 | End: 2020-12-03
Attending: STUDENT IN AN ORGANIZED HEALTH CARE EDUCATION/TRAINING PROGRAM | Admitting: HOSPITALIST
Payer: MEDICARE

## 2020-01-01 VITALS
WEIGHT: 157 LBS | HEART RATE: 69 BPM | DIASTOLIC BLOOD PRESSURE: 69 MMHG | OXYGEN SATURATION: 95 % | TEMPERATURE: 97.6 F | HEIGHT: 64 IN | BODY MASS INDEX: 26.8 KG/M2 | SYSTOLIC BLOOD PRESSURE: 135 MMHG

## 2020-01-01 VITALS
HEART RATE: 76 BPM | RESPIRATION RATE: 20 BRPM | SYSTOLIC BLOOD PRESSURE: 158 MMHG | DIASTOLIC BLOOD PRESSURE: 80 MMHG | OXYGEN SATURATION: 100 %

## 2020-01-01 VITALS
SYSTOLIC BLOOD PRESSURE: 164 MMHG | RESPIRATION RATE: 20 BRPM | HEART RATE: 78 BPM | TEMPERATURE: 98 F | OXYGEN SATURATION: 100 % | DIASTOLIC BLOOD PRESSURE: 70 MMHG

## 2020-01-01 VITALS
DIASTOLIC BLOOD PRESSURE: 67 MMHG | HEART RATE: 64 BPM | SYSTOLIC BLOOD PRESSURE: 153 MMHG | OXYGEN SATURATION: 96 % | RESPIRATION RATE: 18 BRPM

## 2020-01-01 VITALS
RESPIRATION RATE: 16 BRPM | WEIGHT: 154.98 LBS | HEIGHT: 64 IN | TEMPERATURE: 97 F | SYSTOLIC BLOOD PRESSURE: 157 MMHG | OXYGEN SATURATION: 98 % | HEART RATE: 61 BPM | DIASTOLIC BLOOD PRESSURE: 64 MMHG

## 2020-01-01 VITALS
HEART RATE: 70 BPM | TEMPERATURE: 96.9 F | DIASTOLIC BLOOD PRESSURE: 60 MMHG | WEIGHT: 154 LBS | OXYGEN SATURATION: 98 % | SYSTOLIC BLOOD PRESSURE: 134 MMHG | BODY MASS INDEX: 26.29 KG/M2 | HEIGHT: 64 IN

## 2020-01-01 DIAGNOSIS — I77.0 ARTERIOVENOUS FISTULA, ACQUIRED: Chronic | ICD-10-CM

## 2020-01-01 DIAGNOSIS — Z86.39 PERSONAL HISTORY OF OTHER ENDOCRINE, NUTRITIONAL AND METABOLIC DISEASE: ICD-10-CM

## 2020-01-01 DIAGNOSIS — Z96.0 PRESENCE OF UROGENITAL IMPLANTS: Chronic | ICD-10-CM

## 2020-01-01 DIAGNOSIS — Z87.19 PERSONAL HISTORY OF OTHER DISEASES OF THE DIGESTIVE SYSTEM: ICD-10-CM

## 2020-01-01 DIAGNOSIS — I50.20 UNSPECIFIED SYSTOLIC (CONGESTIVE) HEART FAILURE: ICD-10-CM

## 2020-01-01 DIAGNOSIS — I48.91 UNSPECIFIED ATRIAL FIBRILLATION: ICD-10-CM

## 2020-01-01 DIAGNOSIS — M79.89 OTHER SPECIFIED SOFT TISSUE DISORDERS: ICD-10-CM

## 2020-01-01 DIAGNOSIS — Z95.9 PRESENCE OF CARDIAC AND VASCULAR IMPLANT AND GRAFT, UNSPECIFIED: Chronic | ICD-10-CM

## 2020-01-01 DIAGNOSIS — Z86.79 PERSONAL HISTORY OF OTHER DISEASES OF THE CIRCULATORY SYSTEM: ICD-10-CM

## 2020-01-01 DIAGNOSIS — Z86.59 PERSONAL HISTORY OF OTHER MENTAL AND BEHAVIORAL DISORDERS: ICD-10-CM

## 2020-01-01 LAB
A1C WITH ESTIMATED AVERAGE GLUCOSE RESULT: 6.9 % — HIGH (ref 4–5.6)
ALBUMIN SERPL ELPH-MCNC: 3.8 G/DL — SIGNIFICANT CHANGE UP (ref 3.3–5.2)
ALBUMIN SERPL ELPH-MCNC: 3.9 G/DL — SIGNIFICANT CHANGE UP (ref 3.3–5.2)
ALP SERPL-CCNC: 67 U/L — SIGNIFICANT CHANGE UP (ref 40–120)
ALP SERPL-CCNC: 68 U/L — SIGNIFICANT CHANGE UP (ref 40–120)
ALT FLD-CCNC: 10 U/L — SIGNIFICANT CHANGE UP
ALT FLD-CCNC: 9 U/L — SIGNIFICANT CHANGE UP
ANION GAP SERPL CALC-SCNC: 10 MMOL/L — SIGNIFICANT CHANGE UP (ref 5–17)
ANION GAP SERPL CALC-SCNC: 10 MMOL/L — SIGNIFICANT CHANGE UP (ref 5–17)
ANION GAP SERPL CALC-SCNC: 12 MMOL/L — SIGNIFICANT CHANGE UP (ref 5–17)
APTT BLD: 31.2 SEC — SIGNIFICANT CHANGE UP (ref 27.5–35.5)
AST SERPL-CCNC: 16 U/L — SIGNIFICANT CHANGE UP
AST SERPL-CCNC: 17 U/L — SIGNIFICANT CHANGE UP
BASOPHILS # BLD AUTO: 0.04 K/UL — SIGNIFICANT CHANGE UP (ref 0–0.2)
BASOPHILS NFR BLD AUTO: 0.5 % — SIGNIFICANT CHANGE UP (ref 0–2)
BILIRUB SERPL-MCNC: 0.3 MG/DL — LOW (ref 0.4–2)
BILIRUB SERPL-MCNC: 0.4 MG/DL — SIGNIFICANT CHANGE UP (ref 0.4–2)
BLD GP AB SCN SERPL QL: SIGNIFICANT CHANGE UP
BUN SERPL-MCNC: 28 MG/DL — HIGH (ref 8–20)
BUN SERPL-MCNC: 41 MG/DL — HIGH (ref 8–20)
BUN SERPL-MCNC: 58 MG/DL — HIGH (ref 8–20)
CALCIUM SERPL-MCNC: 8 MG/DL — LOW (ref 8.6–10.2)
CALCIUM SERPL-MCNC: 8.6 MG/DL — SIGNIFICANT CHANGE UP (ref 8.6–10.2)
CALCIUM SERPL-MCNC: 8.9 MG/DL — SIGNIFICANT CHANGE UP (ref 8.6–10.2)
CHLORIDE SERPL-SCNC: 97 MMOL/L — LOW (ref 98–107)
CHLORIDE SERPL-SCNC: 99 MMOL/L — SIGNIFICANT CHANGE UP (ref 98–107)
CHLORIDE SERPL-SCNC: 99 MMOL/L — SIGNIFICANT CHANGE UP (ref 98–107)
CO2 SERPL-SCNC: 26 MMOL/L — SIGNIFICANT CHANGE UP (ref 22–29)
CO2 SERPL-SCNC: 27 MMOL/L — SIGNIFICANT CHANGE UP (ref 22–29)
CO2 SERPL-SCNC: 28 MMOL/L — SIGNIFICANT CHANGE UP (ref 22–29)
CREAT SERPL-MCNC: 2.49 MG/DL — HIGH (ref 0.5–1.3)
CREAT SERPL-MCNC: 2.58 MG/DL — HIGH (ref 0.5–1.3)
CREAT SERPL-MCNC: 4 MG/DL — HIGH (ref 0.5–1.3)
EOSINOPHIL # BLD AUTO: 0.18 K/UL — SIGNIFICANT CHANGE UP (ref 0–0.5)
EOSINOPHIL NFR BLD AUTO: 2.4 % — SIGNIFICANT CHANGE UP (ref 0–6)
ESTIMATED AVERAGE GLUCOSE: 151 MG/DL — HIGH (ref 68–114)
GLUCOSE BLDC GLUCOMTR-MCNC: 103 MG/DL — HIGH (ref 70–99)
GLUCOSE BLDC GLUCOMTR-MCNC: 111 MG/DL — HIGH (ref 70–99)
GLUCOSE BLDC GLUCOMTR-MCNC: 123 MG/DL — HIGH (ref 70–99)
GLUCOSE BLDC GLUCOMTR-MCNC: 148 MG/DL — HIGH (ref 70–99)
GLUCOSE BLDC GLUCOMTR-MCNC: 165 MG/DL — HIGH (ref 70–99)
GLUCOSE BLDC GLUCOMTR-MCNC: 174 MG/DL — HIGH (ref 70–99)
GLUCOSE BLDC GLUCOMTR-MCNC: 187 MG/DL — HIGH (ref 70–99)
GLUCOSE BLDC GLUCOMTR-MCNC: 83 MG/DL — SIGNIFICANT CHANGE UP (ref 70–99)
GLUCOSE SERPL-MCNC: 113 MG/DL — HIGH (ref 70–99)
GLUCOSE SERPL-MCNC: 115 MG/DL — SIGNIFICANT CHANGE UP (ref 70–115)
GLUCOSE SERPL-MCNC: 136 MG/DL — HIGH (ref 70–99)
HCT VFR BLD CALC: 33.2 % — LOW (ref 39–50)
HCT VFR BLD CALC: 33.6 % — LOW (ref 39–50)
HGB BLD-MCNC: 10.9 G/DL — LOW (ref 13–17)
HGB BLD-MCNC: 11 G/DL — LOW (ref 13–17)
IMM GRANULOCYTES NFR BLD AUTO: 0.4 % — SIGNIFICANT CHANGE UP (ref 0–1.5)
INR BLD: 1.1 RATIO — SIGNIFICANT CHANGE UP (ref 0.88–1.16)
LYMPHOCYTES # BLD AUTO: 1.06 K/UL — SIGNIFICANT CHANGE UP (ref 1–3.3)
LYMPHOCYTES # BLD AUTO: 14.2 % — SIGNIFICANT CHANGE UP (ref 13–44)
MAGNESIUM SERPL-MCNC: 2.1 MG/DL — SIGNIFICANT CHANGE UP (ref 1.8–2.6)
MAGNESIUM SERPL-MCNC: 2.2 MG/DL — SIGNIFICANT CHANGE UP (ref 1.6–2.6)
MCHC RBC-ENTMCNC: 29.5 PG — SIGNIFICANT CHANGE UP (ref 27–34)
MCHC RBC-ENTMCNC: 29.7 PG — SIGNIFICANT CHANGE UP (ref 27–34)
MCHC RBC-ENTMCNC: 32.7 GM/DL — SIGNIFICANT CHANGE UP (ref 32–36)
MCHC RBC-ENTMCNC: 32.8 GM/DL — SIGNIFICANT CHANGE UP (ref 32–36)
MCV RBC AUTO: 90 FL — SIGNIFICANT CHANGE UP (ref 80–100)
MCV RBC AUTO: 90.8 FL — SIGNIFICANT CHANGE UP (ref 80–100)
MONOCYTES # BLD AUTO: 0.98 K/UL — HIGH (ref 0–0.9)
MONOCYTES NFR BLD AUTO: 13.1 % — SIGNIFICANT CHANGE UP (ref 2–14)
NEUTROPHILS # BLD AUTO: 5.19 K/UL — SIGNIFICANT CHANGE UP (ref 1.8–7.4)
NEUTROPHILS NFR BLD AUTO: 69.4 % — SIGNIFICANT CHANGE UP (ref 43–77)
PHOSPHATE SERPL-MCNC: 3.4 MG/DL — SIGNIFICANT CHANGE UP (ref 2.4–4.7)
PHOSPHATE SERPL-MCNC: 4.2 MG/DL — SIGNIFICANT CHANGE UP (ref 2.4–4.7)
PLATELET # BLD AUTO: 131 K/UL — LOW (ref 150–400)
PLATELET # BLD AUTO: 134 K/UL — LOW (ref 150–400)
POTASSIUM SERPL-MCNC: 3.8 MMOL/L — SIGNIFICANT CHANGE UP (ref 3.5–5.3)
POTASSIUM SERPL-MCNC: 4.2 MMOL/L — SIGNIFICANT CHANGE UP (ref 3.5–5.3)
POTASSIUM SERPL-MCNC: 4.6 MMOL/L — SIGNIFICANT CHANGE UP (ref 3.5–5.3)
POTASSIUM SERPL-SCNC: 3.8 MMOL/L — SIGNIFICANT CHANGE UP (ref 3.5–5.3)
POTASSIUM SERPL-SCNC: 4.2 MMOL/L — SIGNIFICANT CHANGE UP (ref 3.5–5.3)
POTASSIUM SERPL-SCNC: 4.6 MMOL/L — SIGNIFICANT CHANGE UP (ref 3.5–5.3)
PROT SERPL-MCNC: 6.8 G/DL — SIGNIFICANT CHANGE UP (ref 6.6–8.7)
PROT SERPL-MCNC: 7.2 G/DL — SIGNIFICANT CHANGE UP (ref 6.6–8.7)
PROTHROM AB SERPL-ACNC: 12.7 SEC — SIGNIFICANT CHANGE UP (ref 10.6–13.6)
RBC # BLD: 3.69 M/UL — LOW (ref 4.2–5.8)
RBC # BLD: 3.7 M/UL — LOW (ref 4.2–5.8)
RBC # FLD: 13.7 % — SIGNIFICANT CHANGE UP (ref 10.3–14.5)
RBC # FLD: 13.7 % — SIGNIFICANT CHANGE UP (ref 10.3–14.5)
SARS-COV-2 IGG SERPL QL IA: NEGATIVE — SIGNIFICANT CHANGE UP
SARS-COV-2 IGM SERPL IA-ACNC: <0.1 INDEX — SIGNIFICANT CHANGE UP
SARS-COV-2 N GENE NPH QL NAA+PROBE: NOT DETECTED
SARS-COV-2 RNA SPEC QL NAA+PROBE: SIGNIFICANT CHANGE UP
SODIUM SERPL-SCNC: 134 MMOL/L — LOW (ref 135–145)
SODIUM SERPL-SCNC: 137 MMOL/L — SIGNIFICANT CHANGE UP (ref 135–145)
SODIUM SERPL-SCNC: 137 MMOL/L — SIGNIFICANT CHANGE UP (ref 135–145)
WBC # BLD: 6.24 K/UL — SIGNIFICANT CHANGE UP (ref 3.8–10.5)
WBC # BLD: 7.48 K/UL — SIGNIFICANT CHANGE UP (ref 3.8–10.5)
WBC # FLD AUTO: 6.24 K/UL — SIGNIFICANT CHANGE UP (ref 3.8–10.5)
WBC # FLD AUTO: 7.48 K/UL — SIGNIFICANT CHANGE UP (ref 3.8–10.5)

## 2020-01-01 PROCEDURE — 93010 ELECTROCARDIOGRAM REPORT: CPT

## 2020-01-01 PROCEDURE — 33286 RMVL SUBQ CAR RHYTHM MNTR: CPT | Mod: 59

## 2020-01-01 PROCEDURE — 33285 INSJ SUBQ CAR RHYTHM MNTR: CPT

## 2020-01-01 PROCEDURE — 75827 VEIN X-RAY CHEST: CPT | Mod: 26,59

## 2020-01-01 PROCEDURE — 93971 EXTREMITY STUDY: CPT

## 2020-01-01 PROCEDURE — 36903 INTRO CATH DIALYSIS CIRCUIT: CPT | Mod: 59

## 2020-01-01 PROCEDURE — 86900 BLOOD TYPING SEROLOGIC ABO: CPT

## 2020-01-01 PROCEDURE — 71045 X-RAY EXAM CHEST 1 VIEW: CPT | Mod: 26

## 2020-01-01 PROCEDURE — 86850 RBC ANTIBODY SCREEN: CPT

## 2020-01-01 PROCEDURE — 93298 REM INTERROG DEV EVAL SCRMS: CPT

## 2020-01-01 PROCEDURE — 99239 HOSP IP/OBS DSCHRG MGMT >30: CPT

## 2020-01-01 PROCEDURE — 99232 SBSQ HOSP IP/OBS MODERATE 35: CPT

## 2020-01-01 PROCEDURE — 36415 COLL VENOUS BLD VENIPUNCTURE: CPT

## 2020-01-01 PROCEDURE — 80053 COMPREHEN METABOLIC PANEL: CPT

## 2020-01-01 PROCEDURE — 85027 COMPLETE CBC AUTOMATED: CPT

## 2020-01-01 PROCEDURE — 99233 SBSQ HOSP IP/OBS HIGH 50: CPT

## 2020-01-01 PROCEDURE — 84100 ASSAY OF PHOSPHORUS: CPT

## 2020-01-01 PROCEDURE — 71045 X-RAY EXAM CHEST 1 VIEW: CPT

## 2020-01-01 PROCEDURE — C1876: CPT

## 2020-01-01 PROCEDURE — 93005 ELECTROCARDIOGRAM TRACING: CPT

## 2020-01-01 PROCEDURE — 85025 COMPLETE CBC W/AUTO DIFF WBC: CPT

## 2020-01-01 PROCEDURE — C1764: CPT

## 2020-01-01 PROCEDURE — 99223 1ST HOSP IP/OBS HIGH 75: CPT

## 2020-01-01 PROCEDURE — 36907 BALO ANGIOP CTR DIALYSIS SEG: CPT

## 2020-01-01 PROCEDURE — 93000 ELECTROCARDIOGRAM COMPLETE: CPT

## 2020-01-01 PROCEDURE — 82962 GLUCOSE BLOOD TEST: CPT

## 2020-01-01 PROCEDURE — C1887: CPT

## 2020-01-01 PROCEDURE — 76937 US GUIDE VASCULAR ACCESS: CPT | Mod: 26

## 2020-01-01 PROCEDURE — C1894: CPT

## 2020-01-01 PROCEDURE — 99213 OFFICE O/P EST LOW 20 MIN: CPT

## 2020-01-01 PROCEDURE — 99072 ADDL SUPL MATRL&STAF TM PHE: CPT

## 2020-01-01 PROCEDURE — 99152 MOD SED SAME PHYS/QHP 5/>YRS: CPT

## 2020-01-01 PROCEDURE — 99285 EMERGENCY DEPT VISIT HI MDM: CPT

## 2020-01-01 PROCEDURE — C1725: CPT

## 2020-01-01 PROCEDURE — 93971 EXTREMITY STUDY: CPT | Mod: 26,LT

## 2020-01-01 PROCEDURE — 99153 MOD SED SAME PHYS/QHP EA: CPT

## 2020-01-01 PROCEDURE — 99231 SBSQ HOSP IP/OBS SF/LOW 25: CPT

## 2020-01-01 PROCEDURE — 99261: CPT

## 2020-01-01 PROCEDURE — 83735 ASSAY OF MAGNESIUM: CPT

## 2020-01-01 PROCEDURE — 83036 HEMOGLOBIN GLYCOSYLATED A1C: CPT

## 2020-01-01 PROCEDURE — 75710 ARTERY X-RAYS ARM/LEG: CPT | Mod: 26

## 2020-01-01 PROCEDURE — C1769: CPT

## 2020-01-01 PROCEDURE — G2066: CPT

## 2020-01-01 PROCEDURE — U0003: CPT

## 2020-01-01 PROCEDURE — 93985 DUP-SCAN HEMO COMPL BI STD: CPT | Mod: 26

## 2020-01-01 PROCEDURE — 86769 SARS-COV-2 COVID-19 ANTIBODY: CPT

## 2020-01-01 PROCEDURE — 85730 THROMBOPLASTIN TIME PARTIAL: CPT

## 2020-01-01 PROCEDURE — 37248 TRLUML BALO ANGIOP 1ST VEIN: CPT

## 2020-01-01 PROCEDURE — 36903 INTRO CATH DIALYSIS CIRCUIT: CPT

## 2020-01-01 PROCEDURE — 85610 PROTHROMBIN TIME: CPT

## 2020-01-01 PROCEDURE — 86901 BLOOD TYPING SEROLOGIC RH(D): CPT

## 2020-01-01 RX ORDER — DEXTROSE 50 % IN WATER 50 %
25 SYRINGE (ML) INTRAVENOUS ONCE
Refills: 0 | Status: DISCONTINUED | OUTPATIENT
Start: 2020-01-01 | End: 2020-01-01

## 2020-01-01 RX ORDER — ASPIRIN/CALCIUM CARB/MAGNESIUM 324 MG
81 TABLET ORAL DAILY
Refills: 0 | Status: DISCONTINUED | OUTPATIENT
Start: 2020-01-01 | End: 2020-01-01

## 2020-01-01 RX ORDER — FERROUS SULFATE 325(65) MG
325 TABLET ORAL DAILY
Refills: 0 | Status: DISCONTINUED | OUTPATIENT
Start: 2020-01-01 | End: 2020-01-01

## 2020-01-01 RX ORDER — CALCITRIOL 0.5 UG/1
0.25 CAPSULE ORAL DAILY
Refills: 0 | Status: DISCONTINUED | OUTPATIENT
Start: 2020-01-01 | End: 2020-01-01

## 2020-01-01 RX ORDER — INSULIN LISPRO 100/ML
VIAL (ML) SUBCUTANEOUS
Refills: 0 | Status: DISCONTINUED | OUTPATIENT
Start: 2020-01-01 | End: 2020-01-01

## 2020-01-01 RX ORDER — SODIUM CHLORIDE 9 MG/ML
1000 INJECTION, SOLUTION INTRAVENOUS
Refills: 0 | Status: DISCONTINUED | OUTPATIENT
Start: 2020-01-01 | End: 2020-01-01

## 2020-01-01 RX ORDER — CEPHALEXIN 500 MG
500 CAPSULE ORAL ONCE
Refills: 0 | Status: COMPLETED | OUTPATIENT
Start: 2020-01-01 | End: 2020-01-01

## 2020-01-01 RX ORDER — GLUCAGON INJECTION, SOLUTION 0.5 MG/.1ML
1 INJECTION, SOLUTION SUBCUTANEOUS ONCE
Refills: 0 | Status: DISCONTINUED | OUTPATIENT
Start: 2020-01-01 | End: 2020-01-01

## 2020-01-01 RX ORDER — TAMSULOSIN HYDROCHLORIDE 0.4 MG/1
0.4 CAPSULE ORAL AT BEDTIME
Refills: 0 | Status: DISCONTINUED | OUTPATIENT
Start: 2020-01-01 | End: 2020-01-01

## 2020-01-01 RX ORDER — METOPROLOL TARTRATE 50 MG
25 TABLET ORAL DAILY
Refills: 0 | Status: DISCONTINUED | OUTPATIENT
Start: 2020-01-01 | End: 2020-01-01

## 2020-01-01 RX ORDER — DOXAZOSIN MESYLATE 4 MG
4 TABLET ORAL AT BEDTIME
Refills: 0 | Status: DISCONTINUED | OUTPATIENT
Start: 2020-01-01 | End: 2020-01-01

## 2020-01-01 RX ORDER — HYDRALAZINE HCL 50 MG
50 TABLET ORAL THREE TIMES A DAY
Refills: 0 | Status: DISCONTINUED | OUTPATIENT
Start: 2020-01-01 | End: 2020-01-01

## 2020-01-01 RX ORDER — DEXTROSE 50 % IN WATER 50 %
15 SYRINGE (ML) INTRAVENOUS ONCE
Refills: 0 | Status: DISCONTINUED | OUTPATIENT
Start: 2020-01-01 | End: 2020-01-01

## 2020-01-01 RX ORDER — MEMANTINE HYDROCHLORIDE 10 MG/1
10 TABLET ORAL
Refills: 0 | Status: DISCONTINUED | OUTPATIENT
Start: 2020-01-01 | End: 2020-01-01

## 2020-01-01 RX ORDER — LANOLIN ALCOHOL/MO/W.PET/CERES
3 CREAM (GRAM) TOPICAL AT BEDTIME
Refills: 0 | Status: DISCONTINUED | OUTPATIENT
Start: 2020-01-01 | End: 2020-01-01

## 2020-01-01 RX ORDER — ENOXAPARIN SODIUM 100 MG/ML
45 INJECTION SUBCUTANEOUS
Qty: 0 | Refills: 0 | DISCHARGE

## 2020-01-01 RX ORDER — HEPARIN SODIUM 5000 [USP'U]/ML
5000 INJECTION INTRAVENOUS; SUBCUTANEOUS EVERY 12 HOURS
Refills: 0 | Status: DISCONTINUED | OUTPATIENT
Start: 2020-01-01 | End: 2020-01-01

## 2020-01-01 RX ORDER — SODIUM BICARBONATE 1 MEQ/ML
650 SYRINGE (ML) INTRAVENOUS
Refills: 0 | Status: DISCONTINUED | OUTPATIENT
Start: 2020-01-01 | End: 2020-01-01

## 2020-01-01 RX ORDER — AMLODIPINE BESYLATE 2.5 MG/1
10 TABLET ORAL DAILY
Refills: 0 | Status: DISCONTINUED | OUTPATIENT
Start: 2020-01-01 | End: 2020-01-01

## 2020-01-01 RX ORDER — ATORVASTATIN CALCIUM 80 MG/1
10 TABLET, FILM COATED ORAL AT BEDTIME
Refills: 0 | Status: DISCONTINUED | OUTPATIENT
Start: 2020-01-01 | End: 2020-01-01

## 2020-01-01 RX ORDER — ISOSORBIDE DINITRATE 5 MG/1
10 TABLET ORAL THREE TIMES A DAY
Refills: 0 | Status: DISCONTINUED | OUTPATIENT
Start: 2020-01-01 | End: 2020-01-01

## 2020-01-01 RX ORDER — ACETAMINOPHEN 500 MG
650 TABLET ORAL EVERY 6 HOURS
Refills: 0 | Status: DISCONTINUED | OUTPATIENT
Start: 2020-01-01 | End: 2020-01-01

## 2020-01-01 RX ORDER — INSULIN LISPRO 100/ML
VIAL (ML) SUBCUTANEOUS AT BEDTIME
Refills: 0 | Status: DISCONTINUED | OUTPATIENT
Start: 2020-01-01 | End: 2020-01-01

## 2020-01-01 RX ORDER — DEXTROSE 50 % IN WATER 50 %
12.5 SYRINGE (ML) INTRAVENOUS ONCE
Refills: 0 | Status: DISCONTINUED | OUTPATIENT
Start: 2020-01-01 | End: 2020-01-01

## 2020-01-01 RX ADMIN — Medication 50 MILLIGRAM(S): at 05:54

## 2020-01-01 RX ADMIN — AMLODIPINE BESYLATE 10 MILLIGRAM(S): 2.5 TABLET ORAL at 05:54

## 2020-01-01 RX ADMIN — Medication 50 MILLIGRAM(S): at 21:25

## 2020-01-01 RX ADMIN — Medication 10 UNIT(S): at 12:42

## 2020-01-01 RX ADMIN — Medication 50 MILLIGRAM(S): at 23:46

## 2020-01-01 RX ADMIN — ISOSORBIDE DINITRATE 10 MILLIGRAM(S): 5 TABLET ORAL at 23:46

## 2020-01-01 RX ADMIN — INSULIN GLARGINE 10 UNIT(S): 100 INJECTION, SOLUTION SUBCUTANEOUS at 21:25

## 2020-01-01 RX ADMIN — Medication 50 MILLIGRAM(S): at 14:51

## 2020-01-01 RX ADMIN — Medication 325 MILLIGRAM(S): at 11:33

## 2020-01-01 RX ADMIN — Medication 650 MILLIGRAM(S): at 06:09

## 2020-01-01 RX ADMIN — CHLORHEXIDINE GLUCONATE 1 APPLICATION(S): 213 SOLUTION TOPICAL at 05:54

## 2020-01-01 RX ADMIN — Medication 1: at 12:42

## 2020-01-01 RX ADMIN — ISOSORBIDE DINITRATE 10 MILLIGRAM(S): 5 TABLET ORAL at 06:09

## 2020-01-01 RX ADMIN — Medication 81 MILLIGRAM(S): at 07:57

## 2020-01-01 RX ADMIN — CALCITRIOL 0.25 MICROGRAM(S): 0.5 CAPSULE ORAL at 07:57

## 2020-01-01 RX ADMIN — MEMANTINE HYDROCHLORIDE 10 MILLIGRAM(S): 10 TABLET ORAL at 06:09

## 2020-01-01 RX ADMIN — AMLODIPINE BESYLATE 10 MILLIGRAM(S): 2.5 TABLET ORAL at 06:09

## 2020-01-01 RX ADMIN — Medication 500 MILLIGRAM(S): at 12:37

## 2020-01-01 RX ADMIN — Medication 1000 UNIT(S): at 11:33

## 2020-01-01 RX ADMIN — HEPARIN SODIUM 5000 UNIT(S): 5000 INJECTION INTRAVENOUS; SUBCUTANEOUS at 05:54

## 2020-01-01 RX ADMIN — ISOSORBIDE MONONITRATE 30 MILLIGRAM(S): 60 TABLET, EXTENDED RELEASE ORAL at 11:33

## 2020-01-01 RX ADMIN — ATORVASTATIN CALCIUM 10 MILLIGRAM(S): 80 TABLET, FILM COATED ORAL at 23:46

## 2020-01-01 RX ADMIN — Medication 25 MILLIGRAM(S): at 06:09

## 2020-01-01 RX ADMIN — Medication 4 MILLIGRAM(S): at 23:46

## 2020-01-01 RX ADMIN — Medication 1: at 17:06

## 2020-01-01 RX ADMIN — Medication 50 MILLIGRAM(S): at 15:36

## 2020-01-01 RX ADMIN — Medication 10 UNIT(S): at 17:07

## 2020-01-01 RX ADMIN — Medication 20 MILLIGRAM(S): at 06:09

## 2020-01-01 RX ADMIN — ATORVASTATIN CALCIUM 10 MILLIGRAM(S): 80 TABLET, FILM COATED ORAL at 21:25

## 2020-01-01 RX ADMIN — AMLODIPINE BESYLATE 10 MILLIGRAM(S): 2.5 TABLET ORAL at 17:45

## 2020-01-01 RX ADMIN — Medication 4 MILLIGRAM(S): at 21:25

## 2020-01-01 RX ADMIN — Medication 650 MILLIGRAM(S): at 17:06

## 2020-01-01 RX ADMIN — Medication 325 MILLIGRAM(S): at 07:57

## 2020-01-01 RX ADMIN — HEPARIN SODIUM 5000 UNIT(S): 5000 INJECTION INTRAVENOUS; SUBCUTANEOUS at 17:06

## 2020-01-01 RX ADMIN — Medication 50 MILLIGRAM(S): at 06:09

## 2020-01-01 RX ADMIN — Medication 650 MILLIGRAM(S): at 05:54

## 2020-01-01 RX ADMIN — TAMSULOSIN HYDROCHLORIDE 0.4 MILLIGRAM(S): 0.4 CAPSULE ORAL at 23:46

## 2020-01-01 RX ADMIN — CEFTRIAXONE 100 MILLIGRAM(S): 500 INJECTION, POWDER, FOR SOLUTION INTRAMUSCULAR; INTRAVENOUS at 21:39

## 2020-01-01 RX ADMIN — HEPARIN SODIUM 5000 UNIT(S): 5000 INJECTION INTRAVENOUS; SUBCUTANEOUS at 06:10

## 2020-01-01 NOTE — PROGRESS NOTE ADULT - ASSESSMENT
Patient is a 79y old m with CHF exacerbation pending cardiac catheterization delayed due to worsening CKD, Vascular Surgery Consulted for hemodialysis access. Shiley placed on 12/30 tolerating HD    PLAN:    - TDC  placement In AM,   - B/L upper extremity vein mapping  - protect non-dominate upper extremity for future AVF creation

## 2020-01-01 NOTE — PROGRESS NOTE ADULT - ASSESSMENT
PROCEDURE RESULTS: S/P LHC which revealed non obstructive CAD.     < from: Cardiac Cath Lab - Adult (12.31.19 @ 10:55) >  VENTRICLES: No left ventriculogram was performed.  CORONARY VESSELS: The coronary circulation is right dominant.  LM:   --  LM: The vessel was large sized. Angiography showed mild  atherosclerosis with no flow limiting lesions.  LAD:   --  LAD: The vessel was large sized. Angiography showed mild  atherosclerosis with no flow limiting lesions.  CX:   --  Circumflex: The vessel was large sized.  --  OM1: The vessel was medium sized. There was a discrete 40 % stenosis at  the ostium of the vessel segment.  RCA:   --  RCA: The vessel was medium sized. Angiography showed mild  atherosclerosis with no flow limiting lesions.  COMPLICATIONS: No complications occurred during the cath lab visit.  DIAGNOSTIC IMPRESSIONS: Non obstructive CAD  Peak to peak LV /aortic gradient 27 mmHg  DIAGNOSTIC RECOMMENDATIONS: continue current medical therapy , findings  discussed with Dr. Faustin.    Pre op work up for possible TAVR.

## 2020-01-01 NOTE — PROGRESS NOTE ADULT - ASSESSMENT
79M, pmhx CKD IV, HTN, HLD, chronic diastolic heart failure, BPH, mod to severe AS with prior TAVR consult on last admission, 12/25 p/w DEJESUS/SOB, acute on chronic diastolic heart failure exacerbation, b/l pleural effusions, acute on CKD, requiring HD, R pigtail placed for one liter, continues to drain serous fluid.

## 2020-01-01 NOTE — PROGRESS NOTE ADULT - SUBJECTIVE AND OBJECTIVE BOX
The patient is a 79 year old male with a history of CKD stage 4, hypertension, chronic diastolic CHF, bph and moderate-severe AS who presented to the ER with complaints of difficulty breathing. According to the patient and his wife at bedside he was admitted to [facility] on 12/10 for acute hypoxic respiratory failure secondary to acute on chronic diastolic CHF, hypertensive urgency and UTI. He was discharged home on PO diuretics. Since being home he initially felt better however his wife about a week ago he started to have symptoms of orthopnea and dyspnea at rest with worsening lower extremity edema. A house physician came to evaluate him over the weekend and increased the dose of lasix from 40mg PO OD to BID with some improvement. However, last night he suddenly felt like he could not breath and was brought to the ER. In the ED, improved with a dose of IV lasix 40mg x 1. Noted to be hypothermic, rectal temperature of 91.3 with a heart rate of 53. Started on a bear hugger with improvement to 92.3. Lactate was negative, no leukocytosis> Chest xray with pulmonary edema and elevated BNP. Given a dose of empiric rocephin x 1.     Physical Exam:  · Constitutional	Well-developed, well nourished	  · Neck	No bruits; no thyromegaly or nodules	  · Respiratory	detailed exam	  · Respiratory Comments	bibasilar crackles	  · Cardiovascular	Regular rate & rhythm, normal S1, S2; no murmurs, gallops or rubs; no S3, S4	  · Gastrointestinal	Soft, non-tender, no hepatosplenomegaly, normal bowel sounds	  · Extremities	detailed exam	  · Extremities Details	pedal edema	  · Pedal Edema Severity	1+	  · Vascular	Equal and normal pulses (carotid, femoral, dorsalis pedis)	  · Neurological	Alert & oriented; no sensory, motor or coordination deficits, normal reflexes	  · Skin	No lesions; no rash	    ICU Vital Signs Last 24 Hrs  T(C): 36.7 (01 Jan 2020 08:00), Max: 36.9 (31 Dec 2019 16:30)  T(F): 98 (01 Jan 2020 08:00), Max: 98.4 (31 Dec 2019 16:30)  HR: 59 (01 Jan 2020 11:32) (54 - 68)  BP: 142/55 (01 Jan 2020 11:32) (114/57 - 142/55)  BP(mean): --  ABP: --  ABP(mean): --  RR: 18 (01 Jan 2020 08:00) (16 - 18)  SpO2: 98% (01 Jan 2020 08:00) (91% - 98%)                          8.2    8.15  )-----------( 168      ( 31 Dec 2019 08:11 )             27.0   01-01    137  |  99  |  41.0<H>  ----------------------------<  115  4.2   |  26.0  |  2.58<H>    Ca    8.0<L>      01 Jan 2020 07:18

## 2020-01-01 NOTE — PROGRESS NOTE ADULT - SUBJECTIVE AND OBJECTIVE BOX
Vital Signs Last 24 Hrs,    T(C): 36.7 (2020 08:00), Max: 36.9 (31 Dec 2019 16:30)  T(F): 98 (2020 08:00), Max: 98.4 (31 Dec 2019 16:30)  HR: 59 (2020 11:32) (54 - 68)  BP: 142/55 (2020 11:32) (114/57 - 152/67)  RR: 18 (2020 08:00) (16 - 18)  SpO2: 98% (2020 08:00) (91% - 98%)    137    |  99     |  41.0<H>  ----------------------------<  115    Ca:8.0<L> (2020 07:18)  4.2     |  26.0   |  2.58<H>    eGFR if Non : 23 <L>  eGFR if : 26 <L>    TPro  6.0<L>  /  Alb  3.2<L>  /  TBili  <0.2<L>  /  DBili  x      /  AST  20     /  ALT  18     /  AlkPhos  71     30 Dec 2019 08:59                        8.2<L>  8.15  )-----------( 168      ( 31 Dec 2019 08:11 )             27.0<L>    B12:6.54 uIU/mL<H>     Urinalysis Basic - ( 28 Dec 2019 16:39 )  Color: Yellow / Appearance: Cloudy<!> / S.010 / pH: x  Gluc: x / Ketone: Trace<!>  / Bili: Negative / Urobili: Negative mg/dL   Blood: x / Protein: 100 mg/dL<!> / Nitrite: Negative   Leuk Esterase: Small<!> / RBC: >50 /HPF<!> / WBC 6-10<!>   Sq Epi: x / Non Sq Epi: x / Bacteria: Few<!>    UCr:56 mg/dL P/C Ratio:1.7 Ratio<H>     Progress Note:     CC: ADI on CKD     INTERVAL HPI/ OVERNIGHT EVENTS: seen and examined , had dialysis yesterday , underwent LHC - showed non obstructive CAD , Blood sugar elevated, Lantus added to regimen .urine culture negative .     chest tube to be removed today     REVIEW OF SYSTEMS:    CONSTITUTIONAL: No fever, weight loss, or fatigue  RESPIRATORY: No cough, wheezing, hemoptysis; No shortness of breath  CARDIOVASCULAR: No chest pain, palpitations  GASTROINTESTINAL: No abdominal or epigastric pain. No nausea, vomiting  NEUROLOGICAL: No headaches, or blurry vision     Vital Signs Last 24 Hrs  T(C): 37 (31 Dec 2019 10:10), Max: 37.1 (31 Dec 2019 00:37)  T(F): 98.6 (31 Dec 2019 10:10), Max: 98.8 (31 Dec 2019 00:37)  HR: 59 (31 Dec 2019 15:00) (56 - 66)  BP: 114/57 (31 Dec 2019 15:00) (114/57 - 152/67)  RR: 18 (31 Dec 2019 15:00) (16 - 19)  SpO2: 95% (31 Dec 2019 15:00) (91% - 98%)    PHYSICAL EXAM:    GENERAL: NAD, resting comfortable in bed , right chest tube in place   CHEST/LUNG: CTAB , no wheezing , rales, rhonchi heard   HEART: S1S2+, Regular rate and rhythm; 3/6 systolic murmur   ABDOMEN: Soft, Nontender, Nondistended; Bowel sounds present  EXTREMITIES:  no pitting edema , pulses palpated BL.    LABS:                        8.2    8.15  )-----------( 168      ( 31 Dec 2019 08:11 )             27.0     12    136  |  98  |  62.0<H>  ----------------------------<  211<H>  4.8   |  23.0  |  3.17<H>    Ca    7.6<L>      31 Dec 2019 08:11    TPro  6.0<L>  /  Alb  3.2<L>  /  TBili  <0.2<L>  /  DBili  x   /  AST  20  /  ALT  18  /  AlkPhos  71  12-30    MEDICATIONS  (STANDING):  amLODIPine   Tablet 10 milliGRAM(s) Oral daily  aspirin 325 milliGRAM(s) Oral daily  atorvastatin 10 milliGRAM(s) Oral at bedtime  carvedilol 25 milliGRAM(s) Oral every 12 hours  cefTRIAXone   IVPB 1000 milliGRAM(s) IV Intermittent every 24 hours  chlorhexidine 4% Liquid 1 Application(s) Topical <User Schedule>  cholecalciferol 1000 Unit(s) Oral daily  dextrose 5%. 1000 milliLiter(s) (50 mL/Hr) IV Continuous <Continuous>  dextrose 50% Injectable 12.5 Gram(s) IV Push once  dextrose 50% Injectable 25 Gram(s) IV Push once  dextrose 50% Injectable 25 Gram(s) IV Push once  doxazosin 4 milliGRAM(s) Oral at bedtime  epoetin nereida Injectable 22564 Unit(s) SubCutaneous <User Schedule>  ferrous    sulfate 325 milliGRAM(s) Oral daily  heparin  Injectable 5000 Unit(s) SubCutaneous every 12 hours  hydrALAZINE 50 milliGRAM(s) Oral three times a day  insulin glargine Injectable (LANTUS) 10 Unit(s) SubCutaneous at bedtime  insulin lispro (HumaLOG) corrective regimen sliding scale   SubCutaneous three times a day before meals  insulin lispro Injectable (HumaLOG) 10 Unit(s) SubCutaneous three times a day before meals  isosorbide   mononitrate ER Tablet (IMDUR) 30 milliGRAM(s) Oral daily  sodium bicarbonate 650 milliGRAM(s) Oral two times a day  sodium bicarbonate  Infusion 0.055 mEq/kG/Hr (60 mL/Hr) IV Continuous <Continuous>    MEDICATIONS  (PRN):    acetaminophen   Tablet .. 650 milliGRAM(s) Oral every 6 hours PRN Temp greater or equal to 38C (100.4F), Mild Pain (1 - 3)  dextrose 40% Gel 15 Gram(s) Oral once PRN Blood Glucose LESS THAN 70 milliGRAM(s)/deciliter  glucagon  Injectable 1 milliGRAM(s) IntraMuscular once PRN Glucose LESS THAN 70 milligrams/deciliter  sodium chloride 0.9% lock flush 10 milliLiter(s) IV Push every 1 hour PRN Pre/post blood products, medications, blood draw, and to maintain line patency    1. Right pleural effusion /acute CHF exacerbation  chest tube in place ,to be removed today .  had output of 60 ml.  CXR shows 10% PTX .    2. ADI on CKD stage 4 - On HD,     3. UTI   on  ceftriaxone, urine culture negative .    4. Hyperkalemia   resolved .     5. DM   blood sugars elevated , added Humalog,     6. Anemia  likely AOCD .  on iron and epogen     7. Severe AS   cath showed non obstructive CAD .  further work up for TAVR to be done,  outpatient     8. HTN   continue with current regimen .    Wife @ Bedside,

## 2020-01-01 NOTE — PROGRESS NOTE ADULT - ASSESSMENT
1. Right pleural effusion /acute CHF exacerbation  chest tube in place ,had output of 800.  CXR repeated did not show PTX but showed worsening of effusions and fluid overload.    2. ADI on CKD stage 4   on dialysis .  plan is for perm cath placement tomorrow. had vein mapping today .  nephrology is on board     3. questionable UTI   on  ceftriaxone, urine culture negative .  will finish course tomorrow.    4. Severe AS   cath showed non obstructive CAD .  further work up for TAVR to be done outpatient       5. DM   will continue with current regimen       6. Anemia  likely AOCD .  will monitor.  on iron and epogen     8. DLP  statin     9. HTN   continue with current regimen .    10. DVT prophylaxis  heparin .    no family members at bedside .

## 2020-01-01 NOTE — PROGRESS NOTE ADULT - CONSTITUTIONAL
Pt. Discharged to home and transported to d/c lot via w/c. PICC line D/C instructions reviewed with pt. And PICC paperwork given . Extention tubing placed on PICC line for home use. Verbalized understanding of care and maintenance of PICC line Well-developed, well nourished

## 2020-01-01 NOTE — PROGRESS NOTE ADULT - SUBJECTIVE AND OBJECTIVE BOX
CC: newly started dialysis     INTERVAL HPI/OVERNIGHT EVENTS: seen and examined , chest tube was supposed to be removed however patient has 800 cc of output . had dialysis yesterday again    REVIEW OF SYSTEMS:    CONSTITUTIONAL: No fever, weight loss, or fatigue  RESPIRATORY: No cough, wheezing, hemoptysis; No shortness of breath  CARDIOVASCULAR: No chest pain, palpitations  GASTROINTESTINAL: No abdominal or epigastric pain. No nausea, vomiting  NEUROLOGICAL: No headaches, or blurry vision       Vital Signs Last 24 Hrs  T(C): 36.7 (01 Jan 2020 08:00), Max: 36.9 (31 Dec 2019 16:30)  T(F): 98 (01 Jan 2020 08:00), Max: 98.4 (31 Dec 2019 16:30)  HR: 62 (01 Jan 2020 14:50) (54 - 68)  BP: 155/64 (01 Jan 2020 14:50) (127/61 - 155/64)  BP(mean): --  RR: 18 (01 Jan 2020 08:00) (16 - 18)  SpO2: 98% (01 Jan 2020 08:00) (91% - 98%)    PHYSICAL EXAM:    GENERAL: NAD, resting comfortable in bed , right chest tube in place   CHEST/LUNG: CTAB , no wheezing , rales, rhonchi heard   HEART: S1S2+, Regular rate and rhythm; 2/6 systolic murmur   ABDOMEN: Soft, Nontender, Nondistended; Bowel sounds present  EXTREMITIES:  no pitting edema , pulses palpated BL.        LABS:                        8.2    8.15  )-----------( 168      ( 31 Dec 2019 08:11 )             27.0     01-01    137  |  99  |  41.0<H>  ----------------------------<  115  4.2   |  26.0  |  2.58<H>    Ca    8.0<L>      01 Jan 2020 07:18              MEDICATIONS  (STANDING):  amLODIPine   Tablet 10 milliGRAM(s) Oral daily  aspirin 325 milliGRAM(s) Oral daily  atorvastatin 10 milliGRAM(s) Oral at bedtime  carvedilol 25 milliGRAM(s) Oral every 12 hours  cefTRIAXone   IVPB 1000 milliGRAM(s) IV Intermittent every 24 hours  chlorhexidine 4% Liquid 1 Application(s) Topical <User Schedule>  cholecalciferol 1000 Unit(s) Oral daily  dextrose 5%. 1000 milliLiter(s) (50 mL/Hr) IV Continuous <Continuous>  dextrose 50% Injectable 12.5 Gram(s) IV Push once  dextrose 50% Injectable 25 Gram(s) IV Push once  dextrose 50% Injectable 25 Gram(s) IV Push once  doxazosin 4 milliGRAM(s) Oral at bedtime  epoetin nereida Injectable 80730 Unit(s) SubCutaneous <User Schedule>  ferrous    sulfate 325 milliGRAM(s) Oral daily  heparin  Injectable 5000 Unit(s) SubCutaneous every 12 hours  hydrALAZINE 50 milliGRAM(s) Oral three times a day  insulin glargine Injectable (LANTUS) 10 Unit(s) SubCutaneous at bedtime  insulin lispro (HumaLOG) corrective regimen sliding scale   SubCutaneous three times a day before meals  insulin lispro Injectable (HumaLOG) 10 Unit(s) SubCutaneous three times a day before meals  isosorbide   mononitrate ER Tablet (IMDUR) 30 milliGRAM(s) Oral daily  sodium bicarbonate 650 milliGRAM(s) Oral two times a day  sodium bicarbonate  Infusion 0.055 mEq/kG/Hr (60 mL/Hr) IV Continuous <Continuous>    MEDICATIONS  (PRN):  acetaminophen   Tablet .. 650 milliGRAM(s) Oral every 6 hours PRN Temp greater or equal to 38C (100.4F), Mild Pain (1 - 3)  dextrose 40% Gel 15 Gram(s) Oral once PRN Blood Glucose LESS THAN 70 milliGRAM(s)/deciliter  glucagon  Injectable 1 milliGRAM(s) IntraMuscular once PRN Glucose LESS THAN 70 milligrams/deciliter  sodium chloride 0.9% lock flush 10 milliLiter(s) IV Push every 1 hour PRN Pre/post blood products, medications, blood draw, and to maintain line patency      RADIOLOGY & ADDITIONAL TESTS:

## 2020-01-01 NOTE — PROGRESS NOTE ADULT - SUBJECTIVE AND OBJECTIVE BOX
INTERVAL HPI/OVERNIGHT EVENTS:    [facility] Plan for Permacath placement later this week. Vein mapping has been ordered. Patient without acute events this AM. he has no acute issues at this time.     MEDICATIONS  (STANDING):  amLODIPine   Tablet 10 milliGRAM(s) Oral daily  aspirin 325 milliGRAM(s) Oral daily  atorvastatin 10 milliGRAM(s) Oral at bedtime  carvedilol 25 milliGRAM(s) Oral every 12 hours  cefTRIAXone   IVPB 1000 milliGRAM(s) IV Intermittent every 24 hours  chlorhexidine 4% Liquid 1 Application(s) Topical <User Schedule>  cholecalciferol 1000 Unit(s) Oral daily  dextrose 5%. 1000 milliLiter(s) (50 mL/Hr) IV Continuous <Continuous>  dextrose 50% Injectable 12.5 Gram(s) IV Push once  dextrose 50% Injectable 25 Gram(s) IV Push once  dextrose 50% Injectable 25 Gram(s) IV Push once  doxazosin 4 milliGRAM(s) Oral at bedtime  epoetin nereida Injectable 04139 Unit(s) SubCutaneous <User Schedule>  ferrous    sulfate 325 milliGRAM(s) Oral daily  heparin  Injectable 5000 Unit(s) SubCutaneous every 12 hours  hydrALAZINE 50 milliGRAM(s) Oral three times a day  insulin glargine Injectable (LANTUS) 10 Unit(s) SubCutaneous at bedtime  insulin lispro (HumaLOG) corrective regimen sliding scale   SubCutaneous three times a day before meals  insulin lispro Injectable (HumaLOG) 10 Unit(s) SubCutaneous three times a day before meals  isosorbide   mononitrate ER Tablet (IMDUR) 30 milliGRAM(s) Oral daily  sodium bicarbonate 650 milliGRAM(s) Oral two times a day  sodium bicarbonate  Infusion 0.055 mEq/kG/Hr (60 mL/Hr) IV Continuous <Continuous>    MEDICATIONS  (PRN):  acetaminophen   Tablet .. 650 milliGRAM(s) Oral every 6 hours PRN Temp greater or equal to 38C (100.4F), Mild Pain (1 - 3)  dextrose 40% Gel 15 Gram(s) Oral once PRN Blood Glucose LESS THAN 70 milliGRAM(s)/deciliter  glucagon  Injectable 1 milliGRAM(s) IntraMuscular once PRN Glucose LESS THAN 70 milligrams/deciliter  sodium chloride 0.9% lock flush 10 milliLiter(s) IV Push every 1 hour PRN Pre/post blood products, medications, blood draw, and to maintain line patency      Vital Signs Last 24 Hrs  T(C): 36.8 (01 Jan 2020 00:49), Max: 37 (31 Dec 2019 10:10)  T(F): 98.2 (01 Jan 2020 00:49), Max: 98.6 (31 Dec 2019 10:10)  HR: 62 (01 Jan 2020 00:49) (56 - 67)  BP: 139/62 (01 Jan 2020 00:49) (114/57 - 152/67)  BP(mean): --  RR: 18 (01 Jan 2020 00:49) (16 - 19)  SpO2: 97% (01 Jan 2020 00:49) (91% - 98%)    Physical Exam:  General: NAD, Lying in bed comfortably  HEENT: RIJ catheter in place, no hematoma  Neuro: [id number]: RRR, nml S1/S2  Resp: Good effort, CTA b/l  Thorax: No chest wall tenderness  GI/Abd: Soft, NT/ND, no rebound/guarding, no masses palpated  Vascular: All 4 extremities warm. 2+ radial pulses  Musculoskeletal: All 4 extremities moving spontaneously, no limitations    I&O's Detail    30 Dec 2019 07:01  -  31 Dec 2019 07:00  --------------------------------------------------------  IN:  Total IN: 0 mL    OUT:    Chest Tube: 60 mL    Other: 500 mL    Voided: 900 mL  Total OUT: 1460 mL    Total NET: -1460 mL      31 Dec 2019 07:01  -  01 Jan 2020 01:08  --------------------------------------------------------  IN:  Total IN: 0 mL    OUT:    Chest Tube: 880 mL    Other: 1000 mL  Total OUT: 1880 mL    Total NET: -1880 mL          LABS:                        8.2    8.15  )-----------( 168      ( 31 Dec 2019 08:11 )             27.0     12-31    136  |  98  |  62.0<H>  ----------------------------<  211<H>  4.8   |  23.0  |  3.17<H>    Ca    7.6<L>      31 Dec 2019 08:11    TPro  6.0<L>  /  Alb  3.2<L>  /  TBili  <0.2<L>  /  DBili  x   /  AST  20  /  ALT  18  /  AlkPhos  71  12-30          RADIOLOGY & ADDITIONAL STUDIES:

## 2020-01-01 NOTE — PROGRESS NOTE ADULT - ASSESSMENT
Patient is a 79y old m with CHF exacerbation pending cardiac catheterization delayed due to worsening CKD, Vascular Surgery Consulted for hemodialysis access. Shiley placed on 12/30 tolerating HD    PLAN:    - Permacath placement pending Thursday 1/2/2020  - B/L upper extremity vein mapping  - protect non-dominate upper extremity for future AVF creation   - Plan discussed with Attending, Dr. Michael

## 2020-01-01 NOTE — PROGRESS NOTE ADULT - SUBJECTIVE AND OBJECTIVE BOX
Subjective: "i feel good", denies CP, palpitations, SOB, cough, fever, chills, itchiness/rash, diaphoresis, vision changes, HA, dizziness/lightheadedness, numbness/tingling, abd pain, N/V     T(C): 36.7 (01-01-20 @ 08:00), Max: 36.9 (12-31-19 @ 16:30)  HR: 59 (01-01-20 @ 11:32) (54 - 68)  BP: 142/55 (01-01-20 @ 11:32) (114/57 - 142/55)  ABP: --  ABP(mean): --  RR: 18 (01-01-20 @ 08:00) (16 - 18)  SpO2: 98% (01-01-20 @ 08:00) (91% - 98%)    01-01    137  |  99  |  41.0<H>  ----------------------------<  115  4.2   |  26.0  |  2.58<H>    Ca    8.0<L>      01 Jan 2020 07:18                                 8.2    8.15  )-----------( 168      ( 31 Dec 2019 08:11 )             27.0           CAPILLARY BLOOD GLUCOSE  POCT Blood Glucose.: 187 mg/dL (01 Jan 2020 12:00)  POCT Blood Glucose.: 111 mg/dL (01 Jan 2020 08:12)  POCT Blood Glucose.: 141 mg/dL (31 Dec 2019 21:14)  POCT Blood Glucose.: 123 mg/dL (31 Dec 2019 17:40)         I&O's Detail    31 Dec 2019 07:01  -  01 Jan 2020 07:00  --------------------------------------------------------  IN:  Total IN: 0 mL    OUT:    Chest Tube: 880 mL    Other: 1000 mL  Total OUT: 1880 mL  Total NET: -1880 mL    Drug Dosing Weight  Height (cm): 162.56 (25 Dec 2019 11:05)  Weight (kg): 78.4 (01 Jan 2020 05:09)  BMI (kg/m2): 29.7 (01 Jan 2020 05:09)  BSA (m2): 1.84 (01 Jan 2020 05:09)    MEDICATIONS  (STANDING):  amLODIPine   Tablet 10 milliGRAM(s) Oral daily  aspirin 325 milliGRAM(s) Oral daily  atorvastatin 10 milliGRAM(s) Oral at bedtime  carvedilol 25 milliGRAM(s) Oral every 12 hours  cefTRIAXone   IVPB 1000 milliGRAM(s) IV Intermittent every 24 hours  chlorhexidine 4% Liquid 1 Application(s) Topical <User Schedule>  cholecalciferol 1000 Unit(s) Oral daily  dextrose 5%. 1000 milliLiter(s) (50 mL/Hr) IV Continuous <Continuous>  dextrose 50% Injectable 12.5 Gram(s) IV Push once  dextrose 50% Injectable 25 Gram(s) IV Push once  dextrose 50% Injectable 25 Gram(s) IV Push once  doxazosin 4 milliGRAM(s) Oral at bedtime  epoetin nereida Injectable 61273 Unit(s) SubCutaneous <User Schedule>  ferrous    sulfate 325 milliGRAM(s) Oral daily  heparin  Injectable 5000 Unit(s) SubCutaneous every 12 hours  hydrALAZINE 50 milliGRAM(s) Oral three times a day  insulin glargine Injectable (LANTUS) 10 Unit(s) SubCutaneous at bedtime  insulin lispro (HumaLOG) corrective regimen sliding scale   SubCutaneous three times a day before meals  insulin lispro Injectable (HumaLOG) 10 Unit(s) SubCutaneous three times a day before meals  isosorbide   mononitrate ER Tablet (IMDUR) 30 milliGRAM(s) Oral daily  sodium bicarbonate 650 milliGRAM(s) Oral two times a day  sodium bicarbonate  Infusion 0.055 mEq/kG/Hr (60 mL/Hr) IV Continuous <Continuous>    MEDICATIONS  (PRN):  acetaminophen   Tablet .. 650 milliGRAM(s) Oral every 6 hours PRN Temp greater or equal to 38C (100.4F), Mild Pain (1 - 3)  dextrose 40% Gel 15 Gram(s) Oral once PRN Blood Glucose LESS THAN 70 milliGRAM(s)/deciliter  glucagon  Injectable 1 milliGRAM(s) IntraMuscular once PRN Glucose LESS THAN 70 milligrams/deciliter  sodium chloride 0.9% lock flush 10 milliLiter(s) IV Push every 1 hour PRN Pre/post blood products, medications, blood draw, and to maintain line patency    Physical Exam  Neuro: A&Ox3, MORGAN, non focal  neck: no JVD, RIJ HD catheter in place  Pulm: decreased BS b/l, crackles at right base  CV: S1S2 RRR, +ELLEN  Abd: + BS soft NT ND  Extrem/MS: no edema, clubbing, or cyanosis  R pigtail intact with serous drainage

## 2020-01-01 NOTE — PROGRESS NOTE ADULT - PROBLEM SELECTOR PLAN 3
12/25 Rt PTC (Initial drainage 1L-Transudative)  Maintain CT to H20 seal (No gross airleak noted) and record daily output  ISS/cough and deep breathing exercise  daily CXR  d/c when output improves  d/w Dr. Tomas

## 2020-01-02 LAB
BASOPHILS # BLD AUTO: 0.05 K/UL
BASOPHILS NFR BLD AUTO: 0.5 %
BLD GP AB SCN SERPL QL: SIGNIFICANT CHANGE UP
EOSINOPHIL # BLD AUTO: 0.09 K/UL
EOSINOPHIL NFR BLD AUTO: 0.8 %
GLUCOSE BLDC GLUCOMTR-MCNC: 155 MG/DL — HIGH (ref 70–99)
GLUCOSE BLDC GLUCOMTR-MCNC: 219 MG/DL — HIGH (ref 70–99)
GLUCOSE BLDC GLUCOMTR-MCNC: 73 MG/DL — SIGNIFICANT CHANGE UP (ref 70–99)
GLUCOSE BLDC GLUCOMTR-MCNC: 78 MG/DL — SIGNIFICANT CHANGE UP (ref 70–99)
GLUCOSE BLDC GLUCOMTR-MCNC: 94 MG/DL — SIGNIFICANT CHANGE UP (ref 70–99)
HCT VFR BLD CALC: 30 %
HGB BLD-MCNC: 9 G/DL
IMM GRANULOCYTES NFR BLD AUTO: 1.4 %
LYMPHOCYTES # BLD AUTO: 1.19 K/UL
LYMPHOCYTES NFR BLD AUTO: 10.8 %
MAN DIFF?: NORMAL
MCHC RBC-ENTMCNC: 24.1 PG
MCHC RBC-ENTMCNC: 30 GM/DL
MCV RBC AUTO: 80.4 FL
MONOCYTES # BLD AUTO: 1.11 K/UL
MONOCYTES NFR BLD AUTO: 10.1 %
NEUTROPHILS # BLD AUTO: 8.44 K/UL
NEUTROPHILS NFR BLD AUTO: 76.4 %
PLATELET # BLD AUTO: 192 K/UL
RBC # BLD: 3.73 M/UL
RBC # FLD: 18.9 %
WBC # FLD AUTO: 11.03 K/UL

## 2020-01-02 PROCEDURE — 99232 SBSQ HOSP IP/OBS MODERATE 35: CPT

## 2020-01-02 PROCEDURE — 90937 HEMODIALYSIS REPEATED EVAL: CPT

## 2020-01-02 PROCEDURE — 71045 X-RAY EXAM CHEST 1 VIEW: CPT | Mod: 26

## 2020-01-02 PROCEDURE — 99233 SBSQ HOSP IP/OBS HIGH 50: CPT

## 2020-01-02 RX ORDER — TUBERCULIN PURIFIED PROTEIN DERIVATIVE 5 [IU]/.1ML
5 INJECTION, SOLUTION INTRADERMAL ONCE
Refills: 0 | Status: COMPLETED | OUTPATIENT
Start: 2020-01-02 | End: 2020-01-02

## 2020-01-02 RX ADMIN — Medication 2: at 17:56

## 2020-01-02 RX ADMIN — Medication 650 MILLIGRAM(S): at 05:09

## 2020-01-02 RX ADMIN — Medication 60 MEQ/KG/HR: at 06:18

## 2020-01-02 RX ADMIN — HEPARIN SODIUM 5000 UNIT(S): 5000 INJECTION INTRAVENOUS; SUBCUTANEOUS at 18:01

## 2020-01-02 RX ADMIN — Medication 1000 UNIT(S): at 18:01

## 2020-01-02 RX ADMIN — CHLORHEXIDINE GLUCONATE 1 APPLICATION(S): 213 SOLUTION TOPICAL at 05:09

## 2020-01-02 RX ADMIN — Medication 325 MILLIGRAM(S): at 18:02

## 2020-01-02 RX ADMIN — AMLODIPINE BESYLATE 10 MILLIGRAM(S): 2.5 TABLET ORAL at 05:09

## 2020-01-02 RX ADMIN — Medication 10 UNIT(S): at 12:40

## 2020-01-02 RX ADMIN — HEPARIN SODIUM 5000 UNIT(S): 5000 INJECTION INTRAVENOUS; SUBCUTANEOUS at 05:10

## 2020-01-02 RX ADMIN — ERYTHROPOIETIN 10000 UNIT(S): 10000 INJECTION, SOLUTION INTRAVENOUS; SUBCUTANEOUS at 10:08

## 2020-01-02 RX ADMIN — Medication 325 MILLIGRAM(S): at 12:28

## 2020-01-02 RX ADMIN — ATORVASTATIN CALCIUM 10 MILLIGRAM(S): 80 TABLET, FILM COATED ORAL at 23:28

## 2020-01-02 RX ADMIN — Medication 10 UNIT(S): at 17:56

## 2020-01-02 RX ADMIN — Medication 50 MILLIGRAM(S): at 05:09

## 2020-01-02 RX ADMIN — CARVEDILOL PHOSPHATE 25 MILLIGRAM(S): 80 CAPSULE, EXTENDED RELEASE ORAL at 05:09

## 2020-01-02 RX ADMIN — ISOSORBIDE MONONITRATE 30 MILLIGRAM(S): 60 TABLET, EXTENDED RELEASE ORAL at 18:03

## 2020-01-02 RX ADMIN — INSULIN GLARGINE 10 UNIT(S): 100 INJECTION, SOLUTION SUBCUTANEOUS at 23:27

## 2020-01-02 RX ADMIN — Medication 650 MILLIGRAM(S): at 18:00

## 2020-01-02 RX ADMIN — CARVEDILOL PHOSPHATE 25 MILLIGRAM(S): 80 CAPSULE, EXTENDED RELEASE ORAL at 17:58

## 2020-01-02 RX ADMIN — Medication 4 MILLIGRAM(S): at 23:28

## 2020-01-02 NOTE — PROGRESS NOTE ADULT - SUBJECTIVE AND OBJECTIVE BOX
Subjective:    Vital Signs:  Vital Signs Last 24 Hrs  T(C): 36.8 (01-02-20 @ 08:25), Max: 37.1 (01-02-20 @ 08:15)  T(F): 98.2 (01-02-20 @ 08:25), Max: 98.7 (01-02-20 @ 08:15)  HR: 60 (01-02-20 @ 08:25) (58 - 74)  BP: 111/56 (01-02-20 @ 08:25) (111/56 - 155/64)  RR: 18 (01-02-20 @ 08:25) (18 - 18)  SpO2: 93% (01-02-20 @ 08:25) (92% - 94%) on (O2)    Telemetry/Alarms:  General: WN/WD NAD  Neurology: Awake, nonfocal, MORGAN x 4  Eyes: Scleras clear, PERRLA/ EOMI, Gross vision intact  ENT:Gross hearing intact, grossly patent pharynx, no stridor  Neck: Neck supple, trachea midline, No JVD,   Respiratory: CTA B/L, No wheezing, rales, rhonchi  CV: RRR, S1S2, no murmurs, rubs or gallops  Abdominal: Soft, NT, ND +BS,   Extremities: No edema, + peripheral pulses  Skin: No Rashes, Hematoma, Ecchymosis  Lymphatic: No Neck, axilla, groin LAD  Psych: Oriented x 3, normal affect  Incisions:   Tubes:  Relevant labs, radiology and Medications reviewed    01-01    137  |  99  |  41.0<H>  ----------------------------<  115  4.2   |  26.0  |  2.58<H>    Ca    8.0<L>      01 Jan 2020 07:18        MEDICATIONS  (STANDING):  amLODIPine   Tablet 10 milliGRAM(s) Oral daily  aspirin 325 milliGRAM(s) Oral daily  atorvastatin 10 milliGRAM(s) Oral at bedtime  carvedilol 25 milliGRAM(s) Oral every 12 hours  chlorhexidine 4% Liquid 1 Application(s) Topical <User Schedule>  cholecalciferol 1000 Unit(s) Oral daily  dextrose 5%. 1000 milliLiter(s) (50 mL/Hr) IV Continuous <Continuous>  dextrose 50% Injectable 12.5 Gram(s) IV Push once  dextrose 50% Injectable 25 Gram(s) IV Push once  dextrose 50% Injectable 25 Gram(s) IV Push once  doxazosin 4 milliGRAM(s) Oral at bedtime  epoetin nereida Injectable 75600 Unit(s) SubCutaneous <User Schedule>  ferrous    sulfate 325 milliGRAM(s) Oral daily  heparin  Injectable 5000 Unit(s) SubCutaneous every 12 hours  hydrALAZINE 50 milliGRAM(s) Oral three times a day  insulin glargine Injectable (LANTUS) 10 Unit(s) SubCutaneous at bedtime  insulin lispro (HumaLOG) corrective regimen sliding scale   SubCutaneous three times a day before meals  insulin lispro Injectable (HumaLOG) 10 Unit(s) SubCutaneous three times a day before meals  isosorbide   mononitrate ER Tablet (IMDUR) 30 milliGRAM(s) Oral daily  PPD  5 Tuberculin Unit(s) Injectable 5 Unit(s) IntraDermal once  sodium bicarbonate 650 milliGRAM(s) Oral two times a day    MEDICATIONS  (PRN):  acetaminophen   Tablet .. 650 milliGRAM(s) Oral every 6 hours PRN Temp greater or equal to 38C (100.4F), Mild Pain (1 - 3)  dextrose 40% Gel 15 Gram(s) Oral once PRN Blood Glucose LESS THAN 70 milliGRAM(s)/deciliter  glucagon  Injectable 1 milliGRAM(s) IntraMuscular once PRN Glucose LESS THAN 70 milligrams/deciliter  sodium chloride 0.9% lock flush 10 milliLiter(s) IV Push every 1 hour PRN Pre/post blood products, medications, blood draw, and to maintain line patency    Pertinent Physical Exam  I&O's Summary    01 Jan 2020 07:01  -  02 Jan 2020 07:00  --------------------------------------------------------  IN: 0 mL / OUT: 1100 mL / NET: -1100 mL        Assessment  79y Male  w/ PAST MEDICAL & SURGICAL HISTORY:  Hyperkalemia  ADI (acute kidney injury)  BPH (benign prostatic hyperplasia)  CKD (chronic kidney disease)  Hyperlipemia  Hypertension  Diabetes  Ureteral stent retained  admitted with complaints of Patient is a 79y old  Male who presents with a chief complaint of Difficulty breathing (02 Jan 2020 08:36)  .  On (Date), patient underwent US guided placement of non-tunneled central venous catheter  . Postoperative course/issues:    PLAN  Neuro: Pain management  Pulm: Encourage coughing, deep breathing and use of incentive spirometry. Wean off supplemental oxygen as able. Daily CXR.   Cardio: Monitor telemetry/alarms  GI: Tolerating diet. Continue stool softeners.  Renal: monitor urine output, supplement electrolytes as needed  Vasc: Heparin SC/SCDs for DVT prophylaxis  Heme: Stable H/H. .   ID: Off antibiotics. Stable.  Therapy: OOB/ambulate  Tubes: Monitor Chest tube output  Disposition: Aim to D/C to home on  Discussed with Cardiothoracic Team at AM rounds. Subjective: Patient was seen post dialysis, is without any complaints at this time. Has no questions for me. Patient documented to have drained 800ml from the serous. -    Vital Signs:  Vital Signs Last 24 Hrs  T(C): 36.8 (01-02-20 @ 08:25), Max: 37.1 (01-02-20 @ 08:15)  T(F): 98.2 (01-02-20 @ 08:25), Max: 98.7 (01-02-20 @ 08:15)  HR: 60 (01-02-20 @ 08:25) (58 - 74)  BP: 111/56 (01-02-20 @ 08:25) (111/56 - 155/64)  RR: 18 (01-02-20 @ 08:25) (18 - 18)  SpO2: 93% (01-02-20 @ 08:25) (92% - 94%) on (O2)    Telemetry/Alarms:  General: WN/WD NAD  Neurology: Awake, nonfocal, MORGAN x 4  Eyes: Scleras clear, PERRLA/ EOMI, Gross vision intact  ENT:Gross hearing intact, grossly patent pharynx, no stridor  Neck: Neck supple, shiley removed from neck by surgical team   Respiratory: CTA B/L, No wheezing, rales, rhonchi  CV: RRR, systolic murmur noted   Abdominal: Soft, NT, ND +BS,   Extremities: No edema, + peripheral pulses  Skin: No Rashes, Hematoma, Ecchymosis  Lymphatic: No Neck, axilla, groin LAD  Psych: Oriented x 3, normal affect  Tubes: right pigtail with serous drainage   Relevant labs, radiology and Medications reviewed    01-01    137  |  99  |  41.0<H>  ----------------------------<  115  4.2   |  26.0  |  2.58<H>    Ca    8.0<L>      01 Jan 2020 07:18        MEDICATIONS  (STANDING):  amLODIPine   Tablet 10 milliGRAM(s) Oral daily  aspirin 325 milliGRAM(s) Oral daily  atorvastatin 10 milliGRAM(s) Oral at bedtime  carvedilol 25 milliGRAM(s) Oral every 12 hours  chlorhexidine 4% Liquid 1 Application(s) Topical <User Schedule>  cholecalciferol 1000 Unit(s) Oral daily  dextrose 5%. 1000 milliLiter(s) (50 mL/Hr) IV Continuous <Continuous>  dextrose 50% Injectable 12.5 Gram(s) IV Push once  dextrose 50% Injectable 25 Gram(s) IV Push once  dextrose 50% Injectable 25 Gram(s) IV Push once  doxazosin 4 milliGRAM(s) Oral at bedtime  epoetin nereida Injectable 16955 Unit(s) SubCutaneous <User Schedule>  ferrous    sulfate 325 milliGRAM(s) Oral daily  heparin  Injectable 5000 Unit(s) SubCutaneous every 12 hours  hydrALAZINE 50 milliGRAM(s) Oral three times a day  insulin glargine Injectable (LANTUS) 10 Unit(s) SubCutaneous at bedtime  insulin lispro (HumaLOG) corrective regimen sliding scale   SubCutaneous three times a day before meals  insulin lispro Injectable (HumaLOG) 10 Unit(s) SubCutaneous three times a day before meals  isosorbide   mononitrate ER Tablet (IMDUR) 30 milliGRAM(s) Oral daily  PPD  5 Tuberculin Unit(s) Injectable 5 Unit(s) IntraDermal once  sodium bicarbonate 650 milliGRAM(s) Oral two times a day    MEDICATIONS  (PRN):  acetaminophen   Tablet .. 650 milliGRAM(s) Oral every 6 hours PRN Temp greater or equal to 38C (100.4F), Mild Pain (1 - 3)  dextrose 40% Gel 15 Gram(s) Oral once PRN Blood Glucose LESS THAN 70 milliGRAM(s)/deciliter  glucagon  Injectable 1 milliGRAM(s) IntraMuscular once PRN Glucose LESS THAN 70 milligrams/deciliter  sodium chloride 0.9% lock flush 10 milliLiter(s) IV Push every 1 hour PRN Pre/post blood products, medications, blood draw, and to maintain line patency    Pertinent Physical Exam  I&O's Summary    01 Jan 2020 07:01  -  02 Jan 2020 07:00  --------------------------------------------------------  IN: 0 mL / OUT: 1100 mL / NET: -1100 mL        Assessment  79y Male  w/ PAST MEDICAL & SURGICAL HISTORY:  Hyperkalemia  ADI (acute kidney injury)  BPH (benign prostatic hyperplasia)  CKD (chronic kidney disease)  Hyperlipemia  Hypertension  Diabetes  Ureteral stent retained  admitted with complaints of Patient is a 79y old  Male who presents with a chief complaint of Difficulty breathing (02 Jan 2020 08:36)

## 2020-01-02 NOTE — PROGRESS NOTE ADULT - ASSESSMENT
Pt is a 78 y/o male with medical history of DM, HTN, CKD, BPH, pAfib with no AC, who presents to [facility] for c/o SOB. Pt is not a good historian d/t possible dementia, wife at bedside. Pt wife noticed pt has increased SOB even after discharge from [facility]. Pt also started to have frequent chills, but no known fever. Pt family further assess that "he is not himself", not as energetic and responsive to family. Pt was brought to [facility] and was found to be hypothermic at 91.2, HR @ 53 SB, BP @ 139/63. On previous admission, pt had similar symptoms and was advised to be evaluated for AVR as outpatient. Pt Denies fever, cough, phlegm production,  PND, edema, chest pain, pressure, palpitations, irregular, fast heart beat, nausea, vomiting, melena, rectal bleed, hematuria, lightheadedness, dizziness, syncope, near syncope.   As of 12/26/19, pt in no resp. distress, chest tube draining straw colored drainage. Pt insertion site dressing saturated with blood no signs of subcutaneous emphysema. Pt wheezing on inspiration with bilateral rales when auscultated.            Plan:     1. Aortic Stenosis with SOB  - Pleural effusion s/p drainage->   thoracic following up.   -improving.   left heart cath  showed on ly moderate aortic stenosis. Unclear if Echo overdiagnosed or Cath underdiagnosed.  NEvertheless, in view of discordant results. will defer  TAVR for outpatient evalns and will manage diastolic heart failure.       congestive heart failure  diuresis. will get right heart catha dn cardiomem to maintain euvolemia.   and delay long term hemodialysis .    2. HTN     -Continue current antihypertensive medication regimen     3. CKD     as above.

## 2020-01-02 NOTE — PROGRESS NOTE ADULT - SUBJECTIVE AND OBJECTIVE BOX
CC: fluid overload     INTERVAL HPI/OVERNIGHT EVENTS: seen and examined , as per CTS TAVR is not indicated , nephro decided to not perform perm cath , cardio wants to plan on doing cardiaomems .patient still having significant right chest tube output     REVIEW OF SYSTEMS:    CONSTITUTIONAL: patient has no complains No fever, weight loss, or fatigue  RESPIRATORY: No cough, wheezing, hemoptysis; No shortness of breath  CARDIOVASCULAR: No chest pain, palpitations  GASTROINTESTINAL: No abdominal or epigastric pain. No nausea, vomiting  NEUROLOGICAL: No headaches, or blurry vision       Vital Signs Last 24 Hrs  T(C): 36.4 (02 Jan 2020 15:45), Max: 37.1 (02 Jan 2020 08:15)  T(F): 97.5 (02 Jan 2020 15:45), Max: 98.7 (02 Jan 2020 08:15)  HR: 62 (02 Jan 2020 18:07) (56 - 74)  BP: 149/59 (02 Jan 2020 18:07) (111/56 - 149/68)  BP(mean): --  RR: 18 (02 Jan 2020 15:45) (18 - 21)  SpO2: 96% (02 Jan 2020 15:45) (93% - 98%)    PHYSICAL EXAM:    GENERAL: NAD, resting comfortable in bed   CHEST/LUNG: fair air entry BL.  HEART: S1S2+, Regular rate and rhythm; 2/6 systolic murmur   ABDOMEN: Soft, Nontender, Nondistended; Bowel sounds present  EXTREMITIES:  no pitting edema , pulses palpated BL.        LABS:    01-01    137  |  99  |  41.0<H>  ----------------------------<  115  4.2   |  26.0  |  2.58<H>    Ca    8.0<L>      01 Jan 2020 07:18              MEDICATIONS  (STANDING):  amLODIPine   Tablet 10 milliGRAM(s) Oral daily  aspirin 325 milliGRAM(s) Oral daily  atorvastatin 10 milliGRAM(s) Oral at bedtime  carvedilol 25 milliGRAM(s) Oral every 12 hours  chlorhexidine 4% Liquid 1 Application(s) Topical <User Schedule>  cholecalciferol 1000 Unit(s) Oral daily  dextrose 5%. 1000 milliLiter(s) (50 mL/Hr) IV Continuous <Continuous>  dextrose 50% Injectable 12.5 Gram(s) IV Push once  dextrose 50% Injectable 25 Gram(s) IV Push once  dextrose 50% Injectable 25 Gram(s) IV Push once  doxazosin 4 milliGRAM(s) Oral at bedtime  epoetin nereida Injectable 22004 Unit(s) SubCutaneous <User Schedule>  ferrous    sulfate 325 milliGRAM(s) Oral daily  heparin  Injectable 5000 Unit(s) SubCutaneous every 12 hours  hydrALAZINE 50 milliGRAM(s) Oral three times a day  insulin glargine Injectable (LANTUS) 10 Unit(s) SubCutaneous at bedtime  insulin lispro (HumaLOG) corrective regimen sliding scale   SubCutaneous three times a day before meals  insulin lispro Injectable (HumaLOG) 10 Unit(s) SubCutaneous three times a day before meals  isosorbide   mononitrate ER Tablet (IMDUR) 30 milliGRAM(s) Oral daily  PPD  5 Tuberculin Unit(s) Injectable 5 Unit(s) IntraDermal once  sodium bicarbonate 650 milliGRAM(s) Oral two times a day    MEDICATIONS  (PRN):  acetaminophen   Tablet .. 650 milliGRAM(s) Oral every 6 hours PRN Temp greater or equal to 38C (100.4F), Mild Pain (1 - 3)  dextrose 40% Gel 15 Gram(s) Oral once PRN Blood Glucose LESS THAN 70 milliGRAM(s)/deciliter  glucagon  Injectable 1 milliGRAM(s) IntraMuscular once PRN Glucose LESS THAN 70 milligrams/deciliter  sodium chloride 0.9% lock flush 10 milliLiter(s) IV Push every 1 hour PRN Pre/post blood products, medications, blood draw, and to maintain line patency      RADIOLOGY & ADDITIONAL TESTS:

## 2020-01-02 NOTE — PROGRESS NOTE ADULT - ASSESSMENT
1. Right pleural effusion /acute CHF exacerbation  chest tube in place ,had output of 1100  CXR repeated did not show PTX but showed worsening of effusions and fluid overload.  CTS on board ,plan is to continue with tube in place attached to water seal     2. ADI on CKD stage 4   on dialysis . shiley removed .  nephrology has decided to stop dialysis for now.  plan is to do cardiaomems for now and diurese .  patient will eventually need dialysis in near future .    3. Acute diastolic CHF   cardio has decided to do cardiacmems and try diuresis .       4. AS  based on cath moderate .  cath showed non obstructive CAD .  no plan for TAVR as per cardiology and CTS.        5. DM   will continue with current regimen       6. Anemia  likely AOCD .  will monitor.  on iron and epogen     8. DLP  statin     9. HTN   continue with current regimen .    10. DVT prophylaxis  heparin .    i called patient's grand son went to voicemail, will try to reach him again .

## 2020-01-02 NOTE — PROGRESS NOTE ADULT - SUBJECTIVE AND OBJECTIVE BOX
Patient is a 79y old  Male who presents with a chief complaint of Difficulty breathing (01 Jan 2020 15:31)  HD initiated on this admission via [facility] BOWERS for permcath placement today. HD this am prior to procedure    PAST MEDICAL & SURGICAL HISTORY:  Hyperkalemia  ADI (acute kidney injury)  BPH (benign prostatic hyperplasia)  CKD (chronic kidney disease)  Hyperlipemia  Hypertension  Diabetes  Ureteral stent retained    MEDICATIONS  (STANDING):  amLODIPine   Tablet 10 milliGRAM(s) Oral daily  aspirin 325 milliGRAM(s) Oral daily  atorvastatin 10 milliGRAM(s) Oral at bedtime  carvedilol 25 milliGRAM(s) Oral every 12 hours  chlorhexidine 4% Liquid 1 Application(s) Topical <User Schedule>  cholecalciferol 1000 Unit(s) Oral daily  dextrose 5%. 1000 milliLiter(s) (50 mL/Hr) IV Continuous <Continuous>  dextrose 50% Injectable 12.5 Gram(s) IV Push once  dextrose 50% Injectable 25 Gram(s) IV Push once  dextrose 50% Injectable 25 Gram(s) IV Push once  doxazosin 4 milliGRAM(s) Oral at bedtime  epoetin nereida Injectable 62475 Unit(s) SubCutaneous <User Schedule>  ferrous    sulfate 325 milliGRAM(s) Oral daily  heparin  Injectable 5000 Unit(s) SubCutaneous every 12 hours  hydrALAZINE 50 milliGRAM(s) Oral three times a day  insulin glargine Injectable (LANTUS) 10 Unit(s) SubCutaneous at bedtime  insulin lispro (HumaLOG) corrective regimen sliding scale   SubCutaneous three times a day before meals  insulin lispro Injectable (HumaLOG) 10 Unit(s) SubCutaneous three times a day before meals  isosorbide   mononitrate ER Tablet (IMDUR) 30 milliGRAM(s) Oral daily  PPD  5 Tuberculin Unit(s) Injectable 5 Unit(s) IntraDermal once  sodium bicarbonate 650 milliGRAM(s) Oral two times a day    MEDICATIONS  (PRN):  acetaminophen   Tablet .. 650 milliGRAM(s) Oral every 6 hours PRN Temp greater or equal to 38C (100.4F), Mild Pain (1 - 3)  dextrose 40% Gel 15 Gram(s) Oral once PRN Blood Glucose LESS THAN 70 milliGRAM(s)/deciliter  glucagon  Injectable 1 milliGRAM(s) IntraMuscular once PRN Glucose LESS THAN 70 milligrams/deciliter  sodium chloride 0.9% lock flush 10 milliLiter(s) IV Push every 1 hour PRN Pre/post blood products, medications, blood draw, and to maintain line patency      Allergies  No Known Allergies    Vital Signs Last 24 Hrs  T(C): 37.1 (02 Jan 2020 08:15), Max: 37.1 (02 Jan 2020 08:15)  T(F): 98.7 (02 Jan 2020 08:15), Max: 98.7 (02 Jan 2020 08:15)  HR: 58 (02 Jan 2020 08:15) (58 - 74)  BP: 126/67 (02 Jan 2020 08:15) (125/47 - 155/64)  BP(mean): --  RR: 18 (02 Jan 2020 08:15) (18 - 18)  SpO2: 93% (02 Jan 2020 08:15) (92% - 94%)  I&O's Detail    01 Jan 2020 07:01  -  02 Jan 2020 07:00  --------------------------------------------------------  IN:  Total IN: 0 mL    OUT:    Chest Tube: 1100 mL  Total OUT: 1100 mL    Total NET: -1100 mL    Physical Exam:  General: NAD, resting comfortably in bed  Neck: LUMA Dukes in place. Dressing CDI      LABS:    01-01    137  |  99  |  41.0<H>  ----------------------------<  115  4.2   |  26.0  |  2.58<H>    Ca    8.0<L>      01 Jan 2020 07:18    CAPILLARY BLOOD GLUCOSE  POCT Blood Glucose.: 165 mg/dL (01 Jan 2020 21:23)  POCT Blood Glucose.: 174 mg/dL (01 Jan 2020 16:50)  POCT Blood Glucose.: 187 mg/dL (01 Jan 2020 12:00)    Radiology and Additional Studies:    Assessment and Plan: 79yMaleShortness of breath  ADI on CKD requiring RRT    Pt preop for permcath placement today  HD prior via [facility]

## 2020-01-02 NOTE — PROGRESS NOTE ADULT - PROBLEM SELECTOR PLAN 1
Patient will have medical management for aortic stenosis at this time   No surgery per Dr. Barr.  No TAVR.

## 2020-01-02 NOTE — PROGRESS NOTE ADULT - SUBJECTIVE AND OBJECTIVE BOX
[facility] DIVISION OF KIDNEY DISEASES AND HYPERTENSION -- FOLLOW UP NOTE  --------------------------------------------------------------------------------  Chief Complaint: ADI    24 hour events/subjective:  Pt seen during HD; tolerating well      PAST HISTORY  --------------------------------------------------------------------------------  No significant changes to PMH, PSH, FHx, SHx, unless otherwise noted    ALLERGIES & MEDICATIONS  --------------------------------------------------------------------------------  Allergies    No Known Allergies    Intolerances      Standing Inpatient Medications  amLODIPine   Tablet 10 milliGRAM(s) Oral daily  aspirin 325 milliGRAM(s) Oral daily  atorvastatin 10 milliGRAM(s) Oral at bedtime  carvedilol 25 milliGRAM(s) Oral every 12 hours  chlorhexidine 4% Liquid 1 Application(s) Topical <User Schedule>  cholecalciferol 1000 Unit(s) Oral daily  dextrose 5%. 1000 milliLiter(s) IV Continuous <Continuous>  dextrose 50% Injectable 12.5 Gram(s) IV Push once  dextrose 50% Injectable 25 Gram(s) IV Push once  dextrose 50% Injectable 25 Gram(s) IV Push once  doxazosin 4 milliGRAM(s) Oral at bedtime  epoetin nereida Injectable 19118 Unit(s) SubCutaneous <User Schedule>  ferrous    sulfate 325 milliGRAM(s) Oral daily  heparin  Injectable 5000 Unit(s) SubCutaneous every 12 hours  hydrALAZINE 50 milliGRAM(s) Oral three times a day  insulin glargine Injectable (LANTUS) 10 Unit(s) SubCutaneous at bedtime  insulin lispro (HumaLOG) corrective regimen sliding scale   SubCutaneous three times a day before meals  insulin lispro Injectable (HumaLOG) 10 Unit(s) SubCutaneous three times a day before meals  isosorbide   mononitrate ER Tablet (IMDUR) 30 milliGRAM(s) Oral daily  PPD  5 Tuberculin Unit(s) Injectable 5 Unit(s) IntraDermal once  sodium bicarbonate 650 milliGRAM(s) Oral two times a day    PRN Inpatient Medications  acetaminophen   Tablet .. 650 milliGRAM(s) Oral every 6 hours PRN  dextrose 40% Gel 15 Gram(s) Oral once PRN  glucagon  Injectable 1 milliGRAM(s) IntraMuscular once PRN  sodium chloride 0.9% lock flush 10 milliLiter(s) IV Push every 1 hour PRN      REVIEW OF SYSTEMS  --------------------------------------------------------------------------------  Gen: No weight changes, fatigue, fevers/chills  Skin: No rashes  Head/Eyes/Ears/Mouth: No headache; Normal hearing; Normal vision w/o blurriness  Respiratory: No dyspnea, cough, wheezing, hemoptysis  CV: No chest pain, PND, orthopnea  GI: No abdominal pain, diarrhea, constipation, nausea, vomiting  : No increased frequency, dysuria, hematuria, nocturia  MSK: No joint pain/swelling; no back pain; no edema  Neuro: No dizziness/lightheadedness, weakness  Heme: No easy bruising or bleeding  Endo: No heat/cold intolerance  Psych: No significant nervousness, anxiety,     All other systems were reviewed and are negative, except as noted.    VITALS/PHYSICAL EXAM  --------------------------------------------------------------------------------  T(C): 36.8 (01-02-20 @ 08:25), Max: 37.1 (01-02-20 @ 08:15)  HR: 60 (01-02-20 @ 08:25) (58 - 74)  BP: 111/56 (01-02-20 @ 08:25) (111/56 - 155/64)  RR: 18 (01-02-20 @ 08:25) (18 - 18)  SpO2: 93% (01-02-20 @ 08:25) (92% - 94%)  Wt(kg): --    Weight (kg): 78.4 (01-01-20 @ 05:09)      01-01-20 @ 07:01  -  01-02-20 @ 07:00  --------------------------------------------------------  IN: 0 mL / OUT: 1100 mL / NET: -1100 mL      Physical Exam:  	Gen: NAD  	HEENT: PERRL, supple neck,  	Pulm: CTA B/L, RT chest tube  	CV: RRR, S1S2; no rub  	Back: unable to examine  	Abd: +BS, soft, nontender/nondistended  	: No suprapubic tenderness  	UE: Warm, ; no edema; no asterixis  	LE: Warm,  no edema  	Neuro: No focal deficit  	Psych: Normal affect and mood  	Skin: Warm, without rashes  	Vascular access: [facility] non tunneled HD catheter    LABS/STUDIES  --------------------------------------------------------------------------------    137  |  99  |  41.0  ----------------------------<  115      [01-01-20 @ 07:18]  4.2   |  26.0  |  2.58        Ca     8.0     [01-01-20 @ 07:18]      Creatinine Trend:  SCr 2.58 [01-01 @ 07:18]  SCr 3.17 [12-31 @ 08:11]  SCr 3.76 [12-30 @ 08:59]  SCr 4.43 [12-29 @ 06:12]  SCr 4.27 [12-28 @ 09:32]    Urinalysis - [12-28-19 @ 16:39]      Color Yellow / Appearance Cloudy / SG 1.010 / pH 5.0      Gluc Negative / Ketone Trace  / Bili Negative / Urobili Negative       Blood Large / Protein 100 / Leuk Est Small / Nitrite Negative      RBC >50 / WBC 6-10 / Hyaline  / Gran  / Sq Epi  / Non Sq Epi  / Bacteria Few      Iron 25, TIBC 285, %sat 9      [12-11-19 @ 17:50]  Ferritin 25      [12-11-19 @ 17:50]  PTH -- (Ca 8.3)      [12-12-19 @ 19:36]   164  Vitamin D (25OH) 20.3      [12-12-19 @ 19:36]  HbA1c 8.1      [12-11-19 @ 06:47]  TSH 6.54      [12-25-19 @ 21:00]    HBsAb <3.0      [12-31-19 @ 08:48]  HBsAb Nonreact      [12-31-19 @ 08:48]  HBsAg Nonreact      [12-31-19 @ 08:48]  HBcAb Nonreact      [12-31-19 @ 08:48]  HCV 0.06, Nonreact      [12-31-19 @ 08:48] [facility] DIVISION OF KIDNEY DISEASES AND HYPERTENSION -- FOLLOW UP NOTE  --------------------------------------------------------------------------------  Chief Complaint: CKD     24 hour events/subjective:  Pt seen during HD; tolerating well  No acute coomplaint      PAST HISTORY  --------------------------------------------------------------------------------  No significant changes to PMH, PSH, FHx, SHx, unless otherwise noted    ALLERGIES & MEDICATIONS  --------------------------------------------------------------------------------  Allergies    No Known Allergies    Intolerances      Standing Inpatient Medications  amLODIPine   Tablet 10 milliGRAM(s) Oral daily  aspirin 325 milliGRAM(s) Oral daily  atorvastatin 10 milliGRAM(s) Oral at bedtime  carvedilol 25 milliGRAM(s) Oral every 12 hours  chlorhexidine 4% Liquid 1 Application(s) Topical <User Schedule>  cholecalciferol 1000 Unit(s) Oral daily  dextrose 5%. 1000 milliLiter(s) IV Continuous <Continuous>  dextrose 50% Injectable 12.5 Gram(s) IV Push once  dextrose 50% Injectable 25 Gram(s) IV Push once  dextrose 50% Injectable 25 Gram(s) IV Push once  doxazosin 4 milliGRAM(s) Oral at bedtime  epoetin nereida Injectable 05425 Unit(s) SubCutaneous <User Schedule>  ferrous    sulfate 325 milliGRAM(s) Oral daily  heparin  Injectable 5000 Unit(s) SubCutaneous every 12 hours  hydrALAZINE 50 milliGRAM(s) Oral three times a day  insulin glargine Injectable (LANTUS) 10 Unit(s) SubCutaneous at bedtime  insulin lispro (HumaLOG) corrective regimen sliding scale   SubCutaneous three times a day before meals  insulin lispro Injectable (HumaLOG) 10 Unit(s) SubCutaneous three times a day before meals  isosorbide   mononitrate ER Tablet (IMDUR) 30 milliGRAM(s) Oral daily  PPD  5 Tuberculin Unit(s) Injectable 5 Unit(s) IntraDermal once  sodium bicarbonate 650 milliGRAM(s) Oral two times a day    PRN Inpatient Medications  acetaminophen   Tablet .. 650 milliGRAM(s) Oral every 6 hours PRN  dextrose 40% Gel 15 Gram(s) Oral once PRN  glucagon  Injectable 1 milliGRAM(s) IntraMuscular once PRN  sodium chloride 0.9% lock flush 10 milliLiter(s) IV Push every 1 hour PRN      REVIEW OF SYSTEMS  --------------------------------------------------------------------------------  Gen: No weight changes, fatigue, fevers/chills  Skin: No rashes  Head/Eyes/Ears/Mouth: No headache; Normal hearing  Respiratory: No dyspnea, cough, wheezing, hemoptysis  CV: No chest pain, PND, orthopnea  GI: No abdominal pain, diarrhea, constipation, nausea, vomiting  : No increased frequency, dysuria, hematuria, nocturia  MSK: No joint pain/swelling; no back pain; no edema  Neuro: No dizziness/lightheadedness, weakness  Heme: No easy bruising or bleeding  Endo: No heat/cold intolerance  Psych: No significant nervousness      VITALS/PHYSICAL EXAM  --------------------------------------------------------------------------------  T(C): 36.8 (01-02-20 @ 08:25), Max: 37.1 (01-02-20 @ 08:15)  HR: 60 (01-02-20 @ 08:25) (58 - 74)  BP: 111/56 (01-02-20 @ 08:25) (111/56 - 155/64)  RR: 18 (01-02-20 @ 08:25) (18 - 18)  SpO2: 93% (01-02-20 @ 08:25) (92% - 94%)  Wt(kg): --    Weight (kg): 78.4 (01-01-20 @ 05:09)      01-01-20 @ 07:01  -  01-02-20 @ 07:00  --------------------------------------------------------  IN: 0 mL / OUT: 1100 mL / NET: -1100 mL      Physical Exam:  	Gen: NAD  	HEENT: PERRL, supple neck,  	Pulm: CTA B/L, RT chest tube  	CV: RRR, S1S2; no rub  	Back: unable to examine  	Abd: +BS, soft, nontender/nondistended  	: No suprapubic tenderness  	UE: Warm, ; no edema; no asterixis  	LE: Warm,  no edema  	Neuro: No focal deficit  	Psych: Normal affect and mood  	Skin: Warm, without rashes  	Vascular access: [facility] non tunneled HD catheter    LABS/STUDIES  --------------------------------------------------------------------------------    137  |  99  |  41.0  ----------------------------<  115      [01-01-20 @ 07:18]  4.2   |  26.0  |  2.58        Ca     8.0     [01-01-20 @ 07:18]      Creatinine Trend:  SCr 2.58 [01-01 @ 07:18]  SCr 3.17 [12-31 @ 08:11]  SCr 3.76 [12-30 @ 08:59]  SCr 4.43 [12-29 @ 06:12]  SCr 4.27 [12-28 @ 09:32]    Urinalysis - [12-28-19 @ 16:39]      Color Yellow / Appearance Cloudy / SG 1.010 / pH 5.0      Gluc Negative / Ketone Trace  / Bili Negative / Urobili Negative       Blood Large / Protein 100 / Leuk Est Small / Nitrite Negative      RBC >50 / WBC 6-10 / Hyaline  / Gran  / Sq Epi  / Non Sq Epi  / Bacteria Few      Iron 25, TIBC 285, %sat 9      [12-11-19 @ 17:50]  Ferritin 25      [12-11-19 @ 17:50]  PTH -- (Ca 8.3)      [12-12-19 @ 19:36]   164  Vitamin D (25OH) 20.3      [12-12-19 @ 19:36]  HbA1c 8.1      [12-11-19 @ 06:47]  TSH 6.54      [12-25-19 @ 21:00]    HBsAb <3.0      [12-31-19 @ 08:48]  HBsAb Nonreact      [12-31-19 @ 08:48]  HBsAg Nonreact      [12-31-19 @ 08:48]  HBcAb Nonreact      [12-31-19 @ 08:48]  HCV 0.06, Nonreact      [12-31-19 @ 08:48]

## 2020-01-02 NOTE — PROGRESS NOTE ADULT - ASSESSMENT
1. Right pleural effusion /Acute CHF exacerbation & Moderate AS  s/p pigtail catheter     2. CRS - CKD stage 4   S/p 3 HD sessions for optimization prior to cath  Plan for Cardiomems by Cards tomorrow  Will hold off on tunneled HD catheter for now  d/c non tunneled HD catheter  Nephrology follow up outpt    3. Volume/ HTN  BP stable; tolerated UF  Diuresis as per Cards    4. Anemia  likely AOCD  on iron and KD      Discussed with Cards and Vascular Surgery

## 2020-01-02 NOTE — PROGRESS NOTE ADULT - SUBJECTIVE AND OBJECTIVE BOX
[facility]                                                               Office: [address] [address]                                                              Telephone: [phone]. Fax:[phone]                                                                             PROGRESS NOTE  Reason for follow up: congestive heart failure , aortic stenosis   Overnight: No new events.   Update:   cath shows mild coronary artery disease and moderate aortic stenosis.      Subjective: "  i am ok"    Review of symptoms: Cardiac:  No chest pain. + dyspnea. No palpitations.  Respiratory:no cough. +  dyspnea  Gastrointestinal: No diarrhea. No abdominal pain. No bleeding.      Vital Signs Last 24 Hrs  Vital Signs Last 24 Hrs  T(C): 36.4 (01-02-20 @ 15:45), Max: 37.1 (01-02-20 @ 08:15)  T(F): 97.5 (01-02-20 @ 15:45), Max: 98.7 (01-02-20 @ 08:15)  HR: 62 (01-02-20 @ 18:07) (56 - 74)  BP: 149/59 (01-02-20 @ 18:07) (111/56 - 149/68)  BP(mean): --  RR: 18 (01-02-20 @ 15:45) (18 - 21)  SpO2: 96% (01-02-20 @ 15:45) (93% - 98%)    PHYSICAL EXAM:  Appearance: Comfortable. No acute distress  HEENT:  Head and neck: Atraumatic. Normocephalic.  Normal oral mucosa, PERRL, Neck is supple   Neurologic: A & O x 3, no focal deficits. EOMI   Lymphatic: No cervical lymphadenopathy  Cardiovascular: Normal S1 S2, No murmur, rubs/gallops. No JVD, No edema  Respiratory:  Clear to auscultation bilaterally   Gastrointestinal:  Soft, Non-tender, + BS  Lower Extremities: no edema.    Psychiatry: Patient is calm. No agitation. Mood & affect appropriate  Skin: Right chest tube-saturated bright red blood, no subcutaneous emphysema. No rashes/ ecchymoses/cyanosis/ulcers visualized on the face, hands or feet.          MEDICATIONS  (STANDING):  amLODIPine   Tablet 10 milliGRAM(s) Oral daily  carvedilol 25 milliGRAM(s) Oral every 12 hours  doxazosin 4 milliGRAM(s) Oral at bedtime  hydrALAZINE 50 milliGRAM(s) Oral three times a day  isosorbide   mononitrate ER Tablet (IMDUR) 30 milliGRAM(s) Oral daily    aspirin  atorvastatin  chlorhexidine 4% Liquid  cholecalciferol  dextrose 5%.  dextrose 50% Injectable  dextrose 50% Injectable  dextrose 50% Injectable  epoetin nereida Injectable  ferrous    sulfate  heparin  Injectable  insulin glargine Injectable (LANTUS)  insulin lispro (HumaLOG) corrective regimen sliding scale  insulin lispro Injectable (HumaLOG)  PPD  5 Tuberculin Unit(s) Injectable  sodium bicarbonate    PRN: acetaminophen   Tablet .. PRN  dextrose 40% Gel PRN  glucagon  Injectable PRN  sodium chloride 0.9% lock flush PRN    DIAGNOSTIC TESTING:  [ ] Echocardiogram: < from: TTE Echo Complete w/Doppler (12.10.19 @ 13:21) >  Summary:   1. Technically difficult study.   2. Normal global left ventricular systolic function.   3. Left ventricular ejection fraction, by visual estimation, is 60 to   65%.   4. Spectral Doppler shows pseudonormal pattern of left ventricular   myocardial filling (Grade II diastolic dysfunction).   5. Elevated mean left atrial pressure. Mean LAP estimated at 22 mmHg.   6. Mild mitral annular calcification.   7. Mild thickening and calcification of the anterior and posterior   mitralvalve leaflets.   8. Mild mitral valve regurgitation.   9. Mild aortic regurgitation.  10. Moderate to severe aortic valve stenosis. Recommend SCAR for further   evaluation.  11. Moderate pleural effusion in both left and right lateral regions.  12. There is no evidence of pericardial effusion.    < end of copied text >    OTHER: 	  XRAY: < from: Xray Chest 1 View- PORTABLE-Routine (12.26.19 @ 09:10) >    Impression:    Persisting pulmonary edema with small left pleural fluid.    Right chest tube in place, no pneumothorax.    < end of copied text >      LABS:	 	         01 Jan 2020 07:18    137    |  99     |  41.0   ----------------------------<  115    4.2     |  26.0   |  2.58     Ca    8.0        01 Jan 2020 07:18

## 2020-01-02 NOTE — CHART NOTE - NSCHARTNOTEFT_GEN_A_CORE
Source: Patient [ ]  Family [ ]   other [x ] EMR    Current Diet: Diet, DASH/TLC:   Sodium & Cholesterol Restricted  Consistent Carbohydrate {Evening Snacks} (12-25-19 @ 13:53)      Patient reports [ ] nausea  [ ] vomiting [ ] diarrhea [ ] constipation  [ ]chewing problems [ ] swallowing issues  [ ] other:     PO intake:  < 50% [x ]   50-75%  [ ]   %  [ ]  other :    Source for PO intake [ ] Patient [ ] family [x ] chart [ ] staff [ ] other      Current Weight:   1/2 78 kg  1/1 78.4 kg  12/31 79.7 kg  12/25 81.6 kg    % Weight Change     Pertinent Medications: MEDICATIONS  (STANDING):  amLODIPine   Tablet 10 milliGRAM(s) Oral daily  aspirin 325 milliGRAM(s) Oral daily  atorvastatin 10 milliGRAM(s) Oral at bedtime  carvedilol 25 milliGRAM(s) Oral every 12 hours  chlorhexidine 4% Liquid 1 Application(s) Topical <User Schedule>  cholecalciferol 1000 Unit(s) Oral daily  dextrose 5%. 1000 milliLiter(s) (50 mL/Hr) IV Continuous <Continuous>  dextrose 50% Injectable 12.5 Gram(s) IV Push once  dextrose 50% Injectable 25 Gram(s) IV Push once  dextrose 50% Injectable 25 Gram(s) IV Push once  doxazosin 4 milliGRAM(s) Oral at bedtime  epoetin nereida Injectable 31968 Unit(s) SubCutaneous <User Schedule>  ferrous    sulfate 325 milliGRAM(s) Oral daily  heparin  Injectable 5000 Unit(s) SubCutaneous every 12 hours  hydrALAZINE 50 milliGRAM(s) Oral three times a day  insulin glargine Injectable (LANTUS) 10 Unit(s) SubCutaneous at bedtime  insulin lispro (HumaLOG) corrective regimen sliding scale   SubCutaneous three times a day before meals  insulin lispro Injectable (HumaLOG) 10 Unit(s) SubCutaneous three times a day before meals  isosorbide   mononitrate ER Tablet (IMDUR) 30 milliGRAM(s) Oral daily  PPD  5 Tuberculin Unit(s) Injectable 5 Unit(s) IntraDermal once  sodium bicarbonate 650 milliGRAM(s) Oral two times a day    MEDICATIONS  (PRN):  acetaminophen   Tablet .. 650 milliGRAM(s) Oral every 6 hours PRN Temp greater or equal to 38C (100.4F), Mild Pain (1 - 3)  dextrose 40% Gel 15 Gram(s) Oral once PRN Blood Glucose LESS THAN 70 milliGRAM(s)/deciliter  glucagon  Injectable 1 milliGRAM(s) IntraMuscular once PRN Glucose LESS THAN 70 milligrams/deciliter  sodium chloride 0.9% lock flush 10 milliLiter(s) IV Push every 1 hour PRN Pre/post blood products, medications, blood draw, and to maintain line patency    Pertinent Labs: 01-01 Na137 mmol/L Glu 115 mg/dL K+ 4.2 mmol/L Cr  2.58 mg/dL<H> BUN 41.0 mg/dL<H> Phos n/a   Alb n/a   PAB n/a           Skin: intact    Nutrition focused physical exam conducted - found signs of malnutrition [ ]absent [ x]present    Subcutaneous fat loss: [ ] Orbital fat pads region, [ ]Buccal fat region, [ ]Triceps region,  [ ]Ribs region    Muscle wasting: [ ]Temples region, [ ]Clavicle region, [ ]Shoulder region, [ ]Scapula region, [ ]Interosseous region,  [ ]thigh region, [ ]Calf region    Estimated Needs:   [ ] no change since previous assessment  [ ] recalculated:     Current Nutrition Diagnosis: Moderate (acute), related to inability to meet increased protein-energy needs in setting of CKD, pleural effusion, aortic stenosis,  As evidenced by pt likely meeting <75% estimated energy needs >7 days, moderate muscle/fat loss.       Recommendations:   1) glucerna TID  2) nephrovite    Monitoring and Evaluation:   [ x] PO intake [ x] Tolerance to diet prescription [X] Weights  [X] Follow up per protocol [X] Labs:

## 2020-01-02 NOTE — CHART NOTE - NSCHARTNOTEFT_GEN_A_CORE
Patient seen and examined at bedside. Doing well and hemodynamically stable. Patient no longer in need of hemodialysis this admission. Shiley removed at bedside. Pressure held for 10 minutes with hemostasis achieved. Dressing applied. Patient tolerated procedure well.

## 2020-01-03 LAB
ANION GAP SERPL CALC-SCNC: 13 MMOL/L — SIGNIFICANT CHANGE UP (ref 5–17)
BUN SERPL-MCNC: 38 MG/DL — HIGH (ref 8–20)
CALCIUM SERPL-MCNC: 8 MG/DL — LOW (ref 8.6–10.2)
CHLORIDE SERPL-SCNC: 100 MMOL/L — SIGNIFICANT CHANGE UP (ref 98–107)
CO2 SERPL-SCNC: 24 MMOL/L — SIGNIFICANT CHANGE UP (ref 22–29)
CREAT SERPL-MCNC: 2.55 MG/DL — HIGH (ref 0.5–1.3)
GLUCOSE BLDC GLUCOMTR-MCNC: 106 MG/DL — HIGH (ref 70–99)
GLUCOSE BLDC GLUCOMTR-MCNC: 254 MG/DL — HIGH (ref 70–99)
GLUCOSE BLDC GLUCOMTR-MCNC: 91 MG/DL — SIGNIFICANT CHANGE UP (ref 70–99)
GLUCOSE BLDC GLUCOMTR-MCNC: 98 MG/DL — SIGNIFICANT CHANGE UP (ref 70–99)
GLUCOSE SERPL-MCNC: 100 MG/DL — SIGNIFICANT CHANGE UP (ref 70–115)
HCT VFR BLD CALC: 26.9 % — LOW (ref 39–50)
HGB BLD-MCNC: 8.4 G/DL — LOW (ref 13–17)
MCHC RBC-ENTMCNC: 25.8 PG — LOW (ref 27–34)
MCHC RBC-ENTMCNC: 31.2 GM/DL — LOW (ref 32–36)
MCV RBC AUTO: 82.5 FL — SIGNIFICANT CHANGE UP (ref 80–100)
PLATELET # BLD AUTO: 148 K/UL — LOW (ref 150–400)
POTASSIUM SERPL-MCNC: 4.3 MMOL/L — SIGNIFICANT CHANGE UP (ref 3.5–5.3)
POTASSIUM SERPL-SCNC: 4.3 MMOL/L — SIGNIFICANT CHANGE UP (ref 3.5–5.3)
RBC # BLD: 3.26 M/UL — LOW (ref 4.2–5.8)
RBC # FLD: 20.3 % — HIGH (ref 10.3–14.5)
SODIUM SERPL-SCNC: 137 MMOL/L — SIGNIFICANT CHANGE UP (ref 135–145)
WBC # BLD: 7.43 K/UL — SIGNIFICANT CHANGE UP (ref 3.8–10.5)
WBC # FLD AUTO: 7.43 K/UL — SIGNIFICANT CHANGE UP (ref 3.8–10.5)

## 2020-01-03 PROCEDURE — 99233 SBSQ HOSP IP/OBS HIGH 50: CPT

## 2020-01-03 PROCEDURE — 99232 SBSQ HOSP IP/OBS MODERATE 35: CPT | Mod: 25

## 2020-01-03 PROCEDURE — 99232 SBSQ HOSP IP/OBS MODERATE 35: CPT

## 2020-01-03 PROCEDURE — 33289 TCAT IMPL WRLS P-ART PRS SNR: CPT

## 2020-01-03 RX ORDER — ASPIRIN/CALCIUM CARB/MAGNESIUM 324 MG
81 TABLET ORAL DAILY
Refills: 0 | Status: DISCONTINUED | OUTPATIENT
Start: 2020-01-03 | End: 2020-01-09

## 2020-01-03 RX ADMIN — CARVEDILOL PHOSPHATE 25 MILLIGRAM(S): 80 CAPSULE, EXTENDED RELEASE ORAL at 06:31

## 2020-01-03 RX ADMIN — INSULIN GLARGINE 10 UNIT(S): 100 INJECTION, SOLUTION SUBCUTANEOUS at 22:15

## 2020-01-03 RX ADMIN — Medication 650 MILLIGRAM(S): at 06:31

## 2020-01-03 RX ADMIN — ATORVASTATIN CALCIUM 10 MILLIGRAM(S): 80 TABLET, FILM COATED ORAL at 22:15

## 2020-01-03 RX ADMIN — Medication 4 MILLIGRAM(S): at 22:15

## 2020-01-03 RX ADMIN — AMLODIPINE BESYLATE 10 MILLIGRAM(S): 2.5 TABLET ORAL at 06:31

## 2020-01-03 RX ADMIN — Medication 50 MILLIGRAM(S): at 22:15

## 2020-01-03 RX ADMIN — Medication 50 MILLIGRAM(S): at 00:26

## 2020-01-03 RX ADMIN — Medication 50 MILLIGRAM(S): at 06:31

## 2020-01-03 RX ADMIN — Medication 650 MILLIGRAM(S): at 22:14

## 2020-01-03 NOTE — PROGRESS NOTE ADULT - ASSESSMENT
79M, pmhx CKD IV, HTN, HLD, chronic diastolic heart failure, BPH, mod to severe AS with prior TAVR consult on last admission, 12/25 p/w DEJESUS/SOB, acute on chronic diastolic heart failure exacerbation, b/l pleural effusions, acute on CKD, requiring HD, R pigtail placed for one liter, continues to drain serous fluid  Dr Barr not doing TAVR at this time   Pt presents today for Cardi0- Mems device implant for better Heart failure management   PRE-PROCEDURE ASSESSMENT  Cardiomems device implant   -Patient seen and examined  -Labs reviewed  -Pre-procedure teaching completed with patient  -Questions answered

## 2020-01-03 NOTE — PROGRESS NOTE ADULT - ASSESSMENT
Diagnosis: HFpEF  Procedure: RHC and CardioMEMS implant    1. Post procedure and device teaching performed with verbalization of understanding.    2. Continue current medications:        Plavix: N/A       Aspirin: 81 mg daily       Beta Blocker: Coreg 25 mg BID       ACEI/ARB: N/A       Other medications: Amlodipine 10 mg daily, Imdur 30 mg daily, hydralazine 50 mg TID    3. Follow up with: Dr. Faustin    4. Discharge home today.

## 2020-01-03 NOTE — PROGRESS NOTE ADULT - ASSESSMENT
1. Right pleural effusion /acute CHF exacerbation  chest tube in place ,low output last night   CTS on board ,plan is to continue with tube in place attached to water seal .  to be removed once output reduces however for last 3 days patient has had significant output except for today .    2. ADI on CKD stage 4   was temporarily on dialysis .   nephrology has decided to stop dialysis for now.  plan is to do cardiaomems for now and diurese .  patient will eventually need dialysis in near future .    3. Acute diastolic CHF   cardio has decided to do cardiomems and try diuresis .   diuretics to be started after measuring pressure .  will need close monitoring of respiratory , renal and fluid status .      4. AS  based on cath moderate .  cath showed non obstructive CAD .  no plan for TAVR as per cardiology and CTS.        5. DM   will continue with current regimen       6. Anemia  likely AOCD .  will monitor.  on iron and epogen     8. DLP  statin     9. HTN   continue with current regimen .    10. DVT prophylaxis  heparin .    no family members at bedside

## 2020-01-03 NOTE — PROGRESS NOTE ADULT - PROBLEM SELECTOR PLAN 1
Patient will have medical management for aortic stenosis at this time   No surgery per Dr. Barr.  No TAVR.  Cardiac Mems device to be placed  1/3

## 2020-01-03 NOTE — PROGRESS NOTE ADULT - SUBJECTIVE AND OBJECTIVE BOX
[facility] DIVISION OF KIDNEY DISEASES AND HYPERTENSION -- FOLLOW UP NOTE  --------------------------------------------------------------------------------  Chief Complaint: Advanced CKD    24 hour events/subjective:  s/p HD yesterday; tolerated UF  HD catheter removed  Pt planned for RHC and cardiomems today  Seen and examined at bedside; feels well        PAST HISTORY  --------------------------------------------------------------------------------  No significant changes to PMH, PSH, FHx, SHx, unless otherwise noted    ALLERGIES & MEDICATIONS  --------------------------------------------------------------------------------  Allergies    No Known Allergies    Intolerances      Standing Inpatient Medications  amLODIPine   Tablet 10 milliGRAM(s) Oral daily  aspirin 325 milliGRAM(s) Oral daily  atorvastatin 10 milliGRAM(s) Oral at bedtime  carvedilol 25 milliGRAM(s) Oral every 12 hours  chlorhexidine 4% Liquid 1 Application(s) Topical <User Schedule>  cholecalciferol 1000 Unit(s) Oral daily  dextrose 5%. 1000 milliLiter(s) IV Continuous <Continuous>  dextrose 50% Injectable 12.5 Gram(s) IV Push once  dextrose 50% Injectable 25 Gram(s) IV Push once  dextrose 50% Injectable 25 Gram(s) IV Push once  doxazosin 4 milliGRAM(s) Oral at bedtime  epoetin nereida Injectable 26283 Unit(s) SubCutaneous <User Schedule>  ferrous    sulfate 325 milliGRAM(s) Oral daily  heparin  Injectable 5000 Unit(s) SubCutaneous every 12 hours  hydrALAZINE 50 milliGRAM(s) Oral three times a day  insulin glargine Injectable (LANTUS) 10 Unit(s) SubCutaneous at bedtime  insulin lispro (HumaLOG) corrective regimen sliding scale   SubCutaneous three times a day before meals  insulin lispro Injectable (HumaLOG) 10 Unit(s) SubCutaneous three times a day before meals  isosorbide   mononitrate ER Tablet (IMDUR) 30 milliGRAM(s) Oral daily  PPD  5 Tuberculin Unit(s) Injectable 5 Unit(s) IntraDermal once  sodium bicarbonate 650 milliGRAM(s) Oral two times a day    PRN Inpatient Medications  acetaminophen   Tablet .. 650 milliGRAM(s) Oral every 6 hours PRN  dextrose 40% Gel 15 Gram(s) Oral once PRN  glucagon  Injectable 1 milliGRAM(s) IntraMuscular once PRN  sodium chloride 0.9% lock flush 10 milliLiter(s) IV Push every 1 hour PRN      REVIEW OF SYSTEMS  --------------------------------------------------------------------------------  Gen: No weight changes, fatigue, fevers/chills, weakness  Skin: No rashes  Head/Eyes/Ears/Mouth: No headache; Normal hearing; Normal vision w/o blurrines  Respiratory: No dyspnea, cough, wheezing, hemoptysis  CV: No chest pain, PND, orthopnea  GI: No abdominal pain, diarrhea, constipation, nausea, vomiting, melena, hematochezia  : No increased frequency, dysuria, hematuria, nocturia  MSK: No joint pain/swelling; no back pain; no edema  Neuro: No dizziness/lightheadedness, weakness, seizures, numbness, tingling  Heme: No easy bruising or bleeding  Endo: No heat/cold intolerance  Psych: No significant nervousness, anxiety, stress, depression    All other systems were reviewed and are negative, except as noted.    VITALS/PHYSICAL EXAM  --------------------------------------------------------------------------------  T(C): 36.5 (01-03-20 @ 12:29), Max: 37 (01-02-20 @ 23:47)  HR: 60 (01-03-20 @ 13:45) (58 - 64)  BP: 123/59 (01-03-20 @ 13:45) (110/54 - 149/59)  RR: 20 (01-03-20 @ 13:45) (18 - 20)  SpO2: 95% (01-03-20 @ 13:45) (93% - 99%)  Wt(kg): --    Weight (kg): 78.2 (01-03-20 @ 08:40)      01-02-20 @ 07:01  -  01-03-20 @ 07:00  --------------------------------------------------------  IN: 0 mL / OUT: 1500 mL / NET: -1500 mL      Physical Exam:  	Gen: NAD  	HEENT:, supple neck,  	Pulm: CTA B/L, RT chest tube  	CV: RRR, S1S2; no rub  	Back: no sacral edema  	Abd: +BS, soft, nontender/nondistended  	: No suprapubic tenderness  	UE: Warm, ; no edema; no asterixis  	LE: Warm,  no edema  	Neuro: No focal deficit  	Psych: Normal affect and mood  	Skin: Warm, without rashes  	Vascular access: none  LABS/STUDIES  --------------------------------------------------------------------------------              8.4    7.43  >-----------<  148      [01-03-20 @ 06:14]              26.9     137  |  100  |  38.0  ----------------------------<  100      [01-03-20 @ 06:14]  4.3   |  24.0  |  2.55        Ca     8.0     [01-03-20 @ 06:14]    Creatinine Trend:  SCr 2.55 [01-03 @ 06:14]  SCr 2.58 [01-01 @ 07:18]  SCr 3.17 [12-31 @ 08:11]  SCr 3.76 [12-30 @ 08:59]  SCr 4.43 [12-29 @ 06:12]    Urinalysis - [12-28-19 @ 16:39]      Color Yellow / Appearance Cloudy / SG 1.010 / pH 5.0      Gluc Negative / Ketone Trace  / Bili Negative / Urobili Negative       Blood Large / Protein 100 / Leuk Est Small / Nitrite Negative      RBC >50 / WBC 6-10 / Hyaline  / Gran  / Sq Epi  / Non Sq Epi  / Bacteria Few      Iron 25, TIBC 285, %sat 9      [12-11-19 @ 17:50]  Ferritin 25      [12-11-19 @ 17:50]  PTH -- (Ca 8.3)      [12-12-19 @ 19:36]   164  Vitamin D (25OH) 20.3      [12-12-19 @ 19:36]  HbA1c 8.1      [12-11-19 @ 06:47]  TSH 6.54      [12-25-19 @ 21:00]    HBsAb <3.0      [12-31-19 @ 08:48]  HBsAb Nonreact      [12-31-19 @ 08:48]  HBsAg Nonreact      [12-31-19 @ 08:48]  HBcAb Nonreact      [12-31-19 @ 08:48]  HCV 0.06, Nonreact      [12-31-19 @ 08:48]

## 2020-01-03 NOTE — PROGRESS NOTE ADULT - ASSESSMENT
1. Right pleural effusion /Acute CHF exacerbation & Moderate AS  s/p pigtail catheter   CTS on board    2. CRS - CKD stage 4   S/p 3 HD sessions for optimization prior to cath  HD catheter now removed  Plan for Cardiomems/ diuresis by susanna  Nephrology follow up outpt- likely will need dialysis in the near future    3. Volume/ HTN  BP stable; currently euvolemic      4. Anemia  likely AOCD  on iron and KD    Will follow      Discussed with Susanna and Vascular Surgery

## 2020-01-03 NOTE — PROGRESS NOTE ADULT - SUBJECTIVE AND OBJECTIVE BOX
RHC performed.     PA mean 28,   Wedge 14-16.     No further escalation of diuretics.     Cardiomems deployed.

## 2020-01-03 NOTE — PROGRESS NOTE ADULT - SUBJECTIVE AND OBJECTIVE BOX
Subjective:    Vital Signs:  Vital Signs Last 24 Hrs  T(C): 36.9 (01-03-20 @ 08:40), Max: 37 (01-02-20 @ 23:47)  T(F): 98.4 (01-03-20 @ 08:40), Max: 98.6 (01-02-20 @ 23:47)  HR: 58 (01-03-20 @ 08:40) (56 - 64)  BP: 115/60 (01-03-20 @ 08:40) (110/54 - 149/68)  RR: 18 (01-03-20 @ 08:40) (18 - 21)  SpO2: 94% (01-03-20 @ 08:40) (93% - 98%) on (O2)    Telemetry/Alarms:  General: WN/WD NAD  Neurology: Awake, nonfocal, MORGAN x 4  Eyes: Scleras clear, PERRLA/ EOMI, Gross vision intact  ENT:Gross hearing intact, grossly patent pharynx, no stridor  Neck: Neck supple, trachea midline, No JVD,   Respiratory: CTA B/L, No wheezing, rales, rhonchi  CV: RRR, S1S2, no murmurs, rubs or gallops  Abdominal: Soft, NT, ND +BS,   Extremities: No edema, + peripheral pulses  Skin: No Rashes, Hematoma, Ecchymosis  Lymphatic: No Neck, axilla, groin LAD  Psych: Oriented x 3, normal affect  Incisions:   Tubes:  Relevant labs, radiology and Medications reviewed                        8.4    7.43  )-----------( 148      ( 03 Jan 2020 06:14 )             26.9     01-03    137  |  100  |  38.0<H>  ----------------------------<  100  4.3   |  24.0  |  2.55<H>    Ca    8.0<L>      03 Jan 2020 06:14        MEDICATIONS  (STANDING):  amLODIPine   Tablet 10 milliGRAM(s) Oral daily  aspirin 325 milliGRAM(s) Oral daily  atorvastatin 10 milliGRAM(s) Oral at bedtime  carvedilol 25 milliGRAM(s) Oral every 12 hours  chlorhexidine 4% Liquid 1 Application(s) Topical <User Schedule>  cholecalciferol 1000 Unit(s) Oral daily  dextrose 5%. 1000 milliLiter(s) (50 mL/Hr) IV Continuous <Continuous>  dextrose 50% Injectable 12.5 Gram(s) IV Push once  dextrose 50% Injectable 25 Gram(s) IV Push once  dextrose 50% Injectable 25 Gram(s) IV Push once  doxazosin 4 milliGRAM(s) Oral at bedtime  epoetin nereida Injectable 66151 Unit(s) SubCutaneous <User Schedule>  ferrous    sulfate 325 milliGRAM(s) Oral daily  heparin  Injectable 5000 Unit(s) SubCutaneous every 12 hours  hydrALAZINE 50 milliGRAM(s) Oral three times a day  insulin glargine Injectable (LANTUS) 10 Unit(s) SubCutaneous at bedtime  insulin lispro (HumaLOG) corrective regimen sliding scale   SubCutaneous three times a day before meals  insulin lispro Injectable (HumaLOG) 10 Unit(s) SubCutaneous three times a day before meals  isosorbide   mononitrate ER Tablet (IMDUR) 30 milliGRAM(s) Oral daily  PPD  5 Tuberculin Unit(s) Injectable 5 Unit(s) IntraDermal once  sodium bicarbonate 650 milliGRAM(s) Oral two times a day    MEDICATIONS  (PRN):  acetaminophen   Tablet .. 650 milliGRAM(s) Oral every 6 hours PRN Temp greater or equal to 38C (100.4F), Mild Pain (1 - 3)  dextrose 40% Gel 15 Gram(s) Oral once PRN Blood Glucose LESS THAN 70 milliGRAM(s)/deciliter  glucagon  Injectable 1 milliGRAM(s) IntraMuscular once PRN Glucose LESS THAN 70 milligrams/deciliter  sodium chloride 0.9% lock flush 10 milliLiter(s) IV Push every 1 hour PRN Pre/post blood products, medications, blood draw, and to maintain line patency    Pertinent Physical Exam  I&O's Summary    02 Jan 2020 07:01  -  03 Jan 2020 07:00  --------------------------------------------------------  IN: 0 mL / OUT: 1390 mL / NET: -1390 mL        Assessment  79y Male  w/ PAST MEDICAL & SURGICAL HISTORY:  Hyperkalemia  ADI (acute kidney injury)  BPH (benign prostatic hyperplasia)  CKD (chronic kidney disease)  Hyperlipemia  Hypertension  Diabetes  Ureteral stent retained  admitted with complaints of Patient is a 79y old  Male who presents with a chief complaint of Difficulty breathing (02 Jan 2020 20:16)  .  On (Date), patient underwent US guided placement of non-tunneled central venous catheter  . Postoperative course/issues:    PLAN  Neuro: Pain management  Pulm: Encourage coughing, deep breathing and use of incentive spirometry. Wean off supplemental oxygen as able. Daily CXR.   Cardio: Monitor telemetry/alarms  GI: Tolerating diet. Continue stool softeners.  Renal: monitor urine output, supplement electrolytes as needed  Vasc: Heparin SC/SCDs for DVT prophylaxis  Heme: Stable H/H. .   ID: Off antibiotics. Stable.  Therapy: OOB/ambulate  Tubes: Monitor Chest tube output  Disposition: Aim to D/C to home on  Discussed with Cardiothoracic Team at AM rounds. Subjective: Patient is resting comfortably at this time, he has no complaints or questions.     Vital Signs:  Vital Signs Last 24 Hrs  T(C): 36.9 (01-03-20 @ 08:40), Max: 37 (01-02-20 @ 23:47)  T(F): 98.4 (01-03-20 @ 08:40), Max: 98.6 (01-02-20 @ 23:47)  HR: 58 (01-03-20 @ 08:40) (56 - 64)  BP: 115/60 (01-03-20 @ 08:40) (110/54 - 149/68)  RR: 18 (01-03-20 @ 08:40) (18 - 21)  SpO2: 94% (01-03-20 @ 08:40) (93% - 98%) on (O2)    Telemetry/Alarms:  General: WN/WD NAD  Neurology: Awake, nonfocal, MORGAN x 4  Eyes: Scleras clear, PERRLA/ EOMI, Gross vision intact  ENT:Gross hearing intact, grossly patent pharynx, no stridor  Neck: Neck supple, trachea midline, No JVD,   Respiratory: CTA B/L, No wheezing, rales, rhonchi  CV: RRR, S1S2, no murmurs, rubs or gallops  Abdominal: Soft, NT, ND +BS,   Extremities: No edema, + peripheral pulses  Skin: No Rashes, Hematoma, Ecchymosis  Lymphatic: No Neck, axilla, groin LAD  Psych: Oriented x 3, normal affect  Tubes: right pigtail   Relevant labs, radiology and Medications reviewed                        8.4    7.43  )-----------( 148      ( 03 Jan 2020 06:14 )             26.9     01-03    137  |  100  |  38.0<H>  ----------------------------<  100  4.3   |  24.0  |  2.55<H>    Ca    8.0<L>      03 Jan 2020 06:14        MEDICATIONS  (STANDING):  amLODIPine   Tablet 10 milliGRAM(s) Oral daily  aspirin 325 milliGRAM(s) Oral daily  atorvastatin 10 milliGRAM(s) Oral at bedtime  carvedilol 25 milliGRAM(s) Oral every 12 hours  chlorhexidine 4% Liquid 1 Application(s) Topical <User Schedule>  cholecalciferol 1000 Unit(s) Oral daily  dextrose 5%. 1000 milliLiter(s) (50 mL/Hr) IV Continuous <Continuous>  dextrose 50% Injectable 12.5 Gram(s) IV Push once  dextrose 50% Injectable 25 Gram(s) IV Push once  dextrose 50% Injectable 25 Gram(s) IV Push once  doxazosin 4 milliGRAM(s) Oral at bedtime  epoetin nereida Injectable 73442 Unit(s) SubCutaneous <User Schedule>  ferrous    sulfate 325 milliGRAM(s) Oral daily  heparin  Injectable 5000 Unit(s) SubCutaneous every 12 hours  hydrALAZINE 50 milliGRAM(s) Oral three times a day  insulin glargine Injectable (LANTUS) 10 Unit(s) SubCutaneous at bedtime  insulin lispro (HumaLOG) corrective regimen sliding scale   SubCutaneous three times a day before meals  insulin lispro Injectable (HumaLOG) 10 Unit(s) SubCutaneous three times a day before meals  isosorbide   mononitrate ER Tablet (IMDUR) 30 milliGRAM(s) Oral daily  PPD  5 Tuberculin Unit(s) Injectable 5 Unit(s) IntraDermal once  sodium bicarbonate 650 milliGRAM(s) Oral two times a day    MEDICATIONS  (PRN):  acetaminophen   Tablet .. 650 milliGRAM(s) Oral every 6 hours PRN Temp greater or equal to 38C (100.4F), Mild Pain (1 - 3)  dextrose 40% Gel 15 Gram(s) Oral once PRN Blood Glucose LESS THAN 70 milliGRAM(s)/deciliter  glucagon  Injectable 1 milliGRAM(s) IntraMuscular once PRN Glucose LESS THAN 70 milligrams/deciliter  sodium chloride 0.9% lock flush 10 milliLiter(s) IV Push every 1 hour PRN Pre/post blood products, medications, blood draw, and to maintain line patency    Pertinent Physical Exam  I&O's Summary    02 Jan 2020 07:01  -  03 Jan 2020 07:00  --------------------------------------------------------  IN: 0 mL / OUT: 1390 mL / NET: -1390 mL        Assessment  79y Male  w/ PAST MEDICAL & SURGICAL HISTORY:  Hyperkalemia  ADI (acute kidney injury)  BPH (benign prostatic hyperplasia)  CKD (chronic kidney disease)  Hyperlipemia  Hypertension  Diabetes  Ureteral stent retained  admitted with complaints of Patient is a 79y old  Male who presents with a chief complaint of Difficulty breathing (02 Jan 2020 20:16)

## 2020-01-03 NOTE — PROGRESS NOTE ADULT - SUBJECTIVE AND OBJECTIVE BOX
Subjective:  I feel ok   Pt presents to cath lab for Cardio- mems device implant     Medications:  acetaminophen   Tablet .. 650 milliGRAM(s) Oral every 6 hours PRN  amLODIPine   Tablet 10 milliGRAM(s) Oral daily  aspirin 325 milliGRAM(s) Oral daily  atorvastatin 10 milliGRAM(s) Oral at bedtime  carvedilol 25 milliGRAM(s) Oral every 12 hours  chlorhexidine 4% Liquid 1 Application(s) Topical <User Schedule>  cholecalciferol 1000 Unit(s) Oral daily  doxazosin 4 milliGRAM(s) Oral at bedtime  epoetin nereida Injectable 81798 Unit(s) SubCutaneous <User Schedule>  ferrous    sulfate 325 milliGRAM(s) Oral daily  glucagon  Injectable 1 milliGRAM(s) IntraMuscular once PRN  heparin  Injectable 5000 Unit(s) SubCutaneous every 12 hours  hydrALAZINE 50 milliGRAM(s) Oral three times a day  insulin glargine Injectable (LANTUS) 10 Unit(s) SubCutaneous at bedtime  insulin lispro (HumaLOG) corrective regimen sliding scale   SubCutaneous three times a day before meals  insulin lispro Injectable (HumaLOG) 10 Unit(s) SubCutaneous three times a day before meals  isosorbide   mononitrate ER Tablet (IMDUR) 30 milliGRAM(s) Oral daily  PPD  5 Tuberculin Unit(s) Injectable 5 Unit(s) IntraDermal once  sodium bicarbonate 650 milliGRAM(s) Oral two times a day  sodium chloride 0.9% lock flush 10 milliLiter(s) IV Push every 1 hour PRN      Physical Exam:    Vitals:  T(C): 36.5 (01-03-20 @ 12:29), Max: 37 (01-02-20 @ 23:47)  HR: 59 (01-03-20 @ 12:29) (58 - 64)  BP: 123/61 (01-03-20 @ 12:29) (110/54 - 149/59)  RR: 19 (01-03-20 @ 12:29) (18 - 19)  SpO2: 99% (01-03-20 @ 12:29) (93% - 99%)  Vital Signs Last 24 Hrs  T(C): 36.5 (03 Jan 2020 12:29), Max: 37 (02 Jan 2020 23:47)  T(F): 97.7 (03 Jan 2020 12:29), Max: 98.6 (02 Jan 2020 23:47)  HR: 59 (03 Jan 2020 12:29) (58 - 64)  BP: 123/61 (03 Jan 2020 12:29) (110/54 - 149/59)  RR: 19 (03 Jan 2020 12:29) (18 - 19)  SpO2: 99% (03 Jan 2020 12:29) (93% - 99%)      I&O's Summary    02 Jan 2020 07:01  -  03 Jan 2020 07:00  --------------------------------------------------------  IN: 0 mL / OUT: 1500 mL / NET: -1500 mL      General: No distress. Comfortable.  HEENT: EOM intact.  Neck: Neck supple. JVP not elevated. No masses  R IJ dialysis cath site clean and dry   Chest: decreased R greater then left  upper clear   R Pigtail to chest tube drainage serous   CV: Normal S1 and S2. 3/6 ELLEN  rub, or gallops. Radial pulses normal.  Abdomen: Soft, non-distended, non-tender  Skin: No rashes or skin breakdown  Neurology: Alert and oriented times three. Sensation intact  Psych: Affect normal    voiding 1500 over last 2400     Labs:                        8.4    7.43  )-----------( 148      ( 03 Jan 2020 06:14 )             26.9     01-03    137  |  100  |  38.0<H>  ----------------------------<  100  4.3   |  24.0  |  2.55<H>    Ca    8.0<L>      03 Jan 2020 06:14

## 2020-01-03 NOTE — PROGRESS NOTE ADULT - SUBJECTIVE AND OBJECTIVE BOX
CC: acute renal failure / fluid overload.    INTERVAL HPI/OVERNIGHT EVENTS: seen and examined , output decreased from right chest tube . plan is to do right heart cath , cardiomems and start diuretics after that .     REVIEW OF SYSTEMS:    CONSTITUTIONAL: No fever, weight loss, or fatigue  RESPIRATORY: No cough, wheezing, hemoptysis; No shortness of breath  CARDIOVASCULAR: No chest pain, palpitations  GASTROINTESTINAL: No abdominal or epigastric pain. No nausea, vomiting  NEUROLOGICAL: No headaches, or blurry vision       Vital Signs Last 24 Hrs  T(C): 36.9 (03 Jan 2020 08:40), Max: 37 (02 Jan 2020 23:47)  T(F): 98.4 (03 Jan 2020 08:40), Max: 98.6 (02 Jan 2020 23:47)  HR: 58 (03 Jan 2020 08:40) (56 - 64)  BP: 115/60 (03 Jan 2020 08:40) (110/54 - 149/68)  BP(mean): --  RR: 18 (03 Jan 2020 08:40) (18 - 21)  SpO2: 94% (03 Jan 2020 08:40) (93% - 98%)    PHYSICAL EXAM:    GENERAL: NAD, resting comfortable in bed   CHEST/LUNG: fair air entry BL.   HEART: S1S2+, Regular rate and rhythm;2/6 systolic murmur   ABDOMEN: Soft, Nontender, Nondistended; Bowel sounds present  EXTREMITIES:  mild pitting edema , pulses palpated BL.        LABS:                        8.4    7.43  )-----------( 148      ( 03 Jan 2020 06:14 )             26.9     01-03    137  |  100  |  38.0<H>  ----------------------------<  100  4.3   |  24.0  |  2.55<H>    Ca    8.0<L>      03 Jan 2020 06:14              MEDICATIONS  (STANDING):  amLODIPine   Tablet 10 milliGRAM(s) Oral daily  aspirin 325 milliGRAM(s) Oral daily  atorvastatin 10 milliGRAM(s) Oral at bedtime  carvedilol 25 milliGRAM(s) Oral every 12 hours  chlorhexidine 4% Liquid 1 Application(s) Topical <User Schedule>  cholecalciferol 1000 Unit(s) Oral daily  dextrose 5%. 1000 milliLiter(s) (50 mL/Hr) IV Continuous <Continuous>  dextrose 50% Injectable 12.5 Gram(s) IV Push once  dextrose 50% Injectable 25 Gram(s) IV Push once  dextrose 50% Injectable 25 Gram(s) IV Push once  doxazosin 4 milliGRAM(s) Oral at bedtime  epoetin nereida Injectable 49673 Unit(s) SubCutaneous <User Schedule>  ferrous    sulfate 325 milliGRAM(s) Oral daily  heparin  Injectable 5000 Unit(s) SubCutaneous every 12 hours  hydrALAZINE 50 milliGRAM(s) Oral three times a day  insulin glargine Injectable (LANTUS) 10 Unit(s) SubCutaneous at bedtime  insulin lispro (HumaLOG) corrective regimen sliding scale   SubCutaneous three times a day before meals  insulin lispro Injectable (HumaLOG) 10 Unit(s) SubCutaneous three times a day before meals  isosorbide   mononitrate ER Tablet (IMDUR) 30 milliGRAM(s) Oral daily  PPD  5 Tuberculin Unit(s) Injectable 5 Unit(s) IntraDermal once  sodium bicarbonate 650 milliGRAM(s) Oral two times a day    MEDICATIONS  (PRN):  acetaminophen   Tablet .. 650 milliGRAM(s) Oral every 6 hours PRN Temp greater or equal to 38C (100.4F), Mild Pain (1 - 3)  dextrose 40% Gel 15 Gram(s) Oral once PRN Blood Glucose LESS THAN 70 milliGRAM(s)/deciliter  glucagon  Injectable 1 milliGRAM(s) IntraMuscular once PRN Glucose LESS THAN 70 milligrams/deciliter  sodium chloride 0.9% lock flush 10 milliLiter(s) IV Push every 1 hour PRN Pre/post blood products, medications, blood draw, and to maintain line patency      RADIOLOGY & ADDITIONAL TESTS:

## 2020-01-03 NOTE — PROGRESS NOTE ADULT - SUBJECTIVE AND OBJECTIVE BOX
Department of Cardiology                                                                  [facility]/[place] [address]                                                            Telephone: [phone]. Fax:[phone]                                                                          Post CardioMEMS Implant Note    79y Male S/P RHC and CardioMEMS Implant.  The patient denies chest pain, SOB, palpitations or dizziness.    Diagnosis:       ACC/AHA Stage: C       NYHA Class: 3       Type: HFpEF       Acuity: Acute on chronic    Right Heart Pressures:       RA: 8       RV: 49/2/13       PA: 49/9/28       PCWP: 9       CO: 5.75       CI: 3.13    Sensor Information:       Sensor Serial Number: [id number]       Calibration Code: [id number]    Implanting Physician: Michelle Lund Physician: Roxie  _____________________________________________________________________________________    HPI: The patient is a 79 year old male with a history of CKD stage 4, hypertension, chronic diastolic CHF, bph and moderate-severe AS who presented to the ER with complaints of difficulty breathing. According to the patient and his wife at bedside he was admitted to [facility] on 12/10 for acute hypoxic respiratory failure secondary to acute on chronic diastolic CHF, hypertensive urgency and UTI. He was discharged home on PO diuretics. Since being home he initially felt better however his wife about a week ago he started to have symptoms of orthopnea and dyspnea at rest with worsening lower extremity edema. A house physician came to evaluate him over the weekend and increased the dose of Lasix from 40mg PO OD to BID with some improvement. However, last night he suddenly felt like he could not breath and was brought to the ER. In the ED, improved with a dose of IV lasix 40mg x 1. Noted to be hypothermic, rectal temperature of 91.3 with a heart rate of 53. Started on a bear hugger with improvement to 92.3. Lactate was negative, no leukocytosis> Chest xray with pulmonary edema and elevated BNP. Given a dose of empiric rocephin x 1. (25 Dec 2019 14:46)    PAST MEDICAL & SURGICAL HISTORY:  Hyperkalemia  ADI (acute kidney injury)  BPH (benign prostatic hyperplasia)  CKD (chronic kidney disease)  Hyperlipemia  Hypertension  Diabetes  Ureteral stent retained    PE:   T(C): 36.5 (01-03-20 @ 12:29), Max: 37 (01-02-20 @ 23:47)  HR: 54 (01-03-20 @ 15:30) (54 - 64)  BP: 131/61 (01-03-20 @ 15:30) (110/54 - 149/59)  RR: 18 (01-03-20 @ 15:30) (16 - 20)  SpO2: 95% (01-03-20 @ 15:30) (93% - 99%)    CM: NSR  General: Awake, alert, oriented, speech clear, in no acute distress.  Cardiac: S1, S2, no murmur  Respiratory: CTA  Neuro: A & O X 3                          8.4    7.43  )-----------( 148      ( 03 Jan 2020 06:14 )             26.9     01-03    137  |  100  |  38.0  ------------------------<  100  4.3   |  24.0  |  2.55    Ca    8.0      03 Jan 2020 06:14

## 2020-01-04 LAB
ANION GAP SERPL CALC-SCNC: 15 MMOL/L — SIGNIFICANT CHANGE UP (ref 5–17)
BUN SERPL-MCNC: 46 MG/DL — HIGH (ref 8–20)
CALCIUM SERPL-MCNC: 8 MG/DL — LOW (ref 8.6–10.2)
CHLORIDE SERPL-SCNC: 100 MMOL/L — SIGNIFICANT CHANGE UP (ref 98–107)
CO2 SERPL-SCNC: 23 MMOL/L — SIGNIFICANT CHANGE UP (ref 22–29)
CREAT SERPL-MCNC: 2.83 MG/DL — HIGH (ref 0.5–1.3)
GLUCOSE BLDC GLUCOMTR-MCNC: 111 MG/DL — HIGH (ref 70–99)
GLUCOSE BLDC GLUCOMTR-MCNC: 131 MG/DL — HIGH (ref 70–99)
GLUCOSE BLDC GLUCOMTR-MCNC: 158 MG/DL — HIGH (ref 70–99)
GLUCOSE BLDC GLUCOMTR-MCNC: 173 MG/DL — HIGH (ref 70–99)
GLUCOSE BLDC GLUCOMTR-MCNC: 188 MG/DL — HIGH (ref 70–99)
GLUCOSE SERPL-MCNC: 191 MG/DL — HIGH (ref 70–115)
HCT VFR BLD CALC: 31.5 % — LOW (ref 39–50)
HGB BLD-MCNC: 9.1 G/DL — LOW (ref 13–17)
MCHC RBC-ENTMCNC: 24.6 PG — LOW (ref 27–34)
MCHC RBC-ENTMCNC: 28.9 GM/DL — LOW (ref 32–36)
MCV RBC AUTO: 85.1 FL — SIGNIFICANT CHANGE UP (ref 80–100)
PLATELET # BLD AUTO: 152 K/UL — SIGNIFICANT CHANGE UP (ref 150–400)
POTASSIUM SERPL-MCNC: 5.2 MMOL/L — SIGNIFICANT CHANGE UP (ref 3.5–5.3)
POTASSIUM SERPL-SCNC: 5.2 MMOL/L — SIGNIFICANT CHANGE UP (ref 3.5–5.3)
RBC # BLD: 3.7 M/UL — LOW (ref 4.2–5.8)
RBC # FLD: 20.7 % — HIGH (ref 10.3–14.5)
SODIUM SERPL-SCNC: 138 MMOL/L — SIGNIFICANT CHANGE UP (ref 135–145)
WBC # BLD: 6.83 K/UL — SIGNIFICANT CHANGE UP (ref 3.8–10.5)
WBC # FLD AUTO: 6.83 K/UL — SIGNIFICANT CHANGE UP (ref 3.8–10.5)

## 2020-01-04 PROCEDURE — 99231 SBSQ HOSP IP/OBS SF/LOW 25: CPT

## 2020-01-04 PROCEDURE — 99233 SBSQ HOSP IP/OBS HIGH 50: CPT

## 2020-01-04 PROCEDURE — 99232 SBSQ HOSP IP/OBS MODERATE 35: CPT

## 2020-01-04 PROCEDURE — 71045 X-RAY EXAM CHEST 1 VIEW: CPT | Mod: 26

## 2020-01-04 RX ORDER — INSULIN LISPRO 100/ML
4 VIAL (ML) SUBCUTANEOUS
Refills: 0 | Status: DISCONTINUED | OUTPATIENT
Start: 2020-01-05 | End: 2020-01-05

## 2020-01-04 RX ORDER — INSULIN GLARGINE 100 [IU]/ML
4 INJECTION, SOLUTION SUBCUTANEOUS AT BEDTIME
Refills: 0 | Status: DISCONTINUED | OUTPATIENT
Start: 2020-01-04 | End: 2020-01-05

## 2020-01-04 RX ORDER — INSULIN LISPRO 100/ML
4 VIAL (ML) SUBCUTANEOUS
Refills: 0 | Status: DISCONTINUED | OUTPATIENT
Start: 2020-01-04 | End: 2020-01-04

## 2020-01-04 RX ADMIN — ISOSORBIDE MONONITRATE 30 MILLIGRAM(S): 60 TABLET, EXTENDED RELEASE ORAL at 12:03

## 2020-01-04 RX ADMIN — Medication 50 MILLIGRAM(S): at 21:50

## 2020-01-04 RX ADMIN — ATORVASTATIN CALCIUM 10 MILLIGRAM(S): 80 TABLET, FILM COATED ORAL at 21:49

## 2020-01-04 RX ADMIN — Medication 50 MILLIGRAM(S): at 05:27

## 2020-01-04 RX ADMIN — Medication 50 MILLIGRAM(S): at 15:16

## 2020-01-04 RX ADMIN — Medication 325 MILLIGRAM(S): at 12:02

## 2020-01-04 RX ADMIN — AMLODIPINE BESYLATE 10 MILLIGRAM(S): 2.5 TABLET ORAL at 05:27

## 2020-01-04 RX ADMIN — Medication 650 MILLIGRAM(S): at 05:27

## 2020-01-04 RX ADMIN — Medication 650 MILLIGRAM(S): at 17:34

## 2020-01-04 RX ADMIN — HEPARIN SODIUM 5000 UNIT(S): 5000 INJECTION INTRAVENOUS; SUBCUTANEOUS at 17:34

## 2020-01-04 RX ADMIN — Medication 10 UNIT(S): at 12:00

## 2020-01-04 RX ADMIN — Medication 1: at 09:12

## 2020-01-04 RX ADMIN — Medication 81 MILLIGRAM(S): at 12:02

## 2020-01-04 RX ADMIN — Medication 10 UNIT(S): at 09:12

## 2020-01-04 RX ADMIN — Medication 1000 UNIT(S): at 12:03

## 2020-01-04 RX ADMIN — Medication 1: at 11:59

## 2020-01-04 RX ADMIN — CHLORHEXIDINE GLUCONATE 1 APPLICATION(S): 213 SOLUTION TOPICAL at 05:27

## 2020-01-04 RX ADMIN — INSULIN GLARGINE 4 UNIT(S): 100 INJECTION, SOLUTION SUBCUTANEOUS at 23:23

## 2020-01-04 RX ADMIN — HEPARIN SODIUM 5000 UNIT(S): 5000 INJECTION INTRAVENOUS; SUBCUTANEOUS at 05:27

## 2020-01-04 RX ADMIN — Medication 4 MILLIGRAM(S): at 21:49

## 2020-01-04 NOTE — PROGRESS NOTE ADULT - SUBJECTIVE AND OBJECTIVE BOX
Patient: JEMIMA SANTOS [id number] 79y Male                           Internal Medicine Hospitalist Progress Note    Interval History:  Seen with wife at bedside.  No complaints  Chest tube in place.  Denies SOB.  pain controlled.      ____________________PHYSICAL EXAM:  Vitals reviewed as indicated below  GENERAL:  NAD Alert and Oriented x 3   HEENT: NCAT  CARDIOVASCULAR:  S1, S2  LUNGS: coarse BS R base.   ABDOMEN:  soft, (-) tenderness, (-) distension, (+) bowel sounds, (-) guarding, (-) rebound (-) rigidity  EXTREMITIES:  no cyanosis / clubbing.  Trace edema.   ____________________      BACKGROUND:  HEALTH ISSUES - PROBLEM Dx:  Aortic stenosis: Aortic stenosis  Stage 4 chronic kidney disease: Stage 4 chronic kidney disease  Pleural effusion: Pleural effusion        Allergies    No Known Allergies    Intolerances      PAST MEDICAL & SURGICAL HISTORY:  Hyperkalemia  ADI (acute kidney injury)  BPH (benign prostatic hyperplasia)  CKD (chronic kidney disease)  Hyperlipemia  Hypertension  Diabetes  Ureteral stent retained        VITALS:  Vital Signs Last 24 Hrs  T(C): 36.8 (04 Jan 2020 08:00), Max: 36.8 (04 Jan 2020 08:00)  T(F): 98.2 (04 Jan 2020 08:00), Max: 98.2 (04 Jan 2020 08:00)  HR: 58 (04 Jan 2020 08:00) (53 - 85)  BP: 131/66 (04 Jan 2020 08:00) (123/59 - 147/68)  BP(mean): --  RR: 18 (04 Jan 2020 08:00) (14 - 20)  SpO2: 96% (04 Jan 2020 08:00) (95% - 97%) Daily     Daily   CAPILLARY BLOOD GLUCOSE      POCT Blood Glucose.: 173 mg/dL (04 Jan 2020 11:30)  POCT Blood Glucose.: 188 mg/dL (04 Jan 2020 08:48)  POCT Blood Glucose.: 254 mg/dL (03 Jan 2020 22:09)  POCT Blood Glucose.: 98 mg/dL (03 Jan 2020 16:48)    I&O's Summary    03 Jan 2020 07:01  -  04 Jan 2020 07:00  --------------------------------------------------------  IN: 0 mL / OUT: 990 mL / NET: -990 mL          LABS:                        9.1    6.83  )-----------( 152      ( 04 Jan 2020 08:46 )             31.5     01-04    138  |  100  |  46.0<H>  ----------------------------<  191<H>  5.2   |  23.0  |  2.83<H>    Ca    8.0<L>      04 Jan 2020 08:46                    MEDICATIONS:  MEDICATIONS  (STANDING):  amLODIPine   Tablet 10 milliGRAM(s) Oral daily  aspirin  chewable 81 milliGRAM(s) Oral daily  atorvastatin 10 milliGRAM(s) Oral at bedtime  carvedilol 25 milliGRAM(s) Oral every 12 hours  chlorhexidine 4% Liquid 1 Application(s) Topical <User Schedule>  cholecalciferol 1000 Unit(s) Oral daily  dextrose 5%. 1000 milliLiter(s) (50 mL/Hr) IV Continuous <Continuous>  dextrose 50% Injectable 12.5 Gram(s) IV Push once  dextrose 50% Injectable 25 Gram(s) IV Push once  dextrose 50% Injectable 25 Gram(s) IV Push once  doxazosin 4 milliGRAM(s) Oral at bedtime  epoetin nereida Injectable 17265 Unit(s) SubCutaneous <User Schedule>  ferrous    sulfate 325 milliGRAM(s) Oral daily  heparin  Injectable 5000 Unit(s) SubCutaneous every 12 hours  hydrALAZINE 50 milliGRAM(s) Oral three times a day  insulin glargine Injectable (LANTUS) 10 Unit(s) SubCutaneous at bedtime  insulin lispro (HumaLOG) corrective regimen sliding scale   SubCutaneous three times a day before meals  insulin lispro Injectable (HumaLOG) 10 Unit(s) SubCutaneous three times a day before meals  isosorbide   mononitrate ER Tablet (IMDUR) 30 milliGRAM(s) Oral daily  PPD  5 Tuberculin Unit(s) Injectable 5 Unit(s) IntraDermal once  sodium bicarbonate 650 milliGRAM(s) Oral two times a day    MEDICATIONS  (PRN):  acetaminophen   Tablet .. 650 milliGRAM(s) Oral every 6 hours PRN Temp greater or equal to 38C (100.4F), Mild Pain (1 - 3)  dextrose 40% Gel 15 Gram(s) Oral once PRN Blood Glucose LESS THAN 70 milliGRAM(s)/deciliter  glucagon  Injectable 1 milliGRAM(s) IntraMuscular once PRN Glucose LESS THAN 70 milligrams/deciliter  sodium chloride 0.9% lock flush 10 milliLiter(s) IV Push every 1 hour PRN Pre/post blood products, medications, blood draw, and to maintain line patency

## 2020-01-04 NOTE — PROGRESS NOTE ADULT - PROBLEM SELECTOR PLAN 1
Patient will have medical management for aortic stenosis at this time   No surgery per Dr. Barr.  No TAVR.  s/p Cardiac Mems device

## 2020-01-04 NOTE — PROGRESS NOTE ADULT - SUBJECTIVE AND OBJECTIVE BOX
Post Cardiomens check  RHC  PA mean 28,   Wedge 14-16.   Cardiomens inserted      Feeling well  CM  nsr no arrhthymias  right jugular no ecchymosis    patient has Cardiomens monitor and states he knows how to use it

## 2020-01-04 NOTE — PROGRESS NOTE ADULT - ASSESSMENT
79M, pmhx CKD IV, HTN, HLD, chronic diastolic heart failure, BPH, mod to severe AS with prior TAVR consult on last admission, 12/25 p/w DEJESUS/SOB, acute on chronic diastolic heart failure exacerbation, b/l pleural effusions, acute on CKD, requiring HD, R pigtail placed for one liter, continues to drain serous fluid. CX NGTD. Cyto negative. 1/3 s/p cardiac mems device implanted.         Discussed with Dr. Tomas.     Marta STUART  Thoracic Surgery   #[phone]

## 2020-01-04 NOTE — PROGRESS NOTE ADULT - PROBLEM SELECTOR PLAN 2
Patient s/p HD for a total of 3 sessions to optimize for cath, augusto removed post dialysis. Renal following.

## 2020-01-04 NOTE — PROGRESS NOTE ADULT - ASSESSMENT
79 year old male with a history of CKD stage 4, hypertension, chronic diastolic CHF, bph and moderate-severe AS who presented to the ER with complaints of SOB.  In ED given dose of Lasix with some improvement.  Noted hypothermic.  CXR revealed pulmonary edema and R pleural effusion.  Cardiology and CT surgery were consulted. Started on IV diuresis. Status post right chest tube placement on 12/25.  S/p Cardiomems placement.     #. Right pleural effusion /acute on chronic Diastolic CHF exacerbation - Chest tube in place.  CT surgery followup regarding eventual removal, as pt with intermittent output.  S/p cardiomems placement.   #. ADI on CKD stage 4 - Had been temporarily on dialysis.  Remains off HD for now  #. Moderate Aortic Stenosis - noted on recent cath.  No plans for TAVR at present per cardiology and CT surgery  # Diabetes Type II - monitor fingersticks.  Insulin coverage for hyperglycemia.   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  Monitor on Fe, Epogen.  # Hyperlipidemia - diet modification.  Continue Statin.  # Essential HTN - Monitor BP.  Continue oral antihypertensives.  #. DVT prophylaxis heparin .

## 2020-01-04 NOTE — PROGRESS NOTE ADULT - SUBJECTIVE AND OBJECTIVE BOX
[facility] DIVISION OF KIDNEY DISEASES AND HYPERTENSION -- FOLLOW UP NOTE  --------------------------------------------------------------------------------  Chief Complaint: CKD    24 hour events/subjective:  s/p Cardiomems  Chest tube draining ~990 ml  Pt seen and examined; feels well        PAST HISTORY  --------------------------------------------------------------------------------  No significant changes to PMH, PSH, FHx, SHx, unless otherwise noted    ALLERGIES & MEDICATIONS  --------------------------------------------------------------------------------  Allergies    No Known Allergies    Intolerances      Standing Inpatient Medications  amLODIPine   Tablet 10 milliGRAM(s) Oral daily  aspirin  chewable 81 milliGRAM(s) Oral daily  atorvastatin 10 milliGRAM(s) Oral at bedtime  carvedilol 25 milliGRAM(s) Oral every 12 hours  chlorhexidine 4% Liquid 1 Application(s) Topical <User Schedule>  cholecalciferol 1000 Unit(s) Oral daily  dextrose 5%. 1000 milliLiter(s) IV Continuous <Continuous>  dextrose 50% Injectable 12.5 Gram(s) IV Push once  dextrose 50% Injectable 25 Gram(s) IV Push once  dextrose 50% Injectable 25 Gram(s) IV Push once  doxazosin 4 milliGRAM(s) Oral at bedtime  epoetin nereida Injectable 63700 Unit(s) SubCutaneous <User Schedule>  ferrous    sulfate 325 milliGRAM(s) Oral daily  heparin  Injectable 5000 Unit(s) SubCutaneous every 12 hours  hydrALAZINE 50 milliGRAM(s) Oral three times a day  insulin glargine Injectable (LANTUS) 10 Unit(s) SubCutaneous at bedtime  insulin lispro (HumaLOG) corrective regimen sliding scale   SubCutaneous three times a day before meals  insulin lispro Injectable (HumaLOG) 10 Unit(s) SubCutaneous three times a day before meals  isosorbide   mononitrate ER Tablet (IMDUR) 30 milliGRAM(s) Oral daily  PPD  5 Tuberculin Unit(s) Injectable 5 Unit(s) IntraDermal once  sodium bicarbonate 650 milliGRAM(s) Oral two times a day    PRN Inpatient Medications  acetaminophen   Tablet .. 650 milliGRAM(s) Oral every 6 hours PRN  dextrose 40% Gel 15 Gram(s) Oral once PRN  glucagon  Injectable 1 milliGRAM(s) IntraMuscular once PRN  sodium chloride 0.9% lock flush 10 milliLiter(s) IV Push every 1 hour PRN      REVIEW OF SYSTEMS  --------------------------------------------------------------------------------  Gen: No weight changes, fatigue, fevers/chills, weakness  Skin: No rashes  Head/Eyes/Ears/Mouth: No headache; Normal hearing; Normal vision w/o blurriness; No sinus pain/discomfort, sore throat  Respiratory: No dyspnea, cough, wheezing, hemoptysis  CV: No chest pain, PND, orthopnea  GI: No abdominal pain, diarrhea, constipation, nausea, vomiting, melena, hematochezia  : No increased frequency, dysuria, hematuria, nocturia  MSK: No joint pain/swelling; no back pain; no edema  Neuro: No dizziness/lightheadedness, weakness, seizures, numbness, tingling  Heme: No easy bruising or bleeding  Endo: No heat/cold intolerance  Psych: No significant nervousness, anxiety, stress, depression    All other systems were reviewed and are negative, except as noted.    VITALS/PHYSICAL EXAM  --------------------------------------------------------------------------------  T(C): 36.5 (01-04-20 @ 16:08), Max: 36.8 (01-04-20 @ 08:00)  HR: 57 (01-04-20 @ 16:08) (56 - 85)  BP: 142/62 (01-04-20 @ 16:08) (124/61 - 144/85)  RR: 18 (01-04-20 @ 16:08) (14 - 18)  SpO2: 97% (01-04-20 @ 16:08) (95% - 97%)  Wt(kg): --    Weight (kg): 78.2 (01-03-20 @ 08:40)      01-03-20 @ 07:01  -  01-04-20 @ 07:00  --------------------------------------------------------  IN: 0 mL / OUT: 990 mL / NET: -990 mL    01-04-20 @ 07:01  -  01-04-20 @ 17:10  --------------------------------------------------------  IN: 0 mL / OUT: 400 mL / NET: -400 mL      Physical Exam:  	Gen: NAD  	HEENT:, supple neck,  	Pulm: CTA B/L, RT chest tube  	CV: RRR, S1S2; no rub  	Back: no sacral edema  	Abd: +BS, soft, nontender/nondistended  	: No suprapubic tenderness  	UE: Warm, ; no edema; no asterixis  	LE: Warm,  no edema  	Neuro: No focal deficit  	Psych: Normal affect and mood  	Skin: Warm, without rashes  	Vascular access: none    LABS/STUDIES  --------------------------------------------------------------------------------              9.1    6.83  >-----------<  152      [01-04-20 @ 08:46]              31.5     138  |  100  |  46.0  ----------------------------<  191      [01-04-20 @ 08:46]  5.2   |  23.0  |  2.83        Ca     8.0     [01-04-20 @ 08:46]    Creatinine Trend:  SCr 2.83 [01-04 @ 08:46]  SCr 2.55 [01-03 @ 06:14]  SCr 2.58 [01-01 @ 07:18]  SCr 3.17 [12-31 @ 08:11]  SCr 3.76 [12-30 @ 08:59]    Urinalysis - [12-28-19 @ 16:39]      Color Yellow / Appearance Cloudy / SG 1.010 / pH 5.0      Gluc Negative / Ketone Trace  / Bili Negative / Urobili Negative       Blood Large / Protein 100 / Leuk Est Small / Nitrite Negative      RBC >50 / WBC 6-10 / Hyaline  / Gran  / Sq Epi  / Non Sq Epi  / Bacteria Few      Iron 25, TIBC 285, %sat 9      [12-11-19 @ 17:50]  Ferritin 25      [12-11-19 @ 17:50]  PTH -- (Ca 8.3)      [12-12-19 @ 19:36]   164  Vitamin D (25OH) 20.3      [12-12-19 @ 19:36]  HbA1c 8.1      [12-11-19 @ 06:47]  TSH 6.54      [12-25-19 @ 21:00]    HBsAb <3.0      [12-31-19 @ 08:48]  HBsAb Nonreact      [12-31-19 @ 08:48]  HBsAg Nonreact      [12-31-19 @ 08:48]  HBcAb Nonreact      [12-31-19 @ 08:48]  HCV 0.06, Nonreact      [12-31-19 @ 08:48]

## 2020-01-04 NOTE — PROGRESS NOTE ADULT - ASSESSMENT
1. Right pleural effusion /Acute CHF exacerbation & Moderate AS  s/p pigtail catheter   CTS on board    2. CRS - CKD stage 4   S/p 3 HD sessions for optimization prior to cath  HD catheter now removed  Nephrology follow up outpt- likely will need dialysis in the near future    3. Volume/ HTN  BP stable; currently euvolemic  s/p cardiomems    4. Anemia  likely AOCD  on iron and KD    Will follow      Discussed with Cards and Vascular Surgery

## 2020-01-04 NOTE — PROGRESS NOTE ADULT - PROBLEM SELECTOR PLAN 3
12/25 Rt PTC   Maintain CT to H20 seal and record daily output  ISS/cough and deep breathing exercise  daily CXR  d/c when output improves  pain control   d/w Dr. Tomas

## 2020-01-04 NOTE — PROGRESS NOTE ADULT - SUBJECTIVE AND OBJECTIVE BOX
Subjective: Patient comfortable. Saturating well on RA. Denies fever, chills, cough, SOB.     Vital Signs:  Vital Signs Last 24 Hrs  T(C): 36.5 (01-04-20 @ 16:08), Max: 36.8 (01-04-20 @ 08:00)  T(F): 97.7 (01-04-20 @ 16:08), Max: 98.2 (01-04-20 @ 08:00)  HR: 57 (01-04-20 @ 16:08) (56 - 85)  BP: 142/62 (01-04-20 @ 16:08) (124/61 - 144/85)  RR: 18 (01-04-20 @ 16:08) (14 - 18)  SpO2: 97% (01-04-20 @ 16:08) (95% - 97%) on (O2)    I&O's Detail    03 Jan 2020 07:01  -  04 Jan 2020 07:00  --------------------------------------------------------  IN:  Total IN: 0 mL    OUT:    Chest Tube: 990 mL  Total OUT: 990 mL    Total NET: -990 mL      04 Jan 2020 07:01  -  04 Jan 2020 16:45  --------------------------------------------------------  IN:  Total IN: 0 mL    OUT:    Chest Tube: 400 mL  Total OUT: 400 mL    Total NET: -400 mL        General: NAD  Neurology: Awake, nonfocal, MORGAN x 4  Eyes: Scleras clear, PERRLA/ EOMI, Gross vision intact  ENT: Gross hearing intact, grossly patent pharynx, no stridor  Neck: Neck supple, trachea midline, No JVD  Respiratory: CTA B/L, No wheezing, rales, rhonchi  CV: S1S2, no murmurs, rubs or gallops  Abdominal: Soft, NT, ND  Extremities: 1+ b/l LE edema  Psych: Oriented x 3  Tubes: R PTC to WS, no air leak, 990cc out in last 24H    Relevant labs, radiology and Medications reviewed                        9.1    6.83  )-----------( 152      ( 04 Jan 2020 08:46 )             31.5     01-04    138  |  100  |  46.0<H>  ----------------------------<  191<H>  5.2   |  23.0  |  2.83<H>    Ca    8.0<L>      04 Jan 2020 08:46        MEDICATIONS  (STANDING):  amLODIPine   Tablet 10 milliGRAM(s) Oral daily  aspirin  chewable 81 milliGRAM(s) Oral daily  atorvastatin 10 milliGRAM(s) Oral at bedtime  carvedilol 25 milliGRAM(s) Oral every 12 hours  chlorhexidine 4% Liquid 1 Application(s) Topical <User Schedule>  cholecalciferol 1000 Unit(s) Oral daily  dextrose 5%. 1000 milliLiter(s) (50 mL/Hr) IV Continuous <Continuous>  dextrose 50% Injectable 12.5 Gram(s) IV Push once  dextrose 50% Injectable 25 Gram(s) IV Push once  dextrose 50% Injectable 25 Gram(s) IV Push once  doxazosin 4 milliGRAM(s) Oral at bedtime  epoetin nereida Injectable 90475 Unit(s) SubCutaneous <User Schedule>  ferrous    sulfate 325 milliGRAM(s) Oral daily  heparin  Injectable 5000 Unit(s) SubCutaneous every 12 hours  hydrALAZINE 50 milliGRAM(s) Oral three times a day  insulin glargine Injectable (LANTUS) 10 Unit(s) SubCutaneous at bedtime  insulin lispro (HumaLOG) corrective regimen sliding scale   SubCutaneous three times a day before meals  insulin lispro Injectable (HumaLOG) 10 Unit(s) SubCutaneous three times a day before meals  isosorbide   mononitrate ER Tablet (IMDUR) 30 milliGRAM(s) Oral daily  PPD  5 Tuberculin Unit(s) Injectable 5 Unit(s) IntraDermal once  sodium bicarbonate 650 milliGRAM(s) Oral two times a day    MEDICATIONS  (PRN):  acetaminophen   Tablet .. 650 milliGRAM(s) Oral every 6 hours PRN Temp greater or equal to 38C (100.4F), Mild Pain (1 - 3)  dextrose 40% Gel 15 Gram(s) Oral once PRN Blood Glucose LESS THAN 70 milliGRAM(s)/deciliter  glucagon  Injectable 1 milliGRAM(s) IntraMuscular once PRN Glucose LESS THAN 70 milligrams/deciliter  sodium chloride 0.9% lock flush 10 milliLiter(s) IV Push every 1 hour PRN Pre/post blood products, medications, blood draw, and to maintain line patency        Assessment  79y Male  w/ PAST MEDICAL & SURGICAL HISTORY:  Hyperkalemia  ADI (acute kidney injury)  BPH (benign prostatic hyperplasia)  CKD (chronic kidney disease)  Hyperlipemia  Hypertension  Diabetes  Ureteral stent retained  admitted with complaints of Patient is a 79y old  Male who presents with a chief complaint of Difficulty breathing (04 Jan 2020 13:10)   patient underwent US guided placement of non-tunneled central venous catheter

## 2020-01-05 LAB
GLUCOSE BLDC GLUCOMTR-MCNC: 212 MG/DL — HIGH (ref 70–99)
GLUCOSE BLDC GLUCOMTR-MCNC: 96 MG/DL — SIGNIFICANT CHANGE UP (ref 70–99)
GLUCOSE BLDC GLUCOMTR-MCNC: 98 MG/DL — SIGNIFICANT CHANGE UP (ref 70–99)

## 2020-01-05 PROCEDURE — 99232 SBSQ HOSP IP/OBS MODERATE 35: CPT

## 2020-01-05 PROCEDURE — 99233 SBSQ HOSP IP/OBS HIGH 50: CPT

## 2020-01-05 PROCEDURE — 71045 X-RAY EXAM CHEST 1 VIEW: CPT | Mod: 26

## 2020-01-05 RX ORDER — INSULIN LISPRO 100/ML
4 VIAL (ML) SUBCUTANEOUS
Refills: 0 | Status: DISCONTINUED | OUTPATIENT
Start: 2020-01-05 | End: 2020-01-09

## 2020-01-05 RX ADMIN — Medication 650 MILLIGRAM(S): at 18:07

## 2020-01-05 RX ADMIN — Medication 1000 UNIT(S): at 11:48

## 2020-01-05 RX ADMIN — CARVEDILOL PHOSPHATE 25 MILLIGRAM(S): 80 CAPSULE, EXTENDED RELEASE ORAL at 05:47

## 2020-01-05 RX ADMIN — CARVEDILOL PHOSPHATE 25 MILLIGRAM(S): 80 CAPSULE, EXTENDED RELEASE ORAL at 18:07

## 2020-01-05 RX ADMIN — CHLORHEXIDINE GLUCONATE 1 APPLICATION(S): 213 SOLUTION TOPICAL at 05:47

## 2020-01-05 RX ADMIN — Medication 50 MILLIGRAM(S): at 05:47

## 2020-01-05 RX ADMIN — Medication 50 MILLIGRAM(S): at 21:53

## 2020-01-05 RX ADMIN — Medication 325 MILLIGRAM(S): at 11:48

## 2020-01-05 RX ADMIN — AMLODIPINE BESYLATE 10 MILLIGRAM(S): 2.5 TABLET ORAL at 05:47

## 2020-01-05 RX ADMIN — Medication 50 MILLIGRAM(S): at 13:23

## 2020-01-05 RX ADMIN — HEPARIN SODIUM 5000 UNIT(S): 5000 INJECTION INTRAVENOUS; SUBCUTANEOUS at 05:46

## 2020-01-05 RX ADMIN — HEPARIN SODIUM 5000 UNIT(S): 5000 INJECTION INTRAVENOUS; SUBCUTANEOUS at 18:06

## 2020-01-05 RX ADMIN — Medication 81 MILLIGRAM(S): at 11:48

## 2020-01-05 RX ADMIN — Medication 4 UNIT(S): at 09:22

## 2020-01-05 RX ADMIN — Medication 4 MILLIGRAM(S): at 21:53

## 2020-01-05 RX ADMIN — Medication 650 MILLIGRAM(S): at 05:46

## 2020-01-05 RX ADMIN — Medication 2: at 18:03

## 2020-01-05 RX ADMIN — ISOSORBIDE MONONITRATE 30 MILLIGRAM(S): 60 TABLET, EXTENDED RELEASE ORAL at 11:49

## 2020-01-05 RX ADMIN — ATORVASTATIN CALCIUM 10 MILLIGRAM(S): 80 TABLET, FILM COATED ORAL at 21:53

## 2020-01-05 NOTE — PROGRESS NOTE ADULT - ASSESSMENT
79 year old male with a history of CKD stage 4, hypertension, chronic diastolic CHF, bph and moderate-severe AS who presented to the ER with complaints of SOB.  In ED given dose of Lasix with some improvement.  Noted hypothermic.  CXR revealed pulmonary edema and R pleural effusion.  Cardiology and CT surgery were consulted. Started on IV diuresis. Status post right chest tube placement on 12/25.  S/p Cardiomems placement.     #. Right pleural effusion /acute on chronic Diastolic CHF exacerbation - Chest tube in place.  CT surgery followup regarding eventual removal, as pt with intermittent output.  S/p cardiomems placement.   #. ADI on CKD stage 4 - Had been temporarily on dialysis.  Remains off HD for now  #. Moderate Aortic Stenosis - noted on recent cath.  No plans for TAVR at present per cardiology and CT surgery  # Diabetes Type II - monitor fingersticks.  Insulin coverage for hyperglycemia.  D/c Lantus.  Continue Premeal insulin.   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  Monitor on Fe, Epogen.  # Hyperlipidemia - diet modification.  Continue Statin.  # Essential HTN - Monitor BP.  Continue oral antihypertensives.  #. DVT prophylaxis heparin .

## 2020-01-05 NOTE — PROGRESS NOTE ADULT - PROBLEM SELECTOR PLAN 3
12/25 Rt PTC   Stripped tube today and 300ml serous drainage  immediately out of tube.  Maintain CT to H20 seal and record daily output  ISS/cough and deep breathing exercise  daily CXR  d/c when output improves  pain control   d/w Dr. Tomas

## 2020-01-05 NOTE — PROGRESS NOTE ADULT - ASSESSMENT
79M, pmhx CKD IV, HTN, HLD, chronic diastolic heart failure, BPH, mod to severe AS with prior TAVR consult on last admission, 12/25 p/w DEJESUS/SOB, acute on chronic diastolic heart failure exacerbation, b/l pleural effusions, acute on CKD, requiring HD, R pigtail placed for one liter, continues to drain serous fluid. CX NGTD. Cyto negative. 1/3 s/p cardiac mems device implanted.

## 2020-01-05 NOTE — PROGRESS NOTE ADULT - SUBJECTIVE AND OBJECTIVE BOX
Subjective:  Pt sitting up in bed.  Denies CP or SOB.  NAD noted.                          T(F): 98.1 (01-05-20 @ 08:40), Max: 98.1 (01-05-20 @ 08:40)  HR: 58 (01-05-20 @ 08:40) (56 - 58)  BP: 120/66 (01-05-20 @ 08:40) (120/66 - 144/75)  RR: 18 (01-05-20 @ 08:40) (18 - 18)  SpO2: 92% (01-05-20 @ 08:40) (92% - 97%)    LV EF: 60%    No Known Allergies      01-04    138  |  100  |  46.0<H>  ----------------------------<  191<H>  5.2   |  23.0  |  2.83<H>    Ca    8.0<L>      04 Jan 2020 08:46                                 9.1    6.83  )-----------( 152      ( 04 Jan 2020 08:46 )             31.5               CAPILLARY BLOOD GLUCOSE  POCT Blood Glucose.: 98 mg/dL (05 Jan 2020 11:25)  POCT Blood Glucose.: 96 mg/dL (05 Jan 2020 08:21)  POCT Blood Glucose.: 131 mg/dL (04 Jan 2020 23:22)  POCT Blood Glucose.: 158 mg/dL (04 Jan 2020 21:15)  POCT Blood Glucose.: 111 mg/dL (04 Jan 2020 17:14)           CXR: < from: Xray Chest 1 View- PORTABLE-Routine (01.05.20 @ 07:54) >  EXAM:  XR CHEST PORTABLE ROUTINE 1V                          PROCEDURE DATE:  01/05/2020      INTERPRETATION:  XR CHEST    Single AP view    HISTORY:  Status post pigtail insertion.    Comparison:  Chest x-ray 1/4/2020    Right-sided chest tube. No sizable right pleural effusion or pneumothorax. Right lung is clear.    Retrocardiac opacity again seen in the left.    A loop recorder is noted. Cardiomediastinal silhouette is stable. Cardiomems again noted.    IMPRESSION:    Retrocardiac opacity at left lung base, either atelectasis or pneumonia.      KAREN COULTER   This document has been electronically signed. Jan 5 2020 12:56PM    < end of copied text >      I&O's Detail    04 Jan 2020 07:01  -  05 Jan 2020 07:00  --------------------------------------------------------  IN:  Total IN: 0 mL    OUT:    Chest Tube: 440 mL    Voided: 200 mL  Total OUT: 640 mL    Total NET: -640 mL      CHEST TUBE:  [ x] YES [ ] NO  OUTPUT:  40ml overnight (440ml over the last 24 hours)    AIR LEAKS:  [ ] YES [x ] NO        Active Medications:  acetaminophen   Tablet .. 650 milliGRAM(s) Oral every 6 hours PRN  amLODIPine   Tablet 10 milliGRAM(s) Oral daily  aspirin  chewable 81 milliGRAM(s) Oral daily  atorvastatin 10 milliGRAM(s) Oral at bedtime  carvedilol 25 milliGRAM(s) Oral every 12 hours  chlorhexidine 4% Liquid 1 Application(s) Topical <User Schedule>  cholecalciferol 1000 Unit(s) Oral daily  dextrose 40% Gel 15 Gram(s) Oral once PRN  dextrose 5%. 1000 milliLiter(s) IV Continuous <Continuous>  dextrose 50% Injectable 12.5 Gram(s) IV Push once  dextrose 50% Injectable 25 Gram(s) IV Push once  dextrose 50% Injectable 25 Gram(s) IV Push once  doxazosin 4 milliGRAM(s) Oral at bedtime  epoetin nereida Injectable 87953 Unit(s) SubCutaneous <User Schedule>  ferrous    sulfate 325 milliGRAM(s) Oral daily  glucagon  Injectable 1 milliGRAM(s) IntraMuscular once PRN  heparin  Injectable 5000 Unit(s) SubCutaneous every 12 hours  hydrALAZINE 50 milliGRAM(s) Oral three times a day  insulin lispro (HumaLOG) corrective regimen sliding scale   SubCutaneous three times a day before meals  isosorbide   mononitrate ER Tablet (IMDUR) 30 milliGRAM(s) Oral daily  PPD  5 Tuberculin Unit(s) Injectable 5 Unit(s) IntraDermal once  sodium bicarbonate 650 milliGRAM(s) Oral two times a day  sodium chloride 0.9% lock flush 10 milliLiter(s) IV Push every 1 hour PRN      Physical Exam:    Neuro: AAOx3.  non focal    Pulm: Diminished + Right Pigtail without airleak.    CV: RRR    Abd: Soft/NT/ND.  +BS                     PAST MEDICAL & SURGICAL HISTORY:  Hyperkalemia  ADI (acute kidney injury)  BPH (benign prostatic hyperplasia)  CKD (chronic kidney disease)  Hyperlipemia  Hypertension  Diabetes  Ureteral stent retained

## 2020-01-05 NOTE — PROGRESS NOTE ADULT - ASSESSMENT
1. Right pleural effusion /Acute CHF exacerbation & Moderate AS  s/p pigtail catheter   CTS on board    2. CRS - CKD stage 4   S/p 3 HD sessions for optimization prior to cath  HD catheter now removed  Nephrology follow up outpt- likely will need dialysis in the near future    3. Volume/ HTN  BP stable; currently euvolemic  s/p cardiomems  Diuresis as per Cards    4. 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

## 2020-01-05 NOTE — PROGRESS NOTE ADULT - SUBJECTIVE AND OBJECTIVE BOX
Patient: JEMIMA SANTOS [id number] 79y Male                           Internal Medicine Hospitalist Progress Note    Interval History:  Seen with wife, daughter  at bedside.   No complaints  Chest tube in place, had 300ml drainage overnight.  FS were relatively low.  Denies SOB.  pain controlled.      ____________________PHYSICAL EXAM:  Vitals reviewed as indicated below  GENERAL:  NAD Alert and Oriented x 3   HEENT: NCAT  CARDIOVASCULAR:  S1, S2  LUNGS: coarse BS R base.   ABDOMEN:  soft, (-) tenderness, (-) distension, (+) bowel sounds, (-) guarding, (-) rebound (-) rigidity  EXTREMITIES:  no cyanosis / clubbing.  Trace edema.   ____________________    VITALS:  Vital Signs Last 24 Hrs  T(C): 36.7 (05 Jan 2020 08:40), Max: 36.7 (05 Jan 2020 08:40)  T(F): 98.1 (05 Jan 2020 08:40), Max: 98.1 (05 Jan 2020 08:40)  HR: 58 (05 Jan 2020 08:40) (56 - 58)  BP: 120/66 (05 Jan 2020 08:40) (120/66 - 144/75)  BP(mean): --  RR: 18 (05 Jan 2020 08:40) (18 - 18)  SpO2: 92% (05 Jan 2020 08:40) (92% - 92%) Daily     Daily   CAPILLARY BLOOD GLUCOSE      POCT Blood Glucose.: 212 mg/dL (05 Jan 2020 16:45)  POCT Blood Glucose.: 98 mg/dL (05 Jan 2020 11:25)  POCT Blood Glucose.: 96 mg/dL (05 Jan 2020 08:21)  POCT Blood Glucose.: 131 mg/dL (04 Jan 2020 23:22)  POCT Blood Glucose.: 158 mg/dL (04 Jan 2020 21:15)    I&O's Summary    04 Jan 2020 07:01  -  05 Jan 2020 07:00  --------------------------------------------------------  IN: 0 mL / OUT: 640 mL / NET: -640 mL        LABS:                        9.1    6.83  )-----------( 152      ( 04 Jan 2020 08:46 )             31.5     01-04    138  |  100  |  46.0<H>  ----------------------------<  191<H>  5.2   |  23.0  |  2.83<H>    Ca    8.0<L>      04 Jan 2020 08:46                    MEDICATIONS:  acetaminophen   Tablet .. 650 milliGRAM(s) Oral every 6 hours PRN  amLODIPine   Tablet 10 milliGRAM(s) Oral daily  aspirin  chewable 81 milliGRAM(s) Oral daily  atorvastatin 10 milliGRAM(s) Oral at bedtime  carvedilol 25 milliGRAM(s) Oral every 12 hours  chlorhexidine 4% Liquid 1 Application(s) Topical <User Schedule>  cholecalciferol 1000 Unit(s) Oral daily  dextrose 40% Gel 15 Gram(s) Oral once PRN  dextrose 5%. 1000 milliLiter(s) IV Continuous <Continuous>  dextrose 50% Injectable 12.5 Gram(s) IV Push once  dextrose 50% Injectable 25 Gram(s) IV Push once  dextrose 50% Injectable 25 Gram(s) IV Push once  doxazosin 4 milliGRAM(s) Oral at bedtime  epoetin nereida Injectable 32525 Unit(s) SubCutaneous <User Schedule>  ferrous    sulfate 325 milliGRAM(s) Oral daily  glucagon  Injectable 1 milliGRAM(s) IntraMuscular once PRN  heparin  Injectable 5000 Unit(s) SubCutaneous every 12 hours  hydrALAZINE 50 milliGRAM(s) Oral three times a day  insulin lispro (HumaLOG) corrective regimen sliding scale   SubCutaneous three times a day before meals  insulin lispro Injectable (HumaLOG) 4 Unit(s) SubCutaneous three times a day before meals  isosorbide   mononitrate ER Tablet (IMDUR) 30 milliGRAM(s) Oral daily  PPD  5 Tuberculin Unit(s) Injectable 5 Unit(s) IntraDermal once  sodium bicarbonate 650 milliGRAM(s) Oral two times a day  sodium chloride 0.9% lock flush 10 milliLiter(s) IV Push every 1 hour PRN

## 2020-01-05 NOTE — PROGRESS NOTE ADULT - SUBJECTIVE AND OBJECTIVE BOX
[facility] DIVISION OF KIDNEY DISEASES AND HYPERTENSION -- FOLLOW UP NOTE  --------------------------------------------------------------------------------  Chief Complaint: CKD    24 hour events/subjective:    Chest tube draining ~440 ml  Pt seen and examined; feels well    PAST HISTORY  --------------------------------------------------------------------------------  No significant changes to PMH, PSH, FHx, SHx, unless otherwise noted    ALLERGIES & MEDICATIONS  --------------------------------------------------------------------------------  Allergies    No Known Allergies    Intolerances      Standing Inpatient Medications  amLODIPine   Tablet 10 milliGRAM(s) Oral daily  aspirin  chewable 81 milliGRAM(s) Oral daily  atorvastatin 10 milliGRAM(s) Oral at bedtime  carvedilol 25 milliGRAM(s) Oral every 12 hours  chlorhexidine 4% Liquid 1 Application(s) Topical <User Schedule>  cholecalciferol 1000 Unit(s) Oral daily  dextrose 5%. 1000 milliLiter(s) IV Continuous <Continuous>  dextrose 50% Injectable 12.5 Gram(s) IV Push once  dextrose 50% Injectable 25 Gram(s) IV Push once  dextrose 50% Injectable 25 Gram(s) IV Push once  doxazosin 4 milliGRAM(s) Oral at bedtime  epoetin nereida Injectable 63200 Unit(s) SubCutaneous <User Schedule>  ferrous    sulfate 325 milliGRAM(s) Oral daily  heparin  Injectable 5000 Unit(s) SubCutaneous every 12 hours  hydrALAZINE 50 milliGRAM(s) Oral three times a day  insulin lispro (HumaLOG) corrective regimen sliding scale   SubCutaneous three times a day before meals  isosorbide   mononitrate ER Tablet (IMDUR) 30 milliGRAM(s) Oral daily  PPD  5 Tuberculin Unit(s) Injectable 5 Unit(s) IntraDermal once  sodium bicarbonate 650 milliGRAM(s) Oral two times a day    PRN Inpatient Medications  acetaminophen   Tablet .. 650 milliGRAM(s) Oral every 6 hours PRN  dextrose 40% Gel 15 Gram(s) Oral once PRN  glucagon  Injectable 1 milliGRAM(s) IntraMuscular once PRN  sodium chloride 0.9% lock flush 10 milliLiter(s) IV Push every 1 hour PRN      REVIEW OF SYSTEMS  --------------------------------------------------------------------------------  Gen: No weight changes, fatigue, fevers/chills, weakness  Skin: No rashes  Head/Eyes/Ears/Mouth: No headache; Normal hearing; Normal vision w/o blurriness; No sinus pain/discomfort, sore throat  Respiratory: No dyspnea, cough, wheezing, hemoptysis  CV: No chest pain, PND, orthopnea  GI: No abdominal pain, diarrhea, constipation, nausea, vomiting, melena, hematochezia  : No increased frequency, dysuria, hematuria, nocturia  MSK: No joint pain/swelling; no back pain; no edema  Neuro: No dizziness/lightheadedness, weakness, seizures, numbness, tingling  Heme: No easy bruising or bleeding  Endo: No heat/cold intolerance  Psych: No significant nervousness, anxiety, stress, depression    All other systems were reviewed and are negative, except as noted.    VITALS/PHYSICAL EXAM  --------------------------------------------------------------------------------  T(C): 36.7 (01-05-20 @ 08:40), Max: 36.7 (01-05-20 @ 08:40)  HR: 58 (01-05-20 @ 08:40) (56 - 58)  BP: 120/66 (01-05-20 @ 08:40) (120/66 - 144/75)  RR: 18 (01-05-20 @ 08:40) (18 - 18)  SpO2: 92% (01-05-20 @ 08:40) (92% - 97%)  Wt(kg): --        01-04-20 @ 07:01  -  01-05-20 @ 07:00  --------------------------------------------------------  IN: 0 mL / OUT: 640 mL / NET: -640 mL      Physical Exam:  	Gen: NAD  	HEENT:, supple neck,  	Pulm: CTA B/L, RT chest tube  	CV: RRR, S1S2; no rub  	Back: no sacral edema  	Abd: +BS, soft, nontender/nondistended  	: No suprapubic tenderness  	UE: Warm, ; no edema; no asterixis  	LE: Warm,  no edema  	Neuro: No focal deficit  	Psych: Normal affect and mood  	Skin: Warm, without rashes  	Vascular access: none    LABS/STUDIES  --------------------------------------------------------------------------------              9.1    6.83  >-----------<  152      [01-04-20 @ 08:46]              31.5     138  |  100  |  46.0  ----------------------------<  191      [01-04-20 @ 08:46]  5.2   |  23.0  |  2.83        Ca     8.0     [01-04-20 @ 08:46]        Creatinine Trend:  SCr 2.83 [01-04 @ 08:46]  SCr 2.55 [01-03 @ 06:14]  SCr 2.58 [01-01 @ 07:18]  SCr 3.17 [12-31 @ 08:11]  SCr 3.76 [12-30 @ 08:59]    Urinalysis - [12-28-19 @ 16:39]      Color Yellow / Appearance Cloudy / SG 1.010 / pH 5.0      Gluc Negative / Ketone Trace  / Bili Negative / Urobili Negative       Blood Large / Protein 100 / Leuk Est Small / Nitrite Negative      RBC >50 / WBC 6-10 / Hyaline  / Gran  / Sq Epi  / Non Sq Epi  / Bacteria Few      Iron 25, TIBC 285, %sat 9      [12-11-19 @ 17:50]  Ferritin 25      [12-11-19 @ 17:50]  PTH -- (Ca 8.3)      [12-12-19 @ 19:36]   164  Vitamin D (25OH) 20.3      [12-12-19 @ 19:36]  HbA1c 8.1      [12-11-19 @ 06:47]  TSH 6.54      [12-25-19 @ 21:00]    HBsAb <3.0      [12-31-19 @ 08:48]  HBsAb Nonreact      [12-31-19 @ 08:48]  HBsAg Nonreact      [12-31-19 @ 08:48]  HBcAb Nonreact      [12-31-19 @ 08:48]  HCV 0.06, Nonreact      [12-31-19 @ 08:48]

## 2020-01-06 LAB
ANION GAP SERPL CALC-SCNC: 13 MMOL/L — SIGNIFICANT CHANGE UP (ref 5–17)
BUN SERPL-MCNC: 53 MG/DL — HIGH (ref 8–20)
CALCIUM SERPL-MCNC: 7.9 MG/DL — LOW (ref 8.6–10.2)
CHLORIDE SERPL-SCNC: 101 MMOL/L — SIGNIFICANT CHANGE UP (ref 98–107)
CO2 SERPL-SCNC: 22 MMOL/L — SIGNIFICANT CHANGE UP (ref 22–29)
CREAT SERPL-MCNC: 3.37 MG/DL — HIGH (ref 0.5–1.3)
GLUCOSE BLDC GLUCOMTR-MCNC: 129 MG/DL — HIGH (ref 70–99)
GLUCOSE BLDC GLUCOMTR-MCNC: 153 MG/DL — HIGH (ref 70–99)
GLUCOSE BLDC GLUCOMTR-MCNC: 155 MG/DL — HIGH (ref 70–99)
GLUCOSE BLDC GLUCOMTR-MCNC: 172 MG/DL — HIGH (ref 70–99)
GLUCOSE SERPL-MCNC: 184 MG/DL — HIGH (ref 70–115)
HCT VFR BLD CALC: 29.1 % — LOW (ref 39–50)
HGB BLD-MCNC: 8.6 G/DL — LOW (ref 13–17)
MCHC RBC-ENTMCNC: 24.7 PG — LOW (ref 27–34)
MCHC RBC-ENTMCNC: 29.6 GM/DL — LOW (ref 32–36)
MCV RBC AUTO: 83.6 FL — SIGNIFICANT CHANGE UP (ref 80–100)
PLATELET # BLD AUTO: 153 K/UL — SIGNIFICANT CHANGE UP (ref 150–400)
POTASSIUM SERPL-MCNC: 5.6 MMOL/L — HIGH (ref 3.5–5.3)
POTASSIUM SERPL-SCNC: 5.6 MMOL/L — HIGH (ref 3.5–5.3)
RBC # BLD: 3.48 M/UL — LOW (ref 4.2–5.8)
RBC # FLD: 20.4 % — HIGH (ref 10.3–14.5)
SODIUM SERPL-SCNC: 136 MMOL/L — SIGNIFICANT CHANGE UP (ref 135–145)
WBC # BLD: 7.22 K/UL — SIGNIFICANT CHANGE UP (ref 3.8–10.5)
WBC # FLD AUTO: 7.22 K/UL — SIGNIFICANT CHANGE UP (ref 3.8–10.5)

## 2020-01-06 PROCEDURE — 99232 SBSQ HOSP IP/OBS MODERATE 35: CPT

## 2020-01-06 PROCEDURE — 71045 X-RAY EXAM CHEST 1 VIEW: CPT | Mod: 26

## 2020-01-06 PROCEDURE — 99233 SBSQ HOSP IP/OBS HIGH 50: CPT

## 2020-01-06 RX ORDER — SODIUM POLYSTYRENE SULFONATE 4.1 MEQ/G
30 POWDER, FOR SUSPENSION ORAL ONCE
Refills: 0 | Status: COMPLETED | OUTPATIENT
Start: 2020-01-06 | End: 2020-01-06

## 2020-01-06 RX ADMIN — Medication 1: at 09:02

## 2020-01-06 RX ADMIN — Medication 50 MILLIGRAM(S): at 15:59

## 2020-01-06 RX ADMIN — CARVEDILOL PHOSPHATE 25 MILLIGRAM(S): 80 CAPSULE, EXTENDED RELEASE ORAL at 05:06

## 2020-01-06 RX ADMIN — Medication 20 MILLIGRAM(S): at 16:02

## 2020-01-06 RX ADMIN — Medication 1000 UNIT(S): at 11:49

## 2020-01-06 RX ADMIN — Medication 81 MILLIGRAM(S): at 11:49

## 2020-01-06 RX ADMIN — Medication 650 MILLIGRAM(S): at 05:06

## 2020-01-06 RX ADMIN — AMLODIPINE BESYLATE 10 MILLIGRAM(S): 2.5 TABLET ORAL at 05:06

## 2020-01-06 RX ADMIN — Medication 50 MILLIGRAM(S): at 21:24

## 2020-01-06 RX ADMIN — Medication 1: at 17:37

## 2020-01-06 RX ADMIN — ATORVASTATIN CALCIUM 10 MILLIGRAM(S): 80 TABLET, FILM COATED ORAL at 21:22

## 2020-01-06 RX ADMIN — HEPARIN SODIUM 5000 UNIT(S): 5000 INJECTION INTRAVENOUS; SUBCUTANEOUS at 17:38

## 2020-01-06 RX ADMIN — CARVEDILOL PHOSPHATE 25 MILLIGRAM(S): 80 CAPSULE, EXTENDED RELEASE ORAL at 17:38

## 2020-01-06 RX ADMIN — Medication 325 MILLIGRAM(S): at 11:49

## 2020-01-06 RX ADMIN — Medication 50 MILLIGRAM(S): at 05:06

## 2020-01-06 RX ADMIN — HEPARIN SODIUM 5000 UNIT(S): 5000 INJECTION INTRAVENOUS; SUBCUTANEOUS at 05:06

## 2020-01-06 RX ADMIN — Medication 4 UNIT(S): at 09:02

## 2020-01-06 RX ADMIN — Medication 650 MILLIGRAM(S): at 17:40

## 2020-01-06 RX ADMIN — Medication 4 UNIT(S): at 12:58

## 2020-01-06 RX ADMIN — ISOSORBIDE MONONITRATE 30 MILLIGRAM(S): 60 TABLET, EXTENDED RELEASE ORAL at 11:50

## 2020-01-06 RX ADMIN — Medication 4 UNIT(S): at 17:38

## 2020-01-06 RX ADMIN — SODIUM POLYSTYRENE SULFONATE 30 GRAM(S): 4.1 POWDER, FOR SUSPENSION ORAL at 12:50

## 2020-01-06 RX ADMIN — CHLORHEXIDINE GLUCONATE 1 APPLICATION(S): 213 SOLUTION TOPICAL at 05:06

## 2020-01-06 RX ADMIN — Medication 4 MILLIGRAM(S): at 21:22

## 2020-01-06 NOTE — PROGRESS NOTE ADULT - SUBJECTIVE AND OBJECTIVE BOX
[facility]                                                               Office: [address] [address]                                                              Telephone: [phone]. Fax:[phone]                                                                             PROGRESS NOTE  Reason for follow up: congestive heart failure , aortic stenosis   Overnight: No new events.   Update:   cardiomem checked today.      Subjective: "  i am feeling ok. "    Review of symptoms: Cardiac:  No chest pain. None dyspnea. No palpitations.  Respiratory:no cough.  none   Gastrointestinal: No diarrhea. No abdominal pain. No bleeding.     Past medical history updates:  CKD: stable. congestive heart failure : improved.       Vital Signs Last 24 Hrs  T(C): 36.7 (01-06-20 @ 08:07), Max: 36.8 (01-06-20 @ 00:22)  T(F): 98 (01-06-20 @ 08:07), Max: 98.2 (01-06-20 @ 00:22)  HR: 56 (01-06-20 @ 12:00) (56 - 72)  BP: 129/62 (01-06-20 @ 12:00) (116/62 - 129/62)  BP(mean): --  RR: 18 (01-06-20 @ 08:07) (18 - 18)  SpO2: 95% (01-06-20 @ 08:07) (95% - 98%)    PHYSICAL EXAM:  Appearance: Comfortable. No acute distress  HEENT:  Head and neck: Atraumatic. Normocephalic.  Normal oral mucosa, PERRL, Neck is supple   Neurologic: A & O x 3, no focal deficits. EOMI   Lymphatic: No cervical lymphadenopathy  Cardiovascular: Normal S1 S2, No murmur, rubs/gallops. No JVD, No edema  Respiratory:  Clear to auscultation bilaterally   Gastrointestinal:  Soft, Non-tender, + BS  Lower Extremities: no edema.    Psychiatry: Patient is calm. No agitation. Mood & affect appropriate  Skin: Right chest tube           MEDICATIONS  (STANDING):   MEDICATIONS  (STANDING):  amLODIPine   Tablet 10 milliGRAM(s) Oral daily  carvedilol 25 milliGRAM(s) Oral every 12 hours  doxazosin 4 milliGRAM(s) Oral at bedtime  hydrALAZINE 50 milliGRAM(s) Oral three times a day  isosorbide   mononitrate ER Tablet (IMDUR) 30 milliGRAM(s) Oral daily  torsemide 20 milliGRAM(s) Oral every 12 hours    aspirin  chewable  atorvastatin  chlorhexidine 4% Liquid  cholecalciferol  dextrose 5%.  dextrose 50% Injectable  dextrose 50% Injectable  dextrose 50% Injectable  epoetin nereida Injectable  ferrous    sulfate  heparin  Injectable  insulin lispro (HumaLOG) corrective regimen sliding scale  insulin lispro Injectable (HumaLOG)  PPD  5 Tuberculin Unit(s) Injectable  sodium bicarbonate    PRN: acetaminophen   Tablet .. PRN  dextrose 40% Gel PRN  glucagon  Injectable PRN  sodium chloride 0.9% lock flush PRN      DIAGNOSTIC TESTING:  [ ] Echocardiogram: < from: TTE Echo Complete w/Doppler (12.10.19 @ 13:21) >  Summary:   1. Technically difficult study.   2. Normal global left ventricular systolic function.   3. Left ventricular ejection fraction, by visual estimation, is 60 to   65%.   4. Spectral Doppler shows pseudonormal pattern of left ventricular   myocardial filling (Grade II diastolic dysfunction).   5. Elevated mean left atrial pressure. Mean LAP estimated at 22 mmHg.   6. Mild mitral annular calcification.   7. Mild thickening and calcification of the anterior and posterior   mitralvalve leaflets.   8. Mild mitral valve regurgitation.   9. Mild aortic regurgitation.  10. Moderate to severe aortic valve stenosis. Recommend SCAR for further   evaluation.  11. Moderate pleural effusion in both left and right lateral regions.  12. There is no evidence of pericardial effusion.    < end of copied text >    OTHER: 	  XRAY: < from: Xray Chest 1 View- PORTABLE-Routine (12.26.19 @ 09:10) >    Impression:    Persisting pulmonary edema with small left pleural fluid.    Right chest tube in place, no pneumothorax.    < end of copied text >      LABS:	 	                              8.6    7.22  )-----------( 153      ( 06 Jan 2020 07:28 )             29.1   N=x    ; L=x        06 Jan 2020 07:28    136    |  101    |  53.0   ----------------------------<  184    5.6     |  22.0   |  3.37     Ca    7.9        06 Jan 2020 07:28

## 2020-01-06 NOTE — PROGRESS NOTE ADULT - SUBJECTIVE AND OBJECTIVE BOX
Subjective:  Pt in chair NAD no issues overnight.      T(C): 36.8 (01-06-20 @ 00:22), Max: 36.8 (01-06-20 @ 00:22)  HR: 61 (01-06-20 @ 05:06) (58 - 72)  BP: 122/50 (01-06-20 @ 05:06) (116/62 - 128/53)    RR: 18 (01-06-20 @ 00:22) (18 - 18)  SpO2: 98% (01-06-20 @ 00:22) (92% - 98%)  Tele: SR    CHEST TUBE:    Rt                        OUTPUT:  500cc    per 24 hours    AIR LEAKS:  [ ] YES [x ] NO          01-06    136  |  101  |  53.0<H>  ----------------------------<  184<H>  5.6<H>   |  22.0  |  3.37<H>    Ca    7.9<L>      06 Jan 2020 07:28                                 8.6    7.22  )-----------( 153      ( 06 Jan 2020 07:28 )             29.1                 CAPILLARY BLOOD GLUCOSE      POCT Blood Glucose.: 212 mg/dL (05 Jan 2020 16:45)  POCT Blood Glucose.: 98 mg/dL (05 Jan 2020 11:25)  POCT Blood Glucose.: 96 mg/dL (05 Jan 2020 08:21)           CXR: < from: Xray Chest 1 View- PORTABLE-Routine (01.05.20 @ 07:54) >    Comparison:  Chest x-ray 1/4/2020    Right-sided chest tube. No sizable right pleural effusion or pneumothorax. Right lung is clear.    Retrocardiac opacity again seen in the left.    A loop recorder is noted. Cardiomediastinal silhouette is stable. Cardiomems again noted.    IMPRESSION:    Retrocardiac opacity at left lung base, either atelectasis or pneumonia.    < end of copied text >           Assessment  Neuro: Alert Awake NAD no issues   Pulm: decreased at bases RT > Lt + Rt Ct noted   CV: RRR S1 S2   Abd:  soft NT ND + BS   Extremities: no edema b/l LE

## 2020-01-06 NOTE — PROGRESS NOTE ADULT - SUBJECTIVE AND OBJECTIVE BOX
[facility] DIVISION OF KIDNEY DISEASES AND HYPERTENSION -- FOLLOW UP NOTE  --------------------------------------------------------------------------------  Chief Complaint: CKD    24 hour events/subjective:  Chest tube drained ~600 ml over 24 hours        PAST HISTORY  --------------------------------------------------------------------------------  No significant changes to PMH, PSH, FHx, SHx, unless otherwise noted    ALLERGIES & MEDICATIONS  --------------------------------------------------------------------------------  Allergies    No Known Allergies    Intolerances      Standing Inpatient Medications  amLODIPine   Tablet 10 milliGRAM(s) Oral daily  aspirin  chewable 81 milliGRAM(s) Oral daily  atorvastatin 10 milliGRAM(s) Oral at bedtime  carvedilol 25 milliGRAM(s) Oral every 12 hours  chlorhexidine 4% Liquid 1 Application(s) Topical <User Schedule>  cholecalciferol 1000 Unit(s) Oral daily  dextrose 5%. 1000 milliLiter(s) IV Continuous <Continuous>  dextrose 50% Injectable 12.5 Gram(s) IV Push once  dextrose 50% Injectable 25 Gram(s) IV Push once  dextrose 50% Injectable 25 Gram(s) IV Push once  doxazosin 4 milliGRAM(s) Oral at bedtime  epoetin nereida Injectable 50647 Unit(s) SubCutaneous <User Schedule>  ferrous    sulfate 325 milliGRAM(s) Oral daily  heparin  Injectable 5000 Unit(s) SubCutaneous every 12 hours  hydrALAZINE 50 milliGRAM(s) Oral three times a day  insulin lispro (HumaLOG) corrective regimen sliding scale   SubCutaneous three times a day before meals  insulin lispro Injectable (HumaLOG) 4 Unit(s) SubCutaneous three times a day before meals  isosorbide   mononitrate ER Tablet (IMDUR) 30 milliGRAM(s) Oral daily  PPD  5 Tuberculin Unit(s) Injectable 5 Unit(s) IntraDermal once  sodium bicarbonate 650 milliGRAM(s) Oral two times a day    PRN Inpatient Medications  acetaminophen   Tablet .. 650 milliGRAM(s) Oral every 6 hours PRN  dextrose 40% Gel 15 Gram(s) Oral once PRN  glucagon  Injectable 1 milliGRAM(s) IntraMuscular once PRN  sodium chloride 0.9% lock flush 10 milliLiter(s) IV Push every 1 hour PRN      REVIEW OF SYSTEMS  --------------------------------------------------------------------------------  Gen: No weight changes, fatigue, fevers/chills, weakness  Skin: No rashes  Head/Eyes/Ears/Mouth: No headache; Normal hearing; Normal vision w/o blurriness; No sinus pain/discomfort, sore throat  Respiratory: No dyspnea, cough, wheezing, hemoptysis  CV: No chest pain, PND, orthopnea  GI: No abdominal pain, diarrhea, constipation, nausea, vomiting, melena, hematochezia  : No increased frequency, dysuria, hematuria, nocturia  MSK: No joint pain/swelling; no back pain; no edema  Neuro: No dizziness/lightheadedness, weakness, seizures, numbness, tingling  Heme: No easy bruising or bleeding  Endo: No heat/cold intolerance  Psych: No significant nervousness, anxiety, stress, depression    All other systems were reviewed and are negative, except as noted.    VITALS/PHYSICAL EXAM  --------------------------------------------------------------------------------  T(C): 36.7 (01-06-20 @ 08:07), Max: 36.8 (01-06-20 @ 00:22)  HR: 56 (01-06-20 @ 12:00) (56 - 72)  BP: 129/62 (01-06-20 @ 12:00) (116/62 - 129/62)  RR: 18 (01-06-20 @ 08:07) (18 - 18)  SpO2: 95% (01-06-20 @ 08:07) (95% - 98%)  Wt(kg): --        01-05-20 @ 07:01  -  01-06-20 @ 07:00  --------------------------------------------------------  IN: 0 mL / OUT: 1100 mL / NET: -1100 mL      Physical Exam:  	Gen: NAD, well-appearing  	HEENT: PERRL, supple neck, clear oropharynx  	Pulm: CTA B/L  	CV: RRR, S1S2; no rub  	Back: No spinal or CVA tenderness; no sacral edema  	Abd: +BS, soft, nontender/nondistended  	: No suprapubic tenderness  	UE: Warm, FROM, no clubbing, intact strength; no edema; no asterixis  	LE: Warm, FROM, no clubbing, intact strength; no edema  	Neuro: No focal deficits, intact gait  	Psych: Normal affect and mood  	Skin: Warm, without rashes  	Vascular access:    LABS/STUDIES  --------------------------------------------------------------------------------              8.6    7.22  >-----------<  153      [01-06-20 @ 07:28]              29.1     136  |  101  |  53.0  ----------------------------<  184      [01-06-20 @ 07:28]  5.6   |  22.0  |  3.37        Ca     7.9     [01-06-20 @ 07:28]      Creatinine Trend:  SCr 3.37 [01-06 @ 07:28]  SCr 2.83 [01-04 @ 08:46]  SCr 2.55 [01-03 @ 06:14]  SCr 2.58 [01-01 @ 07:18]  SCr 3.17 [12-31 @ 08:11]    Urinalysis - [12-28-19 @ 16:39]      Color Yellow / Appearance Cloudy / SG 1.010 / pH 5.0      Gluc Negative / Ketone Trace  / Bili Negative / Urobili Negative       Blood Large / Protein 100 / Leuk Est Small / Nitrite Negative      RBC >50 / WBC 6-10 / Hyaline  / Gran  / Sq Epi  / Non Sq Epi  / Bacteria Few      Iron 25, TIBC 285, %sat 9      [12-11-19 @ 17:50]  Ferritin 25      [12-11-19 @ 17:50]  PTH -- (Ca 8.3)      [12-12-19 @ 19:36]   164  Vitamin D (25OH) 20.3      [12-12-19 @ 19:36]  HbA1c 8.1      [12-11-19 @ 06:47]  TSH 6.54      [12-25-19 @ 21:00]    HBsAb <3.0      [12-31-19 @ 08:48]  HBsAb Nonreact      [12-31-19 @ 08:48]  HBsAg Nonreact      [12-31-19 @ 08:48]  HBcAb Nonreact      [12-31-19 @ 08:48]  HCV 0.06, Nonreact      [12-31-19 @ 08:48] [facility] DIVISION OF KIDNEY DISEASES AND HYPERTENSION -- FOLLOW UP NOTE  --------------------------------------------------------------------------------  Chief Complaint: CKD    24 hour events/subjective:  Chest tube drained ~600 ml over 24 hours  Pt seen and examined; feels well        PAST HISTORY  --------------------------------------------------------------------------------  No significant changes to PMH, PSH, FHx, SHx, unless otherwise noted    ALLERGIES & MEDICATIONS  --------------------------------------------------------------------------------  Allergies    No Known Allergies    Intolerances      Standing Inpatient Medications  amLODIPine   Tablet 10 milliGRAM(s) Oral daily  aspirin  chewable 81 milliGRAM(s) Oral daily  atorvastatin 10 milliGRAM(s) Oral at bedtime  carvedilol 25 milliGRAM(s) Oral every 12 hours  chlorhexidine 4% Liquid 1 Application(s) Topical <User Schedule>  cholecalciferol 1000 Unit(s) Oral daily  dextrose 5%. 1000 milliLiter(s) IV Continuous <Continuous>  dextrose 50% Injectable 12.5 Gram(s) IV Push once  dextrose 50% Injectable 25 Gram(s) IV Push once  dextrose 50% Injectable 25 Gram(s) IV Push once  doxazosin 4 milliGRAM(s) Oral at bedtime  epoetin nereida Injectable 36094 Unit(s) SubCutaneous <User Schedule>  ferrous    sulfate 325 milliGRAM(s) Oral daily  heparin  Injectable 5000 Unit(s) SubCutaneous every 12 hours  hydrALAZINE 50 milliGRAM(s) Oral three times a day  insulin lispro (HumaLOG) corrective regimen sliding scale   SubCutaneous three times a day before meals  insulin lispro Injectable (HumaLOG) 4 Unit(s) SubCutaneous three times a day before meals  isosorbide   mononitrate ER Tablet (IMDUR) 30 milliGRAM(s) Oral daily  PPD  5 Tuberculin Unit(s) Injectable 5 Unit(s) IntraDermal once  sodium bicarbonate 650 milliGRAM(s) Oral two times a day    PRN Inpatient Medications  acetaminophen   Tablet .. 650 milliGRAM(s) Oral every 6 hours PRN  dextrose 40% Gel 15 Gram(s) Oral once PRN  glucagon  Injectable 1 milliGRAM(s) IntraMuscular once PRN  sodium chloride 0.9% lock flush 10 milliLiter(s) IV Push every 1 hour PRN      REVIEW OF SYSTEMS  --------------------------------------------------------------------------------  Gen: No weight changes, fatigue, fevers/chills, weakness  Skin: No rashes  Head/Eyes/Ears/Mouth: No headache; Normal hearing; Normal vision w/o blurriness; No sinus pain/discomfort, sore throat  Respiratory: No dyspnea, cough, wheezing, hemoptysis  CV: No chest pain, PND, orthopnea  GI: No abdominal pain, diarrhea, constipation, nausea, vomiting, melena, hematochezia  : No increased frequency, dysuria, hematuria, nocturia  MSK: No joint pain/swelling; no back pain; no edema  Neuro: No dizziness/lightheadedness, weakness, seizures, numbness, tingling  Heme: No easy bruising or bleeding  Endo: No heat/cold intolerance  Psych: No significant nervousness, anxiety, stress, depression    All other systems were reviewed and are negative, except as noted.    VITALS/PHYSICAL EXAM  --------------------------------------------------------------------------------  T(C): 36.7 (01-06-20 @ 08:07), Max: 36.8 (01-06-20 @ 00:22)  HR: 56 (01-06-20 @ 12:00) (56 - 72)  BP: 129/62 (01-06-20 @ 12:00) (116/62 - 129/62)  RR: 18 (01-06-20 @ 08:07) (18 - 18)  SpO2: 95% (01-06-20 @ 08:07) (95% - 98%)  Wt(kg): --        01-05-20 @ 07:01  -  01-06-20 @ 07:00  --------------------------------------------------------  IN: 0 mL / OUT: 1100 mL / NET: -1100 mL      Physical Exam:              Gen: NAD  	HEENT:, supple neck,  	Pulm: CTA B/L, RT chest tube  	CV: RRR, S1S2; no rub  	Back: no sacral edema  	Abd: +BS, soft, nontender/nondistended  	: No suprapubic tenderness  	UE: Warm, ; no edema; no asterixis  	LE: Warm,  no edema  	Neuro: No focal deficit  	Psych: Normal affect and mood  	Skin: Warm, without rashes  	Vascular access: none    LABS/STUDIES  --------------------------------------------------------------------------------              8.6    7.22  >-----------<  153      [01-06-20 @ 07:28]              29.1     136  |  101  |  53.0  ----------------------------<  184      [01-06-20 @ 07:28]  5.6   |  22.0  |  3.37        Ca     7.9     [01-06-20 @ 07:28]      Creatinine Trend:  SCr 3.37 [01-06 @ 07:28]  SCr 2.83 [01-04 @ 08:46]  SCr 2.55 [01-03 @ 06:14]  SCr 2.58 [01-01 @ 07:18]  SCr 3.17 [12-31 @ 08:11]    Urinalysis - [12-28-19 @ 16:39]      Color Yellow / Appearance Cloudy / SG 1.010 / pH 5.0      Gluc Negative / Ketone Trace  / Bili Negative / Urobili Negative       Blood Large / Protein 100 / Leuk Est Small / Nitrite Negative      RBC >50 / WBC 6-10 / Hyaline  / Gran  / Sq Epi  / Non Sq Epi  / Bacteria Few      Iron 25, TIBC 285, %sat 9      [12-11-19 @ 17:50]  Ferritin 25      [12-11-19 @ 17:50]  PTH -- (Ca 8.3)      [12-12-19 @ 19:36]   164  Vitamin D (25OH) 20.3      [12-12-19 @ 19:36]  HbA1c 8.1      [12-11-19 @ 06:47]  TSH 6.54      [12-25-19 @ 21:00]    HBsAb <3.0      [12-31-19 @ 08:48]  HBsAb Nonreact      [12-31-19 @ 08:48]  HBsAg Nonreact      [12-31-19 @ 08:48]  HBcAb Nonreact      [12-31-19 @ 08:48]  HCV 0.06, Nonreact      [12-31-19 @ 08:48]

## 2020-01-06 NOTE — PROGRESS NOTE ADULT - ASSESSMENT
Pt is a 78 y/o male with medical history of DM, HTN, CKD, BPH, pAfib with no AC, who presents to [facility]-ED for c/o SOB. Pt is not a good historian d/t possible dementia, wife at bedside. Pt wife noticed pt has increased SOB even after discharge from [facility]. Pt also started to have frequent chills, but no known fever. Pt family further assess that "he is not himself", not as energetic and responsive to family. Pt was brought to [facility]-ED and was found to be hypothermic at 91.2, HR @ 53 SB, BP @ 139/63. On previous admission, pt had similar symptoms and was advised to be evaluated for AVR as outpatient. Pt Denies fever, cough, phlegm production,  PND, edema, chest pain, pressure, palpitations, irregular, fast heart beat, nausea, vomiting, melena, rectal bleed, hematuria, lightheadedness, dizziness, syncope, near syncope.   As of 12/26/19, pt in no resp. distress, chest tube draining straw colored drainage. Pt insertion site dressing saturated with blood no signs of subcutaneous emphysema. Pt wheezing on inspiration with bilateral rales when auscultated.            Plan:     1. Aortic Stenosis with SOB  - Pleural effusion s/p drainage->   thoracic following up.   -improving.            congestive heart failure  diuresis. s/p cardiomem. 46/17/29 mm  restart toremide 20 mg Q12.   right pleural effusiion: thoracic following up on chest tube.s     2. HTN     -Continue current antihypertensive medication regimen     3. CKD     as above.

## 2020-01-06 NOTE — PROGRESS NOTE ADULT - SUBJECTIVE AND OBJECTIVE BOX
Patient: JEMIMA SANTOS [id number] 79y Male                           Internal Medicine Hospitalist Progress Note    Interval History:  No complaints  Chest tube in place, on water seal.  Denies SOB.  pain controlled.      ____________________PHYSICAL EXAM:  Vitals reviewed as indicated below  GENERAL:  NAD Alert and Oriented x 3   HEENT: NCAT  CARDIOVASCULAR:  S1, S2  LUNGS: coarse BS R base.   ABDOMEN:  soft, (-) tenderness, (-) distension, (+) bowel sounds, (-) guarding, (-) rebound (-) rigidity  EXTREMITIES:  no cyanosis / clubbing.  Trace edema.   ____________________    VITALS:  Vital Signs Last 24 Hrs  T(C): 36.6 (06 Jan 2020 15:05), Max: 36.8 (06 Jan 2020 00:22)  T(F): 97.9 (06 Jan 2020 15:05), Max: 98.2 (06 Jan 2020 00:22)  HR: 62 (06 Jan 2020 17:34) (56 - 72)  BP: 130/61 (06 Jan 2020 17:34) (116/62 - 135/54)  BP(mean): --  RR: 18 (06 Jan 2020 15:05) (18 - 18)  SpO2: 96% (06 Jan 2020 15:05) (95% - 98%) Daily     Daily   CAPILLARY BLOOD GLUCOSE      POCT Blood Glucose.: 153 mg/dL (06 Jan 2020 17:33)  POCT Blood Glucose.: 129 mg/dL (06 Jan 2020 12:56)  POCT Blood Glucose.: 172 mg/dL (06 Jan 2020 08:58)    I&O's Summary    05 Jan 2020 07:01  -  06 Jan 2020 07:00  --------------------------------------------------------  IN: 0 mL / OUT: 1100 mL / NET: -1100 mL        LABS:                        8.6    7.22  )-----------( 153      ( 06 Jan 2020 07:28 )             29.1     01-06    136  |  101  |  53.0<H>  ----------------------------<  184<H>  5.6<H>   |  22.0  |  3.37<H>    Ca    7.9<L>      06 Jan 2020 07:28                    MEDICATIONS:  acetaminophen   Tablet .. 650 milliGRAM(s) Oral every 6 hours PRN  amLODIPine   Tablet 10 milliGRAM(s) Oral daily  aspirin  chewable 81 milliGRAM(s) Oral daily  atorvastatin 10 milliGRAM(s) Oral at bedtime  carvedilol 25 milliGRAM(s) Oral every 12 hours  chlorhexidine 4% Liquid 1 Application(s) Topical <User Schedule>  cholecalciferol 1000 Unit(s) Oral daily  dextrose 40% Gel 15 Gram(s) Oral once PRN  dextrose 5%. 1000 milliLiter(s) IV Continuous <Continuous>  dextrose 50% Injectable 12.5 Gram(s) IV Push once  dextrose 50% Injectable 25 Gram(s) IV Push once  dextrose 50% Injectable 25 Gram(s) IV Push once  doxazosin 4 milliGRAM(s) Oral at bedtime  epoetin nereida Injectable 52360 Unit(s) SubCutaneous <User Schedule>  ferrous    sulfate 325 milliGRAM(s) Oral daily  glucagon  Injectable 1 milliGRAM(s) IntraMuscular once PRN  heparin  Injectable 5000 Unit(s) SubCutaneous every 12 hours  hydrALAZINE 50 milliGRAM(s) Oral three times a day  insulin lispro (HumaLOG) corrective regimen sliding scale   SubCutaneous three times a day before meals  insulin lispro Injectable (HumaLOG) 4 Unit(s) SubCutaneous three times a day before meals  isosorbide   mononitrate ER Tablet (IMDUR) 30 milliGRAM(s) Oral daily  sodium bicarbonate 650 milliGRAM(s) Oral two times a day  sodium chloride 0.9% lock flush 10 milliLiter(s) IV Push every 1 hour PRN  torsemide 20 milliGRAM(s) Oral every 12 hours

## 2020-01-06 NOTE — PROGRESS NOTE ADULT - ASSESSMENT
1. Right pleural effusion /Acute CHF exacerbation & Moderate AS  s/p pigtail catheter   CTS on board    2. CRS - CKD stage 4   S/p 3 HD sessions for optimization prior to cath  HD catheter now removed  Nephrology follow up outpt- likely will need dialysis in the near future    3. Volume/ HTN  s/p cardiomems  Diuresis as per Cards    4. 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

## 2020-01-06 NOTE — PROGRESS NOTE ADULT - ASSESSMENT
79 year old male with a history of CKD stage 4, hypertension, chronic diastolic CHF, bph and moderate-severe AS who presented to the ER with complaints of SOB.  In ED given dose of Lasix with some improvement.  Noted hypothermic.  CXR revealed pulmonary edema and R pleural effusion.  Cardiology and CT surgery were consulted. Started on IV diuresis. Status post right chest tube placement on 12/25.  S/p Cardiomems placement.     #. Right pleural effusion /acute on chronic Diastolic CHF exacerbation - Chest tube in place.  CT surgery followup regarding eventual removal, as pt with intermittent output.  S/p cardiomems placement.   #. ADI on CKD stage 4 - Had been temporarily on dialysis.  Remains off HD for now  #. Moderate Aortic Stenosis - noted on recent cath.  No plans for TAVR at present per cardiology and CT surgery  # Diabetes Type II - monitor fingersticks.  Insulin coverage for hyperglycemia.  D/c'd Lantus.  Continue Premeal insulin.  FS well controlled.   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  Monitor on Fe, Epogen.  # Hyperlipidemia - diet modification.  Continue Statin.  # Essential HTN - Monitor BP.  Continue oral antihypertensives.  #. DVT prophylaxis heparin .

## 2020-01-07 LAB
GLUCOSE BLDC GLUCOMTR-MCNC: 119 MG/DL — HIGH (ref 70–99)
GLUCOSE BLDC GLUCOMTR-MCNC: 146 MG/DL — HIGH (ref 70–99)
GLUCOSE BLDC GLUCOMTR-MCNC: 160 MG/DL — HIGH (ref 70–99)
GLUCOSE BLDC GLUCOMTR-MCNC: 175 MG/DL — HIGH (ref 70–99)

## 2020-01-07 PROCEDURE — 99233 SBSQ HOSP IP/OBS HIGH 50: CPT

## 2020-01-07 PROCEDURE — 99231 SBSQ HOSP IP/OBS SF/LOW 25: CPT

## 2020-01-07 PROCEDURE — 99232 SBSQ HOSP IP/OBS MODERATE 35: CPT

## 2020-01-07 PROCEDURE — 71045 X-RAY EXAM CHEST 1 VIEW: CPT | Mod: 26,76

## 2020-01-07 RX ORDER — ALBUTEROL 90 UG/1
2.5 AEROSOL, METERED ORAL ONCE
Refills: 0 | Status: DISCONTINUED | OUTPATIENT
Start: 2020-01-07 | End: 2020-01-09

## 2020-01-07 RX ORDER — FUROSEMIDE 40 MG
20 TABLET ORAL ONCE
Refills: 0 | Status: COMPLETED | OUTPATIENT
Start: 2020-01-07 | End: 2020-01-07

## 2020-01-07 RX ADMIN — Medication 325 MILLIGRAM(S): at 12:33

## 2020-01-07 RX ADMIN — Medication 20 MILLIGRAM(S): at 17:50

## 2020-01-07 RX ADMIN — ATORVASTATIN CALCIUM 10 MILLIGRAM(S): 80 TABLET, FILM COATED ORAL at 21:15

## 2020-01-07 RX ADMIN — Medication 50 MILLIGRAM(S): at 14:44

## 2020-01-07 RX ADMIN — Medication 50 MILLIGRAM(S): at 05:43

## 2020-01-07 RX ADMIN — Medication 20 MILLIGRAM(S): at 21:58

## 2020-01-07 RX ADMIN — CARVEDILOL PHOSPHATE 25 MILLIGRAM(S): 80 CAPSULE, EXTENDED RELEASE ORAL at 17:51

## 2020-01-07 RX ADMIN — CHLORHEXIDINE GLUCONATE 1 APPLICATION(S): 213 SOLUTION TOPICAL at 06:51

## 2020-01-07 RX ADMIN — Medication 1000 UNIT(S): at 12:33

## 2020-01-07 RX ADMIN — Medication 81 MILLIGRAM(S): at 12:33

## 2020-01-07 RX ADMIN — Medication 650 MILLIGRAM(S): at 17:50

## 2020-01-07 RX ADMIN — Medication 4 UNIT(S): at 12:59

## 2020-01-07 RX ADMIN — Medication 1: at 12:59

## 2020-01-07 RX ADMIN — Medication 1: at 09:06

## 2020-01-07 RX ADMIN — HEPARIN SODIUM 5000 UNIT(S): 5000 INJECTION INTRAVENOUS; SUBCUTANEOUS at 05:44

## 2020-01-07 RX ADMIN — CARVEDILOL PHOSPHATE 25 MILLIGRAM(S): 80 CAPSULE, EXTENDED RELEASE ORAL at 05:43

## 2020-01-07 RX ADMIN — Medication 650 MILLIGRAM(S): at 05:43

## 2020-01-07 RX ADMIN — Medication 4 MILLIGRAM(S): at 21:15

## 2020-01-07 RX ADMIN — Medication 4 UNIT(S): at 17:49

## 2020-01-07 RX ADMIN — HEPARIN SODIUM 5000 UNIT(S): 5000 INJECTION INTRAVENOUS; SUBCUTANEOUS at 17:49

## 2020-01-07 RX ADMIN — Medication 20 MILLIGRAM(S): at 06:50

## 2020-01-07 RX ADMIN — ISOSORBIDE MONONITRATE 30 MILLIGRAM(S): 60 TABLET, EXTENDED RELEASE ORAL at 12:33

## 2020-01-07 RX ADMIN — AMLODIPINE BESYLATE 10 MILLIGRAM(S): 2.5 TABLET ORAL at 05:43

## 2020-01-07 RX ADMIN — Medication 50 MILLIGRAM(S): at 21:15

## 2020-01-07 RX ADMIN — Medication 4 UNIT(S): at 09:07

## 2020-01-07 NOTE — PROGRESS NOTE ADULT - PROBLEM SELECTOR PROBLEM 2
Stage 4 chronic kidney disease

## 2020-01-07 NOTE — PROGRESS NOTE ADULT - SUBJECTIVE AND OBJECTIVE BOX
[facility]                                                               Office: [address] [address]                                                              Telephone: [phone]. Fax:[phone]                                                                             PROGRESS NOTE  Reason for follow up: congestive heart failure , aortic stenosis   Overnight: No new events.   Update:   started on diuretics., chest tube removed.  discharge held due to hyperkalemia.      Subjective: "  i am feeling ok. "    Review of symptoms: Cardiac:  No chest pain. None dyspnea. No palpitations.  Respiratory:no cough.  none   Gastrointestinal: No diarrhea. No abdominal pain. No bleeding.     Past medical history updates:  CKD: stable. congestive heart failure : improved.       Vital Signs Last 24 Hrs   Vital Signs Last 24 Hrs  T(C): 36.4 (01-07-20 @ 15:59), Max: 36.6 (01-06-20 @ 23:50)  T(F): 97.6 (01-07-20 @ 15:59), Max: 97.9 (01-06-20 @ 23:50)  HR: 64 (01-07-20 @ 17:53) (62 - 64)  BP: 141/61 (01-07-20 @ 17:53) (125/50 - 147/69)  BP(mean): --  RR: 18 (01-07-20 @ 15:59) (18 - 18)  SpO2: 93% (01-07-20 @ 15:59) (93% - 96%)      PHYSICAL EXAM:  Appearance: Comfortable. No acute distress  HEENT:  Head and neck: Atraumatic. Normocephalic.  Normal oral mucosa, PERRL, Neck is supple   Neurologic: A & O x 3, no focal deficits. EOMI   Lymphatic: No cervical lymphadenopathy  Cardiovascular: Normal S1 S2, No murmur, rubs/gallops. No JVD, No edema  Respiratory:  Clear to auscultation bilaterally   Gastrointestinal:  Soft, Non-tender, + BS  Lower Extremities: no edema.    Psychiatry: Patient is calm. No agitation. Mood & affect appropriate  Skin: Right chest tube           MEDICATIONS  (STANDING):   MEDICATIONS  (STANDING):  amLODIPine   Tablet 10 milliGRAM(s) Oral daily  carvedilol 25 milliGRAM(s) Oral every 12 hours  doxazosin 4 milliGRAM(s) Oral at bedtime  hydrALAZINE 50 milliGRAM(s) Oral three times a day  isosorbide   mononitrate ER Tablet (IMDUR) 30 milliGRAM(s) Oral daily  torsemide 20 milliGRAM(s) Oral every 12 hours    aspirin  chewable  atorvastatin  chlorhexidine 4% Liquid  cholecalciferol  dextrose 5%.  dextrose 50% Injectable  dextrose 50% Injectable  dextrose 50% Injectable  epoetin nereida Injectable  ferrous    sulfate  heparin  Injectable  insulin lispro (HumaLOG) corrective regimen sliding scale  insulin lispro Injectable (HumaLOG)  sodium bicarbonate    PRN: acetaminophen   Tablet .. PRN  dextrose 40% Gel PRN  glucagon  Injectable PRN  sodium chloride 0.9% lock flush PRN      DIAGNOSTIC TESTING:  [ ] Echocardiogram: < from: TTE Echo Complete w/Doppler (12.10.19 @ 13:21) >  Summary:   1. Technically difficult study.   2. Normal global left ventricular systolic function.   3. Left ventricular ejection fraction, by visual estimation, is 60 to   65%.   4. Spectral Doppler shows pseudonormal pattern of left ventricular   myocardial filling (Grade II diastolic dysfunction).   5. Elevated mean left atrial pressure. Mean LAP estimated at 22 mmHg.   6. Mild mitral annular calcification.   7. Mild thickening and calcification of the anterior and posterior   mitralvalve leaflets.   8. Mild mitral valve regurgitation.   9. Mild aortic regurgitation.  10. Moderate to severe aortic valve stenosis. Recommend SCAR for further   evaluation.  11. Moderate pleural effusion in both left and right lateral regions.  12. There is no evidence of pericardial effusion.    < end of copied text >    OTHER: 	  XRAY: < from: Xray Chest 1 View- PORTABLE-Routine (12.26.19 @ 09:10) >    Impression:    Persisting pulmonary edema with small left pleural fluid.    Right chest tube in place, no pneumothorax.    < end of copied text >      LABS:	 	                                                    8.6    7.22  )-----------( 153      ( 06 Jan 2020 07:28 )             29.1   N=x    ; L=x        06 Jan 2020 07:28    136    |  101    |  53.0   ----------------------------<  184    5.6     |  22.0   |  3.37     Ca    7.9        06 Jan 2020 07:28

## 2020-01-07 NOTE — PROGRESS NOTE ADULT - SUBJECTIVE AND OBJECTIVE BOX
Patient: JEMIMA SANTOS [id number] 79y Male                           Internal Medicine Hospitalist Progress Note    Interval History:  No complaints  Chest tube removed.  No SOB.  No cough.  No pain.  Admits to poor ambulation.     ____________________PHYSICAL EXAM:  Vitals reviewed as indicated below  GENERAL:  NAD Alert and Oriented x 3   HEENT: NCAT  CARDIOVASCULAR:  S1, S2  LUNGS: coarse BS R base.   ABDOMEN:  soft, (-) tenderness, (-) distension, (+) bowel sounds, (-) guarding, (-) rebound (-) rigidity  EXTREMITIES:  no cyanosis / clubbing / edema  ____________________    VITALS:  Vital Signs Last 24 Hrs  T(C): 36.6 (07 Jan 2020 08:00), Max: 36.6 (06 Jan 2020 15:05)  T(F): 97.9 (07 Jan 2020 08:00), Max: 97.9 (06 Jan 2020 15:05)  HR: 62 (07 Jan 2020 08:00) (56 - 64)  BP: 143/68 (07 Jan 2020 08:00) (129/62 - 147/69)  BP(mean): --  RR: 18 (07 Jan 2020 08:00) (18 - 18)  SpO2: 96% (07 Jan 2020 08:00) (95% - 96%) Daily     Daily   CAPILLARY BLOOD GLUCOSE      POCT Blood Glucose.: 160 mg/dL (07 Jan 2020 08:32)  POCT Blood Glucose.: 155 mg/dL (06 Jan 2020 21:20)  POCT Blood Glucose.: 153 mg/dL (06 Jan 2020 17:33)  POCT Blood Glucose.: 129 mg/dL (06 Jan 2020 12:56)    I&O's Summary    06 Jan 2020 07:01  -  07 Jan 2020 07:00  --------------------------------------------------------  IN: 0 mL / OUT: 50 mL / NET: -50 mL        LABS:                        8.6    7.22  )-----------( 153      ( 06 Jan 2020 07:28 )             29.1     01-06    136  |  101  |  53.0<H>  ----------------------------<  184<H>  5.6<H>   |  22.0  |  3.37<H>    Ca    7.9<L>      06 Jan 2020 07:28                    MEDICATIONS:  acetaminophen   Tablet .. 650 milliGRAM(s) Oral every 6 hours PRN  amLODIPine   Tablet 10 milliGRAM(s) Oral daily  aspirin  chewable 81 milliGRAM(s) Oral daily  atorvastatin 10 milliGRAM(s) Oral at bedtime  carvedilol 25 milliGRAM(s) Oral every 12 hours  chlorhexidine 4% Liquid 1 Application(s) Topical <User Schedule>  cholecalciferol 1000 Unit(s) Oral daily  dextrose 40% Gel 15 Gram(s) Oral once PRN  dextrose 5%. 1000 milliLiter(s) IV Continuous <Continuous>  dextrose 50% Injectable 12.5 Gram(s) IV Push once  dextrose 50% Injectable 25 Gram(s) IV Push once  dextrose 50% Injectable 25 Gram(s) IV Push once  doxazosin 4 milliGRAM(s) Oral at bedtime  epoetin nereida Injectable 46019 Unit(s) SubCutaneous <User Schedule>  ferrous    sulfate 325 milliGRAM(s) Oral daily  glucagon  Injectable 1 milliGRAM(s) IntraMuscular once PRN  heparin  Injectable 5000 Unit(s) SubCutaneous every 12 hours  hydrALAZINE 50 milliGRAM(s) Oral three times a day  insulin lispro (HumaLOG) corrective regimen sliding scale   SubCutaneous three times a day before meals  insulin lispro Injectable (HumaLOG) 4 Unit(s) SubCutaneous three times a day before meals  isosorbide   mononitrate ER Tablet (IMDUR) 30 milliGRAM(s) Oral daily  sodium bicarbonate 650 milliGRAM(s) Oral two times a day  sodium chloride 0.9% lock flush 10 milliLiter(s) IV Push every 1 hour PRN  torsemide 20 milliGRAM(s) Oral every 12 hours

## 2020-01-07 NOTE — PROGRESS NOTE ADULT - ASSESSMENT
Pt is a 78 y/o male with medical history of DM, HTN, CKD, BPH, pAfib with no AC, who presents to [facility] for c/o SOB. Pt is not a good historian d/t possible dementia, wife at bedside. Pt wife noticed pt has increased SOB even after discharge from [facility]. Pt also started to have frequent chills, but no known fever. Pt family further assess that "he is not himself", not as energetic and responsive to family. Pt was brought to [facility] and was found to be hypothermic at 91.2, HR @ 53 SB, BP @ 139/63. On previous admission, pt had similar symptoms and was advised to be evaluated for AVR as outpatient. Pt Denies fever, cough, phlegm production,  PND, edema, chest pain, pressure, palpitations, irregular, fast heart beat, nausea, vomiting, melena, rectal bleed, hematuria, lightheadedness, dizziness, syncope, near syncope.   As of 12/26/19, pt in no resp. distress, chest tube draining straw colored drainage. Pt insertion site dressing saturated with blood no signs of subcutaneous emphysema. Pt wheezing on inspiration with bilateral rales when auscultated.            Plan:     1. Aortic Stenosis with SOB  - Pleural effusion s/p drainage->   chest tube removed.         congestive heart failure  diuresis. s/p cardiomem. 46/17/29 mm   ct toremide 20 mg Q12.    hypkalemia. discharge held. If does not tolerate diureiss and hyperkalmic may need. frequen hemodialysis.     2. HTN     -Continue current antihypertensive medication regimen     3. CKD     as above.

## 2020-01-07 NOTE — PROGRESS NOTE ADULT - SUBJECTIVE AND OBJECTIVE BOX
Subjective: no c/o incisional pain around chest tube at this time. Denies CP, SOB, palpitations, N/V, other c/o.    T(C): 36.6 (01-07-20 @ 08:00), Max: 36.6 (01-06-20 @ 15:05)  HR: 62 (01-07-20 @ 08:00) (56 - 64)  BP: 143/68 (01-07-20 @ 08:00) (129/62 - 147/69)  ABP: --  ABP(mean): --  RR: 18 (01-07-20 @ 08:00) (18 - 18)  SpO2: 96% (01-07-20 @ 08:00) (95% - 96%)  Wt(kg): --  CVP(mm Hg): --  CO: --  CI: --  PA: --       I&O's Detail    06 Jan 2020 07:01  -  07 Jan 2020 07:00  --------------------------------------------------------  IN:  Total IN: 0 mL    OUT:    Chest Tube: 50 mL  Total OUT: 50 mL    Total NET: -50 mL          LABS: All Lab data reviewed and analyzed                        8.6    7.22  )-----------( 153      ( 06 Jan 2020 07:28 )             29.1     01-06    136  |  101  |  53.0<H>  ----------------------------<  184<H>  5.6<H>   |  22.0  |  3.37<H>    Ca    7.9<L>      06 Jan 2020 07:28              CAPILLARY BLOOD GLUCOSE      POCT Blood Glucose.: 160 mg/dL (07 Jan 2020 08:32)  POCT Blood Glucose.: 155 mg/dL (06 Jan 2020 21:20)  POCT Blood Glucose.: 153 mg/dL (06 Jan 2020 17:33)  POCT Blood Glucose.: 129 mg/dL (06 Jan 2020 12:56)           RADIOLOGY: - Reviewed and analyzed   CXR: pending    HOSPITAL MEDICATIONS: All medications reviewed and analyzed  MEDICATIONS  (STANDING):  amLODIPine   Tablet 10 milliGRAM(s) Oral daily  aspirin  chewable 81 milliGRAM(s) Oral daily  atorvastatin 10 milliGRAM(s) Oral at bedtime  carvedilol 25 milliGRAM(s) Oral every 12 hours  chlorhexidine 4% Liquid 1 Application(s) Topical <User Schedule>  cholecalciferol 1000 Unit(s) Oral daily  dextrose 5%. 1000 milliLiter(s) (50 mL/Hr) IV Continuous <Continuous>  dextrose 50% Injectable 12.5 Gram(s) IV Push once  dextrose 50% Injectable 25 Gram(s) IV Push once  dextrose 50% Injectable 25 Gram(s) IV Push once  doxazosin 4 milliGRAM(s) Oral at bedtime  epoetin nereida Injectable 99270 Unit(s) SubCutaneous <User Schedule>  ferrous    sulfate 325 milliGRAM(s) Oral daily  heparin  Injectable 5000 Unit(s) SubCutaneous every 12 hours  hydrALAZINE 50 milliGRAM(s) Oral three times a day  insulin lispro (HumaLOG) corrective regimen sliding scale   SubCutaneous three times a day before meals  insulin lispro Injectable (HumaLOG) 4 Unit(s) SubCutaneous three times a day before meals  isosorbide   mononitrate ER Tablet (IMDUR) 30 milliGRAM(s) Oral daily  sodium bicarbonate 650 milliGRAM(s) Oral two times a day  torsemide 20 milliGRAM(s) Oral every 12 hours    MEDICATIONS  (PRN):  acetaminophen   Tablet .. 650 milliGRAM(s) Oral every 6 hours PRN Temp greater or equal to 38C (100.4F), Mild Pain (1 - 3)  dextrose 40% Gel 15 Gram(s) Oral once PRN Blood Glucose LESS THAN 70 milliGRAM(s)/deciliter  glucagon  Injectable 1 milliGRAM(s) IntraMuscular once PRN Glucose LESS THAN 70 milligrams/deciliter  sodium chloride 0.9% lock flush 10 milliLiter(s) IV Push every 1 hour PRN Pre/post blood products, medications, blood draw, and to maintain line patency          Neuro: A+O x 3, non-focal, speech clear and intact  HEENT: PERRL, EOMI, oral mucosa pink and moist  Neck: supple, no JVD  CV: regular rate, regular rhythm, +S1S2, holosystolic murmur present , no cardiac rub  Pulm/chest: lung sounds CTA and equal bilaterally, no accessory muscle use noted  Abd: soft, NT, ND, +BS  Ext: MORGAN x 4, no C/C/E  Skin: warm, well perfused         Case including assessment/plan of care discussed with   CT surgery attending.  Care time:  30    minutes of noncontinuos critical care time spent evaluating/treating, reviewing imaging, labs, discussing case with multidisciplinary team, discussing plans/goals of care with patient/family to prevent further life threatening depreciation of the patient's condition. Non-inclusive is procedure time.       79yMale with PMH     US guided placement of non-tunneled central venous catheter      PAST MEDICAL & SURGICAL HISTORY:  Hyperkalemia  ADI (acute kidney injury)  BPH (benign prostatic hyperplasia)  CKD (chronic kidney disease)  Hyperlipemia  Hypertension  Diabetes  Ureteral stent retained

## 2020-01-07 NOTE — PROGRESS NOTE ADULT - ASSESSMENT
79 year old male with a history of CKD stage 4, hypertension, chronic diastolic CHF, bph and moderate-severe AS who presented to the ER with complaints of SOB.  In ED given dose of Lasix with some improvement.  Noted hypothermic.  CXR revealed pulmonary edema and R pleural effusion.  Cardiology and CT surgery were consulted. Started on IV diuresis. Status post right chest tube placement on 12/25.  S/p Cardiomems placement.     #. Right pleural effusion /acute on chronic Diastolic CHF exacerbation - Chest tube removed.  Continue diuretics.  S/p cardiomems placement.   #. ADI on CKD stage 4, Hyperkalemia - Had been temporarily on dialysis.  Remains off HD for now.  Repeat BMP.  No role for HD at present.  Renal followup.    #. Moderate Aortic Stenosis - noted on recent cath.  No plans for TAVR at present per cardiology and CT surgery  # Diabetes Type II - monitor fingersticks.  Insulin coverage for hyperglycemia.  D/c'd Lantus.  Continue Premeal insulin.  FS well controlled.   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  Monitor on Fe, Epogen.  # Hyperlipidemia - diet modification.  Continue Statin.  # Essential HTN - Monitor BP.  Continue oral antihypertensives.  #. DVT prophylaxis heparin .    Awaiting PT evaluation.  Plan for d/c in am.

## 2020-01-07 NOTE — PROGRESS NOTE ADULT - ASSESSMENT
79M, pmhx CKD IV, HTN, HLD, chronic diastolic heart failure, BPH, mod to severe AS with prior TAVR consult on last admission, 12/25 p/w DEJESUS/SOB, acute on chronic diastolic heart failure exacerbation, b/l pleural effusions, acute on CKD, requiring HD, R pigtail placed for one liter, continues to drain serous fluid. CX NGTD. Cyto negative. 1/3 s/p cardiac mems device implanted.     1/7 chest tube removed. no evidence of ptx on post removal radiography. Thoracic team will sign off at this time.

## 2020-01-07 NOTE — PROGRESS NOTE ADULT - SUBJECTIVE AND OBJECTIVE BOX
[facility] DIVISION OF KIDNEY DISEASES AND HYPERTENSION -- FOLLOW UP NOTE  --------------------------------------------------------------------------------  Chief Complaint: CKD    24 hour events/subjective:  Chest tube removed today 1/7.  Pt seen and examined at bedside, reports feeling better.       PAST HISTORY  --------------------------------------------------------------------------------  No significant changes to PMH, PSH, FHx, SHx, unless otherwise noted    ALLERGIES & MEDICATIONS  --------------------------------------------------------------------------------  Allergies  No Known Allergies    Intolerances  None     Standing Inpatient Medications  amLODIPine   Tablet 10 milliGRAM(s) Oral daily  aspirin  chewable 81 milliGRAM(s) Oral daily  atorvastatin 10 milliGRAM(s) Oral at bedtime  carvedilol 25 milliGRAM(s) Oral every 12 hours  chlorhexidine 4% Liquid 1 Application(s) Topical <User Schedule>  cholecalciferol 1000 Unit(s) Oral daily  dextrose 5%. 1000 milliLiter(s) IV Continuous <Continuous>  dextrose 50% Injectable 12.5 Gram(s) IV Push once  dextrose 50% Injectable 25 Gram(s) IV Push once  dextrose 50% Injectable 25 Gram(s) IV Push once  doxazosin 4 milliGRAM(s) Oral at bedtime  epoetin nereida Injectable 32106 Unit(s) SubCutaneous <User Schedule>  ferrous    sulfate 325 milliGRAM(s) Oral daily  heparin  Injectable 5000 Unit(s) SubCutaneous every 12 hours  hydrALAZINE 50 milliGRAM(s) Oral three times a day  insulin lispro (HumaLOG) corrective regimen sliding scale   SubCutaneous three times a day before meals  insulin lispro Injectable (HumaLOG) 4 Unit(s) SubCutaneous three times a day before meals  isosorbide   mononitrate ER Tablet (IMDUR) 30 milliGRAM(s) Oral daily  PPD  5 Tuberculin Unit(s) Injectable 5 Unit(s) IntraDermal once  sodium bicarbonate 650 milliGRAM(s) Oral two times a day    PRN Inpatient Medications  acetaminophen   Tablet .. 650 milliGRAM(s) Oral every 6 hours PRN  dextrose 40% Gel 15 Gram(s) Oral once PRN  glucagon  Injectable 1 milliGRAM(s) IntraMuscular once PRN  sodium chloride 0.9% lock flush 10 milliLiter(s) IV Push every 1 hour PRN      REVIEW OF SYSTEMS  --------------------------------------------------------------------------------  Gen: No weight changes, fatigue, fevers/chills, weakness  Skin: No rashes  Head/Eyes/Ears/Mouth: No headache; Normal hearing; Normal vision w/o blurriness; No sinus pain/discomfort, sore throat  Respiratory: No dyspnea, cough, wheezing, hemoptysis  CV: No chest pain, PND, orthopnea  GI: No abdominal pain, diarrhea, constipation, nausea, vomiting, melena, hematochezia  : No increased frequency, dysuria, hematuria, nocturia  MSK: No joint pain/swelling; no back pain; no edema  Neuro: No dizziness/lightheadedness, weakness, seizures, numbness, tingling  Heme: No easy bruising or bleeding  Endo: No heat/cold intolerance  Psych: No significant nervousness, anxiety, stress, depression    All other systems were reviewed and are negative, except as noted.    VITALS/PHYSICAL EXAM  --------------------------------------------------------------------------------  Vital Signs Last 24 Hrs  T(C): 36.6 (07 Jan 2020 08:00), Max: 36.6 (06 Jan 2020 23:50)  T(F): 97.9 (07 Jan 2020 08:00), Max: 97.9 (06 Jan 2020 23:50)  HR: 63 (07 Jan 2020 14:43) (58 - 64)  BP: 125/50 (07 Jan 2020 14:43) (125/50 - 147/69)  RR: 18 (07 Jan 2020 08:00) (18 - 18)  SpO2: 96% (07 Jan 2020 08:00) (95% - 96%)      I&O's Summary    06 Jan 2020 07:01  -  07 Jan 2020 07:00  --------------------------------------------------------  IN: 0 mL / OUT: 50 mL / NET: -50 mL      Physical Exam:              Gen: NAD  	HEENT: supple neck, no JVD  	Pulm: CTA B/L, s/p R chest tube removal - area c/d/i  	CV: RRR, S1S2; no rub  	Back: no sacral edema  	Abd: +BS, soft, nontender/nondistended  	: No suprapubic tenderness  	UE: Warm, ; no edema; no asterixis  	LE: Warm,  no edema  	Neuro: No focal deficit  	Psych: Normal affect and mood  	Skin: Warm, without rashes  	Vascular access: none    LABS/STUDIES  --------------------------------------------------------------------------------                        8.6    7.22  )-----------( 153      ( 06 Jan 2020 07:28 )             29.1       136  |  101  |  53.0  ----------------------------<  184      [01-06-20 @ 07:28]  5.6   |  22.0  |  3.37        Ca     7.9     [01-06-20 @ 07:28]      Creatinine Trend:  SCr 3.37 [01-06 @ 07:28]  SCr 2.83 [01-04 @ 08:46]  SCr 2.55 [01-03 @ 06:14]  SCr 2.58 [01-01 @ 07:18]  SCr 3.17 [12-31 @ 08:11]    Urinalysis - [12-28-19 @ 16:39]      Color Yellow / Appearance Cloudy / SG 1.010 / pH 5.0      Gluc Negative / Ketone Trace  / Bili Negative / Urobili Negative       Blood Large / Protein 100 / Leuk Est Small / Nitrite Negative      RBC >50 / WBC 6-10 / Hyaline  / Gran  / Sq Epi  / Non Sq Epi  / Bacteria Few      Iron 25, TIBC 285, %sat 9      [12-11-19 @ 17:50]  Ferritin 25      [12-11-19 @ 17:50]  PTH -- (Ca 8.3)      [12-12-19 @ 19:36]   164  Vitamin D (25OH) 20.3      [12-12-19 @ 19:36]  HbA1c 8.1      [12-11-19 @ 06:47]  TSH 6.54      [12-25-19 @ 21:00]    HBsAb <3.0      [12-31-19 @ 08:48]  HBsAb Nonreact      [12-31-19 @ 08:48]  HBsAg Nonreact      [12-31-19 @ 08:48]  HBcAb Nonreact      [12-31-19 @ 08:48]  HCV 0.06, Nonreact      [12-31-19 @ 08:48]

## 2020-01-07 NOTE — PROGRESS NOTE ADULT - PROBLEM SELECTOR PROBLEM 1
Aortic stenosis
Pleural effusion
Aortic stenosis

## 2020-01-07 NOTE — PROGRESS NOTE ADULT - ASSESSMENT
78 YO M w/ PMHx of CKD-4, HTN, chronic diastolic CHF, BPH and moderate-severe AS, pt admitted for R pleural effusion, s/p chest tube placement - removed today 1/7. Cardiology and CT surgery consults noted. Pt is s/p Cardiomems placement 1/3.    1. Right pleural effusion /Acute CHF exacerbation & Moderate AS  -R chest tube removed today 1/7  -Pt is clinically stable     2. CRS - CKD stage 4   -S/p 3 HD sessions for optimization prior to cath  -HD catheter removed  -Pt to f/u w/ Nephrology outpt- likely will need dialysis in the near future    3. Volume/ HTN & CHFpEF   -s/p cardiomems  -C/w Torsemide   -Cardiology consult noted     4. Anemia  -Likely AOCD  -C/w iron and KD    Will follow    Plan of care discussed with patient and wife at bedside. 80 YO M w/ PMHx of CKD-4, HTN, chronic diastolic CHF, BPH and moderate-severe AS, pt admitted for R pleural effusion, s/p chest tube placement - removed today 1/7. Cardiology and CT surgery consults noted. Pt is s/p Cardiomems placement 1/3.    1. Right pleural effusion /Acute CHF exacerbation & Moderate AS  -R chest tube removed today 1/7  -Pt is clinically stable     2. CRS - CKD stage 4   -S/p 3 HD sessions for optimization prior to cath  -HD catheter removed  -Pt to f/u w/ Nephrology outpt- likely will need dialysis in the near future    3. Volume/ HTN & CHFpEF   -s/p cardiomems  -C/w Torsemide   -Cardiology consult noted     4. Anemia  -Likely AOCD  -C/w iron and KD    I was Physically Present for the key portions of the Evaluation & management ( E/M ) Service provided.    I agree with the above History  , Physical examination & Treatment Plans,    I have Reviewed , Modified or appended where appropriate,     Will follow    Plan of care discussed with patient and wife at bedside.

## 2020-01-08 ENCOUNTER — APPOINTMENT (OUTPATIENT)
Dept: NEPHROLOGY | Facility: CLINIC | Age: 80
End: 2020-01-08

## 2020-01-08 LAB
ANION GAP SERPL CALC-SCNC: 16 MMOL/L — SIGNIFICANT CHANGE UP (ref 5–17)
BUN SERPL-MCNC: 55 MG/DL — HIGH (ref 8–20)
CALCIUM SERPL-MCNC: 8.4 MG/DL — LOW (ref 8.6–10.2)
CHLORIDE SERPL-SCNC: 98 MMOL/L — SIGNIFICANT CHANGE UP (ref 98–107)
CO2 SERPL-SCNC: 24 MMOL/L — SIGNIFICANT CHANGE UP (ref 22–29)
CREAT SERPL-MCNC: 3.34 MG/DL — HIGH (ref 0.5–1.3)
GLUCOSE BLDC GLUCOMTR-MCNC: 127 MG/DL — HIGH (ref 70–99)
GLUCOSE BLDC GLUCOMTR-MCNC: 145 MG/DL — HIGH (ref 70–99)
GLUCOSE BLDC GLUCOMTR-MCNC: 152 MG/DL — HIGH (ref 70–99)
GLUCOSE SERPL-MCNC: 126 MG/DL — HIGH (ref 70–115)
HCT VFR BLD CALC: 32.9 % — LOW (ref 39–50)
HGB BLD-MCNC: 9.7 G/DL — LOW (ref 13–17)
MCHC RBC-ENTMCNC: 24.7 PG — LOW (ref 27–34)
MCHC RBC-ENTMCNC: 29.5 GM/DL — LOW (ref 32–36)
MCV RBC AUTO: 83.9 FL — SIGNIFICANT CHANGE UP (ref 80–100)
PLATELET # BLD AUTO: 188 K/UL — SIGNIFICANT CHANGE UP (ref 150–400)
POTASSIUM SERPL-MCNC: 4.6 MMOL/L — SIGNIFICANT CHANGE UP (ref 3.5–5.3)
POTASSIUM SERPL-SCNC: 4.6 MMOL/L — SIGNIFICANT CHANGE UP (ref 3.5–5.3)
RBC # BLD: 3.92 M/UL — LOW (ref 4.2–5.8)
RBC # FLD: 19.9 % — HIGH (ref 10.3–14.5)
SODIUM SERPL-SCNC: 138 MMOL/L — SIGNIFICANT CHANGE UP (ref 135–145)
WBC # BLD: 5.91 K/UL — SIGNIFICANT CHANGE UP (ref 3.8–10.5)
WBC # FLD AUTO: 5.91 K/UL — SIGNIFICANT CHANGE UP (ref 3.8–10.5)

## 2020-01-08 PROCEDURE — 99232 SBSQ HOSP IP/OBS MODERATE 35: CPT

## 2020-01-08 PROCEDURE — 99233 SBSQ HOSP IP/OBS HIGH 50: CPT

## 2020-01-08 RX ADMIN — Medication 50 MILLIGRAM(S): at 05:38

## 2020-01-08 RX ADMIN — CARVEDILOL PHOSPHATE 25 MILLIGRAM(S): 80 CAPSULE, EXTENDED RELEASE ORAL at 05:38

## 2020-01-08 RX ADMIN — Medication 4 UNIT(S): at 08:08

## 2020-01-08 RX ADMIN — Medication 20 MILLIGRAM(S): at 05:38

## 2020-01-08 RX ADMIN — Medication 50 MILLIGRAM(S): at 17:42

## 2020-01-08 RX ADMIN — HEPARIN SODIUM 5000 UNIT(S): 5000 INJECTION INTRAVENOUS; SUBCUTANEOUS at 17:41

## 2020-01-08 RX ADMIN — Medication 650 MILLIGRAM(S): at 05:38

## 2020-01-08 RX ADMIN — Medication 4 UNIT(S): at 11:41

## 2020-01-08 RX ADMIN — Medication 4 MILLIGRAM(S): at 21:18

## 2020-01-08 RX ADMIN — CARVEDILOL PHOSPHATE 25 MILLIGRAM(S): 80 CAPSULE, EXTENDED RELEASE ORAL at 17:42

## 2020-01-08 RX ADMIN — Medication 81 MILLIGRAM(S): at 11:04

## 2020-01-08 RX ADMIN — Medication 50 MILLIGRAM(S): at 21:18

## 2020-01-08 RX ADMIN — Medication 1000 UNIT(S): at 11:04

## 2020-01-08 RX ADMIN — Medication 650 MILLIGRAM(S): at 17:41

## 2020-01-08 RX ADMIN — Medication 20 MILLIGRAM(S): at 17:41

## 2020-01-08 RX ADMIN — Medication 325 MILLIGRAM(S): at 11:04

## 2020-01-08 RX ADMIN — ISOSORBIDE MONONITRATE 30 MILLIGRAM(S): 60 TABLET, EXTENDED RELEASE ORAL at 11:04

## 2020-01-08 RX ADMIN — CHLORHEXIDINE GLUCONATE 1 APPLICATION(S): 213 SOLUTION TOPICAL at 05:39

## 2020-01-08 RX ADMIN — ATORVASTATIN CALCIUM 10 MILLIGRAM(S): 80 TABLET, FILM COATED ORAL at 21:18

## 2020-01-08 RX ADMIN — AMLODIPINE BESYLATE 10 MILLIGRAM(S): 2.5 TABLET ORAL at 05:38

## 2020-01-08 RX ADMIN — HEPARIN SODIUM 5000 UNIT(S): 5000 INJECTION INTRAVENOUS; SUBCUTANEOUS at 05:38

## 2020-01-08 RX ADMIN — Medication 1: at 11:41

## 2020-01-08 RX ADMIN — Medication 4 UNIT(S): at 17:43

## 2020-01-08 NOTE — CHART NOTE - NSCHARTNOTEFT_GEN_A_CORE
Source: Patient [x ]  Family [ ]   other [x ]  per MD note:  79 year old male with a history of CKD stage 4, hypertension, chronic diastolic CHF, bph and moderate-severe AS who presented to the ER with complaints of SOB   Right pleural effusion /acute on chronic Diastolic CHF exacerbation     Current Diet: Diet, DASH/TLC:   Sodium & Cholesterol Restricted  Consistent Carbohydrate {Evening Snacks} (12-25-19 @ 13:53)      Patient reports [ ] nausea  [ ] vomiting [ ] diarrhea [ ] constipation  [ ]chewing problems [ ] swallowing issues  [ ] other:     PO intake:  < 50% [x ]   50-75%  [ ]   %  [ ]  other :    Source for PO intake [ ] Patient [ ] family [ ] chart [ ] staff [ ] other        Current Weight:   1/3 78.2 kg  1/2 78 kg  1/1 78.4 kg  12/31 79.7 kg  12/25 81.6 kg      % Weight Change     Pertinent Medications: MEDICATIONS  (STANDING):  ALBUTerol    0.083%. 2.5 milliGRAM(s) Nebulizer once  amLODIPine   Tablet 10 milliGRAM(s) Oral daily  aspirin  chewable 81 milliGRAM(s) Oral daily  atorvastatin 10 milliGRAM(s) Oral at bedtime  carvedilol 25 milliGRAM(s) Oral every 12 hours  chlorhexidine 4% Liquid 1 Application(s) Topical <User Schedule>  cholecalciferol 1000 Unit(s) Oral daily  dextrose 5%. 1000 milliLiter(s) (50 mL/Hr) IV Continuous <Continuous>  dextrose 50% Injectable 12.5 Gram(s) IV Push once  dextrose 50% Injectable 25 Gram(s) IV Push once  dextrose 50% Injectable 25 Gram(s) IV Push once  doxazosin 4 milliGRAM(s) Oral at bedtime  epoetin nereida Injectable 76397 Unit(s) SubCutaneous <User Schedule>  ferrous    sulfate 325 milliGRAM(s) Oral daily  heparin  Injectable 5000 Unit(s) SubCutaneous every 12 hours  hydrALAZINE 50 milliGRAM(s) Oral three times a day  insulin lispro (HumaLOG) corrective regimen sliding scale   SubCutaneous three times a day before meals  insulin lispro Injectable (HumaLOG) 4 Unit(s) SubCutaneous three times a day before meals  isosorbide   mononitrate ER Tablet (IMDUR) 30 milliGRAM(s) Oral daily  sodium bicarbonate 650 milliGRAM(s) Oral two times a day  torsemide 20 milliGRAM(s) Oral every 12 hours    MEDICATIONS  (PRN):  acetaminophen   Tablet .. 650 milliGRAM(s) Oral every 6 hours PRN Temp greater or equal to 38C (100.4F), Mild Pain (1 - 3)  dextrose 40% Gel 15 Gram(s) Oral once PRN Blood Glucose LESS THAN 70 milliGRAM(s)/deciliter  glucagon  Injectable 1 milliGRAM(s) IntraMuscular once PRN Glucose LESS THAN 70 milligrams/deciliter  sodium chloride 0.9% lock flush 10 milliLiter(s) IV Push every 1 hour PRN Pre/post blood products, medications, blood draw, and to maintain line patency    Pertinent Labs: CBC Full  -  ( 08 Jan 2020 10:25 )  WBC Count : 5.91 K/uL  RBC Count : 3.92 M/uL  Hemoglobin : 9.7 g/dL  Hematocrit : 32.9 %  Platelet Count - Automated : 188 K/uL  Mean Cell Volume : 83.9 fl  Mean Cell Hemoglobin : 24.7 pg  Mean Cell Hemoglobin Concentration : 29.5 gm/dL  Auto Neutrophil # : x  Auto Lymphocyte # : x  Auto Monocyte # : x  Auto Eosinophil # : x  Auto Basophil # : x  Auto Neutrophil % : x  Auto Lymphocyte % : x  Auto Monocyte % : x  Auto Eosinophil % : x  Auto Basophil % : x    01-08 Na138 mmol/L Glu 126 mg/dL<H> K+ 4.6 mmol/L Cr  3.34 mg/dL<H> BUN 55.0 mg/dL<H> Phos n/a   Alb n/a   PAB n/a             Skin: intact    Nutrition focused physical exam conducted - found signs of malnutrition [ ]absent [x ]present    Subcutaneous fat loss: [x ] Orbital fat pads region, [ ]Buccal fat region, [ ]Triceps region,  [ ]Ribs region    Muscle wasting: [ x]Temples region, [ x]Clavicle region, [ ]Shoulder region, [ ]Scapula region, [ ]Interosseous region,  [ ]thigh region, [ ]Calf region    Estimated Needs:   [x] no change since previous assessment  [ ] recalculated:     Current Nutrition Diagnosis: moderate (acute), related to inability to meet increased protein-energy needs in setting of CKD, pleural effusion, aortic stenosis,  As evidenced by pt likely meeting <75% estimated energy needs >7 days, moderate muscle/fat loss. PO intake remains suboptimal       Recommendations:   1) glucerna TID  2) nephrovite          Monitoring and Evaluation:   [ x] PO intake [x ] Tolerance to diet prescription [X] Weights  [X] Follow up per protocol [X] Labs:

## 2020-01-08 NOTE — PROGRESS NOTE ADULT - SUBJECTIVE AND OBJECTIVE BOX
Patient: JEMIMA SANTOS [id number] 79y Male                           Internal Medicine Hospitalist Progress Note    Interval History:  No complaints  Wife at bedside.  No SOB.  No cough.  No pain.  Admits to poor ambulation.     ____________________PHYSICAL EXAM:  Vitals reviewed as indicated below  GENERAL:  NAD Alert and Oriented x 3   HEENT: NCAT  CARDIOVASCULAR:  S1, S2  LUNGS: coarse BS R base.   ABDOMEN:  soft, (-) tenderness, (-) distension, (+) bowel sounds, (-) guarding, (-) rebound (-) rigidity  EXTREMITIES:  no cyanosis / clubbing / edema  ____________________    VITALS:  Vital Signs Last 24 Hrs  T(C): 36.9 (08 Jan 2020 09:05), Max: 36.9 (08 Jan 2020 09:05)  T(F): 98.4 (08 Jan 2020 09:05), Max: 98.4 (08 Jan 2020 09:05)  HR: 64 (08 Jan 2020 09:05) (63 - 79)  BP: 136/64 (08 Jan 2020 09:05) (125/50 - 141/61)  BP(mean): --  RR: 18 (08 Jan 2020 09:05) (18 - 18)  SpO2: 95% (08 Jan 2020 09:05) (93% - 95%) Daily     Daily   CAPILLARY BLOOD GLUCOSE      POCT Blood Glucose.: 152 mg/dL (08 Jan 2020 11:36)  POCT Blood Glucose.: 145 mg/dL (08 Jan 2020 07:51)  POCT Blood Glucose.: 119 mg/dL (07 Jan 2020 22:04)  POCT Blood Glucose.: 146 mg/dL (07 Jan 2020 17:44)    I&O's Summary    08 Jan 2020 07:01  -  08 Jan 2020 13:53  --------------------------------------------------------  IN: 0 mL / OUT: 500 mL / NET: -500 mL        LABS:                        9.7    5.91  )-----------( 188      ( 08 Jan 2020 10:25 )             32.9     01-08    138  |  98  |  55.0<H>  ----------------------------<  126<H>  4.6   |  24.0  |  3.34<H>    Ca    8.4<L>      08 Jan 2020 10:25                    MEDICATIONS:  acetaminophen   Tablet .. 650 milliGRAM(s) Oral every 6 hours PRN  ALBUTerol    0.083%. 2.5 milliGRAM(s) Nebulizer once  amLODIPine   Tablet 10 milliGRAM(s) Oral daily  aspirin  chewable 81 milliGRAM(s) Oral daily  atorvastatin 10 milliGRAM(s) Oral at bedtime  carvedilol 25 milliGRAM(s) Oral every 12 hours  chlorhexidine 4% Liquid 1 Application(s) Topical <User Schedule>  cholecalciferol 1000 Unit(s) Oral daily  dextrose 40% Gel 15 Gram(s) Oral once PRN  dextrose 5%. 1000 milliLiter(s) IV Continuous <Continuous>  dextrose 50% Injectable 12.5 Gram(s) IV Push once  dextrose 50% Injectable 25 Gram(s) IV Push once  dextrose 50% Injectable 25 Gram(s) IV Push once  doxazosin 4 milliGRAM(s) Oral at bedtime  epoetin nereida Injectable 75711 Unit(s) SubCutaneous <User Schedule>  ferrous    sulfate 325 milliGRAM(s) Oral daily  glucagon  Injectable 1 milliGRAM(s) IntraMuscular once PRN  heparin  Injectable 5000 Unit(s) SubCutaneous every 12 hours  hydrALAZINE 50 milliGRAM(s) Oral three times a day  insulin lispro (HumaLOG) corrective regimen sliding scale   SubCutaneous three times a day before meals  insulin lispro Injectable (HumaLOG) 4 Unit(s) SubCutaneous three times a day before meals  isosorbide   mononitrate ER Tablet (IMDUR) 30 milliGRAM(s) Oral daily  sodium bicarbonate 650 milliGRAM(s) Oral two times a day  sodium chloride 0.9% lock flush 10 milliLiter(s) IV Push every 1 hour PRN  torsemide 20 milliGRAM(s) Oral every 12 hours

## 2020-01-08 NOTE — PROGRESS NOTE ADULT - ASSESSMENT
Pt is a 78 y/o male with medical history of DM, HTN, CKD, BPH, pAfib with no AC, who presents to [facility] for c/o SOB. Pt is not a good historian d/t possible dementia, wife at bedside. Pt wife noticed pt has increased SOB even after discharge from [facility]. Pt also started to have frequent chills, but no known fever. Pt family further assess that "he is not himself", not as energetic and responsive to family. Pt was brought to [facility] and was found to be hypothermic at 91.2, HR @ 53 SB, BP @ 139/63. On previous admission, pt had similar symptoms and was advised to be evaluated for AVR as outpatient. Pt Denies fever, cough, phlegm production,  PND, edema, chest pain, pressure, palpitations, irregular, fast heart beat, nausea, vomiting, melena, rectal bleed, hematuria, lightheadedness, dizziness, syncope, near syncope.   As of 12/26/19, pt in no resp. distress, chest tube draining straw colored drainage. Pt insertion site dressing saturated with blood no signs of subcutaneous emphysema. Pt wheezing on inspiration with bilateral rales when auscultated.            Plan:     1. Aortic Stenosis with SOB  Cath shows on ly moderate aortic stneosis. wiull follow up outpiatent until severe.   - Pleural effusion s/p drainage->   chest tube removed.         congestive heart failure  diuresis. s/p cardiomem. 46/17/29 mm will chjeck cardiomem today.   received lasix IV yesterday    ct toremide 20 mg Q12.    hyperkalemia. discharge held. If does not tolerate diureiss and hyperkalmic may need  hemodialysis. and hten plan for perma cath.     2. HTN     -Continue current antihypertensive medication regimen     3. CKD     as above.

## 2020-01-08 NOTE — PROGRESS NOTE ADULT - SUBJECTIVE AND OBJECTIVE BOX
[facility] DIVISION OF KIDNEY DISEASES AND HYPERTENSION -- FOLLOW UP NOTE  --------------------------------------------------------------------------------  Chief Complaint:    24 hour events/subjective:        PAST HISTORY  --------------------------------------------------------------------------------  No significant changes to PMH, PSH, FHx, SHx, unless otherwise noted    ALLERGIES & MEDICATIONS  --------------------------------------------------------------------------------  Allergies  No Known Allergies      Standing Inpatient Medications  ALBUTerol    0.083%. 2.5 milliGRAM(s) Nebulizer once  amLODIPine   Tablet 10 milliGRAM(s) Oral daily  aspirin  chewable 81 milliGRAM(s) Oral daily  atorvastatin 10 milliGRAM(s) Oral at bedtime  carvedilol 25 milliGRAM(s) Oral every 12 hours  chlorhexidine 4% Liquid 1 Application(s) Topical <User Schedule>  cholecalciferol 1000 Unit(s) Oral daily  dextrose 5%. 1000 milliLiter(s) IV Continuous <Continuous>  dextrose 50% Injectable 12.5 Gram(s) IV Push once  dextrose 50% Injectable 25 Gram(s) IV Push once  dextrose 50% Injectable 25 Gram(s) IV Push once  doxazosin 4 milliGRAM(s) Oral at bedtime  epoetin nereida Injectable 00845 Unit(s) SubCutaneous <User Schedule>  ferrous    sulfate 325 milliGRAM(s) Oral daily  heparin  Injectable 5000 Unit(s) SubCutaneous every 12 hours  hydrALAZINE 50 milliGRAM(s) Oral three times a day  insulin lispro (HumaLOG) corrective regimen sliding scale   SubCutaneous three times a day before meals  insulin lispro Injectable (HumaLOG) 4 Unit(s) SubCutaneous three times a day before meals  isosorbide   mononitrate ER Tablet (IMDUR) 30 milliGRAM(s) Oral daily  sodium bicarbonate 650 milliGRAM(s) Oral two times a day  torsemide 20 milliGRAM(s) Oral every 12 hours    PRN Inpatient Medications  acetaminophen   Tablet .. 650 milliGRAM(s) Oral every 6 hours PRN  dextrose 40% Gel 15 Gram(s) Oral once PRN  glucagon  Injectable 1 milliGRAM(s) IntraMuscular once PRN  sodium chloride 0.9% lock flush 10 milliLiter(s) IV Push every 1 hour PRN      REVIEW OF SYSTEMS  --------------------------------------------------------------------------------  Gen: No weight changes, fatigue, fevers/chills, weakness  Skin: No rashes  Head/Eyes/Ears/Mouth: No headache; Normal hearing; Normal vision w/o blurriness  Respiratory: No dyspnea, cough, wheezing, hemoptysis  CV: No chest pain, PND, orthopnea  GI: No abdominal pain, diarrhea, constipation, nausea, vomiting, melena, hematochezia  : No increased frequency, dysuria, hematuria, nocturia  MSK: No joint pain/swelling; no back pain; no edema  Neuro: No dizziness/lightheadedness, weakness, seizures, numbness, tingling  Heme: No easy bruising or bleeding  Endo: No heat/cold intolerance  Psych: No significant nervousness, anxiety, stress, depression    All other systems were reviewed and are negative, except as noted.    VITALS/PHYSICAL EXAM  --------------------------------------------------------------------------------  T(C): 36.9 (01-08-20 @ 09:05), Max: 36.9 (01-08-20 @ 09:05)  HR: 64 (01-08-20 @ 09:05) (64 - 79)  BP: 136/64 (01-08-20 @ 09:05) (125/63 - 141/61)  RR: 18 (01-08-20 @ 09:05) (18 - 18)      01-08-20 @ 07:01  -  01-08-20 @ 15:05  --------------------------------------------------------  IN: 0 mL / OUT: 500 mL / NET: -500 mL      Physical Exam:  	Gen: NAD, well-appearing  	HEENT: PERRL, supple neck, clear oropharynx  	Pulm: CTA B/L. s/p R chest tube removal - area c/d/i  	CV: RRR, S1S2; no rub  	Abd: +BS, soft, nontender/nondistended  	: No suprapubic tenderness  	UE: Warm, FROM, no clubbing, intact strength; no edema; no asterixis  	LE: Warm, FROM, no clubbing, intact strength; no edema  	Neuro: No focal deficits, intact gait  	Psych: Normal affect and mood  	Skin: Warm, without rashes  	Vascular access:    LABS/STUDIES  --------------------------------------------------------------------------------              9.7    5.91  >-----------<  188      [01-08-20 @ 10:25]              32.9     138  |  98  |  55.0  ----------------------------<  126      [01-08-20 @ 10:25]  4.6   |  24.0  |  3.34        Ca     8.4     [01-08-20 @ 10:25]      Creatinine Trend:  SCr 3.34 [01-08 @ 10:25]  SCr 3.37 [01-06 @ 07:28]  SCr 2.83 [01-04 @ 08:46]  SCr 2.55 [01-03 @ 06:14]  SCr 2.58 [01-01 @ 07:18]    Urinalysis - [12-28-19 @ 16:39]      Color Yellow / Appearance Cloudy / SG 1.010 / pH 5.0      Gluc Negative / Ketone Trace  / Bili Negative / Urobili Negative       Blood Large / Protein 100 / Leuk Est Small / Nitrite Negative      RBC >50 / WBC 6-10 / Hyaline  / Gran  / Sq Epi  / Non Sq Epi  / Bacteria Few      Iron 25, TIBC 285, %sat 9      [12-11-19 @ 17:50]  Ferritin 25      [12-11-19 @ 17:50]  PTH -- (Ca 8.3)      [12-12-19 @ 19:36]   164  Vitamin D (25OH) 20.3      [12-12-19 @ 19:36]  HbA1c 8.1      [12-11-19 @ 06:47]  TSH 6.54      [12-25-19 @ 21:00]    HBsAb <3.0      [12-31-19 @ 08:48]  HBsAb Nonreact      [12-31-19 @ 08:48]  HBsAg Nonreact      [12-31-19 @ 08:48]  HBcAb Nonreact      [12-31-19 @ 08:48]  HCV 0.06, Nonreact      [12-31-19 @ 08:48]

## 2020-01-08 NOTE — PROGRESS NOTE ADULT - SUBJECTIVE AND OBJECTIVE BOX
[facility]                                                               Office: [address] [address]                                                              Telephone: [phone]. Fax:[phone]                                                                             PROGRESS NOTE  Reason for follow up: congestive heart failure , aortic stenosis   Overnight: No new events.   Update:  cardiomem check today. will check electrolytes today  BMP pending.     Subjective: "  i am good.  "    Review of symptoms: Cardiac:  No chest pain. None dyspnea. No palpitations.  Respiratory:no cough.  none   Gastrointestinal: No diarrhea. No abdominal pain. No bleeding.     Past medical history updates:  CKD: stable. congestive heart failure : improved.       Vital Signs Last 24 Hrs  Vital Signs Last 24 Hrs  T(C): 36.8 (01-07-20 @ 21:28), Max: 36.8 (01-07-20 @ 21:28)  T(F): 98.2 (01-07-20 @ 21:28), Max: 98.2 (01-07-20 @ 21:28)  HR: 79 (01-08-20 @ 05:48) (63 - 79)  BP: 139/50 (01-08-20 @ 05:48) (125/50 - 141/61)  BP(mean): --  RR: 18 (01-07-20 @ 21:28) (18 - 18)  SpO2: 94% (01-07-20 @ 21:28) (93% - 94%)    PHYSICAL EXAM:  Appearance: Comfortable. No acute distress  HEENT:  Head and neck: Atraumatic. Normocephalic.     Neurologic: A & O x 3, no focal deficits. EOMI   Lymphatic: No cervical lymphadenopathy  Cardiovascular: Normal S1 S2,  [id number] murmur 5/6  murmur, No  rubs/gallops. No JVD, No edema  Respiratory:  Clear to auscultation bilaterally   Gastrointestinal:  Soft, Non-tender, + BS  Lower Extremities: no edema.    Psychiatry: Patient is calm. No agitation. Mood & affect appropriate  Skin: Right chest tube           MEDICATIONS  (STANDING):     MEDICATIONS  (STANDING):  amLODIPine   Tablet 10 milliGRAM(s) Oral daily  carvedilol 25 milliGRAM(s) Oral every 12 hours  doxazosin 4 milliGRAM(s) Oral at bedtime  hydrALAZINE 50 milliGRAM(s) Oral three times a day  isosorbide   mononitrate ER Tablet (IMDUR) 30 milliGRAM(s) Oral daily  torsemide 20 milliGRAM(s) Oral every 12 hours    ALBUTerol    0.083%.  aspirin  chewable  atorvastatin  chlorhexidine 4% Liquid  cholecalciferol  dextrose 5%.  dextrose 50% Injectable  dextrose 50% Injectable  dextrose 50% Injectable  epoetin nereida Injectable  ferrous    sulfate  heparin  Injectable  insulin lispro (HumaLOG) corrective regimen sliding scale  insulin lispro Injectable (HumaLOG)  sodium bicarbonate    PRN: acetaminophen   Tablet .. PRN  dextrose 40% Gel PRN  glucagon  Injectable PRN  sodium chloride 0.9% lock flush PRN      DIAGNOSTIC TESTING:  [ ] Echocardiogram: < from: TTE Echo Complete w/Doppler (12.10.19 @ 13:21) >  Summary:      3. Left ventricular ejection fraction, by visual estimation, is 60 to   65%.         10. Moderate to severe aortic valve stenosis. Recommend SCAR for further   evaluation.          OTHER: 	  XRAY: < from: Xray Chest 1 View- PORTABLE-Routine (12.26.19 @ 09:10) >    Impression:    Persisting pulmonary edema with small left pleural fluid.    Right chest tube in place, no pneumothorax.    < end of copied text >      LABS:	 	                                                              9.7    5.91  )-----------( 188      ( 08 Jan 2020 10:25 )             32.9   N=x    ; L=x

## 2020-01-08 NOTE — PHYSICAL THERAPY INITIAL EVALUATION ADULT - ADDITIONAL COMMENTS
Pt. states he lives in a private home 4 steps to enter with bilat. rails and full flight to bedroom with handrail.

## 2020-01-08 NOTE — PROGRESS NOTE ADULT - ASSESSMENT
79 year old male with a history of CKD stage 4, hypertension, chronic diastolic CHF, bph and moderate-severe AS who presented to the ER with complaints of SOB.  In ED given dose of Lasix with some improvement.  Noted hypothermic.  CXR revealed pulmonary edema and R pleural effusion.  Cardiology and CT surgery were consulted. Started on IV diuresis. Status post right chest tube placement on 12/25.  S/p Cardiomems placement.     #. Right pleural effusion /acute on chronic Diastolic CHF exacerbation - Chest tube removed.  Continue diuretics.  S/p cardiomems placement.   #. ADI on CKD stage 4, Hyperkalemia - Had been temporarily on dialysis.  Remains off HD for now.  Repeat BMP.  No role for HD at present.  Renal followup.    #. Moderate Aortic Stenosis - noted on recent cath.  No plans for TAVR at present per cardiology and CT surgery  # Diabetes Type II - monitor fingersticks.  Insulin coverage for hyperglycemia.  D/c'd Lantus.  Continue Premeal insulin.  FS well controlled.   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  Monitor on Fe, Epogen.  # Hyperlipidemia - diet modification.  Continue Statin.  # Essential HTN - Monitor BP.  Continue oral antihypertensives.  #. DVT prophylaxis heparin .    Awaiting PT evaluation.  Plan for d/c pending ambulatory status.

## 2020-01-08 NOTE — PROGRESS NOTE ADULT - ASSESSMENT
78 YO M w/ PMHx of CKD-4, HTN, chronic diastolic CHF, BPH and moderate-severe AS, pt admitted for R pleural effusion, s/p chest tube placement - removed today 1/7. Cardiology and CT surgery consults noted. Pt is s/p Cardiomems placement 1/3.    1. Right pleural effusion /Acute CHF exacerbation & Moderate AS  -R chest tube removed today 1/7  -Pt is clinically stable     2. CRS - CKD stage 4   -S/p 3 HD sessions for optimization prior to cath  -HD catheter removed  -Pt to f/u w/ Nephrology outpt- likely will need dialysis in the near future    3. Volume/ HTN & CHFpEF   -s/p cardiomems  -C/w Torsemide   -Cardiology consult noted     4. Anemia  -Likely AOCD  -C/w iron and KD    I was Physically Present for the key portions of the Evaluation & management ( E/M ) Service provided.    I agree with the above History  , Physical examination & Treatment Plans,    I have Reviewed , Modified or appended where appropriate,     Will follow    Plan of care discussed with patient and wife at bedside. 80 YO M w/ PMHx of CKD-4, HTN, chronic diastolic CHF, BPH and moderate-severe AS, pt admitted for R pleural effusion, s/p chest tube placement - removed today 1/7. Cardiology and CT surgery consults noted. Pt is s/p Cardiomems placement 1/3.    Right pleural effusion /Acute CHF exacerbation & Moderate AS  -R chest tube removed today 1/7  -Pt is clinically stable     CRS - CKD stage 4   -HD catheter removed  -No need for HD at this time    Anemia  -Likely AOCD  -C/w iron and KD    Hyperkalemia resolved    Continue torsemide    I was Physically Present for the key portions of the Evaluation & management ( E/M ) Service provided.    I agree with the above History  , Physical examination & Treatment Plans,    I have Reviewed , Modified or appended where appropriate,     Will follow    Plan of care discussed with patient and wife at bedside.

## 2020-01-09 ENCOUNTER — APPOINTMENT (OUTPATIENT)
Dept: FAMILY MEDICINE | Facility: CLINIC | Age: 80
End: 2020-01-09

## 2020-01-09 ENCOUNTER — TRANSCRIPTION ENCOUNTER (OUTPATIENT)
Age: 80
End: 2020-01-09

## 2020-01-09 VITALS
SYSTOLIC BLOOD PRESSURE: 154 MMHG | HEART RATE: 68 BPM | TEMPERATURE: 98 F | OXYGEN SATURATION: 97 % | DIASTOLIC BLOOD PRESSURE: 70 MMHG | RESPIRATION RATE: 18 BRPM

## 2020-01-09 LAB
GLUCOSE BLDC GLUCOMTR-MCNC: 143 MG/DL — HIGH (ref 70–99)
GLUCOSE BLDC GLUCOMTR-MCNC: 181 MG/DL — HIGH (ref 70–99)

## 2020-01-09 PROCEDURE — 84157 ASSAY OF PROTEIN OTHER: CPT

## 2020-01-09 PROCEDURE — 99239 HOSP IP/OBS DSCHRG MGMT >30: CPT

## 2020-01-09 PROCEDURE — 87205 SMEAR GRAM STAIN: CPT

## 2020-01-09 PROCEDURE — 82550 ASSAY OF CK (CPK): CPT

## 2020-01-09 PROCEDURE — 86901 BLOOD TYPING SEROLOGIC RH(D): CPT

## 2020-01-09 PROCEDURE — 87633 RESP VIRUS 12-25 TARGETS: CPT

## 2020-01-09 PROCEDURE — G0365: CPT

## 2020-01-09 PROCEDURE — 87798 DETECT AGENT NOS DNA AMP: CPT

## 2020-01-09 PROCEDURE — 83986 ASSAY PH BODY FLUID NOS: CPT

## 2020-01-09 PROCEDURE — 87581 M.PNEUMON DNA AMP PROBE: CPT

## 2020-01-09 PROCEDURE — 87040 BLOOD CULTURE FOR BACTERIA: CPT

## 2020-01-09 PROCEDURE — C1894: CPT

## 2020-01-09 PROCEDURE — 83605 ASSAY OF LACTIC ACID: CPT

## 2020-01-09 PROCEDURE — 87486 CHLMYD PNEUM DNA AMP PROBE: CPT

## 2020-01-09 PROCEDURE — 88305 TISSUE EXAM BY PATHOLOGIST: CPT

## 2020-01-09 PROCEDURE — C1889: CPT

## 2020-01-09 PROCEDURE — 99152 MOD SED SAME PHYS/QHP 5/>YRS: CPT

## 2020-01-09 PROCEDURE — 82945 GLUCOSE OTHER FLUID: CPT

## 2020-01-09 PROCEDURE — 87102 FUNGUS ISOLATION CULTURE: CPT

## 2020-01-09 PROCEDURE — 93005 ELECTROCARDIOGRAM TRACING: CPT

## 2020-01-09 PROCEDURE — 80053 COMPREHEN METABOLIC PANEL: CPT

## 2020-01-09 PROCEDURE — 99153 MOD SED SAME PHYS/QHP EA: CPT

## 2020-01-09 PROCEDURE — 87206 SMEAR FLUORESCENT/ACID STAI: CPT

## 2020-01-09 PROCEDURE — 84443 ASSAY THYROID STIM HORMONE: CPT

## 2020-01-09 PROCEDURE — 84436 ASSAY OF TOTAL THYROXINE: CPT

## 2020-01-09 PROCEDURE — 84484 ASSAY OF TROPONIN QUANT: CPT

## 2020-01-09 PROCEDURE — 85027 COMPLETE CBC AUTOMATED: CPT

## 2020-01-09 PROCEDURE — 88112 CYTOPATH CELL ENHANCE TECH: CPT

## 2020-01-09 PROCEDURE — 80048 BASIC METABOLIC PNL TOTAL CA: CPT

## 2020-01-09 PROCEDURE — 97163 PT EVAL HIGH COMPLEX 45 MIN: CPT

## 2020-01-09 PROCEDURE — C1887: CPT

## 2020-01-09 PROCEDURE — C1769: CPT

## 2020-01-09 PROCEDURE — 36415 COLL VENOUS BLD VENIPUNCTURE: CPT

## 2020-01-09 PROCEDURE — 82962 GLUCOSE BLOOD TEST: CPT

## 2020-01-09 PROCEDURE — 87340 HEPATITIS B SURFACE AG IA: CPT

## 2020-01-09 PROCEDURE — 86803 HEPATITIS C AB TEST: CPT

## 2020-01-09 PROCEDURE — 86850 RBC ANTIBODY SCREEN: CPT

## 2020-01-09 PROCEDURE — 86706 HEP B SURFACE ANTIBODY: CPT

## 2020-01-09 PROCEDURE — C2624: CPT

## 2020-01-09 PROCEDURE — 99285 EMERGENCY DEPT VISIT HI MDM: CPT | Mod: 25

## 2020-01-09 PROCEDURE — 87015 SPECIMEN INFECT AGNT CONCNTJ: CPT

## 2020-01-09 PROCEDURE — 87116 MYCOBACTERIA CULTURE: CPT

## 2020-01-09 PROCEDURE — 86704 HEP B CORE ANTIBODY TOTAL: CPT

## 2020-01-09 PROCEDURE — 89051 BODY FLUID CELL COUNT: CPT

## 2020-01-09 PROCEDURE — 33289 TCAT IMPL WRLS P-ART PRS SNR: CPT

## 2020-01-09 PROCEDURE — 71045 X-RAY EXAM CHEST 1 VIEW: CPT

## 2020-01-09 PROCEDURE — 86900 BLOOD TYPING SEROLOGIC ABO: CPT

## 2020-01-09 PROCEDURE — 99233 SBSQ HOSP IP/OBS HIGH 50: CPT

## 2020-01-09 PROCEDURE — 84480 ASSAY TRIIODOTHYRONINE (T3): CPT

## 2020-01-09 PROCEDURE — 83615 LACTATE (LD) (LDH) ENZYME: CPT

## 2020-01-09 PROCEDURE — 99261: CPT

## 2020-01-09 PROCEDURE — 99232 SBSQ HOSP IP/OBS MODERATE 35: CPT

## 2020-01-09 PROCEDURE — 81001 URINALYSIS AUTO W/SCOPE: CPT

## 2020-01-09 PROCEDURE — 82042 OTHER SOURCE ALBUMIN QUAN EA: CPT

## 2020-01-09 PROCEDURE — C1729: CPT

## 2020-01-09 PROCEDURE — 83880 ASSAY OF NATRIURETIC PEPTIDE: CPT

## 2020-01-09 PROCEDURE — 96374 THER/PROPH/DIAG INJ IV PUSH: CPT

## 2020-01-09 PROCEDURE — 93458 L HRT ARTERY/VENTRICLE ANGIO: CPT

## 2020-01-09 PROCEDURE — 87086 URINE CULTURE/COLONY COUNT: CPT

## 2020-01-09 PROCEDURE — 87070 CULTURE OTHR SPECIMN AEROBIC: CPT

## 2020-01-09 PROCEDURE — 87075 CULTR BACTERIA EXCEPT BLOOD: CPT

## 2020-01-09 RX ORDER — INSULIN DEGLUDEC 100 U/ML
44 INJECTION, SOLUTION SUBCUTANEOUS
Qty: 0 | Refills: 0 | DISCHARGE

## 2020-01-09 RX ORDER — FERROUS SULFATE 325(65) MG
1 TABLET ORAL
Qty: 30 | Refills: 0
Start: 2020-01-09 | End: 2020-02-07

## 2020-01-09 RX ORDER — HYDRALAZINE HCL 50 MG
1 TABLET ORAL
Qty: 90 | Refills: 0
Start: 2020-01-09 | End: 2020-02-07

## 2020-01-09 RX ORDER — OXYBUTYNIN CHLORIDE 5 MG
1 TABLET ORAL
Qty: 0 | Refills: 0 | DISCHARGE

## 2020-01-09 RX ORDER — ASPIRIN/CALCIUM CARB/MAGNESIUM 324 MG
1 TABLET ORAL
Qty: 0 | Refills: 0 | DISCHARGE
Start: 2020-01-09

## 2020-01-09 RX ORDER — ACETAMINOPHEN 500 MG
2 TABLET ORAL
Qty: 0 | Refills: 0 | DISCHARGE
Start: 2020-01-09

## 2020-01-09 RX ORDER — ISOSORBIDE MONONITRATE 60 MG/1
1 TABLET, EXTENDED RELEASE ORAL
Qty: 30 | Refills: 0
Start: 2020-01-09 | End: 2020-02-07

## 2020-01-09 RX ADMIN — AMLODIPINE BESYLATE 10 MILLIGRAM(S): 2.5 TABLET ORAL at 05:19

## 2020-01-09 RX ADMIN — Medication 50 MILLIGRAM(S): at 14:12

## 2020-01-09 RX ADMIN — Medication 650 MILLIGRAM(S): at 05:19

## 2020-01-09 RX ADMIN — Medication 1: at 12:52

## 2020-01-09 RX ADMIN — Medication 4 UNIT(S): at 12:51

## 2020-01-09 RX ADMIN — Medication 20 MILLIGRAM(S): at 05:19

## 2020-01-09 RX ADMIN — CHLORHEXIDINE GLUCONATE 1 APPLICATION(S): 213 SOLUTION TOPICAL at 05:19

## 2020-01-09 RX ADMIN — HEPARIN SODIUM 5000 UNIT(S): 5000 INJECTION INTRAVENOUS; SUBCUTANEOUS at 05:19

## 2020-01-09 RX ADMIN — Medication 50 MILLIGRAM(S): at 05:19

## 2020-01-09 RX ADMIN — CARVEDILOL PHOSPHATE 25 MILLIGRAM(S): 80 CAPSULE, EXTENDED RELEASE ORAL at 05:19

## 2020-01-09 RX ADMIN — Medication 81 MILLIGRAM(S): at 13:09

## 2020-01-09 RX ADMIN — Medication 325 MILLIGRAM(S): at 13:10

## 2020-01-09 RX ADMIN — Medication 4 UNIT(S): at 09:13

## 2020-01-09 RX ADMIN — ISOSORBIDE MONONITRATE 30 MILLIGRAM(S): 60 TABLET, EXTENDED RELEASE ORAL at 14:38

## 2020-01-09 RX ADMIN — Medication 1000 UNIT(S): at 13:10

## 2020-01-09 NOTE — PROGRESS NOTE ADULT - SUBJECTIVE AND OBJECTIVE BOX
[facility] DIVISION OF KIDNEY DISEASES AND HYPERTENSION -- FOLLOW UP NOTE  --------------------------------------------------------------------------------  Chief Complaint: CKD    24 hour events/subjective:  No acute event  Pt has no new complaint.      PAST HISTORY  --------------------------------------------------------------------------------  No significant changes to PMH, PSH, FHx, SHx, unless otherwise noted    ALLERGIES & MEDICATIONS  --------------------------------------------------------------------------------  Allergies    No Known Allergies    Intolerances      Standing Inpatient Medications  ALBUTerol    0.083%. 2.5 milliGRAM(s) Nebulizer once  amLODIPine   Tablet 10 milliGRAM(s) Oral daily  aspirin  chewable 81 milliGRAM(s) Oral daily  atorvastatin 10 milliGRAM(s) Oral at bedtime  carvedilol 25 milliGRAM(s) Oral every 12 hours  chlorhexidine 4% Liquid 1 Application(s) Topical <User Schedule>  cholecalciferol 1000 Unit(s) Oral daily  dextrose 5%. 1000 milliLiter(s) IV Continuous <Continuous>  dextrose 50% Injectable 12.5 Gram(s) IV Push once  dextrose 50% Injectable 25 Gram(s) IV Push once  dextrose 50% Injectable 25 Gram(s) IV Push once  doxazosin 4 milliGRAM(s) Oral at bedtime  epoetin nereida Injectable 89836 Unit(s) SubCutaneous <User Schedule>  ferrous    sulfate 325 milliGRAM(s) Oral daily  heparin  Injectable 5000 Unit(s) SubCutaneous every 12 hours  hydrALAZINE 50 milliGRAM(s) Oral three times a day  insulin lispro (HumaLOG) corrective regimen sliding scale   SubCutaneous three times a day before meals  insulin lispro Injectable (HumaLOG) 4 Unit(s) SubCutaneous three times a day before meals  isosorbide   mononitrate ER Tablet (IMDUR) 30 milliGRAM(s) Oral daily  sodium bicarbonate 650 milliGRAM(s) Oral two times a day  torsemide 20 milliGRAM(s) Oral every 12 hours    PRN Inpatient Medications  acetaminophen   Tablet .. 650 milliGRAM(s) Oral every 6 hours PRN  dextrose 40% Gel 15 Gram(s) Oral once PRN  glucagon  Injectable 1 milliGRAM(s) IntraMuscular once PRN  sodium chloride 0.9% lock flush 10 milliLiter(s) IV Push every 1 hour PRN      REVIEW OF SYSTEMS  --------------------------------------------------------------------------------  Gen: No weight changes, fatigue, fevers/chills, weakness  Skin: No rashes  Head/Eyes/Ears/Mouth: No headache; Normal hearing; Normal vision w/o blurriness; No sinus pain/discomfort, sore throat  Respiratory: No dyspnea, cough, wheezing, hemoptysis  CV: No chest pain, PND, orthopnea  GI: No abdominal pain, diarrhea, constipation, nausea, vomiting, melena, hematochezia  : No increased frequency, dysuria, hematuria, nocturia  MSK: No joint pain/swelling; no back pain; no edema  Neuro: No dizziness/lightheadedness, weakness, seizures, numbness, tingling  Heme: No easy bruising or bleeding  Endo: No heat/cold intolerance  Psych: No significant nervousness, anxiety, stress, depression    All other systems were reviewed and are negative, except as noted.    VITALS/PHYSICAL EXAM  --------------------------------------------------------------------------------  T(C): 36.7 (01-09-20 @ 09:10), Max: 37 (01-08-20 @ 23:58)  HR: 62 (01-09-20 @ 09:10) (62 - 72)  BP: 125/56 (01-09-20 @ 09:10) (125/56 - 152/58)  RR: 18 (01-09-20 @ 09:10) (18 - 18)  SpO2: 95% (01-09-20 @ 09:10) (95% - 96%)  Wt(kg): --        01-08-20 @ 07:01  -  01-09-20 @ 07:00  --------------------------------------------------------  IN: 0 mL / OUT: 1430 mL / NET: -1430 mL      Physical Exam:  	 Gen: NAD  	HEENT:, supple neck  	Pulm: CTA B/L  	CV: RRR, S1S2; no rub  	Back: no sacral edema  	Abd: +BS, soft, nontender/nondistended  	: No suprapubic tenderness  	UE: Warm, no edema; no asterixis  	LE: Warm,  no edema  	Neuro: No focal deficit  	Psych: Normal affect and mood  	Skin: Warm, without rashes  	Vascular access: none    LABS/STUDIES  --------------------------------------------------------------------------------              9.7    5.91  >-----------<  188      [01-08-20 @ 10:25]              32.9     138  |  98  |  55.0  ----------------------------<  126      [01-08-20 @ 10:25]  4.6   |  24.0  |  3.34        Ca     8.4     [01-08-20 @ 10:25]      Creatinine Trend:  SCr 3.34 [01-08 @ 10:25]  SCr 3.37 [01-06 @ 07:28]  SCr 2.83 [01-04 @ 08:46]  SCr 2.55 [01-03 @ 06:14]  SCr 2.58 [01-01 @ 07:18]    Urinalysis - [12-28-19 @ 16:39]      Color Yellow / Appearance Cloudy / SG 1.010 / pH 5.0      Gluc Negative / Ketone Trace  / Bili Negative / Urobili Negative       Blood Large / Protein 100 / Leuk Est Small / Nitrite Negative      RBC >50 / WBC 6-10 / Hyaline  / Gran  / Sq Epi  / Non Sq Epi  / Bacteria Few      Iron 25, TIBC 285, %sat 9      [12-11-19 @ 17:50]  Ferritin 25      [12-11-19 @ 17:50]  PTH -- (Ca 8.3)      [12-12-19 @ 19:36]   164  Vitamin D (25OH) 20.3      [12-12-19 @ 19:36]  HbA1c 8.1      [12-11-19 @ 06:47]  TSH 6.54      [12-25-19 @ 21:00]    HBsAb <3.0      [12-31-19 @ 08:48]  HBsAb Nonreact      [12-31-19 @ 08:48]  HBsAg Nonreact      [12-31-19 @ 08:48]  HBcAb Nonreact      [12-31-19 @ 08:48]  HCV 0.06, Nonreact      [12-31-19 @ 08:48]

## 2020-01-09 NOTE — PROGRESS NOTE ADULT - SUBJECTIVE AND OBJECTIVE BOX
Patient: JEMIMA SANTOS [id number] 79y Male                           Internal Medicine Hospitalist Progress Note    Interval History:  No complaints  Wife at bedside.  No SOB.  No cough.  No pain.  Participated with PT.    ____________________PHYSICAL EXAM:  Vitals reviewed as indicated below  GENERAL:  NAD Alert and Oriented x 3   HEENT: NCAT  CARDIOVASCULAR:  S1, S2  LUNGS: coarse BS R base.   ABDOMEN:  soft, (-) tenderness, (-) distension, (+) bowel sounds, (-) guarding, (-) rebound (-) rigidity  EXTREMITIES:  no cyanosis / clubbing / edema  ____________________    VITALS:  Vital Signs Last 24 Hrs  T(C): 36.6 (09 Jan 2020 14:09), Max: 37 (08 Jan 2020 23:58)  T(F): 97.9 (09 Jan 2020 14:09), Max: 98.6 (08 Jan 2020 23:58)  HR: 68 (09 Jan 2020 14:09) (62 - 72)  BP: 154/70 (09 Jan 2020 14:09) (125/56 - 154/70)  BP(mean): --  RR: 18 (09 Jan 2020 14:09) (18 - 18)  SpO2: 97% (09 Jan 2020 14:09) (95% - 97%) Daily     Daily   CAPILLARY BLOOD GLUCOSE      POCT Blood Glucose.: 181 mg/dL (09 Jan 2020 12:49)  POCT Blood Glucose.: 143 mg/dL (09 Jan 2020 08:14)  POCT Blood Glucose.: 127 mg/dL (08 Jan 2020 17:40)    I&O's Summary    08 Jan 2020 07:01  -  09 Jan 2020 07:00  --------------------------------------------------------  IN: 0 mL / OUT: 1430 mL / NET: -1430 mL        LABS:                        9.7    5.91  )-----------( 188      ( 08 Jan 2020 10:25 )             32.9     01-08    138  |  98  |  55.0<H>  ----------------------------<  126<H>  4.6   |  24.0  |  3.34<H>    Ca    8.4<L>      08 Jan 2020 10:25                    MEDICATIONS:  acetaminophen   Tablet .. 650 milliGRAM(s) Oral every 6 hours PRN  ALBUTerol    0.083%. 2.5 milliGRAM(s) Nebulizer once  amLODIPine   Tablet 10 milliGRAM(s) Oral daily  aspirin  chewable 81 milliGRAM(s) Oral daily  atorvastatin 10 milliGRAM(s) Oral at bedtime  carvedilol 25 milliGRAM(s) Oral every 12 hours  chlorhexidine 4% Liquid 1 Application(s) Topical <User Schedule>  cholecalciferol 1000 Unit(s) Oral daily  dextrose 40% Gel 15 Gram(s) Oral once PRN  dextrose 5%. 1000 milliLiter(s) IV Continuous <Continuous>  dextrose 50% Injectable 12.5 Gram(s) IV Push once  dextrose 50% Injectable 25 Gram(s) IV Push once  dextrose 50% Injectable 25 Gram(s) IV Push once  doxazosin 4 milliGRAM(s) Oral at bedtime  epoetin nereida Injectable 76450 Unit(s) SubCutaneous <User Schedule>  ferrous    sulfate 325 milliGRAM(s) Oral daily  glucagon  Injectable 1 milliGRAM(s) IntraMuscular once PRN  heparin  Injectable 5000 Unit(s) SubCutaneous every 12 hours  hydrALAZINE 50 milliGRAM(s) Oral three times a day  insulin lispro (HumaLOG) corrective regimen sliding scale   SubCutaneous three times a day before meals  insulin lispro Injectable (HumaLOG) 4 Unit(s) SubCutaneous three times a day before meals  isosorbide   mononitrate ER Tablet (IMDUR) 30 milliGRAM(s) Oral daily  sodium bicarbonate 650 milliGRAM(s) Oral two times a day  sodium chloride 0.9% lock flush 10 milliLiter(s) IV Push every 1 hour PRN  torsemide 20 milliGRAM(s) Oral every 12 hours

## 2020-01-09 NOTE — DISCHARGE NOTE PROVIDER - NSDCMRMEDTOKEN_GEN_ALL_CORE_FT
amLODIPine 10 mg oral tablet: 1 tab(s) orally once a day  aspirin 325 mg oral tablet: 1 tab(s) orally once a day  carvedilol 25 mg oral tablet: 1 tab(s) orally every 12 hours  doxazosin 4 mg oral tablet: 1 tab(s) orally once a day (at bedtime)  ergocalciferol 2000 intl units oral capsule: 1 cap(s) orally once a day  ferrous sulfate 325 mg (65 mg elemental iron) oral tablet: 1 tab(s) orally once a day with meals  furosemide 40 mg oral tablet: 1 tab(s) orally once a day on weekdays  and 1 tablet bid on week ends    hydrALAZINE 50 mg oral tablet: 1 tab(s) orally 3 times a day  insulin lispro 100 units/mL injectable solution: 3 times a day before meals    2 Unit(s) if Glucose 151 - 200  4 Unit(s) if Glucose 201 - 250  6 Unit(s) if Glucose 251 - 300  8 Unit(s) if Glucose 301 - 350  10 Unit(s) if Glucose 351 - 400  12 Unit(s) if Glucose Greater Than 400  isosorbide dinitrate 10 mg oral tablet: 1 tab(s) orally 3 times a day  pravastatin 40 mg oral tablet: 1 tab(s) orally once a day  sodium bicarbonate 650 mg oral tablet: 1 tab(s) orally 2 times a day  tamsulosin 0.4 mg oral capsule: 1 cap(s) orally once a day (at bedtime) acetaminophen 325 mg oral tablet: 2 tab(s) orally every 6 hours, As needed, Temp greater or equal to 38C (100.4F), Mild Pain (1 - 3)  amLODIPine 10 mg oral tablet: 1 tab(s) orally once a day  aspirin 81 mg oral tablet, chewable: 1 tab(s) orally once a day  carvedilol 25 mg oral tablet: 1 tab(s) orally every 12 hours  doxazosin 4 mg oral tablet: 1 tab(s) orally once a day (at bedtime)  ergocalciferol 2000 intl units oral capsule: 1 cap(s) orally once a day  ferrous sulfate 325 mg (65 mg elemental iron) oral tablet: 1 tab(s) orally once a day   hydrALAZINE 50 mg oral tablet: 1 tab(s) orally 3 times a day  isosorbide dinitrate 10 mg oral tablet: 1 tab(s) orally 3 times a day  pravastatin 40 mg oral tablet: 1 tab(s) orally once a day  sodium bicarbonate 650 mg oral tablet: 1 tab(s) orally 2 times a day  tamsulosin 0.4 mg oral capsule: 1 cap(s) orally once a day (at bedtime)  torsemide 20 mg oral tablet: 1 tab(s) orally every 12 hours  Tresiba: 4 unit(s) subcutaneous once a day (at bedtime)

## 2020-01-09 NOTE — DISCHARGE NOTE PROVIDER - PROVIDER TOKENS
PROVIDER:[TOKEN:[[id number]]],PROVIDER:[TOKEN:[[id number]]],PROVIDER:[TOKEN:[[id number]]],PROVIDER:[TOKEN:[[id number]]]

## 2020-01-09 NOTE — DISCHARGE NOTE PROVIDER - CARE PROVIDERS DIRECT ADDRESSES
[email or web address]

## 2020-01-09 NOTE — DISCHARGE NOTE PROVIDER - NSDCFUSCHEDAPPT_GEN_ALL_CORE_FT
JEMIMA SANTOS ; 01/09/2020 ; [facility] [address] [facility]  JEMIMA SANTOS ; 02/24/2020 ; NPP Neurology [address] [facility] JEMIMA SANTOS ; 01/09/2020 ; [facility] [address] [facility]  JEMIMA SANTOS ; 02/24/2020 ; NPP Neurology [address] [facility] JEMIMA SANTOS ; 01/09/2020 ; [facility] [address] [facility]  JEMIMA SANTOS ; 02/24/2020 ; NPP Neurology [address] [facility] JEMIMA SANTOS ; 02/24/2020 ; NPP Neurology [address] [facility]

## 2020-01-09 NOTE — DISCHARGE NOTE PROVIDER - HOSPITAL COURSE
79 year old male with a history of CKD stage 4, hypertension, chronic diastolic CHF, bph and moderate-severe AS who presented to the ER with complaints of SOB.  In ED given dose of Lasix with some improvement.  Noted hypothermic.  CXR revealed pulmonary edema and R pleural effusion.  Cardiology and CT surgery were consulted. Started on IV diuresis. Status post right chest tube placement on 12/25. Patient had  been temporarily on dialysis.  Remains off HD for now.  As per Nephrology, no role for HD at present.  S/p RHC with Cardiomems placement on 1/3/20. chest tube was removed on 1/7/20.  No plans for TAVR at present per cardiology and CT surgery. The patient is medically stable for discharge. 79 year old male with a history of CKD stage 4, hypertension, chronic diastolic CHF, bph and moderate-severe AS who presented to the ER with complaints of SOB.  In ED given dose of Lasix with some improvement.  Noted hypothermic.  CXR revealed pulmonary edema and R pleural effusion.  Cardiology and CT surgery were consulted. Started on IV diuresis. Status post right chest tube placement on 12/25. Patient had  been temporarily on dialysis.  Remains off HD for now.  As per Nephrology, no role for HD at present.  S/p RHC with Cardiomems placement on 1/3/20. chest tube was removed on 1/7/20.  No plans for TAVR at present per cardiology and CT surgery. The patient is medically stable for discharge.         Vital Signs Last 24 Hrs    T(C): 36.7 (09 Jan 2020 09:10), Max: 37 (08 Jan 2020 23:58)    T(F): 98.1 (09 Jan 2020 09:10), Max: 98.6 (08 Jan 2020 23:58)    HR: 62 (09 Jan 2020 09:10) (62 - 72)    BP: 125/56 (09 Jan 2020 09:10) (125/56 - 152/58)    BP(mean): --    RR: 18 (09 Jan 2020 09:10) (18 - 18)    SpO2: 95% (09 Jan 2020 09:10) (95% - 96%)                                        9.7      5.91  )-----------( 188      ( 08 Jan 2020 10:25 )               32.9         08 Jan 2020 10:25        138    |  98     |  55.0     ----------------------------<  126      4.6     |  24.0   |  3.34         Ca    8.4        08 Jan 2020 10:25                CAPILLARY BLOOD GLUCOSE            POCT Blood Glucose.: 143 mg/dL (09 Jan 2020 08:14)    POCT Blood Glucose.: 127 mg/dL (08 Jan 2020 17:40)        PE:    GENERAL:  NAD Alert and Oriented x 3     HEENT: NCAT    CARDIOVASCULAR:  S1, S2    LUNGS: CTA B/L    ABDOMEN:  soft, (-) tenderness, (-) distension, (+) bowel sounds, (-) guarding, (-) rebound (-) rigidity    EXTREMITIES:  no cyanosis / clubbing / edema 79 year old male with a history of CKD stage 4, hypertension, chronic diastolic CHF, bph and moderate-severe AS who presented to the ER with complaints of SOB.  In ED given dose of Lasix with some improvement.  Noted hypothermic.  CXR revealed pulmonary edema and R pleural effusion.  Cardiology and CT surgery were consulted. Started on IV diuresis. Status post right chest tube placement on 12/25. Patient had  been temporarily on dialysis.  Remains off HD for now.  As per Nephrology, no role for HD at present.  S/p RHC with Cardiomems placement on 1/3/20. chest tube was removed on 1/7/20.  No plans for TAVR at present per cardiology and CT surgery. The patient is medically stable for discharge.     #. Right pleural effusion /acute on chronic Diastolic CHF exacerbation - Chest tube removed.  Continue diuretics.  S/p cardiomems placement.     #. ADI on CKD stage 4, Hyperkalemia - Had been temporarily on dialysis.  Remains off HD for now.  Repeat BMP.  No role for HD at present.  Renal followup.      #. Moderate Aortic Stenosis - noted on recent cath.  No plans for TAVR at present per cardiology and CT surgery    # Diabetes Type II - monitor fingersticks.  Insulin coverage for hyperglycemia.  FS well controlled.     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  Monitor on Fe, Epogen.    # Hyperlipidemia - diet modification.  Continue Statin.    # Essential HTN - Monitor BP.  Continue oral antihypertensives.        Disposition: Stable for discharge.  Outpatient followup discussed.    Total time spent on discharge is  35  minutes.

## 2020-01-09 NOTE — PROGRESS NOTE ADULT - ASSESSMENT
79 year old male with a history of CKD stage 4, hypertension, chronic diastolic CHF, bph and moderate-severe AS who presented to the ER with complaints of SOB.  In ED given dose of Lasix with some improvement.  Noted hypothermic.  CXR revealed pulmonary edema and R pleural effusion.  Cardiology and CT surgery were consulted. Started on IV diuresis. Status post right chest tube placement on 12/25.  S/p Cardiomems placement.     #. Right pleural effusion /acute on chronic Diastolic CHF exacerbation - Chest tube removed.  Continue diuretics.  S/p cardiomems placement.   #. ADI on CKD stage 4, Hyperkalemia - Had been temporarily on dialysis.  Remains off HD for now.  Repeat BMP.  No role for HD at present.  Renal followup.    #. Moderate Aortic Stenosis - noted on recent cath.  No plans for TAVR at present per cardiology and CT surgery  # Diabetes Type II - monitor fingersticks.  Insulin coverage for hyperglycemia.  FS well controlled.   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  Monitor on Fe, Epogen.  # Hyperlipidemia - diet modification.  Continue Statin.  # Essential HTN - Monitor BP.  Continue oral antihypertensives.  #. DVT prophylaxis heparin .  Stable for d/c home.

## 2020-01-09 NOTE — DISCHARGE NOTE PROVIDER - NSDCCPCAREPLAN_GEN_ALL_CORE_FT
PRINCIPAL DISCHARGE DIAGNOSIS  Diagnosis: Pleural effusion due to congestive heart failure  Assessment and Plan of Treatment:       SECONDARY DISCHARGE DIAGNOSES  Diagnosis: ADI (acute kidney injury)  Assessment and Plan of Treatment:     Diagnosis: Aortic stenosis  Assessment and Plan of Treatment:     Diagnosis: Hypothermia  Assessment and Plan of Treatment:     Diagnosis: UTI (urinary tract infection)  Assessment and Plan of Treatment:     Diagnosis: Generalized weakness  Assessment and Plan of Treatment: PRINCIPAL DISCHARGE DIAGNOSIS  Diagnosis: Pleural effusion due to congestive heart failure  Assessment and Plan of Treatment: resolved  S/P right chest tube 12/25  S/p RHC with Cardiomems placement on 1/3/20. chest tube was removed on 1/7/20.   Continue torsemide 20 mg BID  Low sodium diet  Follow up with Cardiology 3-5 days, sooner if needed      SECONDARY DISCHARGE DIAGNOSES  Diagnosis: ADI (acute kidney injury)  Assessment and Plan of Treatment: Resolved  CKD stage 4  Follow up with Nephrology 5-7 days sooner if needed  Avoid nephrotoxic drugs    Diagnosis: Aortic stenosis  Assessment and Plan of Treatment: Moderate to severe   Follow up with Cardiology for further management/plan      Diagnosis: UTI (urinary tract infection)  Assessment and Plan of Treatment: completed course of antibiotics    Diagnosis: Generalized weakness  Assessment and Plan of Treatment: Home with PT

## 2020-01-09 NOTE — DISCHARGE NOTE NURSING/CASE MANAGEMENT/SOCIAL WORK - NSDCFUADDAPPT_GEN_ALL_CORE_FT
Pt. A&OX4 LIVES HOME WITH SPOUSE. INDEPENDENT WITH ADL'S PTA. HAVE A RW. PCP: DR. RAYO [phone]. CARDIOLOGY: BATOOL RESENDIZ [phone]. D/C PLAN HOME WITH [facility], AND F/U WITH PCP, AND CARDIO. PT. STATES NO ISSUES WITH MEDS OR HIS APPOINTMENTS.

## 2020-01-09 NOTE — PROGRESS NOTE ADULT - ASSESSMENT
79 year old M with PMHx of CKD-4, HTN, chronic diastolic CHF, BPH and moderate-severe AS, pt admitted for R pleural effusion, s/p chest tube placement - removed today 1/7. Cardiology and CT surgery consults noted. Pt is s/p Cardiomems placement 1/3.    Right pleural effusion /Acute CHF exacerbation & Moderate AS  -R chest tube removed     CRS - CKD stage 4   -HD catheter removed  -No need for HD at this time  - s/p Cardiomems; diuresis as per Cards    Anemia  -Likely AOCD  -c/w iron and KD    Nephrology follow up outpt

## 2020-01-09 NOTE — DISCHARGE NOTE PROVIDER - CARE PROVIDER_API CALL
Roz Faustin)  Cardiology; Internal Medicine  [address] [facility], [address]  [place], [place] [address]  Phone: [phone]  Fax: [phone]  Follow Up Time:     Lior Adams ()  Internal Medicine  [address] [facility] [address]nd [place] [address]  Phone: [phone]  Fax: [phone]  Follow Up Time:     Christiano Torres)  [address], [address]  [place] [address]  Phone: [phone]  Fax: [phone]  Follow Up Time:     Luke Tomas)  Surgery; Thoracic Surgery  [address] [place] [address]  Phone: [phone]  Fax: [phone]  Follow Up Time: Roz Faustin)  Cardiology; Internal Medicine  [address] [facility], [address]  [place], [place] [id number]  Phone: [phone]  Fax: [phone]  Follow Up Time:     Lior Adams ()  Internal Medicine  [address] [facility], [address]nd [place] [address]  Phone: [phone]  Fax: [phone]  Follow Up Time:     Christiano Torres)  [address], [address]  [place] [address]  Phone: [phone]  Fax: [phone]  Follow Up Time:     Luke Tomas)  Surgery; Thoracic Surgery  [address] [place] [address]  Phone: [phone]  Fax: [phone]  Follow Up Time:

## 2020-01-09 NOTE — DISCHARGE NOTE NURSING/CASE MANAGEMENT/SOCIAL WORK - PATIENT PORTAL LINK FT
You can access the FollowMyHealth Patient Portal offered by [facility] by registering at the following website: [email or web address]. By joining [facility]’s FollowMyHealth portal, you will also be able to view your health information using other applications (apps) compatible with our system.

## 2020-01-09 NOTE — PROGRESS NOTE ADULT - SUBJECTIVE AND OBJECTIVE BOX
[facility]                                                               Office: [address] [address]                                                              Telephone: [phone]. Fax:[phone]                                                                             PROGRESS NOTE  Reason for follow up: congestive heart failure , aortic stenosis   Overnight: No new events.   Update:  cardiomem check yesterday.PADP = 12 mm Hg. today PADp 14.   potassium normal.   Out of Bed to Chair ambualtes.      Subjective: "  i am great  "    Review of symptoms: Cardiac:  No chest pain. None dyspnea. No palpitations.  Respiratory:no cough.  none   Gastrointestinal: No diarrhea. No abdominal pain. No bleeding.     Past medical history updates:  CKD: stable. congestive heart failure : improved.       Vital Signs Last 24 Hrs   Vital Signs Last 24 Hrs  T(C): 36.6 (01-09-20 @ 14:09), Max: 37 (01-08-20 @ 23:58)  T(F): 97.9 (01-09-20 @ 14:09), Max: 98.6 (01-08-20 @ 23:58)  HR: 68 (01-09-20 @ 14:09) (62 - 72)  BP: 154/70 (01-09-20 @ 14:09) (125/56 - 154/70)  BP(mean): --  RR: 18 (01-09-20 @ 14:09) (18 - 18)  SpO2: 97% (01-09-20 @ 14:09) (95% - 97%)      PHYSICAL EXAM:  Appearance: Comfortable. No acute distress  HEENT:  Head and neck: Atraumatic. Normocephalic.     Neurologic: A & O x 3, no focal deficits. EOMI   Lymphatic: No cervical lymphadenopathy  Cardiovascular: Normal S1 S2,  [id number] murmur 5/6  murmur, No  rubs/gallops. No JVD, No edema  Respiratory:  Clear to auscultation bilaterally   Gastrointestinal:  Soft, Non-tender, + BS  Lower Extremities: no edema.    Psychiatry: Patient is calm. No agitation. Mood & affect appropriate  Skin: Right chest tube           MEDICATIONS  (STANDING):   MEDICATIONS  (STANDING):  amLODIPine   Tablet 10 milliGRAM(s) Oral daily  carvedilol 25 milliGRAM(s) Oral every 12 hours  doxazosin 4 milliGRAM(s) Oral at bedtime  hydrALAZINE 50 milliGRAM(s) Oral three times a day  isosorbide   mononitrate ER Tablet (IMDUR) 30 milliGRAM(s) Oral daily  torsemide 20 milliGRAM(s) Oral every 12 hours    ALBUTerol    0.083%.  aspirin  chewable  atorvastatin  chlorhexidine 4% Liquid  cholecalciferol  dextrose 5%.  dextrose 50% Injectable  dextrose 50% Injectable  dextrose 50% Injectable  epoetin nereida Injectable  ferrous    sulfate  heparin  Injectable  insulin lispro (HumaLOG) corrective regimen sliding scale  insulin lispro Injectable (HumaLOG)  sodium bicarbonate    PRN: acetaminophen   Tablet .. PRN  dextrose 40% Gel PRN  glucagon  Injectable PRN  sodium chloride 0.9% lock flush PRN    DIAGNOSTIC TESTING:  [ ] Echocardiogram: < from: TTE Echo Complete w/Doppler (12.10.19 @ 13:21) >  Summary:      3. Left ventricular ejection fraction, by visual estimation, is 60 to   65%.         10. Moderate to severe aortic valve stenosis. Recommend CSAR for further   evaluation.          OTHER: 	  XRAY: < from: Xray Chest 1 View- PORTABLE-Routine (12.26.19 @ 09:10) >    Impression:    Persisting pulmonary edema with small left pleural fluid.    Right chest tube in place, no pneumothorax.    < end of copied text >      LABS:	 	                                         9.7    5.91  )-----------( 188      ( 08 Jan 2020 10:25 )             32.9   N=x    ; L=x        08 Jan 2020 10:25    138    |  98     |  55.0   ----------------------------<  126    4.6     |  24.0   |  3.34     Ca    8.4        08 Jan 2020 10:25

## 2020-01-09 NOTE — PROGRESS NOTE ADULT - ASSESSMENT
Pt is a 78 y/o male with medical history of DM, HTN, CKD, BPH, pAfib with no AC, who presents to [facility]-ED for c/o SOB. Pt is not a good historian d/t possible dementia, wife at bedside. Pt wife noticed pt has increased SOB even after discharge from [facility]. Pt also started to have frequent chills, but no known fever. Pt family further assess that "he is not himself", not as energetic and responsive to family. Pt was brought to [facility]-ED and was found to be hypothermic at 91.2, HR @ 53 SB, BP @ 139/63. On previous admission, pt had similar symptoms and was advised to be evaluated for AVR as outpatient. Pt Denies fever, cough, phlegm production,  PND, edema, chest pain, pressure, palpitations, irregular, fast heart beat, nausea, vomiting, melena, rectal bleed, hematuria, lightheadedness, dizziness, syncope, near syncope.   As of 12/26/19, pt in no resp. distress, chest tube draining straw colored drainage. Pt insertion site dressing saturated with blood no signs of subcutaneous emphysema. Pt wheezing on inspiration with bilateral rales when auscultated.            Plan:     1. Aortic Stenosis with SOB  Cath shows on ly moderate aortic stneosis. wiull follow up outpiatent until severe.   - Pleural effusion resolved         congestive heart failure  diuresis. s/p cardiomem. 46/17/29 mm will chjeck cardiomem today. PADP today 14.     ct toremide 20 mg Q12.    hyperkalemia.resolved     2. HTN     -Continue current antihypertensive medication regimen     3. CKD     as above.     No further in-patient cardiac work-up/management is needed.  Follow-up in cardiology office in 1-2 weeks.

## 2020-01-10 ENCOUNTER — INBOUND DOCUMENT (OUTPATIENT)
Age: 80
End: 2020-01-10

## 2020-01-10 NOTE — PROGRESS NOTE ADULT - PROVIDER SPECIALTY LIST ADULT
CT Surgery
CT Surgery
Cardiology
Hospitalist
Intervent Cardiology
Nephrology
Thoracic Surgery
Vascular Surgery
Cardiology

## 2020-01-10 NOTE — CDI QUERY NOTE - NSCDIOTHERTXTBX_GEN_ALL_CORE_HH
Can you please specify site and laterality of Cardiomems device insertion?    A.	Left pulmonary artery  B.	Right pulmonary Artery  C.	Other, please specify  D.	Not clinically significant    Supporting Documentations:     1/3/20 Cardiac Cath Lab- Adult;  PROCEDURE:  --  Right heart catheterization.  --  Right heart angiography.  --  Sonosite.  --  CardioMEMS device implant.  TECHNIQUE: The risks and alternatives of the procedures and conscious  sedation were explained to the patient and informed consent was obtained.  Cardiac catheterization performed electively.  Local anesthetic given. Right femoral vein access. Right heart  catheterization. The procedure was performed utilizing a catheter. Right  heart angiography. A catheter was advanced to the. Contrast was injected.  Images were obtained. Sonosite. RADIATION EXPOSURE: 7.5 min. CardioMEMS  device implant.  CONTRAST GIVEN: Omnipaque 10 ml.  MEDICATIONS GIVEN: Midazolam, 1 mg, IV. Aspirin, 81 mg, PO. 1% Lidocaine,  10 ml, subcutaneously.  COMPLICATIONS: No complications occurred during the cath lab visit.  DIAGNOSTIC IMPRESSIONS: Mild pulmonary hypertension.  Low to normal wedge pressure.  Left pulmonary artery angiogram performed. (final catheter position)  DIAGNOSTIC RECOMMENDATIONS: Further management as per primary cardiology  team.  INTERVENTIONAL IMPRESSIONS: Mild pulmonary hypertension.  Low to normal wedge pressure.  Left pulmonary artery angiogram performed. (final catheter position)  INTERVENTIONAL RECOMMENDATIONS: Further management as per primary  cardiology team.  Prepared and signed by  Kody Martinez MD  Signed 01/06/2020 11:38:54      1/4/20 Progress Note Cardio NP/Attending:  Post Cardiomens check  RHC  PA mean 28,   Wedge 14-16.   Cardiomens inserted

## 2020-01-10 NOTE — PROGRESS NOTE ADULT - REASON FOR ADMISSION
Difficulty breathing
SOB
Difficulty breathing

## 2020-01-13 ENCOUNTER — OTHER (OUTPATIENT)
Age: 80
End: 2020-01-13

## 2020-01-15 LAB
CULTURE RESULTS: SIGNIFICANT CHANGE UP
SPECIMEN SOURCE: SIGNIFICANT CHANGE UP

## 2020-01-16 RX ORDER — FUROSEMIDE 40 MG/1
40 TABLET ORAL DAILY
Qty: 90 | Refills: 3 | Status: DISCONTINUED | COMMUNITY
Start: 2019-05-14 | End: 2020-01-16

## 2020-01-17 ENCOUNTER — APPOINTMENT (OUTPATIENT)
Dept: NEPHROLOGY | Facility: CLINIC | Age: 80
End: 2020-01-17
Payer: MEDICARE

## 2020-01-17 ENCOUNTER — APPOINTMENT (OUTPATIENT)
Dept: CARDIOLOGY | Facility: CLINIC | Age: 80
End: 2020-01-17
Payer: MEDICARE

## 2020-01-17 VITALS
OXYGEN SATURATION: 96 % | DIASTOLIC BLOOD PRESSURE: 52 MMHG | BODY MASS INDEX: 29.52 KG/M2 | SYSTOLIC BLOOD PRESSURE: 114 MMHG | HEART RATE: 57 BPM | WEIGHT: 172 LBS

## 2020-01-17 VITALS
BODY MASS INDEX: 29.37 KG/M2 | DIASTOLIC BLOOD PRESSURE: 55 MMHG | WEIGHT: 172 LBS | HEART RATE: 55 BPM | HEIGHT: 64 IN | SYSTOLIC BLOOD PRESSURE: 126 MMHG

## 2020-01-17 PROCEDURE — 36415 COLL VENOUS BLD VENIPUNCTURE: CPT

## 2020-01-17 PROCEDURE — 99214 OFFICE O/P EST MOD 30 MIN: CPT | Mod: 25

## 2020-01-17 PROCEDURE — 99215 OFFICE O/P EST HI 40 MIN: CPT | Mod: 25

## 2020-01-17 PROCEDURE — 93000 ELECTROCARDIOGRAM COMPLETE: CPT

## 2020-01-17 RX ORDER — ASPIRIN 81 MG/1
81 TABLET, COATED ORAL DAILY
Refills: 0 | Status: ACTIVE | COMMUNITY
Start: 2020-01-17

## 2020-01-17 RX ORDER — ERGOCALCIFEROL 1.25 MG/1
1.25 MG CAPSULE, LIQUID FILLED ORAL
Qty: 8 | Refills: 0 | Status: DISCONTINUED | COMMUNITY
Start: 2019-06-26 | End: 2020-01-17

## 2020-01-17 RX ORDER — CEFPODOXIME PROXETIL 200 MG/1
200 TABLET, FILM COATED ORAL
Qty: 2 | Refills: 0 | Status: DISCONTINUED | COMMUNITY
Start: 2019-12-16

## 2020-01-17 RX ORDER — FUROSEMIDE 40 MG/1
40 TABLET ORAL DAILY
Qty: 90 | Refills: 1 | Status: DISCONTINUED | COMMUNITY
Start: 2020-01-17 | End: 2020-01-17

## 2020-01-17 NOTE — PHYSICAL EXAM
[General Appearance - Alert] : alert [General Appearance - In No Acute Distress] : in no acute distress [General Appearance - Well Nourished] : well nourished [Outer Ear] : the ears and nose were normal in appearance [Sclera] : the sclera and conjunctiva were normal [Neck Appearance] : the appearance of the neck was normal [Respiration, Rhythm And Depth] : normal respiratory rhythm and effort [] : no respiratory distress [Auscultation Breath Sounds / Voice Sounds] : lungs were clear to auscultation bilaterally [Arterial Pulses Carotid] : carotid pulses were normal with no bruits [Heart Sounds] : normal S1 and S2 [Heart Rate And Rhythm] : heart rate was normal and rhythm regular [Bowel Sounds] : normal bowel sounds [Abdomen Soft] : soft [Abdomen Tenderness] : non-tender [Cervical Lymph Nodes Enlarged Posterior Bilaterally] : posterior cervical [No CVA Tenderness] : no ~M costovertebral angle tenderness [Skin Color & Pigmentation] : normal skin color and pigmentation [Skin Turgor] : normal skin turgor [Abnormal Walk] : normal gait [Oriented To Time, Place, And Person] : oriented to person, place, and time [Cranial Nerves] : cranial nerves 2-12 were intact

## 2020-01-18 LAB
25(OH)D3 SERPL-MCNC: 26.6 NG/ML
ALBUMIN SERPL ELPH-MCNC: 3.9 G/DL
ANION GAP SERPL CALC-SCNC: 17 MMOL/L
APPEARANCE: CLEAR
BACTERIA: NEGATIVE
BASOPHILS # BLD AUTO: 0.06 K/UL
BASOPHILS NFR BLD AUTO: 1 %
BILIRUBIN URINE: NEGATIVE
BLOOD URINE: NEGATIVE
BUN SERPL-MCNC: 83 MG/DL
CALCIUM SERPL-MCNC: 8.1 MG/DL
CALCIUM SERPL-MCNC: 8.1 MG/DL
CHLORIDE SERPL-SCNC: 97 MMOL/L
CO2 SERPL-SCNC: 20 MMOL/L
COLOR: NORMAL
CREAT SERPL-MCNC: 3.66 MG/DL
CREAT SPEC-SCNC: 54 MG/DL
CREAT/PROT UR: 1 RATIO
EOSINOPHIL # BLD AUTO: 0.1 K/UL
EOSINOPHIL NFR BLD AUTO: 1.7 %
ESTIMATED AVERAGE GLUCOSE: 143 MG/DL
GLUCOSE QUALITATIVE U: NEGATIVE
GLUCOSE SERPL-MCNC: 177 MG/DL
HBA1C MFR BLD HPLC: 6.6 %
HCT VFR BLD CALC: 29.5 %
HGB BLD-MCNC: 9.1 G/DL
HYALINE CASTS: 5 /LPF
IMM GRANULOCYTES NFR BLD AUTO: 0.3 %
KETONES URINE: NEGATIVE
LEUKOCYTE ESTERASE URINE: ABNORMAL
LYMPHOCYTES # BLD AUTO: 0.93 K/UL
LYMPHOCYTES NFR BLD AUTO: 15.7 %
MAN DIFF?: NORMAL
MCHC RBC-ENTMCNC: 26.1 PG
MCHC RBC-ENTMCNC: 30.8 GM/DL
MCV RBC AUTO: 84.8 FL
MICROSCOPIC-UA: NORMAL
MONOCYTES # BLD AUTO: 0.54 K/UL
MONOCYTES NFR BLD AUTO: 9.1 %
NEUTROPHILS # BLD AUTO: 4.26 K/UL
NEUTROPHILS NFR BLD AUTO: 72.2 %
NITRITE URINE: NEGATIVE
NT-PROBNP SERPL-MCNC: 8451 PG/ML
PARATHYROID HORMONE INTACT: 209 PG/ML
PH URINE: 5
PHOSPHATE SERPL-MCNC: 6.3 MG/DL
PLATELET # BLD AUTO: 227 K/UL
POTASSIUM SERPL-SCNC: 5.6 MMOL/L
PROT UR-MCNC: 53 MG/DL
PROTEIN URINE: ABNORMAL
RBC # BLD: 3.48 M/UL
RBC # FLD: 19.7 %
RED BLOOD CELLS URINE: 4 /HPF
SODIUM SERPL-SCNC: 134 MMOL/L
SPECIFIC GRAVITY URINE: 1.01
SQUAMOUS EPITHELIAL CELLS: 5 /HPF
UROBILINOGEN URINE: NORMAL
WBC # FLD AUTO: 5.91 K/UL
WHITE BLOOD CELLS URINE: 57 /HPF

## 2020-01-18 NOTE — ASSESSMENT
[FreeTextEntry1] : 1) ATN - New baseline \par 2) CKD IV\par 3) HTN\par 4) IDDM\par \par Patient here with worsening of SCr;\par Pt does not know which medications he takes; neither does wife;\par Reviewed med list from cardiology note; patient told to bring in all medications;\par Denies NSAID use;\par Will start on sodium bicarbonate 650mg TID ; re-sending; pt still not started\par May require AVF/vascular consult by next visit\par \par \par Progress Note Adult-Nephrology Attending [Charted Location: [facility] [id number]] [Authored: 09-Jan-2020 11:19]- for Visit: [id number], Complete, Entered, Signed in Full, General\par \par Progress Note: \par · Provider Specialty	Nephrology\par \par Reason for Admission: \par  Reason for Admission:\par · Reason for Admission	Difficulty breathing\par \par \par · Subjective and Objective: \par [facility] DIVISION OF KIDNEY DISEASES AND HYPERTENSION -- FOLLOW UP NOTE\par --------------------------------------------------------------------------------\par Chief Complaint: CKD\par \par 24 hour events/subjective:\par No acute event\par Pt has no new complaint.\par \par \par PAST HISTORY\par --------------------------------------------------------------------------------\par No significant changes to PMH, PSH, FHx, SHx, unless otherwise noted\par \par ALLERGIES & MEDICATIONS\par --------------------------------------------------------------------------------\par Allergies\par \par No Known Allergies\par \par Intolerances\par \par \par Standing Inpatient Medications\par ALBUTerol    0.083%. 2.5 milliGRAM(s) Nebulizer once\par amLODIPine   Tablet 10 milliGRAM(s) Oral daily\par aspirin  chewable 81 milliGRAM(s) Oral daily\par atorvastatin 10 milliGRAM(s) Oral at bedtime\par carvedilol 25 milliGRAM(s) Oral every 12 hours\par chlorhexidine 4% Liquid 1 Application(s) Topical <User Schedule>\par cholecalciferol 1000 Unit(s) Oral daily\par dextrose 5%. 1000 milliLiter(s) IV Continuous <Continuous>\par dextrose 50% Injectable 12.5 Gram(s) IV Push once\par dextrose 50% Injectable 25 Gram(s) IV Push once\par dextrose 50% Injectable 25 Gram(s) IV Push once\par doxazosin 4 milliGRAM(s) Oral at bedtime\par epoetin nereida Injectable 78081 Unit(s) SubCutaneous <User Schedule>\par ferrous    sulfate 325 milliGRAM(s) Oral daily\par heparin  Injectable 5000 Unit(s) SubCutaneous every 12 hours\par hydrALAZINE 50 milliGRAM(s) Oral three times a day\par insulin lispro (HumaLOG) corrective regimen sliding scale   SubCutaneous three times a day before meals\par insulin lispro Injectable (HumaLOG) 4 Unit(s) SubCutaneous three times a day before meals\par isosorbide   mononitrate ER Tablet (IMDUR) 30 milliGRAM(s) Oral daily\par sodium bicarbonate 650 milliGRAM(s) Oral two times a day\par torsemide 20 milliGRAM(s) Oral every 12 hours\par \par PRN Inpatient Medications\par acetaminophen   Tablet .. 650 milliGRAM(s) Oral every 6 hours PRN\par dextrose 40% Gel 15 Gram(s) Oral once PRN\par glucagon  Injectable 1 milliGRAM(s) IntraMuscular once PRN\par sodium chloride 0.9% lock flush 10 milliLiter(s) IV Push every 1 hour PRN\par \par \par REVIEW OF SYSTEMS\par --------------------------------------------------------------------------------\par Gen: No weight changes, fatigue, fevers/chills, weakness\par Skin: No rashes\par Head/Eyes/Ears/Mouth: No headache; Normal hearing; Normal vision w/o blurriness; No sinus pain/discomfort, sore throat\par Respiratory: No dyspnea, cough, wheezing, hemoptysis\par CV: No chest pain, PND, orthopnea\par GI: No abdominal pain, diarrhea, constipation, nausea, vomiting, melena, hematochezia\par : No increased frequency, dysuria, hematuria, nocturia\par MSK: No joint pain/swelling; no back pain; no edema\par Neuro: No dizziness/lightheadedness, weakness, seizures, numbness, tingling\par Heme: No easy bruising or bleeding\par Endo: No heat/cold intolerance\par Psych: No significant nervousness, anxiety, stress, depression\par \par All other systems were reviewed and are negative, except as noted.\par \par VITALS/PHYSICAL EXAM\par --------------------------------------------------------------------------------\par T(C): 36.7 (01-09-20 @ 09:10), Max: 37 (01-08-20 @ 23:58)\par HR: 62 (01-09-20 @ 09:10) (62 - 72)\par BP: 125/56 (01-09-20 @ 09:10) (125/56 - 152/58)\par RR: 18 (01-09-20 @ 09:10) (18 - 18)\par SpO2: 95% (01-09-20 @ 09:10) (95% - 96%)\par Wt(kg): --\par \par \par \par 01-08-20 @ 07:01  -  01-09-20 @ 07:00\par --------------------------------------------------------\par IN: 0 mL / OUT: 1430 mL / NET: -1430 mL\par \par \par Physical Exam:\par 	 Gen: NAD\par 	HEENT:, supple neck\par 	Pulm: CTA B/L\par 	CV: RRR, S1S2; no rub\par 	Back: no sacral edema\par 	Abd: +BS, soft, nontender/nondistended\par 	: No suprapubic tenderness\par 	UE: Warm, no edema; no asterixis\par 	LE: Warm,  no edema\par 	Neuro: No focal deficit\par 	Psych: Normal affect and mood\par 	Skin: Warm, without rashes\par 	Vascular access: none\par \par LABS/STUDIES\par --------------------------------------------------------------------------------\par             9.7  \par 5.91  >-----------<  188      [01-08-20 @ 10:25]\par             32.9 \par \par 138  |  98  |  55.0\par ----------------------------<  126      [01-08-20 @ 10:25]\par 4.6   |  24.0  |  3.34\par \par     Ca     8.4     [01-08-20 @ 10:25]\par \par \par Creatinine Trend:\par SCr 3.34 [01-08 @ 10:25]\par SCr 3.37 [01-06 @ 07:28]\par SCr 2.83 [01-04 @ 08:46]\par SCr 2.55 [01-03 @ 06:14]\par SCr 2.58 [01-01 @ 07:18]\par \par Urinalysis - [12-28-19 @ 16:39]\par     Color Yellow / Appearance Cloudy / SG 1.010 / pH 5.0\par     Gluc Negative / Ketone Trace  / Bili Negative / Urobili Negative \par     Blood Large / Protein 100 / Leuk Est Small / Nitrite Negative\par     RBC >50 / WBC 6-10 / Hyaline  / Gran  / Sq Epi  / Non Sq Epi  / Bacteria Few\par \par \par Iron 25, TIBC 285, %sat 9      [12-11-19 @ 17:50]\par Ferritin 25      [12-11-19 @ 17:50]\par PTH -- (Ca 8.3)      [12-12-19 @ 19:36]   164\par Vitamin D (25OH) 20.3      [12-12-19 @ 19:36]\par HbA1c 8.1      [12-11-19 @ 06:47]\par TSH 6.54      [12-25-19 @ 21:00]\par \par HBsAb <3.0      [12-31-19 @ 08:48]\par HBsAb Nonreact      [12-31-19 @ 08:48]\par HBsAg Nonreact      [12-31-19 @ 08:48]\par HBcAb Nonreact      [12-31-19 @ 08:48]\par HCV 0.06, Nonreact      [12-31-19 @ 08:48]\par \par \par \par Assessment and Plan: \par · Assessment	\par 79 year old M with PMHx of CKD-4, HTN, chronic diastolic CHF, BPH and moderate-severe AS, pt admitted for R pleural effusion, s/p chest tube placement - removed today 1/7. Cardiology and CT surgery consults noted. Pt is s/p Cardiomems placement 1/3.\par \par Right pleural effusion /Acute CHF exacerbation & Moderate AS\par -R chest tube removed \par \par CRS - CKD stage 4 \par -HD catheter removed\par -No need for HD at this time\par - s/p Cardiomems; diuresis as per Cards\par \par Anemia\par -Likely AOCD\par -c/w iron and KD\par \par Nephrology follow up outpt \par \par \par \par \par Renal US\par \par The treatment plan risks, benefits and alternatives was discussed with the patient. \par 79 M with HTN, DM, HLD, paroxsymal atrial fibrillation with Supra therapeutic INR and acute severe GI bleeding now off coumadin on ILR to evaluate for Afib burden.\par \par 1. HF- Torsemide 40 mg saturday and sunday, then 40 mg in AM and 40 mg in PM. BMP and MAG on Monday, \par 1) LE skin changes: ELENA to evaluate for Peripheral vascular disease \par 2) Bilateral LE edema: Unclear cause. Patient is not short of breathe. CKD. lasix 40 mg daily. CKD. HFPEF. \par 2) Paroxysmal Afib: s/p cardioversion jan 2015, CHADSVASC >=3.. = xarelto not approved for life long or high co pay. no coumadin as patient had bleeding on supra therapeutic INR. ct aspirin 325 mg daily. F/u ILR. low afib burden. ct follow up with ILR. \par 3) HTN: Uncontrolled. ct hydralazine 25 Q8 hr. off losartan due to high potassium. change toprol to coreg 12.5 Q12 for better blood pressure control \par 4) CAD evaluation: risk factors for CAD. HTN, controlled DM, CKD and HLD aspirin, statins. nuclear stress test and echo. : no ischemia. \par 5) Moderate aortic stenosis: asymptomatic. \par 6_ Bilateral carotid bruitL: US carotid arteries. : mild atherosclerosis. no stenosis. \par \par RTC 1 month

## 2020-01-18 NOTE — HISTORY OF PRESENT ILLNESS
[FreeTextEntry1] : Patient is a 77 year old male with history of IDDM for 20-25 years, HTN, arthritis; AFib; here for initial evaluation of CKD. Patient has had "stable" CKD 4 with Cr of 3 for past 2-3 years; presenting in early August with Cr of 4. Pt accompanied by his wife; no other complaints at this time; no edema; no SOB. Feels well. GFR ~12.\par Current: Pt seen and examined; no complaints; feels well. Was at [facility] ER March 20th for hyperkalemia; was recently restarted on losartan 25mg daily by cardiology in January; found to have K of 6; repeat 5.9 at ER; taken off. BP stable; taken off losartan; however pt and wife don’t have complete medication list with them to verify which meds he is taking/not taking; they state he has been off losartan since ER visit.

## 2020-01-18 NOTE — REVIEW OF SYSTEMS
[Skin Lesions] : skin lesion [Itching] : itching [Negative] : Heme/Lymph [Fever] : no fever [Chills] : no chills [Eye Pain] : no eye pain [Red Eyes] : eyes not red [Earache] : no earache [Loss Of Hearing] : no hearing loss [Heart Rate Is Slow] : the heart rate was not slow [Shortness Of Breath] : no shortness of breath [Heart Rate Is Fast] : the heart rate was not fast [Abdominal Pain] : no abdominal pain [Wheezing] : no wheezing [Vomiting] : no vomiting [Arthralgias] : no arthralgias [Joint Pain] : no joint pain [Hot Flashes] : no hot flashes [Proptosis] : no proptosis [Easy Bleeding] : no tendency for easy bleeding [Easy Bruising] : no tendency for easy bruising

## 2020-01-22 RX ORDER — TORSEMIDE 20 MG/1
20 TABLET ORAL
Qty: 90 | Refills: 1 | Status: DISCONTINUED | COMMUNITY
Start: 2020-01-09 | End: 2020-01-22

## 2020-01-23 ENCOUNTER — LABORATORY RESULT (OUTPATIENT)
Age: 80
End: 2020-01-23

## 2020-01-23 ENCOUNTER — APPOINTMENT (OUTPATIENT)
Dept: FAMILY MEDICINE | Facility: CLINIC | Age: 80
End: 2020-01-23
Payer: MEDICARE

## 2020-01-23 VITALS
SYSTOLIC BLOOD PRESSURE: 126 MMHG | OXYGEN SATURATION: 98 % | HEIGHT: 64 IN | WEIGHT: 172 LBS | HEART RATE: 55 BPM | RESPIRATION RATE: 13 BRPM | BODY MASS INDEX: 29.37 KG/M2 | DIASTOLIC BLOOD PRESSURE: 78 MMHG

## 2020-01-23 DIAGNOSIS — R23.4 CHANGES IN SKIN TEXTURE: ICD-10-CM

## 2020-01-23 DIAGNOSIS — R23.8 OTHER SKIN CHANGES: ICD-10-CM

## 2020-01-23 PROCEDURE — 36415 COLL VENOUS BLD VENIPUNCTURE: CPT

## 2020-01-23 PROCEDURE — 99496 TRANSJ CARE MGMT HIGH F2F 7D: CPT | Mod: 25

## 2020-01-23 RX ORDER — METOLAZONE 5 MG/1
5 TABLET ORAL DAILY
Qty: 5 | Refills: 0 | Status: COMPLETED | COMMUNITY
Start: 2020-01-22 | End: 2020-01-23

## 2020-01-23 RX ORDER — ERYTHROPOIETIN 40000 [IU]/ML
40000 INJECTION, SOLUTION INTRAVENOUS; SUBCUTANEOUS
Qty: 1 | Refills: 3 | Status: COMPLETED | COMMUNITY
Start: 2020-01-23 | End: 2020-01-23

## 2020-01-23 RX ORDER — CHLORHEXIDINE GLUCONATE 4 %
325 (65 FE) LIQUID (ML) TOPICAL DAILY
Qty: 90 | Refills: 1 | Status: COMPLETED | COMMUNITY
Start: 2019-12-19 | End: 2020-01-23

## 2020-01-23 RX ORDER — CARVEDILOL 12.5 MG/1
12.5 TABLET, FILM COATED ORAL
Qty: 180 | Refills: 0 | Status: COMPLETED | COMMUNITY
Start: 2019-08-31 | End: 2020-01-23

## 2020-01-23 RX ORDER — PEN NEEDLE, DIABETIC 32 GX 1/4"
32G X 6 MM NEEDLE, DISPOSABLE MISCELLANEOUS
Qty: 2 | Refills: 3 | Status: COMPLETED | COMMUNITY
Start: 2019-07-16 | End: 2020-01-23

## 2020-01-23 RX ORDER — ERYTHROPOIETIN 40000 [IU]/ML
40000 INJECTION, SOLUTION INTRAVENOUS; SUBCUTANEOUS
Qty: 1 | Refills: 3 | Status: DISCONTINUED | COMMUNITY
Start: 2020-01-22 | End: 2020-01-23

## 2020-01-23 NOTE — REVIEW OF SYSTEMS
[Patient Intake Form Reviewed] : Patient intake form was reviewed [Negative] : Psychiatric [FreeTextEntry5] : Afib, CHF, HTN, HLD, Aortic stenosis [FreeTextEntry8] : CKD, Chronic renal failure, enlarged prostate [de-identified] : Dementia [de-identified] : Anemia [FreeTextEntry1] : Hyperkalemia, IDDM

## 2020-01-23 NOTE — HEALTH RISK ASSESSMENT
[Never (0 pts)] : Never (0 points) [1 or 2 (0 pts)] : 1 or 2 (0 points) [No] : In the past 12 months have you used drugs other than those required for medical reasons? No [Two or more falls in past year] : Patient reported two or more falls in the past year [0] : 1) Little interest or pleasure doing things: Not at all (0) [] : No [de-identified] : Low - Walks with cane [Audit-CScore] : 0 [de-identified] : Average [[id number]] : 0

## 2020-01-23 NOTE — PHYSICAL EXAM
[No Acute Distress] : no acute distress [Well Nourished] : well nourished [Well Developed] : well developed [Well-Appearing] : well-appearing [Normal Sclera/Conjunctiva] : normal sclera/conjunctiva [PERRL] : pupils equal round and reactive to light [EOMI] : extraocular movements intact [Normal Oropharynx] : the oropharynx was normal [Normal Outer Ear/Nose] : the outer ears and nose were normal in appearance [No JVD] : no jugular venous distention [No Lymphadenopathy] : no lymphadenopathy [Supple] : supple [Thyroid Normal, No Nodules] : the thyroid was normal and there were no nodules present [No Accessory Muscle Use] : no accessory muscle use [No Respiratory Distress] : no respiratory distress  [Clear to Auscultation] : lungs were clear to auscultation bilaterally [Normal Rate] : normal rate  [Regular Rhythm] : with a regular rhythm [Normal S1, S2] : normal S1 and S2 [No Murmur] : no murmur heard [No Carotid Bruits] : no carotid bruits [No Abdominal Bruit] : a ~M bruit was not heard ~T in the abdomen [No Varicosities] : no varicosities [Pedal Pulses Present] : the pedal pulses are present [No Edema] : there was no peripheral edema [No Palpable Aorta] : no palpable aorta [Non Tender] : non-tender [Soft] : abdomen soft [No Extremity Clubbing/Cyanosis] : no extremity clubbing/cyanosis [Non-distended] : non-distended [No Masses] : no abdominal mass palpated [No HSM] : no HSM [Normal Bowel Sounds] : normal bowel sounds [No CVA Tenderness] : no CVA  tenderness [Normal Posterior Cervical Nodes] : no posterior cervical lymphadenopathy [Normal Anterior Cervical Nodes] : no anterior cervical lymphadenopathy [No Spinal Tenderness] : no spinal tenderness [No Rash] : no rash [Grossly Normal Strength/Tone] : grossly normal strength/tone [Coordination Grossly Intact] : coordination grossly intact [No Focal Deficits] : no focal deficits [Normal Affect] : the affect was normal [Normal Gait] : normal gait [Deep Tendon Reflexes (DTR)] : deep tendon reflexes were 2+ and symmetric [Normal Insight/Judgement] : insight and judgment were intact [de-identified] : Murmur noted on the right second intercostal.  [de-identified] : 2+ bilateral lower extremity edema.

## 2020-01-23 NOTE — COUNSELING
[Fall prevention counseling provided] : Fall prevention counseling provided [Adequate lighting] : Adequate lighting [No throw rugs] : No throw rugs [Use proper foot wear] : Use proper foot wear [Behavioral health counseling provided] : Behavioral health counseling provided [Sleep ___ hours/day] : Sleep [unfilled] hours/day [Engage in a relaxing activity] : Engage in a relaxing activity [Plan in advance] : Plan in advance [AUDIT-C Screening administered and reviewed] : AUDIT-C Screening administered and reviewed [Benefits of weight loss discussed] : Benefits of weight loss discussed [Potential consequences of obesity discussed] : Potential consequences of obesity discussed [Structured Weight Management Program suggested:] : Structured weight management program suggested [Weigh Self Weekly] : weigh self weekly [Encouraged to increase physical activity] : Encouraged to increase physical activity [Decrease Portions] : decrease portions [None] : None [Good understanding] : Patient has a good understanding of lifestyle changes and steps needed to achieve self management goal [FreeTextEntry2] : Former smoker

## 2020-01-23 NOTE — ASSESSMENT
[FreeTextEntry1] : On 12/25/2019 wife states that the patient was resting in his room at night when he came over to her room and informed her that he was having difficulty breathing. Given that the patient was recently discharged previously for Congestive Heart Failure, wife decided to summon EMS. Wife states that prior to most recent admission, the patient has been complaining of intermittent short of breath that is worst time. During his time at [facility], patient had a chest tube place and it was not removed until 01/07/2020, 3 rounds of dialysis, labs, chest xray. He was discharged on 01/09/2020 with instructions to follow up with PCP and Cardiology in 1 month. He has not been told that he needs regular scheduled dialysis at this time. \par \par Hospital course reviewed. Continue to follow up with cardiology as scheduled. \par Discharge medication reconciled. Medication renewals sent to pharmacy.\par Labs drawn today. \par \par RTC in 1 month. \par \par

## 2020-01-23 NOTE — HISTORY OF PRESENT ILLNESS
[Admitted on: ___] : The patient was admitted on [unfilled] [Post-hospitalization from ___ Hospital] : Post-hospitalization from [unfilled] Hospital [Discharged on ___] : discharged on [unfilled] [FreeTextEntry2] : On 12/25/2019 wife states that the patient was resting in his room at night when he came over to her room and informed her that he was having difficulty breathing. Given that the patient was recently discharged previously for Congestive Heart Failure, wife decided to summon EMS. Wife states that prior to most recent admission, the patient has been complaining of intermittent short of breath that is worst time. During his time at [facility], patient had a chest tube place and it was not removed until 01/07/2020, 3 rounds of dialysis, labs, chest xray. He was discharged on 01/09/2020 with instructions to follow up with PCP and Cardiology in 1 month. He has not been told that he needs regular scheduled dialysis at this time.

## 2020-01-24 LAB
24R-OH-CALCIDIOL SERPL-MCNC: 26.9 PG/ML
ALBUMIN SERPL ELPH-MCNC: 4.1 G/DL
ALP BLD-CCNC: 68 U/L
ALT SERPL-CCNC: 15 U/L
ANION GAP SERPL CALC-SCNC: 17 MMOL/L
APPEARANCE: CLEAR
AST SERPL-CCNC: 19 U/L
BASOPHILS # BLD AUTO: 0.04 K/UL
BASOPHILS NFR BLD AUTO: 0.7 %
BILIRUB SERPL-MCNC: 0.2 MG/DL
BILIRUBIN URINE: NEGATIVE
BLOOD URINE: NEGATIVE
BUN SERPL-MCNC: 97 MG/DL
CALCIUM SERPL-MCNC: 8.3 MG/DL
CHLORIDE SERPL-SCNC: 97 MMOL/L
CHOLEST SERPL-MCNC: 133 MG/DL
CHOLEST/HDLC SERPL: 2.7 RATIO
CO2 SERPL-SCNC: 22 MMOL/L
COLOR: COLORLESS
CREAT SERPL-MCNC: 3.77 MG/DL
EOSINOPHIL # BLD AUTO: 0.09 K/UL
EOSINOPHIL NFR BLD AUTO: 1.5 %
ERYTHROCYTE [SEDIMENTATION RATE] IN BLOOD BY WESTERGREN METHOD: 33 MM/HR
FOLATE SERPL-MCNC: 7.3 NG/ML
GLUCOSE QUALITATIVE U: NEGATIVE
GLUCOSE SERPL-MCNC: 90 MG/DL
HCT VFR BLD CALC: 29.7 %
HDLC SERPL-MCNC: 50 MG/DL
HGB BLD-MCNC: 8.9 G/DL
IMM GRANULOCYTES NFR BLD AUTO: 0.5 %
KETONES URINE: NEGATIVE
LDLC SERPL CALC-MCNC: 67 MG/DL
LEUKOCYTE ESTERASE URINE: ABNORMAL
LYMPHOCYTES # BLD AUTO: 0.94 K/UL
LYMPHOCYTES NFR BLD AUTO: 15.4 %
MAN DIFF?: NORMAL
MCHC RBC-ENTMCNC: 26.3 PG
MCHC RBC-ENTMCNC: 30 GM/DL
MCV RBC AUTO: 87.6 FL
MONOCYTES # BLD AUTO: 0.65 K/UL
MONOCYTES NFR BLD AUTO: 10.6 %
NEUTROPHILS # BLD AUTO: 4.37 K/UL
NEUTROPHILS NFR BLD AUTO: 71.3 %
NITRITE URINE: NEGATIVE
PH URINE: 6
PLATELET # BLD AUTO: 207 K/UL
POTASSIUM SERPL-SCNC: 5.1 MMOL/L
PROT SERPL-MCNC: 6.6 G/DL
PROTEIN URINE: NORMAL
RBC # BLD: 3.39 M/UL
RBC # FLD: 19.5 %
SODIUM SERPL-SCNC: 137 MMOL/L
SPECIFIC GRAVITY URINE: 1.01
TRIGL SERPL-MCNC: 81 MG/DL
UROBILINOGEN URINE: NORMAL
VIT B12 SERPL-MCNC: 390 PG/ML
WBC # FLD AUTO: 6.12 K/UL

## 2020-01-27 LAB
ANION GAP SERPL CALC-SCNC: 18 MMOL/L
BUN SERPL-MCNC: 104 MG/DL
CALCIUM SERPL-MCNC: 9 MG/DL
CHLORIDE SERPL-SCNC: 91 MMOL/L
CO2 SERPL-SCNC: 27 MMOL/L
CREAT SERPL-MCNC: 3.68 MG/DL
GLUCOSE SERPL-MCNC: 218 MG/DL
POTASSIUM SERPL-SCNC: 4.8 MMOL/L
SODIUM SERPL-SCNC: 135 MMOL/L
TSH SERPL-ACNC: 13.2 UIU/ML

## 2020-01-28 NOTE — DISCUSSION/SUMMARY
[Risks] : risks [Benefits] : benefits [Patient] : the patient [Alternatives] : alternatives [FreeTextEntry1] : 79 M with HTN, DM, HLD, paroxsymal atrial fibrillation with Supra therapeutic INR and acute severe GI bleeding now off coumadin on ILR to evaluate for Afib burden,  Pt is s/p Cardiomems placement 1/3. reports today for lower ext. swelling. denies any SOB, chest pain, palpitations, syncope or near syncope. \par Plan d/w Dr. Faustin. \par \par \par Plan:\par 1. HF-  Pt is s/p Cardiomems placement 1/3. Torsemide 40 mg Saturday and Sunday, then 40 mg in AM and 40 mg in PM. BMP and MAG on Monday,   \par 1) LE skin changes: ELENA to evaluate for Peripheral vascular disease \par 2) Bilateral LE edema: Unclear cause. Patient is not short of breathe. CKD. lasix 40 mg daily.  CKD. HFPEF.  \par 2) Paroxysmal Afib: s/p cardioversion Jan 2015, CHADSVASC >=3..  =  Xarelto not approved for life long or high co pay.  no coumadin as patient had bleeding on supra therapeutic INR.  ct  aspirin 325  mg daily. F/u ILR. low afib burden. ct follow up with ILR. \par 3) HTN: Uncontrolled. ct hydralazine 25 Q8 hr.   off losartan  due to high potassium. change Toprol to Coreg 12.5 Q12 for better blood pressure control \par 4) CAD evaluation: risk factors for CAD. HTN, controlled DM, CKD and HLD aspirin, statins. nuclear stress test and echo. : no ischemia. \par 5) MODerate aortic stenosis: asymptomatic. \par 6) Bilateral carotid bruit: US carotid arteries : mild atherosclerosis. no stenosis\par \par Isabel Larios, NPC.

## 2020-01-28 NOTE — HISTORY OF PRESENT ILLNESS
[FreeTextEntry1] : HTN, atrial fibrillation\par \par no headaches. no LE edema. No dyspnea. no chest pain. no dizziness. walks. physicall active. \par \par old note: No syncope. nod izzines.s no palpitations. no falls. no bleeding. no headaches. no chest pain,. no dyspena. \par : compliant with meds.  patient had worsneing potassium, and now off losartna. BP controlled. \par \par \par old note: compliant with meds. no syncope. nod izzines.s no palptiatiosn. no falls. no bleeding. no headaches. no chest pain,. no dyspena. \par \par \par ol dnote: no chest pain. no dyspnea. no headaches. no dizziness. \par \par old note: no chest pain. no dyspnea. routine follow up. compliant with meds. no fall. no bleeding. \par \par \par prior note: last visit, did not bring his pills. BP high, added Benica-HCTZ.  Compliant with emds. Did not bring his pills today again.  Also, said that did not  Benicar-HCTZ due to high co pay. Only taking one BP pill. Not compliant with diet. \par No chest pain. No dyspnea.\par \par OLD NOTE: This is a 74 year old man with history of HTN, DM, CKD (Stage 3) Atrial fibrillation, prior pneumonia and pleural effusions and pericardial effusion, recently admitted to hospital for rectal bleeding and Elevated INR Hb < 4 , INR =8 and acute on chronic renal failure. CT abd showed, diverticular disease.  Colonoscopy showed, diverticulitis, EGD showed  Patient is off AC. \par \par 1/17/2020\par Patient reports for follow up of lower leg swelling. His lower leg is 2-3+ edema.  Pt is s/p Cardiomems placement 1/3.  Pt is s/p Cardiomems placement 1/3. He denies any chest pain, SOB, palpitations. \par

## 2020-01-28 NOTE — END OF VISIT
[FreeTextEntry3] : I personally discussed this patient with Nurse Practitioner/Physician Assistant. I agree with the assessment and plan as written, unless noted below. \par State blodo work. Folowoed up on blood work today (1/28/2020) patient had similar reading with normal renal funciton \par patient had prior episode of urinary rtention. Stat bladder US to check for urinary retention.  follow up with neprhology. f/u with urology.\par re check cardiomem. patient may need permamney HD management.  if bladder scan does now show urinary retention as patient has Uremia,.

## 2020-01-28 NOTE — PHYSICAL EXAM
[General Appearance - Well Developed] : well developed [Normal Appearance] : normal appearance [General Appearance - Well Nourished] : well nourished [Well Groomed] : well groomed [No Deformities] : no deformities [General Appearance - In No Acute Distress] : no acute distress [Normal Conjunctiva] : the conjunctiva exhibited no abnormalities [Eyelids - No Xanthelasma] : the eyelids demonstrated no xanthelasmas [Normal Oral Mucosa] : normal oral mucosa [No Oral Pallor] : no oral pallor [No Oral Cyanosis] : no oral cyanosis [Normal Jugular Venous A Waves Present] : normal jugular venous A waves present [No Jugular Venous Perez A Waves] : no jugular venous perez A waves [Normal Jugular Venous V Waves Present] : normal jugular venous V waves present [Respiration, Rhythm And Depth] : normal respiratory rhythm and effort [Exaggerated Use Of Accessory Muscles For Inspiration] : no accessory muscle use [Auscultation Breath Sounds / Voice Sounds] : lungs were clear to auscultation bilaterally [Heart Rate And Rhythm] : heart rate and rhythm were normal [Edema] : no peripheral edema present [Heart Sounds] : normal S1 and S2 [Veins - Varicosity Changes] : no varicosital changes were noted in the lower extremities [Abdomen Soft] : soft [Abdomen Tenderness] : non-tender [Abdomen Mass (___ Cm)] : no abdominal mass palpated [Nail Clubbing] : no clubbing of the fingernails [Cyanosis, Localized] : no localized cyanosis [Petechial Hemorrhages (___cm)] : no petechial hemorrhages [Skin Color & Pigmentation] : normal skin color and pigmentation [] : no rash [No Skin Ulcers] : no skin ulcer [No Anxiety] : not feeling anxious [Affect] : the affect was normal [Oriented To Time, Place, And Person] : oriented to person, place, and time [Mood] : the mood was normal [FreeTextEntry1] : Dry hairless skin lack of lustere in the legs . Edema.

## 2020-01-28 NOTE — CARDIOLOGY SUMMARY
[No Ischemia] : no Ischemia [LVEF ___%] : LVEF [unfilled]% [None] : normal LV function [Enlarged] : enlarged LA size [Mild] : mild mitral regurgitation [___] : [unfilled] [de-identified] : feb 2018 : carotid US: mild atherosclerosis. no stenosis. \par feb 2019: Mild plaque . no stenosis.

## 2020-01-29 ENCOUNTER — APPOINTMENT (OUTPATIENT)
Dept: ULTRASOUND IMAGING | Facility: CLINIC | Age: 80
End: 2020-01-29
Payer: MEDICARE

## 2020-01-29 ENCOUNTER — OUTPATIENT (OUTPATIENT)
Dept: OUTPATIENT SERVICES | Facility: HOSPITAL | Age: 80
LOS: 1 days | End: 2020-01-29
Payer: MEDICARE

## 2020-01-29 DIAGNOSIS — Z96.0 PRESENCE OF UROGENITAL IMPLANTS: Chronic | ICD-10-CM

## 2020-01-29 DIAGNOSIS — N32.89 OTHER SPECIFIED DISORDERS OF BLADDER: ICD-10-CM

## 2020-01-29 PROCEDURE — 76857 US EXAM PELVIC LIMITED: CPT | Mod: 26

## 2020-01-29 PROCEDURE — 76857 US EXAM PELVIC LIMITED: CPT

## 2020-02-07 ENCOUNTER — APPOINTMENT (OUTPATIENT)
Dept: CARDIOLOGY | Facility: CLINIC | Age: 80
End: 2020-02-07
Payer: MEDICARE

## 2020-02-07 PROCEDURE — 93264 REM MNTR WRLS P-ART PRS SNR: CPT

## 2020-02-10 ENCOUNTER — APPOINTMENT (OUTPATIENT)
Dept: FAMILY MEDICINE | Facility: CLINIC | Age: 80
End: 2020-02-10
Payer: MEDICARE

## 2020-02-10 VITALS
SYSTOLIC BLOOD PRESSURE: 110 MMHG | HEART RATE: 51 BPM | BODY MASS INDEX: 29.37 KG/M2 | DIASTOLIC BLOOD PRESSURE: 54 MMHG | OXYGEN SATURATION: 95 % | RESPIRATION RATE: 12 BRPM | HEIGHT: 64 IN | WEIGHT: 172 LBS

## 2020-02-10 PROCEDURE — 99214 OFFICE O/P EST MOD 30 MIN: CPT | Mod: 25

## 2020-02-10 PROCEDURE — 36415 COLL VENOUS BLD VENIPUNCTURE: CPT

## 2020-02-10 NOTE — PHYSICAL EXAM
[No Acute Distress] : no acute distress [Well Nourished] : well nourished [Well Developed] : well developed [Well-Appearing] : well-appearing [Normal Sclera/Conjunctiva] : normal sclera/conjunctiva [PERRL] : pupils equal round and reactive to light [Normal Outer Ear/Nose] : the outer ears and nose were normal in appearance [EOMI] : extraocular movements intact [Normal Oropharynx] : the oropharynx was normal [No Lymphadenopathy] : no lymphadenopathy [No JVD] : no jugular venous distention [Supple] : supple [No Respiratory Distress] : no respiratory distress  [Thyroid Normal, No Nodules] : the thyroid was normal and there were no nodules present [No Accessory Muscle Use] : no accessory muscle use [Clear to Auscultation] : lungs were clear to auscultation bilaterally [Regular Rhythm] : with a regular rhythm [Normal Rate] : normal rate  [Normal S1, S2] : normal S1 and S2 [No Murmur] : no murmur heard [No Carotid Bruits] : no carotid bruits [No Varicosities] : no varicosities [No Abdominal Bruit] : a ~M bruit was not heard ~T in the abdomen [Pedal Pulses Present] : the pedal pulses are present [No Edema] : there was no peripheral edema [No Palpable Aorta] : no palpable aorta [Soft] : abdomen soft [No Extremity Clubbing/Cyanosis] : no extremity clubbing/cyanosis [Non Tender] : non-tender [Non-distended] : non-distended [No Masses] : no abdominal mass palpated [No HSM] : no HSM [Normal Anterior Cervical Nodes] : no anterior cervical lymphadenopathy [Normal Bowel Sounds] : normal bowel sounds [Normal Posterior Cervical Nodes] : no posterior cervical lymphadenopathy [No CVA Tenderness] : no CVA  tenderness [No Spinal Tenderness] : no spinal tenderness [Grossly Normal Strength/Tone] : grossly normal strength/tone [No Rash] : no rash [Coordination Grossly Intact] : coordination grossly intact [No Focal Deficits] : no focal deficits [Normal Gait] : normal gait [Normal Affect] : the affect was normal [Deep Tendon Reflexes (DTR)] : deep tendon reflexes were 2+ and symmetric [Normal Insight/Judgement] : insight and judgment were intact [de-identified] : Murmur noted on the right second intercostal.  [de-identified] : 2+ bilateral lower extremity edema.

## 2020-02-10 NOTE — HEALTH RISK ASSESSMENT
[1 or 2 (0 pts)] : 1 or 2 (0 points) [Never (0 pts)] : Never (0 points) [No] : In the past 12 months have you used drugs other than those required for medical reasons? No [Two or more falls in past year] : Patient reported two or more falls in the past year [0] : 2) Feeling down, depressed, or hopeless: Not at all (0) [] : No [Audit-CScore] : 0 [de-identified] : Low - Walks with cane [de-identified] : Average [HLA1Aexbr] : 0

## 2020-02-10 NOTE — REVIEW OF SYSTEMS
[Patient Intake Form Reviewed] : Patient intake form was reviewed [Negative] : Integumentary [FreeTextEntry5] : Afib, CHF, HTN, HLD, Aortic stenosis [FreeTextEntry8] : CKD, Chronic renal failure, enlarged prostate [de-identified] : Dementia [de-identified] : Anemia [FreeTextEntry1] : Hyperkalemia, IDDM

## 2020-02-10 NOTE — HISTORY OF PRESENT ILLNESS
[de-identified] : Patient here for 2 week f/u and review of PMH anemia, a-fib, chronic GERD, chronic renal failure, enlarged prostate, HTN, HLD, IDDM. Patient is considering renal replacement therapy. Awaiting results of sonogram from renal. F/u of labs. DM care plan reviewed. Glucose stable as per wife.

## 2020-02-10 NOTE — COUNSELING
[Fall prevention counseling provided] : Fall prevention counseling provided [No throw rugs] : No throw rugs [Adequate lighting] : Adequate lighting [Use proper foot wear] : Use proper foot wear [Engage in a relaxing activity] : Engage in a relaxing activity [Behavioral health counseling provided] : Behavioral health counseling provided [Sleep ___ hours/day] : Sleep [unfilled] hours/day [Plan in advance] : Plan in advance [Potential consequences of obesity discussed] : Potential consequences of obesity discussed [AUDIT-C Screening administered and reviewed] : AUDIT-C Screening administered and reviewed [Benefits of weight loss discussed] : Benefits of weight loss discussed [Structured Weight Management Program suggested:] : Structured weight management program suggested [Encouraged to increase physical activity] : Encouraged to increase physical activity [Weigh Self Weekly] : weigh self weekly [Decrease Portions] : decrease portions [None] : None [Good understanding] : Patient has a good understanding of lifestyle changes and steps needed to achieve self management goal [FreeTextEntry2] : Former smoker

## 2020-02-11 ENCOUNTER — APPOINTMENT (OUTPATIENT)
Dept: CARDIOLOGY | Facility: CLINIC | Age: 80
End: 2020-02-11
Payer: MEDICARE

## 2020-02-11 ENCOUNTER — NON-APPOINTMENT (OUTPATIENT)
Age: 80
End: 2020-02-11

## 2020-02-11 VITALS
SYSTOLIC BLOOD PRESSURE: 119 MMHG | HEART RATE: 52 BPM | RESPIRATION RATE: 12 BRPM | OXYGEN SATURATION: 95 % | DIASTOLIC BLOOD PRESSURE: 54 MMHG

## 2020-02-11 LAB
BASOPHILS # BLD AUTO: 0.04 K/UL
BASOPHILS NFR BLD AUTO: 0.7 %
EOSINOPHIL # BLD AUTO: 0.06 K/UL
EOSINOPHIL NFR BLD AUTO: 1.1 %
HCT VFR BLD CALC: 27.6 %
HGB BLD-MCNC: 8.5 G/DL
IMM GRANULOCYTES NFR BLD AUTO: 0.5 %
LYMPHOCYTES # BLD AUTO: 0.72 K/UL
LYMPHOCYTES NFR BLD AUTO: 13 %
MAN DIFF?: NORMAL
MCHC RBC-ENTMCNC: 26.6 PG
MCHC RBC-ENTMCNC: 30.8 GM/DL
MCV RBC AUTO: 86.3 FL
MONOCYTES # BLD AUTO: 0.63 K/UL
MONOCYTES NFR BLD AUTO: 11.4 %
NEUTROPHILS # BLD AUTO: 4.05 K/UL
NEUTROPHILS NFR BLD AUTO: 73.3 %
PLATELET # BLD AUTO: 114 K/UL
RBC # BLD: 3.2 M/UL
RBC # FLD: 17.5 %
WBC # FLD AUTO: 5.53 K/UL

## 2020-02-11 PROCEDURE — 93000 ELECTROCARDIOGRAM COMPLETE: CPT

## 2020-02-11 PROCEDURE — 99215 OFFICE O/P EST HI 40 MIN: CPT

## 2020-02-11 NOTE — PHYSICAL EXAM
[General Appearance - Well Developed] : well developed [Normal Appearance] : normal appearance [Well Groomed] : well groomed [General Appearance - Well Nourished] : well nourished [No Deformities] : no deformities [General Appearance - In No Acute Distress] : no acute distress [Normal Conjunctiva] : the conjunctiva exhibited no abnormalities [Eyelids - No Xanthelasma] : the eyelids demonstrated no xanthelasmas [Normal Oral Mucosa] : normal oral mucosa [No Oral Pallor] : no oral pallor [No Oral Cyanosis] : no oral cyanosis [Normal Jugular Venous A Waves Present] : normal jugular venous A waves present [Normal Jugular Venous V Waves Present] : normal jugular venous V waves present [No Jugular Venous Perez A Waves] : no jugular venous perez A waves [Respiration, Rhythm And Depth] : normal respiratory rhythm and effort [Exaggerated Use Of Accessory Muscles For Inspiration] : no accessory muscle use [Auscultation Breath Sounds / Voice Sounds] : lungs were clear to auscultation bilaterally [Heart Sounds] : normal S1 and S2 [Veins - Varicosity Changes] : no varicosital changes were noted in the lower extremities [Heart Rate And Rhythm] : heart rate and rhythm were normal [Edema] : no peripheral edema present [Abdomen Soft] : soft [Abdomen Tenderness] : non-tender [Abdomen Mass (___ Cm)] : no abdominal mass palpated [Nail Clubbing] : no clubbing of the fingernails [Cyanosis, Localized] : no localized cyanosis [Petechial Hemorrhages (___cm)] : no petechial hemorrhages [Skin Color & Pigmentation] : normal skin color and pigmentation [] : no rash [No Skin Ulcers] : no skin ulcer [Oriented To Time, Place, And Person] : oriented to person, place, and time [FreeTextEntry1] : Dry hairless skin lack of lustere in the legs . Edema.  [Mood] : the mood was normal [Affect] : the affect was normal [No Anxiety] : not feeling anxious

## 2020-02-11 NOTE — CARDIOLOGY SUMMARY
[No Ischemia] : no Ischemia [LVEF ___%] : LVEF [unfilled]% [None] : normal LV function [Enlarged] : enlarged LA size [Mild] : mild mitral regurgitation [___] : [unfilled] [de-identified] : feb 2018 : carotid US: mild atherosclerosis. no stenosis. \par feb 2019: Mild plaque . no stenosis.

## 2020-02-11 NOTE — DISCUSSION/SUMMARY
[Patient] : the patient [Risks] : risks [Benefits] : benefits [Alternatives] : alternatives [___ Month(s)] : [unfilled] month(s) [FreeTextEntry1] : 79 M with HTN, DM, HLD, paroxsymal atrial fibrillation with Supra therapeutic INR and acute severe GI bleeding now off coumadin on ILR to evaluate for Afib burden,  Pt is s/p Cardiomems placement 1/3. reports today for lower ext. swelling. denies any SOB, chest pain, palpitations, syncope or near syncope. \par Plan d/w Dr. Faustin. \par \par \par Plan:\par 1. HF- heart failure with preserve ejection fraction   Pt is s/p Cardiomems placement torsemide 20 Q12. POCUS echo today: PADP goal 17 mm Hgh. Repeat BMP check in couple of weeks.\par 2) LE skin changes: Normal ELENA at rest.  \par 3) Bilateral LE edema:  .  CKD. HFPEF.  Compressions stockings.  \par 4) Paroxysmal Afib: s/p cardioversion Jan 2015, CHADSVASC >=3..  =  Xarelto not approved for life long or high co pay.  no coumadin as patient had bleeding on supra therapeutic INR.  ct  aspirin 325  mg daily. F/u ILR. low afib burden. ct follow up with ILR. \par 5) HTN: controlled. ct  current meds. \par 6) CAD evaluation : no significant coronary artery disease on cath \par 7) MODerate aortic stenosis: asymptomatic. \par

## 2020-02-15 LAB
CULTURE RESULTS: SIGNIFICANT CHANGE UP
SPECIMEN SOURCE: SIGNIFICANT CHANGE UP

## 2020-02-17 LAB
ALBUMIN SERPL ELPH-MCNC: 4.4 G/DL
ALP BLD-CCNC: 72 U/L
ALT SERPL-CCNC: 14 U/L
ANION GAP SERPL CALC-SCNC: 19 MMOL/L
AST SERPL-CCNC: 16 U/L
BILIRUB SERPL-MCNC: 0.2 MG/DL
BUN SERPL-MCNC: 119 MG/DL
CALCIUM SERPL-MCNC: 8.8 MG/DL
CHLORIDE SERPL-SCNC: 95 MMOL/L
CO2 SERPL-SCNC: 22 MMOL/L
CREAT SERPL-MCNC: 3.75 MG/DL
GLUCOSE SERPL-MCNC: 73 MG/DL
POTASSIUM SERPL-SCNC: 4.6 MMOL/L
PROT SERPL-MCNC: 6.7 G/DL
SODIUM SERPL-SCNC: 136 MMOL/L

## 2020-02-19 ENCOUNTER — RX CHANGE (OUTPATIENT)
Age: 80
End: 2020-02-19

## 2020-02-21 ENCOUNTER — INPATIENT (INPATIENT)
Facility: HOSPITAL | Age: 80
LOS: 13 days | Discharge: HOME | DRG: 264 | End: 2020-03-06
Attending: HOSPITALIST | Admitting: INTERNAL MEDICINE
Payer: MEDICARE

## 2020-02-21 ENCOUNTER — APPOINTMENT (OUTPATIENT)
Dept: CARDIOLOGY | Facility: CLINIC | Age: 80
End: 2020-02-21
Payer: MEDICARE

## 2020-02-21 VITALS
DIASTOLIC BLOOD PRESSURE: 53 MMHG | OXYGEN SATURATION: 99 % | HEIGHT: 64 IN | HEART RATE: 44 BPM | WEIGHT: 171.96 LBS | SYSTOLIC BLOOD PRESSURE: 112 MMHG | RESPIRATION RATE: 18 BRPM

## 2020-02-21 VITALS
DIASTOLIC BLOOD PRESSURE: 40 MMHG | SYSTOLIC BLOOD PRESSURE: 94 MMHG | HEART RATE: 46 BPM | OXYGEN SATURATION: 96 % | HEIGHT: 64 IN | BODY MASS INDEX: 30.05 KG/M2 | WEIGHT: 176 LBS

## 2020-02-21 DIAGNOSIS — Z96.0 PRESENCE OF UROGENITAL IMPLANTS: Chronic | ICD-10-CM

## 2020-02-21 DIAGNOSIS — I50.9 HEART FAILURE, UNSPECIFIED: ICD-10-CM

## 2020-02-21 LAB
ALBUMIN SERPL ELPH-MCNC: 3.9 G/DL
ALBUMIN SERPL ELPH-MCNC: 4.1 G/DL — SIGNIFICANT CHANGE UP (ref 3.3–5.2)
ALP BLD-CCNC: 74 U/L
ALP SERPL-CCNC: 74 U/L — SIGNIFICANT CHANGE UP (ref 40–120)
ALT FLD-CCNC: 27 U/L — SIGNIFICANT CHANGE UP
ALT SERPL-CCNC: 29 U/L
ANION GAP SERPL CALC-SCNC: 19 MMOL/L
ANION GAP SERPL CALC-SCNC: 20 MMOL/L — HIGH (ref 5–17)
APPEARANCE UR: CLEAR — SIGNIFICANT CHANGE UP
APTT BLD: 35.3 SEC — SIGNIFICANT CHANGE UP (ref 27.5–36.3)
AST SERPL-CCNC: 23 U/L
AST SERPL-CCNC: 26 U/L — SIGNIFICANT CHANGE UP
BACTERIA # UR AUTO: NEGATIVE — SIGNIFICANT CHANGE UP
BASOPHILS # BLD AUTO: 0.02 K/UL
BASOPHILS # BLD AUTO: 0.02 K/UL — SIGNIFICANT CHANGE UP (ref 0–0.2)
BASOPHILS # BLD AUTO: 0.02 K/UL — SIGNIFICANT CHANGE UP (ref 0–0.2)
BASOPHILS NFR BLD AUTO: 0.4 %
BASOPHILS NFR BLD AUTO: 0.4 % — SIGNIFICANT CHANGE UP (ref 0–2)
BASOPHILS NFR BLD AUTO: 0.5 % — SIGNIFICANT CHANGE UP (ref 0–2)
BILIRUB SERPL-MCNC: 0.2 MG/DL
BILIRUB SERPL-MCNC: <0.2 MG/DL — LOW (ref 0.4–2)
BILIRUB UR-MCNC: NEGATIVE — SIGNIFICANT CHANGE UP
BLD GP AB SCN SERPL QL: SIGNIFICANT CHANGE UP
BUN SERPL-MCNC: 136 MG/DL
BUN SERPL-MCNC: 136 MG/DL — HIGH (ref 8–20)
CALCIUM SERPL-MCNC: 8.4 MG/DL
CALCIUM SERPL-MCNC: 8.6 MG/DL — SIGNIFICANT CHANGE UP (ref 8.6–10.2)
CHLORIDE SERPL-SCNC: 93 MMOL/L — LOW (ref 98–107)
CHLORIDE SERPL-SCNC: 95 MMOL/L
CO2 SERPL-SCNC: 22 MMOL/L
CO2 SERPL-SCNC: 22 MMOL/L — SIGNIFICANT CHANGE UP (ref 22–29)
COLOR SPEC: YELLOW — SIGNIFICANT CHANGE UP
CREAT SERPL-MCNC: 3.93 MG/DL
CREAT SERPL-MCNC: 4 MG/DL — HIGH (ref 0.5–1.3)
DIFF PNL FLD: NEGATIVE — SIGNIFICANT CHANGE UP
EOSINOPHIL # BLD AUTO: 0.05 K/UL — SIGNIFICANT CHANGE UP (ref 0–0.5)
EOSINOPHIL # BLD AUTO: 0.07 K/UL
EOSINOPHIL # BLD AUTO: 0.07 K/UL — SIGNIFICANT CHANGE UP (ref 0–0.5)
EOSINOPHIL NFR BLD AUTO: 1.3 %
EOSINOPHIL NFR BLD AUTO: 1.3 % — SIGNIFICANT CHANGE UP (ref 0–6)
EOSINOPHIL NFR BLD AUTO: 1.3 % — SIGNIFICANT CHANGE UP (ref 0–6)
EPI CELLS # UR: NEGATIVE — SIGNIFICANT CHANGE UP
GLUCOSE BLDC GLUCOMTR-MCNC: 184 MG/DL — HIGH (ref 70–99)
GLUCOSE SERPL-MCNC: 76 MG/DL — SIGNIFICANT CHANGE UP (ref 70–99)
GLUCOSE SERPL-MCNC: 97 MG/DL
GLUCOSE UR QL: NEGATIVE MG/DL — SIGNIFICANT CHANGE UP
HCT VFR BLD CALC: 24.6 % — LOW (ref 39–50)
HCT VFR BLD CALC: 26.7 %
HCT VFR BLD CALC: 28 % — LOW (ref 39–50)
HGB BLD-MCNC: 7.8 G/DL — LOW (ref 13–17)
HGB BLD-MCNC: 8.6 G/DL
HGB BLD-MCNC: 8.8 G/DL — LOW (ref 13–17)
IMM GRANULOCYTES NFR BLD AUTO: 0.2 % — SIGNIFICANT CHANGE UP (ref 0–1.5)
IMM GRANULOCYTES NFR BLD AUTO: 0.3 % — SIGNIFICANT CHANGE UP (ref 0–1.5)
IMM GRANULOCYTES NFR BLD AUTO: 0.4 %
INR BLD: 1.21 RATIO — HIGH (ref 0.88–1.16)
KETONES UR-MCNC: NEGATIVE — SIGNIFICANT CHANGE UP
LACTATE BLDV-MCNC: 0.6 MMOL/L — SIGNIFICANT CHANGE UP (ref 0.5–2)
LEUKOCYTE ESTERASE UR-ACNC: ABNORMAL
LIDOCAIN IGE QN: 21 U/L — LOW (ref 22–51)
LYMPHOCYTES # BLD AUTO: 0.51 K/UL
LYMPHOCYTES # BLD AUTO: 0.52 K/UL — LOW (ref 1–3.3)
LYMPHOCYTES # BLD AUTO: 0.58 K/UL — LOW (ref 1–3.3)
LYMPHOCYTES # BLD AUTO: 10.9 % — LOW (ref 13–44)
LYMPHOCYTES # BLD AUTO: 13.6 % — SIGNIFICANT CHANGE UP (ref 13–44)
LYMPHOCYTES NFR BLD AUTO: 9.8 %
MAGNESIUM SERPL-MCNC: 2.5 MG/DL
MAN DIFF?: NORMAL
MCHC RBC-ENTMCNC: 26.1 PG — LOW (ref 27–34)
MCHC RBC-ENTMCNC: 26.3 PG — LOW (ref 27–34)
MCHC RBC-ENTMCNC: 26.7 PG
MCHC RBC-ENTMCNC: 31.4 GM/DL — LOW (ref 32–36)
MCHC RBC-ENTMCNC: 31.7 GM/DL — LOW (ref 32–36)
MCHC RBC-ENTMCNC: 32.2 GM/DL
MCV RBC AUTO: 82.8 FL — SIGNIFICANT CHANGE UP (ref 80–100)
MCV RBC AUTO: 82.9 FL
MCV RBC AUTO: 83.1 FL — SIGNIFICANT CHANGE UP (ref 80–100)
MONOCYTES # BLD AUTO: 0.46 K/UL — SIGNIFICANT CHANGE UP (ref 0–0.9)
MONOCYTES # BLD AUTO: 0.56 K/UL — SIGNIFICANT CHANGE UP (ref 0–0.9)
MONOCYTES # BLD AUTO: 0.65 K/UL
MONOCYTES NFR BLD AUTO: 10.5 % — SIGNIFICANT CHANGE UP (ref 2–14)
MONOCYTES NFR BLD AUTO: 12 % — SIGNIFICANT CHANGE UP (ref 2–14)
MONOCYTES NFR BLD AUTO: 12.5 %
NEUTROPHILS # BLD AUTO: 2.76 K/UL — SIGNIFICANT CHANGE UP (ref 1.8–7.4)
NEUTROPHILS # BLD AUTO: 3.93 K/UL
NEUTROPHILS # BLD AUTO: 4.09 K/UL — SIGNIFICANT CHANGE UP (ref 1.8–7.4)
NEUTROPHILS NFR BLD AUTO: 72.3 % — SIGNIFICANT CHANGE UP (ref 43–77)
NEUTROPHILS NFR BLD AUTO: 75.6 %
NEUTROPHILS NFR BLD AUTO: 76.7 % — SIGNIFICANT CHANGE UP (ref 43–77)
NITRITE UR-MCNC: NEGATIVE — SIGNIFICANT CHANGE UP
NT-PROBNP SERPL-MCNC: ABNORMAL PG/ML
PH UR: 5 — SIGNIFICANT CHANGE UP (ref 5–8)
PLATELET # BLD AUTO: 106 K/UL — LOW (ref 150–400)
PLATELET # BLD AUTO: 112 K/UL — LOW (ref 150–400)
PLATELET # BLD AUTO: 113 K/UL
POTASSIUM SERPL-MCNC: 4.2 MMOL/L — SIGNIFICANT CHANGE UP (ref 3.5–5.3)
POTASSIUM SERPL-SCNC: 4.2 MMOL/L — SIGNIFICANT CHANGE UP (ref 3.5–5.3)
POTASSIUM SERPL-SCNC: 4.5 MMOL/L
PROT SERPL-MCNC: 6.6 G/DL
PROT SERPL-MCNC: 6.7 G/DL — SIGNIFICANT CHANGE UP (ref 6.6–8.7)
PROT UR-MCNC: 15 MG/DL
PROTHROM AB SERPL-ACNC: 13.7 SEC — HIGH (ref 10–12.9)
RBC # BLD: 2.97 M/UL — LOW (ref 4.2–5.8)
RBC # BLD: 3.22 M/UL
RBC # BLD: 3.37 M/UL — LOW (ref 4.2–5.8)
RBC # FLD: 16.9 % — HIGH (ref 10.3–14.5)
RBC # FLD: 17.1 % — HIGH (ref 10.3–14.5)
RBC # FLD: 17.2 %
RBC CASTS # UR COMP ASSIST: NEGATIVE /HPF — SIGNIFICANT CHANGE UP (ref 0–4)
SODIUM SERPL-SCNC: 135 MMOL/L — SIGNIFICANT CHANGE UP (ref 135–145)
SODIUM SERPL-SCNC: 136 MMOL/L
SP GR SPEC: 1.01 — SIGNIFICANT CHANGE UP (ref 1.01–1.02)
TROPONIN T SERPL-MCNC: 0.04 NG/ML — SIGNIFICANT CHANGE UP (ref 0–0.06)
UROBILINOGEN FLD QL: NEGATIVE MG/DL — SIGNIFICANT CHANGE UP
WBC # BLD: 3.82 K/UL — SIGNIFICANT CHANGE UP (ref 3.8–10.5)
WBC # BLD: 5.33 K/UL — SIGNIFICANT CHANGE UP (ref 3.8–10.5)
WBC # FLD AUTO: 3.82 K/UL — SIGNIFICANT CHANGE UP (ref 3.8–10.5)
WBC # FLD AUTO: 5.2 K/UL
WBC # FLD AUTO: 5.33 K/UL — SIGNIFICANT CHANGE UP (ref 3.8–10.5)
WBC UR QL: SIGNIFICANT CHANGE UP

## 2020-02-21 PROCEDURE — 99214 OFFICE O/P EST MOD 30 MIN: CPT | Mod: 25

## 2020-02-21 PROCEDURE — 93000 ELECTROCARDIOGRAM COMPLETE: CPT

## 2020-02-21 PROCEDURE — 71045 X-RAY EXAM CHEST 1 VIEW: CPT | Mod: 26

## 2020-02-21 PROCEDURE — 99223 1ST HOSP IP/OBS HIGH 75: CPT

## 2020-02-21 PROCEDURE — 99285 EMERGENCY DEPT VISIT HI MDM: CPT

## 2020-02-21 PROCEDURE — 93010 ELECTROCARDIOGRAM REPORT: CPT

## 2020-02-21 RX ORDER — INSULIN LISPRO 100/ML
VIAL (ML) SUBCUTANEOUS
Refills: 0 | Status: DISCONTINUED | OUTPATIENT
Start: 2020-02-21 | End: 2020-02-28

## 2020-02-21 RX ORDER — TAMSULOSIN HYDROCHLORIDE 0.4 MG/1
0.4 CAPSULE ORAL AT BEDTIME
Refills: 0 | Status: DISCONTINUED | OUTPATIENT
Start: 2020-02-21 | End: 2020-02-28

## 2020-02-21 RX ORDER — PANTOPRAZOLE SODIUM 20 MG/1
40 TABLET, DELAYED RELEASE ORAL EVERY 12 HOURS
Refills: 0 | Status: DISCONTINUED | OUTPATIENT
Start: 2020-02-21 | End: 2020-02-23

## 2020-02-21 RX ORDER — CARVEDILOL PHOSPHATE 80 MG/1
25 CAPSULE, EXTENDED RELEASE ORAL EVERY 12 HOURS
Refills: 0 | Status: DISCONTINUED | OUTPATIENT
Start: 2020-02-21 | End: 2020-02-22

## 2020-02-21 RX ORDER — ERGOCALCIFEROL 1.25 MG/1
50000 CAPSULE ORAL
Refills: 0 | Status: DISCONTINUED | OUTPATIENT
Start: 2020-02-21 | End: 2020-02-28

## 2020-02-21 RX ORDER — ISOSORBIDE DINITRATE 5 MG/1
10 TABLET ORAL THREE TIMES A DAY
Refills: 0 | Status: DISCONTINUED | OUTPATIENT
Start: 2020-02-21 | End: 2020-02-28

## 2020-02-21 RX ORDER — DEXTROSE 50 % IN WATER 50 %
25 SYRINGE (ML) INTRAVENOUS ONCE
Refills: 0 | Status: DISCONTINUED | OUTPATIENT
Start: 2020-02-21 | End: 2020-02-28

## 2020-02-21 RX ORDER — FUROSEMIDE 40 MG
40 TABLET ORAL DAILY
Refills: 0 | Status: DISCONTINUED | OUTPATIENT
Start: 2020-02-21 | End: 2020-02-21

## 2020-02-21 RX ORDER — DEXTROSE 50 % IN WATER 50 %
12.5 SYRINGE (ML) INTRAVENOUS ONCE
Refills: 0 | Status: DISCONTINUED | OUTPATIENT
Start: 2020-02-21 | End: 2020-02-28

## 2020-02-21 RX ORDER — GLUCAGON INJECTION, SOLUTION 0.5 MG/.1ML
1 INJECTION, SOLUTION SUBCUTANEOUS ONCE
Refills: 0 | Status: DISCONTINUED | OUTPATIENT
Start: 2020-02-21 | End: 2020-02-28

## 2020-02-21 RX ORDER — SODIUM BICARBONATE 1 MEQ/ML
650 SYRINGE (ML) INTRAVENOUS
Refills: 0 | Status: DISCONTINUED | OUTPATIENT
Start: 2020-02-21 | End: 2020-02-27

## 2020-02-21 RX ORDER — ACETAMINOPHEN 500 MG
650 TABLET ORAL EVERY 6 HOURS
Refills: 0 | Status: DISCONTINUED | OUTPATIENT
Start: 2020-02-21 | End: 2020-02-28

## 2020-02-21 RX ORDER — AMLODIPINE BESYLATE 2.5 MG/1
10 TABLET ORAL DAILY
Refills: 0 | Status: DISCONTINUED | OUTPATIENT
Start: 2020-02-21 | End: 2020-02-28

## 2020-02-21 RX ORDER — SODIUM CHLORIDE 9 MG/ML
1000 INJECTION, SOLUTION INTRAVENOUS
Refills: 0 | Status: DISCONTINUED | OUTPATIENT
Start: 2020-02-21 | End: 2020-02-28

## 2020-02-21 RX ORDER — HYDRALAZINE HCL 50 MG
50 TABLET ORAL THREE TIMES A DAY
Refills: 0 | Status: DISCONTINUED | OUTPATIENT
Start: 2020-02-21 | End: 2020-02-28

## 2020-02-21 RX ORDER — DOXAZOSIN MESYLATE 4 MG
4 TABLET ORAL AT BEDTIME
Refills: 0 | Status: DISCONTINUED | OUTPATIENT
Start: 2020-02-21 | End: 2020-02-28

## 2020-02-21 RX ORDER — DEXTROSE 50 % IN WATER 50 %
15 SYRINGE (ML) INTRAVENOUS ONCE
Refills: 0 | Status: DISCONTINUED | OUTPATIENT
Start: 2020-02-21 | End: 2020-02-28

## 2020-02-21 RX ADMIN — Medication 650 MILLIGRAM(S): at 23:02

## 2020-02-21 RX ADMIN — Medication 4 MILLIGRAM(S): at 23:02

## 2020-02-21 RX ADMIN — Medication 50 MILLIGRAM(S): at 23:02

## 2020-02-21 RX ADMIN — Medication 2: at 23:05

## 2020-02-21 RX ADMIN — TAMSULOSIN HYDROCHLORIDE 0.4 MILLIGRAM(S): 0.4 CAPSULE ORAL at 23:02

## 2020-02-21 RX ADMIN — ISOSORBIDE DINITRATE 10 MILLIGRAM(S): 5 TABLET ORAL at 23:06

## 2020-02-21 RX ADMIN — PANTOPRAZOLE SODIUM 40 MILLIGRAM(S): 20 TABLET, DELAYED RELEASE ORAL at 18:37

## 2020-02-21 NOTE — H&P ADULT - HISTORY OF PRESENT ILLNESS
78 yo male, PMH of HFpEF, CKD4, HTN, DM2 on insulin, advanced dementia, prior GIBs, presents today sent in by Cardiology for evaluation of concern for GI bleeding in setting of HFpEF exacerbation. Patient's wife, who is primary historian given patient's demented state, says that for last few days, patient has had multiple episodes of dark tarry stools, wife uncertain of quantity of them due to patient being incontinent and wife not actively observing. She contacted cardiology (Dr Faustin) for concern of his increased dyspnea and leg swelling who advised presenting for evaluation then when wife took patient to office, was advised to go to ER for evaluation given the concern of bleeding. Per wife, patient always denies any complaints and always says that he is fine.     In ER: noted to have worsening renal function, renal consulted with no plan for urgent HD. Cardio consulted, noted to have improved cardiomems with plans to pull back on aggressive diuresis given worsened renal function.

## 2020-02-21 NOTE — ED ADULT TRIAGE NOTE - CHIEF COMPLAINT QUOTE
Pt's wife reports "we were sent here because they think he's internally bleeding", pt reports having black stool "for weeks", pt recently discharged on 1/9 for CHF, denies pain, denies SOB at this time but wife reports "he can't breathe at night"

## 2020-02-21 NOTE — H&P ADULT - NSHPLABSRESULTS_GEN_ALL_CORE
02-21    135  |  93<L>  |  136.0<H>  ----------------------------<  76  4.2   |  22.0  |  4.00<H>    Ca    8.6      21 Feb 2020 12:37    TPro  6.7  /  Alb  4.1  /  TBili  <0.2<L>  /  DBili  x   /  AST  26  /  ALT  27  /  AlkPhos  74  02-21                          8.8    5.33  )-----------( 112      ( 21 Feb 2020 12:37 )             28.0    < from: Xray Chest 1 View- PORTABLE-Urgent (02.21.20 @ 13:04) >    INTERPRETATION:  CHEST AP PORTABLE:    History: sob.     Date and time of exam: 2/21/2020 12:43 PM.    Technique: A single AP view of the chest was obtained.    Comparison exam: 1/7/2020.    Findings:  There are small bilateral pleural effusions. The heart is enlarged. A loop recorder is noted. No hilar or mediastinal abnormality. No evidence of pulmonary vascular congestion on the current study. No evidence of infiltrate. There are degenerative changes in the spine..    Impression:  Small bilateral pleural effusions..    < end of copied text >

## 2020-02-21 NOTE — ED PROVIDER NOTE - OBJECTIVE STATEMENT
78 yo male pmh dementia, ammon, diabetes, htn , gi bleed brought to ed with increasing shortness of breath; pt followed by cardiology on MEMS  with increasing creatinine;  pt also with black stool as per wife who also noted patient takes iron for anemia

## 2020-02-21 NOTE — CONSULT NOTE ADULT - ATTENDING COMMENTS
Patient seen and examined.  Wife at bedside who provides most of the history.   A&P amended above.   Will follow.    Thank you for allowing me to participate in your patient's care.

## 2020-02-21 NOTE — CONSULT NOTE ADULT - SUBJECTIVE AND OBJECTIVE BOX
[facility] DIVISION OF KIDNEY DISEASES AND HYPERTENSION -- INITIAL CONSULT NOTE  --------------------------------------------------------------------------------  HPI:  79 year old M with PMH of CKD-4, HTN, chronic diastolic CHF, BPH and moderate-severe AS, recent admission for  R pleural effusion, s/p chest tube placement (removed on 01/07) ; pt also received 3 HD sessions during last admission. He had a Cardiomems placement on 1/3 and discahrged home on maintenance diuretics. Pt sent from cards clinic today for concern for GIB.   Pt seen and examined in ED with wife at bedside. Pt states he feels well, no acute complaint offered. Wife reports pt was having black stools for the past few days.       PAST HISTORY  --------------------------------------------------------------------------------  PAST MEDICAL & SURGICAL HISTORY:  Hyperkalemia  ADI (acute kidney injury)  BPH (benign prostatic hyperplasia)  CKD (chronic kidney disease)  Hyperlipemia  Hypertension  Diabetes  Ureteral stent retained    FAMILY HISTORY:  No pertinent family history in first degree relatives    PAST SOCIAL HISTORY:      ALLERGIES & MEDICATIONS  --------------------------------------------------------------------------------  Allergies ; No Known Allergies    Standing Inpatient Medications    PRN Inpatient Medications      REVIEW OF SYSTEMS  --------------------------------------------------------------------------------  Gen: No weight changes, fatigue, fevers/chills, weakness  Skin: No rashes  Head/Eyes/Ears/Mouth: No headache; Normal hearing; Normal vision w/o blurriness; No sinus pain/discomfort, sore throat  Respiratory: No dyspnea, cough, wheezing, hemoptysis  CV: No chest pain, PND, orthopnea  GI: No abdominal pain, diarrhea, constipation, nausea, vomiting, melena+  : No increased frequency, dysuria, hematuria, nocturia  MSK: No joint pain/swelling; no back pain; no edema  Neuro: No dizziness/lightheadedness, weakness, seizures, numbness, tingling  Heme: No easy bruising or bleeding  Endo: No heat/cold intolerance  Psych: No significant nervousness, anxiety, stress, depression    All other systems were reviewed and are negative, except as noted.    VITALS/PHYSICAL EXAM  --------------------------------------------------------------------------------  T(C): --  HR: 44 (02-21-20 @ 11:34) (44 - 44)  BP: 112/53 (02-21-20 @ 11:34) (112/53 - 112/53)  RR: 18 (02-21-20 @ 11:34) (18 - 18)  SpO2: 99% (02-21-20 @ 11:34) (99% - 99%)  Wt(kg): --  Height (cm): 162.56 (02-21-20 @ 11:34)  Weight (kg): 78 (02-21-20 @ 11:34)  BMI (kg/m2): 29.5 (02-21-20 @ 11:34)  BSA (m2): 1.83 (02-21-20 @ 11:34)      Physical Exam:  	Gen: NAD, lying flat on bed  	HEENT: supple neck  	Pulm: CTA B/L  	CV: RRR, S1S2; no rub  	Back: No spinal or CVA tenderness; no sacral edema  	Abd: +BS, soft, nontender/nondistended  	: No suprapubic tenderness  	UE: Warm, no edema; no asterixis  	LE: Warm,  no edema  	Neuro: No focal deficits  	Psych: Normal affect and mood  	Skin: Warm, without rashes      LABS/STUDIES  --------------------------------------------------------------------------------              8.8    5.33  >-----------<  112      [02-21-20 @ 12:37]              28.0     135  |  93  |  136.0  ----------------------------<  76      [02-21-20 @ 12:37]  4.2   |  22.0  |  4.00        Ca     8.6     [02-21-20 @ 12:37]    TPro  6.7  /  Alb  4.1  /  TBili  <0.2  /  DBili  x   /  AST  26  /  ALT  27  /  AlkPhos  74  [02-21-20 @ 12:37]    PT/INR: PT 13.7 , INR 1.21       [02-21-20 @ 12:37]  PTT: 35.3       [02-21-20 @ 12:37]    Troponin 0.04      [02-21-20 @ 12:37]    Creatinine Trend:  SCr 4.00 [02-21 @ 12:37]    Urinalysis - [12-28-19 @ 16:39]      Color Yellow / Appearance Cloudy / SG 1.010 / pH 5.0      Gluc Negative / Ketone Trace  / Bili Negative / Urobili Negative       Blood Large / Protein 100 / Leuk Est Small / Nitrite Negative      RBC >50 / WBC 6-10 / Hyaline  / Gran  / Sq Epi  / Non Sq Epi  / Bacteria Few      Iron 25, TIBC 285, %sat 9      [12-11-19 @ 17:50]  Ferritin 25      [12-11-19 @ 17:50]  PTH -- (Ca 8.3)      [12-12-19 @ 19:36]   164  Vitamin D (25OH) 20.3      [12-12-19 @ 19:36]  HbA1c 8.1      [12-11-19 @ 06:47]  TSH 6.54      [12-25-19 @ 21:00]    HBsAb <3.0      [12-31-19 @ 08:48]  HBsAb Nonreact      [12-31-19 @ 08:48]  HBsAg Nonreact      [12-31-19 @ 08:48]  HBcAb Nonreact      [12-31-19 @ 08:48]  HCV 0.06, Nonreact      [12-31-19 @ 08:48]

## 2020-02-21 NOTE — ED PROVIDER NOTE - PMH
ADI (acute kidney injury)    BPH (benign prostatic hyperplasia)    CKD (chronic kidney disease)    Diabetes    Hyperkalemia    Hyperlipemia    Hypertension
ADI (acute kidney injury)    BPH (benign prostatic hyperplasia)    CKD (chronic kidney disease)    Diabetes    Hyperkalemia    Hyperlipemia    Hypertension

## 2020-02-21 NOTE — H&P ADULT - NSHPPHYSICALEXAM_GEN_ALL_CORE
OBJECTIVE:  VS:   Vital Signs Last 24 Hrs  T(C): 36.4 (21 Feb 2020 16:41), Max: 36.4 (21 Feb 2020 16:41)  T(F): 97.5 (21 Feb 2020 16:41), Max: 97.5 (21 Feb 2020 16:41)  HR: 45 (21 Feb 2020 16:41) (44 - 45)  BP: 112/70 (21 Feb 2020 16:41) (112/53 - 112/70)  RR: 17 (21 Feb 2020 16:41) (17 - 18)  SpO2: 98% (21 Feb 2020 16:41) (98% - 99%)    Physical Exam:   Gen: elderly male, laying in stretcher, NAD, wife at bedside  HEENT: NCAT, EOMI, PERRLA, Pupils reactive 4mm to 3mm. moist mucous membranes.   Neck: no carotid bruits appreciated.   CVS: crystal rate, regular Rhythm, +S1/S2, no murmurs, rubs or gallops appreciated  Pulm: trace b/l bibasilar fine crackles noted.   GI: +BS, soft, Nontender, noted distended. no CVA tenderness  MSK/Ext: No cyanosis or calf tenderness. 2+ pitting b/l lower extremity edema. 2+ DP pulses b/l.   Neuro: AAOx2 to self and place, not to time or situation. No other focal deficits. gait not assessed.   Skin: Warm dry without any notable rashes.

## 2020-02-21 NOTE — CONSULT NOTE ADULT - ASSESSMENT
A/P:  78yo male with PMH pAF no AC d/t hx of anemia, non-obstructive CAD, chronic bradycardia, ILR placed 1/2016, Chronic Diastolic HFpEF (12/2019 EF 60-65%), Gr. II DD, Cardiomems (placed 1/2020) PA diastolic goal of 16, mod to severe AS, CKD 4, HTN, HLD, b/l carotid bruit, hyperkalemia, IDDMII, dementia.  HPI primarily obtained from wife Angela ([phone]), pt extremely soft spoken, able to nod yes or no for subjective input.  Per wife monday she reported pt was having 1week of dark tarry stools (incontinent) which has been new despite ferrous sulfate, abd distention, and a 7lb weight gain (to 175lbs).  Cardiomem was 24. Office had increased Torsemide to 40mg BID and added Metolazone 5mg QD x2days.  Yesterday Took Torsemide 40mg in the am and 60mg in the pm.  Today reports improvement in edema, weight down to 171lbs, cardiomems now 19.  Office sent in for eval of GIB and fluid overload.  has chronic orthopnea requiring 3pillows to sleep. Denies chest pain/pressure, palpitations, irregular and/or rapid heart beat, syncope/near syncope, dizziness, PND, cough, n/v/d, hematuria.    1. chronic diastolic HFpEF  - edema improving, weight loss since monday  - telemetry monitoring  - given Cr 4, cardiomem 19, maintain fluid balance w/Torsemide 20mg BID  - Strict I/O and daily standing weights (if possible)  - Keep K > 4, Mg > 2    - Monitor renal function with ongoing diuresis     2. CKD 4  - nephrology following    3. GIB  - H/H stable compared to baseline  - recommend trend H/H  - w/u per PMT    Preliminary evaluation, please await complete evaluation by Dr. Salmon A/P:  80yo male with PMH pAF no AC d/t hx of anemia, non-obstructive CAD, chronic bradycardia, ILR placed 1/2016, Chronic Diastolic HFpEF (12/2019 EF 60-65%), Gr. II DD, Cardiomems (placed 1/2020) PA diastolic goal of 16, mod to severe AS, CKD 4, HTN, HLD, b/l carotid bruit, hyperkalemia, IDDMII, dementia.  HPI primarily obtained from wife Angela ([phone]), pt extremely soft spoken, able to nod yes or no for subjective input.  Per wife monday she reported pt was having 1week of dark tarry stools (incontinent) which has been new despite ferrous sulfate, abd distention, and a 7lb weight gain (to 175lbs).  Cardiomem was 24. Office had increased Torsemide to 40mg BID and added Metolazone 5mg QD x2days.  Yesterday Took Torsemide 40mg in the am and 60mg in the pm.  Today reports improvement in edema, weight down to 171lbs, cardiomems now 19.  Office sent in for eval of GIB and fluid overload.  has chronic orthopnea requiring 3pillows to sleep. Denies chest pain/pressure, palpitations, irregular and/or rapid heart beat, syncope/near syncope, dizziness, PND, cough, n/v/d, hematuria.    1. chronic diastolic HFpEF  - edema improving, weight loss since monday  - telemetry monitoring  - given Cr 4, cardiomem PAD 19 - mildly elevated, maintain fluid balance w/Torsemide 20mg BID (backing off on diuretic due to worsening renal function)  - Strict I/O and daily standing weights (if possible)  - Keep K > 4, Mg > 2    - Monitor renal function with ongoing diuresis     2. CKD 4  - nephrology following  - acutely worsened renal function likely pre-renal from diuresis    3. tarry stools - unclear if change from baseline (on iron tabs)  - H/H stable compared to baseline; - high BUN suspicious for acute GI bleed  - recommend trend H/H

## 2020-02-21 NOTE — H&P ADULT - ASSESSMENT
78 yo male, PMH chronic HFpEF, ckd4, Dm2, htn, presenting for evaluation of dark tarry stools in setting of exacerbation diastolic HFpEF for which patient to be admitted.      Admit to medicine, Dr. Leggett  Telemetry bed  ambulate as tolerated  vitals per routine   DASH/TLC diet with fluid restrict 1200cc       Melanotic Tarry Stools, r/o acute GI blood  FOBT ordered and pending   H/H stable from prior admission but BUN noted elevated suspicious for GIB as cause.   Will trend H/H in AM and if noted to be dropping or if FOBT positive, will consult GI  Patient is on iron so unclear if due to persistent iron intake   Type/Screen ordered   Protonix 40mg iv daily     Chronic diastolic HFpEF  Cardiology Consulted and recs appreciated  Noted to have edema improving  Daily weights, fluid restriction 1200cc daily   c/w diuresis torsemide 20mg bid given worsening renal function from prior baseline, had previously been aggressively diuresed in outpatient.   c/w home meds, Coreg, Isordil, hydralyzine     ADI on CKD 4  Nephrology consulted and following  Acutely worsened renal function likely pre-renal from diuresis  No active plans to diurese at this time  sodium bicarb 650 bid      HTN  c/w home medications  norvasc 10mg po daily     BPH  flomax daily     DVT prophylaxis:  VCD boots in setting of possibly GI bleed    LOS expected 2-3 days 80 yo male, PMH chronic HFpEF, ckd4, Dm2, htn, presenting for evaluation of dark tarry stools in setting of exacerbation diastolic HFpEF for which patient to be admitted.      Admit to medicine, Dr. Leggett  Telemetry bed  ambulate as tolerated  vitals per routine   DASH/TLC diet with fluid restrict 1200cc       Melanotic Tarry Stools, r/o acute GI blood  FOBT ordered and pending   H/H stable from prior admission but BUN noted elevated suspicious for GIB as cause.   Will trend H/H in AM and if noted to be dropping or if FOBT positive, will consult GI  Patient is on iron so unclear if due to persistent iron intake   Type/Screen ordered   Protonix 40mg iv daily     Chronic diastolic HFpEF  Cardiology Consulted and recs appreciated  Noted to have edema improving  Daily weights, fluid restriction 1200cc daily   c/w diuresis torsemide 20mg bid given worsening renal function from prior baseline, had previously been aggressively diuresed in outpatient.   c/w home meds, Coreg, Isordil, hydralyzine     ADI on CKD 4  Nephrology consulted and following  Acutely worsened renal function likely pre-renal from diuresis  No active plans to diurese at this time  sodium bicarb 650 bid      HTN  c/w home medications  norvasc 10mg po daily     BPH  flomax daily     DM-2   hold oral meds   ssi     DVT prophylaxis:  VCD boots in setting of possibly GI bleed    LOS expected 2-3 days

## 2020-02-21 NOTE — H&P ADULT - NSHPREVIEWOFSYSTEMS_GEN_ALL_CORE
Denies chest pain, cough, fevers, chills, sputum production, abdominal pain, diarrhea, constipation, dysuria.     +dyspnea, +lower extremity swelling, +tarry stools.

## 2020-02-21 NOTE — CONSULT NOTE ADULT - ASSESSMENT
CKD stage 4   Anemia  CHF ; s/p cardiomems    SCr 3.34 on 01/08--> 4.0 today  BUN elevated disproportionately at 136, ? UGIB   Recommend GI eval  repeat Iron studies  Will give KD  Diuresis as per Cards  No indication for HD   Will follow

## 2020-02-21 NOTE — H&P ADULT - NSHPSOCIALHISTORY_GEN_ALL_CORE
ex smoker, quit several years ago.   ex social drinker  no prior drug use.   lives with wife who is primary care giver.

## 2020-02-21 NOTE — CHART NOTE - NSCHARTNOTEFT_GEN_A_CORE
Called by Rn for pt who is hypothermic 93.4 rectally. VS stable /95, P-50  Hypothermic blanket ordered  cbc                       7.8    3.82  )-----------( 106      ( 21 Feb 2020 23:15 )             24.6

## 2020-02-21 NOTE — H&P ADULT - ATTENDING COMMENTS
patient seen and eval. agree with above.   check FOBT to r/o GIB   patient takes FESO4 at home   low dose lasix   monitor renal function

## 2020-02-21 NOTE — ED STATDOCS - CLINICAL SUMMARY MEDICAL DECISION MAKING FREE TEXT BOX
79yoM; pmh of HTN, BPH, CKD, DM, Hyperkalemia, HLD, HTN,; p/w melena x 1.5 months. Associated w/ diarrhea. Wife reports that for the past 2 weeks, the patient has been defecating on himself without realizing. Patient was at Urgent Care today, and was prompted to come to the ED for further evaluation. According to the Urgent report, the patient had elevated LAP, mild MR and grade 2 diastolic malfunction. Patient has had internal bleeding 3 times in the past, and wife is worried he is having another episode of internal bleeding. Pt denies SOB, but wife states he does feel SOB, and doesn't admit it. Will send to main for further evaluation.   pmh: HTN, BPH, CKD, DM, Hyperkalemia, HLD, HTN  SOCIAL: No tobacco/illicit substance use/socialEtOH

## 2020-02-21 NOTE — ED PROVIDER NOTE - CLINICAL SUMMARY MEDICAL DECISION MAKING FREE TEXT BOX
79yoM; pmh of HTN, BPH, CKD, DM, Hyperkalemia, HLD, HTN,; p/w melena x 1.5 months. Associated w/ diarrhea. Wife reports that for the past 2 weeks, the patient has been defecating on himself without realizing. Patient was at Urgent Care today, and was prompted to come to the ED for further evaluation. According to the Urgent report, the patient had elevated LAP, mild MR and grade 2 diastolic malfunction. Patient has had internal bleeding 3 times in the past, and wife is worried he is having another episode of internal bleeding. Pt denies SOB, but wife states he does feel SOB, and doesn't admit it. Will send to main for further evaluation.   pmh: HTN, BPH, CKD, DM, Hyperkalemia, HLD, HTN  SOCIAL: No tobacco/illicit substance use/socialEtOH
sob with chf renal insufficiency  ; cardiology and renal evaluation

## 2020-02-22 LAB
ANION GAP SERPL CALC-SCNC: 21 MMOL/L — HIGH (ref 5–17)
BUN SERPL-MCNC: 148 MG/DL — HIGH (ref 8–20)
CALCIUM SERPL-MCNC: 8.3 MG/DL — LOW (ref 8.6–10.2)
CHLORIDE SERPL-SCNC: 92 MMOL/L — LOW (ref 98–107)
CO2 SERPL-SCNC: 21 MMOL/L — LOW (ref 22–29)
CREAT SERPL-MCNC: 4.22 MG/DL — HIGH (ref 0.5–1.3)
FERRITIN SERPL-MCNC: 41 NG/ML — SIGNIFICANT CHANGE UP (ref 30–400)
GLUCOSE BLDC GLUCOMTR-MCNC: 110 MG/DL — HIGH (ref 70–99)
GLUCOSE BLDC GLUCOMTR-MCNC: 131 MG/DL — HIGH (ref 70–99)
GLUCOSE BLDC GLUCOMTR-MCNC: 135 MG/DL — HIGH (ref 70–99)
GLUCOSE BLDC GLUCOMTR-MCNC: 154 MG/DL — HIGH (ref 70–99)
GLUCOSE BLDC GLUCOMTR-MCNC: 172 MG/DL — HIGH (ref 70–99)
GLUCOSE BLDC GLUCOMTR-MCNC: 43 MG/DL — CRITICAL LOW (ref 70–99)
GLUCOSE BLDC GLUCOMTR-MCNC: 43 MG/DL — CRITICAL LOW (ref 70–99)
GLUCOSE SERPL-MCNC: 141 MG/DL — HIGH (ref 70–99)
HCT VFR BLD CALC: 26.1 % — LOW (ref 39–50)
HGB BLD-MCNC: 8.6 G/DL — LOW (ref 13–17)
IRON SATN MFR SERPL: 23 UG/DL — LOW (ref 59–158)
IRON SATN MFR SERPL: 7 % — LOW (ref 16–55)
MCHC RBC-ENTMCNC: 27.3 PG — SIGNIFICANT CHANGE UP (ref 27–34)
MCHC RBC-ENTMCNC: 33 GM/DL — SIGNIFICANT CHANGE UP (ref 32–36)
MCV RBC AUTO: 82.9 FL — SIGNIFICANT CHANGE UP (ref 80–100)
OB PNL STL: NEGATIVE — SIGNIFICANT CHANGE UP
PLATELET # BLD AUTO: 101 K/UL — LOW (ref 150–400)
POTASSIUM SERPL-MCNC: 4.5 MMOL/L — SIGNIFICANT CHANGE UP (ref 3.5–5.3)
POTASSIUM SERPL-SCNC: 4.5 MMOL/L — SIGNIFICANT CHANGE UP (ref 3.5–5.3)
RBC # BLD: 3.15 M/UL — LOW (ref 4.2–5.8)
RBC # FLD: 17.2 % — HIGH (ref 10.3–14.5)
SODIUM SERPL-SCNC: 134 MMOL/L — LOW (ref 135–145)
TIBC SERPL-MCNC: 319 UG/DL — SIGNIFICANT CHANGE UP (ref 220–430)
TRANSFERRIN SERPL-MCNC: 223 MG/DL — SIGNIFICANT CHANGE UP (ref 180–329)
WBC # BLD: 5.16 K/UL — SIGNIFICANT CHANGE UP (ref 3.8–10.5)
WBC # FLD AUTO: 5.16 K/UL — SIGNIFICANT CHANGE UP (ref 3.8–10.5)

## 2020-02-22 PROCEDURE — 74176 CT ABD & PELVIS W/O CONTRAST: CPT | Mod: 26

## 2020-02-22 PROCEDURE — 99233 SBSQ HOSP IP/OBS HIGH 50: CPT

## 2020-02-22 PROCEDURE — 99232 SBSQ HOSP IP/OBS MODERATE 35: CPT

## 2020-02-22 RX ORDER — IRON SUCROSE 20 MG/ML
200 INJECTION, SOLUTION INTRAVENOUS EVERY 24 HOURS
Refills: 0 | Status: COMPLETED | OUTPATIENT
Start: 2020-02-22 | End: 2020-02-26

## 2020-02-22 RX ORDER — AMLODIPINE BESYLATE 2.5 MG/1
10 TABLET ORAL ONCE
Refills: 0 | Status: COMPLETED | OUTPATIENT
Start: 2020-02-22 | End: 2020-02-22

## 2020-02-22 RX ADMIN — Medication 50 MILLIGRAM(S): at 22:50

## 2020-02-22 RX ADMIN — PANTOPRAZOLE SODIUM 40 MILLIGRAM(S): 20 TABLET, DELAYED RELEASE ORAL at 17:57

## 2020-02-22 RX ADMIN — ERGOCALCIFEROL 50000 UNIT(S): 1.25 CAPSULE ORAL at 13:04

## 2020-02-22 RX ADMIN — Medication 2: at 22:53

## 2020-02-22 RX ADMIN — Medication 650 MILLIGRAM(S): at 17:57

## 2020-02-22 RX ADMIN — Medication 20 MILLIGRAM(S): at 06:00

## 2020-02-22 RX ADMIN — ISOSORBIDE DINITRATE 10 MILLIGRAM(S): 5 TABLET ORAL at 14:26

## 2020-02-22 RX ADMIN — PANTOPRAZOLE SODIUM 40 MILLIGRAM(S): 20 TABLET, DELAYED RELEASE ORAL at 05:59

## 2020-02-22 RX ADMIN — ISOSORBIDE DINITRATE 10 MILLIGRAM(S): 5 TABLET ORAL at 22:49

## 2020-02-22 RX ADMIN — Medication 50 MILLIGRAM(S): at 14:26

## 2020-02-22 RX ADMIN — Medication 50 MILLIGRAM(S): at 06:00

## 2020-02-22 RX ADMIN — IRON SUCROSE 110 MILLIGRAM(S): 20 INJECTION, SOLUTION INTRAVENOUS at 18:33

## 2020-02-22 RX ADMIN — AMLODIPINE BESYLATE 10 MILLIGRAM(S): 2.5 TABLET ORAL at 13:04

## 2020-02-22 RX ADMIN — TAMSULOSIN HYDROCHLORIDE 0.4 MILLIGRAM(S): 0.4 CAPSULE ORAL at 22:49

## 2020-02-22 RX ADMIN — Medication 650 MILLIGRAM(S): at 13:03

## 2020-02-22 RX ADMIN — Medication 20 MILLIGRAM(S): at 17:57

## 2020-02-22 RX ADMIN — ISOSORBIDE DINITRATE 10 MILLIGRAM(S): 5 TABLET ORAL at 06:00

## 2020-02-22 RX ADMIN — Medication 4 MILLIGRAM(S): at 22:50

## 2020-02-22 NOTE — PROGRESS NOTE ADULT - SUBJECTIVE AND OBJECTIVE BOX
Vital Signs Last 24 Hrs,    T(C): 36.7 (2020 22:47), Max: 36.7 (2020 22:47)  T(F): 98.1 (2020 22:47), Max: 98.1 (2020 22:47)  HR: 51 (2020 22:47) (51 - 60)  BP: 126/65 (2020 22:47) (107/53 - 126/65)  RR: 18 (2020 22:47) (18 - 18)  SpO2: 97% (2020 22:47) (91% - 97%)    134<L>  |  92<L>  |  148.0<H>  ----------------------------<  141<H>  Ca:8.3<L> (2020 11:11)  4.5     |  21.0<L>  |  4.22<H>    eGFR if Non : 13 <L>  eGFR if : 15 <L>    TPro  6.7    /  Alb  4.1    /  TBili  <0.2<L>  /  DBili  x      /  AST  26     /  ALT  27     /  AlkPhos  74     2020 12:37                        8.6<L>  5.16  )-----------( 101<L>    ( 2020 11:12 )             26.1<L>    Ferritin:41 ng/mL Iron:23 ug/dL<L> TIBC:319 ug/dL Tsat:7 %<L>    Urinalysis Basic - ( 2020 14:31 )  Color: Yellow / Appearance: Clear / S.010 / pH: x  Gluc: x / Ketone: Negative  / Bili: Negative / Urobili: Negative mg/dL   Blood: x / Protein: 15 mg/dL<!> / Nitrite: Negative   Leuk Esterase: Small<!> / RBC: Negative /HPF / WBC 3-5   Sq Epi: x / Non Sq Epi: Negative / Bacteria: Negative    HPI:  79 year old M with PMH of CKD-4, HTN, chronic diastolic CHF, BPH and moderate-severe AS, recent admission for  R pleural effusion, s/p chest tube placement (removed on ) ; pt also received 3 HD sessions during last admission. He had a Cardiomems placement on 1/3 and discahrged home on maintenance diuretics. Pt sent from cards clinic today for concern for GIB.   Pt seen and examined in ED with wife at bedside. Pt states he feels well, no acute complaint offered. Wife reports pt was having black stools for the past few days.     PAST HISTORY  --------------------------------------------------------------------------------  PAST MEDICAL & SURGICAL HISTORY:  Hyperkalemia  ADI (acute kidney injury)  BPH (benign prostatic hyperplasia)  CKD (chronic kidney disease)  Hyperlipemia  Hypertension  Diabetes  Ureteral stent retained    FAMILY HISTORY:  No pertinent family history in first degree relatives    PAST SOCIAL HISTORY:      ALLERGIES & MEDICATIONS  --------------------------------------------------------------------------------  Allergies ; No Known Allergies    Standing Inpatient Medications    PRN Inpatient Medications      REVIEW OF SYSTEMS  --------------------------------------------------------------------------------  Gen: No weight changes, fatigue, fevers/chills, weakness  Skin: No rashes  Head/Eyes/Ears/Mouth: No headache; Normal hearing; Normal vision w/o blurriness; No sinus pain/discomfort, sore throat  Respiratory: No dyspnea, cough, wheezing, hemoptysis  CV: No chest pain, PND, orthopnea  GI: No abdominal pain, diarrhea, constipation, nausea, vomiting, melena+  : No increased frequency, dysuria, hematuria, nocturia  MSK: No joint pain/swelling; no back pain; no edema  Neuro: No dizziness/lightheadedness, weakness, seizures, numbness, tingling  Heme: No easy bruising or bleeding  Endo: No heat/cold intolerance  Psych: No significant nervousness, anxiety, stress, depression    All other systems were reviewed and are negative, except as noted.    VITALS/PHYSICAL EXAM  --------------------------------------------------------------------------------  HR: 44 (20 @ 11:34) (44 - 44)  BP: 112/53 (20 @ 11:34) (112/53 - 112/53)  RR: 18 (20 11:34) (18 - 18)  SpO2: 99% (20:34) (99% - 99%)  Height (cm): 162.56 (20 @ 11:34)  Weight (kg): 78 (20 @ 11:34)  BMI (kg/m2): 29.5 (20 @ :34)  BSA (m2): 1.83 (20 @ 11:34)    Physical Exam:  	Gen: NAD, lying flat on bed  	HEENT: supple neck  	Pulm: CTA B/L  	CV: RRR, S1S2; no rub  	Back: No spinal or CVA tenderness; no sacral edema  	Abd: +BS, soft, nontender/nondistended  	: No suprapubic tenderness  	UE: Warm, no edema; no asterixis  	LE: Warm,  no edema  	Neuro: No focal deficits  	Psych: Normal affect and mood  	Skin: Warm, without rashes    LABS/STUDIES  --------------------------------------------------------------------------------              8.8    5.33  >-----------<  112      [20 @ 12:37]              28.0     135  |  93  |  136.0  ----------------------------<  76      [20 @ 12:37]  4.2   |  22.0  |  4.00        Ca     8.6     [20 12:37]    TPro  6.7  /  Alb  4.1  /  TBili  <0.2  /  DBili  x   /  AST  26  /  ALT  27  /  AlkPhos  74  [20 12:37]    PT/INR: PT 13.7 , INR 1.21       [20 12:37]  PTT: 35.3       [20 12:37]    Troponin 0.04      [20 12:37]    Creatinine Trend:  SCr 4.00 [:37]    Urinalysis - [19 @ 16:39]      Color Yellow / Appearance Cloudy / SG 1.010 / pH 5.0      Gluc Negative / Ketone Trace  / Bili Negative / Urobili Negative       Blood Large / Protein 100 / Leuk Est Small / Nitrite Negative      RBC >50 / WBC 6-10 / Hyaline  / Gran  / Sq Epi  / Non Sq Epi  / Bacteria Few    Iron 25, TIBC 285, %sat 9      [19 @ 17:50]  Ferritin 25      [19 @ 17:50]  PTH -- (Ca 8.3)      [19 19:36]   164  Vitamin D (25OH) 20.3      [19 19:36]  HbA1c 8.1      [19 @ 06:47]  TSH 6.54      [19 @ 21:00]    HBsAb <3.0      [19 @ 08:48]  HBsAb Nonreact      [19 @ 08:48]  HBsAg Nonreact      [19 @ 08:48]  HBcAb Nonreact      [19 @ 08:48]  HCV 0.06, Nonreact      [19 @ 08:48]    DX :  CKD stage 4   Anemia  CHF ; s/p cardiomems  BUN elevated disproportionately at 136 mg., ? UGIB   Recommend GI eval    Will give KD  Diuresis as per Cards  No indication for HD   Will follow

## 2020-02-22 NOTE — PROGRESS NOTE ADULT - SUBJECTIVE AND OBJECTIVE BOX
seen for renal failure, dark stool      no acute complaints/events  no BM per RN  ros limited due to dementia. denies sob/cp/abd pain    telemetry with bradycardia    MEDICATIONS  (STANDING):  amLODIPine   Tablet 10 milliGRAM(s) Oral daily  dextrose 5%. 1000 milliLiter(s) (50 mL/Hr) IV Continuous <Continuous>  dextrose 50% Injectable 12.5 Gram(s) IV Push once  dextrose 50% Injectable 25 Gram(s) IV Push once  dextrose 50% Injectable 25 Gram(s) IV Push once  doxazosin 4 milliGRAM(s) Oral at bedtime  ergocalciferol 48212 Unit(s) Oral every week  hydrALAZINE 50 milliGRAM(s) Oral three times a day  insulin lispro (HumaLOG) corrective regimen sliding scale   SubCutaneous Before meals and at bedtime  iron sucrose IVPB 200 milliGRAM(s) IV Intermittent every 24 hours  isosorbide   dinitrate Tablet (ISORDIL) 10 milliGRAM(s) Oral three times a day  pantoprazole  Injectable 40 milliGRAM(s) IV Push every 12 hours  sodium bicarbonate 650 milliGRAM(s) Oral two times a day  tamsulosin Oral Tab/Cap - Peds 0.4 milliGRAM(s) Oral at bedtime  torsemide 20 milliGRAM(s) Oral two times a day    MEDICATIONS  (PRN):  acetaminophen   Tablet .. 650 milliGRAM(s) Oral every 6 hours PRN Temp greater or equal to 38C (100.4F), Mild Pain (1 - 3)  dextrose 40% Gel 15 Gram(s) Oral once PRN Blood Glucose LESS THAN 70 milliGRAM(s)/deciliter  glucagon  Injectable 1 milliGRAM(s) IntraMuscular once PRN Glucose LESS THAN 70 milligrams/deciliter      Allergies    No Known Allergies    Vital Signs Last 24 Hrs  T(C): 36.4 (2020 10:00), Max: 36.9 (2020 01:28)  T(F): 97.6 (2020 10:00), Max: 98.5 (2020 01:28)  HR: 55 (2020 10:00) (45 - 57)  BP: 122/65 (2020 10:00) (112/70 - 137/70)  BP(mean): --  RR: 18 (2020 10:00) (17 - 20)  SpO2: 91% (2020 10:00) (91% - 98%)    PHYSICAL EXAM:    GENERAL: NAD  CHEST/LUNG: Clear to percussion bilaterally  HEART: Regular rate and rhythm; S1 S2  ABDOMEN: Soft,  Bowel sounds present  EXTREMITIES:  trace edema   NERVOUS SYSTEM: awake, responds appropriately confused ,nonfocal neuro  LABS:                        8.6    5.16  )-----------( 101      ( 2020 11:12 )             26.1     02-22    134<L>  |  92<L>  |  148.0<H>  ----------------------------<  141<H>  4.5   |  21.0<L>  |  4.22<H>    Ca    8.3<L>      2020 11:11    TPro  6.7  /  Alb  4.1  /  TBili  <0.2<L>  /  DBili  x   /  AST  26  /  ALT  27  /  AlkPhos  74  02-21    PT/INR - ( 2020 12:37 )   PT: 13.7 sec;   INR: 1.21 ratio         PTT - ( 2020 12:37 )  PTT:35.3 sec  Urinalysis Basic - ( 2020 14:31 )    Color: Yellow / Appearance: Clear / S.010 / pH: x  Gluc: x / Ketone: Negative  / Bili: Negative / Urobili: Negative mg/dL   Blood: x / Protein: 15 mg/dL / Nitrite: Negative   Leuk Esterase: Small / RBC: Negative /HPF / WBC 3-5   Sq Epi: x / Non Sq Epi: Negative / Bacteria: Negative        CAPILLARY BLOOD GLUCOSE      POCT Blood Glucose.: 135 mg/dL (2020 12:17)  POCT Blood Glucose.: 110 mg/dL (2020 09:12)  POCT Blood Glucose.: 43 mg/dL (2020 08:28)  POCT Blood Glucose.: 43 mg/dL (2020 08:27)  POCT Blood Glucose.: 184 mg/dL (2020 23:04)        RADIOLOGY & ADDITIONAL TESTS:

## 2020-02-22 NOTE — PROGRESS NOTE ADULT - ASSESSMENT
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     CHF diastolic dysfunciton  Telemetry monitoring    back to daily dose of torsemide  20 bid  Cardio mens Pad 19 on admit  mildly elevated  Keep K > 4, Mg > 2    Monitor renal function with ongoing diuresis     CKD  creat pending from today    ANEMIA  iron sat low  iv venofer  ? procrit    TARRY STOOLS  Check stool guaiac

## 2020-02-22 NOTE — PROGRESS NOTE ADULT - ASSESSMENT
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     CHF diastolic dysfunciton  Telemetry monitoring    back to daily dose of torsemide  20 bid  Cardio mens Pad 19 on admit  mildly elevated  Keep K > 4, Mg > 2    Monitor renal function with ongoing diuresis     CKD  creat pending from today    ANEMIA  iron sat low  iv venofer  ? procrit    TARRY STOOLS  Check stool guaia

## 2020-02-22 NOTE — PROGRESS NOTE ADULT - ASSESSMENT
78 yo male, PMH of HFpEF s/p cardiomem, CKD4, HTN, DM2 on insulin, advanced dementia, prior GIB, recent admit for HF requiring chest tube and temporary HD presents to ER for dark stool (although on iron at home) and shortness of breath per wife.  patient is poor historian.       iron deficiency anemia:     h/h stable, venofer    CT abdomen with oral contrast to eval for gross pathology.    c/w PPI BID    GI evaluation subsequently    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    torsemide decreased by cardiology    chronic HFpEF: seems compensated    cardiomem interrogated    torsemide decreased    HTN with bradycardia   HOLD coreg 25mg BID for now    c/w norvasc and hydralazine    restart bb when HR improves     hypothermia: resolved, unclear etiology     no evidence of infection    hypoglycemia: resolved, unclear etiology.    monitor.    palliative consulted

## 2020-02-22 NOTE — PROGRESS NOTE ADULT - SUBJECTIVE AND OBJECTIVE BOX
[facility]  [address] [id number]    REASON FOR VISIT:  Patient s/p RHC and cardiomems placement.   Left pulmonary artery placement of cardiomems.   UPDATE:  Feeling ok  TELEMETRY MONITORING:  sinus rhythmn    ROS:    No fever chills  Cardiac  No cp sob or palp  Resp  no cough no mucus production  Gi  no abd pain no melana  Ext No calf tenderness, no edema      CARDIAC MARKERS ( 21 Feb 2020 12:37 )  x     / 0.04 ng/mL / x     / x     / x        02-22    134<L>  |  92<L>  |  148.0<H>  ----------------------------<  141<H>  4.5   |  21.0<L>  |  4.22<H>    Ca    8.3<L>      22 Feb 2020 11:11    TPro  6.7  /  Alb  4.1  /  TBili  <0.2<L>  /  DBili  x   /  AST  26  /  ALT  27  /  AlkPhos  74  02-21    LIVER FUNCTIONS - ( 21 Feb 2020 12:37 )  Alb: 4.1 g/dL / Pro: 6.7 g/dL / ALK PHOS: 74 U/L / ALT: 27 U/L / AST: 26 U/L / GGT: x             MEDICATIONS  (STANDING):  amLODIPine   Tablet 10 milliGRAM(s) Oral daily  doxazosin 4 milliGRAM(s) Oral at bedtime  ergocalciferol 63221 Unit(s) Oral every week  hydrALAZINE 50 milliGRAM(s) Oral three times a day  insulin lispro (HumaLOG) corrective regimen sliding scale   SubCutaneous Before meals and at bedtime  iron sucrose IVPB 200 milliGRAM(s) IV Intermittent every 24 hours  isosorbide   dinitrate Tablet (ISORDIL) 10 milliGRAM(s) Oral three times a day  pantoprazole  Injectable 40 milliGRAM(s) IV Push every 12 hours  sodium bicarbonate 650 milliGRAM(s) Oral two times a day  tamsulosin Oral Tab/Cap - Peds 0.4 milliGRAM(s) Oral at bedtime  torsemide 20 milliGRAM(s) Oral two times a day      Vital Signs Last 24 Hrs  T(C): 36.4 (22 Feb 2020 10:00), Max: 36.9 (22 Feb 2020 01:28)  T(F): 97.6 (22 Feb 2020 10:00), Max: 98.5 (22 Feb 2020 01:28)  HR: 55 (22 Feb 2020 10:00) (45 - 57)  BP: 122/65 (22 Feb 2020 10:00) (112/70 - 137/70)  BP(mean): --  RR: 18 (22 Feb 2020 10:00) (17 - 20)  SpO2: 91% (22 Feb 2020 10:00) (91% - 98%)  T(C): 36.4 (02-22-20 @ 10:00), Max: 36.9 (02-22-20 @ 01:28)  HR: 55 (02-22-20 @ 10:00) (45 - 57)  BP: 122/65 (02-22-20 @ 10:00) (112/70 - 137/70)  RR: 18 (02-22-20 @ 10:00) (17 - 20)  SpO2: 91% (02-22-20 @ 10:00) (91% - 98%)    HEENT Head atraumatic eomi, oral mucosa moist  CV S1&S2    regular   RESP  clear  GI  Soft active bowel sounds non tender  EXT  No clubbing/Cyanosis /Edema  NEURO  Alert oriented  No gross motor or sensory deficits    Summary:   1. Technically difficult study.   2. Normal global left ventricular systolic function.   3. Left ventricular ejection fraction, by visual estimation, is 60 to   65%.   4. Spectral Doppler shows pseudonormal pattern of left ventricular   myocardial filling (Grade II diastolic dysfunction).   5. Elevated mean left atrial pressure. Mean LAP estimated at 22 mmHg.   6. Mild mitral annular calcification.   7. Mild thickening and calcification of the anterior and posterior   mitral valve leaflets.   8. Mild mitral valve regurgitation.   9. Mild aortic regurgitation.  10. Moderate to severe aortic valve stenosis. Recommend SCAR for further   evaluation.  11. Moderate pleural effusion in both left and right lateral regions.  12. There is no evidence of pericardial effusion.    < from: Cardiac Cath Lab - Adult (12.31.19 @ 10:55) >  CORONARY VESSELS: The coronary circulation is right dominant.  LM:   --  LM: The vessel was large sized. Angiography showed mild  atherosclerosis with no flow limiting lesions.  LAD:   --  LAD: The vessel was large sized. Angiography showed mild  atherosclerosis with no flow limiting lesions.  CX:   --  Circumflex: The vessel was large sized.  --  OM1: The vessel was medium sized. There was a discrete 40 % stenosis at  the ostium of the vessel segment.  RCA:   --  RCA: The vessel was medium sized. Angiography showed mild  atherosclerosis with no flow limiting lesions.  COMPLICATIONS: No complications occurred during the cath lab visit.  DIAGNOSTIC IMPRESSIONS: Non obstructive CAD  Peak to peak LV /aortic gradient 27 mmHg    < end of copied text >

## 2020-02-23 LAB
-  AMIKACIN: SIGNIFICANT CHANGE UP
-  AMIKACIN: SIGNIFICANT CHANGE UP
-  AMPICILLIN/SULBACTAM: SIGNIFICANT CHANGE UP
-  AMPICILLIN: SIGNIFICANT CHANGE UP
-  AZTREONAM: SIGNIFICANT CHANGE UP
-  AZTREONAM: SIGNIFICANT CHANGE UP
-  CEFAZOLIN: SIGNIFICANT CHANGE UP
-  CEFEPIME: SIGNIFICANT CHANGE UP
-  CEFEPIME: SIGNIFICANT CHANGE UP
-  CEFOXITIN: SIGNIFICANT CHANGE UP
-  CEFTAZIDIME: SIGNIFICANT CHANGE UP
-  CEFTRIAXONE: SIGNIFICANT CHANGE UP
-  CIPROFLOXACIN: SIGNIFICANT CHANGE UP
-  CIPROFLOXACIN: SIGNIFICANT CHANGE UP
-  ERTAPENEM: SIGNIFICANT CHANGE UP
-  GENTAMICIN: SIGNIFICANT CHANGE UP
-  GENTAMICIN: SIGNIFICANT CHANGE UP
-  IMIPENEM: SIGNIFICANT CHANGE UP
-  IMIPENEM: SIGNIFICANT CHANGE UP
-  LEVOFLOXACIN: SIGNIFICANT CHANGE UP
-  LEVOFLOXACIN: SIGNIFICANT CHANGE UP
-  MEROPENEM: SIGNIFICANT CHANGE UP
-  MEROPENEM: SIGNIFICANT CHANGE UP
-  NITROFURANTOIN: SIGNIFICANT CHANGE UP
-  PIPERACILLIN/TAZOBACTAM: SIGNIFICANT CHANGE UP
-  PIPERACILLIN/TAZOBACTAM: SIGNIFICANT CHANGE UP
-  TIGECYCLINE: SIGNIFICANT CHANGE UP
-  TOBRAMYCIN: SIGNIFICANT CHANGE UP
-  TOBRAMYCIN: SIGNIFICANT CHANGE UP
-  TRIMETHOPRIM/SULFAMETHOXAZOLE: SIGNIFICANT CHANGE UP
ANION GAP SERPL CALC-SCNC: 20 MMOL/L — HIGH (ref 5–17)
BUN SERPL-MCNC: 144 MG/DL — HIGH (ref 8–20)
CALCIUM SERPL-MCNC: 8.2 MG/DL — LOW (ref 8.6–10.2)
CHLORIDE SERPL-SCNC: 90 MMOL/L — LOW (ref 98–107)
CO2 SERPL-SCNC: 22 MMOL/L — SIGNIFICANT CHANGE UP (ref 22–29)
CREAT SERPL-MCNC: 3.89 MG/DL — HIGH (ref 0.5–1.3)
CULTURE RESULTS: SIGNIFICANT CHANGE UP
FOLATE SERPL-MCNC: 7.8 NG/ML — SIGNIFICANT CHANGE UP
GLUCOSE BLDC GLUCOMTR-MCNC: 125 MG/DL — HIGH (ref 70–99)
GLUCOSE BLDC GLUCOMTR-MCNC: 144 MG/DL — HIGH (ref 70–99)
GLUCOSE BLDC GLUCOMTR-MCNC: 163 MG/DL — HIGH (ref 70–99)
GLUCOSE BLDC GLUCOMTR-MCNC: 87 MG/DL — SIGNIFICANT CHANGE UP (ref 70–99)
GLUCOSE SERPL-MCNC: 91 MG/DL — SIGNIFICANT CHANGE UP (ref 70–99)
HCT VFR BLD CALC: 24.7 % — LOW (ref 39–50)
HGB BLD-MCNC: 8.2 G/DL — LOW (ref 13–17)
MCHC RBC-ENTMCNC: 27.2 PG — SIGNIFICANT CHANGE UP (ref 27–34)
MCHC RBC-ENTMCNC: 33.2 GM/DL — SIGNIFICANT CHANGE UP (ref 32–36)
MCV RBC AUTO: 81.8 FL — SIGNIFICANT CHANGE UP (ref 80–100)
METHOD TYPE: SIGNIFICANT CHANGE UP
METHOD TYPE: SIGNIFICANT CHANGE UP
ORGANISM # SPEC MICROSCOPIC CNT: SIGNIFICANT CHANGE UP
PLATELET # BLD AUTO: 94 K/UL — LOW (ref 150–400)
POTASSIUM SERPL-MCNC: 4 MMOL/L — SIGNIFICANT CHANGE UP (ref 3.5–5.3)
POTASSIUM SERPL-SCNC: 4 MMOL/L — SIGNIFICANT CHANGE UP (ref 3.5–5.3)
RBC # BLD: 3.02 M/UL — LOW (ref 4.2–5.8)
RBC # FLD: 17.1 % — HIGH (ref 10.3–14.5)
SODIUM SERPL-SCNC: 132 MMOL/L — LOW (ref 135–145)
SPECIMEN SOURCE: SIGNIFICANT CHANGE UP
T3 SERPL-MCNC: 63 NG/DL — LOW (ref 80–200)
T4 AB SER-ACNC: 8.9 UG/DL — SIGNIFICANT CHANGE UP (ref 4.5–12)
TSH SERPL-MCNC: 6.62 UIU/ML — HIGH (ref 0.27–4.2)
VIT B12 SERPL-MCNC: 388 PG/ML — SIGNIFICANT CHANGE UP (ref 232–1245)
WBC # BLD: 3.68 K/UL — LOW (ref 3.8–10.5)
WBC # FLD AUTO: 3.68 K/UL — LOW (ref 3.8–10.5)

## 2020-02-23 PROCEDURE — 99232 SBSQ HOSP IP/OBS MODERATE 35: CPT

## 2020-02-23 PROCEDURE — 99233 SBSQ HOSP IP/OBS HIGH 50: CPT

## 2020-02-23 RX ORDER — ERYTHROPOIETIN 10000 [IU]/ML
8000 INJECTION, SOLUTION INTRAVENOUS; SUBCUTANEOUS
Refills: 0 | Status: DISCONTINUED | OUTPATIENT
Start: 2020-02-23 | End: 2020-02-25

## 2020-02-23 RX ORDER — PANTOPRAZOLE SODIUM 20 MG/1
40 TABLET, DELAYED RELEASE ORAL
Refills: 0 | Status: DISCONTINUED | OUTPATIENT
Start: 2020-02-23 | End: 2020-02-28

## 2020-02-23 RX ADMIN — IRON SUCROSE 110 MILLIGRAM(S): 20 INJECTION, SOLUTION INTRAVENOUS at 17:50

## 2020-02-23 RX ADMIN — ISOSORBIDE DINITRATE 10 MILLIGRAM(S): 5 TABLET ORAL at 06:37

## 2020-02-23 RX ADMIN — ERYTHROPOIETIN 8000 UNIT(S): 10000 INJECTION, SOLUTION INTRAVENOUS; SUBCUTANEOUS at 13:19

## 2020-02-23 RX ADMIN — Medication 650 MILLIGRAM(S): at 17:51

## 2020-02-23 RX ADMIN — Medication 20 MILLIGRAM(S): at 17:51

## 2020-02-23 RX ADMIN — Medication 50 MILLIGRAM(S): at 22:00

## 2020-02-23 RX ADMIN — TAMSULOSIN HYDROCHLORIDE 0.4 MILLIGRAM(S): 0.4 CAPSULE ORAL at 22:00

## 2020-02-23 RX ADMIN — AMLODIPINE BESYLATE 10 MILLIGRAM(S): 2.5 TABLET ORAL at 06:39

## 2020-02-23 RX ADMIN — Medication 4 MILLIGRAM(S): at 22:00

## 2020-02-23 RX ADMIN — Medication 650 MILLIGRAM(S): at 06:37

## 2020-02-23 RX ADMIN — PANTOPRAZOLE SODIUM 40 MILLIGRAM(S): 20 TABLET, DELAYED RELEASE ORAL at 06:39

## 2020-02-23 RX ADMIN — Medication 20 MILLIGRAM(S): at 06:37

## 2020-02-23 RX ADMIN — ISOSORBIDE DINITRATE 10 MILLIGRAM(S): 5 TABLET ORAL at 13:19

## 2020-02-23 RX ADMIN — Medication 2: at 22:01

## 2020-02-23 RX ADMIN — Medication 50 MILLIGRAM(S): at 06:37

## 2020-02-23 RX ADMIN — Medication 50 MILLIGRAM(S): at 13:19

## 2020-02-23 RX ADMIN — ISOSORBIDE DINITRATE 10 MILLIGRAM(S): 5 TABLET ORAL at 22:00

## 2020-02-23 NOTE — PROGRESS NOTE ADULT - ASSESSMENT
78 yo male, PMH of HFpEF s/p cardiomem, CKD4, HTN, DM2 on insulin, advanced dementia, prior GIB, recent admit for HF requiring chest tube and temporary HD presents to ER for dark stool (although on iron at home) and shortness of breath per wife.  patient is poor historian.       iron deficiency anemia:  doubt GI bleed as h/h stable and stool occult negative    venofer    CT abdomen with oral contrast negative for acute pathology    change to oral daily PPI    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    torsemide decreased by cardiology    suspect may need to restart HD     chronic HFpEF: seems compensated    cardiomem interrogated    torsemide decreased    HTN with bradycardia   HOLD coreg 25mg BID for now    c/w norvasc and hydralazine    restart bb when HR improves     hypothermia: resolved, unclear etiology     no evidence of infection    hypoglycemia: resolved, unclear etiology.    monitor.    palliative consulted    d/w patient/wife at bedside.

## 2020-02-23 NOTE — PROGRESS NOTE ADULT - SUBJECTIVE AND OBJECTIVE BOX
Vital Signs Last 24 Hrs ;    T(C): 36.4 (2020 06:24), Max: 36.7 (2020 22:47)  T(F): 97.5 (2020 06:24), Max: 98.1 (2020 22:47)  HR: 46 (2020 06:24) (46 - 60)  BP: 126/65 (2020 06:24) (107/53 - 126/65)  RR: 16 (2020 06:24) (16 - 18)  SpO2: 98% (2020 06:24) (96% - 98%)    132<L>  |  90<L>  |  144.0<H>  ----------------------------<  91     Ca:8.2<L> (2020 06:42)  4.0     |  22.0   |  3.89<H>    eGFR if Non : 14 <L>    TPro  6.7    /  Alb  4.1    /  TBili  <0.2<L>  /  DBili  x      /  AST  26     /  ALT  27     /  AlkPhos  74     2020 12:37                            8.2<L>  3.68<L> )-----------( 94<L>    ( 2020 06:51 )                24.7<L>    B12: 6.62 uIU / mL<H> TSH: 388 pg/mL ( @ 10:16)    Urinalysis Basic - ( 2020 14:31 )  Color: Yellow / Appearance: Clear / S.010 / pH: x  Gluc: x / Ketone: Negative  / Bili: Negative / Urobili: Negative mg/dL   Blood: x / Protein: 15 mg/dL<!> / Nitrite: Negative   Leuk Esterase: Small<!> / RBC: Negative /HPF / WBC 3-5   Sq Epi: x / Non Sq Epi: Negative / Bacteria: Negative    T(C): 36.7 (2020 22:47), Max: 36.7 (2020 22:47)  T(F): 98.1 (2020 22:47), Max: 98.1 (2020 22:47)  HR: 51 (2020 22:47) (51 - 60)  BP: 126/65 (2020 22:47) (107/53 - 126/65)  RR: 18 (2020 22:47) (18 - 18)  SpO2: 97% (2020 22:47) (91% - 97%)    134<L>  |  92<L>  |  148.0<H>  ----------------------------<  141<H>  Ca:8.3<L> (2020 11:11)  4.5     |  21.0<L>  |  4.22<H>    eGFR if Non : 13 <L>  eGFR if : 15 <L>    TPro  6.7    /  Alb  4.1    /  TBili  <0.2<L>  /  DBili  x      /  AST  26     /  ALT  27     /  AlkPhos  74     2020 12:37                        8.6<L>  5.16  )-----------( 101<L>    ( 2020 11:12 )             26.1<L>    Ferritin:41 ng/mL Iron:23 ug/dL<L> TIBC:319 ug/dL Tsat:7 %<L>    Urinalysis Basic - ( 2020 14:31 )  Color: Yellow / Appearance: Clear / S.010 / pH: x  Gluc: x / Ketone: Negative  / Bili: Negative / Urobili: Negative mg/dL   Blood: x / Protein: 15 mg/dL<!> / Nitrite: Negative   Leuk Esterase: Small<!> / RBC: Negative /HPF / WBC 3-5   Sq Epi: x / Non Sq Epi: Negative / Bacteria: Negative    HPI:  79 year old M with PMH of CKD-4, HTN, chronic diastolic CHF, BPH and moderate-severe AS, recent admission for  R pleural effusion, s/p chest tube placement (removed on ) ; pt also received 3 HD sessions during last admission. He had a Cardiomems placement on 1/3 and discahrged home on maintenance diuretics. Pt sent from cards clinic today for concern for GIB.   Pt seen and examined in ED with wife at bedside. Pt states he feels well, no acute complaint offered. Wife reports pt was having black stools for the past few days.     PAST HISTORY  --------------------------------------------------------------------------------  PAST MEDICAL & SURGICAL HISTORY:  Hyperkalemia  ADI (acute kidney injury)  BPH (benign prostatic hyperplasia)  CKD (chronic kidney disease)  Hyperlipemia  Hypertension  Diabetes  Ureteral stent retained    FAMILY HISTORY:  No pertinent family history in first degree relatives    PAST SOCIAL HISTORY:      ALLERGIES & MEDICATIONS  --------------------------------------------------------------------------------  Allergies ; No Known Allergies    Standing Inpatient Medications    PRN Inpatient Medications      REVIEW OF SYSTEMS  --------------------------------------------------------------------------------  Gen: No weight changes, fatigue, fevers/chills, weakness  Skin: No rashes  Head/Eyes/Ears/Mouth: No headache; Normal hearing; Normal vision w/o blurriness; No sinus pain/discomfort, sore throat  Respiratory: No dyspnea, cough, wheezing, hemoptysis  CV: No chest pain, PND, orthopnea  GI: No abdominal pain, diarrhea, constipation, nausea, vomiting, melena+  : No increased frequency, dysuria, hematuria, nocturia  MSK: No joint pain/swelling; no back pain; no edema  Neuro: No dizziness/lightheadedness, weakness, seizures, numbness, tingling  Heme: No easy bruising or bleeding  Endo: No heat/cold intolerance  Psych: No significant nervousness, anxiety, stress, depression    All other systems were reviewed and are negative, except as noted.    VITALS/PHYSICAL EXAM  --------------------------------------------------------------------------------  HR: 44 (20 11:34) (44 - 44)  BP: 112/53 (20 11:34) (112/53 - 112/53)  RR: 18 (20:34) (18 - 18)  SpO2: 99% (20:) (99% - 99%)  Height (cm): 162.56 (20:34)  Weight (kg): 78 (20:34)  BMI (kg/m2): 29.5 (20:34)  BSA (m2): 1.83 (20:34)    Physical Exam:  	Gen: NAD, lying flat on bed  	HEENT: supple neck  	Pulm: CTA B/L  	CV: RRR, S1S2; no rub  	Back: No spinal or CVA tenderness; no sacral edema  	Abd: +BS, soft, nontender/nondistended  	: No suprapubic tenderness  	UE: Warm, no edema; no asterixis  	LE: Warm,  no edema  	Neuro: No focal deficits  	Psych: Normal affect and mood  	Skin: Warm, without rashes    LABS/STUDIES  --------------------------------------------------------------------------------              8.8    5.33  >-----------<  112      [20 @ 12:37]              28.0     135  |  93  |  136.0  ----------------------------<  76      [20 @ 12:37]  4.2   |  22.0  |  4.00        Ca     8.6     [20 @ 12:37]    TPro  6.7  /  Alb  4.1  /  TBili  <0.2  /  DBili  x   /  AST  26  /  ALT  27  /  AlkPhos  74  [20 @ 12:37]    PT/INR: PT 13.7 , INR 1.21       [20 @ 12:37]  PTT: 35.3       [20 @ 12:37]    Troponin 0.04      [20 @ 12:37]    Creatinine Trend:  SCr 4.00 [:37]    Urinalysis - [19 @ 16:39]      Color Yellow / Appearance Cloudy / SG 1.010 / pH 5.0      Gluc Negative / Ketone Trace  / Bili Negative / Urobili Negative       Blood Large / Protein 100 / Leuk Est Small / Nitrite Negative      RBC >50 / WBC 6-10 / Hyaline  / Gran  / Sq Epi  / Non Sq Epi  / Bacteria Few    Iron 25, TIBC 285, %sat 9      [19 @ 17:50]  Ferritin 25      [19 @ 17:50]  PTH -- (Ca 8.3)      [19 @ 19:36]   164  Vitamin D (25OH) 20.3      [19 @ 19:36]  HbA1c 8.1      [19 @ 06:47]  TSH 6.54      [19 @ 21:00]    HBsAb <3.0      [19 @ 08:48]  HBsAb Nonreact      [19 @ 08:48]  HBsAg Nonreact      [19 @ 08:48]  HBcAb Nonreact      [19 @ 08:48]  HCV 0.06, Nonreact      [19 @ 08:48]    DX :  CKD stage 4   Anemia  CHF ; s/p cardiomems  BUN elevated disproportionately at 136 mg., ? UGIB   Recommend GI eval    Will give KD  Diuresis as per Cards  No indication for HD   Will follow                 78 yo male, PMH of Non obstructive cad , HFpEF s/p cardiomem, CKD4, HTN, DM2 on insulin, advanced dementia, prior GIB, recent admit for HF requiring chest tube and temporary HD presents to ER for dark stool (although on iron at home) and shortness of breath per wife.     CHF diastolic dysfunction  Telemetry monitoring    back to daily dose of torsemide  20 bid  Cardio mens Pad 19 on admit  mildly elevated  Keep K > 4 Meq.,  Mg > 2 mg.,    Monitor renal function with ongoing diuresis     CKD  creat pending from today    ANEMIA  iron sat low , IV Fe & KD,     TARRY STOOLS    Fecal Blood Immunoassay,

## 2020-02-23 NOTE — DIETITIAN INITIAL EVALUATION ADULT. - PERTINENT LABORATORY DATA
02-23 Na132 mmol/L<L> Glu 91 mg/dL K+ 4.0 mmol/L Cr  3.89 mg/dL<H> .0 mg/dL<H> Phos n/a   Alb n/a   PAB n/a

## 2020-02-23 NOTE — DIETITIAN INITIAL EVALUATION ADULT. - PERTINENT MEDS FT
MEDICATIONS  (STANDING):  amLODIPine   Tablet 10 milliGRAM(s) Oral daily  dextrose 5%. 1000 milliLiter(s) (50 mL/Hr) IV Continuous <Continuous>  dextrose 50% Injectable 12.5 Gram(s) IV Push once  dextrose 50% Injectable 25 Gram(s) IV Push once  dextrose 50% Injectable 25 Gram(s) IV Push once  doxazosin 4 milliGRAM(s) Oral at bedtime  epoetin nereida Injectable 8000 Unit(s) SubCutaneous <User Schedule>  ergocalciferol 86148 Unit(s) Oral every week  hydrALAZINE 50 milliGRAM(s) Oral three times a day  insulin lispro (HumaLOG) corrective regimen sliding scale   SubCutaneous Before meals and at bedtime  iron sucrose IVPB 200 milliGRAM(s) IV Intermittent every 24 hours  isosorbide   dinitrate Tablet (ISORDIL) 10 milliGRAM(s) Oral three times a day  metolazone 5 milliGRAM(s) Oral daily  pantoprazole    Tablet 40 milliGRAM(s) Oral before breakfast  sodium bicarbonate 650 milliGRAM(s) Oral two times a day  tamsulosin Oral Tab/Cap - Peds 0.4 milliGRAM(s) Oral at bedtime  torsemide 20 milliGRAM(s) Oral two times a day    MEDICATIONS  (PRN):  acetaminophen   Tablet .. 650 milliGRAM(s) Oral every 6 hours PRN Temp greater or equal to 38C (100.4F), Mild Pain (1 - 3)  dextrose 40% Gel 15 Gram(s) Oral once PRN Blood Glucose LESS THAN 70 milliGRAM(s)/deciliter  glucagon  Injectable 1 milliGRAM(s) IntraMuscular once PRN Glucose LESS THAN 70 milligrams/deciliter

## 2020-02-23 NOTE — PROGRESS NOTE ADULT - SUBJECTIVE AND OBJECTIVE BOX
[facility]  [address], [place] [address]    REASON FOR VISIT:  Follow up on chf  UPDATE:  Had ct abd which was negative but view of lungs still with moderate pleural effusions  TELEMETRY MONITORING:  Sinus crystal    CARDIAC MARKERS ( 21 Feb 2020 12:37 )  x     / 0.04 ng/mL / x     / x     / x          2-23    132<L>  |  90<L>  |  144.0<H>  ----------------------------<  91  4.0   |  22.0  |  3.89<H>    Ca    8.2<L>      23 Feb 2020 06:42    TPro  6.7  /  Alb  4.1  /  TBili  <0.2<L>  /  DBili  x   /  AST  26  /  ALT  27  /  AlkPhos  74  02-21    LIVER FUNCTIONS - ( 21 Feb 2020 12:37 )  Alb: 4.1 g/dL / Pro: 6.7 g/dL / ALK PHOS: 74 U/L / ALT: 27 U/L / AST: 26 U/L / GGT: x           02-22-20 @ 07:01  -  02-23-20 @ 07:00  --------------------------------------------------------  IN: 580 mL / OUT: 1700 mL / NET: -1120 mL    MEDICATIONS  (STANDING):  amLODIPine   Tablet 10 milliGRAM(s) Oral daily  doxazosin 4 milliGRAM(s) Oral at bedtime  epoetin nereida Injectable 8000 Unit(s) SubCutaneous <User Schedule>  ergocalciferol 93779 Unit(s) Oral every week  hydrALAZINE 50 milliGRAM(s) Oral three times a day  insulin lispro (HumaLOG) corrective regimen sliding scale   SubCutaneous Before meals and at bedtime  iron sucrose IVPB 200 milliGRAM(s) IV Intermittent every 24 hours  isosorbide   dinitrate Tablet (ISORDIL) 10 milliGRAM(s) Oral three times a day  pantoprazole  Injectable 40 milliGRAM(s) IV Push every 12 hours  sodium bicarbonate 650 milliGRAM(s) Oral two times a day  tamsulosin Oral Tab/Cap - Peds 0.4 milliGRAM(s) Oral at bedtime  torsemide 20 milliGRAM(s) Oral two times a day    ROS:  No fever chills  Cardiac  No cp sob or palp  Resp  no cough no mucus production  Gi  no abd pain no melana  Ext No calf tenderness, no edema    Vital Signs Last 24 Hrs  T(C): 36.4 (23 Feb 2020 06:24), Max: 36.7 (22 Feb 2020 22:47)  T(F): 97.5 (23 Feb 2020 06:24), Max: 98.1 (22 Feb 2020 22:47)  HR: 46 (23 Feb 2020 06:24) (46 - 60)  BP: 126/65 (23 Feb 2020 06:24) (107/53 - 126/65)  BP(mean): --  RR: 16 (23 Feb 2020 06:24) (16 - 18)  SpO2: 98% (23 Feb 2020 06:24) (91% - 98%)  T(C): 36.4 (02-23-20 @ 06:24), Max: 36.7 (02-22-20 @ 22:47)  HR: 46 (02-23-20 @ 06:24) (46 - 60)    BP: 126/65 (02-23-20 @ 06:24) (107/53 - 126/65)  RR: 16 (02-23-20 @ 06:24) (16 - 18)  SpO2: 98% (02-23-20 @ 06:24) (91% - 98%)    HEENT Head atraumatic eomi, oral mucosa moist  CV S1&S2  regular  RESP  cracles at bases  GI  Soft active bowel sounds non tender  EXT  No clubbing/Cyanosis /Edema  NEURO  Alert oriented  No gross motor or sensory deficits     1. Technically difficult study.   2. Normal global left ventricular systolic function.   3. Left ventricular ejection fraction, by visual estimation, is 60 to   65%.   4. Spectral Doppler shows pseudonormal pattern of left ventricular   myocardial filling (Grade II diastolic dysfunction).   5. Elevated mean left atrial pressure. Mean LAP estimated at 22 mmHg.   6. Mild mitral annular calcification.   7. Mild thickening and calcification of the anterior and posterior   mitral valve leaflets.   8. Mild mitral valve regurgitation.   9. Mild aortic regurgitation.  10. Moderate to severe aortic valve stenosis. Recommend SCAR for further   evaluation.  11. Moderate pleural effusion in both left and right lateral regions.  12. There is no evidence of pericardial effusion.    < from: Cardiac Cath Lab - Adult (12.31.19 @ 10:55) >  CORONARY VESSELS: The coronary circulation is right dominant.  LM:   --  LM: The vessel was large sized. Angiography showed mild  atherosclerosis with no flow limiting lesions.  LAD:   --  LAD: The vessel was large sized. Angiography showed mild  atherosclerosis with no flow limiting lesions.  CX:   --  Circumflex: The vessel was large sized.  --  OM1: The vessel was medium sized. There was a discrete 40 % stenosis at  the ostium of the vessel segment.  RCA:   --  RCA: The vessel was medium sized. Angiography showed mild  atherosclerosis with no flow limiting lesions.  COMPLICATIONS: No complications occurred during the cath lab visit.  DIAGNOSTIC IMPRESSIONS: Non obstructive CAD  Peak to peak LV /aortic gradient 27 mmHg

## 2020-02-23 NOTE — PROGRESS NOTE ADULT - ASSESSMENT
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 Chest with moderate bilateral pleural effusion.  echo with moderate to severe AS and EF 60%    CHF (moderate pleural effusion bilaterally)  EF 65%  moderate to severe aortic stenosis  IN: 580 mL / OUT: 1700 mL / NET: -1120 mL on demadex 20 bid  Consider SCAR at some point to evaluate aortic valve  add metolazone daily x 2 days    CRF  creat slightly improved    Dementia  new diagnosis has neuro appointment next week  very forgetful  difficulty doing task  gait issue  repeat tsh  6.5 in january  check b12 folate levels    Gi bleed  stool occult blood pending   hgb stable  continue iv iron for now    Hypertension  low na diet   c/w hydralazine, cardura and norvasc    BPH    Flomax 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, HFpEF s/p cardiomem, CKD4, HTN, DM2 on insulin, advanced dementia, prior GIB, recent admit for HF requiring chest tube and temporary HD presents to ER for dark stool (although on iron at home) and shortness of breath per wife. Chest with moderate bilateral pleural effusion.  echo with moderate to severe AS and EF 60%    CHF (moderate pleural effusion bilaterally)  EF 65%  moderate to severe aortic stenosis  IN: 580 mL / OUT: 1700 mL / NET: -1120 mL on demadex 20 bid  Consider SCAR at some point to evaluate aortic valve  On Torsemide and  metolazone daily x 2 days    CRF  creat slightly improved    Dementia  new diagnosis has neuro appointment next week  very forgetful  difficulty doing task  gait issue  repeat tsh  6.5 in january  check b12 folate levels    Gi bleed  stool occult blood pending   hgb stable  continue iv iron for now    Hypertension  low na diet   c/w hydralazine, cardura and norvasc    BPH    Flomax

## 2020-02-23 NOTE — PROGRESS NOTE ADULT - SUBJECTIVE AND OBJECTIVE BOX
seen for shortness of breath/black stool    no acute complaints/events  at baseline per wife at bedside.  ros otherwise negative     MEDICATIONS  (STANDING):  amLODIPine   Tablet 10 milliGRAM(s) Oral daily  dextrose 5%. 1000 milliLiter(s) (50 mL/Hr) IV Continuous <Continuous>  dextrose 50% Injectable 12.5 Gram(s) IV Push once  dextrose 50% Injectable 25 Gram(s) IV Push once  dextrose 50% Injectable 25 Gram(s) IV Push once  doxazosin 4 milliGRAM(s) Oral at bedtime  epoetin nereida Injectable 8000 Unit(s) SubCutaneous <User Schedule>  ergocalciferol 11278 Unit(s) Oral every week  hydrALAZINE 50 milliGRAM(s) Oral three times a day  insulin lispro (HumaLOG) corrective regimen sliding scale   SubCutaneous Before meals and at bedtime  iron sucrose IVPB 200 milliGRAM(s) IV Intermittent every 24 hours  isosorbide   dinitrate Tablet (ISORDIL) 10 milliGRAM(s) Oral three times a day  metolazone 5 milliGRAM(s) Oral daily  pantoprazole    Tablet 40 milliGRAM(s) Oral before breakfast  sodium bicarbonate 650 milliGRAM(s) Oral two times a day  tamsulosin Oral Tab/Cap - Peds 0.4 milliGRAM(s) Oral at bedtime  torsemide 20 milliGRAM(s) Oral two times a day    MEDICATIONS  (PRN):  acetaminophen   Tablet .. 650 milliGRAM(s) Oral every 6 hours PRN Temp greater or equal to 38C (100.4F), Mild Pain (1 - 3)  dextrose 40% Gel 15 Gram(s) Oral once PRN Blood Glucose LESS THAN 70 milliGRAM(s)/deciliter  glucagon  Injectable 1 milliGRAM(s) IntraMuscular once PRN Glucose LESS THAN 70 milligrams/deciliter      Allergies    No Known Allergies      Vital Signs Last 24 Hrs  T(C): 36.4 (2020 10:39), Max: 36.7 (2020 22:47)  T(F): 97.5 (2020 10:39), Max: 98.1 (2020 22:47)  HR: 50 (2020 13:12) (46 - 60)  BP: 112/52 (2020 13:12) (107/53 - 126/65)  BP(mean): --  RR: 18 (2020 10:39) (16 - 18)  SpO2: 99% (2020 10:39) (96% - 99%)    PHYSICAL EXAM:    GENERAL: NAD  CHEST/LUNG: dec bs at bases no wheeze  HEART: Regular rate and rhythm; S1 S2  ABDOMEN: Soft,  Bowel sounds present  EXTREMITIES: +1 EDEMA   NERVOUS SYSTEM:  Alert awake confused nonfocal neuro  LABS:                        8.2    3.68  )-----------( 94       ( 2020 06:51 )             24.7     02-23    132<L>  |  90<L>  |  144.0<H>  ----------------------------<  91  4.0   |  22.0  |  3.89<H>    Ca    8.2<L>      2020 06:42        Urinalysis Basic - ( 2020 14:31 )    Color: Yellow / Appearance: Clear / S.010 / pH: x  Gluc: x / Ketone: Negative  / Bili: Negative / Urobili: Negative mg/dL   Blood: x / Protein: 15 mg/dL / Nitrite: Negative   Leuk Esterase: Small / RBC: Negative /HPF / WBC 3-5   Sq Epi: x / Non Sq Epi: Negative / Bacteria: Negative        CAPILLARY BLOOD GLUCOSE      POCT Blood Glucose.: 125 mg/dL (2020 12:13)  POCT Blood Glucose.: 87 mg/dL (2020 08:24)  POCT Blood Glucose.: 154 mg/dL (2020 22:52)  POCT Blood Glucose.: 172 mg/dL (2020 21:24)  POCT Blood Glucose.: 131 mg/dL (2020 17:31)        RADIOLOGY & ADDITIONAL TESTS:

## 2020-02-23 NOTE — PROGRESS NOTE ADULT - ASSESSMENT
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     Chest with moderate bilateral pleural effusion.  TTE -  with moderate to severe AS and EF 60%    CHF (moderate pleural effusion bilaterally)  EF 65%  moderate to severe aortic stenosis  IN: 580 mL / OUT: 1700 mL / NET: -1120 mL on Demadex 20 mg.,  bid    CRF- Scr.,  slightly improved    GI bleed    Hypertension - DASH,    On  hydralazine, Cardura and Norvasc    BPH

## 2020-02-23 NOTE — DIETITIAN INITIAL EVALUATION ADULT. - OTHER INFO
78 yo male, PMH of HFpEF s/p cardiomem, CKD4, HTN, DM2 on insulin, advanced dementia, prior GIB, recent admit for HF requiring chest tube and temporary HD presents to ER for dark stool (although on iron at home) and shortness of breath per wife.  patient is poor historian.   Spoke with pt and wife reporting good understanding of diet and diet compliance. low Na and con cho principals reviewed.  Reports that weight fluctuates 170-180 depending on fluid status.  appears well nourished.     edema in legs noted

## 2020-02-24 ENCOUNTER — TRANSCRIPTION ENCOUNTER (OUTPATIENT)
Age: 80
End: 2020-02-24

## 2020-02-24 LAB
ALT FLD-CCNC: 18 U/L — SIGNIFICANT CHANGE UP
ANION GAP SERPL CALC-SCNC: 22 MMOL/L — HIGH (ref 5–17)
ANISOCYTOSIS BLD QL: SLIGHT — SIGNIFICANT CHANGE UP
APPEARANCE UR: CLEAR — SIGNIFICANT CHANGE UP
BACTERIA # UR AUTO: ABNORMAL
BASOPHILS # BLD AUTO: 0 K/UL — SIGNIFICANT CHANGE UP (ref 0–0.2)
BASOPHILS NFR BLD AUTO: 0 % — SIGNIFICANT CHANGE UP (ref 0–2)
BILIRUB UR-MCNC: NEGATIVE — SIGNIFICANT CHANGE UP
BUN SERPL-MCNC: 139 MG/DL — HIGH (ref 8–20)
CALCIUM SERPL-MCNC: 8.3 MG/DL — LOW (ref 8.6–10.2)
CHLORIDE SERPL-SCNC: 93 MMOL/L — LOW (ref 98–107)
CO2 SERPL-SCNC: 22 MMOL/L — SIGNIFICANT CHANGE UP (ref 22–29)
COLOR SPEC: YELLOW — SIGNIFICANT CHANGE UP
CREAT SERPL-MCNC: 4.46 MG/DL — HIGH (ref 0.5–1.3)
DIFF PNL FLD: ABNORMAL
ELLIPTOCYTES BLD QL SMEAR: SLIGHT — SIGNIFICANT CHANGE UP
EOSINOPHIL # BLD AUTO: 0 K/UL — SIGNIFICANT CHANGE UP (ref 0–0.5)
EOSINOPHIL NFR BLD AUTO: 0 % — SIGNIFICANT CHANGE UP (ref 0–6)
EPI CELLS # UR: SIGNIFICANT CHANGE UP
GIANT PLATELETS BLD QL SMEAR: PRESENT — SIGNIFICANT CHANGE UP
GLUCOSE BLDC GLUCOMTR-MCNC: 142 MG/DL — HIGH (ref 70–99)
GLUCOSE BLDC GLUCOMTR-MCNC: 154 MG/DL — HIGH (ref 70–99)
GLUCOSE BLDC GLUCOMTR-MCNC: 218 MG/DL — HIGH (ref 70–99)
GLUCOSE BLDC GLUCOMTR-MCNC: 90 MG/DL — SIGNIFICANT CHANGE UP (ref 70–99)
GLUCOSE SERPL-MCNC: 76 MG/DL — SIGNIFICANT CHANGE UP (ref 70–99)
GLUCOSE UR QL: NEGATIVE MG/DL — SIGNIFICANT CHANGE UP
HAV IGM SER-ACNC: ABNORMAL
HBV CORE AB SER-ACNC: SIGNIFICANT CHANGE UP
HBV CORE IGM SER-ACNC: SIGNIFICANT CHANGE UP
HBV SURFACE AB SER-ACNC: <3 MIU/ML — LOW
HBV SURFACE AG SER-ACNC: SIGNIFICANT CHANGE UP
HCT VFR BLD CALC: 25.1 % — LOW (ref 39–50)
HCV AB S/CO SERPL IA: 0.12 S/CO — SIGNIFICANT CHANGE UP (ref 0–0.99)
HCV AB SERPL-IMP: SIGNIFICANT CHANGE UP
HGB BLD-MCNC: 8.3 G/DL — LOW (ref 13–17)
HYPOCHROMIA BLD QL: SLIGHT — SIGNIFICANT CHANGE UP
KETONES UR-MCNC: NEGATIVE — SIGNIFICANT CHANGE UP
LEUKOCYTE ESTERASE UR-ACNC: ABNORMAL
LYMPHOCYTES # BLD AUTO: 0.8 K/UL — LOW (ref 1–3.3)
LYMPHOCYTES # BLD AUTO: 21.9 % — SIGNIFICANT CHANGE UP (ref 13–44)
MACROCYTES BLD QL: SLIGHT — SIGNIFICANT CHANGE UP
MAGNESIUM SERPL-MCNC: 2.6 MG/DL — SIGNIFICANT CHANGE UP (ref 1.6–2.6)
MANUAL SMEAR VERIFICATION: SIGNIFICANT CHANGE UP
MCHC RBC-ENTMCNC: 27.3 PG — SIGNIFICANT CHANGE UP (ref 27–34)
MCHC RBC-ENTMCNC: 33.1 GM/DL — SIGNIFICANT CHANGE UP (ref 32–36)
MCV RBC AUTO: 82.6 FL — SIGNIFICANT CHANGE UP (ref 80–100)
MICROCYTES BLD QL: SLIGHT — SIGNIFICANT CHANGE UP
MONOCYTES # BLD AUTO: 0.48 K/UL — SIGNIFICANT CHANGE UP (ref 0–0.9)
MONOCYTES NFR BLD AUTO: 13.2 % — SIGNIFICANT CHANGE UP (ref 2–14)
NEUTROPHILS # BLD AUTO: 2.37 K/UL — SIGNIFICANT CHANGE UP (ref 1.8–7.4)
NEUTROPHILS NFR BLD AUTO: 64.9 % — SIGNIFICANT CHANGE UP (ref 43–77)
NITRITE UR-MCNC: NEGATIVE — SIGNIFICANT CHANGE UP
OVALOCYTES BLD QL SMEAR: SLIGHT — SIGNIFICANT CHANGE UP
PH UR: 6 — SIGNIFICANT CHANGE UP (ref 5–8)
PLAT MORPH BLD: NORMAL — SIGNIFICANT CHANGE UP
PLATELET # BLD AUTO: 83 K/UL — LOW (ref 150–400)
POIKILOCYTOSIS BLD QL AUTO: SLIGHT — SIGNIFICANT CHANGE UP
POLYCHROMASIA BLD QL SMEAR: SLIGHT — SIGNIFICANT CHANGE UP
POTASSIUM SERPL-MCNC: 4.4 MMOL/L — SIGNIFICANT CHANGE UP (ref 3.5–5.3)
POTASSIUM SERPL-SCNC: 4.4 MMOL/L — SIGNIFICANT CHANGE UP (ref 3.5–5.3)
PROT UR-MCNC: 30 MG/DL
RBC # BLD: 3.04 M/UL — LOW (ref 4.2–5.8)
RBC # FLD: 17.1 % — HIGH (ref 10.3–14.5)
RBC BLD AUTO: ABNORMAL
RBC CASTS # UR COMP ASSIST: SIGNIFICANT CHANGE UP /HPF (ref 0–4)
SCHISTOCYTES BLD QL AUTO: SLIGHT — SIGNIFICANT CHANGE UP
SODIUM SERPL-SCNC: 137 MMOL/L — SIGNIFICANT CHANGE UP (ref 135–145)
SP GR SPEC: 1.01 — SIGNIFICANT CHANGE UP (ref 1.01–1.02)
UROBILINOGEN FLD QL: NEGATIVE MG/DL — SIGNIFICANT CHANGE UP
WBC # BLD: 3.65 K/UL — LOW (ref 3.8–10.5)
WBC # FLD AUTO: 3.65 K/UL — LOW (ref 3.8–10.5)
WBC UR QL: >50

## 2020-02-24 PROCEDURE — 99233 SBSQ HOSP IP/OBS HIGH 50: CPT

## 2020-02-24 PROCEDURE — 99232 SBSQ HOSP IP/OBS MODERATE 35: CPT

## 2020-02-24 RX ORDER — PREGABALIN 225 MG/1
1000 CAPSULE ORAL DAILY
Refills: 0 | Status: DISCONTINUED | OUTPATIENT
Start: 2020-02-24 | End: 2020-02-28

## 2020-02-24 RX ORDER — CEFTRIAXONE 500 MG/1
1000 INJECTION, POWDER, FOR SOLUTION INTRAMUSCULAR; INTRAVENOUS EVERY 24 HOURS
Refills: 0 | Status: DISCONTINUED | OUTPATIENT
Start: 2020-02-24 | End: 2020-02-26

## 2020-02-24 RX ADMIN — Medication 650 MILLIGRAM(S): at 05:53

## 2020-02-24 RX ADMIN — ISOSORBIDE DINITRATE 10 MILLIGRAM(S): 5 TABLET ORAL at 15:52

## 2020-02-24 RX ADMIN — AMLODIPINE BESYLATE 10 MILLIGRAM(S): 2.5 TABLET ORAL at 05:53

## 2020-02-24 RX ADMIN — Medication 20 MILLIGRAM(S): at 17:52

## 2020-02-24 RX ADMIN — IRON SUCROSE 110 MILLIGRAM(S): 20 INJECTION, SOLUTION INTRAVENOUS at 17:51

## 2020-02-24 RX ADMIN — TAMSULOSIN HYDROCHLORIDE 0.4 MILLIGRAM(S): 0.4 CAPSULE ORAL at 22:22

## 2020-02-24 RX ADMIN — Medication 50 MILLIGRAM(S): at 05:53

## 2020-02-24 RX ADMIN — Medication 4 MILLIGRAM(S): at 22:22

## 2020-02-24 RX ADMIN — PANTOPRAZOLE SODIUM 40 MILLIGRAM(S): 20 TABLET, DELAYED RELEASE ORAL at 05:53

## 2020-02-24 RX ADMIN — Medication 2: at 22:37

## 2020-02-24 RX ADMIN — CEFTRIAXONE 100 MILLIGRAM(S): 500 INJECTION, POWDER, FOR SOLUTION INTRAMUSCULAR; INTRAVENOUS at 15:53

## 2020-02-24 RX ADMIN — Medication 650 MILLIGRAM(S): at 17:52

## 2020-02-24 RX ADMIN — ISOSORBIDE DINITRATE 10 MILLIGRAM(S): 5 TABLET ORAL at 05:53

## 2020-02-24 RX ADMIN — Medication 4: at 17:51

## 2020-02-24 RX ADMIN — Medication 20 MILLIGRAM(S): at 05:54

## 2020-02-24 RX ADMIN — Medication 50 MILLIGRAM(S): at 22:22

## 2020-02-24 RX ADMIN — Medication 50 MILLIGRAM(S): at 15:53

## 2020-02-24 RX ADMIN — ISOSORBIDE DINITRATE 10 MILLIGRAM(S): 5 TABLET ORAL at 22:22

## 2020-02-24 NOTE — PROGRESS NOTE ADULT - SUBJECTIVE AND OBJECTIVE BOX
[facility]                                                        Office: [address] [place] [address]                                                       Telephone: [phone]. Fax:[phone]      CARDIOLOGY PROGRESS NOTE   ([facility])    Subjective: Patient seen and examined.  More alert today compared to admission.  H&H has been stable. Poor historian. Renal function worsened from yesterday.     ROS: No headache, no chest pain, no SOB, no palpitations, no dizziness, no nausea, no bleeding    Chronic Conditions:     CURRENT MEDICATIONS  amLODIPine   Tablet 10 milliGRAM(s) Oral daily  doxazosin 4 milliGRAM(s) Oral at bedtime  hydrALAZINE 50 milliGRAM(s) Oral three times a day  isosorbide   dinitrate Tablet (ISORDIL) 10 milliGRAM(s) Oral three times a day  tamsulosin Oral Tab/Cap - Peds 0.4 milliGRAM(s) Oral at bedtime  torsemide 20 milliGRAM(s) Oral two times a day        acetaminophen   Tablet .. 650 milliGRAM(s) Oral every 6 hours PRN    pantoprazole    Tablet 40 milliGRAM(s) Oral before breakfast    dextrose 40% Gel 15 Gram(s) Oral once PRN  dextrose 50% Injectable 12.5 Gram(s) IV Push once  dextrose 50% Injectable 25 Gram(s) IV Push once  dextrose 50% Injectable 25 Gram(s) IV Push once  glucagon  Injectable 1 milliGRAM(s) IntraMuscular once PRN  insulin lispro (HumaLOG) corrective regimen sliding scale   SubCutaneous Before meals and at bedtime    dextrose 5%. 1000 milliLiter(s) IV Continuous <Continuous>  epoetin nereida Injectable 8000 Unit(s) SubCutaneous <User Schedule>  ergocalciferol 66003 Unit(s) Oral every week  iron sucrose IVPB 200 milliGRAM(s) IV Intermittent every 24 hours  sodium bicarbonate 650 milliGRAM(s) Oral two times a day    	  TELEMETRY: SR, PVCs  Vitals:  T(C): 36.7 (20 @ 05:50), Max: 36.7 (20 @ 05:50)  HR: 53 (20 @ 05:50) (50 - 63)  BP: 131/61 (20 @ 05:50) (110/58 - 135/67)  RR: 16 (20 @ 05:50) (16 - 18)  SpO2: 97% (20 @ 05:50) (95% - 99%)  Wt(kg): --  I&O's Summary    2020 07:01  -  2020 07:00  --------------------------------------------------------  IN: 490 mL / OUT: 1850 mL / NET: -1360 mL        Daily T(C): 36.7 (20 @ 05:50), Max: 36.7 (20 @ 05:50)  HR: 53 (20 @ 05:50) (50 - 63)  BP: 131/61 (20 @ 05:50) (110/58 - 135/67)  RR: 16 (20 @ 05:50) (16 - 18)  SpO2: 97% (20 @ 05:50) (95% - 99%)  Wt(kg): --    Daily     PHYSICAL EXAM:  Appearance: Normal, NAD	  HEENT:   Normal oral mucosa, PERRL, EOMI	  Lymphatic: No lymphadenopathy  Cardiovascular: Normal S1, No JVD, late peaking systolic murmur (S2 audible) No edema  Respiratory: Lungs clear to auscultation	  Psychiatry: A & O x 3, Mood & affect appropriate  Gastrointestinal:  Soft, Non-tender, + BS	  Skin: No rashes, No ecchymoses, No cyanosis  Neurologic: Non-focal  Extremities: Normal range of motion, No clubbing, cyanosis or edema  Vascular: Peripheral pulses palpable 2+ bilaterally, warm      ECG (tracing reviewed by me):  	    DIAGNOSTIC TESTIN.3    3.65  )-----------( 83       ( 2020 06:04 )             25.1     02-    137  |  93<L>  |  139.0<H>  ----------------------------<  76  4.4   |  22.0  |  4.46<H>    Ca    8.3<L>      2020 06:04  Mg     2.6         TPro  x   /  Alb  x   /  TBili  x   /  DBili  x   /  AST  x   /  ALT  18  /  AlkPhos  x       TSH: Thyroid Stimulating Hormone, Serum: 6.62 uIU/mL ( @ 10:16)

## 2020-02-24 NOTE — PROGRESS NOTE ADULT - SUBJECTIVE AND OBJECTIVE BOX
[facility] DIVISION OF KIDNEY DISEASES AND HYPERTENSION -- FOLLOW UP NOTE  --------------------------------------------------------------------------------  Chief Complaint: CKD    24 hour events/subjective:  Patient seen and examine, NAD    PAST HISTORY  --------------------------------------------------------------------------------  No significant changes to PMH, PSH, FHx, SHx, unless otherwise noted    ALLERGIES & MEDICATIONS  --------------------------------------------------------------------------------  Allergies  No Known Allergies    Standing Inpatient Medications  amLODIPine   Tablet 10 milliGRAM(s) Oral daily  cefTRIAXone   IVPB 1000 milliGRAM(s) IV Intermittent every 24 hours  cyanocobalamin Injectable 1000 MICROGram(s) SubCutaneous daily  dextrose 5%. 1000 milliLiter(s) IV Continuous <Continuous>  dextrose 50% Injectable 12.5 Gram(s) IV Push once  dextrose 50% Injectable 25 Gram(s) IV Push once  dextrose 50% Injectable 25 Gram(s) IV Push once  doxazosin 4 milliGRAM(s) Oral at bedtime  epoetin nereida Injectable 8000 Unit(s) SubCutaneous <User Schedule>  ergocalciferol 32519 Unit(s) Oral every week  hydrALAZINE 50 milliGRAM(s) Oral three times a day  insulin lispro (HumaLOG) corrective regimen sliding scale   SubCutaneous Before meals and at bedtime  iron sucrose IVPB 200 milliGRAM(s) IV Intermittent every 24 hours  isosorbide   dinitrate Tablet (ISORDIL) 10 milliGRAM(s) Oral three times a day  pantoprazole    Tablet 40 milliGRAM(s) Oral before breakfast  sodium bicarbonate 650 milliGRAM(s) Oral two times a day  tamsulosin Oral Tab/Cap - Peds 0.4 milliGRAM(s) Oral at bedtime  torsemide 20 milliGRAM(s) Oral two times a day    PRN Inpatient Medications  acetaminophen   Tablet .. 650 milliGRAM(s) Oral every 6 hours PRN  dextrose 40% Gel 15 Gram(s) Oral once PRN  glucagon  Injectable 1 milliGRAM(s) IntraMuscular once PRN    REVIEW OF SYSTEMS  --------------------------------------------------------------------------------  Gen: No weight changes, fatigue, fevers/chills, weakness  Skin: No rashes  Head/Eyes/Ears/Mouth: No headache; Normal hearing; Normal vision w/o blurriness  Respiratory: No dyspnea, cough, wheezing, hemoptysis  CV: No chest pain, PND, orthopnea  GI: No abdominal pain, diarrhea, constipation, nausea, vomiting, melena, hematochezia  : No increased frequency, dysuria, hematuria, nocturia  MSK: No joint pain/swelling; no back pain; no edema  Neuro: No dizziness/lightheadedness, weakness, seizures, numbness, tingling  Heme: No easy bruising or bleeding  Endo: No heat/cold intolerance  Psych: No significant nervousness, anxiety, stress, depression    All other systems were reviewed and are negative, except as noted.    VITALS/PHYSICAL EXAM  --------------------------------------------------------------------------------  T(C): 36.5 (02-24-20 @ 10:00), Max: 36.7 (02-24-20 @ 05:50)  HR: 57 (02-24-20 @ 10:00) (53 - 63)  BP: 128/52 (02-24-20 @ 10:00) (110/58 - 135/67)  RR: 17 (02-24-20 @ 10:00) (16 - 18)  SpO2: 96% (02-24-20 @ 10:00) (95% - 97%)    02-23-20 @ 07:01  -  02-24-20 @ 07:00  --------------------------------------------------------  IN: 490 mL / OUT: 1850 mL / NET: -1360 mL    Physical Exam:  	Gen: NAD,  	HEENT: supple neck  	Pulm: CTA B/L  	CV: RRR, S1S2; no rub  	Back: No spinal or CVA tenderness; no sacral edema  	Abd: +BS, soft, nontender/nondistended  	: No suprapubic tenderness  	UE: Warm,  no edema; no asterixis  	LE: Warm, no edema  	Neuro: No focal deficits  	Psych: Normal affect and mood  	Skin: Warm, without rashes  	    LABS/STUDIES  --------------------------------------------------------------------------------              8.3    3.65  >-----------<  83       [02-24-20 @ 06:04]              25.1     137  |  93  |  139.0  ----------------------------<  76      [02-24-20 @ 06:04]  4.4   |  22.0  |  4.46        Ca     8.3     [02-24-20 @ 06:04]      Mg     2.6     [02-24-20 @ 06:04]    TPro  x   /  Alb  x   /  TBili  x   /  DBili  x   /  AST  x   /  ALT  18  /  AlkPhos  x   [02-24-20 @ 06:04]    Creatinine Trend:  SCr 4.46 [02-24 @ 06:04]  SCr 3.89 [02-23 @ 06:42]  SCr 4.22 [02-22 @ 11:11]  SCr 4.00 [02-21 @ 12:37]    Urinalysis - [02-21-20 @ 14:31]      Color Yellow / Appearance Clear / SG 1.010 / pH 5.0      Gluc Negative / Ketone Negative  / Bili Negative / Urobili Negative       Blood Negative / Protein 15 / Leuk Est Small / Nitrite Negative      RBC Negative / WBC 3-5 / Hyaline  / Gran  / Sq Epi  / Non Sq Epi Negative / Bacteria Negative    Iron 23, TIBC 319, %sat 7      [02-22-20 @ 11:11]  Ferritin 41      [02-22-20 @ 11:11]  PTH -- (Ca 8.3)      [12-12-19 @ 19:36]   164  Vitamin D (25OH) 20.3      [12-12-19 @ 19:36]  HbA1c 8.1      [12-11-19 @ 06:47]  TSH 6.62      [02-23-20 @ 10:16]

## 2020-02-24 NOTE — PROGRESS NOTE ADULT - ASSESSMENT
78 yo male, PMH of HFpEF s/p cardiomem, CKD4, HTN, DM2 on insulin, advanced dementia, prior GIB, recent admit for HF requiring chest tube and temporary HD presents to ER for dark stool (although on iron at home) and shortness of breath per wife.  patient is poor historian.     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    torsemide decreased by cardiology    HD to be restarted per nephro    cystitis on CT, + urine culture    repeat ua/ucx, start ceftriaxone    iron deficiency anemia:  doubt GI bleed as h/h stable and stool occult negative    venofer    CT abdomen with oral contrast negative for acute pathology    change to oral daily PPI    chronic HFpEF: seems compensated    cardiomem interrogated    torsemide decreased    HTN with bradycardia   HOLD coreg 25mg BID for now    c/w norvasc and hydralazine    restart bb when HR improves     hypothermia: resolved, unclear etiology     hypoglycemia: resolved, unclear etiology.    monitor.    d/w patient/wife at bedside.

## 2020-02-24 NOTE — CONSULT NOTE ADULT - SUBJECTIVE AND OBJECTIVE BOX
Vascular Attending:  Alec FELICIANO       HPI:  78 yo male, PMH of HFpEF, CKD4, HTN, DM2 on insulin, advanced dementia, prior GIBs, presents today sent in by Cardiology for evaluation of concern for GI bleeding in setting of HFpEF exacerbation. Patient's wife, who is primary historian given patient's demented state, says that for last few days, patient has had multiple episodes of dark tarry stools, wife uncertain of quantity of them due to patient being incontinent and wife not actively observing. She contacted cardiology (Dr Faustin) for concern of his increased dyspnea and leg swelling who advised presenting for evaluation then when wife took patient to office, was advised to go to ER for evaluation given the concern of bleeding. Per wife, patient always denies any complaints and always says that he is fine.     In ER: noted to have worsening renal function, renal consulted with no plan for urgent HD. Cardio consulted, noted to have improved cardiomems with plans to pull back on aggressive diuresis given worsened renal function. (21 Feb 2020 17:27)    Vascular Surgery HPI:      Admission HPI reviewed.  Consulted for HD access.  Previous history of Temporary HD episodes.  Reported declining Renal fxn, need for permanent HD.  Pt is right hand dominant. Pt is not currently on HD but Vascular service would consider permanent HD.  Pts wife admits she brought her  in  for shortness of breath on exertion and leg swelling. Pt denies chest pain, heart palpitations, nausea, vomiting, diarrhea at this time.          PAST MEDICAL & SURGICAL HISTORY:  Hyperkalemia  ADI (acute kidney injury)  BPH (benign prostatic hyperplasia)  CKD (chronic kidney disease)  Hyperlipemia  Hypertension  Diabetes  Ureteral stent retained      REVIEW OF SYSTEMS      General:  	    Skin/Breast:  	  Ophthalmologic:  	  ENMT:	    Respiratory and Thorax: SOB on exertion. Denies wheeze, cough, sputum production  	  Cardiovascular:	HTN    Gastrointestinal:    Genitourinary: CKD4	    Musculoskeletal:	    Neurological:     Psychiatric: Advanced dementia 	    Hematology/Lymphatics:	    Endocrine: Diabetes Mellitis Type 2 	    Allergic/Immunologic:	    MEDICATIONS  (STANDING):  amLODIPine   Tablet 10 milliGRAM(s) Oral daily  cefTRIAXone   IVPB 1000 milliGRAM(s) IV Intermittent every 24 hours  dextrose 5%. 1000 milliLiter(s) (50 mL/Hr) IV Continuous <Continuous>  dextrose 50% Injectable 12.5 Gram(s) IV Push once  dextrose 50% Injectable 25 Gram(s) IV Push once  dextrose 50% Injectable 25 Gram(s) IV Push once  doxazosin 4 milliGRAM(s) Oral at bedtime  epoetin nereida Injectable 8000 Unit(s) SubCutaneous <User Schedule>  ergocalciferol 59005 Unit(s) Oral every week  hydrALAZINE 50 milliGRAM(s) Oral three times a day  insulin lispro (HumaLOG) corrective regimen sliding scale   SubCutaneous Before meals and at bedtime  iron sucrose IVPB 200 milliGRAM(s) IV Intermittent every 24 hours  isosorbide   dinitrate Tablet (ISORDIL) 10 milliGRAM(s) Oral three times a day  pantoprazole    Tablet 40 milliGRAM(s) Oral before breakfast  sodium bicarbonate 650 milliGRAM(s) Oral two times a day  tamsulosin Oral Tab/Cap - Peds 0.4 milliGRAM(s) Oral at bedtime  torsemide 20 milliGRAM(s) Oral two times a day    MEDICATIONS  (PRN):  acetaminophen   Tablet .. 650 milliGRAM(s) Oral every 6 hours PRN Temp greater or equal to 38C (100.4F), Mild Pain (1 - 3)  dextrose 40% Gel 15 Gram(s) Oral once PRN Blood Glucose LESS THAN 70 milliGRAM(s)/deciliter  glucagon  Injectable 1 milliGRAM(s) IntraMuscular once PRN Glucose LESS THAN 70 milligrams/deciliter      Allergies    No Known Allergies    Intolerances        SOCIAL HISTORY: remote tobacco hist       Vital Signs Last 24 Hrs  T(C): 36.5 (24 Feb 2020 10:00), Max: 36.7 (24 Feb 2020 05:50)  T(F): 97.7 (24 Feb 2020 10:00), Max: 98.1 (24 Feb 2020 05:50)  HR: 57 (24 Feb 2020 10:00) (50 - 63)  BP: 128/52 (24 Feb 2020 10:00) (110/58 - 135/67)  BP(mean): --  RR: 17 (24 Feb 2020 10:00) (16 - 18)  SpO2: 96% (24 Feb 2020 10:00) (95% - 97%)    PHYSICAL EXAM:      Constitutional: Pt is alert and oriented x 2. Pt is in no acute distress. Non-labored breathing.    Eyes: No jaundice     ENMT: atraumatic    Neck: No obvious JVD    Respiratory: Pt has diminished breath sounds B/L. No wheezes, crackles or rales B/L.    Cardiovascular: RRR, S1/S2 sounds,    Gastrointestinal: Abdomen is soft, nontender with no pulsatile masses.    Extremities: Warm, no peripheral edema noted and no ulcers. Multiple areas of ecchymosis to B/L upper extremities. IV to left hand.     Vascular: +2 palpable pulses of the brachial and radial arteries B/L. +2 palpable pulses of the femoral, popliteal and pedal B/L. 	    Neurological: No gross motor or sensory deficits    Psychiatric: good affect      Pulses:   Right:                                                                          Left:  FEM [x]2+ [ ]1+ [ ]doppler                                             FEM [ x]2+ [ ]1+ [ ]doppler    POP [ ]2+ [ x]1+ [ ]doppler                                             POP [ ]2+ [ x]1+ [ ]doppler    DP [ x]2+ [ ]1+ [ ]doppler                                                DP [x ]2+ [ ]1+ [ ]doppler  PT[ x]2+ [ ]1+ [ ]doppler                                                  PT [x ]2+ [ ]1+ [ ]doppler      LABS:                        8.3    3.65  )-----------( 83       ( 24 Feb 2020 06:04 )             25.1     02-24    137  |  93<L>  |  139.0<H>  ----------------------------<  76  4.4   |  22.0  |  4.46<H>    Ca    8.3<L>      24 Feb 2020 06:04  Mg     2.6     02-24    TPro  x   /  Alb  x   /  TBili  x   /  DBili  x   /  AST  x   /  ALT  18  /  AlkPhos  x   02-24      Assessment:   -Acute on Chronic Renal Failure.   -Assessed for permanent dialysis treatment.     RADIOLOGY & ADDITIONAL STUDIES    Impression and Plan:  -Will obtain vein mapping.  -Preserve LUE. Will plan for permcath placement and AV fistula prior to discharge.    * seen with PAS

## 2020-02-24 NOTE — PROGRESS NOTE ADULT - ASSESSMENT
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 Chest with moderate bilateral pleural effusion.  echo with moderate to severe AS and EF 60%    # compensated HFPEF.  Last PAD evaluation last week at 19 (mildly elevated).  Backed off on torsemide due to renal failure.  Appears euvolemic.  Would keep volume even.    # aortic stenosis - on exam with audible S2 consistent with moderate.  No interventions at this juncture.   # acute renal failure - worse today.  Avoid any nephrotoxins.  # black tarry stools while on iron - H&H stable.  # Hypertension - well controlled on current regimen - hydralazine, amlodipine, isordil, torsemide    Thank you for allowing me to participate in care of your patient.   Please call as needed.

## 2020-02-24 NOTE — PHYSICAL THERAPY INITIAL EVALUATION ADULT - PERTINENT HX OF CURRENT PROBLEM, REHAB EVAL
78 yo male, PMH of HFpEF s/p cardiomem, CKD4, HTN, DM2 on insulin, advanced dementia, prior GIB, recent admit for HF requiring chest tube and temporary HD presents to ER for dark stool (although on iron at home) and shortness of breath per wife.

## 2020-02-24 NOTE — PHYSICAL THERAPY INITIAL EVALUATION ADULT - ADDITIONAL COMMENTS
Pt. lives in a house with his wife. Reporting wife is at home at all times and able to assist as needed. Pt. has 3STE with one rail and 12 inside with one rail. Pt. reports modified independent with [employer] PTA. pt. also owns RW.

## 2020-02-24 NOTE — PROGRESS NOTE ADULT - ASSESSMENT
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     Chest with moderate bilateral pleural effusion.  TTE -  with moderate to severe AS and EF 60%    CHF (moderate pleural effusion bilaterally)  EF 65%  moderate to severe aortic stenosis  IN: 580 mL / OUT: 1700 mL / NET: -1120 mL on Demadex 20 mg.,  bid    CRF- Scr.,  slightly improved    GI bleed    Hypertension - DASH,    On  hydralazine, Cardura and Norvasc    BPH      As per vascular,  -Will obtain vein mapping.  -Preserve LUE. Will plan for permcath placement and AV fistula prior to discharge. 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     Chest with moderate bilateral pleural effusion.  TTE -  with moderate to severe AS and EF 60%    CHF (moderate pleural effusion bilaterally)  EF 65%  moderate to severe aortic stenosis  IN: 580 mL / OUT: 1700 mL / NET: -1120 mL on Demadex 20 mg.,  bid    CRF- Scr.,  slightly improved    GI bleed    Hypertension - DASH,    On  hydralazine, Cardura and Norvasc    BPH      As per vascular,    -Will obtain vein mapping.    -Preserve LUE. Will plan for permcath placement and AV fistula prior to discharge.    I was Physically Present for the key portions of the Evaluation & management ( E/M ) Service provided.    I agree with the above History  , Physical examination & Treatment Plans,    I have Reviewed , Modified or appended where appropriate,

## 2020-02-24 NOTE — PROGRESS NOTE ADULT - SUBJECTIVE AND OBJECTIVE BOX
seen for anemia, ESRD    no acute complaints/events  ros negative     MEDICATIONS  (STANDING):  amLODIPine   Tablet 10 milliGRAM(s) Oral daily  cefTRIAXone   IVPB 1000 milliGRAM(s) IV Intermittent every 24 hours  cyanocobalamin Injectable 1000 MICROGram(s) SubCutaneous daily  dextrose 5%. 1000 milliLiter(s) (50 mL/Hr) IV Continuous <Continuous>  dextrose 50% Injectable 12.5 Gram(s) IV Push once  dextrose 50% Injectable 25 Gram(s) IV Push once  dextrose 50% Injectable 25 Gram(s) IV Push once  doxazosin 4 milliGRAM(s) Oral at bedtime  epoetin nereida Injectable 8000 Unit(s) SubCutaneous <User Schedule>  ergocalciferol 49546 Unit(s) Oral every week  hydrALAZINE 50 milliGRAM(s) Oral three times a day  insulin lispro (HumaLOG) corrective regimen sliding scale   SubCutaneous Before meals and at bedtime  iron sucrose IVPB 200 milliGRAM(s) IV Intermittent every 24 hours  isosorbide   dinitrate Tablet (ISORDIL) 10 milliGRAM(s) Oral three times a day  pantoprazole    Tablet 40 milliGRAM(s) Oral before breakfast  sodium bicarbonate 650 milliGRAM(s) Oral two times a day  tamsulosin Oral Tab/Cap - Peds 0.4 milliGRAM(s) Oral at bedtime  torsemide 20 milliGRAM(s) Oral two times a day    MEDICATIONS  (PRN):  acetaminophen   Tablet .. 650 milliGRAM(s) Oral every 6 hours PRN Temp greater or equal to 38C (100.4F), Mild Pain (1 - 3)  dextrose 40% Gel 15 Gram(s) Oral once PRN Blood Glucose LESS THAN 70 milliGRAM(s)/deciliter  glucagon  Injectable 1 milliGRAM(s) IntraMuscular once PRN Glucose LESS THAN 70 milligrams/deciliter      Allergies    No Known Allergies    Vital Signs Last 24 Hrs  T(C): 36.5 (24 Feb 2020 10:00), Max: 36.7 (24 Feb 2020 05:50)  T(F): 97.7 (24 Feb 2020 10:00), Max: 98.1 (24 Feb 2020 05:50)  HR: 57 (24 Feb 2020 10:00) (50 - 63)  BP: 128/52 (24 Feb 2020 10:00) (110/58 - 135/67)  BP(mean): --  RR: 17 (24 Feb 2020 10:00) (16 - 18)  SpO2: 96% (24 Feb 2020 10:00) (95% - 97%)    PHYSICAL EXAM:    GENERAL: NAD  CHEST/LUNG: Clear to percussion bilaterally  HEART: Regular rate and rhythm; S1 S2;  ABDOMEN: Soft,  Bowel sounds present  EXTREMITIES:  trace edema  NERVOUS SYSTEM:  Alert & Oriented X2 nonfocal confused   LABS:                        8.3    3.65  )-----------( 83       ( 24 Feb 2020 06:04 )             25.1     02-24    137  |  93<L>  |  139.0<H>  ----------------------------<  76  4.4   |  22.0  |  4.46<H>    Ca    8.3<L>      24 Feb 2020 06:04  Mg     2.6     02-24    TPro  x   /  Alb  x   /  TBili  x   /  DBili  x   /  AST  x   /  ALT  18  /  AlkPhos  x   02-24          CAPILLARY BLOOD GLUCOSE      POCT Blood Glucose.: 142 mg/dL (24 Feb 2020 12:08)  POCT Blood Glucose.: 90 mg/dL (24 Feb 2020 08:14)  POCT Blood Glucose.: 163 mg/dL (23 Feb 2020 21:59)  POCT Blood Glucose.: 144 mg/dL (23 Feb 2020 17:22)        RADIOLOGY & ADDITIONAL TESTS:

## 2020-02-25 LAB
ANION GAP SERPL CALC-SCNC: 18 MMOL/L — HIGH (ref 5–17)
BLD GP AB SCN SERPL QL: SIGNIFICANT CHANGE UP
BUN SERPL-MCNC: 148 MG/DL — HIGH (ref 8–20)
CALCIUM SERPL-MCNC: 8.1 MG/DL — LOW (ref 8.6–10.2)
CHLORIDE SERPL-SCNC: 92 MMOL/L — LOW (ref 98–107)
CO2 SERPL-SCNC: 24 MMOL/L — SIGNIFICANT CHANGE UP (ref 22–29)
CREAT SERPL-MCNC: 4.73 MG/DL — HIGH (ref 0.5–1.3)
CULTURE RESULTS: SIGNIFICANT CHANGE UP
GLUCOSE BLDC GLUCOMTR-MCNC: 103 MG/DL — HIGH (ref 70–99)
GLUCOSE BLDC GLUCOMTR-MCNC: 106 MG/DL — HIGH (ref 70–99)
GLUCOSE BLDC GLUCOMTR-MCNC: 113 MG/DL — HIGH (ref 70–99)
GLUCOSE BLDC GLUCOMTR-MCNC: 117 MG/DL — HIGH (ref 70–99)
GLUCOSE SERPL-MCNC: 90 MG/DL — SIGNIFICANT CHANGE UP (ref 70–99)
HCT VFR BLD CALC: 25.9 % — LOW (ref 39–50)
HGB BLD-MCNC: 8.2 G/DL — LOW (ref 13–17)
MCHC RBC-ENTMCNC: 26.1 PG — LOW (ref 27–34)
MCHC RBC-ENTMCNC: 31.7 GM/DL — LOW (ref 32–36)
MCV RBC AUTO: 82.5 FL — SIGNIFICANT CHANGE UP (ref 80–100)
PLATELET # BLD AUTO: 91 K/UL — LOW (ref 150–400)
POTASSIUM SERPL-MCNC: 4.6 MMOL/L — SIGNIFICANT CHANGE UP (ref 3.5–5.3)
POTASSIUM SERPL-SCNC: 4.6 MMOL/L — SIGNIFICANT CHANGE UP (ref 3.5–5.3)
RBC # BLD: 3.14 M/UL — LOW (ref 4.2–5.8)
RBC # FLD: 17.2 % — HIGH (ref 10.3–14.5)
SODIUM SERPL-SCNC: 134 MMOL/L — LOW (ref 135–145)
SPECIMEN SOURCE: SIGNIFICANT CHANGE UP
WBC # BLD: 4.48 K/UL — SIGNIFICANT CHANGE UP (ref 3.8–10.5)
WBC # FLD AUTO: 4.48 K/UL — SIGNIFICANT CHANGE UP (ref 3.8–10.5)

## 2020-02-25 PROCEDURE — 99232 SBSQ HOSP IP/OBS MODERATE 35: CPT

## 2020-02-25 PROCEDURE — 90937 HEMODIALYSIS REPEATED EVAL: CPT

## 2020-02-25 PROCEDURE — 36558 INSERT TUNNELED CV CATH: CPT

## 2020-02-25 PROCEDURE — 93985 DUP-SCAN HEMO COMPL BI STD: CPT | Mod: 26

## 2020-02-25 PROCEDURE — 76937 US GUIDE VASCULAR ACCESS: CPT | Mod: 26

## 2020-02-25 RX ORDER — ERYTHROPOIETIN 10000 [IU]/ML
8000 INJECTION, SOLUTION INTRAVENOUS; SUBCUTANEOUS
Refills: 0 | Status: DISCONTINUED | OUTPATIENT
Start: 2020-02-25 | End: 2020-02-28

## 2020-02-25 RX ADMIN — ISOSORBIDE DINITRATE 10 MILLIGRAM(S): 5 TABLET ORAL at 05:33

## 2020-02-25 RX ADMIN — ISOSORBIDE DINITRATE 10 MILLIGRAM(S): 5 TABLET ORAL at 13:24

## 2020-02-25 RX ADMIN — PANTOPRAZOLE SODIUM 40 MILLIGRAM(S): 20 TABLET, DELAYED RELEASE ORAL at 05:33

## 2020-02-25 RX ADMIN — ERYTHROPOIETIN 8000 UNIT(S): 10000 INJECTION, SOLUTION INTRAVENOUS; SUBCUTANEOUS at 17:31

## 2020-02-25 RX ADMIN — AMLODIPINE BESYLATE 10 MILLIGRAM(S): 2.5 TABLET ORAL at 07:21

## 2020-02-25 RX ADMIN — Medication 50 MILLIGRAM(S): at 21:48

## 2020-02-25 RX ADMIN — TAMSULOSIN HYDROCHLORIDE 0.4 MILLIGRAM(S): 0.4 CAPSULE ORAL at 21:48

## 2020-02-25 RX ADMIN — Medication 20 MILLIGRAM(S): at 05:33

## 2020-02-25 RX ADMIN — PREGABALIN 1000 MICROGRAM(S): 225 CAPSULE ORAL at 12:41

## 2020-02-25 RX ADMIN — Medication 50 MILLIGRAM(S): at 13:25

## 2020-02-25 RX ADMIN — Medication 50 MILLIGRAM(S): at 05:33

## 2020-02-25 RX ADMIN — CEFTRIAXONE 100 MILLIGRAM(S): 500 INJECTION, POWDER, FOR SOLUTION INTRAMUSCULAR; INTRAVENOUS at 19:46

## 2020-02-25 RX ADMIN — Medication 650 MILLIGRAM(S): at 05:33

## 2020-02-25 RX ADMIN — ISOSORBIDE DINITRATE 10 MILLIGRAM(S): 5 TABLET ORAL at 21:48

## 2020-02-25 RX ADMIN — Medication 4 MILLIGRAM(S): at 21:48

## 2020-02-25 RX ADMIN — IRON SUCROSE 110 MILLIGRAM(S): 20 INJECTION, SOLUTION INTRAVENOUS at 17:47

## 2020-02-25 NOTE — PROGRESS NOTE ADULT - SUBJECTIVE AND OBJECTIVE BOX
[facility]                                                        Office: [address] [address]                                                       Telephone: [phone]. Fax:[phone]      CARDIOLOGY PROGRESS NOTE   ([facility])    Subjective: Patient seen and examined.  Feels well. No complaints.    ROS: No headache, no chest pain, no SOB, no palpitations, no dizziness, no nausea, no bleeding    Chronic Conditions:     CURRENT MEDICATIONS  amLODIPine   Tablet 10 milliGRAM(s) Oral daily  doxazosin 4 milliGRAM(s) Oral at bedtime  hydrALAZINE 50 milliGRAM(s) Oral three times a day  isosorbide   dinitrate Tablet (ISORDIL) 10 milliGRAM(s) Oral three times a day  tamsulosin Oral Tab/Cap - Peds 0.4 milliGRAM(s) Oral at bedtime  torsemide 20 milliGRAM(s) Oral two times a day    cefTRIAXone   IVPB 1000 milliGRAM(s) IV Intermittent every 24 hours      acetaminophen   Tablet .. 650 milliGRAM(s) Oral every 6 hours PRN    pantoprazole    Tablet 40 milliGRAM(s) Oral before breakfast    dextrose 40% Gel 15 Gram(s) Oral once PRN  dextrose 50% Injectable 12.5 Gram(s) IV Push once  dextrose 50% Injectable 25 Gram(s) IV Push once  dextrose 50% Injectable 25 Gram(s) IV Push once  glucagon  Injectable 1 milliGRAM(s) IntraMuscular once PRN  insulin lispro (HumaLOG) corrective regimen sliding scale   SubCutaneous Before meals and at bedtime    cyanocobalamin Injectable 1000 MICROGram(s) SubCutaneous daily  dextrose 5%. 1000 milliLiter(s) IV Continuous <Continuous>  epoetin nereida Injectable 8000 Unit(s) SubCutaneous <User Schedule>  ergocalciferol 14376 Unit(s) Oral every week  iron sucrose IVPB 200 milliGRAM(s) IV Intermittent every 24 hours  sodium bicarbonate 650 milliGRAM(s) Oral two times a day    	  TELEMETRY:   Vitals:  T(C): 37.1 (20 @ 05:29), Max: 37.1 (20 @ 05:29)  HR: 61 (20 @ 07:20) (48 - 61)  BP: 118/70 (20 @ 07:20) (108/66 - 129/64)  RR: 18 (20 @ 05:29) (16 - 18)  SpO2: 95% (20 @ 05:29) (95% - 96%)  Wt(kg): --  I&O's Summary    2020 07:01  -  2020 07:00  --------------------------------------------------------  IN: 840 mL / OUT: 1350 mL / NET: -510 mL        Daily T(C): 37.1 (20 @ 05:29), Max: 37.1 (20 @ 05:29)  HR: 61 (20 @ 07:20) (48 - 61)  BP: 118/70 (20 @ 07:20) (108/66 - 129/64)  RR: 18 (20 @ 05:29) (16 - 18)  SpO2: 95% (20 @ 05:29) (95% - 96%)  Wt(kg): --    Daily     PHYSICAL EXAM:  Appearance: Normal, NAD	  HEENT:   Normal oral mucosa, PERRL, EOMI	  Lymphatic: No lymphadenopathy  Cardiovascular: Normal S1 S2, No JVD, III/VI late peaking systolic murmur, trace bilateral LE edema  Respiratory: Lungs clear to auscultation	  Psychiatry: A & O x 3, Mood & affect appropriate  Gastrointestinal:  Soft, Non-tender, + BS	  Skin: No rashes, No ecchymoses, No cyanosis  Neurologic: Non-focal  Extremities: Normal range of motion, No clubbing, cyanosis or edema  Vascular: Peripheral pulses palpable 2+ bilaterally, warm      ECG (tracing reviewed by me):  	    DIAGNOSTIC TESTIN.2    4.48  )-----------( 91       ( 2020 06:09 )             25.9     02-    134<L>  |  92<L>  |  148.0<H>  ----------------------------<  90  4.6   |  24.0  |  4.73<H>    Ca    8.1<L>      2020 06:09  Mg     2.6         TPro  x   /  Alb  x   /  TBili  x   /  DBili  x   /  AST  x   /  ALT  18  /  AlkPhos  x

## 2020-02-25 NOTE — PROGRESS NOTE ADULT - ASSESSMENT
80yo M with HFpEF, HTN, DM, and acute on CKD4 with need for dialysis. Consulted for hemodialysis access. Plan for tunnelled catheter placement today. Patient stable with no new complaints.    - Cath lab for permacath placement.  - F/u vein mapping  - Plan for AVF this admission  - rest of care per primary team.

## 2020-02-25 NOTE — CHART NOTE - NSCHARTNOTEFT_GEN_A_CORE
POST-OP CHECK    Patient is a 79 year old male s/p placement of R IJ Permcath. Patient was seen and examined in HD. Resting comfortably in bed with no acute events since surgery. RN states HD is going well with normal flow rates and no complications. Insertion site is soft with no hematoma. Dressing is c/d/i. Bilateral radial pulses 2+. Patient will continue to get HD per nephrology and scheduled for AVF on 2/28.

## 2020-02-25 NOTE — PROGRESS NOTE ADULT - ASSESSMENT
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     Chest with moderate bilateral pleural effusion.  TTE -  with moderate to severe AS and EF 60%    CHF (moderate pleural effusion bilaterally)  EF 65%  moderate to severe aortic stenosis  IN: 580 mL / OUT: 1700 mL / NET: -1120 mL on Demadex 20 mg.,  bid    CRF- Scr.,  slightly improved    GI bleed    Hypertension - DASH,    On  hydralazine, Cardura and Norvasc    BPH      As per vascular,    -Will obtain vein mapping.    -Preserve LUE. Will plan for permcath placement and AV fistula prior to discharge. 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     CKD 4  As per vascular,  -Will obtain vein mapping.  -Preserve LUE. Will plan for permcath placement and AV fistula prior to discharge    Creatinine Trend:  SCr 4.73 [02-25 @ 06:09]  SCr 4.46 [02-24 @ 06:04]  SCr 3.89 [02-23 @ 06:42]  SCr 4.22 [02-22 @ 11:11]  SCr 4.00 [02-21 @ 12:37]  Chest with moderate bilateral pleural effusion.  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     CKD 4  Will obtain tunneled HD catheter today  plan for HD today after catheter palcement  As per vascular,  -Will obtain vein mapping.  -Preserve LUE. Will plan AV fistula prior to discharge    Creatinine Trend:  SCr 4.73 [02-25 @ 06:09]  SCr 4.46 [02-24 @ 06:04]  SCr 3.89 [02-23 @ 06:42]  SCr 4.22 [02-22 @ 11:11]  SCr 4.00 [02-21 @ 12:37]  Chest with moderate bilateral pleural effusion.  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     CKD 4  Will obtain tunneled HD catheter today  plan for HD today after catheter palcement  As per vascular,  -Will obtain vein mapping.  -Preserve LUE. Will plan AV fistula prior to discharge    Creatinine Trend:  SCr 4.73 [02-25 @ 06:09]  SCr 4.46 [02-24 @ 06:04]  SCr 3.89 [02-23 @ 06:42]  SCr 4.22 [02-22 @ 11:11]  SCr 4.00 [02-21 @ 12:37]  Chest with moderate bilateral pleural effusion.  TTE -  with moderate to severe AS and EF 60%    CHF (moderate pleural effusion bilaterally)  EF 65%  moderate to severe aortic stenosis  IN: 580 mL / OUT: 1700 mL / NET: -1120 mL on Demadex 20 mg.,  bid    CRF- Scr.,  slightly improved    GI bleed    Hypertension - DASH,    On  hydralazine, Cardura and Norvasc,    I was Physically Present for the key portions of the Evaluation & management ( E/M ) Service provided.    I agree with the above History  , Physical examination & Treatment Plans,    I have Reviewed , Modified or appended where appropriate,

## 2020-02-25 NOTE — PROGRESS NOTE ADULT - SUBJECTIVE AND OBJECTIVE BOX
RIJ accessed under US guidance and guidewire placed. Dilated and sheath introduced. Catheter tunnelled from right chest wall. inserted through sheath into RIJ. Placement confirmed without kinks by fluoroscopy. Heparin 2cc instilled into each port of catheter. Sutured into place. Space where catheter enters approximated snug to catheter. Neck access site sutured and surgical glue applied. Patient tolerated procedure well.    [facility] DIVISION OF KIDNEY DISEASES AND HYPERTENSION -- HEMODIALYSIS NOTE  --------------------------------------------------------------------------------  Chief Complaint: ESRD/Ongoing hemodialysis requirement    24 hour events/subjective: S/P TDC, As above,     PAST HISTORY  --------------------------------------------------------------------------------  No significant changes to PMH, PSH, FHx, SHx, unless otherwise noted    ALLERGIES & MEDICATIONS  --------------------------------------------------------------------------------  Allergies    No Known Allergies    Standing Inpatient Medications  amLODIPine   Tablet 10 milliGRAM(s) Oral daily  cefTRIAXone   IVPB 1000 milliGRAM(s) IV Intermittent every 24 hours  cyanocobalamin Injectable 1000 MICROGram(s) SubCutaneous daily  dextrose 5%. 1000 milliLiter(s) IV Continuous <Continuous>  dextrose 50% Injectable 12.5 Gram(s) IV Push once  dextrose 50% Injectable 25 Gram(s) IV Push once  dextrose 50% Injectable 25 Gram(s) IV Push once  doxazosin 4 milliGRAM(s) Oral at bedtime  epoetin nereida Injectable 8000 Unit(s) IV Push <User Schedule>  ergocalciferol 12054 Unit(s) Oral every week  hydrALAZINE 50 milliGRAM(s) Oral three times a day  insulin lispro (HumaLOG) corrective regimen sliding scale   SubCutaneous Before meals and at bedtime  iron sucrose IVPB 200 milliGRAM(s) IV Intermittent every 24 hours  isosorbide   dinitrate Tablet (ISORDIL) 10 milliGRAM(s) Oral three times a day  pantoprazole    Tablet 40 milliGRAM(s) Oral before breakfast  sodium bicarbonate 650 milliGRAM(s) Oral two times a day  tamsulosin Oral Tab/Cap - Peds 0.4 milliGRAM(s) Oral at bedtime  torsemide 20 milliGRAM(s) Oral two times a day    PRN Inpatient Medications  acetaminophen   Tablet .. 650 milliGRAM(s) Oral every 6 hours PRN  dextrose 40% Gel 15 Gram(s) Oral once PRN  glucagon  Injectable 1 milliGRAM(s) IntraMuscular once PRN      REVIEW OF SYSTEMS  --------------------------------------------------------------------------------  Gen: No weight changes, fatigue, fevers/chills, weakness  Skin: No rashes  Head/Eyes/Ears/Mouth: No headache; Normal hearing; Normal vision w/o blurriness; No sinus pain/discomfort, sore throat  Respiratory: No dyspnea, cough, wheezing, hemoptysis  CV: No chest pain, PND, orthopnea  GI: No abdominal pain, diarrhea, constipation, nausea, vomiting, melena, hematochezia  : No increased frequency, dysuria, hematuria, nocturia  MSK: No joint pain/swelling; no back pain; no edema  Neuro: No dizziness/lightheadedness, weakness, seizures, numbness, tingling  Heme: No easy bruising or bleeding  Endo: No heat/cold intolerance  Psych: No significant nervousness, anxiety, stress, depression    All other systems were reviewed and are negative, except as noted.    VITALS/PHYSICAL EXAM  --------------------------------------------------------------------------------  T(C): 36.4 (02-25-20 @ 09:59), Max: 37.1 (02-25-20 @ 05:29)  HR: 54 (02-25-20 @ 13:21) (51 - 61)  BP: 131/58 (02-25-20 @ 13:21) (108/66 - 131/58)  RR: 18 (02-25-20 @ 09:59) (16 - 18)  SpO2: 94% (02-25-20 @ 09:59) (94% - 95%)    02-24-20 @ 07:01  -  02-25-20 @ 07:00  --------------------------------------------------------  IN: 840 mL / OUT: 1350 mL / NET: -510 mL    02-25-20 @ 07:01  -  02-25-20 @ 15:40  --------------------------------------------------------  IN: 0 mL / OUT: 200 mL / NET: -200 mL    Physical Exam:  	Gen: NAD, well-appearing  	HEENT: PERRL, supple neck,  	Pulm: CTA B/L  	CV: RRR, S1S2; no rub  	Back: No spinal or CVA tenderness; no sacral edema  	Abd: +BS, soft, nontender/nondistended  	: No suprapubic tenderness  	UE: Warm, FROM, no clubbing, intact strength; no edema; no asterixis  	LE: Warm, FROM, no clubbing, intact strength; no edema  	Neuro: No focal deficits,   	Psych: Normal affect and mood  	Skin: Warm, without rashes  	Vascular access:    LABS/STUDIES  --------------------------------------------------------------------------------              8.2    4.48  >-----------<  91       [02-25-20 @ 06:09]              25.9     134  |  92  |  148.0  ----------------------------<  90      [02-25-20 @ 06:09]  4.6   |  24.0  |  4.73        Ca     8.1     [02-25-20 @ 06:09]      Mg     2.6     [02-24-20 @ 06:04]    TPro  x   /  Alb  x   /  TBili  x   /  DBili  x   /  AST  x   /  ALT  18  /  AlkPhos  x   [02-24-20 @ 06:04]    Iron 23, TIBC 319, %sat 7      [02-22-20 @ 11:11]  Ferritin 41      [02-22-20 @ 11:11]  PTH -- (Ca 8.3)      [12-12-19 @ 19:36]   164  Vitamin D (25OH) 20.3      [12-12-19 @ 19:36]  HbA1c 8.1      [12-11-19 @ 06:47]  TSH 6.62      [02-23-20 @ 10:16]    HBsAb <3.0      [02-24-20 @ 15:46]  HBsAg Nonreact      [02-24-20 @ 15:46]  HBcAb Nonreact      [02-24-20 @ 15:46]  HCV 0.12, Nonreact      [02-24-20 @ 15:46]

## 2020-02-25 NOTE — PROGRESS NOTE ADULT - SUBJECTIVE AND OBJECTIVE BOX
[facility] DIVISION OF KIDNEY DISEASES AND HYPERTENSION -- FOLLOW UP NOTE  --------------------------------------------------------------------------------  Chief Complaint: CKD    24 hour events/subjective:  Patient seen and examined, NAD    PAST HISTORY  --------------------------------------------------------------------------------  No significant changes to PMH, PSH, FHx, SHx, unless otherwise noted    ALLERGIES & MEDICATIONS  --------------------------------------------------------------------------------  Allergies  No Known Allergies    Standing Inpatient Medications  amLODIPine   Tablet 10 milliGRAM(s) Oral daily  cefTRIAXone   IVPB 1000 milliGRAM(s) IV Intermittent every 24 hours  cyanocobalamin Injectable 1000 MICROGram(s) SubCutaneous daily  dextrose 5%. 1000 milliLiter(s) IV Continuous <Continuous>  dextrose 50% Injectable 12.5 Gram(s) IV Push once  dextrose 50% Injectable 25 Gram(s) IV Push once  dextrose 50% Injectable 25 Gram(s) IV Push once  doxazosin 4 milliGRAM(s) Oral at bedtime  epoetin nereida Injectable 8000 Unit(s) IV Push <User Schedule>  ergocalciferol 66957 Unit(s) Oral every week  hydrALAZINE 50 milliGRAM(s) Oral three times a day  insulin lispro (HumaLOG) corrective regimen sliding scale   SubCutaneous Before meals and at bedtime  iron sucrose IVPB 200 milliGRAM(s) IV Intermittent every 24 hours  isosorbide   dinitrate Tablet (ISORDIL) 10 milliGRAM(s) Oral three times a day  pantoprazole    Tablet 40 milliGRAM(s) Oral before breakfast  sodium bicarbonate 650 milliGRAM(s) Oral two times a day  tamsulosin Oral Tab/Cap - Peds 0.4 milliGRAM(s) Oral at bedtime  torsemide 20 milliGRAM(s) Oral two times a day    PRN Inpatient Medications  acetaminophen   Tablet .. 650 milliGRAM(s) Oral every 6 hours PRN  dextrose 40% Gel 15 Gram(s) Oral once PRN  glucagon  Injectable 1 milliGRAM(s) IntraMuscular once PRN      REVIEW OF SYSTEMS  --------------------------------------------------------------------------------  Gen: No weight changes, fatigue, fevers/chills, weakness  Skin: No rashes  Head/Eyes/Ears/Mouth: No headache; Normal hearing; Normal vision w/o blurriness  Respiratory: No dyspnea, cough, wheezing, hemoptysis  CV: No chest pain, PND, orthopnea  GI: No abdominal pain, diarrhea, constipation, nausea, vomiting, melena, hematochezia  : No increased frequency, dysuria, hematuria, nocturia  MSK: No joint pain/swelling; no back pain; no edema  Neuro: No dizziness/lightheadedness, weakness, seizures, numbness, tingling  Heme: No easy bruising or bleeding  Endo: No heat/cold intolerance  Psych: No significant nervousness, anxiety, stress, depression    All other systems were reviewed and are negative, except as noted.    VITALS/PHYSICAL EXAM  --------------------------------------------------------------------------------  T(C): 37.1 (02-25-20 @ 05:29), Max: 37.1 (02-25-20 @ 05:29)  HR: 61 (02-25-20 @ 07:20) (48 - 61)  BP: 118/70 (02-25-20 @ 07:20) (108/66 - 129/64)  RR: 18 (02-25-20 @ 05:29) (16 - 18)  SpO2: 95% (02-25-20 @ 05:29) (95% - 96%)    02-24-20 @ 07:01  -  02-25-20 @ 07:00  --------------------------------------------------------  IN: 840 mL / OUT: 1350 mL / NET: -510 mL      Physical Exam:  	Gen: NAD,  	HEENT: supple neck  	Pulm: CTA B/L  	CV: RRR, S1S2; no rub  	Back: No spinal or CVA tenderness; no sacral edema  	Abd: +BS, soft, nontender/nondistended  	: No suprapubic tenderness  	UE: Warm,  no edema; no asterixis  	LE: Warm, no edema  	Neuro: No focal deficits  	Psych: Normal affect and mood  	Skin: Warm, without rashes    LABS/STUDIES  --------------------------------------------------------------------------------              8.2    4.48  >-----------<  91       [02-25-20 @ 06:09]              25.9     134  |  92  |  148.0  ----------------------------<  90      [02-25-20 @ 06:09]  4.6   |  24.0  |  4.73        Ca     8.1     [02-25-20 @ 06:09]      Mg     2.6     [02-24-20 @ 06:04]    TPro  x   /  Alb  x   /  TBili  x   /  DBili  x   /  AST  x   /  ALT  18  /  AlkPhos  x   [02-24-20 @ 06:04]      Creatinine Trend:  SCr 4.73 [02-25 @ 06:09]  SCr 4.46 [02-24 @ 06:04]  SCr 3.89 [02-23 @ 06:42]  SCr 4.22 [02-22 @ 11:11]  SCr 4.00 [02-21 @ 12:37]    Urinalysis - [02-24-20 @ 14:06]      Color Yellow / Appearance Clear / SG 1.010 / pH 6.0      Gluc Negative / Ketone Negative  / Bili Negative / Urobili Negative       Blood Trace / Protein 30 / Leuk Est Moderate / Nitrite Negative      RBC 0-2 / WBC >50 / Hyaline  / Gran  / Sq Epi  / Non Sq Epi Occasional / Bacteria Few      Iron 23, TIBC 319, %sat 7      [02-22-20 @ 11:11]  Ferritin 41      [02-22-20 @ 11:11]  PTH -- (Ca 8.3)      [12-12-19 @ 19:36]   164  Vitamin D (25OH) 20.3      [12-12-19 @ 19:36]  HbA1c 8.1      [12-11-19 @ 06:47]  TSH 6.62      [02-23-20 @ 10:16]    HBsAb <3.0      [02-24-20 @ 15:46]  HBsAg Nonreact      [02-24-20 @ 15:46]  HBcAb Nonreact      [02-24-20 @ 15:46]  HCV 0.12, Nonreact      [02-24-20 @ 15:46] [facility] DIVISION OF KIDNEY DISEASES AND HYPERTENSION -- FOLLOW UP NOTE  --------------------------------------------------------------------------------  Chief Complaint: CKD    24 hour events/subjective:  Patient seen and examined, NAD    PAST HISTORY  --------------------------------------------------------------------------------  No significant changes to PMH, PSH, FHx, SHx, unless otherwise noted    ALLERGIES & MEDICATIONS  --------------------------------------------------------------------------------  Allergies  No Known Allergies    Standing Inpatient Medications  amLODIPine   Tablet 10 milliGRAM(s) Oral daily  cefTRIAXone   IVPB 1000 milliGRAM(s) IV Intermittent every 24 hours  cyanocobalamin Injectable 1000 MICROGram(s) SubCutaneous daily  dextrose 5%. 1000 milliLiter(s) IV Continuous <Continuous>  dextrose 50% Injectable 12.5 Gram(s) IV Push once  dextrose 50% Injectable 25 Gram(s) IV Push once  dextrose 50% Injectable 25 Gram(s) IV Push once  doxazosin 4 milliGRAM(s) Oral at bedtime  epoetin nereida Injectable 8000 Unit(s) IV Push <User Schedule>  ergocalciferol 12510 Unit(s) Oral every week  hydrALAZINE 50 milliGRAM(s) Oral three times a day  insulin lispro (HumaLOG) corrective regimen sliding scale   SubCutaneous Before meals and at bedtime  iron sucrose IVPB 200 milliGRAM(s) IV Intermittent every 24 hours  isosorbide   dinitrate Tablet (ISORDIL) 10 milliGRAM(s) Oral three times a day  pantoprazole    Tablet 40 milliGRAM(s) Oral before breakfast  sodium bicarbonate 650 milliGRAM(s) Oral two times a day  tamsulosin Oral Tab/Cap - Peds 0.4 milliGRAM(s) Oral at bedtime  torsemide 20 milliGRAM(s) Oral two times a day    PRN Inpatient Medications  acetaminophen   Tablet .. 650 milliGRAM(s) Oral every 6 hours PRN  dextrose 40% Gel 15 Gram(s) Oral once PRN  glucagon  Injectable 1 milliGRAM(s) IntraMuscular once PRN      REVIEW OF SYSTEMS  --------------------------------------------------------------------------------  Gen: No weight changes, fatigue, fevers/chills, weakness  Skin: No rashes  Head/Eyes/Ears/Mouth: No headache; Normal hearing; Normal vision w/o blurriness  Respiratory: No dyspnea, cough, wheezing, hemoptysis  CV: No chest pain, PND, orthopnea  GI: No abdominal pain, diarrhea, constipation, nausea, vomiting, melena, hematochezia  : No increased frequency, dysuria, hematuria, nocturia  MSK: No joint pain/swelling; no back pain; no edema  Neuro: No dizziness/lightheadedness, weakness, seizures, numbness, tingling  Heme: No easy bruising or bleeding  Endo: No heat/cold intolerance  Psych: No significant nervousness, anxiety, stress, depression    All other systems were reviewed and are negative, except as noted.    VITALS/PHYSICAL EXAM  --------------------------------------------------------------------------------  T(C): 37.1 (02-25-20 @ 05:29), Max: 37.1 (02-25-20 @ 05:29)  HR: 61 (02-25-20 @ 07:20) (48 - 61)  BP: 118/70 (02-25-20 @ 07:20) (108/66 - 129/64)  RR: 18 (02-25-20 @ 05:29) (16 - 18)  SpO2: 95% (02-25-20 @ 05:29) (95% - 96%)    02-24-20 @ 07:01  -  02-25-20 @ 07:00  --------------------------------------------------------  IN: 840 mL / OUT: 1350 mL / NET: -510 mL      Physical Exam:  	Gen: NAD,  	HEENT: supple neck  	Pulm: diminished airflow at bases B/L  	CV: RRR, S1S2; no rub. +systolic murmur  	Back: No spinal or CVA tenderness; no sacral edema  	Abd: +BS, soft, nontender/nondistended  	: No suprapubic tenderness. condom catheter  	UE: Warm, no edema; no asterixis  	LE: Warm, no edema  	Neuro: No focal deficits  	Psych: Normal affect and mood  	Skin: Warm, without rashes    LABS/STUDIES  --------------------------------------------------------------------------------              8.2    4.48  >-----------<  91       [02-25-20 @ 06:09]              25.9     134  |  92  |  148.0  ----------------------------<  90      [02-25-20 @ 06:09]  4.6   |  24.0  |  4.73        Ca     8.1     [02-25-20 @ 06:09]      Mg     2.6     [02-24-20 @ 06:04]    TPro  x   /  Alb  x   /  TBili  x   /  DBili  x   /  AST  x   /  ALT  18  /  AlkPhos  x   [02-24-20 @ 06:04]      Creatinine Trend:  SCr 4.73 [02-25 @ 06:09]  SCr 4.46 [02-24 @ 06:04]  SCr 3.89 [02-23 @ 06:42]  SCr 4.22 [02-22 @ 11:11]  SCr 4.00 [02-21 @ 12:37]    Urinalysis - [02-24-20 @ 14:06]      Color Yellow / Appearance Clear / SG 1.010 / pH 6.0      Gluc Negative / Ketone Negative  / Bili Negative / Urobili Negative       Blood Trace / Protein 30 / Leuk Est Moderate / Nitrite Negative      RBC 0-2 / WBC >50 / Hyaline  / Gran  / Sq Epi  / Non Sq Epi Occasional / Bacteria Few      Iron 23, TIBC 319, %sat 7      [02-22-20 @ 11:11]  Ferritin 41      [02-22-20 @ 11:11]  PTH -- (Ca 8.3)      [12-12-19 @ 19:36]   164  Vitamin D (25OH) 20.3      [12-12-19 @ 19:36]  HbA1c 8.1      [12-11-19 @ 06:47]  TSH 6.62      [02-23-20 @ 10:16]    HBsAb <3.0      [02-24-20 @ 15:46]  HBsAg Nonreact      [02-24-20 @ 15:46]  HBcAb Nonreact      [02-24-20 @ 15:46]  HCV 0.12, Nonreact      [02-24-20 @ 15:46] [facility] DIVISION OF KIDNEY DISEASES AND HYPERTENSION -- FOLLOW UP NOTE  --------------------------------------------------------------------------------  Chief Complaint: CKD    24 hour events/subjective:  Patient seen and examined, NAD  Plan for tunneled catheter today    PAST HISTORY  --------------------------------------------------------------------------------  No significant changes to PMH, PSH, FHx, SHx, unless otherwise noted    ALLERGIES & MEDICATIONS  --------------------------------------------------------------------------------  Allergies  No Known Allergies    Standing Inpatient Medications  amLODIPine   Tablet 10 milliGRAM(s) Oral daily  cefTRIAXone   IVPB 1000 milliGRAM(s) IV Intermittent every 24 hours  cyanocobalamin Injectable 1000 MICROGram(s) SubCutaneous daily  dextrose 5%. 1000 milliLiter(s) IV Continuous <Continuous>  dextrose 50% Injectable 12.5 Gram(s) IV Push once  dextrose 50% Injectable 25 Gram(s) IV Push once  dextrose 50% Injectable 25 Gram(s) IV Push once  doxazosin 4 milliGRAM(s) Oral at bedtime  epoetin nereida Injectable 8000 Unit(s) IV Push <User Schedule>  ergocalciferol 95811 Unit(s) Oral every week  hydrALAZINE 50 milliGRAM(s) Oral three times a day  insulin lispro (HumaLOG) corrective regimen sliding scale   SubCutaneous Before meals and at bedtime  iron sucrose IVPB 200 milliGRAM(s) IV Intermittent every 24 hours  isosorbide   dinitrate Tablet (ISORDIL) 10 milliGRAM(s) Oral three times a day  pantoprazole    Tablet 40 milliGRAM(s) Oral before breakfast  sodium bicarbonate 650 milliGRAM(s) Oral two times a day  tamsulosin Oral Tab/Cap - Peds 0.4 milliGRAM(s) Oral at bedtime  torsemide 20 milliGRAM(s) Oral two times a day    PRN Inpatient Medications  acetaminophen   Tablet .. 650 milliGRAM(s) Oral every 6 hours PRN  dextrose 40% Gel 15 Gram(s) Oral once PRN  glucagon  Injectable 1 milliGRAM(s) IntraMuscular once PRN      REVIEW OF SYSTEMS  --------------------------------------------------------------------------------  Gen: No weight changes, fatigue, fevers/chills, weakness  Skin: No rashes  Head/Eyes/Ears/Mouth: No headache; Normal hearing; Normal vision w/o blurriness  Respiratory: No dyspnea, cough, wheezing, hemoptysis  CV: No chest pain, PND, orthopnea  GI: No abdominal pain, diarrhea, constipation, nausea, vomiting, melena, hematochezia  : No increased frequency, dysuria, hematuria, nocturia  MSK: No joint pain/swelling; no back pain; no edema  Neuro: No dizziness/lightheadedness, weakness, seizures, numbness, tingling  Heme: No easy bruising or bleeding  Endo: No heat/cold intolerance  Psych: No significant nervousness, anxiety, stress, depression    All other systems were reviewed and are negative, except as noted.    VITALS/PHYSICAL EXAM  --------------------------------------------------------------------------------  T(C): 37.1 (02-25-20 @ 05:29), Max: 37.1 (02-25-20 @ 05:29)  HR: 61 (02-25-20 @ 07:20) (48 - 61)  BP: 118/70 (02-25-20 @ 07:20) (108/66 - 129/64)  RR: 18 (02-25-20 @ 05:29) (16 - 18)  SpO2: 95% (02-25-20 @ 05:29) (95% - 96%)    02-24-20 @ 07:01  -  02-25-20 @ 07:00  --------------------------------------------------------  IN: 840 mL / OUT: 1350 mL / NET: -510 mL      Physical Exam:  	Gen: NAD,  	HEENT: supple neck  	Pulm: diminished airflow at bases B/L  	CV: RRR, S1S2; no rub. +systolic murmur  	Back: No spinal or CVA tenderness; no sacral edema  	Abd: +BS, soft, nontender/nondistended  	: No suprapubic tenderness. condom catheter  	UE: Warm, no edema; no asterixis  	LE: Warm, no edema  	Neuro: No focal deficits  	Psych: Normal affect and mood  	Skin: Warm, without rashes    LABS/STUDIES  --------------------------------------------------------------------------------              8.2    4.48  >-----------<  91       [02-25-20 @ 06:09]              25.9     134  |  92  |  148.0  ----------------------------<  90      [02-25-20 @ 06:09]  4.6   |  24.0  |  4.73        Ca     8.1     [02-25-20 @ 06:09]      Mg     2.6     [02-24-20 @ 06:04]    TPro  x   /  Alb  x   /  TBili  x   /  DBili  x   /  AST  x   /  ALT  18  /  AlkPhos  x   [02-24-20 @ 06:04]      Creatinine Trend:  SCr 4.73 [02-25 @ 06:09]  SCr 4.46 [02-24 @ 06:04]  SCr 3.89 [02-23 @ 06:42]  SCr 4.22 [02-22 @ 11:11]  SCr 4.00 [02-21 @ 12:37]    Urinalysis - [02-24-20 @ 14:06]      Color Yellow / Appearance Clear / SG 1.010 / pH 6.0      Gluc Negative / Ketone Negative  / Bili Negative / Urobili Negative       Blood Trace / Protein 30 / Leuk Est Moderate / Nitrite Negative      RBC 0-2 / WBC >50 / Hyaline  / Gran  / Sq Epi  / Non Sq Epi Occasional / Bacteria Few      Iron 23, TIBC 319, %sat 7      [02-22-20 @ 11:11]  Ferritin 41      [02-22-20 @ 11:11]  PTH -- (Ca 8.3)      [12-12-19 @ 19:36]   164  Vitamin D (25OH) 20.3      [12-12-19 @ 19:36]  HbA1c 8.1      [12-11-19 @ 06:47]  TSH 6.62      [02-23-20 @ 10:16]    HBsAb <3.0      [02-24-20 @ 15:46]  HBsAg Nonreact      [02-24-20 @ 15:46]  HBcAb Nonreact      [02-24-20 @ 15:46]  HCV 0.12, Nonreact      [02-24-20 @ 15:46]

## 2020-02-25 NOTE — PROGRESS NOTE ADULT - ASSESSMENT
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 Chest with moderate bilateral pleural effusion.  echo with moderate to severe AS and EF 60%    # compensated HFPEF.  Last PAD evaluation last week at 19 (mildly elevated).  Backed off on torsemide due to renal failure.  Appears euvolemic.  Would keep volume even.   # aortic stenosis - on exam with audible S2 consistent with moderate.  No interventions at this juncture. Carvedilol on hold due to bradycardia.  If HR sustains > 70, can restart carvedilol at lower dose 3.125 mg bid.   # acute renal failure - not improving.   Can give diuretic holiday, likely pre-renal in nature.  Does not appear to be in a low flow state.  Will need close watch on volume status.  Avoid any nephrotoxins.  # black tarry stools while on iron - H&H stable.  # Hypertension - well controlled on current regimen - hydralazine, amlodipine, isordil    Thank you for allowing me to participate in care of your patient.   Please call as needed.

## 2020-02-25 NOTE — PROGRESS NOTE ADULT - SUBJECTIVE AND OBJECTIVE BOX
HPI/OVERNIGHT EVENTS:  No acute overnight events. Vital signs stable. Patient NPO overnight for procedure today. LUE preserved. Denies nausea, vomiting, fever, chills, chest pain, shortness of breath, or any new or concerning symptoms.     MEDICATIONS  (STANDING):  amLODIPine   Tablet 10 milliGRAM(s) Oral daily  cefTRIAXone   IVPB 1000 milliGRAM(s) IV Intermittent every 24 hours  cyanocobalamin Injectable 1000 MICROGram(s) SubCutaneous daily  dextrose 5%. 1000 milliLiter(s) (50 mL/Hr) IV Continuous <Continuous>  dextrose 50% Injectable 12.5 Gram(s) IV Push once  dextrose 50% Injectable 25 Gram(s) IV Push once  dextrose 50% Injectable 25 Gram(s) IV Push once  doxazosin 4 milliGRAM(s) Oral at bedtime  epoetin nereida Injectable 8000 Unit(s) IV Push <User Schedule>  ergocalciferol 23647 Unit(s) Oral every week  hydrALAZINE 50 milliGRAM(s) Oral three times a day  insulin lispro (HumaLOG) corrective regimen sliding scale   SubCutaneous Before meals and at bedtime  iron sucrose IVPB 200 milliGRAM(s) IV Intermittent every 24 hours  isosorbide   dinitrate Tablet (ISORDIL) 10 milliGRAM(s) Oral three times a day  pantoprazole    Tablet 40 milliGRAM(s) Oral before breakfast  sodium bicarbonate 650 milliGRAM(s) Oral two times a day  tamsulosin Oral Tab/Cap - Peds 0.4 milliGRAM(s) Oral at bedtime  torsemide 20 milliGRAM(s) Oral two times a day    MEDICATIONS  (PRN):  acetaminophen   Tablet .. 650 milliGRAM(s) Oral every 6 hours PRN Temp greater or equal to 38C (100.4F), Mild Pain (1 - 3)  dextrose 40% Gel 15 Gram(s) Oral once PRN Blood Glucose LESS THAN 70 milliGRAM(s)/deciliter  glucagon  Injectable 1 milliGRAM(s) IntraMuscular once PRN Glucose LESS THAN 70 milligrams/deciliter      Vital Signs Last 24 Hrs  T(C): 37.1 (2020 05:29), Max: 37.1 (2020 05:29)  T(F): 98.8 (2020 05:29), Max: 98.8 (2020 05:29)  HR: 61 (2020 07:20) (48 - 61)  BP: 118/70 (2020 07:20) (108/66 - 129/64)  BP(mean): --  RR: 18 (2020 05:29) (16 - 18)  SpO2: 95% (2020 05:29) (95% - 96%)    Constitutional: pleasantly demented patient laying in bed, in no acute distress  HEENT: EOMI, no active drainage or redness  Neck: Full ROM without pain  Respiratory: respirations are unlabored, no accessory muscle use, no conversational dyspnea  Cardiovascular: regular rate & rhythm  Gastrointestinal: Abdomen soft, non-tender, non-distended  Neurological: no gross sensory / motor / coordination deficits  Ext: Pulses 2+ at radial and DP bilaterally. No limitation of movement      I&O's Detail    2020 07:01  -  2020 07:00  --------------------------------------------------------  IN:    Oral Fluid: 840 mL  Total IN: 840 mL    OUT:    Incontinent per Condom Catheter: 700 mL    Voided: 650 mL  Total OUT: 1350 mL    Total NET: -510 mL          LABS:                        8.2    4.48  )-----------( 91       ( 2020 06:09 )             25.9     02-25    134<L>  |  92<L>  |  148.0<H>  ----------------------------<  90  4.6   |  24.0  |  4.73<H>    Ca    8.1<L>      2020 06:09  Mg     2.6     02-24    TPro  x   /  Alb  x   /  TBili  x   /  DBili  x   /  AST  x   /  ALT  18  /  AlkPhos  x   02-24      Urinalysis Basic - ( 2020 14:06 )    Color: Yellow / Appearance: Clear / S.010 / pH: x  Gluc: x / Ketone: Negative  / Bili: Negative / Urobili: Negative mg/dL   Blood: x / Protein: 30 mg/dL / Nitrite: Negative   Leuk Esterase: Moderate / RBC: 0-2 /HPF / WBC >50   Sq Epi: x / Non Sq Epi: Occasional / Bacteria: Few

## 2020-02-25 NOTE — PROGRESS NOTE ADULT - ASSESSMENT
80 yo male, PMH of HFpEF s/p cardiomem, CKD4, HTN, DM2 on insulin, advanced dementia, prior GIB, recent admit for HF requiring chest tube and temporary HD presents to ER for dark stool (although on iron at home) and shortness of breath per wife.  patient is poor historian.     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    torsemide decreased by cardiology    HD to be restarted per nephro    cystitis on CT, + urine culture    repeat ua/ucx, start ceftriaxone x7 days    iron deficiency anemia:  doubt GI bleed as h/h stable and stool occult negative    venofer    CT abdomen with oral contrast negative for acute pathology    change to oral daily PPI    chronic HFpEF: seems compensated    cardiomem interrogated    torsemide decreased    HTN with bradycardia   HOLD coreg 25mg BID for now    c/w norvasc and hydralazine    restart coreg 3.125 BID when HR improves     hypothermia: resolved, unclear etiology     hypoglycemia: resolved, unclear etiology.    monitor.

## 2020-02-25 NOTE — PROGRESS NOTE ADULT - ASSESSMENT
Patient was seen and evaluated on dialysis.   Patient is tolerating the procedure well.   T(C): 36.4 (02-25-20 @ 09:59), Max: 37.1 (02-25-20 @ 05:29)  HR: 54 (02-25-20 @ 13:21) (51 - 61)  BP: 131/58 (02-25-20 @ 13:21) (108/66 - 131/58)  Continue dialysis: Daily,  Dialyzer: Revaclear 300         QB:  300 ml.,       QD: 500ml.,  Goal UF  0.5 L over 3 Hours

## 2020-02-25 NOTE — PROGRESS NOTE ADULT - SUBJECTIVE AND OBJECTIVE BOX
seen for anemia and ESRD    no acute complaints/events  sinus bradycardia on telemetry 50-60's  ros otherwise negative     MEDICATIONS  (STANDING):  amLODIPine   Tablet 10 milliGRAM(s) Oral daily  cefTRIAXone   IVPB 1000 milliGRAM(s) IV Intermittent every 24 hours  cyanocobalamin Injectable 1000 MICROGram(s) SubCutaneous daily  dextrose 5%. 1000 milliLiter(s) (50 mL/Hr) IV Continuous <Continuous>  dextrose 50% Injectable 12.5 Gram(s) IV Push once  dextrose 50% Injectable 25 Gram(s) IV Push once  dextrose 50% Injectable 25 Gram(s) IV Push once  doxazosin 4 milliGRAM(s) Oral at bedtime  epoetin nereida Injectable 8000 Unit(s) IV Push <User Schedule>  ergocalciferol 48703 Unit(s) Oral every week  hydrALAZINE 50 milliGRAM(s) Oral three times a day  insulin lispro (HumaLOG) corrective regimen sliding scale   SubCutaneous Before meals and at bedtime  iron sucrose IVPB 200 milliGRAM(s) IV Intermittent every 24 hours  isosorbide   dinitrate Tablet (ISORDIL) 10 milliGRAM(s) Oral three times a day  pantoprazole    Tablet 40 milliGRAM(s) Oral before breakfast  sodium bicarbonate 650 milliGRAM(s) Oral two times a day  tamsulosin Oral Tab/Cap - Peds 0.4 milliGRAM(s) Oral at bedtime  torsemide 20 milliGRAM(s) Oral two times a day    MEDICATIONS  (PRN):  acetaminophen   Tablet .. 650 milliGRAM(s) Oral every 6 hours PRN Temp greater or equal to 38C (100.4F), Mild Pain (1 - 3)  dextrose 40% Gel 15 Gram(s) Oral once PRN Blood Glucose LESS THAN 70 milliGRAM(s)/deciliter  glucagon  Injectable 1 milliGRAM(s) IntraMuscular once PRN Glucose LESS THAN 70 milligrams/deciliter      Allergies    No Known Allergies      Vital Signs Last 24 Hrs  T(C): 37.1 (2020 05:29), Max: 37.1 (2020 05:29)  T(F): 98.8 (2020 05:29), Max: 98.8 (2020 05:29)  HR: 61 (2020 07:20) (48 - 61)  BP: 118/70 (2020 07:20) (108/66 - 129/64)  BP(mean): --  RR: 18 (2020 05:29) (16 - 18)  SpO2: 95% (2020 05:29) (95% - 96%)    PHYSICAL EXAM:    GENERAL: NAD  CHEST/LUNG: Clear to percussion bilaterally  HEART: Regular rate and rhythm; S1 S2  ABDOMEN: Soft, Bowel sounds present  EXTREMITIES:  no edema  NERVOUS SYSTEM:  nonfocal, alert, episodes of confusion    LABS:                        8.2    4.48  )-----------( 91       ( 2020 06:09 )             25.9     02-25    134<L>  |  92<L>  |  148.0<H>  ----------------------------<  90  4.6   |  24.0  |  4.73<H>    Ca    8.1<L>      2020 06:09  Mg     2.6     02-24    TPro  x   /  Alb  x   /  TBili  x   /  DBili  x   /  AST  x   /  ALT  18  /  AlkPhos  x   02-24      Urinalysis Basic - ( 2020 14:06 )    Color: Yellow / Appearance: Clear / S.010 / pH: x  Gluc: x / Ketone: Negative  / Bili: Negative / Urobili: Negative mg/dL   Blood: x / Protein: 30 mg/dL / Nitrite: Negative   Leuk Esterase: Moderate / RBC: 0-2 /HPF / WBC >50   Sq Epi: x / Non Sq Epi: Occasional / Bacteria: Few        CAPILLARY BLOOD GLUCOSE      POCT Blood Glucose.: 106 mg/dL (2020 08:23)  POCT Blood Glucose.: 154 mg/dL (2020 22:24)  POCT Blood Glucose.: 218 mg/dL (2020 16:54)  POCT Blood Glucose.: 142 mg/dL (2020 12:08)        RADIOLOGY & ADDITIONAL TESTS:

## 2020-02-26 LAB
ANION GAP SERPL CALC-SCNC: 17 MMOL/L — SIGNIFICANT CHANGE UP (ref 5–17)
BUN SERPL-MCNC: 86 MG/DL — HIGH (ref 8–20)
CALCIUM SERPL-MCNC: 8.6 MG/DL — SIGNIFICANT CHANGE UP (ref 8.6–10.2)
CHLORIDE SERPL-SCNC: 93 MMOL/L — LOW (ref 98–107)
CO2 SERPL-SCNC: 27 MMOL/L — SIGNIFICANT CHANGE UP (ref 22–29)
CREAT SERPL-MCNC: 3.31 MG/DL — HIGH (ref 0.5–1.3)
GLUCOSE BLDC GLUCOMTR-MCNC: 148 MG/DL — HIGH (ref 70–99)
GLUCOSE BLDC GLUCOMTR-MCNC: 182 MG/DL — HIGH (ref 70–99)
GLUCOSE BLDC GLUCOMTR-MCNC: 189 MG/DL — HIGH (ref 70–99)
GLUCOSE BLDC GLUCOMTR-MCNC: 90 MG/DL — SIGNIFICANT CHANGE UP (ref 70–99)
GLUCOSE SERPL-MCNC: 173 MG/DL — HIGH (ref 70–99)
HCT VFR BLD CALC: 28.1 % — LOW (ref 39–50)
HGB BLD-MCNC: 8.9 G/DL — LOW (ref 13–17)
MCHC RBC-ENTMCNC: 26.8 PG — LOW (ref 27–34)
MCHC RBC-ENTMCNC: 31.7 GM/DL — LOW (ref 32–36)
MCV RBC AUTO: 84.6 FL — SIGNIFICANT CHANGE UP (ref 80–100)
PLATELET # BLD AUTO: 99 K/UL — LOW (ref 150–400)
POTASSIUM SERPL-MCNC: 4 MMOL/L — SIGNIFICANT CHANGE UP (ref 3.5–5.3)
POTASSIUM SERPL-SCNC: 4 MMOL/L — SIGNIFICANT CHANGE UP (ref 3.5–5.3)
RBC # BLD: 3.32 M/UL — LOW (ref 4.2–5.8)
RBC # FLD: 17.2 % — HIGH (ref 10.3–14.5)
SODIUM SERPL-SCNC: 137 MMOL/L — SIGNIFICANT CHANGE UP (ref 135–145)
WBC # BLD: 6.35 K/UL — SIGNIFICANT CHANGE UP (ref 3.8–10.5)
WBC # FLD AUTO: 6.35 K/UL — SIGNIFICANT CHANGE UP (ref 3.8–10.5)

## 2020-02-26 PROCEDURE — 90937 HEMODIALYSIS REPEATED EVAL: CPT

## 2020-02-26 PROCEDURE — 99232 SBSQ HOSP IP/OBS MODERATE 35: CPT

## 2020-02-26 PROCEDURE — 99223 1ST HOSP IP/OBS HIGH 75: CPT

## 2020-02-26 RX ORDER — HEPARIN SODIUM 5000 [USP'U]/ML
5000 INJECTION INTRAVENOUS; SUBCUTANEOUS
Refills: 0 | Status: DISCONTINUED | OUTPATIENT
Start: 2020-02-26 | End: 2020-02-26

## 2020-02-26 RX ORDER — HEPARIN SODIUM 5000 [USP'U]/ML
5000 INJECTION INTRAVENOUS; SUBCUTANEOUS EVERY 12 HOURS
Refills: 0 | Status: DISCONTINUED | OUTPATIENT
Start: 2020-02-26 | End: 2020-02-28

## 2020-02-26 RX ORDER — CHLORHEXIDINE GLUCONATE 213 G/1000ML
1 SOLUTION TOPICAL
Refills: 0 | Status: DISCONTINUED | OUTPATIENT
Start: 2020-02-26 | End: 2020-02-28

## 2020-02-26 RX ORDER — TUBERCULIN PURIFIED PROTEIN DERIVATIVE 5 [IU]/.1ML
5 INJECTION, SOLUTION INTRADERMAL ONCE
Refills: 0 | Status: COMPLETED | OUTPATIENT
Start: 2020-02-26 | End: 2020-02-26

## 2020-02-26 RX ADMIN — AMLODIPINE BESYLATE 10 MILLIGRAM(S): 2.5 TABLET ORAL at 06:17

## 2020-02-26 RX ADMIN — PANTOPRAZOLE SODIUM 40 MILLIGRAM(S): 20 TABLET, DELAYED RELEASE ORAL at 06:17

## 2020-02-26 RX ADMIN — Medication 2: at 16:12

## 2020-02-26 RX ADMIN — Medication 650 MILLIGRAM(S): at 06:17

## 2020-02-26 RX ADMIN — IRON SUCROSE 110 MILLIGRAM(S): 20 INJECTION, SOLUTION INTRAVENOUS at 14:28

## 2020-02-26 RX ADMIN — ISOSORBIDE DINITRATE 10 MILLIGRAM(S): 5 TABLET ORAL at 06:17

## 2020-02-26 RX ADMIN — Medication 20 MILLIGRAM(S): at 16:13

## 2020-02-26 RX ADMIN — PREGABALIN 1000 MICROGRAM(S): 225 CAPSULE ORAL at 15:26

## 2020-02-26 RX ADMIN — Medication 650 MILLIGRAM(S): at 16:13

## 2020-02-26 RX ADMIN — TUBERCULIN PURIFIED PROTEIN DERIVATIVE 5 UNIT(S): 5 INJECTION, SOLUTION INTRADERMAL at 15:27

## 2020-02-26 RX ADMIN — Medication 2: at 21:24

## 2020-02-26 RX ADMIN — CEFTRIAXONE 100 MILLIGRAM(S): 500 INJECTION, POWDER, FOR SOLUTION INTRAMUSCULAR; INTRAVENOUS at 15:38

## 2020-02-26 RX ADMIN — HEPARIN SODIUM 5000 UNIT(S): 5000 INJECTION INTRAVENOUS; SUBCUTANEOUS at 16:13

## 2020-02-26 RX ADMIN — Medication 4 MILLIGRAM(S): at 21:21

## 2020-02-26 RX ADMIN — Medication 50 MILLIGRAM(S): at 15:29

## 2020-02-26 RX ADMIN — Medication 50 MILLIGRAM(S): at 06:17

## 2020-02-26 RX ADMIN — Medication 50 MILLIGRAM(S): at 21:21

## 2020-02-26 RX ADMIN — ISOSORBIDE DINITRATE 10 MILLIGRAM(S): 5 TABLET ORAL at 21:21

## 2020-02-26 RX ADMIN — TAMSULOSIN HYDROCHLORIDE 0.4 MILLIGRAM(S): 0.4 CAPSULE ORAL at 21:21

## 2020-02-26 RX ADMIN — Medication 20 MILLIGRAM(S): at 06:17

## 2020-02-26 RX ADMIN — ISOSORBIDE DINITRATE 10 MILLIGRAM(S): 5 TABLET ORAL at 15:26

## 2020-02-26 NOTE — PROGRESS NOTE ADULT - ASSESSMENT
Patient was seen and evaluated on dialysis.   Patient is tolerating the procedure well.     T(C): 36.5 (02-26-20 @ 12:18), Max: 37.1 (02-25-20 @ 16:22)  HR: 63 (02-26-20 @ 12:18) (55 - 75)  BP: 120/56 (02-26-20 @ 12:18) (119/49 - 151/66)    Continue dialysis:   Dialyzer:  Revaclear 300        QB: 400ml/min       QD: 500ml.,  Goal UF 1 L over 150 mins

## 2020-02-26 NOTE — PROGRESS NOTE ADULT - SUBJECTIVE AND OBJECTIVE BOX
[facility] DIVISION OF KIDNEY DISEASES AND HYPERTENSION -- FOLLOW UP NOTE  --------------------------------------------------------------------------------  Chief Complaint: ESRD/Ongoing hemodialysis requirement    24 hour events/subjective:  Patient seen and examined, NAD    PAST HISTORY  --------------------------------------------------------------------------------  No significant changes to PMH, PSH, FHx, SHx, unless otherwise noted    ALLERGIES & MEDICATIONS  --------------------------------------------------------------------------------  Allergies  No Known Allergies    Standing Inpatient Medications  amLODIPine   Tablet 10 milliGRAM(s) Oral daily  cefTRIAXone   IVPB 1000 milliGRAM(s) IV Intermittent every 24 hours  chlorhexidine 2% Cloths 1 Application(s) Topical <User Schedule>  cyanocobalamin Injectable 1000 MICROGram(s) SubCutaneous daily  dextrose 5%. 1000 milliLiter(s) IV Continuous <Continuous>  dextrose 50% Injectable 12.5 Gram(s) IV Push once  dextrose 50% Injectable 25 Gram(s) IV Push once  dextrose 50% Injectable 25 Gram(s) IV Push once  doxazosin 4 milliGRAM(s) Oral at bedtime  epoetin nereida Injectable 8000 Unit(s) IV Push <User Schedule>  ergocalciferol 26523 Unit(s) Oral every week  heparin  Injectable (Preservative-Free) 5000 Unit(s) SubCutaneous two times a day  hydrALAZINE 50 milliGRAM(s) Oral three times a day  insulin lispro (HumaLOG) corrective regimen sliding scale   SubCutaneous Before meals and at bedtime  iron sucrose IVPB 200 milliGRAM(s) IV Intermittent every 24 hours  isosorbide   dinitrate Tablet (ISORDIL) 10 milliGRAM(s) Oral three times a day  pantoprazole    Tablet 40 milliGRAM(s) Oral before breakfast  PPD  5 Tuberculin Unit(s) Injectable 5 Unit(s) IntraDermal once  sodium bicarbonate 650 milliGRAM(s) Oral two times a day  tamsulosin Oral Tab/Cap - Peds 0.4 milliGRAM(s) Oral at bedtime  torsemide 20 milliGRAM(s) Oral two times a day    PRN Inpatient Medications  acetaminophen   Tablet .. 650 milliGRAM(s) Oral every 6 hours PRN  dextrose 40% Gel 15 Gram(s) Oral once PRN  glucagon  Injectable 1 milliGRAM(s) IntraMuscular once PRN      REVIEW OF SYSTEMS  --------------------------------------------------------------------------------  Gen: No weight changes, fatigue, fevers/chills, weakness  Skin: No rashes  Head/Eyes/Ears/Mouth: No headache; Normal hearing; Normal vision w/o blurriness  Respiratory: No dyspnea, cough, wheezing, hemoptysis  CV: No chest pain, PND, orthopnea  GI: No abdominal pain, diarrhea, constipation, nausea, vomiting, melena, hematochezia  : No increased frequency, dysuria, hematuria, nocturia  MSK: No joint pain/swelling; no back pain; no edema  Neuro: No dizziness/lightheadedness, weakness, seizures, numbness, tingling  Heme: No easy bruising or bleeding  Endo: No heat/cold intolerance  Psych: No significant nervousness, anxiety, stress, depression    All other systems were reviewed and are negative, except as noted.    VITALS/PHYSICAL EXAM  --------------------------------------------------------------------------------  T(C): 36.5 (02-26-20 @ 12:18), Max: 37.1 (02-25-20 @ 16:22)  HR: 63 (02-26-20 @ 12:18) (55 - 75)  BP: 120/56 (02-26-20 @ 12:18) (119/49 - 151/66)  RR: 18 (02-26-20 @ 12:18) (16 - 18)  SpO2: 94% (02-26-20 @ 12:18) (94% - 98%)    02-25-20 @ 07:01  -  02-26-20 @ 07:00  --------------------------------------------------------  IN: 0 mL / OUT: 1450 mL / NET: -1450 mL    Physical Exam:  	Gen: NAD, well-appearing  	HEENT: supple neck  	Pulm: CTA B/L  	CV: RRR, S1S2; no rub  	Back: No spinal or CVA tenderness; no sacral edema  	Abd: +BS, soft, nontender/nondistended  	: No suprapubic tenderness  	UE: Warm,  no edema; no asterixis  	LE: Warm, no edema  	Neuro: No focal deficits  	Psych: Normal affect and mood  	Skin: Warm, without rashes  	Vascular access: Right IJ tunneled HD catheter    LABS/STUDIES  --------------------------------------------------------------------------------              8.9    6.35  >-----------<  99       [02-26-20 @ 13:02]              28.1     137  |  93  |  86.0  ----------------------------<  173      [02-26-20 @ 13:02]  4.0   |  27.0  |  3.31        Ca     8.6     [02-26-20 @ 13:02]      Creatinine Trend:  SCr 3.31 [02-26 @ 13:02]  SCr 4.73 [02-25 @ 06:09]  SCr 4.46 [02-24 @ 06:04]  SCr 3.89 [02-23 @ 06:42]  SCr 4.22 [02-22 @ 11:11]    Urinalysis - [02-24-20 @ 14:06]      Color Yellow / Appearance Clear / SG 1.010 / pH 6.0      Gluc Negative / Ketone Negative  / Bili Negative / Urobili Negative       Blood Trace / Protein 30 / Leuk Est Moderate / Nitrite Negative      RBC 0-2 / WBC >50 / Hyaline  / Gran  / Sq Epi  / Non Sq Epi Occasional / Bacteria Few      Iron 23, TIBC 319, %sat 7      [02-22-20 @ 11:11]  Ferritin 41      [02-22-20 @ 11:11]  PTH -- (Ca 8.3)      [12-12-19 @ 19:36]   164  Vitamin D (25OH) 20.3      [12-12-19 @ 19:36]  HbA1c 8.1      [12-11-19 @ 06:47]  TSH 6.62      [02-23-20 @ 10:16]    HBsAb <3.0      [02-24-20 @ 15:46]  HBsAg Nonreact      [02-24-20 @ 15:46]  HBcAb Nonreact      [02-24-20 @ 15:46]  HCV 0.12, Nonreact      [02-24-20 @ 15:46]

## 2020-02-26 NOTE — PROGRESS NOTE ADULT - SUBJECTIVE AND OBJECTIVE BOX
seen for renal failure, anemia    no events/complaints  ros negative     MEDICATIONS  (STANDING):  amLODIPine   Tablet 10 milliGRAM(s) Oral daily  cefTRIAXone   IVPB 1000 milliGRAM(s) IV Intermittent every 24 hours  chlorhexidine 2% Cloths 1 Application(s) Topical <User Schedule>  cyanocobalamin Injectable 1000 MICROGram(s) SubCutaneous daily  dextrose 5%. 1000 milliLiter(s) (50 mL/Hr) IV Continuous <Continuous>  dextrose 50% Injectable 12.5 Gram(s) IV Push once  dextrose 50% Injectable 25 Gram(s) IV Push once  dextrose 50% Injectable 25 Gram(s) IV Push once  doxazosin 4 milliGRAM(s) Oral at bedtime  epoetin nereida Injectable 8000 Unit(s) IV Push <User Schedule>  ergocalciferol 64589 Unit(s) Oral every week  heparin  Injectable (Preservative-Free) 5000 Unit(s) SubCutaneous two times a day  hydrALAZINE 50 milliGRAM(s) Oral three times a day  insulin lispro (HumaLOG) corrective regimen sliding scale   SubCutaneous Before meals and at bedtime  iron sucrose IVPB 200 milliGRAM(s) IV Intermittent every 24 hours  isosorbide   dinitrate Tablet (ISORDIL) 10 milliGRAM(s) Oral three times a day  pantoprazole    Tablet 40 milliGRAM(s) Oral before breakfast  PPD  5 Tuberculin Unit(s) Injectable 5 Unit(s) IntraDermal once  sodium bicarbonate 650 milliGRAM(s) Oral two times a day  tamsulosin Oral Tab/Cap - Peds 0.4 milliGRAM(s) Oral at bedtime  torsemide 20 milliGRAM(s) Oral two times a day    MEDICATIONS  (PRN):  acetaminophen   Tablet .. 650 milliGRAM(s) Oral every 6 hours PRN Temp greater or equal to 38C (100.4F), Mild Pain (1 - 3)  dextrose 40% Gel 15 Gram(s) Oral once PRN Blood Glucose LESS THAN 70 milliGRAM(s)/deciliter  glucagon  Injectable 1 milliGRAM(s) IntraMuscular once PRN Glucose LESS THAN 70 milligrams/deciliter      Allergies    No Known Allergies        Vital Signs Last 24 Hrs  T(C): 36.5 (2020 12:18), Max: 37.1 (2020 16:22)  T(F): 97.7 (2020 12:18), Max: 98.7 (2020 16:22)  HR: 63 (2020 12:18) (54 - 75)  BP: 120/56 (2020 12:18) (119/49 - 151/66)  BP(mean): --  RR: 18 (2020 12:18) (16 - 18)  SpO2: 94% (2020 12:18) (94% - 98%)    PHYSICAL EXAM:    GENERAL: NAD  CHEST/LUNG: Clear to percussion bilaterally  HEART: Regular rate and rhythm; S1 S2  ABDOMEN: Soft, Bowel sounds present  EXTREMITIES: no edema       LABS:                        8.2    4.48  )-----------( 91       ( 2020 06:09 )             25.9     02-25    134<L>  |  92<L>  |  148.0<H>  ----------------------------<  90  4.6   |  24.0  |  4.73<H>    Ca    8.1<L>      2020 06:09        Urinalysis Basic - ( 2020 14:06 )    Color: Yellow / Appearance: Clear / S.010 / pH: x  Gluc: x / Ketone: Negative  / Bili: Negative / Urobili: Negative mg/dL   Blood: x / Protein: 30 mg/dL / Nitrite: Negative   Leuk Esterase: Moderate / RBC: 0-2 /HPF / WBC >50   Sq Epi: x / Non Sq Epi: Occasional / Bacteria: Few        CAPILLARY BLOOD GLUCOSE      POCT Blood Glucose.: 148 mg/dL (2020 11:16)  POCT Blood Glucose.: 90 mg/dL (2020 07:41)  POCT Blood Glucose.: 117 mg/dL (2020 22:13)  POCT Blood Glucose.: 113 mg/dL (2020 19:46)        RADIOLOGY & ADDITIONAL TESTS:

## 2020-02-26 NOTE — PROGRESS NOTE ADULT - SUBJECTIVE AND OBJECTIVE BOX
JEMIMA SANTOS    [id number]    History:  The patient is a 79 year old male with a PMH of CHF, HTN, DM, BPH, and CKD4. Pt was admitted for acute on chronic kidney disease, edematous extremities and shortness of breath. Pt had a permcath placed in the RIJ on 2/25/20 with no complications with dialysis. Patient is doing well with no acute complaints. The patient's pain is controlled using the prescribed pain medications. The patient is participating in physical therapy. Denies nausea, vomiting, chest pain, shortness of breath, abdominal pain or fever. No new complaints. No acute motor or sensory changes are reported.    Vital Signs Last 24 Hrs  T(C): 36.8 (26 Feb 2020 07:54), Max: 37.1 (25 Feb 2020 16:22)  T(F): 98.2 (26 Feb 2020 07:54), Max: 98.7 (25 Feb 2020 16:22)  HR: 63 (26 Feb 2020 07:54) (51 - 75)  BP: 138/53 (26 Feb 2020 07:54) (119/49 - 151/66)  BP(mean): --  RR: 18 (26 Feb 2020 07:54) (16 - 18)  SpO2: 96% (26 Feb 2020 07:54) (94% - 98%)  I&O's Summary    25 Feb 2020 07:01  -  26 Feb 2020 07:00  --------------------------------------------------------  IN: 0 mL / OUT: 1450 mL / NET: -1450 mL                              8.2    4.48  )-----------( 91       ( 25 Feb 2020 06:09 )             25.9     02-25    134<L>  |  92<L>  |  148.0<H>  ----------------------------<  90  4.6   |  24.0  |  4.73<H>    Ca    8.1<L>      25 Feb 2020 06:09        MEDICATIONS  (STANDING):  amLODIPine   Tablet 10 milliGRAM(s) Oral daily  cefTRIAXone   IVPB 1000 milliGRAM(s) IV Intermittent every 24 hours  cyanocobalamin Injectable 1000 MICROGram(s) SubCutaneous daily  dextrose 5%. 1000 milliLiter(s) (50 mL/Hr) IV Continuous <Continuous>  dextrose 50% Injectable 12.5 Gram(s) IV Push once  dextrose 50% Injectable 25 Gram(s) IV Push once  dextrose 50% Injectable 25 Gram(s) IV Push once  doxazosin 4 milliGRAM(s) Oral at bedtime  epoetin nereida Injectable 8000 Unit(s) IV Push <User Schedule>  ergocalciferol 06555 Unit(s) Oral every week  hydrALAZINE 50 milliGRAM(s) Oral three times a day  insulin lispro (HumaLOG) corrective regimen sliding scale   SubCutaneous Before meals and at bedtime  iron sucrose IVPB 200 milliGRAM(s) IV Intermittent every 24 hours  isosorbide   dinitrate Tablet (ISORDIL) 10 milliGRAM(s) Oral three times a day  pantoprazole    Tablet 40 milliGRAM(s) Oral before breakfast  sodium bicarbonate 650 milliGRAM(s) Oral two times a day  tamsulosin Oral Tab/Cap - Peds 0.4 milliGRAM(s) Oral at bedtime  torsemide 20 milliGRAM(s) Oral two times a day    MEDICATIONS  (PRN):  acetaminophen   Tablet .. 650 milliGRAM(s) Oral every 6 hours PRN Temp greater or equal to 38C (100.4F), Mild Pain (1 - 3)  dextrose 40% Gel 15 Gram(s) Oral once PRN Blood Glucose LESS THAN 70 milliGRAM(s)/deciliter  glucagon  Injectable 1 milliGRAM(s) IntraMuscular once PRN Glucose LESS THAN 70 milligrams/deciliter      Physical exam:   General: A&O x2. Patient pleasantly demented with no acute distress. Non-labored breathing  Neuro: No gross sensory or motor deficits  Skin: No pain to palpation, erythema, or abnormal drainage from permcath site          Primary Assessment:  1.CHF  2.ESRD  3.Cystitis   -Vein mapping 2/25 revealed no DVT. Left upper arm cephalic vein 0.35 cm.       Plan:   -Schedule AVF procedure prior to discharge. Preserve LUE for fistula. JEMIMA SANTOS    [id number]    History:  The patient is a 79 year old male with a PMH of CHF, HTN, DM, BPH, and CKD4. Pt was admitted for acute on chronic kidney disease, edematous extremities and shortness of breath. Pt had a permcath placed in the RIJ on 2/25/20 with no complications with dialysis. Patient is doing well with no acute complaints. The patient's pain is controlled using the prescribed pain medications.  Denies nausea, vomiting, chest pain, shortness of breath, abdominal pain or fever. No new complaints. No acute motor or sensory changes are reported.    Vital Signs Last 24 Hrs  T(C): 36.8 (26 Feb 2020 07:54), Max: 37.1 (25 Feb 2020 16:22)  T(F): 98.2 (26 Feb 2020 07:54), Max: 98.7 (25 Feb 2020 16:22)  HR: 63 (26 Feb 2020 07:54) (51 - 75)  BP: 138/53 (26 Feb 2020 07:54) (119/49 - 151/66)  BP(mean): --  RR: 18 (26 Feb 2020 07:54) (16 - 18)  SpO2: 96% (26 Feb 2020 07:54) (94% - 98%)  I&O's Summary    25 Feb 2020 07:01  -  26 Feb 2020 07:00  --------------------------------------------------------  IN: 0 mL / OUT: 1450 mL / NET: -1450 mL                              8.2    4.48  )-----------( 91       ( 25 Feb 2020 06:09 )             25.9     02-25    134<L>  |  92<L>  |  148.0<H>  ----------------------------<  90  4.6   |  24.0  |  4.73<H>    Ca    8.1<L>      25 Feb 2020 06:09        MEDICATIONS  (STANDING):  amLODIPine   Tablet 10 milliGRAM(s) Oral daily  cefTRIAXone   IVPB 1000 milliGRAM(s) IV Intermittent every 24 hours  cyanocobalamin Injectable 1000 MICROGram(s) SubCutaneous daily  dextrose 5%. 1000 milliLiter(s) (50 mL/Hr) IV Continuous <Continuous>  dextrose 50% Injectable 12.5 Gram(s) IV Push once  dextrose 50% Injectable 25 Gram(s) IV Push once  dextrose 50% Injectable 25 Gram(s) IV Push once  doxazosin 4 milliGRAM(s) Oral at bedtime  epoetin nereida Injectable 8000 Unit(s) IV Push <User Schedule>  ergocalciferol 43852 Unit(s) Oral every week  hydrALAZINE 50 milliGRAM(s) Oral three times a day  insulin lispro (HumaLOG) corrective regimen sliding scale   SubCutaneous Before meals and at bedtime  iron sucrose IVPB 200 milliGRAM(s) IV Intermittent every 24 hours  isosorbide   dinitrate Tablet (ISORDIL) 10 milliGRAM(s) Oral three times a day  pantoprazole    Tablet 40 milliGRAM(s) Oral before breakfast  sodium bicarbonate 650 milliGRAM(s) Oral two times a day  tamsulosin Oral Tab/Cap - Peds 0.4 milliGRAM(s) Oral at bedtime  torsemide 20 milliGRAM(s) Oral two times a day    MEDICATIONS  (PRN):  acetaminophen   Tablet .. 650 milliGRAM(s) Oral every 6 hours PRN Temp greater or equal to 38C (100.4F), Mild Pain (1 - 3)  dextrose 40% Gel 15 Gram(s) Oral once PRN Blood Glucose LESS THAN 70 milliGRAM(s)/deciliter  glucagon  Injectable 1 milliGRAM(s) IntraMuscular once PRN Glucose LESS THAN 70 milligrams/deciliter      Physical exam:   General: A&O x2. Patient pleasantly demented with no acute distress. Non-labored breathing  Neuro: No gross sensory or motor deficits  Skin: No pain to palpation, erythema, or abnormal drainage from permcath site            Primary Assessment:  1.CHF  2.ESRD  3.Cystitis   -Vein mapping 2/25 revealed no DVT. Left upper arm cephalic vein 0.35 cm.       Plan:   -Schedule AVF procedure prior to discharge. Preserve LUE for fistula.

## 2020-02-26 NOTE — PROGRESS NOTE ADULT - SUBJECTIVE AND OBJECTIVE BOX
[facility] DIVISION OF KIDNEY DISEASES AND HYPERTENSION -- HEMODIALYSIS NOTE  --------------------------------------------------------------------------------  Chief Complaint: ESRD/Ongoing hemodialysis requirement    24 hour events/subjective:  Patient seen and examined on HD      PAST HISTORY  --------------------------------------------------------------------------------  No significant changes to PMH, PSH, FHx, SHx, unless otherwise noted    ALLERGIES & MEDICATIONS  --------------------------------------------------------------------------------  Allergies  No Known Allergies      Standing Inpatient Medications  amLODIPine   Tablet 10 milliGRAM(s) Oral daily  cefTRIAXone   IVPB 1000 milliGRAM(s) IV Intermittent every 24 hours  chlorhexidine 2% Cloths 1 Application(s) Topical <User Schedule>  cyanocobalamin Injectable 1000 MICROGram(s) SubCutaneous daily  dextrose 5%. 1000 milliLiter(s) IV Continuous <Continuous>  dextrose 50% Injectable 12.5 Gram(s) IV Push once  dextrose 50% Injectable 25 Gram(s) IV Push once  dextrose 50% Injectable 25 Gram(s) IV Push once  doxazosin 4 milliGRAM(s) Oral at bedtime  epoetin nereida Injectable 8000 Unit(s) IV Push <User Schedule>  ergocalciferol 41590 Unit(s) Oral every week  heparin  Injectable (Preservative-Free) 5000 Unit(s) SubCutaneous two times a day  hydrALAZINE 50 milliGRAM(s) Oral three times a day  insulin lispro (HumaLOG) corrective regimen sliding scale   SubCutaneous Before meals and at bedtime  iron sucrose IVPB 200 milliGRAM(s) IV Intermittent every 24 hours  isosorbide   dinitrate Tablet (ISORDIL) 10 milliGRAM(s) Oral three times a day  pantoprazole    Tablet 40 milliGRAM(s) Oral before breakfast  PPD  5 Tuberculin Unit(s) Injectable 5 Unit(s) IntraDermal once  sodium bicarbonate 650 milliGRAM(s) Oral two times a day  tamsulosin Oral Tab/Cap - Peds 0.4 milliGRAM(s) Oral at bedtime  torsemide 20 milliGRAM(s) Oral two times a day    PRN Inpatient Medications  acetaminophen   Tablet .. 650 milliGRAM(s) Oral every 6 hours PRN  dextrose 40% Gel 15 Gram(s) Oral once PRN  glucagon  Injectable 1 milliGRAM(s) IntraMuscular once PRN      REVIEW OF SYSTEMS  --------------------------------------------------------------------------------  Please see other progress note from today    VITALS/PHYSICAL EXAM  --------------------------------------------------------------------------------  T(C): 36.5 (02-26-20 @ 12:18), Max: 37.1 (02-25-20 @ 16:22)  HR: 63 (02-26-20 @ 12:18) (55 - 75)  BP: 120/56 (02-26-20 @ 12:18) (119/49 - 151/66)  RR: 18 (02-26-20 @ 12:18) (16 - 18)  SpO2: 94% (02-26-20 @ 12:18) (94% - 98%)      02-25-20 @ 07:01  -  02-26-20 @ 07:00  --------------------------------------------------------  IN: 0 mL / OUT: 1450 mL / NET: -1450 mL      Physical Exam:  	Please see other progress note from today  	Vascular access: Right IJ tunneled dialysis catheter    LABS/STUDIES  --------------------------------------------------------------------------------              8.9    6.35  >-----------<  99       [02-26-20 @ 13:02]              28.1     137  |  93  |  86.0  ----------------------------<  173      [02-26-20 @ 13:02]  4.0   |  27.0  |  3.31        Ca     8.6     [02-26-20 @ 13:02]      Iron 23, TIBC 319, %sat 7      [02-22-20 @ 11:11]  Ferritin 41      [02-22-20 @ 11:11]  PTH -- (Ca 8.3)      [12-12-19 @ 19:36]   164  Vitamin D (25OH) 20.3      [12-12-19 @ 19:36]  HbA1c 8.1      [12-11-19 @ 06:47]  TSH 6.62      [02-23-20 @ 10:16]    HBsAb <3.0      [02-24-20 @ 15:46]  HBsAg Nonreact      [02-24-20 @ 15:46]  HBcAb Nonreact      [02-24-20 @ 15:46]  HCV 0.12, Nonreact      [02-24-20 @ 15:46]

## 2020-02-26 NOTE — PROGRESS NOTE ADULT - ASSESSMENT
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     Chest with moderate bilateral pleural effusion.  TTE -  with moderate to severe AS and EF 60%    CHF (moderate pleural effusion bilaterally)  EF 65%  moderate to severe aortic stenosis  IN: 580 mL / OUT: 1700 mL / NET: -1120 mL on Demadex 20 mg.,  bid    CRF- Scr.,  slightly improved    GI bleed    Hypertension - DASH,    On  hydralazine, Cardura and Norvasc    BPH      RIJ Tunneled catheter placed in OR yesterday  HD today    -Preserve LUE. Will plan for permcath placement and AV fistula prior to discharge.

## 2020-02-26 NOTE — PROGRESS NOTE ADULT - ASSESSMENT
80 yo male, PMH of HFpEF s/p cardiomem, CKD4, HTN, DM2 on insulin, advanced dementia, prior GIB, recent admit for HF requiring chest tube and temporary HD presents to ER for dark stool (although on iron at home) and shortness of breath per wife.  patient is poor historian.     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    torsemide decreased by cardiology    HD restarted per nephro    AVF on this admission    cystitis on CT, + urine culture    repeat ua/ucx, start ceftriaxone    ID consulted for duration/choice of abx     iron deficiency anemia:  doubt GI bleed as h/h stable and stool occult negative    venofer    CT abdomen with oral contrast negative for acute pathology    change to oral daily PPI    low-normal p02--H42 supplements    chronic HFpEF: seems compensated    cardiomem interrogated    torsemide decreased    HTN with bradycardia   HOLD coreg 25mg BID for now    c/w norvasc and hydralazine    restart coreg 3.125 BID when HR improves     hypothermia: resolved, unclear etiology     hypoglycemia: resolved, unclear etiology.    monitor.    heparin subq

## 2020-02-26 NOTE — CONSULT NOTE ADULT - ASSESSMENT
80 yo male, PMH of HFpEF, CKD4, HTN, DM2, TURP on insulin, advanced dementia, prior GIBs, presents today sent in by Cardiology for evaluation of concern for GI bleeding in setting of HFpEF exacerbation. Admitted for initiation of HD on this admission. ID called due to concern for UTI. Patient unable to give reliable history  due to dementia however denies urinary symptoms.    Overall ESRD, TURP, anemia, r/o UTI    - hypothermic on admission ? cause  - CT with bladder wall thickening, surrounding stranding, diverticulum-these are old findings present on prior CT as well  - UA negative on admission with low counts of Pseudomonas and Enterobacter likely contaminants. Repeat UA with >WBC however UCX contaminated  - CXR negative  - DC ceftriaxone and observe  - Suggest blood cultures given hypothermia on admission  - Rt IJ HD catheter placed 2/25, plan for AVF prior to discharge  - Trend Fever  - Trend Leukocytosis    d/w Dr Cary and pharmacy  Will Follow

## 2020-02-26 NOTE — CONSULT NOTE ADULT - SUBJECTIVE AND OBJECTIVE BOX
[facility]  INFECTIOUS DISEASES AND INTERNAL MEDICINE at [place]  =======================================================  Donato Banegas MD  Diplomates American Board of Internal Medicine and Infectious Diseases  Tel: [phone]      Fax: [phone]  =======================================================      [id number]  JEMIMA SANTOS is a 79y  Male     CC: Patient is a 79y old  Male who presents with a chief complaint of tarry stool with leg swelling (26 Feb 2020 14:04)    HPI:  78 yo male, PMH of HFpEF, CKD4, HTN, DM2 on insulin, advanced dementia, prior GIBs, presents today sent in by Cardiology for evaluation of concern for GI bleeding in setting of HFpEF exacerbation. Patient's wife, who is primary historian given patient's demented state, says that for last few days, patient has had multiple episodes of dark tarry stools, wife uncertain of quantity of them due to patient being incontinent and wife not actively observing. She contacted cardiology (Dr Faustin) for concern of his increased dyspnea and leg swelling who advised presenting for evaluation then when wife took patient to office, was advised to go to ER for evaluation given the concern of bleeding. Per wife, patient always denies any complaints and always says that he is fine.     In ER: noted to have worsening renal function, renal consulted with no plan for urgent HD. Cardio consulted, noted to have improved cardiomems with plans to pull back on aggressive diuresis given worsened renal function. (21 Feb 2020 17:27)      PAST MEDICAL & SURGICAL HISTORY:  Hyperkalemia  ADI (acute kidney injury)  BPH (benign prostatic hyperplasia)  CKD (chronic kidney disease)  Hyperlipemia  Hypertension  Diabetes  Ureteral stent retained      Social Hx:    FAMILY HISTORY:  FH: type 2 diabetes: mother      Allergies    No Known Allergies    Intolerances        MEDICATIONS  (STANDING):  amLODIPine   Tablet 10 milliGRAM(s) Oral daily  chlorhexidine 2% Cloths 1 Application(s) Topical <User Schedule>  cyanocobalamin Injectable 1000 MICROGram(s) SubCutaneous daily  dextrose 5%. 1000 milliLiter(s) (50 mL/Hr) IV Continuous <Continuous>  dextrose 50% Injectable 12.5 Gram(s) IV Push once  dextrose 50% Injectable 25 Gram(s) IV Push once  dextrose 50% Injectable 25 Gram(s) IV Push once  doxazosin 4 milliGRAM(s) Oral at bedtime  epoetin nereida Injectable 8000 Unit(s) IV Push <User Schedule>  ergocalciferol 93592 Unit(s) Oral every week  heparin  Injectable 5000 Unit(s) SubCutaneous every 12 hours  hydrALAZINE 50 milliGRAM(s) Oral three times a day  insulin lispro (HumaLOG) corrective regimen sliding scale   SubCutaneous Before meals and at bedtime  isosorbide   dinitrate Tablet (ISORDIL) 10 milliGRAM(s) Oral three times a day  pantoprazole    Tablet 40 milliGRAM(s) Oral before breakfast  sodium bicarbonate 650 milliGRAM(s) Oral two times a day  tamsulosin Oral Tab/Cap - Peds 0.4 milliGRAM(s) Oral at bedtime  torsemide 20 milliGRAM(s) Oral two times a day    MEDICATIONS  (PRN):  acetaminophen   Tablet .. 650 milliGRAM(s) Oral every 6 hours PRN Temp greater or equal to 38C (100.4F), Mild Pain (1 - 3)  dextrose 40% Gel 15 Gram(s) Oral once PRN Blood Glucose LESS THAN 70 milliGRAM(s)/deciliter  glucagon  Injectable 1 milliGRAM(s) IntraMuscular once PRN Glucose LESS THAN 70 milligrams/deciliter      ANTIMICROBIALS:      OTHER MEDS: MEDICATIONS  (STANDING):  acetaminophen   Tablet .. 650 every 6 hours PRN  amLODIPine   Tablet 10 daily  dextrose 40% Gel 15 once PRN  dextrose 50% Injectable 12.5 once  dextrose 50% Injectable 25 once  dextrose 50% Injectable 25 once  doxazosin 4 at bedtime  epoetin nereida Injectable 8000 <User Schedule>  glucagon  Injectable 1 once PRN  heparin  Injectable 5000 every 12 hours  hydrALAZINE 50 three times a day  insulin lispro (HumaLOG) corrective regimen sliding scale  Before meals and at bedtime  isosorbide   dinitrate Tablet (ISORDIL) 10 three times a day  pantoprazole    Tablet 40 before breakfast  tamsulosin Oral Tab/Cap - Peds 0.4 at bedtime  torsemide 20 two times a day             REVIEW OF SYSTEMS:  CONSTITUTIONAL:  No Fever or chills  HEENT:  No diplopia or blurred vision.  No earache, sore throat or runny nose.  CARDIOVASCULAR:  No pressure, squeezing, strangling, tightness, heaviness or aching about the chest, neck, axilla or epigastrium.  RESPIRATORY:  No cough, shortness of breath  GASTROINTESTINAL:  No nausea, vomiting or diarrhea.  GENITOURINARY:  No dysuria, frequency or urgency. No Blood in urine  MUSCULOSKELETAL:  no joint aches, no muscle pain  SKIN:  No change in skin, hair or nails.  NEUROLOGIC:  No Headaches, seizures or weakness.  PSYCHIATRIC:  No disorder of thought or mood.  ENDOCRINE:  No heat or cold intolerance  HEMATOLOGICAL:  No easy bruising or bleeding.           I&O's Detail    25 Feb 2020 07:01  -  26 Feb 2020 07:00  --------------------------------------------------------  IN:  Total IN: 0 mL    OUT:    Other: 1000 mL    Voided: 450 mL  Total OUT: 1450 mL    Total NET: -1450 mL      26 Feb 2020 07:01  -  26 Feb 2020 15:57  --------------------------------------------------------  IN:  Total IN: 0 mL    OUT:    Other: 1000 mL  Total OUT: 1000 mL    Total NET: -1000 mL            Physical Exam:  Vital Signs Last 24 Hrs  T(C): 36.4 (26 Feb 2020 14:58), Max: 37.1 (25 Feb 2020 16:22)  T(F): 97.5 (26 Feb 2020 14:58), Max: 98.7 (25 Feb 2020 16:22)  HR: 62 (26 Feb 2020 14:58) (55 - 75)  BP: 128/62 (26 Feb 2020 14:58) (119/49 - 151/66)  BP(mean): --  RR: 17 (26 Feb 2020 14:58) (16 - 18)  SpO2: 95% (26 Feb 2020 14:58) (94% - 98%)    GEN: NAD, pleasant  HEENT: normocephalic and atraumatic. EOMI. PERRL.  Anicteric  NECK: Supple.   LUNGS: Clear to auscultation.  HEART: Regular rate and rhythm without murmur.  ABDOMEN: Soft, nontender, and nondistended.  Positive bowel sounds.    : No CVA tenderness  EXTREMITIES: Without any edema.  MSK: No joint swelling  NEUROLOGIC: Cranial nerves II through XII are grossly intact. No Focal Deficits  PSYCHIATRIC: Appropriate affect .  SKIN: No Rash        Labs:  02-26    137  |  93<L>  |  86.0<H>  ----------------------------<  173<H>  4.0   |  27.0  |  3.31<H>    Ca    8.6      26 Feb 2020 13:02                            8.9    6.35  )-----------( 99       ( 26 Feb 2020 13:02 )             28.1                 CAPILLARY BLOOD GLUCOSE      POCT Blood Glucose.: 182 mg/dL (26 Feb 2020 15:56)  POCT Blood Glucose.: 148 mg/dL (26 Feb 2020 11:16)  POCT Blood Glucose.: 90 mg/dL (26 Feb 2020 07:41)  POCT Blood Glucose.: 117 mg/dL (25 Feb 2020 22:13)  POCT Blood Glucose.: 113 mg/dL (25 Feb 2020 19:46)        RECENT CULTURES:  02-24 @ 21:49 .Urine     >=3 organisms. Probable collection contamination.        02-21 @ 22:09 .Urine Pseudomonas aeruginosa  Enterobacter cloacae    50,000 - 99,000 CFU/mL Pseudomonas aeruginosa  50,000 - 99,000 CFU/mL Enterobacter cloacae              Radiology: [facility]  INFECTIOUS DISEASES AND INTERNAL MEDICINE at [place]  =======================================================  Donato Banegas MD  Diplomates American Board of Internal Medicine and Infectious Diseases  Tel: [phone]      Fax: [phone]  =======================================================      [id number]  JEMIMA SANTOS is a 79y  Male     CC: Patient is a 79y old  Male who presents with a chief complaint of tarry stool with leg swelling (26 Feb 2020 14:04)    HPI:  78 yo male, PMH of HFpEF, CKD4, HTN, DM2 on insulin, advanced dementia, prior GIBs, presents today sent in by Cardiology for evaluation of concern for GI bleeding in setting of HFpEF exacerbation. Patient's wife, who is primary historian given patient's demented state, says that for last few days, patient has had multiple episodes of dark tarry stools, wife uncertain of quantity of them due to patient being incontinent and wife not actively observing. She contacted cardiology (Dr Faustin) for concern of his increased dyspnea and leg swelling who advised presenting for evaluation then when wife took patient to office, was advised to go to ER for evaluation given the concern of bleeding. Per wife, patient always denies any complaints and always says that he is fine.     In ER: noted to have worsening renal function, renal consulted with no plan for urgent HD. Cardio consulted, noted to have improved cardiomems with plans to pull back on aggressive diuresis given worsened renal function. (21 Feb 2020 17:27)      PAST MEDICAL & SURGICAL HISTORY:  Hyperkalemia  ADI (acute kidney injury)  BPH (benign prostatic hyperplasia)  CKD (chronic kidney disease)  Hyperlipemia  Hypertension  Diabetes  Ureteral stent retained      Social Hx: non smoker    FAMILY HISTORY:  FH: type 2 diabetes: mother      Allergies    No Known Allergies    Intolerances        MEDICATIONS  (STANDING):  amLODIPine   Tablet 10 milliGRAM(s) Oral daily  chlorhexidine 2% Cloths 1 Application(s) Topical <User Schedule>  cyanocobalamin Injectable 1000 MICROGram(s) SubCutaneous daily  dextrose 5%. 1000 milliLiter(s) (50 mL/Hr) IV Continuous <Continuous>  dextrose 50% Injectable 12.5 Gram(s) IV Push once  dextrose 50% Injectable 25 Gram(s) IV Push once  dextrose 50% Injectable 25 Gram(s) IV Push once  doxazosin 4 milliGRAM(s) Oral at bedtime  epoetin nereida Injectable 8000 Unit(s) IV Push <User Schedule>  ergocalciferol 65653 Unit(s) Oral every week  heparin  Injectable 5000 Unit(s) SubCutaneous every 12 hours  hydrALAZINE 50 milliGRAM(s) Oral three times a day  insulin lispro (HumaLOG) corrective regimen sliding scale   SubCutaneous Before meals and at bedtime  isosorbide   dinitrate Tablet (ISORDIL) 10 milliGRAM(s) Oral three times a day  pantoprazole    Tablet 40 milliGRAM(s) Oral before breakfast  sodium bicarbonate 650 milliGRAM(s) Oral two times a day  tamsulosin Oral Tab/Cap - Peds 0.4 milliGRAM(s) Oral at bedtime  torsemide 20 milliGRAM(s) Oral two times a day    MEDICATIONS  (PRN):  acetaminophen   Tablet .. 650 milliGRAM(s) Oral every 6 hours PRN Temp greater or equal to 38C (100.4F), Mild Pain (1 - 3)  dextrose 40% Gel 15 Gram(s) Oral once PRN Blood Glucose LESS THAN 70 milliGRAM(s)/deciliter  glucagon  Injectable 1 milliGRAM(s) IntraMuscular once PRN Glucose LESS THAN 70 milligrams/deciliter      ANTIMICROBIALS:      OTHER MEDS: MEDICATIONS  (STANDING):  acetaminophen   Tablet .. 650 every 6 hours PRN  amLODIPine   Tablet 10 daily  dextrose 40% Gel 15 once PRN  dextrose 50% Injectable 12.5 once  dextrose 50% Injectable 25 once  dextrose 50% Injectable 25 once  doxazosin 4 at bedtime  epoetin nereiad Injectable 8000 <User Schedule>  glucagon  Injectable 1 once PRN  heparin  Injectable 5000 every 12 hours  hydrALAZINE 50 three times a day  insulin lispro (HumaLOG) corrective regimen sliding scale  Before meals and at bedtime  isosorbide   dinitrate Tablet (ISORDIL) 10 three times a day  pantoprazole    Tablet 40 before breakfast  tamsulosin Oral Tab/Cap - Peds 0.4 at bedtime  torsemide 20 two times a day             REVIEW OF SYSTEMS:  CONSTITUTIONAL:  No Fever or chills  HEENT:  No diplopia or blurred vision.  No earache, sore throat or runny nose.  CARDIOVASCULAR:  No pressure, squeezing, strangling, tightness, heaviness or aching about the chest, neck, axilla or epigastrium.  RESPIRATORY:  No cough, shortness of breath  GASTROINTESTINAL:  No nausea, vomiting or diarrhea.  GENITOURINARY:  No dysuria, frequency or urgency. No Blood in urine  MUSCULOSKELETAL:  no joint aches, no muscle pain  SKIN:  No change in skin, hair or nails.  NEUROLOGIC:  No Headaches, seizures or weakness.  PSYCHIATRIC:  No disorder of thought or mood.  ENDOCRINE:  No heat or cold intolerance  HEMATOLOGICAL:  No easy bruising or bleeding.           I&O's Detail    25 Feb 2020 07:01  -  26 Feb 2020 07:00  --------------------------------------------------------  IN:  Total IN: 0 mL    OUT:    Other: 1000 mL    Voided: 450 mL  Total OUT: 1450 mL    Total NET: -1450 mL      26 Feb 2020 07:01  -  26 Feb 2020 15:57  --------------------------------------------------------  IN:  Total IN: 0 mL    OUT:    Other: 1000 mL  Total OUT: 1000 mL    Total NET: -1000 mL            Physical Exam:  Vital Signs Last 24 Hrs  T(C): 36.4 (26 Feb 2020 14:58), Max: 37.1 (25 Feb 2020 16:22)  T(F): 97.5 (26 Feb 2020 14:58), Max: 98.7 (25 Feb 2020 16:22)  HR: 62 (26 Feb 2020 14:58) (55 - 75)  BP: 128/62 (26 Feb 2020 14:58) (119/49 - 151/66)  BP(mean): --  RR: 17 (26 Feb 2020 14:58) (16 - 18)  SpO2: 95% (26 Feb 2020 14:58) (94% - 98%)    GEN: NAD, pleasant  HEENT: normocephalic and atraumatic. EOMI. PERRL.  Anicteric  NECK: Supple.   LUNGS: Clear to auscultation.  HEART: Regular rate and rhythm without murmur. right chest HD catheter  ABDOMEN: Soft, nontender, and nondistended.  Positive bowel sounds.    : No CVA tenderness  EXTREMITIES: Without any edema.  MSK: No joint swelling  NEUROLOGIC: Cranial nerves II through XII are grossly intact. No Focal Deficits  PSYCHIATRIC: Appropriate affect .  SKIN: No Rash        Labs:  02-26    137  |  93<L>  |  86.0<H>  ----------------------------<  173<H>  4.0   |  27.0  |  3.31<H>    Ca    8.6      26 Feb 2020 13:02                            8.9    6.35  )-----------( 99       ( 26 Feb 2020 13:02 )             28.1                 CAPILLARY BLOOD GLUCOSE      POCT Blood Glucose.: 182 mg/dL (26 Feb 2020 15:56)  POCT Blood Glucose.: 148 mg/dL (26 Feb 2020 11:16)  POCT Blood Glucose.: 90 mg/dL (26 Feb 2020 07:41)  POCT Blood Glucose.: 117 mg/dL (25 Feb 2020 22:13)  POCT Blood Glucose.: 113 mg/dL (25 Feb 2020 19:46)        RECENT CULTURES:  02-24 @ 21:49 .Urine     >=3 organisms. Probable collection contamination.        02-21 @ 22:09 .Urine Pseudomonas aeruginosa  Enterobacter cloacae    50,000 - 99,000 CFU/mL Pseudomonas aeruginosa  50,000 - 99,000 CFU/mL Enterobacter cloacae              Radiology:  < from: CT Abdomen and Pelvis w/ Oral Cont (02.22.20 @ 17:19) >  FINDINGS:    LOWER CHEST: Moderate bilateral pleural effusionsand bibasilar atelectasis.    LIVER: Normal.  BILE DUCTS: Nondilated.  GALLBLADDER: Normal.  SPLEEN: Normal.  PANCREAS: Normal.  ADRENALS: Normal.  KIDNEYS/URETERS: No hydronephrosis or urinary tract calculi.    BLADDER: Diffuse wall thickening. Surrounding Stranding. Left bladder diverticulum.  REPRODUCTIVE ORGANS: Status post TURP.    BOWEL: No evidence of mass, obstruction, or inflammation.  PERITONEUM: Small ascites. No free air or collection.  VESSELS:  Normal caliber aorta.  RETROPERITONEUM/LYMPH NODES: No adenopathy.    ABDOMINAL WALL: Normal.  BONES: No acute bony abnormality.    IMPRESSION:     No evidence of mass, obstruction, or inflammation.    Urinary bladder cystitis.      < end of copied text >

## 2020-02-27 ENCOUNTER — TRANSCRIPTION ENCOUNTER (OUTPATIENT)
Age: 80
End: 2020-02-27

## 2020-02-27 LAB
ANION GAP SERPL CALC-SCNC: 14 MMOL/L — SIGNIFICANT CHANGE UP (ref 5–17)
BLD GP AB SCN SERPL QL: SIGNIFICANT CHANGE UP
BUN SERPL-MCNC: 43 MG/DL — HIGH (ref 8–20)
CALCIUM SERPL-MCNC: 8.7 MG/DL — SIGNIFICANT CHANGE UP (ref 8.6–10.2)
CHLORIDE SERPL-SCNC: 97 MMOL/L — LOW (ref 98–107)
CO2 SERPL-SCNC: 27 MMOL/L — SIGNIFICANT CHANGE UP (ref 22–29)
CREAT SERPL-MCNC: 2.55 MG/DL — HIGH (ref 0.5–1.3)
GLUCOSE BLDC GLUCOMTR-MCNC: 118 MG/DL — HIGH (ref 70–99)
GLUCOSE BLDC GLUCOMTR-MCNC: 153 MG/DL — HIGH (ref 70–99)
GLUCOSE BLDC GLUCOMTR-MCNC: 171 MG/DL — HIGH (ref 70–99)
GLUCOSE BLDC GLUCOMTR-MCNC: 257 MG/DL — HIGH (ref 70–99)
GLUCOSE SERPL-MCNC: 149 MG/DL — HIGH (ref 70–99)
HCT VFR BLD CALC: 28 % — LOW (ref 39–50)
HGB BLD-MCNC: 9 G/DL — LOW (ref 13–17)
MCHC RBC-ENTMCNC: 27.2 PG — SIGNIFICANT CHANGE UP (ref 27–34)
MCHC RBC-ENTMCNC: 32.1 GM/DL — SIGNIFICANT CHANGE UP (ref 32–36)
MCV RBC AUTO: 84.6 FL — SIGNIFICANT CHANGE UP (ref 80–100)
PLATELET # BLD AUTO: 122 K/UL — LOW (ref 150–400)
POTASSIUM SERPL-MCNC: 3.8 MMOL/L — SIGNIFICANT CHANGE UP (ref 3.5–5.3)
POTASSIUM SERPL-SCNC: 3.8 MMOL/L — SIGNIFICANT CHANGE UP (ref 3.5–5.3)
RBC # BLD: 3.31 M/UL — LOW (ref 4.2–5.8)
RBC # FLD: 17 % — HIGH (ref 10.3–14.5)
SODIUM SERPL-SCNC: 138 MMOL/L — SIGNIFICANT CHANGE UP (ref 135–145)
WBC # BLD: 7.33 K/UL — SIGNIFICANT CHANGE UP (ref 3.8–10.5)
WBC # FLD AUTO: 7.33 K/UL — SIGNIFICANT CHANGE UP (ref 3.8–10.5)

## 2020-02-27 PROCEDURE — 99232 SBSQ HOSP IP/OBS MODERATE 35: CPT

## 2020-02-27 PROCEDURE — 90937 HEMODIALYSIS REPEATED EVAL: CPT

## 2020-02-27 RX ORDER — CEFAZOLIN SODIUM 1 G
2000 VIAL (EA) INJECTION ONCE
Refills: 0 | Status: DISCONTINUED | OUTPATIENT
Start: 2020-02-27 | End: 2020-02-28

## 2020-02-27 RX ORDER — IRON SUCROSE 20 MG/ML
200 INJECTION, SOLUTION INTRAVENOUS
Refills: 0 | Status: DISCONTINUED | OUTPATIENT
Start: 2020-02-27 | End: 2020-02-28

## 2020-02-27 RX ADMIN — TAMSULOSIN HYDROCHLORIDE 0.4 MILLIGRAM(S): 0.4 CAPSULE ORAL at 21:40

## 2020-02-27 RX ADMIN — Medication 50 MILLIGRAM(S): at 21:40

## 2020-02-27 RX ADMIN — Medication 2: at 17:40

## 2020-02-27 RX ADMIN — Medication 2: at 12:29

## 2020-02-27 RX ADMIN — CHLORHEXIDINE GLUCONATE 1 APPLICATION(S): 213 SOLUTION TOPICAL at 06:02

## 2020-02-27 RX ADMIN — HEPARIN SODIUM 5000 UNIT(S): 5000 INJECTION INTRAVENOUS; SUBCUTANEOUS at 06:02

## 2020-02-27 RX ADMIN — Medication 650 MILLIGRAM(S): at 19:42

## 2020-02-27 RX ADMIN — Medication 50 MILLIGRAM(S): at 06:01

## 2020-02-27 RX ADMIN — HEPARIN SODIUM 5000 UNIT(S): 5000 INJECTION INTRAVENOUS; SUBCUTANEOUS at 17:38

## 2020-02-27 RX ADMIN — Medication 650 MILLIGRAM(S): at 06:01

## 2020-02-27 RX ADMIN — PREGABALIN 1000 MICROGRAM(S): 225 CAPSULE ORAL at 12:28

## 2020-02-27 RX ADMIN — Medication 20 MILLIGRAM(S): at 06:01

## 2020-02-27 RX ADMIN — ISOSORBIDE DINITRATE 10 MILLIGRAM(S): 5 TABLET ORAL at 17:37

## 2020-02-27 RX ADMIN — ISOSORBIDE DINITRATE 10 MILLIGRAM(S): 5 TABLET ORAL at 21:40

## 2020-02-27 RX ADMIN — Medication 20 MILLIGRAM(S): at 17:40

## 2020-02-27 RX ADMIN — Medication 6: at 21:41

## 2020-02-27 RX ADMIN — IRON SUCROSE 110 MILLIGRAM(S): 20 INJECTION, SOLUTION INTRAVENOUS at 17:38

## 2020-02-27 RX ADMIN — ISOSORBIDE DINITRATE 10 MILLIGRAM(S): 5 TABLET ORAL at 06:02

## 2020-02-27 RX ADMIN — AMLODIPINE BESYLATE 10 MILLIGRAM(S): 2.5 TABLET ORAL at 06:01

## 2020-02-27 RX ADMIN — ERYTHROPOIETIN 8000 UNIT(S): 10000 INJECTION, SOLUTION INTRAVENOUS; SUBCUTANEOUS at 14:06

## 2020-02-27 RX ADMIN — PANTOPRAZOLE SODIUM 40 MILLIGRAM(S): 20 TABLET, DELAYED RELEASE ORAL at 06:02

## 2020-02-27 RX ADMIN — Medication 4 MILLIGRAM(S): at 21:40

## 2020-02-27 NOTE — PROGRESS NOTE ADULT - ASSESSMENT
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     Advanced CKD; now deemed ESRD  started on HD 02/24  3rd HD session today, then TTS schedule  Plan for AVF on 02/28  d/c sodium bicarb tabs    Anemia of CKD  Hb below goal  KD with HD  iron parameters consistent with Iron def anemia  Start IV iron    Chronic HFpEF: compensated  Cardiomems interrogated    Hypertension   BP stable  Continue Hydralazine, Cardura and Norvasc    CKD - MBD  on high dose Vitamin D  Repeat 25-OH vitamin D, iPTH levels  Monitor Ca++ and phos

## 2020-02-27 NOTE — PROGRESS NOTE ADULT - SUBJECTIVE AND OBJECTIVE BOX
[facility]  INFECTIOUS DISEASES AND INTERNAL MEDICINE at [place]  =======================================================  Donato Banegas MD  Diplomates American Board of Internal Medicine and Infectious Diseases  Tel: [phone]      Fax: [phone]  =======================================================    JEMIMA SANTOS [id number]    Follow up: r/o uti  axox3  no complaints    Allergies:  No Known Allergies      Medications:  acetaminophen   Tablet .. 650 milliGRAM(s) Oral every 6 hours PRN  amLODIPine   Tablet 10 milliGRAM(s) Oral daily  ceFAZolin   IVPB 2000 milliGRAM(s) IV Intermittent once  chlorhexidine 2% Cloths 1 Application(s) Topical <User Schedule>  cyanocobalamin Injectable 1000 MICROGram(s) SubCutaneous daily  dextrose 40% Gel 15 Gram(s) Oral once PRN  dextrose 5%. 1000 milliLiter(s) IV Continuous <Continuous>  dextrose 50% Injectable 12.5 Gram(s) IV Push once  dextrose 50% Injectable 25 Gram(s) IV Push once  dextrose 50% Injectable 25 Gram(s) IV Push once  doxazosin 4 milliGRAM(s) Oral at bedtime  epoetin nereida Injectable 8000 Unit(s) IV Push <User Schedule>  ergocalciferol 80130 Unit(s) Oral every week  glucagon  Injectable 1 milliGRAM(s) IntraMuscular once PRN  heparin  Injectable 5000 Unit(s) SubCutaneous every 12 hours  hydrALAZINE 50 milliGRAM(s) Oral three times a day  insulin lispro (HumaLOG) corrective regimen sliding scale   SubCutaneous Before meals and at bedtime  iron sucrose IVPB 200 milliGRAM(s) IV Intermittent <User Schedule>  isosorbide   dinitrate Tablet (ISORDIL) 10 milliGRAM(s) Oral three times a day  pantoprazole    Tablet 40 milliGRAM(s) Oral before breakfast  tamsulosin Oral Tab/Cap - Peds 0.4 milliGRAM(s) Oral at bedtime  torsemide 20 milliGRAM(s) Oral two times a day            REVIEW OF SYSTEMS:  CONSTITUTIONAL:  No Fever or chills  HEENT:   No diplopia or blurred vision.  No earache, sore throat or runny nose.  CARDIOVASCULAR:  No pressure, squeezing, strangling, tightness, heaviness or aching about the chest, neck, axilla or epigastrium.  RESPIRATORY:  No cough, shortness of breath  GASTROINTESTINAL:  No nausea, vomiting or diarrhea.  GENITOURINARY:  No dysuria, frequency or urgency. No Blood in urine  MUSCULOSKELETAL:  no joint aches, no muscle pain  SKIN:  No change in skin, hair or nails.  NEUROLOGIC:  No Headaches, seizures or weakness.  PSYCHIATRIC:  No disorder of thought or mood.  ENDOCRINE:  No heat or cold intolerance  HEMATOLOGICAL:  No easy bruising or bleeding.            Physical Exam:  ICU Vital Signs Last 24 Hrs  T(C): 36.7 (27 Feb 2020 17:11), Max: 37.3 (27 Feb 2020 00:22)  T(F): 98.1 (27 Feb 2020 17:11), Max: 99.1 (27 Feb 2020 00:22)  HR: 67 (27 Feb 2020 17:52) (65 - 75)  BP: 139/55 (27 Feb 2020 17:52) (134/52 - 153/54)  BP(mean): --  ABP: --  ABP(mean): --  RR: 18 (27 Feb 2020 17:11) (18 - 18)  SpO2: 95% (27 Feb 2020 17:11) (92% - 98%)      GEN: NAD, pleasant  HEENT: normocephalic and atraumatic. EOMI. PERRL.  Anicteric   NECK: Supple.   LUNGS: Clear to auscultation.  HEART: Regular rate and rhythm without murmur. rt HD catheter  ABDOMEN: Soft, nontender, and nondistended.  Positive bowel sounds.    : No CVA tenderness  EXTREMITIES: Without any edema. + hollis  MSK: no joint swelling  NEUROLOGIC: Cranial nerves II through XII are grossly intact. No focal deficits  PSYCHIATRIC: Appropriate affect .  SKIN: No Rash      Labs:  02-27    138  |  97<L>  |  43.0<H>  ----------------------------<  149<H>  3.8   |  27.0  |  2.55<H>    Ca    8.7      27 Feb 2020 02:43                            9.0    7.33  )-----------( 122      ( 27 Feb 2020 02:43 )             28.0                 CAPILLARY BLOOD GLUCOSE      POCT Blood Glucose.: 171 mg/dL (27 Feb 2020 17:20)  POCT Blood Glucose.: 153 mg/dL (27 Feb 2020 11:19)  POCT Blood Glucose.: 118 mg/dL (27 Feb 2020 07:31)  POCT Blood Glucose.: 189 mg/dL (26 Feb 2020 21:23)        RECENT CULTURES:  02-24 @ 21:49 .Urine     >=3 organisms. Probable collection contamination.        02-21 @ 22:09 .Urine Pseudomonas aeruginosa  Enterobacter cloacae    50,000 - 99,000 CFU/mL Pseudomonas aeruginosa  50,000 - 99,000 CFU/mL Enterobacter cloacae

## 2020-02-27 NOTE — PROGRESS NOTE ADULT - ASSESSMENT
79M PMHx CHF, HTN, DM, BPH, and CKD4 admitted for acute on chronic kidney disease, edematous extremities and shortness of breath.    - plan for AVF tomorrow 2/28

## 2020-02-27 NOTE — PROGRESS NOTE ADULT - ASSESSMENT
78 yo male, PMH of HFpEF, CKD4, HTN, DM2, TURP on insulin, advanced dementia, prior GIBs, presents today sent in by Cardiology for evaluation of concern for GI bleeding in setting of HFpEF exacerbation. Admitted for initiation of HD on this admission. ID called due to concern for UTI. Patient unable to give reliable history  due to dementia however denies urinary symptoms.    Overall ESRD, TURP, anemia, r/o UTI    - hypothermic on admission ? cause  - CT with bladder wall thickening, surrounding stranding, diverticulum-these are old findings present on prior CT as well  - UA negative on admission with low counts of Pseudomonas and Enterobacter likely contaminants. Repeat UA with >WBC however UCX contaminated  - CXR negative  - Stable off abx  - Blood cultures so far NGTD  - Rt IJ HD catheter placed 2/25, plan for AVF prior to discharge  - Trend Fever  - Trend Leukocytosis

## 2020-02-27 NOTE — PROGRESS NOTE ADULT - SUBJECTIVE AND OBJECTIVE BOX
[facility] DIVISION OF KIDNEY DISEASES AND HYPERTENSION -- HEMODIALYSIS NOTE  --------------------------------------------------------------------------------  Chief Complaint: ESRD/Ongoing hemodialysis requirement    24 hour events/subjective:        PAST HISTORY  --------------------------------------------------------------------------------  No significant changes to PMH, PSH, FHx, SHx, unless otherwise noted    ALLERGIES & MEDICATIONS  --------------------------------------------------------------------------------  Allergies    No Known Allergies    Intolerances      Standing Inpatient Medications  amLODIPine   Tablet 10 milliGRAM(s) Oral daily  ceFAZolin   IVPB 2000 milliGRAM(s) IV Intermittent once  chlorhexidine 2% Cloths 1 Application(s) Topical <User Schedule>  cyanocobalamin Injectable 1000 MICROGram(s) SubCutaneous daily  dextrose 5%. 1000 milliLiter(s) IV Continuous <Continuous>  dextrose 50% Injectable 12.5 Gram(s) IV Push once  dextrose 50% Injectable 25 Gram(s) IV Push once  dextrose 50% Injectable 25 Gram(s) IV Push once  doxazosin 4 milliGRAM(s) Oral at bedtime  epoetin nereida Injectable 8000 Unit(s) IV Push <User Schedule>  ergocalciferol 80033 Unit(s) Oral every week  heparin  Injectable 5000 Unit(s) SubCutaneous every 12 hours  hydrALAZINE 50 milliGRAM(s) Oral three times a day  insulin lispro (HumaLOG) corrective regimen sliding scale   SubCutaneous Before meals and at bedtime  isosorbide   dinitrate Tablet (ISORDIL) 10 milliGRAM(s) Oral three times a day  pantoprazole    Tablet 40 milliGRAM(s) Oral before breakfast  sodium bicarbonate 650 milliGRAM(s) Oral two times a day  tamsulosin Oral Tab/Cap - Peds 0.4 milliGRAM(s) Oral at bedtime  torsemide 20 milliGRAM(s) Oral two times a day    PRN Inpatient Medications  acetaminophen   Tablet .. 650 milliGRAM(s) Oral every 6 hours PRN  dextrose 40% Gel 15 Gram(s) Oral once PRN  glucagon  Injectable 1 milliGRAM(s) IntraMuscular once PRN      REVIEW OF SYSTEMS  --------------------------------------------------------------------------------  Gen: No weight changes, fatigue, fevers/chills, weakness  Skin: No rashes  Head/Eyes/Ears/Mouth: No headache  Respiratory: No dyspnea, cough,  CV: No chest pain, orthopnea  GI: No abdominal pain, diarrhea, constipation, nausea, vomiting,  MSK: No joint pain  Neuro: No dizziness/lightheadedness, weakness  Heme: No bleeding  Psych: No significant nervousness, anxiety, stress, depression    All other systems were reviewed and are negative, except as noted.    VITALS/PHYSICAL EXAM  --------------------------------------------------------------------------------  T(C): 36.4 (02-27-20 @ 12:55), Max: 37.3 (02-27-20 @ 00:22)  HR: 75 (02-27-20 @ 12:55) (62 - 75)  BP: 134/52 (02-27-20 @ 12:55) (128/62 - 153/54)  RR: 18 (02-27-20 @ 12:55) (17 - 181)  SpO2: 95% (02-27-20 @ 12:55) (92% - 98%)  Wt(kg): --        02-26-20 @ 07:01  -  02-27-20 @ 07:00  --------------------------------------------------------  IN: 0 mL / OUT: 1800 mL / NET: -1800 mL      Physical Exam:  	Gen: NAD  	HEENT:  supple neck  	Pulm: CTA B/L  	CV: RRR, S1S2; no rub  	Abd: +BS, soft, nontender  	UE: Warm, intact strength  	LE: Warm, no edema  	Neuro: No focal deficits  	Psych: Normal affect and mood  	Skin: Warm, without rashes  	Vascular access: BRIEN tunneled HD catheter+    LABS/STUDIES  --------------------------------------------------------------------------------              9.0    7.33  >-----------<  122      [02-27-20 @ 02:43]              28.0     138  |  97  |  43.0  ----------------------------<  149      [02-27-20 @ 02:43]  3.8   |  27.0  |  2.55        Ca     8.7     [02-27-20 @ 02:43]      Iron 23, TIBC 319, %sat 7      [02-22-20 @ 11:11]  Ferritin 41      [02-22-20 @ 11:11]  PTH -- (Ca 8.3)      [12-12-19 @ 19:36]   164  Vitamin D (25OH) 20.3      [12-12-19 @ 19:36]  HbA1c 8.1      [12-11-19 @ 06:47]  TSH 6.62      [02-23-20 @ 10:16]    HBsAb <3.0      [02-24-20 @ 15:46]  HBsAg Nonreact      [02-24-20 @ 15:46]  HBcAb Nonreact      [02-24-20 @ 15:46]  HCV 0.12, Nonreact      [02-24-20 @ 15:46]

## 2020-02-27 NOTE — PROGRESS NOTE ADULT - ASSESSMENT
INCOMPLETE 78 yo male, PMH of HFpEF s/p cardiomem, CKD4, HTN, DM2 on insulin, advanced dementia, prior GIB, recent admit for HF requiring chest tube and temporary HD presents to ER for dark stool (although on iron at home) and shortness of breath per wife. Patient is poor historian.     1) Advanced CKD   - Now requiring HD  - Had HD yesterday, will go for 3rd session today  - AVF in am. Medically optimized for proposed procedure.  - Vascular Surgery follow up noted   - Renal following    2) Rule out UTI  - Urine culture contaminated  - Monitor off antibiotics   - ID Consult appreciated     3) Iron deficiency anemia  - Doubt GI bleed as h/h stable and stool occult negative  - Continue Venofer     4) Chronic Diastolic Heart Failure   - Continue Torsemide   - Beta blockers on hold due to bradycardia    5) CAD / HTN  - Continue Isodril, Hydralazine, Amlodipine and Cardura  - Beta blockers on hold as above     6) Hypothermia  - Resolved, unclear etiology     7) DM 2  - HbA1c 8.1 in December 2019  - Accu checks and ISS    DVT Prophylaxis -- Heparin SQ    Dispo: Home once HD seat is arranged. 78 yo male, PMH of HFpEF s/p cardiomem, CKD4, HTN, DM2 on insulin, advanced dementia, prior GIB, recent admit for HF requiring chest tube and temporary HD presents to ER for dark stool (although on iron at home) and shortness of breath per wife. Patient is poor historian.     1) Advanced CKD   - Now requiring HD  - Had HD yesterday, will go for 3rd session today  - AVF in am. Medically optimized for proposed procedure.  - Vascular Surgery follow up noted   - Renal following    2) Rule out UTI  - Urine culture contaminated  - Monitor off antibiotics   - ID Consult appreciated     3) Iron deficiency anemia  - Doubt GI bleed as h/h stable and stool occult negative  - Continue Venofer     4) Chronic Diastolic Heart Failure   - Continue Torsemide   - Beta blockers on hold due to bradycardia    5) CAD / HTN  - Continue Isodril, Hydralazine, Amlodipine and Cardura  - Beta blockers on hold as above     6) Hypothermia  - Resolved, unclear etiology     7) DM 2  - HbA1c 8.1 in December 2019  - Accu checks and ISS    8) Thrombocytopenia  - Stable  - Monitor platelets     DVT Prophylaxis -- Heparin SQ    Dispo: Home once HD seat is arranged. 78 yo male, PMH of HFpEF s/p cardiomem, CKD4, HTN, DM2 on insulin, advanced dementia, prior GIB, recent admit for HF requiring chest tube and temporary HD presents to ER for dark stool (although on iron at home) and shortness of breath per wife. Patient is poor historian.     1) ESRD  - Now requiring HD  - Had HD yesterday, will go for 3rd session today  - AVF in am. Medically optimized for proposed procedure.  - Vascular Surgery follow up noted   - Renal following    2) Rule out UTI  - Urine culture contaminated  - Monitor off antibiotics   - ID Consult appreciated     3) Iron deficiency anemia  - Doubt GI bleed as h/h stable and stool occult negative  - Continue Venofer     4) Chronic Diastolic Heart Failure   - Continue Torsemide   - Beta blockers on hold due to bradycardia    5) CAD / HTN  - Continue Isodril, Hydralazine, Amlodipine and Cardura  - Beta blockers on hold as above     6) Hypothermia  - Resolved, unclear etiology     7) DM 2  - HbA1c 8.1 in December 2019  - Accu checks and ISS    8) Thrombocytopenia  - Stable  - Monitor platelets     DVT Prophylaxis -- Heparin SQ    Dispo: Home once HD seat is arranged.

## 2020-02-27 NOTE — PROGRESS NOTE ADULT - SUBJECTIVE AND OBJECTIVE BOX
Heart failure    HPI:  78 yo male, PMH of HFpEF, CKD4, HTN, DM2 on insulin, advanced dementia, prior GIBs, presents today sent in by Cardiology for evaluation of concern for GI bleeding in setting of HFpEF exacerbation. Patient's wife, who is primary historian given patient's demented state, says that for last few days, patient has had multiple episodes of dark tarry stools, wife uncertain of quantity of them due to patient being incontinent and wife not actively observing. She contacted cardiology (Dr Faustin) for concern of his increased dyspnea and leg swelling who advised presenting for evaluation then when wife took patient to office, was advised to go to ER for evaluation given the concern of bleeding. Per wife, patient always denies any complaints and always says that he is fine.     In ER: noted to have worsening renal function, renal consulted with no plan for urgent HD. Cardio consulted, noted to have improved cardiomems with plans to pull back on aggressive diuresis given worsened renal function. (21 Feb 2020 17:27)    Interval History:  Patient was seen and examined at bedside. Feeling better.   Denies chest pain, palpitations, shortness of breath, headache, dizziness, visual symptoms, nausea, vomiting or abdominal pain.  Appetite is poor.     ROS:  As per interval history otherwise unremarkable.    PHYSICAL EXAM:  Vital Signs   T(C): 37 (27 Feb 2020 09:34), Max: 37.3 (27 Feb 2020 00:22)  T(F): 98.6 (27 Feb 2020 09:34), Max: 99.1 (27 Feb 2020 00:22)  HR: 70 (27 Feb 2020 09:34) (62 - 70)  BP: 141/51 (27 Feb 2020 09:34) (128/62 - 153/54)  RR: 18 (27 Feb 2020 09:34) (17 - 181)  SpO2: 92% (27 Feb 2020 09:34) (92% - 98%)  General: Elderly male sitting in bed comfortably. No acute distress  HEENT: EOMI. Clear conjunctivae. Moist mucus membrane  Neck: Supple.   Chest: CTA bilaterally - no wheezing, rales or rhonchi. Permacath in right upper chest.   Heart: Normal S1 & S2. RRR. Systolic murmur.   Abdomen: Soft. Non-tender. Non-distended. + BS  Ext: No pedal edema. No calf tenderness   Neuro: AAO x 3. No focal deficit. No speech disorder  Skin: Warm and Dry  Psychiatry: Normal mood and affect    I&O's Summary    26 Feb 2020 07:01  -  27 Feb 2020 07:00  --------------------------------------------------------  IN: 0 mL / OUT: 1800 mL / NET: -1800 mL    LABS:  CAPILLARY BLOOD GLUCOSE  POCT Blood Glucose.: 153 mg/dL (27 Feb 2020 11:19)  POCT Blood Glucose.: 118 mg/dL (27 Feb 2020 07:31)  POCT Blood Glucose.: 189 mg/dL (26 Feb 2020 21:23)  POCT Blood Glucose.: 182 mg/dL (26 Feb 2020 15:56)                          9.0    7.33  )-----------( 122      ( 27 Feb 2020 02:43 )             28.0     02-27    138  |  97<L>  |  43.0<H>  ----------------------------<  149<H>  3.8   |  27.0  |  2.55<H>    Ca    8.7      27 Feb 2020 02:43    RADIOLOGY & ADDITIONAL STUDIES:  Reviewed     MEDICATIONS  (STANDING):  amLODIPine   Tablet 10 milliGRAM(s) Oral daily  ceFAZolin   IVPB 2000 milliGRAM(s) IV Intermittent once  chlorhexidine 2% Cloths 1 Application(s) Topical <User Schedule>  cyanocobalamin Injectable 1000 MICROGram(s) SubCutaneous daily  dextrose 5%. 1000 milliLiter(s) (50 mL/Hr) IV Continuous <Continuous>  dextrose 50% Injectable 12.5 Gram(s) IV Push once  dextrose 50% Injectable 25 Gram(s) IV Push once  dextrose 50% Injectable 25 Gram(s) IV Push once  doxazosin 4 milliGRAM(s) Oral at bedtime  epoetin nereida Injectable 8000 Unit(s) IV Push <User Schedule>  ergocalciferol 40553 Unit(s) Oral every week  heparin  Injectable 5000 Unit(s) SubCutaneous every 12 hours  hydrALAZINE 50 milliGRAM(s) Oral three times a day  insulin lispro (HumaLOG) corrective regimen sliding scale   SubCutaneous Before meals and at bedtime  isosorbide   dinitrate Tablet (ISORDIL) 10 milliGRAM(s) Oral three times a day  pantoprazole    Tablet 40 milliGRAM(s) Oral before breakfast  sodium bicarbonate 650 milliGRAM(s) Oral two times a day  tamsulosin Oral Tab/Cap - Peds 0.4 milliGRAM(s) Oral at bedtime  torsemide 20 milliGRAM(s) Oral two times a day    MEDICATIONS  (PRN):  acetaminophen   Tablet .. 650 milliGRAM(s) Oral every 6 hours PRN Temp greater or equal to 38C (100.4F), Mild Pain (1 - 3)  dextrose 40% Gel 15 Gram(s) Oral once PRN Blood Glucose LESS THAN 70 milliGRAM(s)/deciliter  glucagon  Injectable 1 milliGRAM(s) IntraMuscular once PRN Glucose LESS THAN 70 milligrams/deciliter Heart failure    HPI:  80 yo male, PMH of HFpEF, CKD4, HTN, DM2 on insulin, advanced dementia, prior GIBs, presents today sent in by Cardiology for evaluation of concern for GI bleeding in setting of HFpEF exacerbation. Patient's wife, who is primary historian given patient's demented state, says that for last few days, patient has had multiple episodes of dark tarry stools, wife uncertain of quantity of them due to patient being incontinent and wife not actively observing. She contacted cardiology (Dr Faustin) for concern of his increased dyspnea and leg swelling who advised presenting for evaluation then when wife took patient to office, was advised to go to ER for evaluation given the concern of bleeding. Per wife, patient always denies any complaints and always says that he is fine.     In ER: noted to have worsening renal function, renal consulted with no plan for urgent HD. Cardio consulted, noted to have improved cardiomems with plans to pull back on aggressive diuresis given worsened renal function. (21 Feb 2020 17:27)    Interval History:  Patient was seen and examined at bedside. Complaining of decreased appetite.   Denies chest pain, palpitations, shortness of breath, headache, dizziness, visual symptoms, nausea, vomiting or abdominal pain.    ROS:  As per interval history otherwise unremarkable.    PHYSICAL EXAM:  Vital Signs   T(C): 37 (27 Feb 2020 09:34), Max: 37.3 (27 Feb 2020 00:22)  T(F): 98.6 (27 Feb 2020 09:34), Max: 99.1 (27 Feb 2020 00:22)  HR: 70 (27 Feb 2020 09:34) (62 - 70)  BP: 141/51 (27 Feb 2020 09:34) (128/62 - 153/54)  RR: 18 (27 Feb 2020 09:34) (17 - 181)  SpO2: 92% (27 Feb 2020 09:34) (92% - 98%)  General: Elderly male sitting in bed comfortably. No acute distress  HEENT: EOMI. Clear conjunctivae. Moist mucus membrane  Neck: Supple.   Chest: CTA bilaterally - no wheezing, rales or rhonchi. Permacath in right upper chest.   Heart: Normal S1 & S2. RRR. Systolic murmur.   Abdomen: Soft. Non-tender. Non-distended. + BS  Ext: No pedal edema. No calf tenderness   Neuro: AAO x 3. No focal deficit. No speech disorder  Skin: Warm and Dry  Psychiatry: Normal mood and affect    I&O's Summary    26 Feb 2020 07:01  -  27 Feb 2020 07:00  --------------------------------------------------------  IN: 0 mL / OUT: 1800 mL / NET: -1800 mL    LABS:  CAPILLARY BLOOD GLUCOSE  POCT Blood Glucose.: 153 mg/dL (27 Feb 2020 11:19)  POCT Blood Glucose.: 118 mg/dL (27 Feb 2020 07:31)  POCT Blood Glucose.: 189 mg/dL (26 Feb 2020 21:23)  POCT Blood Glucose.: 182 mg/dL (26 Feb 2020 15:56)                          9.0    7.33  )-----------( 122      ( 27 Feb 2020 02:43 )             28.0     02-27    138  |  97<L>  |  43.0<H>  ----------------------------<  149<H>  3.8   |  27.0  |  2.55<H>    Ca    8.7      27 Feb 2020 02:43    RADIOLOGY & ADDITIONAL STUDIES:  Reviewed     MEDICATIONS  (STANDING):  amLODIPine   Tablet 10 milliGRAM(s) Oral daily  ceFAZolin   IVPB 2000 milliGRAM(s) IV Intermittent once  chlorhexidine 2% Cloths 1 Application(s) Topical <User Schedule>  cyanocobalamin Injectable 1000 MICROGram(s) SubCutaneous daily  dextrose 5%. 1000 milliLiter(s) (50 mL/Hr) IV Continuous <Continuous>  dextrose 50% Injectable 12.5 Gram(s) IV Push once  dextrose 50% Injectable 25 Gram(s) IV Push once  dextrose 50% Injectable 25 Gram(s) IV Push once  doxazosin 4 milliGRAM(s) Oral at bedtime  epoetin nereida Injectable 8000 Unit(s) IV Push <User Schedule>  ergocalciferol 01964 Unit(s) Oral every week  heparin  Injectable 5000 Unit(s) SubCutaneous every 12 hours  hydrALAZINE 50 milliGRAM(s) Oral three times a day  insulin lispro (HumaLOG) corrective regimen sliding scale   SubCutaneous Before meals and at bedtime  isosorbide   dinitrate Tablet (ISORDIL) 10 milliGRAM(s) Oral three times a day  pantoprazole    Tablet 40 milliGRAM(s) Oral before breakfast  sodium bicarbonate 650 milliGRAM(s) Oral two times a day  tamsulosin Oral Tab/Cap - Peds 0.4 milliGRAM(s) Oral at bedtime  torsemide 20 milliGRAM(s) Oral two times a day    MEDICATIONS  (PRN):  acetaminophen   Tablet .. 650 milliGRAM(s) Oral every 6 hours PRN Temp greater or equal to 38C (100.4F), Mild Pain (1 - 3)  dextrose 40% Gel 15 Gram(s) Oral once PRN Blood Glucose LESS THAN 70 milliGRAM(s)/deciliter  glucagon  Injectable 1 milliGRAM(s) IntraMuscular once PRN Glucose LESS THAN 70 milligrams/deciliter

## 2020-02-27 NOTE — PROGRESS NOTE ADULT - SUBJECTIVE AND OBJECTIVE BOX
INTERVAL HPI/OVERNIGHT EVENTS: no new complaints, no overnight events        MEDICATIONS  (STANDING):  amLODIPine   Tablet 10 milliGRAM(s) Oral daily  chlorhexidine 2% Cloths 1 Application(s) Topical <User Schedule>  cyanocobalamin Injectable 1000 MICROGram(s) SubCutaneous daily  dextrose 5%. 1000 milliLiter(s) (50 mL/Hr) IV Continuous <Continuous>  dextrose 50% Injectable 12.5 Gram(s) IV Push once  dextrose 50% Injectable 25 Gram(s) IV Push once  dextrose 50% Injectable 25 Gram(s) IV Push once  doxazosin 4 milliGRAM(s) Oral at bedtime  epoetin nereida Injectable 8000 Unit(s) IV Push <User Schedule>  ergocalciferol 75885 Unit(s) Oral every week  heparin  Injectable 5000 Unit(s) SubCutaneous every 12 hours  hydrALAZINE 50 milliGRAM(s) Oral three times a day  insulin lispro (HumaLOG) corrective regimen sliding scale   SubCutaneous Before meals and at bedtime  isosorbide   dinitrate Tablet (ISORDIL) 10 milliGRAM(s) Oral three times a day  pantoprazole    Tablet 40 milliGRAM(s) Oral before breakfast  sodium bicarbonate 650 milliGRAM(s) Oral two times a day  tamsulosin Oral Tab/Cap - Peds 0.4 milliGRAM(s) Oral at bedtime  torsemide 20 milliGRAM(s) Oral two times a day    MEDICATIONS  (PRN):  acetaminophen   Tablet .. 650 milliGRAM(s) Oral every 6 hours PRN Temp greater or equal to 38C (100.4F), Mild Pain (1 - 3)  dextrose 40% Gel 15 Gram(s) Oral once PRN Blood Glucose LESS THAN 70 milliGRAM(s)/deciliter  glucagon  Injectable 1 milliGRAM(s) IntraMuscular once PRN Glucose LESS THAN 70 milligrams/deciliter      Vital Signs Last 24 Hrs  T(C): 37.3 (27 Feb 2020 00:22), Max: 37.3 (27 Feb 2020 00:22)  T(F): 99.1 (27 Feb 2020 00:22), Max: 99.1 (27 Feb 2020 00:22)  HR: 67 (27 Feb 2020 06:02) (62 - 68)  BP: 148/67 (27 Feb 2020 06:02) (120/56 - 153/54)  BP(mean): --  RR: 18 (27 Feb 2020 00:22) (17 - 181)  SpO2: 98% (27 Feb 2020 00:22) (92% - 98%)    PHYSICAL EXAM:      Constitutional: NAD    Respiratory: no accessory muscle use    Cardiovascular: S1S2    Gastrointestinal: soft, NT/ND            I&O's Detail    26 Feb 2020 07:01  -  27 Feb 2020 07:00  --------------------------------------------------------  IN:  Total IN: 0 mL    OUT:    Other: 1000 mL    Voided: 800 mL  Total OUT: 1800 mL    Total NET: -1800 mL          LABS:                        9.0    7.33  )-----------( 122      ( 27 Feb 2020 02:43 )             28.0     02-27    138  |  97<L>  |  43.0<H>  ----------------------------<  149<H>  3.8   |  27.0  |  2.55<H>    Ca    8.7      27 Feb 2020 02:43            RADIOLOGY & ADDITIONAL STUDIES:

## 2020-02-28 LAB
GLUCOSE BLDC GLUCOMTR-MCNC: 108 MG/DL — HIGH (ref 70–99)
GLUCOSE BLDC GLUCOMTR-MCNC: 118 MG/DL — HIGH (ref 70–99)
GLUCOSE BLDC GLUCOMTR-MCNC: 136 MG/DL — HIGH (ref 70–99)
GLUCOSE BLDC GLUCOMTR-MCNC: 137 MG/DL — HIGH (ref 70–99)
GLUCOSE BLDC GLUCOMTR-MCNC: 99 MG/DL — SIGNIFICANT CHANGE UP (ref 70–99)

## 2020-02-28 PROCEDURE — 36821 AV FUSION DIRECT ANY SITE: CPT | Mod: 58

## 2020-02-28 PROCEDURE — 99232 SBSQ HOSP IP/OBS MODERATE 35: CPT

## 2020-02-28 PROCEDURE — 99233 SBSQ HOSP IP/OBS HIGH 50: CPT

## 2020-02-28 RX ORDER — DEXTROSE 50 % IN WATER 50 %
15 SYRINGE (ML) INTRAVENOUS ONCE
Refills: 0 | Status: DISCONTINUED | OUTPATIENT
Start: 2020-02-28 | End: 2020-03-06

## 2020-02-28 RX ORDER — PREGABALIN 225 MG/1
1000 CAPSULE ORAL DAILY
Refills: 0 | Status: COMPLETED | OUTPATIENT
Start: 2020-02-28 | End: 2020-03-04

## 2020-02-28 RX ORDER — GLUCAGON INJECTION, SOLUTION 0.5 MG/.1ML
1 INJECTION, SOLUTION SUBCUTANEOUS ONCE
Refills: 0 | Status: DISCONTINUED | OUTPATIENT
Start: 2020-02-28 | End: 2020-03-06

## 2020-02-28 RX ORDER — DEXTROSE 50 % IN WATER 50 %
25 SYRINGE (ML) INTRAVENOUS ONCE
Refills: 0 | Status: DISCONTINUED | OUTPATIENT
Start: 2020-02-28 | End: 2020-03-06

## 2020-02-28 RX ORDER — INSULIN LISPRO 100/ML
VIAL (ML) SUBCUTANEOUS
Refills: 0 | Status: DISCONTINUED | OUTPATIENT
Start: 2020-02-28 | End: 2020-03-06

## 2020-02-28 RX ORDER — DOXAZOSIN MESYLATE 4 MG
4 TABLET ORAL AT BEDTIME
Refills: 0 | Status: DISCONTINUED | OUTPATIENT
Start: 2020-02-28 | End: 2020-03-06

## 2020-02-28 RX ORDER — DEXTROSE 50 % IN WATER 50 %
12.5 SYRINGE (ML) INTRAVENOUS ONCE
Refills: 0 | Status: DISCONTINUED | OUTPATIENT
Start: 2020-02-28 | End: 2020-03-06

## 2020-02-28 RX ORDER — CARVEDILOL PHOSPHATE 80 MG/1
3.12 CAPSULE, EXTENDED RELEASE ORAL EVERY 12 HOURS
Refills: 0 | Status: DISCONTINUED | OUTPATIENT
Start: 2020-02-28 | End: 2020-02-28

## 2020-02-28 RX ORDER — SODIUM BICARBONATE 1 MEQ/ML
650 SYRINGE (ML) INTRAVENOUS
Refills: 0 | Status: DISCONTINUED | OUTPATIENT
Start: 2020-02-28 | End: 2020-03-03

## 2020-02-28 RX ORDER — HYDRALAZINE HCL 50 MG
50 TABLET ORAL THREE TIMES A DAY
Refills: 0 | Status: DISCONTINUED | OUTPATIENT
Start: 2020-02-28 | End: 2020-03-06

## 2020-02-28 RX ORDER — ERGOCALCIFEROL 1.25 MG/1
50000 CAPSULE ORAL
Refills: 0 | Status: DISCONTINUED | OUTPATIENT
Start: 2020-02-28 | End: 2020-03-06

## 2020-02-28 RX ORDER — SODIUM CHLORIDE 9 MG/ML
1000 INJECTION INTRAMUSCULAR; INTRAVENOUS; SUBCUTANEOUS
Refills: 0 | Status: DISCONTINUED | OUTPATIENT
Start: 2020-02-28 | End: 2020-02-28

## 2020-02-28 RX ORDER — PANTOPRAZOLE SODIUM 20 MG/1
40 TABLET, DELAYED RELEASE ORAL
Refills: 0 | Status: DISCONTINUED | OUTPATIENT
Start: 2020-02-28 | End: 2020-03-06

## 2020-02-28 RX ORDER — ACETAMINOPHEN 500 MG
650 TABLET ORAL EVERY 6 HOURS
Refills: 0 | Status: DISCONTINUED | OUTPATIENT
Start: 2020-02-28 | End: 2020-03-06

## 2020-02-28 RX ORDER — CARVEDILOL PHOSPHATE 80 MG/1
3.12 CAPSULE, EXTENDED RELEASE ORAL EVERY 12 HOURS
Refills: 0 | Status: DISCONTINUED | OUTPATIENT
Start: 2020-02-28 | End: 2020-03-06

## 2020-02-28 RX ORDER — FENTANYL CITRATE 50 UG/ML
25 INJECTION INTRAVENOUS
Refills: 0 | Status: DISCONTINUED | OUTPATIENT
Start: 2020-02-28 | End: 2020-02-28

## 2020-02-28 RX ORDER — ISOSORBIDE DINITRATE 5 MG/1
10 TABLET ORAL THREE TIMES A DAY
Refills: 0 | Status: DISCONTINUED | OUTPATIENT
Start: 2020-02-28 | End: 2020-03-06

## 2020-02-28 RX ORDER — AMLODIPINE BESYLATE 2.5 MG/1
10 TABLET ORAL DAILY
Refills: 0 | Status: DISCONTINUED | OUTPATIENT
Start: 2020-02-28 | End: 2020-03-06

## 2020-02-28 RX ORDER — TAMSULOSIN HYDROCHLORIDE 0.4 MG/1
0.4 CAPSULE ORAL AT BEDTIME
Refills: 0 | Status: DISCONTINUED | OUTPATIENT
Start: 2020-02-28 | End: 2020-03-06

## 2020-02-28 RX ADMIN — HEPARIN SODIUM 5000 UNIT(S): 5000 INJECTION INTRAVENOUS; SUBCUTANEOUS at 06:19

## 2020-02-28 RX ADMIN — Medication 50 MILLIGRAM(S): at 13:07

## 2020-02-28 RX ADMIN — PANTOPRAZOLE SODIUM 40 MILLIGRAM(S): 20 TABLET, DELAYED RELEASE ORAL at 18:50

## 2020-02-28 RX ADMIN — Medication 50 MILLIGRAM(S): at 21:03

## 2020-02-28 RX ADMIN — TAMSULOSIN HYDROCHLORIDE 0.4 MILLIGRAM(S): 0.4 CAPSULE ORAL at 21:03

## 2020-02-28 RX ADMIN — Medication 4 MILLIGRAM(S): at 21:03

## 2020-02-28 RX ADMIN — Medication 650 MILLIGRAM(S): at 13:57

## 2020-02-28 RX ADMIN — PREGABALIN 1000 MICROGRAM(S): 225 CAPSULE ORAL at 18:50

## 2020-02-28 RX ADMIN — ISOSORBIDE DINITRATE 10 MILLIGRAM(S): 5 TABLET ORAL at 21:03

## 2020-02-28 RX ADMIN — PANTOPRAZOLE SODIUM 40 MILLIGRAM(S): 20 TABLET, DELAYED RELEASE ORAL at 06:19

## 2020-02-28 RX ADMIN — ISOSORBIDE DINITRATE 10 MILLIGRAM(S): 5 TABLET ORAL at 06:19

## 2020-02-28 RX ADMIN — ERGOCALCIFEROL 50000 UNIT(S): 1.25 CAPSULE ORAL at 18:52

## 2020-02-28 RX ADMIN — Medication 50 MILLIGRAM(S): at 06:19

## 2020-02-28 RX ADMIN — Medication 650 MILLIGRAM(S): at 12:57

## 2020-02-28 RX ADMIN — AMLODIPINE BESYLATE 10 MILLIGRAM(S): 2.5 TABLET ORAL at 06:19

## 2020-02-28 RX ADMIN — Medication 20 MILLIGRAM(S): at 18:52

## 2020-02-28 RX ADMIN — CHLORHEXIDINE GLUCONATE 1 APPLICATION(S): 213 SOLUTION TOPICAL at 06:20

## 2020-02-28 RX ADMIN — Medication 650 MILLIGRAM(S): at 18:49

## 2020-02-28 RX ADMIN — Medication 20 MILLIGRAM(S): at 06:19

## 2020-02-28 RX ADMIN — TUBERCULIN PURIFIED PROTEIN DERIVATIVE 5 UNIT(S): 5 INJECTION, SOLUTION INTRADERMAL at 18:51

## 2020-02-28 NOTE — BRIEF OPERATIVE NOTE - OPERATION/FINDINGS
Left upper extremity brachiocephalic fistula created. Excellent thrill and distal perfusion w/ 2+ radial pulse following procedure.
RIJ accessed under US guidance and guidewire placed. Dilated and sheath introduced. Catheter tunnelled from right chest wall. inserted through sheath into RIJ. Placement confirmed without kinks by fluoroscopy. Heparin 2cc instilled into each port of catheter. Sutured into place. Space where catheter enters approximated snug to catheter. Neck access site sutured and surgical glue applied. Patient tolerated procedure well.

## 2020-02-28 NOTE — BRIEF OPERATIVE NOTE - NSICDXBRIEFPROCEDURE_GEN_ALL_CORE_FT
PROCEDURES:  Creation, AV fistula, brachiocephalic 28-Feb-2020 16:37:53 [facility] brachiocephalic AVF Andreas Bernard
PROCEDURES:  Insertion, catheter, dialysis, tunneled, with imaging guidance 25-Feb-2020 15:19:28  Ezequiel Beauchamp

## 2020-02-28 NOTE — PRE-OP CHECKLIST - AS BP NONINV METHOD
electronic [facility] Counseling: I discussed with the patient the risks of tildrakizumab including but not limited to immunosuppression, malignancy, posterior leukoencephalopathy syndrome, and serious infections.  The patient understands that monitoring is required including a PPD at baseline and must alert us or the primary physician if symptoms of infection or other concerning signs are noted.

## 2020-02-28 NOTE — PROGRESS NOTE ADULT - SUBJECTIVE AND OBJECTIVE BOX
[facility] DIVISION OF KIDNEY DISEASES AND HYPERTENSION -- HEMODIALYSIS NOTE  --------------------------------------------------------------------------------  Chief Complaint: ESRD/Ongoing hemodialysis requirement    24 hour events/subjective:  s/p HD yesterday, tolerated well      PAST HISTORY  --------------------------------------------------------------------------------  No significant changes to PMH, PSH, FHx, SHx, unless otherwise noted    ALLERGIES & MEDICATIONS  --------------------------------------------------------------------------------  Allergies    No Known Allergies      Standing Inpatient Medications  amLODIPine   Tablet 10 milliGRAM(s) Oral daily  carvedilol 3.125 milliGRAM(s) Oral every 12 hours  ceFAZolin   IVPB 2000 milliGRAM(s) IV Intermittent once  chlorhexidine 2% Cloths 1 Application(s) Topical <User Schedule>  cyanocobalamin Injectable 1000 MICROGram(s) SubCutaneous daily  dextrose 5%. 1000 milliLiter(s) IV Continuous <Continuous>  dextrose 50% Injectable 12.5 Gram(s) IV Push once  dextrose 50% Injectable 25 Gram(s) IV Push once  dextrose 50% Injectable 25 Gram(s) IV Push once  doxazosin 4 milliGRAM(s) Oral at bedtime  epoetin nereida Injectable 8000 Unit(s) IV Push <User Schedule>  ergocalciferol 90656 Unit(s) Oral every week  heparin  Injectable 5000 Unit(s) SubCutaneous every 12 hours  hydrALAZINE 50 milliGRAM(s) Oral three times a day  insulin lispro (HumaLOG) corrective regimen sliding scale   SubCutaneous Before meals and at bedtime  iron sucrose IVPB 200 milliGRAM(s) IV Intermittent <User Schedule>  isosorbide   dinitrate Tablet (ISORDIL) 10 milliGRAM(s) Oral three times a day  pantoprazole    Tablet 40 milliGRAM(s) Oral before breakfast  tamsulosin Oral Tab/Cap - Peds 0.4 milliGRAM(s) Oral at bedtime  torsemide 20 milliGRAM(s) Oral two times a day    PRN Inpatient Medications  acetaminophen   Tablet .. 650 milliGRAM(s) Oral every 6 hours PRN  dextrose 40% Gel 15 Gram(s) Oral once PRN  glucagon  Injectable 1 milliGRAM(s) IntraMuscular once PRN      REVIEW OF SYSTEMS  --------------------------------------------------------------------------------  Gen: No weight changes, fatigue, fevers/chills, weakness  Skin: No rashes  Head/Eyes/Ears/Mouth: No headache  Respiratory: No dyspnea, cough,  CV: No chest pain, orthopnea  GI: No abdominal pain, diarrhea, constipation, nausea, vomiting,  MSK: No joint pain  Neuro: No dizziness/lightheadedness, weakness  Heme: No bleeding  Psych: No significant nervousness, anxiety, stress, depression    All other systems were reviewed and are negative, except as noted.    VITALS/PHYSICAL EXAM  --------------------------------------------------------------------------------  T(C): 37.1 (02-28-20 @ 07:29), Max: 37.1 (02-28-20 @ 07:29)  HR: 75 (02-28-20 @ 07:29) (65 - 75)  BP: 137/55 (02-28-20 @ 07:29) (134/52 - 145/55)  RR: 17 (02-28-20 @ 07:29) (17 - 18)  SpO2: 92% (02-28-20 @ 07:29) (92% - 98%)  Wt(kg): --        02-27-20 @ 07:01  -  02-28-20 @ 07:00  --------------------------------------------------------  IN: 0 mL / OUT: 2650 mL / NET: -2650 mL      Physical Exam:  	Gen: NAD, well-appearing  	HEENT: PERRL, supple neck,  	Pulm: CTA B/L  	CV: RRR, S1S2; no rub  	Abd: +BS, soft, nontender  	UE: Warm  	LE: Warm, no edema  	Neuro: No focal deficits  	Psych: Normal affect and mood  	Skin: Warm, without rashes  	Vascular access: RIJ tunneled HD catheter    LABS/STUDIES  --------------------------------------------------------------------------------              9.0    7.33  >-----------<  122      [02-27-20 @ 02:43]              28.0     138  |  97  |  43.0  ----------------------------<  149      [02-27-20 @ 02:43]  3.8   |  27.0  |  2.55        Ca     8.7     [02-27-20 @ 02:43]            Iron 23, TIBC 319, %sat 7      [02-22-20 @ 11:11]  Ferritin 41      [02-22-20 @ 11:11]  PTH -- (Ca 8.3)      [12-12-19 @ 19:36]   164  Vitamin D (25OH) 20.3      [12-12-19 @ 19:36]  HbA1c 8.1      [12-11-19 @ 06:47]  TSH 6.62      [02-23-20 @ 10:16]    HBsAb <3.0      [02-24-20 @ 15:46]  HBsAg Nonreact      [02-24-20 @ 15:46]  HBcAb Nonreact      [02-24-20 @ 15:46]  HCV 0.12, Nonreact      [02-24-20 @ 15:46]

## 2020-02-28 NOTE — PROGRESS NOTE ADULT - SUBJECTIVE AND OBJECTIVE BOX
INTERVAL HPI/OVERNIGHT EVENTS/SUBJECTIVE:  NPO for AVF creation today.     ICU Vital Signs Last 24 Hrs  T(C): 37.1 (28 Feb 2020 07:29), Max: 37.1 (28 Feb 2020 07:29)  T(F): 98.7 (28 Feb 2020 07:29), Max: 98.7 (28 Feb 2020 07:29)  HR: 75 (28 Feb 2020 07:29) (65 - 75)  BP: 137/55 (28 Feb 2020 07:29) (134/52 - 145/55)  BP(mean): --  ABP: --  ABP(mean): --  RR: 17 (28 Feb 2020 07:29) (17 - 18)  SpO2: 92% (28 Feb 2020 07:29) (92% - 98%)      I&O's Detail    27 Feb 2020 07:01  -  28 Feb 2020 07:00  --------------------------------------------------------  IN:  Total IN: 0 mL    OUT:    Incontinent per Condom Catheter: 1650 mL    Other: 1000 mL  Total OUT: 2650 mL    Total NET: -2650 mL    MEDICATIONS  (STANDING):  amLODIPine   Tablet 10 milliGRAM(s) Oral daily  ceFAZolin   IVPB 2000 milliGRAM(s) IV Intermittent once  chlorhexidine 2% Cloths 1 Application(s) Topical <User Schedule>  cyanocobalamin Injectable 1000 MICROGram(s) SubCutaneous daily  dextrose 5%. 1000 milliLiter(s) (50 mL/Hr) IV Continuous <Continuous>  dextrose 50% Injectable 12.5 Gram(s) IV Push once  dextrose 50% Injectable 25 Gram(s) IV Push once  dextrose 50% Injectable 25 Gram(s) IV Push once  doxazosin 4 milliGRAM(s) Oral at bedtime  epoetin nereida Injectable 8000 Unit(s) IV Push <User Schedule>  ergocalciferol 79721 Unit(s) Oral every week  heparin  Injectable 5000 Unit(s) SubCutaneous every 12 hours  hydrALAZINE 50 milliGRAM(s) Oral three times a day  insulin lispro (HumaLOG) corrective regimen sliding scale   SubCutaneous Before meals and at bedtime  iron sucrose IVPB 200 milliGRAM(s) IV Intermittent <User Schedule>  isosorbide   dinitrate Tablet (ISORDIL) 10 milliGRAM(s) Oral three times a day  pantoprazole    Tablet 40 milliGRAM(s) Oral before breakfast  tamsulosin Oral Tab/Cap - Peds 0.4 milliGRAM(s) Oral at bedtime  torsemide 20 milliGRAM(s) Oral two times a day    MEDICATIONS  (PRN):  acetaminophen   Tablet .. 650 milliGRAM(s) Oral every 6 hours PRN Temp greater or equal to 38C (100.4F), Mild Pain (1 - 3)  dextrose 40% Gel 15 Gram(s) Oral once PRN Blood Glucose LESS THAN 70 milliGRAM(s)/deciliter  glucagon  Injectable 1 milliGRAM(s) IntraMuscular once PRN Glucose LESS THAN 70 milligrams/deciliter    MISC:     PHYSICAL EXAM:    Constitutional: NAD  Respiratory: no accessory muscle use  Cardiovascular: S1S2  Gastrointestinal: soft, NT/ND  Extremities: LUE pink banded  R IJ permacath in place, no surrounding erythema    LABS:  CBC Full  -  ( 27 Feb 2020 02:43 )  WBC Count : 7.33 K/uL  RBC Count : 3.31 M/uL  Hemoglobin : 9.0 g/dL  Hematocrit : 28.0 %  Platelet Count - Automated : 122 K/uL  Mean Cell Volume : 84.6 fl  Mean Cell Hemoglobin : 27.2 pg  Mean Cell Hemoglobin Concentration : 32.1 gm/dL  Auto Neutrophil # : x  Auto Lymphocyte # : x  Auto Monocyte # : x  Auto Eosinophil # : x  Auto Basophil # : x  Auto Neutrophil % : x  Auto Lymphocyte % : x  Auto Monocyte % : x  Auto Eosinophil % : x  Auto Basophil % : x    02-27    138  |  97<L>  |  43.0<H>  ----------------------------<  149<H>  3.8   |  27.0  |  2.55<H>    Ca    8.7      27 Feb 2020 02:43          RECENT CULTURES:  02-24 .Urine XXXX XXXX   >=3 organisms. Probable collection contamination.    02-21 .Urine Pseudomonas aeruginosa  Enterobacter cloacae XXXX   50,000 - 99,000 CFU/mL Pseudomonas aeruginosa  50,000 - 99,000 CFU/mL Enterobacter cloacae    ASSESSMENT/PLAN:  79M PMHx CHF, HTN, DM, BPH, and CKD4 admitted for acute on chronic kidney disease, edematous extremities and shortness of breath.  -NPO for AVF creation today  will POC after  -clearances in chart  HD through permacat

## 2020-02-28 NOTE — PROGRESS NOTE ADULT - SUBJECTIVE AND OBJECTIVE BOX
seen for SOB< anemia, ESRD    no acute complaints/events  ros negative     MEDICATIONS  (STANDING):  amLODIPine   Tablet 10 milliGRAM(s) Oral daily  ceFAZolin   IVPB 2000 milliGRAM(s) IV Intermittent once  chlorhexidine 2% Cloths 1 Application(s) Topical <User Schedule>  cyanocobalamin Injectable 1000 MICROGram(s) SubCutaneous daily  dextrose 5%. 1000 milliLiter(s) (50 mL/Hr) IV Continuous <Continuous>  dextrose 50% Injectable 12.5 Gram(s) IV Push once  dextrose 50% Injectable 25 Gram(s) IV Push once  dextrose 50% Injectable 25 Gram(s) IV Push once  doxazosin 4 milliGRAM(s) Oral at bedtime  epoetin nereida Injectable 8000 Unit(s) IV Push <User Schedule>  ergocalciferol 15778 Unit(s) Oral every week  heparin  Injectable 5000 Unit(s) SubCutaneous every 12 hours  hydrALAZINE 50 milliGRAM(s) Oral three times a day  insulin lispro (HumaLOG) corrective regimen sliding scale   SubCutaneous Before meals and at bedtime  iron sucrose IVPB 200 milliGRAM(s) IV Intermittent <User Schedule>  isosorbide   dinitrate Tablet (ISORDIL) 10 milliGRAM(s) Oral three times a day  pantoprazole    Tablet 40 milliGRAM(s) Oral before breakfast  tamsulosin Oral Tab/Cap - Peds 0.4 milliGRAM(s) Oral at bedtime  torsemide 20 milliGRAM(s) Oral two times a day    MEDICATIONS  (PRN):  acetaminophen   Tablet .. 650 milliGRAM(s) Oral every 6 hours PRN Temp greater or equal to 38C (100.4F), Mild Pain (1 - 3)  dextrose 40% Gel 15 Gram(s) Oral once PRN Blood Glucose LESS THAN 70 milliGRAM(s)/deciliter  glucagon  Injectable 1 milliGRAM(s) IntraMuscular once PRN Glucose LESS THAN 70 milligrams/deciliter      Allergies    No Known Allergies        Vital Signs Last 24 Hrs  T(C): 37.1 (28 Feb 2020 07:29), Max: 37.1 (28 Feb 2020 07:29)  T(F): 98.7 (28 Feb 2020 07:29), Max: 98.7 (28 Feb 2020 07:29)  HR: 75 (28 Feb 2020 07:29) (65 - 75)  BP: 137/55 (28 Feb 2020 07:29) (134/52 - 145/55)  BP(mean): --  RR: 17 (28 Feb 2020 07:29) (17 - 18)  SpO2: 92% (28 Feb 2020 07:29) (92% - 98%)    PHYSICAL EXAM:    GENERAL: NAD  CHEST/LUNG: Clear to percussion bilaterally  HEART: Regular rate and rhythm; S1 S2  ABDOMEN: Soft, Nontender, Nondistended; Bowel sounds present  EXTREMITIES:  no edema   NERVOUS SYSTEM:  Alert & Oriented X2, Motor Strength 5/5 B/L upper and lower extremities    LABS:                        9.0    7.33  )-----------( 122      ( 27 Feb 2020 02:43 )             28.0     02-27    138  |  97<L>  |  43.0<H>  ----------------------------<  149<H>  3.8   |  27.0  |  2.55<H>    Ca    8.7      27 Feb 2020 02:43            CAPILLARY BLOOD GLUCOSE      POCT Blood Glucose.: 99 mg/dL (28 Feb 2020 07:14)  POCT Blood Glucose.: 257 mg/dL (27 Feb 2020 21:36)  POCT Blood Glucose.: 171 mg/dL (27 Feb 2020 17:20)  POCT Blood Glucose.: 153 mg/dL (27 Feb 2020 11:19)        RADIOLOGY & ADDITIONAL TESTS:

## 2020-02-28 NOTE — PROGRESS NOTE ADULT - ASSESSMENT
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     Advanced CKD; now deemed ESRD  started on HD 02/24, now TTS schedule  Plan for AVF today    Anemia of CKD  Hb below goal  KD with HD  iron parameters consistent with Iron def anemia  Start IV iron    Chronic HFpEF: compensated  Cardiomems interrogated    Hypertension   BP stable  Continue Hydralazine, Cardura and Norvasc    CKD - MBD  on high dose Vitamin D  Repeat 25-OH vitamin D, iPTH levels  Monitor Ca++ and phos

## 2020-02-28 NOTE — BRIEF OPERATIVE NOTE - NSICDXBRIEFPREOP_GEN_ALL_CORE_FT
PRE-OP DIAGNOSIS:  CKD (chronic kidney disease) requiring chronic dialysis 25-Feb-2020 15:20:15  Ezequiel Beauchamp
PRE-OP DIAGNOSIS:  CKD (chronic kidney disease) requiring chronic dialysis 25-Feb-2020 15:20:15  Ezequiel Beauchamp

## 2020-02-28 NOTE — PROGRESS NOTE ADULT - ASSESSMENT
80 yo male, PMH of HFpEF s/p cardiomem, CKD4, HTN, DM2 on insulin, advanced dementia, prior GIB, recent admit for HF requiring chest tube and temporary HD presents to ER for dark stool (although on iron at home) and shortness of breath per wife. Patient is poor historian.     1) ESRD    initiated on HD this admission.    AVF today    s/p permacath    vascular and renal following    2) Rule out UTI  - Urine culture contaminated  - Monitor off antibiotics   - ID Consult appreciated     3) Iron deficiency anemia  - Doubt GI bleed as h/h stable and stool occult negative  - Continue Venofer     4) Chronic Diastolic Heart Failure   - Continue Torsemide   - Beta blockers on hold due to bradycardia  - restarted low dose coreg, monitor HR     5) CAD / HTN  - Continue Isodril, Hydralazine, Amlodipine and Cardura  - Beta blockers restarted at lower dose     6) Hypothermia  - Resolved, unclear etiology     7) DM 2  - HbA1c 8.1 in December 2019  - Accu checks and ISS    8) Thrombocytopenia  - Stable  - Monitor platelets     DVT Prophylaxis -- Heparin SQ    Dispo: Home once HD seat is arranged.

## 2020-02-28 NOTE — PROGRESS NOTE ADULT - SUBJECTIVE AND OBJECTIVE BOX
[facility]  INFECTIOUS DISEASES AND INTERNAL MEDICINE at [place]  =======================================================  Donato Banegas MD  Diplomates American Board of Internal Medicine and Infectious Diseases  Tel: [phone]      Fax: [phone]  =======================================================    JEMIMA SANTOS [id number]    Follow up: ESrd  hypothermia  stable  no complaints    Allergies:  No Known Allergies      Medications:  acetaminophen   Tablet .. 650 milliGRAM(s) Oral every 6 hours PRN  amLODIPine   Tablet 10 milliGRAM(s) Oral daily  carvedilol 3.125 milliGRAM(s) Oral every 12 hours  cyanocobalamin Injectable 1000 MICROGram(s) SubCutaneous daily  dextrose 40% Gel 15 Gram(s) Oral once PRN  dextrose 50% Injectable 12.5 Gram(s) IV Push once  dextrose 50% Injectable 25 Gram(s) IV Push once  dextrose 50% Injectable 25 Gram(s) IV Push once  doxazosin 4 milliGRAM(s) Oral at bedtime  ergocalciferol 80672 Unit(s) Oral every week  fentaNYL    Injectable 25 MICROGram(s) IV Push every 5 minutes PRN  glucagon  Injectable 1 milliGRAM(s) IntraMuscular once PRN  hydrALAZINE 50 milliGRAM(s) Oral three times a day  insulin lispro (HumaLOG) corrective regimen sliding scale   SubCutaneous Before meals and at bedtime  isosorbide   dinitrate Tablet (ISORDIL) 10 milliGRAM(s) Oral three times a day  pantoprazole    Tablet 40 milliGRAM(s) Oral before breakfast  sodium bicarbonate 650 milliGRAM(s) Oral two times a day  sodium chloride 0.9%. 1000 milliLiter(s) IV Continuous <Continuous>  tamsulosin Oral Tab/Cap - Peds 0.4 milliGRAM(s) Oral at bedtime  torsemide 20 milliGRAM(s) Oral two times a day            REVIEW OF SYSTEMS:  CONSTITUTIONAL:  No Fever or chills  HEENT:   No diplopia or blurred vision.  No earache, sore throat or runny nose.  CARDIOVASCULAR:  No pressure, squeezing, strangling, tightness, heaviness or aching about the chest, neck, axilla or epigastrium.  RESPIRATORY:  No cough, shortness of breath  GASTROINTESTINAL:  No nausea, vomiting or diarrhea.  GENITOURINARY:  No dysuria, frequency or urgency. No Blood in urine  MUSCULOSKELETAL:  no joint aches, no muscle pain  SKIN:  No change in skin, hair or nails.  NEUROLOGIC:  No Headaches, seizures or weakness.  PSYCHIATRIC:  No disorder of thought or mood.  ENDOCRINE:  No heat or cold intolerance  HEMATOLOGICAL:  No easy bruising or bleeding.            Physical Exam:  ICU Vital Signs Last 24 Hrs  T(C): 36.2 (28 Feb 2020 13:43), Max: 37.1 (28 Feb 2020 07:29)  T(F): 97.1 (28 Feb 2020 13:43), Max: 98.7 (28 Feb 2020 07:29)  HR: 66 (28 Feb 2020 13:43) (66 - 76)  BP: 142/51 (28 Feb 2020 13:43) (137/55 - 149/84)  BP(mean): --  ABP: --  ABP(mean): --  RR: 16 (28 Feb 2020 13:43) (16 - 18)  SpO2: 99% (28 Feb 2020 13:43) (92% - 99%)      GEN: NAD, pleasant  HEENT: normocephalic and atraumatic. EOMI. PERRL.  Anicteric   NECK: Supple.   LUNGS: Clear to auscultation.  HEART: Regular rate and rhythm without murmur. Rt HD catheter  ABDOMEN: Soft, nontender, and nondistended.  Positive bowel sounds.    : No CVA tenderness  EXTREMITIES: Without any edema.  MSK: no joint swelling  NEUROLOGIC: Cranial nerves II through XII are grossly intact. No focal deficits  PSYCHIATRIC: Appropriate affect .  SKIN: No Rash      Labs:  02-27    138  |  97<L>  |  43.0<H>  ----------------------------<  149<H>  3.8   |  27.0  |  2.55<H>    Ca    8.7      27 Feb 2020 02:43                            9.0    7.33  )-----------( 122      ( 27 Feb 2020 02:43 )             28.0                 CAPILLARY BLOOD GLUCOSE      POCT Blood Glucose.: 137 mg/dL (28 Feb 2020 12:56)  POCT Blood Glucose.: 108 mg/dL (28 Feb 2020 11:04)  POCT Blood Glucose.: 99 mg/dL (28 Feb 2020 07:14)  POCT Blood Glucose.: 257 mg/dL (27 Feb 2020 21:36)  POCT Blood Glucose.: 171 mg/dL (27 Feb 2020 17:20)        RECENT CULTURES:  02-24 @ 21:49 .Urine     >=3 organisms. Probable collection contamination.        02-21 @ 22:09 .Urine Pseudomonas aeruginosa  Enterobacter cloacae    50,000 - 99,000 CFU/mL Pseudomonas aeruginosa  50,000 - 99,000 CFU/mL Enterobacter cloacae

## 2020-02-28 NOTE — PROGRESS NOTE ADULT - ASSESSMENT
80 yo male, PMH of HFpEF, CKD4, HTN, DM2, TURP on insulin, advanced dementia, prior GIBs, presents today sent in by Cardiology for evaluation of concern for GI bleeding in setting of HFpEF exacerbation. Admitted for initiation of HD on this admission. ID called due to concern for UTI. Patient unable to give reliable history  due to dementia however denies urinary symptoms.    Overall ESRD, TURP, anemia, r/o UTI    - hypothermic on admission ? cause  - CT with bladder wall thickening, surrounding stranding, diverticulum-these are old findings present on prior CT as well  - UA negative on admission with low counts of Pseudomonas and Enterobacter likely contaminants. Repeat UA with >WBC however UCX contaminated  - CXR negative  - Stable off abx  - Blood cultures so far NGTD  - Rt IJ HD catheter placed 2/25, plan for AVF    Please call with questions.

## 2020-02-28 NOTE — BRIEF OPERATIVE NOTE - NSICDXBRIEFPOSTOP_GEN_ALL_CORE_FT
POST-OP DIAGNOSIS:  CKD (chronic kidney disease) requiring chronic dialysis 25-Feb-2020 15:20:22  Ezequiel Beauchamp
POST-OP DIAGNOSIS:  CKD (chronic kidney disease) requiring chronic dialysis 25-Feb-2020 15:20:22  Ezequiel Beauchamp

## 2020-02-29 LAB
ANION GAP SERPL CALC-SCNC: 15 MMOL/L — SIGNIFICANT CHANGE UP (ref 5–17)
BUN SERPL-MCNC: 29 MG/DL — HIGH (ref 8–20)
CALCIUM SERPL-MCNC: 8.7 MG/DL — SIGNIFICANT CHANGE UP (ref 8.6–10.2)
CHLORIDE SERPL-SCNC: 100 MMOL/L — SIGNIFICANT CHANGE UP (ref 98–107)
CO2 SERPL-SCNC: 28 MMOL/L — SIGNIFICANT CHANGE UP (ref 22–29)
CREAT SERPL-MCNC: 2.67 MG/DL — HIGH (ref 0.5–1.3)
GLUCOSE BLDC GLUCOMTR-MCNC: 101 MG/DL — HIGH (ref 70–99)
GLUCOSE BLDC GLUCOMTR-MCNC: 102 MG/DL — HIGH (ref 70–99)
GLUCOSE BLDC GLUCOMTR-MCNC: 167 MG/DL — HIGH (ref 70–99)
GLUCOSE BLDC GLUCOMTR-MCNC: 181 MG/DL — HIGH (ref 70–99)
GLUCOSE SERPL-MCNC: 115 MG/DL — HIGH (ref 70–99)
HCT VFR BLD CALC: 30.2 % — LOW (ref 39–50)
HGB BLD-MCNC: 9.4 G/DL — LOW (ref 13–17)
MCHC RBC-ENTMCNC: 27 PG — SIGNIFICANT CHANGE UP (ref 27–34)
MCHC RBC-ENTMCNC: 31.1 GM/DL — LOW (ref 32–36)
MCV RBC AUTO: 86.8 FL — SIGNIFICANT CHANGE UP (ref 80–100)
PLATELET # BLD AUTO: 145 K/UL — LOW (ref 150–400)
POTASSIUM SERPL-MCNC: 3.7 MMOL/L — SIGNIFICANT CHANGE UP (ref 3.5–5.3)
POTASSIUM SERPL-SCNC: 3.7 MMOL/L — SIGNIFICANT CHANGE UP (ref 3.5–5.3)
RBC # BLD: 3.48 M/UL — LOW (ref 4.2–5.8)
RBC # FLD: 17.5 % — HIGH (ref 10.3–14.5)
SODIUM SERPL-SCNC: 143 MMOL/L — SIGNIFICANT CHANGE UP (ref 135–145)
WBC # BLD: 9.9 K/UL — SIGNIFICANT CHANGE UP (ref 3.8–10.5)
WBC # FLD AUTO: 9.9 K/UL — SIGNIFICANT CHANGE UP (ref 3.8–10.5)

## 2020-02-29 PROCEDURE — 90935 HEMODIALYSIS ONE EVALUATION: CPT

## 2020-02-29 PROCEDURE — 99232 SBSQ HOSP IP/OBS MODERATE 35: CPT

## 2020-02-29 RX ORDER — IRON SUCROSE 20 MG/ML
200 INJECTION, SOLUTION INTRAVENOUS ONCE
Refills: 0 | Status: COMPLETED | OUTPATIENT
Start: 2020-02-29 | End: 2021-01-01

## 2020-02-29 RX ORDER — ERYTHROPOIETIN 10000 [IU]/ML
8000 INJECTION, SOLUTION INTRAVENOUS; SUBCUTANEOUS
Refills: 0 | Status: DISCONTINUED | OUTPATIENT
Start: 2020-02-29 | End: 2020-03-06

## 2020-02-29 RX ADMIN — Medication 2: at 21:38

## 2020-02-29 RX ADMIN — CARVEDILOL PHOSPHATE 3.12 MILLIGRAM(S): 80 CAPSULE, EXTENDED RELEASE ORAL at 18:23

## 2020-02-29 RX ADMIN — Medication 50 MILLIGRAM(S): at 13:03

## 2020-02-29 RX ADMIN — Medication 650 MILLIGRAM(S): at 18:23

## 2020-02-29 RX ADMIN — ERYTHROPOIETIN 8000 UNIT(S): 10000 INJECTION, SOLUTION INTRAVENOUS; SUBCUTANEOUS at 09:25

## 2020-02-29 RX ADMIN — Medication 20 MILLIGRAM(S): at 18:23

## 2020-02-29 RX ADMIN — Medication 4 MILLIGRAM(S): at 21:38

## 2020-02-29 RX ADMIN — ISOSORBIDE DINITRATE 10 MILLIGRAM(S): 5 TABLET ORAL at 13:03

## 2020-02-29 RX ADMIN — CARVEDILOL PHOSPHATE 3.12 MILLIGRAM(S): 80 CAPSULE, EXTENDED RELEASE ORAL at 05:21

## 2020-02-29 RX ADMIN — AMLODIPINE BESYLATE 10 MILLIGRAM(S): 2.5 TABLET ORAL at 05:23

## 2020-02-29 RX ADMIN — PANTOPRAZOLE SODIUM 40 MILLIGRAM(S): 20 TABLET, DELAYED RELEASE ORAL at 05:20

## 2020-02-29 RX ADMIN — ISOSORBIDE DINITRATE 10 MILLIGRAM(S): 5 TABLET ORAL at 05:19

## 2020-02-29 RX ADMIN — TAMSULOSIN HYDROCHLORIDE 0.4 MILLIGRAM(S): 0.4 CAPSULE ORAL at 21:38

## 2020-02-29 RX ADMIN — Medication 50 MILLIGRAM(S): at 21:38

## 2020-02-29 RX ADMIN — PREGABALIN 1000 MICROGRAM(S): 225 CAPSULE ORAL at 13:03

## 2020-02-29 RX ADMIN — Medication 20 MILLIGRAM(S): at 05:19

## 2020-02-29 RX ADMIN — ISOSORBIDE DINITRATE 10 MILLIGRAM(S): 5 TABLET ORAL at 21:38

## 2020-02-29 RX ADMIN — Medication 2: at 16:50

## 2020-02-29 RX ADMIN — Medication 650 MILLIGRAM(S): at 05:20

## 2020-02-29 RX ADMIN — Medication 50 MILLIGRAM(S): at 05:19

## 2020-02-29 NOTE — PROGRESS NOTE ADULT - SUBJECTIVE AND OBJECTIVE BOX
Post operative check    HPI/OVERNIGHT EVENTS: Patient seen and examined at bedside this AM and found in no distress. No overnight events, No complaints.     Denies fever, chills, nausea, vomiting, chest pain, SOB, dizziness, abd pain or any other concerning symptoms    Vital Signs Last 24 Hrs  T(C): 36.8 (28 Feb 2020 23:42), Max: 37.1 (28 Feb 2020 07:29)  T(F): 98.2 (28 Feb 2020 23:42), Max: 98.7 (28 Feb 2020 07:29)  HR: 73 (28 Feb 2020 23:42) (60 - 76)  BP: 130/53 (28 Feb 2020 23:42) (114/44 - 149/84)  BP(mean): --  RR: 18 (28 Feb 2020 23:42) (14 - 22)  SpO2: 94% (28 Feb 2020 23:42) (92% - 100%)    I&O's Detail    27 Feb 2020 07:01  -  28 Feb 2020 07:00  --------------------------------------------------------  IN:  Total IN: 0 mL    OUT:    Incontinent per Condom Catheter: 1650 mL    Other: 1000 mL  Total OUT: 2650 mL    Total NET: -2650 mL      28 Feb 2020 07:01  -  29 Feb 2020 00:09  --------------------------------------------------------  IN:    sodium chloride 0.9%: 300 mL  Total IN: 300 mL    OUT:    Estimated Blood Loss: 5 mL    Incontinent per Condom Catheter: 50 mL  Total OUT: 55 mL    Total NET: 245 mL      Constitutional: patient resting comfortably in bed, in no acute distress  HEENT: EOMI / PERRLA. R IJ permacath   Respiratory: Non labored breathing   Cardiovascular: RRR  Gastrointestinal: Abdomen soft, non-tender, non-distended, no rebound tenderness / guarding  Musculoskeletal: No joint pain, swelling or deformity; no limitation of movement  Vascular: LUE brachiocephalic AVF wound is c/d/i w/o erythema. AVF with palpable thrill.     LABS:                        9.0    7.33  )-----------( 122      ( 27 Feb 2020 02:43 )             28.0     02-27    138  |  97<L>  |  43.0<H>  ----------------------------<  149<H>  3.8   |  27.0  |  2.55<H>    Ca    8.7      27 Feb 2020 02:43            MEDICATIONS  (STANDING):  amLODIPine   Tablet 10 milliGRAM(s) Oral daily  carvedilol 3.125 milliGRAM(s) Oral every 12 hours  cyanocobalamin Injectable 1000 MICROGram(s) SubCutaneous daily  dextrose 50% Injectable 12.5 Gram(s) IV Push once  dextrose 50% Injectable 25 Gram(s) IV Push once  dextrose 50% Injectable 25 Gram(s) IV Push once  doxazosin 4 milliGRAM(s) Oral at bedtime  ergocalciferol 08480 Unit(s) Oral every week  hydrALAZINE 50 milliGRAM(s) Oral three times a day  insulin lispro (HumaLOG) corrective regimen sliding scale   SubCutaneous Before meals and at bedtime  isosorbide   dinitrate Tablet (ISORDIL) 10 milliGRAM(s) Oral three times a day  pantoprazole    Tablet 40 milliGRAM(s) Oral before breakfast  sodium bicarbonate 650 milliGRAM(s) Oral two times a day  tamsulosin Oral Tab/Cap - Peds 0.4 milliGRAM(s) Oral at bedtime  torsemide 20 milliGRAM(s) Oral two times a day    MEDICATIONS  (PRN):  acetaminophen   Tablet .. 650 milliGRAM(s) Oral every 6 hours PRN Temp greater or equal to 38C (100.4F), Mild Pain (1 - 3)  dextrose 40% Gel 15 Gram(s) Oral once PRN Blood Glucose LESS THAN 70 milliGRAM(s)/deciliter  glucagon  Injectable 1 milliGRAM(s) IntraMuscular once PRN Glucose LESS THAN 70 milligrams/deciliter      MICRO:   Cultures     STUDIES:   EKG, CXR, U/S, CT, MRI

## 2020-02-29 NOTE — PROGRESS NOTE ADULT - ASSESSMENT
80 yo male, PMH of Non obstructive cad 12/19, HFpEF s/p cardiomem, CKD4, HTN, DM2 on insulin, advanced dementia, prior GIB, recent admit for HF requiring chest tube and now ESKD    Advanced CKD; now deemed ESKD  started on HD 02/24, now TTS schedule, seen on HD, tolerating well  Next HD next week on tuesday March 3rd    Access: Pt with R IJ Tunnelled cath and L avf, appears inflammed and red  Vascular called to come examine and see if additional abx needed as fever last night    Anemia of CKD  Hb below goal  KD with HD at 8000 units  needs IV iron started next week      Chronic HFpEF: compensated  Cardiomems interrogated  Cont torsemide for now but next week, may use UF goal over torsemide dose adjustment    Hypertension   BP stable  Continue Hydralazine, Cardura and Norvasc    CKD - MBD  on high dose Vitamin D  PTH pending      Jana Gupta MD  Cell   Pager

## 2020-02-29 NOTE — PROGRESS NOTE ADULT - ASSESSMENT
78 yo male, PMH of HFpEF s/p cardiomem, CKD4, HTN, DM2 on insulin, advanced dementia, prior GIB, recent admit for HF requiring chest tube and temporary HD presents to ER for dark stool (although on iron at home) and shortness of breath per wife. Patient is poor historian.     1) ESRD- on new HD on this admission     initiated on HD this admission.   s/p  AVF today    vascular and renal following    2) UTI ruled out   - Urine culture contaminated  - Monitor off antibiotics   - ID follow up noted   signed off     3) Iron deficiency anemia  - Doubt GI bleed as h/h stable and stool occult negative  - Continue Venofer     4- Diabetes Mellitus type 2 controlled   stable

## 2020-02-29 NOTE — CHART NOTE - NSCHARTNOTEFT_GEN_A_CORE
Called by nephrologist with concern for erythema around AVF site. Patient seen and examined at bedside in dialysis - had just finished HD session without incident.  Has no complaints of pain to AVF site. Area is ecchymotic, suture line intact. Good thrill and palpable right radial pulse. No significant edema. No blanching erythema. Discussed with Dr. Michael. No acute concern for infection of AVF site. Will continue to monitor.

## 2020-02-29 NOTE — PROGRESS NOTE ADULT - SUBJECTIVE AND OBJECTIVE BOX
Internal Medicine Hospitalist Progress Note    seen in HD , he is with no complaints , stable alert awake   low grade temp reported by the staff   chart reviwewed                ROS: as above, all remaining ROS are negative.       BACKGROUND:  MEDICATIONS  (STANDING):  amLODIPine   Tablet 10 milliGRAM(s) Oral daily  carvedilol 3.125 milliGRAM(s) Oral every 12 hours  cyanocobalamin Injectable 1000 MICROGram(s) SubCutaneous daily  dextrose 50% Injectable 12.5 Gram(s) IV Push once  dextrose 50% Injectable 25 Gram(s) IV Push once  dextrose 50% Injectable 25 Gram(s) IV Push once  doxazosin 4 milliGRAM(s) Oral at bedtime  epoetin nereida Injectable 8000 Unit(s) IV Push <User Schedule>  ergocalciferol 98547 Unit(s) Oral every week  hydrALAZINE 50 milliGRAM(s) Oral three times a day  insulin lispro (HumaLOG) corrective regimen sliding scale   SubCutaneous Before meals and at bedtime  isosorbide   dinitrate Tablet (ISORDIL) 10 milliGRAM(s) Oral three times a day  pantoprazole    Tablet 40 milliGRAM(s) Oral before breakfast  sodium bicarbonate 650 milliGRAM(s) Oral two times a day  tamsulosin Oral Tab/Cap - Peds 0.4 milliGRAM(s) Oral at bedtime  torsemide 20 milliGRAM(s) Oral two times a day    MEDICATIONS  (PRN):  acetaminophen   Tablet .. 650 milliGRAM(s) Oral every 6 hours PRN Temp greater or equal to 38C (100.4F), Mild Pain (1 - 3)  dextrose 40% Gel 15 Gram(s) Oral once PRN Blood Glucose LESS THAN 70 milliGRAM(s)/deciliter  glucagon  Injectable 1 milliGRAM(s) IntraMuscular once PRN Glucose LESS THAN 70 milligrams/deciliter    Allergies    No Known Allergies    Intolerances            VITALS:  Vital Signs Last 24 Hrs  T(C): 38.3 (2020 06:40), Max: 38.3 (2020 06:40)  T(F): 100.9 (2020 06:40), Max: 100.9 (2020 06:40)  HR: 78 (2020 06:40) (60 - 82)  BP: 115/76 (2020 06:40) (114/44 - 157/57)  BP(mean): --  RR: 18 (2020 06:40) (14 - 22)  SpO2: 95% (2020 06:40) (94% - 100%) Daily     Daily Weight in k.2 (2020 06:40)  CAPILLARY BLOOD GLUCOSE      POCT Blood Glucose.: 136 mg/dL (2020 20:23)  POCT Blood Glucose.: 118 mg/dL (2020 17:21)  POCT Blood Glucose.: 137 mg/dL (2020 12:56)  POCT Blood Glucose.: 108 mg/dL (2020 11:04)    I&O's Summary    2020 07:01  -  2020 07:00  --------------------------------------------------------  IN: 300 mL / OUT: 1505 mL / NET: -1205 mL        PHYSICAL EXAM:      Constitutional:    Eyes:    ENMT:    Neck:    Breasts:    Back:    Respiratory:    Cardiovascular:    Gastrointestinal:    Genitourinary:    Rectal:    Extremities:    Vascular:    Neurological:    Skin:    Lymph Nodes:    Musculoskeletal:    Psychiatric:          LABS:              Radiology : Internal Medicine Hospitalist Progress Note    seen in HD , he is with no complaints , stable alert awake   low grade temp reported by the staff   chart reviwewed          ROS: as above, all remaining ROS are negative.       BACKGROUND:  MEDICATIONS  (STANDING):  amLODIPine   Tablet 10 milliGRAM(s) Oral daily  carvedilol 3.125 milliGRAM(s) Oral every 12 hours  cyanocobalamin Injectable 1000 MICROGram(s) SubCutaneous daily  dextrose 50% Injectable 12.5 Gram(s) IV Push once  dextrose 50% Injectable 25 Gram(s) IV Push once  dextrose 50% Injectable 25 Gram(s) IV Push once  doxazosin 4 milliGRAM(s) Oral at bedtime  epoetin enreida Injectable 8000 Unit(s) IV Push <User Schedule>  ergocalciferol 13573 Unit(s) Oral every week  hydrALAZINE 50 milliGRAM(s) Oral three times a day  insulin lispro (HumaLOG) corrective regimen sliding scale   SubCutaneous Before meals and at bedtime  isosorbide   dinitrate Tablet (ISORDIL) 10 milliGRAM(s) Oral three times a day  pantoprazole    Tablet 40 milliGRAM(s) Oral before breakfast  sodium bicarbonate 650 milliGRAM(s) Oral two times a day  tamsulosin Oral Tab/Cap - Peds 0.4 milliGRAM(s) Oral at bedtime  torsemide 20 milliGRAM(s) Oral two times a day    MEDICATIONS  (PRN):  acetaminophen   Tablet .. 650 milliGRAM(s) Oral every 6 hours PRN Temp greater or equal to 38C (100.4F), Mild Pain (1 - 3)  dextrose 40% Gel 15 Gram(s) Oral once PRN Blood Glucose LESS THAN 70 milliGRAM(s)/deciliter  glucagon  Injectable 1 milliGRAM(s) IntraMuscular once PRN Glucose LESS THAN 70 milligrams/deciliter    Allergies    No Known Allergies    Intolerances            VITALS:  Vital Signs Last 24 Hrs  T(C): 38.3 (2020 06:40), Max: 38.3 (2020 06:40)  T(F): 100.9 (2020 06:40), Max: 100.9 (2020 06:40)  HR: 78 (2020 06:40) (60 - 82)  BP: 115/76 (2020 06:40) (114/44 - 157/57)  BP(mean): --  RR: 18 (2020 06:40) (14 - 22)  SpO2: 95% (2020 06:40) (94% - 100%) Daily     Daily Weight in k.2 (2020 06:40)  CAPILLARY BLOOD GLUCOSE      POCT Blood Glucose.: 136 mg/dL (2020 20:23)  POCT Blood Glucose.: 118 mg/dL (2020 17:21)  POCT Blood Glucose.: 137 mg/dL (2020 12:56)  POCT Blood Glucose.: 108 mg/dL (2020 11:04)    I&O's Summary    2020 07:01  -  2020 07:00  --------------------------------------------------------  IN: 300 mL / OUT: 1505 mL / NET: -1205 mL        PHYSICAL EXAM:      Constitutional: awake alert     Neck: supple , no JVD     Respiratory: CTa bilateral     Cardiovascular: regular s1 /s2     Gastrointestinal: soft no tenderness , Bs positive     Extremities: no pretibial edema         LABS:                          9.4    9.90  )-----------( 145      ( 2020 08:12 )             30.2   02-    143  |  100  |  29.0<H>  ----------------------------<  115<H>  3.7   |  28.0  |  2.67<H>    Ca    8.7      2020 08:12  CAPILLARY BLOOD GLUCOSE      POCT Blood Glucose.: 167 mg/dL (2020 16:49)  POCT Blood Glucose.: 101 mg/dL (2020 11:16)  POCT Blood Glucose.: 102 mg/dL (2020 08:13)  POCT Blood Glucose.: 136 mg/dL (2020 20:23)  POCT Blood Glucose.: 118 mg/dL (2020 17:21)              Radiology :

## 2020-02-29 NOTE — PROGRESS NOTE ADULT - ASSESSMENT
78yo M with ESRD s/p LUE brachiocephalic AVF with adequate postoperative course.    -Continue HD via R IJ permacath  -Daily vascular exam  -Rest of care per primary team

## 2020-02-29 NOTE — PROGRESS NOTE ADULT - SUBJECTIVE AND OBJECTIVE BOX
[facility] DIVISION OF KIDNEY DISEASES AND HYPERTENSION -- HEMODIALYSIS NOTE  --------------------------------------------------------------------------------  Chief Complaint: ESRD/Ongoing hemodialysis requirement    24 hour events/subjective:  Seen on HD  Low grade fever overnight      PAST HISTORY  --------------------------------------------------------------------------------  No significant changes to PMH, PSH, FHx, SHx, unless otherwise noted    ALLERGIES & MEDICATIONS  --------------------------------------------------------------------------------  Allergies    No Known Allergies    Intolerances      Standing Inpatient Medications  amLODIPine   Tablet 10 milliGRAM(s) Oral daily  carvedilol 3.125 milliGRAM(s) Oral every 12 hours  cyanocobalamin Injectable 1000 MICROGram(s) SubCutaneous daily  dextrose 50% Injectable 12.5 Gram(s) IV Push once  dextrose 50% Injectable 25 Gram(s) IV Push once  dextrose 50% Injectable 25 Gram(s) IV Push once  doxazosin 4 milliGRAM(s) Oral at bedtime  epoetin nereida Injectable 8000 Unit(s) IV Push <User Schedule>  ergocalciferol 80231 Unit(s) Oral every week  hydrALAZINE 50 milliGRAM(s) Oral three times a day  insulin lispro (HumaLOG) corrective regimen sliding scale   SubCutaneous Before meals and at bedtime  isosorbide   dinitrate Tablet (ISORDIL) 10 milliGRAM(s) Oral three times a day  pantoprazole    Tablet 40 milliGRAM(s) Oral before breakfast  sodium bicarbonate 650 milliGRAM(s) Oral two times a day  tamsulosin Oral Tab/Cap - Peds 0.4 milliGRAM(s) Oral at bedtime  torsemide 20 milliGRAM(s) Oral two times a day    PRN Inpatient Medications  acetaminophen   Tablet .. 650 milliGRAM(s) Oral every 6 hours PRN  dextrose 40% Gel 15 Gram(s) Oral once PRN  glucagon  Injectable 1 milliGRAM(s) IntraMuscular once PRN      REVIEW OF SYSTEMS  --------------------------------------------------------------------------------  Gen: No weight changes, fatigue, fevers/chills, weakness  Skin: No rashes  Head/Eyes/Ears/Mouth: No headache; Normal hearing; Normal vision w/o blurriness; No sinus pain/discomfort, sore throat  Respiratory: No dyspnea, cough, wheezing, hemoptysis  CV: No chest pain, PND, orthopnea  GI: No abdominal pain, diarrhea, constipation, nausea, vomiting, melena, hematochezia  : No increased frequency, dysuria, hematuria, nocturia  MSK: No joint pain/swelling; no back pain; no edema  Neuro: No dizziness/lightheadedness, weakness, seizures, numbness, tingling  Heme: No easy bruising or bleeding  Endo: No heat/cold intolerance  Psych: No significant nervousness, anxiety, stress, depression    All other systems were reviewed and are negative, except as noted.    VITALS/PHYSICAL EXAM  --------------------------------------------------------------------------------  T(C): 36.6 (02-29-20 @ 09:56), Max: 38.3 (02-29-20 @ 06:40)  HR: 72 (02-29-20 @ 09:56) (60 - 82)  BP: 131/56 (02-29-20 @ 09:56) (114/44 - 157/57)  RR: 18 (02-29-20 @ 09:56) (14 - 22)  SpO2: 98% (02-29-20 @ 09:56) (94% - 100%)  Wt(kg): --    Weight (kg): 78 (02-28-20 @ 14:43)      02-28-20 @ 07:01  -  02-29-20 @ 07:00  --------------------------------------------------------  IN: 300 mL / OUT: 1505 mL / NET: -1205 mL    02-29-20 @ 07:01  -  02-29-20 @ 11:17  --------------------------------------------------------  IN: 0 mL / OUT: 1000 mL / NET: -1000 mL      PHYSICAL EXAM: vital signs as above  in no apparent distress  Neck: Supple, no JVD,    Lungs: no rhonchi, no wheeze, no crackles  CVS: S1 S2 no M/R/G  Abdomen: no tenderness, no organomegaly, BS present  Skin: warm, dry  Ext: no cyanosis or clubbing, no edema  Access: R IJ tunnelled cath, L AVF +thrill, appears red and inflammed, warm on exam    LABS/STUDIES  --------------------------------------------------------------------------------              9.4    9.90  >-----------<  145      [02-29-20 @ 08:12]              30.2     143  |  100  |  29.0  ----------------------------<  115      [02-29-20 @ 08:12]  3.7   |  28.0  |  2.67        Ca     8.7     [02-29-20 @ 08:12]            Iron 23, TIBC 319, %sat 7      [02-22-20 @ 11:11]  Ferritin 41      [02-22-20 @ 11:11]  PTH -- (Ca 8.3)      [12-12-19 @ 19:36]   164  Vitamin D (25OH) 20.3      [12-12-19 @ 19:36]  HbA1c 8.1      [12-11-19 @ 06:47]  TSH 6.62      [02-23-20 @ 10:16]    HBsAb <3.0      [02-24-20 @ 15:46]  HBsAg Nonreact      [02-24-20 @ 15:46]  HBcAb Nonreact      [02-24-20 @ 15:46]  HCV 0.12, Nonreact      [02-24-20 @ 15:46]

## 2020-03-01 DIAGNOSIS — N18.9 CHRONIC KIDNEY DISEASE, UNSPECIFIED: ICD-10-CM

## 2020-03-01 LAB
GLUCOSE BLDC GLUCOMTR-MCNC: 120 MG/DL — HIGH (ref 70–99)
GLUCOSE BLDC GLUCOMTR-MCNC: 144 MG/DL — HIGH (ref 70–99)
GLUCOSE BLDC GLUCOMTR-MCNC: 162 MG/DL — HIGH (ref 70–99)
GLUCOSE BLDC GLUCOMTR-MCNC: 219 MG/DL — HIGH (ref 70–99)

## 2020-03-01 PROCEDURE — 99232 SBSQ HOSP IP/OBS MODERATE 35: CPT

## 2020-03-01 RX ORDER — HEPARIN SODIUM 5000 [USP'U]/ML
5000 INJECTION INTRAVENOUS; SUBCUTANEOUS EVERY 12 HOURS
Refills: 0 | Status: DISCONTINUED | OUTPATIENT
Start: 2020-03-01 | End: 2020-03-06

## 2020-03-01 RX ADMIN — Medication 650 MILLIGRAM(S): at 05:10

## 2020-03-01 RX ADMIN — ISOSORBIDE DINITRATE 10 MILLIGRAM(S): 5 TABLET ORAL at 13:10

## 2020-03-01 RX ADMIN — Medication 4 MILLIGRAM(S): at 21:40

## 2020-03-01 RX ADMIN — Medication 4: at 16:42

## 2020-03-01 RX ADMIN — Medication 2: at 11:41

## 2020-03-01 RX ADMIN — CARVEDILOL PHOSPHATE 3.12 MILLIGRAM(S): 80 CAPSULE, EXTENDED RELEASE ORAL at 16:42

## 2020-03-01 RX ADMIN — Medication 50 MILLIGRAM(S): at 13:10

## 2020-03-01 RX ADMIN — PANTOPRAZOLE SODIUM 40 MILLIGRAM(S): 20 TABLET, DELAYED RELEASE ORAL at 07:48

## 2020-03-01 RX ADMIN — PREGABALIN 1000 MICROGRAM(S): 225 CAPSULE ORAL at 11:40

## 2020-03-01 RX ADMIN — ISOSORBIDE DINITRATE 10 MILLIGRAM(S): 5 TABLET ORAL at 05:10

## 2020-03-01 RX ADMIN — CARVEDILOL PHOSPHATE 3.12 MILLIGRAM(S): 80 CAPSULE, EXTENDED RELEASE ORAL at 05:10

## 2020-03-01 RX ADMIN — Medication 50 MILLIGRAM(S): at 21:40

## 2020-03-01 RX ADMIN — ISOSORBIDE DINITRATE 10 MILLIGRAM(S): 5 TABLET ORAL at 21:40

## 2020-03-01 RX ADMIN — TAMSULOSIN HYDROCHLORIDE 0.4 MILLIGRAM(S): 0.4 CAPSULE ORAL at 21:40

## 2020-03-01 RX ADMIN — Medication 20 MILLIGRAM(S): at 16:43

## 2020-03-01 RX ADMIN — Medication 650 MILLIGRAM(S): at 16:43

## 2020-03-01 RX ADMIN — Medication 20 MILLIGRAM(S): at 05:10

## 2020-03-01 RX ADMIN — AMLODIPINE BESYLATE 10 MILLIGRAM(S): 2.5 TABLET ORAL at 05:10

## 2020-03-01 RX ADMIN — Medication 50 MILLIGRAM(S): at 05:10

## 2020-03-01 NOTE — CHART NOTE - NSCHARTNOTEFT_GEN_A_CORE
Source: Patient [x ]  Family [ x]   other [ ]    Current Diet: Diet, Regular:   DASH/TLC {Sodium & Cholesteral Restricted}  1200mL Fluid Restriction ([id number])  Low Sodium     Special Instructions for Nursing:  Low Sodium (02-28-20 @ 16:45)    PO intake: Pt has good po intake    Current Weight:   (2/29) 139.3#  (2/27) 135.1#  (2/26) 151#  (2/25) 154.9#  (2/24) 174.1#  (2/23) 168.4#  -Continue daily wts, continue to monitor. No edema noted.     Pertinent Medications: MEDICATIONS  (STANDING):  amLODIPine   Tablet 10 milliGRAM(s) Oral daily  carvedilol 3.125 milliGRAM(s) Oral every 12 hours  cyanocobalamin Injectable 1000 MICROGram(s) SubCutaneous daily  dextrose 50% Injectable 12.5 Gram(s) IV Push once  dextrose 50% Injectable 25 Gram(s) IV Push once  dextrose 50% Injectable 25 Gram(s) IV Push once  doxazosin 4 milliGRAM(s) Oral at bedtime  epoetin nereida Injectable 8000 Unit(s) IV Push <User Schedule>  ergocalciferol 19259 Unit(s) Oral every week  hydrALAZINE 50 milliGRAM(s) Oral three times a day  insulin lispro (HumaLOG) corrective regimen sliding scale   SubCutaneous Before meals and at bedtime  iron sucrose IVPB 200 milliGRAM(s) IV Intermittent once  isosorbide   dinitrate Tablet (ISORDIL) 10 milliGRAM(s) Oral three times a day  pantoprazole    Tablet 40 milliGRAM(s) Oral before breakfast  sodium bicarbonate 650 milliGRAM(s) Oral two times a day  tamsulosin Oral Tab/Cap - Peds 0.4 milliGRAM(s) Oral at bedtime  torsemide 20 milliGRAM(s) Oral two times a day    MEDICATIONS  (PRN):  acetaminophen   Tablet .. 650 milliGRAM(s) Oral every 6 hours PRN Temp greater or equal to 38C (100.4F), Mild Pain (1 - 3)  dextrose 40% Gel 15 Gram(s) Oral once PRN Blood Glucose LESS THAN 70 milliGRAM(s)/deciliter  glucagon  Injectable 1 milliGRAM(s) IntraMuscular once PRN Glucose LESS THAN 70 milligrams/deciliter    Pertinent Labs: CBC Full  -  ( 29 Feb 2020 08:12 )  WBC Count : 9.90 K/uL  RBC Count : 3.48 M/uL  Hemoglobin : 9.4 g/dL  Hematocrit : 30.2 %  Platelet Count - Automated : 145 K/uL  Mean Cell Volume : 86.8 fl  Mean Cell Hemoglobin : 27.0 pg  Mean Cell Hemoglobin Concentration : 31.1 gm/dL  Auto Neutrophil # : x  Auto Lymphocyte # : x  Auto Monocyte # : x  Auto Eosinophil # : x  Auto Basophil # : x  Auto Neutrophil % : x  Auto Lymphocyte % : x  Auto Monocyte % : x  Auto Eosinophil % : x  Auto Basophil % : x    -No recent labs    Skin: Surgical incision    Nutrition focused physical exam conducted - found signs of malnutrition [ x]absent [ ]present    Subcutaneous fat loss: [ ] Orbital fat pads region, [ ]Buccal fat region, [ ]Triceps region,  [ ]Ribs region    Muscle wasting: [ ]Temples region, [ ]Clavicle region, [ ]Shoulder region, [ ]Scapula region, [ ]Interosseous region,  [ ]thigh region, [ ]Calf region    Estimated Needs:   [x ] no change since previous assessment  [ ] recalculated:     Current Nutrition Diagnosis: Pt remains at high nutrition risk secondary to Altered Nutrition Related Lab Values related to CKD and DM as evidenced by increased BUN, Creat, HgA1c. Pt family present during assessment, reported pt has good appetite. Pt consumed 100% of lunch per tray observation. Last documented BM 2/27. RD to remain available.      Recommendations:   1) Encourage diet adherence  2) Monitor po intake, encourage HBV protein  3) Monitor wts and labs    Monitoring and Evaluation:   [ x] PO intake [x ] Tolerance to diet prescription [X] Weights  [X] Follow up per protocol [X] Labs:

## 2020-03-01 NOTE — PROGRESS NOTE ADULT - SUBJECTIVE AND OBJECTIVE BOX
Internal Medicine Hospitalist Progress Note    seen in the morning with  Joellen   he is resting in the bed , no complaints , no distress     Vital Signs Last 24 Hrs  T(C): 36.6 (01 Mar 2020 07:54), Max: 37.3 (29 Feb 2020 16:22)  T(F): 97.8 (01 Mar 2020 07:54), Max: 99.1 (29 Feb 2020 16:22)  HR: 71 (01 Mar 2020 13:10) (66 - 74)  BP: 129/67 (01 Mar 2020 13:10) (125/56 - 137/60)  BP(mean): --  RR: 18 (01 Mar 2020 07:54) (18 - 18)  SpO2: 98% (01 Mar 2020 07:54) (98% - 98%)CAPILLARY BLOOD GLUCOSE          PHYSICAL EXAM:      Constitutional: awake alert     Neck: supple , no JVD     Respiratory: CTa bilateral     Cardiovascular: regular s1 /s2     Gastrointestinal: soft no tenderness , Bs positive     Extremities: no pretibial edema     CAPILLARY BLOOD GLUCOSE      POCT Blood Glucose.: 162 mg/dL (01 Mar 2020 11:09)  POCT Blood Glucose.: 144 mg/dL (01 Mar 2020 07:30)  POCT Blood Glucose.: 181 mg/dL (29 Feb 2020 21:32)  POCT Blood Glucose.: 167 mg/dL (29 Feb 2020 16:49)                            9.4    9.90  )-----------( 145      ( 29 Feb 2020 08:12 )             30.2   02-29    143  |  100  |  29.0<H>  ----------------------------<  115<H>  3.7   |  28.0  |  2.67<H>    Ca    8.7      29 Feb 2020 08:12

## 2020-03-01 NOTE — PROGRESS NOTE ADULT - PROBLEM SELECTOR PLAN 1
[facility]  daily assessment of av fistula site  NV checks   monitor erythema/ecchymosis   vascular to continue to follow

## 2020-03-01 NOTE — PROGRESS NOTE ADULT - SUBJECTIVE AND OBJECTIVE BOX
INTERVAL HPI/OVERNIGHT EVENTS: No acute events throughout the day overnight. There was some concern during the day on Saturday regarding the bruising and erythema around the av fistula site, evaluated by vascular team and Alec made aware, all findings within normal limits.     STATUS POST:  Creation, AV fistula, brachiocephalic, LUE brachiocephalic AVF    POST OPERATIVE DAY #: 2    MEDICATIONS  (STANDING):  amLODIPine   Tablet 10 milliGRAM(s) Oral daily  carvedilol 3.125 milliGRAM(s) Oral every 12 hours  cyanocobalamin Injectable 1000 MICROGram(s) SubCutaneous daily  dextrose 50% Injectable 12.5 Gram(s) IV Push once  dextrose 50% Injectable 25 Gram(s) IV Push once  dextrose 50% Injectable 25 Gram(s) IV Push once  doxazosin 4 milliGRAM(s) Oral at bedtime  epoetin nereida Injectable 8000 Unit(s) IV Push <User Schedule>  ergocalciferol 52621 Unit(s) Oral every week  hydrALAZINE 50 milliGRAM(s) Oral three times a day  insulin lispro (HumaLOG) corrective regimen sliding scale   SubCutaneous Before meals and at bedtime  iron sucrose IVPB 200 milliGRAM(s) IV Intermittent once  isosorbide   dinitrate Tablet (ISORDIL) 10 milliGRAM(s) Oral three times a day  pantoprazole    Tablet 40 milliGRAM(s) Oral before breakfast  sodium bicarbonate 650 milliGRAM(s) Oral two times a day  tamsulosin Oral Tab/Cap - Peds 0.4 milliGRAM(s) Oral at bedtime  torsemide 20 milliGRAM(s) Oral two times a day    MEDICATIONS  (PRN):  acetaminophen   Tablet .. 650 milliGRAM(s) Oral every 6 hours PRN Temp greater or equal to 38C (100.4F), Mild Pain (1 - 3)  dextrose 40% Gel 15 Gram(s) Oral once PRN Blood Glucose LESS THAN 70 milliGRAM(s)/deciliter  glucagon  Injectable 1 milliGRAM(s) IntraMuscular once PRN Glucose LESS THAN 70 milligrams/deciliter      Vital Signs Last 24 Hrs  T(C): 37.2 (29 Feb 2020 23:36), Max: 38.3 (29 Feb 2020 06:40)  T(F): 99 (29 Feb 2020 23:36), Max: 100.9 (29 Feb 2020 06:40)  HR: 69 (29 Feb 2020 23:36) (66 - 82)  BP: 133/66 (29 Feb 2020 23:36) (115/76 - 157/57)  BP(mean): --  RR: 18 (29 Feb 2020 23:36) (18 - 18)  SpO2: 98% (29 Feb 2020 23:36) (95% - 98%)    PHYSICAL EXAM:      Constitutional: in good spirits     Respiratory: no respiratory distress, no conversational dyspnea, no supplemental o2 needed     Cardiovascular: NSR     Gastrointestinal: abdomen soft, non-tender, atraumatic     Genitourinary: condom cath draining clear yellow urine     Vascular: lue NVI, +2 radial pulse, extremity warm to touch, cap refil <2, av fistula with good palpable thrill, surrounding erythema and ecchymosis stable,  no blanching erythema noted    Neurological:  A&OX3    Skin: warm, dry and no rashes           I&O's Detail    28 Feb 2020 07:01  -  29 Feb 2020 07:00  --------------------------------------------------------  IN:    sodium chloride 0.9%: 300 mL  Total IN: 300 mL    OUT:    Estimated Blood Loss: 5 mL    Incontinent per Condom Catheter: 1500 mL  Total OUT: 1505 mL    Total NET: -1205 mL      29 Feb 2020 07:01  -  01 Mar 2020 01:30  --------------------------------------------------------  IN:  Total IN: 0 mL    OUT:    Other: 1000 mL  Total OUT: 1000 mL    Total NET: -1000 mL          LABS:                        9.4    9.90  )-----------( 145      ( 29 Feb 2020 08:12 )             30.2     02-29    143  |  100  |  29.0<H>  ----------------------------<  115<H>  3.7   |  28.0  |  2.67<H>    Ca    8.7      29 Feb 2020 08:12            RADIOLOGY & ADDITIONAL STUDIES:

## 2020-03-01 NOTE — PROGRESS NOTE ADULT - ASSESSMENT
80 yo male, PMH of HFpEF s/p cardiomem, CKD4, HTN, DM2 on insulin, advanced dementia, prior GIB, recent admit for HF requiring chest tube and temporary HD presents to ER for dark stool (although on iron at home) and shortness of breath per wife. Patient is poor historian.     1) ESRD- on new HD on this admission    doing well , cont HD perschedule    s/p  AVF     vascular and renal following    2) UTI ruled out   - Urine culture contaminated  cont to  Monitor off antibiotics   - ID signed off     3) Iron deficiency anemia  - Doubt GI bleed as h/h stable and stool occult negative  - Continue Venofer     4- Diabetes Mellitus type 2 controlled   stable

## 2020-03-02 LAB
GLUCOSE BLDC GLUCOMTR-MCNC: 187 MG/DL — HIGH (ref 70–99)
GLUCOSE BLDC GLUCOMTR-MCNC: 188 MG/DL — HIGH (ref 70–99)
GLUCOSE BLDC GLUCOMTR-MCNC: 252 MG/DL — HIGH (ref 70–99)
GLUCOSE BLDC GLUCOMTR-MCNC: 369 MG/DL — HIGH (ref 70–99)

## 2020-03-02 PROCEDURE — 99232 SBSQ HOSP IP/OBS MODERATE 35: CPT

## 2020-03-02 PROCEDURE — 99223 1ST HOSP IP/OBS HIGH 75: CPT

## 2020-03-02 RX ORDER — CHLORHEXIDINE GLUCONATE 213 G/1000ML
1 SOLUTION TOPICAL
Refills: 0 | Status: DISCONTINUED | OUTPATIENT
Start: 2020-03-02 | End: 2020-03-06

## 2020-03-02 RX ORDER — IRON SUCROSE 20 MG/ML
200 INJECTION, SOLUTION INTRAVENOUS ONCE
Refills: 0 | Status: COMPLETED | OUTPATIENT
Start: 2020-03-02 | End: 2020-03-02

## 2020-03-02 RX ADMIN — PANTOPRAZOLE SODIUM 40 MILLIGRAM(S): 20 TABLET, DELAYED RELEASE ORAL at 05:32

## 2020-03-02 RX ADMIN — AMLODIPINE BESYLATE 10 MILLIGRAM(S): 2.5 TABLET ORAL at 05:33

## 2020-03-02 RX ADMIN — Medication 650 MILLIGRAM(S): at 17:09

## 2020-03-02 RX ADMIN — Medication 20 MILLIGRAM(S): at 17:29

## 2020-03-02 RX ADMIN — CARVEDILOL PHOSPHATE 3.12 MILLIGRAM(S): 80 CAPSULE, EXTENDED RELEASE ORAL at 05:33

## 2020-03-02 RX ADMIN — PREGABALIN 1000 MICROGRAM(S): 225 CAPSULE ORAL at 12:30

## 2020-03-02 RX ADMIN — Medication 10: at 12:29

## 2020-03-02 RX ADMIN — ISOSORBIDE DINITRATE 10 MILLIGRAM(S): 5 TABLET ORAL at 17:09

## 2020-03-02 RX ADMIN — HEPARIN SODIUM 5000 UNIT(S): 5000 INJECTION INTRAVENOUS; SUBCUTANEOUS at 05:32

## 2020-03-02 RX ADMIN — HEPARIN SODIUM 5000 UNIT(S): 5000 INJECTION INTRAVENOUS; SUBCUTANEOUS at 17:09

## 2020-03-02 RX ADMIN — Medication 4 MILLIGRAM(S): at 21:25

## 2020-03-02 RX ADMIN — Medication 20 MILLIGRAM(S): at 05:33

## 2020-03-02 RX ADMIN — Medication 50 MILLIGRAM(S): at 14:27

## 2020-03-02 RX ADMIN — ISOSORBIDE DINITRATE 10 MILLIGRAM(S): 5 TABLET ORAL at 05:32

## 2020-03-02 RX ADMIN — Medication 650 MILLIGRAM(S): at 05:32

## 2020-03-02 RX ADMIN — Medication 2: at 21:29

## 2020-03-02 RX ADMIN — CARVEDILOL PHOSPHATE 3.12 MILLIGRAM(S): 80 CAPSULE, EXTENDED RELEASE ORAL at 17:10

## 2020-03-02 RX ADMIN — Medication 2: at 08:40

## 2020-03-02 RX ADMIN — Medication 50 MILLIGRAM(S): at 05:33

## 2020-03-02 RX ADMIN — Medication 50 MILLIGRAM(S): at 21:25

## 2020-03-02 RX ADMIN — ISOSORBIDE DINITRATE 10 MILLIGRAM(S): 5 TABLET ORAL at 22:42

## 2020-03-02 RX ADMIN — IRON SUCROSE 110 MILLIGRAM(S): 20 INJECTION, SOLUTION INTRAVENOUS at 17:28

## 2020-03-02 RX ADMIN — Medication 6: at 17:11

## 2020-03-02 RX ADMIN — TAMSULOSIN HYDROCHLORIDE 0.4 MILLIGRAM(S): 0.4 CAPSULE ORAL at 21:26

## 2020-03-02 NOTE — PROGRESS NOTE ADULT - SUBJECTIVE AND OBJECTIVE BOX
JEMIMA SANTOS    [id number]    History:  The patient is status post left brachial cephalic avf 2/28.  HD currently via right permacath. No reported issues with permacath access. Patient is doing well. Currently Denies nausea, vomiting, chest pain, shortness of breath, abdominal pain or fever. No new complaints. No acute motor or sensory changes are reported.    Vital Signs Last 24 Hrs  T(C): 36.8 (02 Mar 2020 07:45), Max: 37.2 (01 Mar 2020 16:42)  T(F): 98.2 (02 Mar 2020 07:45), Max: 99 (01 Mar 2020 16:42)  HR: 73 (02 Mar 2020 07:45) (66 - 74)  BP: 134/58 (02 Mar 2020 07:45) (128/52 - 134/58)  BP(mean): --  RR: 18 (02 Mar 2020 07:45) (18 - 18)  SpO2: 98% (02 Mar 2020 07:45) (98% - 99%)  I&O's Summary    01 Mar 2020 07:01  -  02 Mar 2020 07:00  --------------------------------------------------------  IN: 0 mL / OUT: 400 mL / NET: -400 mL                              9.4    9.90  )-----------( 145      ( 29 Feb 2020 08:12 )             30.2     02-29    143  |  100  |  29.0<H>  ----------------------------<  115<H>  3.7   |  28.0  |  2.67<H>    Ca    8.7      29 Feb 2020 08:12        MEDICATIONS  (STANDING):  amLODIPine   Tablet 10 milliGRAM(s) Oral daily  carvedilol 3.125 milliGRAM(s) Oral every 12 hours  cyanocobalamin Injectable 1000 MICROGram(s) SubCutaneous daily  dextrose 50% Injectable 12.5 Gram(s) IV Push once  dextrose 50% Injectable 25 Gram(s) IV Push once  dextrose 50% Injectable 25 Gram(s) IV Push once  doxazosin 4 milliGRAM(s) Oral at bedtime  epoetin nereida Injectable 8000 Unit(s) IV Push <User Schedule>  ergocalciferol 34690 Unit(s) Oral every week  heparin  Injectable 5000 Unit(s) SubCutaneous every 12 hours  hydrALAZINE 50 milliGRAM(s) Oral three times a day  insulin lispro (HumaLOG) corrective regimen sliding scale   SubCutaneous Before meals and at bedtime  iron sucrose IVPB 200 milliGRAM(s) IV Intermittent once  isosorbide   dinitrate Tablet (ISORDIL) 10 milliGRAM(s) Oral three times a day  pantoprazole    Tablet 40 milliGRAM(s) Oral before breakfast  sodium bicarbonate 650 milliGRAM(s) Oral two times a day  tamsulosin Oral Tab/Cap - Peds 0.4 milliGRAM(s) Oral at bedtime  torsemide 20 milliGRAM(s) Oral two times a day    MEDICATIONS  (PRN):  acetaminophen   Tablet .. 650 milliGRAM(s) Oral every 6 hours PRN Temp greater or equal to 38C (100.4F), Mild Pain (1 - 3)  dextrose 40% Gel 15 Gram(s) Oral once PRN Blood Glucose LESS THAN 70 milliGRAM(s)/deciliter  glucagon  Injectable 1 milliGRAM(s) IntraMuscular once PRN Glucose LESS THAN 70 milligrams/deciliter      Physical exam:     Alert, non distress  non labored breathing  Right Ij permacath present, clean dressing   abdomen is soft and non tender  left Upper extremity with surgical site CDI.  + thrill to the outflow cephalic  No gross motor or sensory deficits     Primary Assessment:  • ESRD    Newly created Left AVF, patent       •   Plan:   • Continue with Hd via Right IJ permacath   - Await maturity of Left avf, usually 2 to 3 months   - have patient follow up in office after discharger 1-2 weeks  - Vascular surgery to sign off

## 2020-03-02 NOTE — PROGRESS NOTE ADULT - ASSESSMENT
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, HFpEF s/p cardiomem, CKD4, HTN, DM2 on insulin, advanced dementia, prior GIB, recent admit for HF requiring chest tube and now ESKD    Advanced CKD; now deemed ESKD  started on HD 02/24, now TTS schedule  Needs outpt HD set up  d/c sodium bicarb tabs    Access: Pt with R IJ Tunnelled cath   s/p LUE AVF ( 02/28)      Anemia of CKD  Hb below goal  KD with HD at 8000 units  IV iron      Chronic HFpEF: compensated  Cardiomems interrogated  Continue torsemide for now; will use UF goal over torsemide dose adjustment    Hypertension   BP stable  Continue Hydralazine, Cardura and Norvasc    CKD - MBD  on high dose Vitamin D  PTH pending

## 2020-03-02 NOTE — PROGRESS NOTE ADULT - SUBJECTIVE AND OBJECTIVE BOX
[facility] DIVISION OF KIDNEY DISEASES AND HYPERTENSION -- HEMODIALYSIS NOTE  --------------------------------------------------------------------------------  Chief Complaint: ESRD/Ongoing hemodialysis requirement    24 hour events/subjective:  Pt seen and examined; feels well  No acute complaint      PAST HISTORY  --------------------------------------------------------------------------------  No significant changes to PMH, PSH, FHx, SHx, unless otherwise noted    ALLERGIES & MEDICATIONS  --------------------------------------------------------------------------------  Allergies    No Known Allergies    Intolerances      Standing Inpatient Medications  amLODIPine   Tablet 10 milliGRAM(s) Oral daily  carvedilol 3.125 milliGRAM(s) Oral every 12 hours  chlorhexidine 2% Cloths 1 Application(s) Topical <User Schedule>  cyanocobalamin Injectable 1000 MICROGram(s) SubCutaneous daily  dextrose 50% Injectable 12.5 Gram(s) IV Push once  dextrose 50% Injectable 25 Gram(s) IV Push once  dextrose 50% Injectable 25 Gram(s) IV Push once  doxazosin 4 milliGRAM(s) Oral at bedtime  epoetin nereida Injectable 8000 Unit(s) IV Push <User Schedule>  ergocalciferol 09808 Unit(s) Oral every week  heparin  Injectable 5000 Unit(s) SubCutaneous every 12 hours  hydrALAZINE 50 milliGRAM(s) Oral three times a day  insulin lispro (HumaLOG) corrective regimen sliding scale   SubCutaneous Before meals and at bedtime  iron sucrose IVPB 200 milliGRAM(s) IV Intermittent once  isosorbide   dinitrate Tablet (ISORDIL) 10 milliGRAM(s) Oral three times a day  pantoprazole    Tablet 40 milliGRAM(s) Oral before breakfast  sodium bicarbonate 650 milliGRAM(s) Oral two times a day  tamsulosin Oral Tab/Cap - Peds 0.4 milliGRAM(s) Oral at bedtime  torsemide 20 milliGRAM(s) Oral two times a day    PRN Inpatient Medications  acetaminophen   Tablet .. 650 milliGRAM(s) Oral every 6 hours PRN  dextrose 40% Gel 15 Gram(s) Oral once PRN  glucagon  Injectable 1 milliGRAM(s) IntraMuscular once PRN      REVIEW OF SYSTEMS  --------------------------------------------------------------------------------  Gen: No weight changes, fatigue, fevers/chills, weakness  Skin: No rashes  Head/Eyes/Ears/Mouth: No headache  Respiratory: No dyspnea, cough,  CV: No chest pain, orthopnea  GI: No abdominal pain, diarrhea, constipation, nausea, vomiting,  MSK: No joint pain  Neuro: No dizziness/lightheadedness, weakness  Heme: No bleeding  Psych: No significant nervousness, anxiety, stress, depression    All other systems were reviewed and are negative, except as noted.    VITALS/PHYSICAL EXAM  --------------------------------------------------------------------------------  T(C): 36.8 (03-02-20 @ 07:45), Max: 37.2 (03-01-20 @ 16:42)  HR: 73 (03-02-20 @ 07:45) (66 - 74)  BP: 134/58 (03-02-20 @ 07:45) (128/52 - 134/58)  RR: 18 (03-02-20 @ 07:45) (18 - 18)  SpO2: 98% (03-02-20 @ 07:45) (98% - 99%)  Wt(kg): --        03-01-20 @ 07:01  -  03-02-20 @ 07:00  --------------------------------------------------------  IN: 0 mL / OUT: 400 mL / NET: -400 mL      Physical Exam:  	Gen: NAD  	HEENT: supple neck  	Pulm: CTA B/L  	CV: RRR, S1S2; no rub  	Abd: +BS, soft, nontender  	UE: Warm,  no asterixis  	LE: Warm, no edema  	Neuro: No focal deficits  	Psych: Normal affect and mood  	Skin: Warm, without rashes  	Vascular access: RIJ tunneled HD catheter, LUMARNI BRYANT thrill+    LABS/STUDIES  --------------------------------------------------------------------------------    Iron 23, TIBC 319, %sat 7      [02-22-20 @ 11:11]  Ferritin 41      [02-22-20 @ 11:11]  PTH -- (Ca 8.3)      [12-12-19 @ 19:36]   164  Vitamin D (25OH) 20.3      [12-12-19 @ 19:36]  HbA1c 8.1      [12-11-19 @ 06:47]  TSH 6.62      [02-23-20 @ 10:16]    HBsAb <3.0      [02-24-20 @ 15:46]  HBsAg Nonreact      [02-24-20 @ 15:46]  HBcAb Nonreact      [02-24-20 @ 15:46]  HCV 0.12, Nonreact      [02-24-20 @ 15:46]

## 2020-03-02 NOTE — PROGRESS NOTE ADULT - ASSESSMENT
78 yo male, PMH of HFpEF s/p cardiomem, CKD4, HTN, DM2 on insulin, advanced dementia, prior GIB, recent admit for HF requiring chest tube and temporary HD presents to ER for dark stool (although on iron at home) and shortness of breath per wife. Patient is poor historian.     1) ESRD- on new HD   seat is avaliable discharge in am   resume the HD on Wed outpatient     2) UTI ruled out   - Urine culture contaminated  cont to  Monitor off antibiotics   - ID signed off     3) Iron deficiency anemia  - Doubt GI bleed as h/h stable and stool occult negative  - Continue Venofer     4- Diabetes Mellitus type 2 controlled   stable

## 2020-03-02 NOTE — PROGRESS NOTE ADULT - SUBJECTIVE AND OBJECTIVE BOX
Internal Medicine Hospitalist Progress Note    seen in the morning no active symptoms   feels well   discharge in am , HD seat is to start on Wed  per SW         Vital Signs Last 24 Hrs  T(C): 36.8 (02 Mar 2020 07:45), Max: 37.2 (01 Mar 2020 16:42)  T(F): 98.2 (02 Mar 2020 07:45), Max: 99 (01 Mar 2020 16:42)  HR: 73 (02 Mar 2020 07:45) (66 - 74)  BP: 134/58 (02 Mar 2020 07:45) (128/52 - 134/58)  BP(mean): --  RR: 18 (02 Mar 2020 07:45) (18 - 18)  SpO2: 98% (02 Mar 2020 07:45) (98% - 99%)    General : Constitutional: awake alert     Neck: supple , no JVD     Respiratory: CTA bilateral     Cardiovascular: regular s1 /s2     Gastrointestinal: soft no tenderness , Bs positive     Extremities: no pretibial edema     CAPILLARY BLOOD GLUCOSE      POCT Blood Glucose.: 369 mg/dL (02 Mar 2020 12:03)  POCT Blood Glucose.: 187 mg/dL (02 Mar 2020 08:39)  POCT Blood Glucose.: 120 mg/dL (01 Mar 2020 21:42)

## 2020-03-03 LAB
ANION GAP SERPL CALC-SCNC: 17 MMOL/L — SIGNIFICANT CHANGE UP (ref 5–17)
BUN SERPL-MCNC: 61 MG/DL — HIGH (ref 8–20)
CALCIUM SERPL-MCNC: 8.7 MG/DL — SIGNIFICANT CHANGE UP (ref 8.6–10.2)
CHLORIDE SERPL-SCNC: 93 MMOL/L — LOW (ref 98–107)
CO2 SERPL-SCNC: 28 MMOL/L — SIGNIFICANT CHANGE UP (ref 22–29)
CREAT SERPL-MCNC: 3.78 MG/DL — HIGH (ref 0.5–1.3)
CULTURE RESULTS: SIGNIFICANT CHANGE UP
GLUCOSE BLDC GLUCOMTR-MCNC: 118 MG/DL — HIGH (ref 70–99)
GLUCOSE BLDC GLUCOMTR-MCNC: 156 MG/DL — HIGH (ref 70–99)
GLUCOSE BLDC GLUCOMTR-MCNC: 237 MG/DL — HIGH (ref 70–99)
GLUCOSE BLDC GLUCOMTR-MCNC: 320 MG/DL — HIGH (ref 70–99)
GLUCOSE SERPL-MCNC: 154 MG/DL — HIGH (ref 70–99)
HCT VFR BLD CALC: 32.4 % — LOW (ref 39–50)
HGB BLD-MCNC: 10.3 G/DL — LOW (ref 13–17)
MCHC RBC-ENTMCNC: 27.3 PG — SIGNIFICANT CHANGE UP (ref 27–34)
MCHC RBC-ENTMCNC: 31.8 GM/DL — LOW (ref 32–36)
MCV RBC AUTO: 85.9 FL — SIGNIFICANT CHANGE UP (ref 80–100)
PHOSPHATE SERPL-MCNC: 3.5 MG/DL — SIGNIFICANT CHANGE UP (ref 2.4–4.7)
PLATELET # BLD AUTO: 160 K/UL — SIGNIFICANT CHANGE UP (ref 150–400)
POTASSIUM SERPL-MCNC: 4 MMOL/L — SIGNIFICANT CHANGE UP (ref 3.5–5.3)
POTASSIUM SERPL-SCNC: 4 MMOL/L — SIGNIFICANT CHANGE UP (ref 3.5–5.3)
RBC # BLD: 3.77 M/UL — LOW (ref 4.2–5.8)
RBC # FLD: 18.1 % — HIGH (ref 10.3–14.5)
SODIUM SERPL-SCNC: 138 MMOL/L — SIGNIFICANT CHANGE UP (ref 135–145)
SPECIMEN SOURCE: SIGNIFICANT CHANGE UP
WBC # BLD: 8.47 K/UL — SIGNIFICANT CHANGE UP (ref 3.8–10.5)
WBC # FLD AUTO: 8.47 K/UL — SIGNIFICANT CHANGE UP (ref 3.8–10.5)

## 2020-03-03 PROCEDURE — 90937 HEMODIALYSIS REPEATED EVAL: CPT

## 2020-03-03 PROCEDURE — 99232 SBSQ HOSP IP/OBS MODERATE 35: CPT

## 2020-03-03 RX ORDER — IRON SUCROSE 20 MG/ML
200 INJECTION, SOLUTION INTRAVENOUS
Refills: 0 | Status: DISCONTINUED | OUTPATIENT
Start: 2020-03-03 | End: 2020-03-06

## 2020-03-03 RX ADMIN — AMLODIPINE BESYLATE 10 MILLIGRAM(S): 2.5 TABLET ORAL at 05:42

## 2020-03-03 RX ADMIN — Medication 20 MILLIGRAM(S): at 05:43

## 2020-03-03 RX ADMIN — Medication 8: at 16:10

## 2020-03-03 RX ADMIN — Medication 650 MILLIGRAM(S): at 05:41

## 2020-03-03 RX ADMIN — PREGABALIN 1000 MICROGRAM(S): 225 CAPSULE ORAL at 12:26

## 2020-03-03 RX ADMIN — Medication 4 MILLIGRAM(S): at 23:10

## 2020-03-03 RX ADMIN — Medication 50 MILLIGRAM(S): at 05:41

## 2020-03-03 RX ADMIN — ISOSORBIDE DINITRATE 10 MILLIGRAM(S): 5 TABLET ORAL at 23:10

## 2020-03-03 RX ADMIN — Medication 2: at 07:43

## 2020-03-03 RX ADMIN — PANTOPRAZOLE SODIUM 40 MILLIGRAM(S): 20 TABLET, DELAYED RELEASE ORAL at 05:41

## 2020-03-03 RX ADMIN — Medication 4: at 23:09

## 2020-03-03 RX ADMIN — Medication 20 MILLIGRAM(S): at 17:04

## 2020-03-03 RX ADMIN — ERYTHROPOIETIN 8000 UNIT(S): 10000 INJECTION, SOLUTION INTRAVENOUS; SUBCUTANEOUS at 11:02

## 2020-03-03 RX ADMIN — HEPARIN SODIUM 5000 UNIT(S): 5000 INJECTION INTRAVENOUS; SUBCUTANEOUS at 17:04

## 2020-03-03 RX ADMIN — ISOSORBIDE DINITRATE 10 MILLIGRAM(S): 5 TABLET ORAL at 13:03

## 2020-03-03 RX ADMIN — Medication 50 MILLIGRAM(S): at 13:03

## 2020-03-03 RX ADMIN — Medication 50 MILLIGRAM(S): at 23:10

## 2020-03-03 RX ADMIN — TAMSULOSIN HYDROCHLORIDE 0.4 MILLIGRAM(S): 0.4 CAPSULE ORAL at 23:10

## 2020-03-03 RX ADMIN — CHLORHEXIDINE GLUCONATE 1 APPLICATION(S): 213 SOLUTION TOPICAL at 05:42

## 2020-03-03 RX ADMIN — ISOSORBIDE DINITRATE 10 MILLIGRAM(S): 5 TABLET ORAL at 05:45

## 2020-03-03 RX ADMIN — HEPARIN SODIUM 5000 UNIT(S): 5000 INJECTION INTRAVENOUS; SUBCUTANEOUS at 05:42

## 2020-03-03 RX ADMIN — CARVEDILOL PHOSPHATE 3.12 MILLIGRAM(S): 80 CAPSULE, EXTENDED RELEASE ORAL at 05:41

## 2020-03-03 RX ADMIN — IRON SUCROSE 110 MILLIGRAM(S): 20 INJECTION, SOLUTION INTRAVENOUS at 15:07

## 2020-03-03 NOTE — PROGRESS NOTE ADULT - ASSESSMENT
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, HFpEF s/p cardiomem, CKD4, HTN, DM2 on insulin, advanced dementia, prior GIB, recent admit for HF requiring chest tube and now ESKD    Advanced CKD; now deemed ESKD  started on HD 02/24, now TTS schedule  Needs outpt HD set up  d/c sodium bicarb tabs    Access: Pt with R IJ Tunnelled cath   s/p LUE AVF ( 02/28)      Anemia of CKD  Hb at goal  KD with HD at 8000 units  IV iron      Chronic HFpEF: compensated  Cardiomems interrogated  Continue torsemide for now; will use UF goal over torsemide dose adjustment    Hypertension   BP stable  Continue Hydralazine, Cardura and Norvasc    CKD - MBD  on high dose Vitamin D  Monitor Ca++ and phos

## 2020-03-03 NOTE — PROGRESS NOTE ADULT - SUBJECTIVE AND OBJECTIVE BOX
[facility] Medicine Hospitalist Progress Note    seen in the morning during HD   no complaints  feels well       Vital Signs Last 24 Hrs  T(C): 36.5 (03 Mar 2020 12:30), Max: 36.7 (02 Mar 2020 16:41)  T(F): 97.7 (03 Mar 2020 12:30), Max: 98.1 (03 Mar 2020 08:43)  HR: 75 (03 Mar 2020 12:30) (65 - 116)  BP: 140/50 (03 Mar 2020 12:30) (119/54 - 151/81)  BP(mean): --  RR: 18 (03 Mar 2020 12:30) (16 - 18)  SpO2: 98% (03 Mar 2020 12:30) (95% - 99%)    General : Constitutional: awake alert     Neck: supple , no JVD     Respiratory: CTA bilateral     Cardiovascular: regular s1 /s2     Gastrointestinal: soft no tenderness , Bs positive     Extremities: no pretibial edema                           10.3   8.47  )-----------( 160      ( 03 Mar 2020 09:43 )             32.4   03-03    138  |  93<L>  |  61.0<H>  ----------------------------<  154<H>  4.0   |  28.0  |  3.78<H>    Ca    8.7      03 Mar 2020 09:43  Phos  3.5     03-03

## 2020-03-03 NOTE — PROGRESS NOTE ADULT - ASSESSMENT
78 yo male, PMH of HFpEF s/p cardiomem, CKD4, HTN, DM2 on insulin, advanced dementia, prior GIB, recent admit for HF requiring chest tube and temporary HD presents to ER for dark stool (although on iron at home) and shortness of breath per wife. Patient is poor historian.     1) ESRD- on new HD   seat is avaliable discharge once insurance approval obtained for outpatient  HD   resume the HD on Wed outpatient     2) UTI ruled out   cont to  Monitor off antibiotics    ID signed off     3) Iron deficiency anemia  - Doubt GI bleed as h/h stable and stool occult negative    4- Diabetes Mellitus type 2 controlled   stable

## 2020-03-03 NOTE — PROGRESS NOTE ADULT - SUBJECTIVE AND OBJECTIVE BOX
[facility] DIVISION OF KIDNEY DISEASES AND HYPERTENSION -- HEMODIALYSIS NOTE  --------------------------------------------------------------------------------  Chief Complaint: ESRD/Ongoing hemodialysis requirement    24 hour events/subjective:  Seen on HD; tolerating well    PAST HISTORY  --------------------------------------------------------------------------------  No significant changes to PMH, PSH, FHx, SHx, unless otherwise noted    ALLERGIES & MEDICATIONS  --------------------------------------------------------------------------------  Allergies    No Known Allergies    Intolerances      Standing Inpatient Medications  amLODIPine   Tablet 10 milliGRAM(s) Oral daily  carvedilol 3.125 milliGRAM(s) Oral every 12 hours  chlorhexidine 2% Cloths 1 Application(s) Topical <User Schedule>  cyanocobalamin Injectable 1000 MICROGram(s) SubCutaneous daily  dextrose 50% Injectable 12.5 Gram(s) IV Push once  dextrose 50% Injectable 25 Gram(s) IV Push once  dextrose 50% Injectable 25 Gram(s) IV Push once  doxazosin 4 milliGRAM(s) Oral at bedtime  epoetin nereida Injectable 8000 Unit(s) IV Push <User Schedule>  ergocalciferol 84183 Unit(s) Oral every week  heparin  Injectable 5000 Unit(s) SubCutaneous every 12 hours  hydrALAZINE 50 milliGRAM(s) Oral three times a day  insulin lispro (HumaLOG) corrective regimen sliding scale   SubCutaneous Before meals and at bedtime  isosorbide   dinitrate Tablet (ISORDIL) 10 milliGRAM(s) Oral three times a day  pantoprazole    Tablet 40 milliGRAM(s) Oral before breakfast  sodium bicarbonate 650 milliGRAM(s) Oral two times a day  tamsulosin Oral Tab/Cap - Peds 0.4 milliGRAM(s) Oral at bedtime  torsemide 20 milliGRAM(s) Oral two times a day    PRN Inpatient Medications  acetaminophen   Tablet .. 650 milliGRAM(s) Oral every 6 hours PRN  dextrose 40% Gel 15 Gram(s) Oral once PRN  glucagon  Injectable 1 milliGRAM(s) IntraMuscular once PRN      REVIEW OF SYSTEMS  --------------------------------------------------------------------------------  Gen: No weight changes, fatigue, fevers/chills, weakness  Skin: No rashes  Head/Eyes/Ears/Mouth: No headache  Respiratory: No dyspnea, cough,  CV: No chest pain, orthopnea  GI: No abdominal pain, diarrhea, constipation, nausea, vomiting,  MSK: No joint pain  Neuro: No dizziness/lightheadedness, weakness  Heme: No bleeding  Psych: No significant nervousness, anxiety, stress, depression    All other systems were reviewed and are negative, except as noted.    VITALS/PHYSICAL EXAM  --------------------------------------------------------------------------------  T(C): 36.5 (03-03-20 @ 12:30), Max: 36.7 (03-02-20 @ 16:41)  HR: 75 (03-03-20 @ 12:30) (65 - 116)  BP: 140/50 (03-03-20 @ 12:30) (119/54 - 151/81)  RR: 18 (03-03-20 @ 12:30) (16 - 18)  SpO2: 98% (03-03-20 @ 12:30) (95% - 99%)  Wt(kg): --        03-03-20 @ 07:01  -  03-03-20 @ 12:34  --------------------------------------------------------  IN: 0 mL / OUT: 1000 mL / NET: -1000 mL      Physical Exam:  	Gen: NAD  	HEENT: supple neck  	Pulm: CTA B/L  	CV: RRR, S1S2; no rub  	Abd: +BS, soft, nontender  	UE: Warm,  no asterixis  	LE: Warm, no edema  	Neuro: No focal deficits  	Psych: Normal affect and mood  	Skin: Warm, without rashes  	Vascular access: RIJ tunneled HD catheter, LUE AVF thrill+      LABS/STUDIES  --------------------------------------------------------------------------------              10.3   8.47  >-----------<  160      [03-03-20 @ 09:43]              32.4     138  |  93  |  61.0  ----------------------------<  154      [03-03-20 @ 09:43]  4.0   |  28.0  |  3.78        Ca     8.7     [03-03-20 @ 09:43]      Phos  3.5     [03-03-20 @ 09:43]            Iron 23, TIBC 319, %sat 7      [02-22-20 @ 11:11]  Ferritin 41      [02-22-20 @ 11:11]  PTH -- (Ca 8.3)      [12-12-19 @ 19:36]   164  Vitamin D (25OH) 20.3      [12-12-19 @ 19:36]  HbA1c 8.1      [12-11-19 @ 06:47]  TSH 6.62      [02-23-20 @ 10:16]    HBsAb <3.0      [02-24-20 @ 15:46]  HBsAg Nonreact      [02-24-20 @ 15:46]  HBcAb Nonreact      [02-24-20 @ 15:46]  HCV 0.12, Nonreact      [02-24-20 @ 15:46]

## 2020-03-04 LAB
GLUCOSE BLDC GLUCOMTR-MCNC: 137 MG/DL — HIGH (ref 70–99)
GLUCOSE BLDC GLUCOMTR-MCNC: 248 MG/DL — HIGH (ref 70–99)
GLUCOSE BLDC GLUCOMTR-MCNC: 264 MG/DL — HIGH (ref 70–99)
GLUCOSE BLDC GLUCOMTR-MCNC: 306 MG/DL — HIGH (ref 70–99)

## 2020-03-04 PROCEDURE — 99232 SBSQ HOSP IP/OBS MODERATE 35: CPT

## 2020-03-04 PROCEDURE — 99233 SBSQ HOSP IP/OBS HIGH 50: CPT

## 2020-03-04 RX ADMIN — Medication 50 MILLIGRAM(S): at 14:40

## 2020-03-04 RX ADMIN — AMLODIPINE BESYLATE 10 MILLIGRAM(S): 2.5 TABLET ORAL at 05:41

## 2020-03-04 RX ADMIN — PANTOPRAZOLE SODIUM 40 MILLIGRAM(S): 20 TABLET, DELAYED RELEASE ORAL at 05:41

## 2020-03-04 RX ADMIN — Medication 50 MILLIGRAM(S): at 05:41

## 2020-03-04 RX ADMIN — CARVEDILOL PHOSPHATE 3.12 MILLIGRAM(S): 80 CAPSULE, EXTENDED RELEASE ORAL at 17:15

## 2020-03-04 RX ADMIN — PREGABALIN 1000 MICROGRAM(S): 225 CAPSULE ORAL at 12:12

## 2020-03-04 RX ADMIN — Medication 20 MILLIGRAM(S): at 05:41

## 2020-03-04 RX ADMIN — ISOSORBIDE DINITRATE 10 MILLIGRAM(S): 5 TABLET ORAL at 21:53

## 2020-03-04 RX ADMIN — Medication 20 MILLIGRAM(S): at 17:15

## 2020-03-04 RX ADMIN — Medication 4 MILLIGRAM(S): at 21:53

## 2020-03-04 RX ADMIN — TAMSULOSIN HYDROCHLORIDE 0.4 MILLIGRAM(S): 0.4 CAPSULE ORAL at 21:53

## 2020-03-04 RX ADMIN — Medication 8: at 21:53

## 2020-03-04 RX ADMIN — CARVEDILOL PHOSPHATE 3.12 MILLIGRAM(S): 80 CAPSULE, EXTENDED RELEASE ORAL at 05:41

## 2020-03-04 RX ADMIN — HEPARIN SODIUM 5000 UNIT(S): 5000 INJECTION INTRAVENOUS; SUBCUTANEOUS at 05:41

## 2020-03-04 RX ADMIN — Medication 6: at 17:15

## 2020-03-04 RX ADMIN — CHLORHEXIDINE GLUCONATE 1 APPLICATION(S): 213 SOLUTION TOPICAL at 05:42

## 2020-03-04 RX ADMIN — ISOSORBIDE DINITRATE 10 MILLIGRAM(S): 5 TABLET ORAL at 14:40

## 2020-03-04 RX ADMIN — ISOSORBIDE DINITRATE 10 MILLIGRAM(S): 5 TABLET ORAL at 05:41

## 2020-03-04 RX ADMIN — Medication 50 MILLIGRAM(S): at 21:53

## 2020-03-04 RX ADMIN — Medication 4: at 12:12

## 2020-03-04 NOTE — PROGRESS NOTE ADULT - SUBJECTIVE AND OBJECTIVE BOX
[facility] DIVISION OF KIDNEY DISEASES AND HYPERTENSION -- FOLLOW UP NOTE  --------------------------------------------------------------------------------  Chief Complaint:  ESRD/Ongoing hemodialysis requirement    24 hour events/subjective:  Patient seen and examined, NAD      PAST HISTORY  --------------------------------------------------------------------------------  No significant changes to PMH, PSH, FHx, SHx, unless otherwise noted    ALLERGIES & MEDICATIONS  --------------------------------------------------------------------------------  Allergies  No Known Allergies    Standing Inpatient Medications  amLODIPine   Tablet 10 milliGRAM(s) Oral daily  carvedilol 3.125 milliGRAM(s) Oral every 12 hours  chlorhexidine 2% Cloths 1 Application(s) Topical <User Schedule>  dextrose 50% Injectable 12.5 Gram(s) IV Push once  dextrose 50% Injectable 25 Gram(s) IV Push once  dextrose 50% Injectable 25 Gram(s) IV Push once  doxazosin 4 milliGRAM(s) Oral at bedtime  epoetin nereida Injectable 8000 Unit(s) IV Push <User Schedule>  ergocalciferol 35020 Unit(s) Oral every week  heparin  Injectable 5000 Unit(s) SubCutaneous every 12 hours  hydrALAZINE 50 milliGRAM(s) Oral three times a day  insulin lispro (HumaLOG) corrective regimen sliding scale   SubCutaneous Before meals and at bedtime  iron sucrose IVPB 200 milliGRAM(s) IV Intermittent <User Schedule>  isosorbide   dinitrate Tablet (ISORDIL) 10 milliGRAM(s) Oral three times a day  pantoprazole    Tablet 40 milliGRAM(s) Oral before breakfast  tamsulosin Oral Tab/Cap - Peds 0.4 milliGRAM(s) Oral at bedtime  torsemide 20 milliGRAM(s) Oral two times a day    PRN Inpatient Medications  acetaminophen   Tablet .. 650 milliGRAM(s) Oral every 6 hours PRN  dextrose 40% Gel 15 Gram(s) Oral once PRN  glucagon  Injectable 1 milliGRAM(s) IntraMuscular once PRN      REVIEW OF SYSTEMS  --------------------------------------------------------------------------------  Gen: No weight changes, fatigue, fevers/chills, weakness  Skin: No rashes  Head/Eyes/Ears/Mouth: No headache; Normal hearing; Normal vision w/o blurriness  Respiratory: No dyspnea, cough, wheezing, hemoptysis  CV: No chest pain, PND, orthopnea  GI: No abdominal pain, diarrhea, constipation, nausea, vomiting, melena, hematochezia  : No increased frequency, dysuria, hematuria, nocturia  MSK: No joint pain/swelling; no back pain; no edema  Neuro: No dizziness/lightheadedness, weakness, seizures, numbness, tingling  Heme: No easy bruising or bleeding  Endo: No heat/cold intolerance  Psych: No significant nervousness, anxiety, stress, depression    All other systems were reviewed and are negative, except as noted.    VITALS/PHYSICAL EXAM  --------------------------------------------------------------------------------  T(C): 36.9 (03-04-20 @ 07:18), Max: 36.9 (03-04-20 @ 07:18)  HR: 69 (03-04-20 @ 07:18) (64 - 108)  BP: 127/54 (03-04-20 @ 07:18) (120/60 - 147/81)  RR: 18 (03-04-20 @ 07:18) (18 - 18)  SpO2: 92% (03-04-20 @ 07:18) (92% - 98%)      03-03-20 @ 07:01  -  03-04-20 @ 07:00  --------------------------------------------------------  IN: 0 mL / OUT: 1000 mL / NET: -1000 mL      Physical Exam:  	Gen: NAD  	HEENT: supple neck  	Pulm: CTA B/L  	CV: RRR, S1S2; no rub  	Abd: +BS, soft, nontender  	UE: Warm,  no asterixis  	LE: Warm, no edema  	Neuro: No focal deficits  	Psych: Normal affect and mood  	Skin: Warm, without rashes  	Vascular access: RIJ tunneled HD catheter, LUE AVF thrill+    LABS/STUDIES  --------------------------------------------------------------------------------              10.3   8.47  >-----------<  160      [03-03-20 @ 09:43]              32.4     138  |  93  |  61.0  ----------------------------<  154      [03-03-20 @ 09:43]  4.0   |  28.0  |  3.78        Ca     8.7     [03-03-20 @ 09:43]      Phos  3.5     [03-03-20 @ 09:43]    Creatinine Trend:  SCr 3.78 [03-03 @ 09:43]  SCr 2.67 [02-29 @ 08:12]  SCr 2.55 [02-27 @ 02:43]  SCr 3.31 [02-26 @ 13:02]  SCr 4.73 [02-25 @ 06:09]    Urinalysis - [02-24-20 @ 14:06]      Color Yellow / Appearance Clear / SG 1.010 / pH 6.0      Gluc Negative / Ketone Negative  / Bili Negative / Urobili Negative       Blood Trace / Protein 30 / Leuk Est Moderate / Nitrite Negative      RBC 0-2 / WBC >50 / Hyaline  / Gran  / Sq Epi  / Non Sq Epi Occasional / Bacteria Few      Iron 23, TIBC 319, %sat 7      [02-22-20 @ 11:11]  Ferritin 41      [02-22-20 @ 11:11]  PTH -- (Ca 8.3)      [12-12-19 @ 19:36]   164  Vitamin D (25OH) 20.3      [12-12-19 @ 19:36]  HbA1c 8.1      [12-11-19 @ 06:47]  TSH 6.62      [02-23-20 @ 10:16]    HBsAb <3.0      [02-24-20 @ 15:46]  HBsAg Nonreact      [02-24-20 @ 15:46]  HBcAb Nonreact      [02-24-20 @ 15:46]  HCV 0.12, Nonreact      [02-24-20 @ 15:46] [facility] DIVISION OF KIDNEY DISEASES AND HYPERTENSION -- FOLLOW UP NOTE  --------------------------------------------------------------------------------  Chief Complaint:  ESRD/Ongoing hemodialysis requirement    24 hour events/subjective:  Patient seen and examined, NAD  Family member at bedside    PAST HISTORY  --------------------------------------------------------------------------------  No significant changes to PMH, PSH, FHx, SHx, unless otherwise noted    ALLERGIES & MEDICATIONS  --------------------------------------------------------------------------------  Allergies  No Known Allergies    Standing Inpatient Medications  amLODIPine   Tablet 10 milliGRAM(s) Oral daily  carvedilol 3.125 milliGRAM(s) Oral every 12 hours  chlorhexidine 2% Cloths 1 Application(s) Topical <User Schedule>  dextrose 50% Injectable 12.5 Gram(s) IV Push once  dextrose 50% Injectable 25 Gram(s) IV Push once  dextrose 50% Injectable 25 Gram(s) IV Push once  doxazosin 4 milliGRAM(s) Oral at bedtime  epoetin nereida Injectable 8000 Unit(s) IV Push <User Schedule>  ergocalciferol 99108 Unit(s) Oral every week  heparin  Injectable 5000 Unit(s) SubCutaneous every 12 hours  hydrALAZINE 50 milliGRAM(s) Oral three times a day  insulin lispro (HumaLOG) corrective regimen sliding scale   SubCutaneous Before meals and at bedtime  iron sucrose IVPB 200 milliGRAM(s) IV Intermittent <User Schedule>  isosorbide   dinitrate Tablet (ISORDIL) 10 milliGRAM(s) Oral three times a day  pantoprazole    Tablet 40 milliGRAM(s) Oral before breakfast  tamsulosin Oral Tab/Cap - Peds 0.4 milliGRAM(s) Oral at bedtime  torsemide 20 milliGRAM(s) Oral two times a day    PRN Inpatient Medications  acetaminophen   Tablet .. 650 milliGRAM(s) Oral every 6 hours PRN  dextrose 40% Gel 15 Gram(s) Oral once PRN  glucagon  Injectable 1 milliGRAM(s) IntraMuscular once PRN      REVIEW OF SYSTEMS  --------------------------------------------------------------------------------  Gen: No weight changes, fatigue, fevers/chills, weakness  Skin: No rashes  Head/Eyes/Ears/Mouth: No headache; Normal hearing; Normal vision w/o blurriness  Respiratory: No dyspnea, cough, wheezing, hemoptysis  CV: No chest pain, PND, orthopnea  GI: No abdominal pain, diarrhea, constipation, nausea, vomiting, melena, hematochezia  : No increased frequency, dysuria, hematuria, nocturia  MSK: No joint pain/swelling; no back pain; no edema  Neuro: No dizziness/lightheadedness, weakness, seizures, numbness, tingling  Heme: No easy bruising or bleeding  Endo: No heat/cold intolerance  Psych: No significant nervousness, anxiety, stress, depression    All other systems were reviewed and are negative, except as noted.    VITALS/PHYSICAL EXAM  --------------------------------------------------------------------------------  T(C): 36.9 (03-04-20 @ 07:18), Max: 36.9 (03-04-20 @ 07:18)  HR: 69 (03-04-20 @ 07:18) (64 - 108)  BP: 127/54 (03-04-20 @ 07:18) (120/60 - 147/81)  RR: 18 (03-04-20 @ 07:18) (18 - 18)  SpO2: 92% (03-04-20 @ 07:18) (92% - 98%)      03-03-20 @ 07:01  -  03-04-20 @ 07:00  --------------------------------------------------------  IN: 0 mL / OUT: 1000 mL / NET: -1000 mL      Physical Exam:  	Gen: NAD  	HEENT: supple neck  	Pulm: CTA B/L  	CV: RRR, S1S2; +systolic murmur, no rub  	Abd: +BS, soft, nontender  	UE: Warm,  no asterixis  	LE: Warm, no edema  	Neuro: No focal deficits  	Psych: Normal affect and mood  	Skin: Warm, without rashes  	Vascular access: RIJ tunneled HD catheter, CASSIA chambers+    LABS/STUDIES  --------------------------------------------------------------------------------              10.3   8.47  >-----------<  160      [03-03-20 @ 09:43]              32.4     138  |  93  |  61.0  ----------------------------<  154      [03-03-20 @ 09:43]  4.0   |  28.0  |  3.78        Ca     8.7     [03-03-20 @ 09:43]      Phos  3.5     [03-03-20 @ 09:43]    Creatinine Trend:  SCr 3.78 [03-03 @ 09:43]  SCr 2.67 [02-29 @ 08:12]  SCr 2.55 [02-27 @ 02:43]  SCr 3.31 [02-26 @ 13:02]  SCr 4.73 [02-25 @ 06:09]    Urinalysis - [02-24-20 @ 14:06]      Color Yellow / Appearance Clear / SG 1.010 / pH 6.0      Gluc Negative / Ketone Negative  / Bili Negative / Urobili Negative       Blood Trace / Protein 30 / Leuk Est Moderate / Nitrite Negative      RBC 0-2 / WBC >50 / Hyaline  / Gran  / Sq Epi  / Non Sq Epi Occasional / Bacteria Few      Iron 23, TIBC 319, %sat 7      [02-22-20 @ 11:11]  Ferritin 41      [02-22-20 @ 11:11]  PTH -- (Ca 8.3)      [12-12-19 @ 19:36]   164  Vitamin D (25OH) 20.3      [12-12-19 @ 19:36]  HbA1c 8.1      [12-11-19 @ 06:47]  TSH 6.62      [02-23-20 @ 10:16]    HBsAb <3.0      [02-24-20 @ 15:46]  HBsAg Nonreact      [02-24-20 @ 15:46]  HBcAb Nonreact      [02-24-20 @ 15:46]  HCV 0.12, Nonreact      [02-24-20 @ 15:46]

## 2020-03-04 NOTE — PROGRESS NOTE ADULT - ASSESSMENT
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, HFpEF s/p cardiomem, CKD4, HTN, DM2 on insulin, advanced dementia, prior GIB, recent admit for HF requiring chest tube and now ESKD    Advanced CKD; now deemed ESKD  started on HD 02/24, now TTS schedule  HD yesterday, tolerated well  Needs outpt HD set up    Access: Pt with R IJ Tunnelled cath   s/p LUE AVF ( 02/28)    Anemia of CKD  Hb at goal  KD with HD at 8000 units  IV iron    Chronic HFpEF: compensated  Cardiomems interrogated  Continue torsemide for now; will use UF goal over torsemide dose adjustment    Hypertension   BP stable  Continue Hydralazine, Cardura and Norvasc    CKD - MBD  on high dose Vitamin D  Monitor Ca++ and phos

## 2020-03-04 NOTE — PROGRESS NOTE ADULT - ASSESSMENT
78 yo male, PMH of HFpEF s/p cardiomem, CKD4, HTN, DM2 on insulin, advanced dementia, prior GIB, recent admit for HF requiring chest tube and temporary HD presents to ER for dark stool (although on iron at home) and shortness of breath per wife. Patient is poor historian.     1) ESRD- on new HD   seat is avaliable discharge once insurance approval obtained for outpatient  HD   resume the HD       2) Iron deficiency anemia  - Doubt GI bleed as h/h stable and stool occult negative    3- Diabetes Mellitus type 2 controlled   stable     cont Hd per schedule

## 2020-03-04 NOTE — PROGRESS NOTE ADULT - SUBJECTIVE AND OBJECTIVE BOX
[facility] Medicine Hospitalist Progress Note    seen in the morning , doing well   feels well , no complaints       Vital Signs Last 24 Hrs  T(C): 36.9 (04 Mar 2020 07:18), Max: 36.9 (04 Mar 2020 07:18)  T(F): 98.5 (04 Mar 2020 07:18), Max: 98.5 (04 Mar 2020 07:18)  HR: 73 (04 Mar 2020 14:40) (64 - 108)  BP: 147/67 (04 Mar 2020 14:40) (120/60 - 147/81)  BP(mean): --  RR: 18 (04 Mar 2020 07:18) (18 - 18)  SpO2: 92% (04 Mar 2020 07:18) (92% - 98%)    General : Constitutional: awake alert     Neck: supple , no JVD     Respiratory: CTA bilateral     Cardiovascular: regular s1 /s2     Gastrointestinal: soft no tenderness , Bs positive     Extremities: no pretibial edema                              10.3   8.47  )-----------( 160      ( 03 Mar 2020 09:43 )             32.4   03-03    138  |  93<L>  |  61.0<H>  ----------------------------<  154<H>  4.0   |  28.0  |  3.78<H>    Ca    8.7      03 Mar 2020 09:43  Phos  3.5     03-03

## 2020-03-05 LAB
ANION GAP SERPL CALC-SCNC: 17 MMOL/L — SIGNIFICANT CHANGE UP (ref 5–17)
BUN SERPL-MCNC: 43 MG/DL — HIGH (ref 8–20)
CALCIUM SERPL-MCNC: 8.4 MG/DL — LOW (ref 8.6–10.2)
CHLORIDE SERPL-SCNC: 94 MMOL/L — LOW (ref 98–107)
CO2 SERPL-SCNC: 25 MMOL/L — SIGNIFICANT CHANGE UP (ref 22–29)
CREAT SERPL-MCNC: 2.84 MG/DL — HIGH (ref 0.5–1.3)
GLUCOSE BLDC GLUCOMTR-MCNC: 122 MG/DL — HIGH (ref 70–99)
GLUCOSE BLDC GLUCOMTR-MCNC: 138 MG/DL — HIGH (ref 70–99)
GLUCOSE BLDC GLUCOMTR-MCNC: 223 MG/DL — HIGH (ref 70–99)
GLUCOSE BLDC GLUCOMTR-MCNC: 269 MG/DL — HIGH (ref 70–99)
GLUCOSE SERPL-MCNC: 165 MG/DL — HIGH (ref 70–99)
HCT VFR BLD CALC: 32.1 % — LOW (ref 39–50)
HGB BLD-MCNC: 10.2 G/DL — LOW (ref 13–17)
MCHC RBC-ENTMCNC: 27.6 PG — SIGNIFICANT CHANGE UP (ref 27–34)
MCHC RBC-ENTMCNC: 31.8 GM/DL — LOW (ref 32–36)
MCV RBC AUTO: 87 FL — SIGNIFICANT CHANGE UP (ref 80–100)
PLATELET # BLD AUTO: 158 K/UL — SIGNIFICANT CHANGE UP (ref 150–400)
POTASSIUM SERPL-MCNC: 4.1 MMOL/L — SIGNIFICANT CHANGE UP (ref 3.5–5.3)
POTASSIUM SERPL-SCNC: 4.1 MMOL/L — SIGNIFICANT CHANGE UP (ref 3.5–5.3)
RBC # BLD: 3.69 M/UL — LOW (ref 4.2–5.8)
RBC # FLD: 18.6 % — HIGH (ref 10.3–14.5)
SODIUM SERPL-SCNC: 136 MMOL/L — SIGNIFICANT CHANGE UP (ref 135–145)
WBC # BLD: 6.7 K/UL — SIGNIFICANT CHANGE UP (ref 3.8–10.5)
WBC # FLD AUTO: 6.7 K/UL — SIGNIFICANT CHANGE UP (ref 3.8–10.5)

## 2020-03-05 PROCEDURE — 90937 HEMODIALYSIS REPEATED EVAL: CPT

## 2020-03-05 PROCEDURE — 99232 SBSQ HOSP IP/OBS MODERATE 35: CPT

## 2020-03-05 RX ORDER — INSULIN GLARGINE 100 [IU]/ML
8 INJECTION, SOLUTION SUBCUTANEOUS AT BEDTIME
Refills: 0 | Status: DISCONTINUED | OUTPATIENT
Start: 2020-03-05 | End: 2020-03-06

## 2020-03-05 RX ADMIN — Medication 6: at 21:24

## 2020-03-05 RX ADMIN — AMLODIPINE BESYLATE 10 MILLIGRAM(S): 2.5 TABLET ORAL at 05:32

## 2020-03-05 RX ADMIN — ERYTHROPOIETIN 8000 UNIT(S): 10000 INJECTION, SOLUTION INTRAVENOUS; SUBCUTANEOUS at 11:52

## 2020-03-05 RX ADMIN — ISOSORBIDE DINITRATE 10 MILLIGRAM(S): 5 TABLET ORAL at 21:25

## 2020-03-05 RX ADMIN — Medication 50 MILLIGRAM(S): at 05:33

## 2020-03-05 RX ADMIN — CARVEDILOL PHOSPHATE 3.12 MILLIGRAM(S): 80 CAPSULE, EXTENDED RELEASE ORAL at 16:53

## 2020-03-05 RX ADMIN — Medication 20 MILLIGRAM(S): at 05:32

## 2020-03-05 RX ADMIN — PANTOPRAZOLE SODIUM 40 MILLIGRAM(S): 20 TABLET, DELAYED RELEASE ORAL at 05:32

## 2020-03-05 RX ADMIN — CHLORHEXIDINE GLUCONATE 1 APPLICATION(S): 213 SOLUTION TOPICAL at 05:33

## 2020-03-05 RX ADMIN — Medication 4: at 16:52

## 2020-03-05 RX ADMIN — Medication 4 MILLIGRAM(S): at 21:24

## 2020-03-05 RX ADMIN — HEPARIN SODIUM 5000 UNIT(S): 5000 INJECTION INTRAVENOUS; SUBCUTANEOUS at 05:32

## 2020-03-05 RX ADMIN — INSULIN GLARGINE 8 UNIT(S): 100 INJECTION, SOLUTION SUBCUTANEOUS at 21:24

## 2020-03-05 RX ADMIN — Medication 50 MILLIGRAM(S): at 14:23

## 2020-03-05 RX ADMIN — ISOSORBIDE DINITRATE 10 MILLIGRAM(S): 5 TABLET ORAL at 05:32

## 2020-03-05 RX ADMIN — HEPARIN SODIUM 5000 UNIT(S): 5000 INJECTION INTRAVENOUS; SUBCUTANEOUS at 16:53

## 2020-03-05 RX ADMIN — TAMSULOSIN HYDROCHLORIDE 0.4 MILLIGRAM(S): 0.4 CAPSULE ORAL at 21:23

## 2020-03-05 RX ADMIN — Medication 50 MILLIGRAM(S): at 21:24

## 2020-03-05 RX ADMIN — CARVEDILOL PHOSPHATE 3.12 MILLIGRAM(S): 80 CAPSULE, EXTENDED RELEASE ORAL at 05:32

## 2020-03-05 RX ADMIN — ISOSORBIDE DINITRATE 10 MILLIGRAM(S): 5 TABLET ORAL at 14:23

## 2020-03-05 RX ADMIN — IRON SUCROSE 110 MILLIGRAM(S): 20 INJECTION, SOLUTION INTRAVENOUS at 11:45

## 2020-03-05 NOTE — PROGRESS NOTE ADULT - SUBJECTIVE AND OBJECTIVE BOX
CC:   Interval/Overnight Events: No overnight events reported by RN or patient.  Pt stated he felt fine. Denies sob, chest pain, abdominal pain, palpitations, nausea/vomiting, diarrhea    PAST MEDICAL & SURGICAL HISTORY:  Hyperkalemia  ADI (acute kidney injury)  BPH (benign prostatic hyperplasia)  CKD (chronic kidney disease)  Hyperlipemia  Hypertension  Diabetes  Ureteral stent retained    Vital Signs Last 24 Hrs  T(C): 36.8 (05 Mar 2020 15:55), Max: 36.8 (05 Mar 2020 08:24)  T(F): 98.3 (05 Mar 2020 15:55), Max: 98.3 (05 Mar 2020 08:24)  HR: 71 (05 Mar 2020 15:55) (63 - 71)  BP: 133/60 (05 Mar 2020 15:55) (109/50 - 146/63)  BP(mean): --  RR: 18 (05 Mar 2020 15:55) (16 - 18)  SpO2: 93% (05 Mar 2020 15:55) (93% - 99%)    PHYSICAL EXAM  GENERAL:  awake and alert, in no apparent distress.   HEAD: normocephalic and atraumatic. EOM intact.    ENT: Mucous membranes are moist. No erythema/exudates ,  NECK: Supple. no lymphadenopathy, no JVD  LUNGS: Clear to auscultation B/L , no wheezing, rales or rhonchi; respirations unlabored  HEART: Regular rate and rhythm ,+S1/+S2, no murmurs, rubs, gallops  ABDOMEN: Soft, nontender, and nondistended, bowel sounds in all 4 quadrants  EXTREMITIES: Without any cyanosis, clubbing, rash, lesions or edema.  SKIN: No new rashes or lesions.  NEUROLOGIC: Grossly intact.  PSYCH: No new changes.    03-05 @ 07:01  -  03-05 @ 16:54  --------------------------------------------------------  IN: 0 mL / OUT: 1500 mL / NET: -1500 mL                          10.2   6.70  )-----------( 158      ( 05 Mar 2020 09:50 )             32.1     03-05    136  |  94<L>  |  43.0<H>  ----------------------------<  165<H>  4.1   |  25.0  |  2.84<H>    Ca    8.4<L>      05 Mar 2020 09:50      MEDICATIONS  (STANDING):  amLODIPine   Tablet 10 milliGRAM(s) Oral daily  carvedilol 3.125 milliGRAM(s) Oral every 12 hours  chlorhexidine 2% Cloths 1 Application(s) Topical <User Schedule>  dextrose 50% Injectable 12.5 Gram(s) IV Push once  dextrose 50% Injectable 25 Gram(s) IV Push once  dextrose 50% Injectable 25 Gram(s) IV Push once  doxazosin 4 milliGRAM(s) Oral at bedtime  epoetin nereida Injectable 8000 Unit(s) IV Push <User Schedule>  ergocalciferol 15071 Unit(s) Oral every week  heparin  Injectable 5000 Unit(s) SubCutaneous every 12 hours  hydrALAZINE 50 milliGRAM(s) Oral three times a day  insulin lispro (HumaLOG) corrective regimen sliding scale   SubCutaneous Before meals and at bedtime  iron sucrose IVPB 200 milliGRAM(s) IV Intermittent <User Schedule>  isosorbide   dinitrate Tablet (ISORDIL) 10 milliGRAM(s) Oral three times a day  pantoprazole    Tablet 40 milliGRAM(s) Oral before breakfast  tamsulosin Oral Tab/Cap - Peds 0.4 milliGRAM(s) Oral at bedtime  torsemide 20 milliGRAM(s) Oral <User Schedule>    MEDICATIONS  (PRN):  acetaminophen   Tablet .. 650 milliGRAM(s) Oral every 6 hours PRN Temp greater or equal to 38C (100.4F), Mild Pain (1 - 3)  dextrose 40% Gel 15 Gram(s) Oral once PRN Blood Glucose LESS THAN 70 milliGRAM(s)/deciliter  glucagon  Injectable 1 milliGRAM(s) IntraMuscular once PRN Glucose LESS THAN 70 milligrams/deciliter

## 2020-03-05 NOTE — PROGRESS NOTE ADULT - SUBJECTIVE AND OBJECTIVE BOX
[facility] DIVISION OF KIDNEY DISEASES AND HYPERTENSION -- HEMODIALYSIS NOTE  --------------------------------------------------------------------------------  Chief Complaint: ESRD/Ongoing hemodialysis requirement    24 hour events/subjective:  Seen on HD; tolerating well    PAST HISTORY  --------------------------------------------------------------------------------  No significant changes to PMH, PSH, FHx, SHx, unless otherwise noted    ALLERGIES & MEDICATIONS  --------------------------------------------------------------------------------  Allergies  No Known Allergies    Standing Inpatient Medications  amLODIPine   Tablet 10 milliGRAM(s) Oral daily  carvedilol 3.125 milliGRAM(s) Oral every 12 hours  chlorhexidine 2% Cloths 1 Application(s) Topical <User Schedule>  dextrose 50% Injectable 12.5 Gram(s) IV Push once  dextrose 50% Injectable 25 Gram(s) IV Push once  dextrose 50% Injectable 25 Gram(s) IV Push once  doxazosin 4 milliGRAM(s) Oral at bedtime  epoetin nereida Injectable 8000 Unit(s) IV Push <User Schedule>  ergocalciferol 84309 Unit(s) Oral every week  heparin  Injectable 5000 Unit(s) SubCutaneous every 12 hours  hydrALAZINE 50 milliGRAM(s) Oral three times a day  insulin lispro (HumaLOG) corrective regimen sliding scale   SubCutaneous Before meals and at bedtime  iron sucrose IVPB 200 milliGRAM(s) IV Intermittent <User Schedule>  isosorbide   dinitrate Tablet (ISORDIL) 10 milliGRAM(s) Oral three times a day  pantoprazole    Tablet 40 milliGRAM(s) Oral before breakfast  tamsulosin Oral Tab/Cap - Peds 0.4 milliGRAM(s) Oral at bedtime  torsemide 20 milliGRAM(s) Oral two times a day    PRN Inpatient Medications  acetaminophen   Tablet .. 650 milliGRAM(s) Oral every 6 hours PRN  dextrose 40% Gel 15 Gram(s) Oral once PRN  glucagon  Injectable 1 milliGRAM(s) IntraMuscular once PRN      REVIEW OF SYSTEMS  --------------------------------------------------------------------------------  Gen: No weight changes, fatigue, fevers/chills, weakness  Skin: No rashes  Head/Eyes/Ears/Mouth: No headache  Respiratory: No dyspnea, cough,  CV: No chest pain, orthopnea  GI: No abdominal pain, diarrhea, constipation, nausea, vomiting  MSK: No joint pain  Neuro: No dizziness/lightheadedness, weakness  Heme: No bleeding  Psych: No significant nervousness, anxiety, stress, depression    All other systems were reviewed and are negative, except as noted.    VITALS/PHYSICAL EXAM  --------------------------------------------------------------------------------  T(C): 36.4 (03-05-20 @ 12:06), Max: 36.8 (03-05-20 @ 08:24)  HR: 63 (03-05-20 @ 12:06) (63 - 73)  BP: 136/54 (03-05-20 @ 12:06) (109/50 - 147/67)  RR: 18 (03-05-20 @ 12:06) (16 - 18)  SpO2: 99% (03-05-20 @ 12:06) (95% - 99%)  Wt(kg): --        03-05-20 @ 07:01  -  03-05-20 @ 12:39  --------------------------------------------------------  IN: 0 mL / OUT: 1500 mL / NET: -1500 mL      Physical Exam:  	Gen: NAD, well-appearing  	HEENT:, supple neck,  	Pulm: CTA B/L anteriorly  	CV: RRR, S1S2; no rub  	Abd: +BS, soft, nontender  	UE: Warm,   	LE: Warm, no edema  	Neuro: No focal deficits  	Psych: Normal affect and mood  	Skin: Warm, without rashes  	Vascular access: RIJ tunneled HD catheter, LUE AVF thrill/ bruit+    LABS/STUDIES  --------------------------------------------------------------------------------              10.2   6.70  >-----------<  158      [03-05-20 @ 09:50]              32.1     136  |  94  |  43.0  ----------------------------<  165      [03-05-20 @ 09:50]  4.1   |  25.0  |  2.84        Ca     8.4     [03-05-20 @ 09:50]            Iron 23, TIBC 319, %sat 7      [02-22-20 @ 11:11]  Ferritin 41      [02-22-20 @ 11:11]  PTH -- (Ca 8.3)      [12-12-19 @ 19:36]   164  Vitamin D (25OH) 20.3      [12-12-19 @ 19:36]  HbA1c 8.1      [12-11-19 @ 06:47]  TSH 6.62      [02-23-20 @ 10:16]    HBsAb <3.0      [02-24-20 @ 15:46]  HBsAg Nonreact      [02-24-20 @ 15:46]  HBcAb Nonreact      [02-24-20 @ 15:46]  HCV 0.12, Nonreact      [02-24-20 @ 15:46]

## 2020-03-05 NOTE — PROGRESS NOTE ADULT - ASSESSMENT
80 yo male, PMH of HFpEF s/p cardiomem, CKD4, HTN, DM2 on insulin, advanced dementia, prior GIB, recent admit for HF requiring chest tube and temporary HD presents to ER for dark stool (although on iron at home) and shortness of breath per wife. Patient is poor historian.     # ESRD- on new HD   Nephrology inputs appreciated.  seat is avaliable discharge once insurance approval obtained for outpatient  HD   started on HD 02/24, now TTS schedule  Needs outpt HD set up      # Iron deficiency anemia  - H/H stable and stool occult negative    # Diabetes Mellitus type 2 controlled   stable     cont HD per schedule     Dispo:     Discussed with Attending Dr Haines

## 2020-03-05 NOTE — PROGRESS NOTE ADULT - ASSESSMENT
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

## 2020-03-05 NOTE — PROGRESS NOTE ADULT - SUBJECTIVE AND OBJECTIVE BOX
Follow up for CRF on HD   pt is awake alert , no  distress           Vital Signs Last 24 Hrs  T(C): 36.8 (05 Mar 2020 15:55), Max: 36.8 (05 Mar 2020 08:24)  T(F): 98.3 (05 Mar 2020 15:55), Max: 98.3 (05 Mar 2020 08:24)  HR: 71 (05 Mar 2020 15:55) (63 - 71)  BP: 133/60 (05 Mar 2020 15:55) (109/50 - 146/63)  BP(mean): --  RR: 18 (05 Mar 2020 15:55) (16 - 18)  SpO2: 93% (05 Mar 2020 15:55) (93% - 99%)    General : Constitutional: awake alert     Neck: supple , no JVD     Respiratory: CTA bilateral     Cardiovascular: regular s1 /s2     Gastrointestinal: soft no tenderness , Bs positive     Extremities: no pretibial edema                                 10.2   6.70  )-----------( 158      ( 05 Mar 2020 09:50 )             32.1   03-05    136  |  94<L>  |  43.0<H>  ----------------------------<  165<H>  4.1   |  25.0  |  2.84<H>    Ca    8.4<L>      05 Mar 2020 09:50    CAPILLARY BLOOD GLUCOSE      POCT Blood Glucose.: 223 mg/dL (05 Mar 2020 15:56)  POCT Blood Glucose.: 122 mg/dL (05 Mar 2020 11:58)  POCT Blood Glucose.: 138 mg/dL (05 Mar 2020 07:41)  POCT Blood Glucose.: 306 mg/dL (04 Mar 2020 21:51)

## 2020-03-05 NOTE — PROGRESS NOTE ADULT - ASSESSMENT
78 yo male, PMH of HFpEF s/p cardiomem, CKD4, HTN, DM2 on insulin, advanced dementia, prior GIB, recent admit for HF requiring chest tube and temporary HD presents to ER for dark stool (although on iron at home) and shortness of breath per wife. Patient is poor historian.     1) ESRD- on new HD   seat is avaliable discharge once insurance approval obtained for outpatient  HD   cont HD per schedule     2) Iron deficiency anemia  Doubt GI bleed as h/h stable and stool occult negative    3- Diabetes Mellitus type 2 controlled   add low dose lantus for better control

## 2020-03-06 ENCOUNTER — TRANSCRIPTION ENCOUNTER (OUTPATIENT)
Age: 80
End: 2020-03-06

## 2020-03-06 VITALS
SYSTOLIC BLOOD PRESSURE: 142 MMHG | DIASTOLIC BLOOD PRESSURE: 74 MMHG | HEART RATE: 72 BPM | RESPIRATION RATE: 18 BRPM | TEMPERATURE: 98 F | OXYGEN SATURATION: 95 %

## 2020-03-06 LAB
GLUCOSE BLDC GLUCOMTR-MCNC: 128 MG/DL — HIGH (ref 70–99)
GLUCOSE BLDC GLUCOMTR-MCNC: 153 MG/DL — HIGH (ref 70–99)
GLUCOSE BLDC GLUCOMTR-MCNC: 189 MG/DL — HIGH (ref 70–99)

## 2020-03-06 PROCEDURE — 86803 HEPATITIS C AB TEST: CPT

## 2020-03-06 PROCEDURE — 82728 ASSAY OF FERRITIN: CPT

## 2020-03-06 PROCEDURE — 80053 COMPREHEN METABOLIC PANEL: CPT

## 2020-03-06 PROCEDURE — 74176 CT ABD & PELVIS W/O CONTRAST: CPT

## 2020-03-06 PROCEDURE — 87086 URINE CULTURE/COLONY COUNT: CPT

## 2020-03-06 PROCEDURE — T1013: CPT

## 2020-03-06 PROCEDURE — 81001 URINALYSIS AUTO W/SCOPE: CPT

## 2020-03-06 PROCEDURE — 86901 BLOOD TYPING SEROLOGIC RH(D): CPT

## 2020-03-06 PROCEDURE — 77002 NEEDLE LOCALIZATION BY XRAY: CPT

## 2020-03-06 PROCEDURE — 83540 ASSAY OF IRON: CPT

## 2020-03-06 PROCEDURE — 84443 ASSAY THYROID STIM HORMONE: CPT

## 2020-03-06 PROCEDURE — 86705 HEP B CORE ANTIBODY IGM: CPT

## 2020-03-06 PROCEDURE — 87040 BLOOD CULTURE FOR BACTERIA: CPT

## 2020-03-06 PROCEDURE — 76942 ECHO GUIDE FOR BIOPSY: CPT

## 2020-03-06 PROCEDURE — 86923 COMPATIBILITY TEST ELECTRIC: CPT

## 2020-03-06 PROCEDURE — 93985 DUP-SCAN HEMO COMPL BI STD: CPT

## 2020-03-06 PROCEDURE — 36415 COLL VENOUS BLD VENIPUNCTURE: CPT

## 2020-03-06 PROCEDURE — C1750: CPT

## 2020-03-06 PROCEDURE — 83550 IRON BINDING TEST: CPT

## 2020-03-06 PROCEDURE — 97163 PT EVAL HIGH COMPLEX 45 MIN: CPT

## 2020-03-06 PROCEDURE — 87186 SC STD MICRODIL/AGAR DIL: CPT

## 2020-03-06 PROCEDURE — 85610 PROTHROMBIN TIME: CPT

## 2020-03-06 PROCEDURE — 84460 ALANINE AMINO (ALT) (SGPT): CPT

## 2020-03-06 PROCEDURE — 97116 GAIT TRAINING THERAPY: CPT

## 2020-03-06 PROCEDURE — 99233 SBSQ HOSP IP/OBS HIGH 50: CPT

## 2020-03-06 PROCEDURE — 82962 GLUCOSE BLOOD TEST: CPT

## 2020-03-06 PROCEDURE — 84484 ASSAY OF TROPONIN QUANT: CPT

## 2020-03-06 PROCEDURE — 83605 ASSAY OF LACTIC ACID: CPT

## 2020-03-06 PROCEDURE — 85027 COMPLETE CBC AUTOMATED: CPT

## 2020-03-06 PROCEDURE — 97530 THERAPEUTIC ACTIVITIES: CPT

## 2020-03-06 PROCEDURE — 82272 OCCULT BLD FECES 1-3 TESTS: CPT

## 2020-03-06 PROCEDURE — 86704 HEP B CORE ANTIBODY TOTAL: CPT

## 2020-03-06 PROCEDURE — 84100 ASSAY OF PHOSPHORUS: CPT

## 2020-03-06 PROCEDURE — 99239 HOSP IP/OBS DSCHRG MGMT >30: CPT

## 2020-03-06 PROCEDURE — 85730 THROMBOPLASTIN TIME PARTIAL: CPT

## 2020-03-06 PROCEDURE — 83690 ASSAY OF LIPASE: CPT

## 2020-03-06 PROCEDURE — 82746 ASSAY OF FOLIC ACID SERUM: CPT

## 2020-03-06 PROCEDURE — 97112 NEUROMUSCULAR REEDUCATION: CPT

## 2020-03-06 PROCEDURE — 97110 THERAPEUTIC EXERCISES: CPT

## 2020-03-06 PROCEDURE — 86706 HEP B SURFACE ANTIBODY: CPT

## 2020-03-06 PROCEDURE — 87340 HEPATITIS B SURFACE AG IA: CPT

## 2020-03-06 PROCEDURE — 86850 RBC ANTIBODY SCREEN: CPT

## 2020-03-06 PROCEDURE — C1769: CPT

## 2020-03-06 PROCEDURE — 83735 ASSAY OF MAGNESIUM: CPT

## 2020-03-06 PROCEDURE — 71045 X-RAY EXAM CHEST 1 VIEW: CPT

## 2020-03-06 PROCEDURE — 99261: CPT

## 2020-03-06 PROCEDURE — 84466 ASSAY OF TRANSFERRIN: CPT

## 2020-03-06 PROCEDURE — 93005 ELECTROCARDIOGRAM TRACING: CPT

## 2020-03-06 PROCEDURE — 99285 EMERGENCY DEPT VISIT HI MDM: CPT

## 2020-03-06 PROCEDURE — 86900 BLOOD TYPING SEROLOGIC ABO: CPT

## 2020-03-06 PROCEDURE — 86709 HEPATITIS A IGM ANTIBODY: CPT

## 2020-03-06 PROCEDURE — 84436 ASSAY OF TOTAL THYROXINE: CPT

## 2020-03-06 PROCEDURE — 84480 ASSAY TRIIODOTHYRONINE (T3): CPT

## 2020-03-06 PROCEDURE — 82607 VITAMIN B-12: CPT

## 2020-03-06 PROCEDURE — 80048 BASIC METABOLIC PNL TOTAL CA: CPT

## 2020-03-06 RX ORDER — AMLODIPINE BESYLATE 2.5 MG/1
1 TABLET ORAL
Qty: 0 | Refills: 0 | DISCHARGE
Start: 2020-03-06

## 2020-03-06 RX ORDER — PANTOPRAZOLE SODIUM 20 MG/1
1 TABLET, DELAYED RELEASE ORAL
Qty: 0 | Refills: 0 | DISCHARGE
Start: 2020-03-06

## 2020-03-06 RX ORDER — HYDRALAZINE HCL 50 MG
1 TABLET ORAL
Qty: 0 | Refills: 0 | DISCHARGE
Start: 2020-03-06

## 2020-03-06 RX ORDER — AMLODIPINE BESYLATE 2.5 MG/1
1 TABLET ORAL
Qty: 0 | Refills: 0 | DISCHARGE

## 2020-03-06 RX ADMIN — Medication 20 MILLIGRAM(S): at 07:58

## 2020-03-06 RX ADMIN — Medication 50 MILLIGRAM(S): at 13:23

## 2020-03-06 RX ADMIN — PANTOPRAZOLE SODIUM 40 MILLIGRAM(S): 20 TABLET, DELAYED RELEASE ORAL at 05:28

## 2020-03-06 RX ADMIN — Medication 50 MILLIGRAM(S): at 05:27

## 2020-03-06 RX ADMIN — CHLORHEXIDINE GLUCONATE 1 APPLICATION(S): 213 SOLUTION TOPICAL at 05:27

## 2020-03-06 RX ADMIN — HEPARIN SODIUM 5000 UNIT(S): 5000 INJECTION INTRAVENOUS; SUBCUTANEOUS at 05:27

## 2020-03-06 RX ADMIN — CARVEDILOL PHOSPHATE 3.12 MILLIGRAM(S): 80 CAPSULE, EXTENDED RELEASE ORAL at 19:02

## 2020-03-06 RX ADMIN — ISOSORBIDE DINITRATE 10 MILLIGRAM(S): 5 TABLET ORAL at 15:42

## 2020-03-06 RX ADMIN — ISOSORBIDE DINITRATE 10 MILLIGRAM(S): 5 TABLET ORAL at 05:29

## 2020-03-06 RX ADMIN — Medication 2: at 19:02

## 2020-03-06 RX ADMIN — ERGOCALCIFEROL 50000 UNIT(S): 1.25 CAPSULE ORAL at 11:56

## 2020-03-06 RX ADMIN — AMLODIPINE BESYLATE 10 MILLIGRAM(S): 2.5 TABLET ORAL at 05:27

## 2020-03-06 RX ADMIN — CARVEDILOL PHOSPHATE 3.12 MILLIGRAM(S): 80 CAPSULE, EXTENDED RELEASE ORAL at 05:27

## 2020-03-06 RX ADMIN — Medication 2: at 11:57

## 2020-03-06 NOTE — PROGRESS NOTE ADULT - SUBJECTIVE AND OBJECTIVE BOX
Follow up for CRF on HD   pt is awake alert , no  distress   anxious wants to go home   no complaints         Vital Signs Last 24 Hrs  T(C): 36.8 (05 Mar 2020 23:45), Max: 36.8 (05 Mar 2020 15:55)  T(F): 98.3 (05 Mar 2020 23:45), Max: 98.3 (05 Mar 2020 15:55)  HR: 65 (06 Mar 2020 13:22) (65 - 71)  BP: 145/60 (06 Mar 2020 13:22) (132/59 - 145/60)  BP(mean): --  RR: 18 (05 Mar 2020 23:45) (18 - 18)  SpO2: 98% (05 Mar 2020 23:45) (93% - 98%)    Constitutional: awake alert no distress     Neck: supple , no JVD     Respiratory: CTA bilateral , no wheezing     Cardiovascular: regular s1 /s2     Gastrointestinal: soft no tenderness , Bs positive     Extremities: no pretibial edema                                       10.2   6.70  )-----------( 158      ( 05 Mar 2020 09:50 )             32.1   03-05    136  |  94<L>  |  43.0<H>  ----------------------------<  165<H>  4.1   |  25.0  |  2.84<H>    Ca    8.4<L>      05 Mar 2020 09:50  CAPILLARY BLOOD GLUCOSE      POCT Blood Glucose.: 189 mg/dL (06 Mar 2020 11:55)  POCT Blood Glucose.: 128 mg/dL (06 Mar 2020 07:56)  POCT Blood Glucose.: 269 mg/dL (05 Mar 2020 21:23)  POCT Blood Glucose.: 223 mg/dL (05 Mar 2020 15:56)

## 2020-03-06 NOTE — PROGRESS NOTE ADULT - SUBJECTIVE AND OBJECTIVE BOX
[facility] DIVISION OF KIDNEY DISEASES AND HYPERTENSION -- HEMODIALYSIS NOTE  --------------------------------------------------------------------------------  Chief Complaint: ESRD/Ongoing hemodialysis requirement    24 hour events/subjective  s/p HD yesterday with 1.5 L UF      PAST HISTORY  --------------------------------------------------------------------------------  No significant changes to PMH, PSH, FHx, SHx, unless otherwise noted    ALLERGIES & MEDICATIONS  --------------------------------------------------------------------------------  Allergies    No Known Allergies    Intolerances      Standing Inpatient Medications  amLODIPine   Tablet 10 milliGRAM(s) Oral daily  carvedilol 3.125 milliGRAM(s) Oral every 12 hours  chlorhexidine 2% Cloths 1 Application(s) Topical <User Schedule>  dextrose 50% Injectable 12.5 Gram(s) IV Push once  dextrose 50% Injectable 25 Gram(s) IV Push once  dextrose 50% Injectable 25 Gram(s) IV Push once  doxazosin 4 milliGRAM(s) Oral at bedtime  epoetin nereida Injectable 8000 Unit(s) IV Push <User Schedule>  ergocalciferol 38744 Unit(s) Oral every week  heparin  Injectable 5000 Unit(s) SubCutaneous every 12 hours  hydrALAZINE 50 milliGRAM(s) Oral three times a day  insulin glargine Injectable (LANTUS) 8 Unit(s) SubCutaneous at bedtime  insulin lispro (HumaLOG) corrective regimen sliding scale   SubCutaneous Before meals and at bedtime  iron sucrose IVPB 200 milliGRAM(s) IV Intermittent <User Schedule>  isosorbide   dinitrate Tablet (ISORDIL) 10 milliGRAM(s) Oral three times a day  pantoprazole    Tablet 40 milliGRAM(s) Oral before breakfast  tamsulosin Oral Tab/Cap - Peds 0.4 milliGRAM(s) Oral at bedtime  torsemide 20 milliGRAM(s) Oral <User Schedule>    PRN Inpatient Medications  acetaminophen   Tablet .. 650 milliGRAM(s) Oral every 6 hours PRN  dextrose 40% Gel 15 Gram(s) Oral once PRN  glucagon  Injectable 1 milliGRAM(s) IntraMuscular once PRN      REVIEW OF SYSTEMS  --------------------------------------------------------------------------------  Gen: No weight changes, fatigue, fevers/chills, weakness  Skin: No rashes  Head/Eyes/Ears/Mouth: No headache  Respiratory: No dyspnea, cough,  CV: No chest pain, orthopnea  GI: No abdominal pain, diarrhea, constipation, nausea, vomiting,  MSK: No joint pain  Neuro: No dizziness/lightheadedness, weakness  Heme: No bleeding  Psych: No significant nervousness, anxiety, stress, depression    All other systems were reviewed and are negative, except as noted.    VITALS/PHYSICAL EXAM  --------------------------------------------------------------------------------  T(C): 36.8 (03-05-20 @ 23:45), Max: 36.8 (03-05-20 @ 15:55)  HR: 68 (03-06-20 @ 05:29) (67 - 71)  BP: 132/59 (03-06-20 @ 05:29) (132/59 - 137/62)  RR: 18 (03-05-20 @ 23:45) (18 - 18)  SpO2: 98% (03-05-20 @ 23:45) (93% - 98%)  Wt(kg): --        03-05-20 @ 07:01  -  03-06-20 @ 07:00  --------------------------------------------------------  IN: 0 mL / OUT: 1800 mL / NET: -1800 mL      Physical Exam:  	Gen: NAD,   	HEENT: supple neck,  	Pulm: CTA B/L  	CV: RRR, S1S2; no rub  	Abd: +BS, soft, nontender  	UE: Warm,  	LE: Warm, no edema  	Neuro: No focal deficits  	Psych: Normal affect and mood  	Skin: Warm, without rashes  	Vascular access: RIJ tunneled HD catheter, LUE AVF+    LABS/STUDIES  --------------------------------------------------------------------------------              10.2   6.70  >-----------<  158      [03-05-20 @ 09:50]              32.1     136  |  94  |  43.0  ----------------------------<  165      [03-05-20 @ 09:50]  4.1   |  25.0  |  2.84        Ca     8.4     [03-05-20 @ 09:50]            Iron 23, TIBC 319, %sat 7      [02-22-20 @ 11:11]  Ferritin 41      [02-22-20 @ 11:11]  PTH -- (Ca 8.3)      [12-12-19 @ 19:36]   164  Vitamin D (25OH) 20.3      [12-12-19 @ 19:36]  HbA1c 8.1      [12-11-19 @ 06:47]  TSH 6.62      [02-23-20 @ 10:16]    HBsAb <3.0      [02-24-20 @ 15:46]  HBsAg Nonreact      [02-24-20 @ 15:46]  HBcAb Nonreact      [02-24-20 @ 15:46]  HCV 0.12, Nonreact      [02-24-20 @ 15:46]

## 2020-03-06 NOTE — PROGRESS NOTE ADULT - REASON FOR ADMISSION
tarry stool with leg swelling

## 2020-03-06 NOTE — DISCHARGE NOTE NURSING/CASE MANAGEMENT/SOCIAL WORK - PATIENT PORTAL LINK FT
You can access the FollowMyHealth Patient Portal offered by [facility] by registering at the following website: [email or web address]. By joining [employer]’s FollowMyHealth portal, you will also be able to view your health information using other applications (apps) compatible with our system.

## 2020-03-06 NOTE — DISCHARGE NOTE PROVIDER - NSDCFUSCHEDAPPT_GEN_ALL_CORE_FT
DANIELLE JEMIMA ; 03/09/2020 ; NPP Neurology [address] [facility] ; 03/23/2020 ; NPP Nephro [address] [facility] ; 03/30/2020 ; NPP FamilyMed [address] [facility] DANIELLE JEMIMA ; 03/09/2020 ; NPP Neurology [address] [facility] ; 03/23/2020 ; NPP Nephro [address] [facility] ; 03/30/2020 ; NPP FamilyMed [address] [facility]

## 2020-03-06 NOTE — PROGRESS NOTE ADULT - ASSESSMENT
78 yo male, PMH of HFpEF s/p cardiomem, CKD4, HTN, DM2 on insulin, advanced dementia, prior GIB, recent admit for HF requiring chest tube and temporary HD presents to ER for dark stool (although on iron at home) and shortness of breath per wife. Patient is poor historian. He was evaluated for dark stool ; Ct of abd negative , PPI initiated seen by cardiology and nephrology on admission , pt is with worsening renal failure HD initiated , perma cath inserted by vascular , Hb remains stable . Pt remains in the hospital pending insurance auth and dialysis seat     1) ESRD- on new HD   seat is available for DC home after HD   has seat in the community to start on Monday      2) Iron deficiency anemia- no active bleed or sign of bleed   s/p iv venofer   Hb stable     3- Diabetes Mellitus type 2 controlled   added  low dose lantus for better control   resume home meds on discharge     4- HTN - continue amlodipine , hydralazine and isordil   stable controlled     discharge after HD tomorrow 80 yo male, PMH of HFpEF s/p cardiomem, CKD4, HTN, DM2 on insulin, advanced dementia, prior GIB, recent admit for HF requiring chest tube and temporary HD presents to ER for dark stool (although on iron at home) and shortness of breath per wife. Patient is poor historian. He was evaluated for dark stool ; Ct of abd negative , PPI initiated seen by cardiology and nephrology on admission , pt is with worsening renal failure HD initiated , perma cath inserted by vascular , Hb remains stable . Pt remains in the hospital pending insurance auth and dialysis seat     1) ESRD- on new HD   seat is available for DC home after HD   has seat in the community to start on Monday      2) Iron deficiency anemia- no active bleed or sign of bleed   s/p iv venofer   Hb stable     3- Diabetes Mellitus type 2 controlled   added  low dose lantus for better control   resume home meds on discharge     4- HTN - continue amlodipine , hydralazine and isordil   stable controlled     discharge after HD tomorrow     d/w wife

## 2020-03-06 NOTE — DISCHARGE NOTE PROVIDER - HOSPITAL COURSE
80 yo male, PMH of HFpEF s/p cardiomem, CKD4, HTN, DM2 on insulin, advanced dementia, prior GIB, recent admit for HF requiring chest tube and temporary HD presents to ER for dark stool (although on iron at home) and shortness of breath per wife. Patient is poor historian. He was evaluated for dark stool; Ct of abd negative , PPI initiated.  Seen by cardiology and nephrology on admission, pt is with worsening renal failure  HD initiated after Perma cath inserted by vascular. Had HD 3/7/20, and will cont HD outpatient in the community to start on Monday 3/09/20. Hb remains stable. Patient's chronic medical conditions were managed with continuation of his home medications. His  Diabetes Mellitus type 2 is controlled, after low dose Lantus added for better glycemic control. Pt noted with Iron deficiency anemia, managed with IV venofer. No active GI bleed noted. Pt has improved, and is medically stable for discharge after HD on 3/7/20. Pt to continue home medications on discharge. 78 yo male, PMH of HFpEF s/p cardiomem, CKD4, HTN, DM2 on insulin, advanced dementia, prior GIB, recent admit for HF requiring chest tube and temporary HD presents to ER for dark stool (although on iron at home) and shortness of breath per wife. Patient is poor historian. He was evaluated for dark stool; Ct of abd negative , PPI initiated.  Seen by cardiology and nephrology on admission, pt is with worsening renal failure  HD initiated after Perma cath inserted by vascular. Had HD 3/7/20, and will cont HD outpatient in the community to start on Monday 3/09/20. Hb remains stable. Patient's chronic medical conditions were managed with continuation of his home medications. His  Diabetes Mellitus type 2 is controlled, after low dose Lantus added for better glycemic control. Pt noted with Iron deficiency anemia, managed with IV venofer. No active GI bleed noted. Pt has improved, and is medically stable for discharge after HD on 3/7/20. Pt to continue home medications on discharge.        total time spend 35 min coordinating care

## 2020-03-06 NOTE — DISCHARGE NOTE PROVIDER - NSDCMRMEDTOKEN_GEN_ALL_CORE_FT
acetaminophen 325 mg oral tablet: 2 tab(s) orally every 6 hours, As needed, Temp greater or equal to 38C (100.4F), Mild Pain (1 - 3)  amLODIPine 10 mg oral tablet: 1 tab(s) orally once a day  aspirin 81 mg oral tablet, chewable: 1 tab(s) orally once a day  carvedilol 25 mg oral tablet: 1 tab(s) orally every 12 hours  doxazosin 4 mg oral tablet: 1 tab(s) orally once a day (at bedtime)  ergocalciferol 2000 intl units oral capsule: 1 cap(s) orally once a day  ferrous sulfate 325 mg (65 mg elemental iron) oral tablet: 1 tab(s) orally once a day   hydrALAZINE 50 mg oral tablet: 1 tab(s) orally 3 times a day  isosorbide dinitrate 10 mg oral tablet: 1 tab(s) orally 3 times a day  pravastatin 40 mg oral tablet: 1 tab(s) orally once a day  sodium bicarbonate 650 mg oral tablet: 1 tab(s) orally 2 times a day  tamsulosin 0.4 mg oral capsule: 1 cap(s) orally once a day (at bedtime)  torsemide 20 mg oral tablet: 1 tab(s) orally every 12 hours  Tresiba: 4 unit(s) subcutaneous once a day (at bedtime) amLODIPine 10 mg oral tablet: 1 tab(s) orally once a day  aspirin 81 mg oral tablet, chewable: 1 tab(s) orally once a day  doxazosin 4 mg oral tablet: 1 tab(s) orally once a day (at bedtime)  ergocalciferol 2000 intl units oral capsule: 1 cap(s) orally once a day  ferrous sulfate 325 mg (65 mg elemental iron) oral tablet: 1 tab(s) orally once a day   hydrALAZINE 50 mg oral tablet: 1 tab(s) orally 3 times a day  isosorbide dinitrate 10 mg oral tablet: 1 tab(s) orally 3 times a day  pantoprazole 40 mg oral delayed release tablet: 1 tab(s) orally once a day (before a meal)  pravastatin 40 mg oral tablet: 1 tab(s) orally once a day  sodium bicarbonate 650 mg oral tablet: 1 tab(s) orally 2 times a day  tamsulosin 0.4 mg oral capsule: 1 cap(s) orally once a day (at bedtime)  torsemide 20 mg oral tablet: 1 tab(s) orally   Tresiba: 4 unit(s) subcutaneous once a day (at bedtime)

## 2020-03-06 NOTE — DISCHARGE NOTE PROVIDER - NSDCCPCAREPLAN_GEN_ALL_CORE_FT
PRINCIPAL DISCHARGE DIAGNOSIS  Diagnosis: Acute congestive heart failure, unspecified heart failure type  Assessment and Plan of Treatment: PRINCIPAL DISCHARGE DIAGNOSIS  Diagnosis: CKD (chronic kidney disease)  Assessment and Plan of Treatment: cont HD strat on Monday      SECONDARY DISCHARGE DIAGNOSES  Diagnosis: Chronic anemia  Assessment and Plan of Treatment: stable    Diagnosis: Hypertension  Assessment and Plan of Treatment: cont current meds

## 2020-03-06 NOTE — PROGRESS NOTE ADULT - ASSESSMENT
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

## 2020-03-09 ENCOUNTER — APPOINTMENT (OUTPATIENT)
Dept: NEUROLOGY | Facility: CLINIC | Age: 80
End: 2020-03-09
Payer: MEDICARE

## 2020-03-09 VITALS
DIASTOLIC BLOOD PRESSURE: 81 MMHG | WEIGHT: 150 LBS | BODY MASS INDEX: 25.61 KG/M2 | HEIGHT: 64 IN | SYSTOLIC BLOOD PRESSURE: 130 MMHG

## 2020-03-09 DIAGNOSIS — Z86.79 PERSONAL HISTORY OF OTHER DISEASES OF THE CIRCULATORY SYSTEM: ICD-10-CM

## 2020-03-09 DIAGNOSIS — Z87.438 PERSONAL HISTORY OF OTHER DISEASES OF MALE GENITAL ORGANS: ICD-10-CM

## 2020-03-09 DIAGNOSIS — Z86.39 PERSONAL HISTORY OF OTHER ENDOCRINE, NUTRITIONAL AND METABOLIC DISEASE: ICD-10-CM

## 2020-03-09 DIAGNOSIS — Z87.19 PERSONAL HISTORY OF OTHER DISEASES OF THE DIGESTIVE SYSTEM: ICD-10-CM

## 2020-03-09 PROCEDURE — 99204 OFFICE O/P NEW MOD 45 MIN: CPT

## 2020-03-09 RX ORDER — CARVEDILOL 25 MG/1
25 TABLET, FILM COATED ORAL TWICE DAILY
Qty: 60 | Refills: 5 | Status: COMPLETED | COMMUNITY
Start: 2019-06-19 | End: 2020-03-09

## 2020-03-09 NOTE — HISTORY OF PRESENT ILLNESS
[FreeTextEntry1] :  I saw this patient in the office today.\par \par He has been having memory difficulty.\par This began in early 2019.\par Short-term is more affected than long-term. He frequently repeats questions.\par He denies any history of depression.\par \par

## 2020-03-09 NOTE — ASSESSMENT
[FreeTextEntry1] : This is a 79-year-old man with what is most likely Alzheimer's type dementia.\par I will obtain a CT scan of the head as he has never been imaged.\par He had blood tests for reversible causes of which were unrevealing.\par \par Given his history of heart disease I would avoid Aricept and related medications.\par I have suggested a trial of Namenda. I will start with 5 mg and titrate to 10 mg twice per day.\par \par \par

## 2020-03-09 NOTE — PHYSICAL EXAM
[General Appearance - Alert] : alert [General Appearance - In No Acute Distress] : in no acute distress [Affect] : the affect was normal [Memory Remote] : remote memory was not impaired [Oriented to Person] : oriented to person [Vocabulary] : adequate range of vocabulary [Total Score ___ / 30] : the patient achieved a score of [unfilled] /30 [Date / Time ___ / 5] : date / time [unfilled] / 5 [Place ___ / 5] : place [unfilled] / 5 [Registration ___ / 3] : registration [unfilled] / 3 [Serial Sevens ___/5] : serial sevens [unfilled] / 5 [Naming 2 Objects ___ / 2] : naming two objects [unfilled] / 2 [Repeating a Sentence ___ / 1] : repeating a sentence [unfilled] / 1 [Writing a Sentence ___ / 1] : write sentence [unfilled] / 1 [3-stage Verbal Command ___ / 3] : three-stage verbal command [unfilled] / 3 [Written Command ___ / 1] : written command [unfilled] / 1 [Copy a Design ___ / 1] : copy a design [unfilled] / 1 [Recall ___ / 3] : recall [unfilled] / 3 [Cranial Nerves Optic (II)] : visual acuity intact bilaterally,  visual fields full to confrontation, pupils equal round and reactive to light [Cranial Nerves Oculomotor (III)] : extraocular motion intact [Cranial Nerves Trigeminal (V)] : facial sensation intact symmetrically [Cranial Nerves Facial (VII)] : face symmetrical [Cranial Nerves Vestibulocochlear (VIII)] : hearing was intact bilaterally [Cranial Nerves Glossopharyngeal (IX)] : tongue and palate midline [Cranial Nerves Accessory (XI - Cranial And Spinal)] : head turning and shoulder shrug symmetric [Cranial Nerves Hypoglossal (XII)] : there was no tongue deviation with protrusion [Motor Tone] : muscle tone was normal in all four extremities [Motor Strength] : muscle strength was normal in all four extremities [Sensation Tactile Decrease] : light touch was intact [Sensation Pain / Temperature Decrease] : pain and temperature was intact [Sensation Vibration Decrease] : vibration was intact [Limited Balance] : the patient's balance was impaired [2+] : Ankle jerk left 2+ [Optic Disc Abnormality] : the optic disc were normal in size and color [Edema] : there was no peripheral edema [Involuntary Movements] : no involuntary movements were seen [Dysarthria] : no dysarthria [Aphasia] : no dysphasia/aphasia [Current Events] : inadequate knowledge of current events [Past History] : inadequate knowledge of personal past history [Romberg's Sign] : Romberg's sign was negtive [Tremor] : no tremor present [Coordination - Dysmetria Impaired Finger-to-Nose Bilateral] : not present [Plantar Reflex Right Only] : normal on the right [Plantar Reflex Left Only] : normal on the left [FreeTextEntry8] : Ambulation required a cane.

## 2020-03-09 NOTE — CONSULT LETTER
[Dear  ___] : Dear  [unfilled], [Courtesy Letter:] : I had the pleasure of seeing your patient, [unfilled], in my office today. [Please see my note below.] : Please see my note below. [Consult Closing:] : Thank you very much for allowing me to participate in the care of this patient.  If you have any questions, please do not hesitate to contact me. [Sincerely,] : Sincerely, [FreeTextEntry3] : Tip Batista MD.

## 2020-03-09 NOTE — DATA REVIEWED
[de-identified] : B12 level was 390\par TSH level was markedly elevated at 13.20, however, T4 level was normal.\par

## 2020-03-10 LAB
ANION GAP SERPL CALC-SCNC: 18 MMOL/L
BUN SERPL-MCNC: 125 MG/DL
CALCIUM SERPL-MCNC: 8.5 MG/DL
CHLORIDE SERPL-SCNC: 99 MMOL/L
CO2 SERPL-SCNC: 21 MMOL/L
CREAT SERPL-MCNC: 4.07 MG/DL
GLUCOSE SERPL-MCNC: 122 MG/DL
NT-PROBNP SERPL-MCNC: ABNORMAL PG/ML
POTASSIUM SERPL-SCNC: 4.8 MMOL/L
SODIUM SERPL-SCNC: 138 MMOL/L

## 2020-03-12 LAB — GLUCOSE BLDC GLUCOMTR-MCNC: 128 MG/DL — HIGH (ref 70–99)

## 2020-03-13 ENCOUNTER — APPOINTMENT (OUTPATIENT)
Dept: CARDIOLOGY | Facility: CLINIC | Age: 80
End: 2020-03-13
Payer: MEDICARE

## 2020-03-13 ENCOUNTER — RX RENEWAL (OUTPATIENT)
Age: 80
End: 2020-03-13

## 2020-03-13 PROCEDURE — 93264 REM MNTR WRLS P-ART PRS SNR: CPT

## 2020-03-17 ENCOUNTER — APPOINTMENT (OUTPATIENT)
Dept: CARDIOLOGY | Facility: CLINIC | Age: 80
End: 2020-03-17

## 2020-03-24 ENCOUNTER — OUTPATIENT (OUTPATIENT)
Dept: OUTPATIENT SERVICES | Facility: HOSPITAL | Age: 80
LOS: 1 days | End: 2020-03-24
Payer: MEDICARE

## 2020-03-24 ENCOUNTER — APPOINTMENT (OUTPATIENT)
Dept: CT IMAGING | Facility: CLINIC | Age: 80
End: 2020-03-24
Payer: MEDICARE

## 2020-03-24 DIAGNOSIS — Z96.0 PRESENCE OF UROGENITAL IMPLANTS: Chronic | ICD-10-CM

## 2020-03-24 DIAGNOSIS — Z00.00 ENCOUNTER FOR GENERAL ADULT MEDICAL EXAMINATION WITHOUT ABNORMAL FINDINGS: ICD-10-CM

## 2020-03-24 DIAGNOSIS — G30.9 ALZHEIMER'S DISEASE, UNSPECIFIED: ICD-10-CM

## 2020-03-24 PROCEDURE — 70450 CT HEAD/BRAIN W/O DYE: CPT | Mod: 26

## 2020-03-24 PROCEDURE — 70450 CT HEAD/BRAIN W/O DYE: CPT

## 2020-03-31 ENCOUNTER — APPOINTMENT (OUTPATIENT)
Dept: FAMILY MEDICINE | Facility: CLINIC | Age: 80
End: 2020-03-31
Payer: MEDICARE

## 2020-03-31 ENCOUNTER — LABORATORY RESULT (OUTPATIENT)
Age: 80
End: 2020-03-31

## 2020-03-31 ENCOUNTER — RX CHANGE (OUTPATIENT)
Age: 80
End: 2020-03-31

## 2020-03-31 VITALS
SYSTOLIC BLOOD PRESSURE: 110 MMHG | DIASTOLIC BLOOD PRESSURE: 70 MMHG | HEART RATE: 76 BPM | BODY MASS INDEX: 25.61 KG/M2 | OXYGEN SATURATION: 95 % | TEMPERATURE: 97.4 F | WEIGHT: 150 LBS | RESPIRATION RATE: 13 BRPM | HEIGHT: 64 IN

## 2020-03-31 DIAGNOSIS — N40.0 BENIGN PROSTATIC HYPERPLASIA WITHOUT LOWER URINARY TRACT SYMPMS: ICD-10-CM

## 2020-03-31 DIAGNOSIS — Z86.19 PERSONAL HISTORY OF OTHER INFECTIOUS AND PARASITIC DISEASES: ICD-10-CM

## 2020-03-31 DIAGNOSIS — N32.89 OTHER SPECIFIED DISORDERS OF BLADDER: ICD-10-CM

## 2020-03-31 DIAGNOSIS — Z86.39 PERSONAL HISTORY OF OTHER ENDOCRINE, NUTRITIONAL AND METABOLIC DISEASE: ICD-10-CM

## 2020-03-31 DIAGNOSIS — N18.3 CHRONIC KIDNEY DISEASE, STAGE 3 (MODERATE): ICD-10-CM

## 2020-03-31 DIAGNOSIS — R60.0 LOCALIZED EDEMA: ICD-10-CM

## 2020-03-31 DIAGNOSIS — K92.1 MELENA: ICD-10-CM

## 2020-03-31 LAB — HBA1C MFR BLD HPLC: 7

## 2020-03-31 PROCEDURE — 83036 HEMOGLOBIN GLYCOSYLATED A1C: CPT | Mod: QW

## 2020-03-31 PROCEDURE — 36415 COLL VENOUS BLD VENIPUNCTURE: CPT

## 2020-03-31 PROCEDURE — 99214 OFFICE O/P EST MOD 30 MIN: CPT | Mod: 25

## 2020-03-31 NOTE — PHYSICAL EXAM
[No Acute Distress] : no acute distress [Well Nourished] : well nourished [Well Developed] : well developed [Well-Appearing] : well-appearing [Normal Sclera/Conjunctiva] : normal sclera/conjunctiva [PERRL] : pupils equal round and reactive to light [EOMI] : extraocular movements intact [Normal Outer Ear/Nose] : the outer ears and nose were normal in appearance [Normal Oropharynx] : the oropharynx was normal [No JVD] : no jugular venous distention [No Lymphadenopathy] : no lymphadenopathy [Supple] : supple [Thyroid Normal, No Nodules] : the thyroid was normal and there were no nodules present [No Respiratory Distress] : no respiratory distress  [No Accessory Muscle Use] : no accessory muscle use [Clear to Auscultation] : lungs were clear to auscultation bilaterally [Normal Rate] : normal rate  [Regular Rhythm] : with a regular rhythm [Normal S1, S2] : normal S1 and S2 [No Murmur] : no murmur heard [No Carotid Bruits] : no carotid bruits [No Abdominal Bruit] : a ~M bruit was not heard ~T in the abdomen [No Varicosities] : no varicosities [Pedal Pulses Present] : the pedal pulses are present [No Edema] : there was no peripheral edema [No Palpable Aorta] : no palpable aorta [No Extremity Clubbing/Cyanosis] : no extremity clubbing/cyanosis [Soft] : abdomen soft [Non Tender] : non-tender [Non-distended] : non-distended [No Masses] : no abdominal mass palpated [No HSM] : no HSM [Normal Bowel Sounds] : normal bowel sounds [Normal Posterior Cervical Nodes] : no posterior cervical lymphadenopathy [Normal Anterior Cervical Nodes] : no anterior cervical lymphadenopathy [No CVA Tenderness] : no CVA  tenderness [No Spinal Tenderness] : no spinal tenderness [Grossly Normal Strength/Tone] : grossly normal strength/tone [No Rash] : no rash [Coordination Grossly Intact] : coordination grossly intact [No Focal Deficits] : no focal deficits [Normal Gait] : normal gait [Deep Tendon Reflexes (DTR)] : deep tendon reflexes were 2+ and symmetric [Normal Affect] : the affect was normal [Normal Insight/Judgement] : insight and judgment were intact [de-identified] : Murmur noted on the right second intercostal.  [de-identified] : 2+ bilateral lower extremity edema.

## 2020-03-31 NOTE — REVIEW OF SYSTEMS
[Patient Intake Form Reviewed] : Patient intake form was reviewed [Negative] : Psychiatric [FreeTextEntry5] : Afib, CHF, HTN, HLD, Aortic stenosis [FreeTextEntry8] : CKD, Chronic renal failure, enlarged prostate [de-identified] : Dementia [de-identified] : Anemia [FreeTextEntry1] : Hyperkalemia, IDDM

## 2020-03-31 NOTE — HISTORY OF PRESENT ILLNESS
[FreeTextEntry1] : Patient here for review of PMH anemia, a-fib, chronic GERD, chronic renal failure, enlarged prostate, HTN, HLD, IDDM. Patient is getting  renal replacement therapy.  3 days a week  wife claims glucose stable, managed by Cardio and Renal, CHF has improved with dialysis

## 2020-03-31 NOTE — COUNSELING
[Fall prevention counseling provided] : Fall prevention counseling provided [Adequate lighting] : Adequate lighting [No throw rugs] : No throw rugs [Use proper foot wear] : Use proper foot wear [Behavioral health counseling provided] : Behavioral health counseling provided [Sleep ___ hours/day] : Sleep [unfilled] hours/day [Engage in a relaxing activity] : Engage in a relaxing activity [Plan in advance] : Plan in advance [AUDIT-C Screening administered and reviewed] : AUDIT-C Screening administered and reviewed [Potential consequences of obesity discussed] : Potential consequences of obesity discussed [Benefits of weight loss discussed] : Benefits of weight loss discussed [Structured Weight Management Program suggested:] : Structured weight management program suggested [Encouraged to increase physical activity] : Encouraged to increase physical activity [Weigh Self Weekly] : weigh self weekly [Decrease Portions] : decrease portions [None] : None [Good understanding] : Patient has a good understanding of lifestyle changes and steps needed to achieve self management goal [FreeTextEntry2] : Former smoker

## 2020-03-31 NOTE — ASSESSMENT
[FreeTextEntry1] : Patient here for review of PMH anemia, a-fib, chronic GERD, chronic renal failure, enlarged prostate, HTN, HLD, IDDM. Patient is getting  renal replacement therapy.  3 days a week  wife claims glucose stable, managed by Cardio and Renal, CHF has improved with dialysis \par \par Labs and care plan reviewed\par A1c reviewed  diabetic education and forms completed\par advised follow up with  , cardio and renal\par extended visit \par \par RTC in 6-8 weeks

## 2020-03-31 NOTE — HEALTH RISK ASSESSMENT
[1 or 2 (0 pts)] : 1 or 2 (0 points) [Never (0 pts)] : Never (0 points) [No] : In the past 12 months have you used drugs other than those required for medical reasons? No [Two or more falls in past year] : Patient reported two or more falls in the past year [0] : 2) Feeling down, depressed, or hopeless: Not at all (0) [] : No [Audit-CScore] : 0 [de-identified] : Low - Walks with cane [de-identified] : Average [[id number]] : 0

## 2020-04-01 LAB
ALBUMIN SERPL ELPH-MCNC: 3.9 G/DL
ALP BLD-CCNC: 82 U/L
ALT SERPL-CCNC: 8 U/L
ANION GAP SERPL CALC-SCNC: 17 MMOL/L
APPEARANCE: ABNORMAL
AST SERPL-CCNC: 15 U/L
BASOPHILS # BLD AUTO: 0.05 K/UL
BASOPHILS NFR BLD AUTO: 0.5 %
BILIRUB SERPL-MCNC: 0.2 MG/DL
BILIRUBIN URINE: NEGATIVE
BLOOD URINE: ABNORMAL
BUN SERPL-MCNC: 21 MG/DL
CALCIUM SERPL-MCNC: 8.7 MG/DL
CHLORIDE SERPL-SCNC: 91 MMOL/L
CO2 SERPL-SCNC: 30 MMOL/L
COLOR: NORMAL
CREAT SERPL-MCNC: 2.04 MG/DL
EOSINOPHIL # BLD AUTO: 0.11 K/UL
EOSINOPHIL NFR BLD AUTO: 1.1 %
ERYTHROCYTE [SEDIMENTATION RATE] IN BLOOD BY WESTERGREN METHOD: 86 MM/HR
GLUCOSE QUALITATIVE U: NEGATIVE
GLUCOSE SERPL-MCNC: 160 MG/DL
HCT VFR BLD CALC: 33.7 %
HGB BLD-MCNC: 10.5 G/DL
IMM GRANULOCYTES NFR BLD AUTO: 1 %
KETONES URINE: NEGATIVE
LEUKOCYTE ESTERASE URINE: ABNORMAL
LYMPHOCYTES # BLD AUTO: 1.2 K/UL
LYMPHOCYTES NFR BLD AUTO: 12 %
MAN DIFF?: NORMAL
MCHC RBC-ENTMCNC: 27.5 PG
MCHC RBC-ENTMCNC: 31.2 GM/DL
MCV RBC AUTO: 88.2 FL
MONOCYTES # BLD AUTO: 0.9 K/UL
MONOCYTES NFR BLD AUTO: 9 %
NEUTROPHILS # BLD AUTO: 7.68 K/UL
NEUTROPHILS NFR BLD AUTO: 76.4 %
NITRITE URINE: NEGATIVE
PH URINE: 7.5
PLATELET # BLD AUTO: 313 K/UL
POTASSIUM SERPL-SCNC: 3.9 MMOL/L
PROT SERPL-MCNC: 7 G/DL
PROTEIN URINE: ABNORMAL
RBC # BLD: 3.82 M/UL
RBC # FLD: 16.8 %
SODIUM SERPL-SCNC: 137 MMOL/L
SPECIFIC GRAVITY URINE: 1.01
UROBILINOGEN URINE: NORMAL
WBC # FLD AUTO: 10.04 K/UL

## 2020-04-08 ENCOUNTER — RX RENEWAL (OUTPATIENT)
Age: 80
End: 2020-04-08

## 2020-04-09 ENCOUNTER — RX CHANGE (OUTPATIENT)
Age: 80
End: 2020-04-09

## 2020-04-10 ENCOUNTER — RX CHANGE (OUTPATIENT)
Age: 80
End: 2020-04-10

## 2020-04-17 ENCOUNTER — APPOINTMENT (OUTPATIENT)
Dept: CARDIOLOGY | Facility: CLINIC | Age: 80
End: 2020-04-17
Payer: MEDICARE

## 2020-04-17 PROCEDURE — 93264 REM MNTR WRLS P-ART PRS SNR: CPT

## 2020-04-30 ENCOUNTER — APPOINTMENT (OUTPATIENT)
Dept: NEUROLOGY | Facility: CLINIC | Age: 80
End: 2020-04-30

## 2020-05-04 ENCOUNTER — APPOINTMENT (OUTPATIENT)
Dept: CARDIOLOGY | Facility: CLINIC | Age: 80
End: 2020-05-04

## 2020-05-12 ENCOUNTER — APPOINTMENT (OUTPATIENT)
Dept: FAMILY MEDICINE | Facility: CLINIC | Age: 80
End: 2020-05-12
Payer: MEDICARE

## 2020-05-12 VITALS
RESPIRATION RATE: 13 BRPM | HEIGHT: 64 IN | SYSTOLIC BLOOD PRESSURE: 122 MMHG | DIASTOLIC BLOOD PRESSURE: 78 MMHG | HEART RATE: 78 BPM | WEIGHT: 150 LBS | BODY MASS INDEX: 25.61 KG/M2 | OXYGEN SATURATION: 98 %

## 2020-05-12 DIAGNOSIS — Z87.448 PERSONAL HISTORY OF OTHER DISEASES OF URINARY SYSTEM: ICD-10-CM

## 2020-05-12 PROCEDURE — 36415 COLL VENOUS BLD VENIPUNCTURE: CPT

## 2020-05-12 PROCEDURE — 99214 OFFICE O/P EST MOD 30 MIN: CPT | Mod: 25

## 2020-05-12 RX ORDER — INSULIN DETEMIR 100 [IU]/ML
100 INJECTION, SOLUTION SUBCUTANEOUS
Qty: 1 | Refills: 3 | Status: DISCONTINUED | COMMUNITY
Start: 2020-05-12 | End: 2020-05-12

## 2020-05-12 NOTE — ASSESSMENT
[FreeTextEntry1] : Patient here for review of PMH anemia, a-fib, chronic GERD, chronic renal failure, enlarged prostate, HTN, HLD, IDDM. Patient is getting  renal replacement therapy.  3 days a week  wife claims glucose stable, managed by Cardio and Renal, CHF has improved with dialysis\par \par Labs and care plan reviewed\par Titers for Covid \par \par RTC in 2 months

## 2020-05-12 NOTE — PHYSICAL EXAM
[No Acute Distress] : no acute distress [Well Developed] : well developed [Well Nourished] : well nourished [Well-Appearing] : well-appearing [PERRL] : pupils equal round and reactive to light [Normal Sclera/Conjunctiva] : normal sclera/conjunctiva [EOMI] : extraocular movements intact [Normal Outer Ear/Nose] : the outer ears and nose were normal in appearance [No JVD] : no jugular venous distention [No Lymphadenopathy] : no lymphadenopathy [Normal Oropharynx] : the oropharynx was normal [Thyroid Normal, No Nodules] : the thyroid was normal and there were no nodules present [Supple] : supple [Clear to Auscultation] : lungs were clear to auscultation bilaterally [No Respiratory Distress] : no respiratory distress  [No Accessory Muscle Use] : no accessory muscle use [Normal Rate] : normal rate  [Normal S1, S2] : normal S1 and S2 [Regular Rhythm] : with a regular rhythm [No Carotid Bruits] : no carotid bruits [No Murmur] : no murmur heard [Pedal Pulses Present] : the pedal pulses are present [No Varicosities] : no varicosities [No Abdominal Bruit] : a ~M bruit was not heard ~T in the abdomen [No Edema] : there was no peripheral edema [No Palpable Aorta] : no palpable aorta [No Extremity Clubbing/Cyanosis] : no extremity clubbing/cyanosis [Non Tender] : non-tender [Soft] : abdomen soft [No Masses] : no abdominal mass palpated [Non-distended] : non-distended [Normal Bowel Sounds] : normal bowel sounds [Normal Posterior Cervical Nodes] : no posterior cervical lymphadenopathy [No HSM] : no HSM [No CVA Tenderness] : no CVA  tenderness [Normal Anterior Cervical Nodes] : no anterior cervical lymphadenopathy [Grossly Normal Strength/Tone] : grossly normal strength/tone [No Spinal Tenderness] : no spinal tenderness [Coordination Grossly Intact] : coordination grossly intact [No Rash] : no rash [Normal Gait] : normal gait [No Focal Deficits] : no focal deficits [Normal Affect] : the affect was normal [Normal Insight/Judgement] : insight and judgment were intact [Deep Tendon Reflexes (DTR)] : deep tendon reflexes were 2+ and symmetric [de-identified] : Murmur noted on the right second intercostal.  [de-identified] : 2+ bilateral lower extremity edema.

## 2020-05-12 NOTE — HISTORY OF PRESENT ILLNESS
Addended by: BOBBY VIZCAINO on: 12/22/2017 10:35 AM     Modules accepted: Benita Resendiz     [FreeTextEntry1] : Patient here for review of PMH anemia, a-fib, chronic GERD, chronic renal failure, enlarged prostate, HTN, HLD, IDDM. Patient is getting  renal replacement therapy.  3 days a week  wife claims glucose stable, managed by Cardio and Renal, CHF has improved with dialysis

## 2020-05-12 NOTE — HEALTH RISK ASSESSMENT
[1 or 2 (0 pts)] : 1 or 2 (0 points) [No] : In the past 12 months have you used drugs other than those required for medical reasons? No [Never (0 pts)] : Never (0 points) [Two or more falls in past year] : Patient reported two or more falls in the past year [0] : 1) Little interest or pleasure doing things: Not at all (0) [] : No [Audit-CScore] : 0 [de-identified] : Low - Walks with cane [de-identified] : Average [[id number]] : 0

## 2020-05-12 NOTE — COUNSELING
[Fall prevention counseling provided] : Fall prevention counseling provided [Adequate lighting] : Adequate lighting [Use proper foot wear] : Use proper foot wear [No throw rugs] : No throw rugs [Engage in a relaxing activity] : Engage in a relaxing activity [Sleep ___ hours/day] : Sleep [unfilled] hours/day [Behavioral health counseling provided] : Behavioral health counseling provided [Plan in advance] : Plan in advance [AUDIT-C Screening administered and reviewed] : AUDIT-C Screening administered and reviewed [Potential consequences of obesity discussed] : Potential consequences of obesity discussed [Benefits of weight loss discussed] : Benefits of weight loss discussed [Structured Weight Management Program suggested:] : Structured weight management program suggested [Encouraged to increase physical activity] : Encouraged to increase physical activity [Weigh Self Weekly] : weigh self weekly [Decrease Portions] : decrease portions [None] : None [Good understanding] : Patient has a good understanding of lifestyle changes and steps needed to achieve self management goal [FreeTextEntry2] : Former smoker

## 2020-05-12 NOTE — REVIEW OF SYSTEMS
[Patient Intake Form Reviewed] : Patient intake form was reviewed [Negative] : Integumentary [FreeTextEntry5] : Afib, CHF, HTN, HLD, Aortic stenosis [FreeTextEntry8] : CKD, Chronic renal failure, enlarged prostate [de-identified] : Dementia [de-identified] : Anemia [FreeTextEntry1] : Hyperkalemia, IDDM

## 2020-05-13 LAB
ALBUMIN SERPL ELPH-MCNC: 4.4 G/DL
ALP BLD-CCNC: 84 U/L
ALT SERPL-CCNC: 6 U/L
ANION GAP SERPL CALC-SCNC: 15 MMOL/L
AST SERPL-CCNC: 16 U/L
BASOPHILS # BLD AUTO: 0.07 K/UL
BASOPHILS NFR BLD AUTO: 1 %
BILIRUB SERPL-MCNC: 0.3 MG/DL
BUN SERPL-MCNC: 32 MG/DL
CALCIUM SERPL-MCNC: 8.8 MG/DL
CHLORIDE SERPL-SCNC: 90 MMOL/L
CO2 SERPL-SCNC: 30 MMOL/L
CREAT SERPL-MCNC: 2.34 MG/DL
EOSINOPHIL # BLD AUTO: 0.09 K/UL
EOSINOPHIL NFR BLD AUTO: 1.3 %
GLUCOSE SERPL-MCNC: 123 MG/DL
HCT VFR BLD CALC: 40.6 %
HGB BLD-MCNC: 12.4 G/DL
IMM GRANULOCYTES NFR BLD AUTO: 0.4 %
LYMPHOCYTES # BLD AUTO: 1 K/UL
LYMPHOCYTES NFR BLD AUTO: 14.2 %
MAN DIFF?: NORMAL
MCHC RBC-ENTMCNC: 29 PG
MCHC RBC-ENTMCNC: 30.5 GM/DL
MCV RBC AUTO: 94.9 FL
MONOCYTES # BLD AUTO: 0.84 K/UL
MONOCYTES NFR BLD AUTO: 12 %
NEUTROPHILS # BLD AUTO: 4.99 K/UL
NEUTROPHILS NFR BLD AUTO: 71.1 %
PLATELET # BLD AUTO: 178 K/UL
POTASSIUM SERPL-SCNC: 3.9 MMOL/L
PROT SERPL-MCNC: 7.4 G/DL
RBC # BLD: 4.28 M/UL
RBC # FLD: 15.1 %
SODIUM SERPL-SCNC: 135 MMOL/L
WBC # FLD AUTO: 7.02 K/UL

## 2020-05-14 LAB
SARS-COV-2 IGG SERPL IA-ACNC: <0.1 INDEX
SARS-COV-2 IGG SERPL QL IA: NEGATIVE

## 2020-05-29 ENCOUNTER — APPOINTMENT (OUTPATIENT)
Dept: CARDIOLOGY | Facility: CLINIC | Age: 80
End: 2020-05-29
Payer: MEDICARE

## 2020-05-29 PROCEDURE — 93264 REM MNTR WRLS P-ART PRS SNR: CPT

## 2020-06-09 ENCOUNTER — APPOINTMENT (OUTPATIENT)
Dept: CARDIOLOGY | Facility: CLINIC | Age: 80
End: 2020-06-09
Payer: MEDICARE

## 2020-06-09 ENCOUNTER — RX RENEWAL (OUTPATIENT)
Age: 80
End: 2020-06-09

## 2020-06-09 VITALS — HEIGHT: 64 IN | WEIGHT: 150 LBS | BODY MASS INDEX: 25.61 KG/M2

## 2020-06-09 VITALS
OXYGEN SATURATION: 96 % | DIASTOLIC BLOOD PRESSURE: 57 MMHG | SYSTOLIC BLOOD PRESSURE: 109 MMHG | RESPIRATION RATE: 14 BRPM | TEMPERATURE: 97.5 F | HEART RATE: 79 BPM

## 2020-06-09 PROCEDURE — 93000 ELECTROCARDIOGRAM COMPLETE: CPT

## 2020-06-09 PROCEDURE — 99215 OFFICE O/P EST HI 40 MIN: CPT

## 2020-06-12 NOTE — DISCUSSION/SUMMARY
[Patient] : the patient [Benefits] : benefits [Risks] : risks [Alternatives] : alternatives [___ Month(s)] : [unfilled] month(s) [With Me] : with me [FreeTextEntry1] : 79 M with HTN, DM, HLD, paroxsymal atrial fibrillation with Supra therapeutic INR and acute severe GI bleeding now off coumadin on ILR to evaluate for Afib burden,  Pt is s/p Cardiomems placement 1/3. reports today for lower ext. swelling. denies any SOB, chest pain, palpitations, syncope or near syncope. \par Plan d/w Dr. Faustin. \par \par \par Plan:\par 1) left arm fisutal. vascualr surgery referral. HD catheter removal if fisutal working good. US left arm. \par 2). HF- heart failure with preserve ejection fraction   Pt is s/p Cardiomems placement torsemide 20 Q12. on hemodialyses . euvolemic. PADP goal 17 mm Hgh. \par 3) Bilateral LE edema:  .  CKD. HFPEF.  Compressions stockings.  resolved. \par 4) Paroxysmal Afib: s/p cardioversion Jan 2015, CHADSVASC >=3..  =  Xarelto not approved for life long or high co pay.  no coumadin as patient had bleeding on supra therapeutic INR.  ct  aspirin 325  mg daily. F/u ILR. low afib burden. ct follow up with ILR. check ILr today. evlaute for wathcmn if Afib burden. \par 5) HTN: controlled. ct  current meds. \par 6) CAD evaluation : no significant coronary artery disease on cath \par 7) MODerate to severe  aortic stenosis:  CHF.  echo and Cath discrepancy. CHF optimized. repeat echo with valvulo arterial impeadance and strain imaging. \par

## 2020-06-12 NOTE — PHYSICAL EXAM
[General Appearance - Well Developed] : well developed [Normal Appearance] : normal appearance [Well Groomed] : well groomed [No Deformities] : no deformities [General Appearance - In No Acute Distress] : no acute distress [General Appearance - Well Nourished] : well nourished [Normal Conjunctiva] : the conjunctiva exhibited no abnormalities [Eyelids - No Xanthelasma] : the eyelids demonstrated no xanthelasmas [Normal Oral Mucosa] : normal oral mucosa [No Oral Cyanosis] : no oral cyanosis [No Oral Pallor] : no oral pallor [Normal Jugular Venous A Waves Present] : normal jugular venous A waves present [Normal Jugular Venous V Waves Present] : normal jugular venous V waves present [No Jugular Venous Perez A Waves] : no jugular venous perez A waves [Respiration, Rhythm And Depth] : normal respiratory rhythm and effort [Exaggerated Use Of Accessory Muscles For Inspiration] : no accessory muscle use [Auscultation Breath Sounds / Voice Sounds] : lungs were clear to auscultation bilaterally [Heart Rate And Rhythm] : heart rate and rhythm were normal [Heart Sounds] : normal S1 and S2 [Edema] : no peripheral edema present [Veins - Varicosity Changes] : no varicosital changes were noted in the lower extremities [Abdomen Soft] : soft [Abdomen Tenderness] : non-tender [Abdomen Mass (___ Cm)] : no abdominal mass palpated [Nail Clubbing] : no clubbing of the fingernails [Petechial Hemorrhages (___cm)] : no petechial hemorrhages [Cyanosis, Localized] : no localized cyanosis [Skin Color & Pigmentation] : normal skin color and pigmentation [] : no rash [No Skin Ulcers] : no skin ulcer [Oriented To Time, Place, And Person] : oriented to person, place, and time [Affect] : the affect was normal [No Anxiety] : not feeling anxious [Mood] : the mood was normal [FreeTextEntry1] : Dry hairless skin lack of lustere in the legs . Edema.

## 2020-06-12 NOTE — HISTORY OF PRESENT ILLNESS
[FreeTextEntry1] : HTN, atrial fibrillation, congestive heart failure and heart failure with preserve ejection fraction s/p cardiomem \par \par HPI for today: : c/c : when parker the HD catheter will come off.  he has fistual 2 mths ago. getting hemodialyses with catheter. No headaches. Nodizziness. no dyspnea. \par no palpitations,. has cardiomem. at goal. \par NO LE edema. \par \par \par  old note: patient cardiomem checked. PADP 22; POCUS echo shows  PASP 36 mm hg.  IVC presure 8 mm Hg. Diastolic dysfunction Grade II.\par BMP reviewed.  US did not show any significant retention.\par Patient compliant with meds. \par \par \par \par \par old noteL: no headaches. no LE edema. No dyspnea. no chest pain. no dizziness. walks. physicall active. \par old note: No syncope. nod izzines.s no palpitations. no falls. no bleeding. no headaches. no chest pain,. no dyspena. \par : compliant with meds.  patient had worsneing potassium, and now off losartna. BP controlled. \par \par old note: compliant with meds. no syncope. nod izzines.s no palptiatiosn. no falls. no bleeding. no headaches. no chest pain,. no dyspena. \par \par \par ol dnote: no chest pain. no dyspnea. no headaches. no dizziness. \par old note: no chest pain. no dyspnea. routine follow up. compliant with meds. no fall. no bleeding. \par prior note: last visit, did not bring his pills. BP high, added Benica-HCTZ.  Compliant with emds. Did not bring his pills today again.  Also, said that did not  Benicar-HCTZ due to high co pay. Only taking one BP pill. Not compliant with diet. \par No chest pain. No dyspnea.\par OLD NOTE: This is a 74 year old man with history of HTN, DM, CKD (Stage 3) Atrial fibrillation, prior pneumonia and pleural effusions and pericardial effusion, recently admitted to hospital for rectal bleeding and Elevated INR Hb < 4 , INR =8 and acute on chronic renal failure. CT abd showed, diverticular disease.  Colonoscopy showed, diverticulitis, EGD showed  Patient is off AC. \par \par

## 2020-06-12 NOTE — CARDIOLOGY SUMMARY
[No Ischemia] : no Ischemia [LVEF ___%] : LVEF [unfilled]% [None] : normal LV function [Enlarged] : enlarged LA size [Mild] : mild mitral regurgitation [___] : [unfilled] [___] : [unfilled] [de-identified] : feb 2018 : carotid US: mild atherosclerosis. no stenosis. \par feb 2019: Mild plaque . no stenosis.

## 2020-06-25 ENCOUNTER — APPOINTMENT (OUTPATIENT)
Dept: CARDIOLOGY | Facility: CLINIC | Age: 80
End: 2020-06-25

## 2020-07-02 ENCOUNTER — APPOINTMENT (OUTPATIENT)
Dept: VASCULAR SURGERY | Facility: CLINIC | Age: 80
End: 2020-07-02
Payer: MEDICARE

## 2020-07-02 VITALS
OXYGEN SATURATION: 96 % | HEART RATE: 81 BPM | BODY MASS INDEX: 26.93 KG/M2 | WEIGHT: 152 LBS | RESPIRATION RATE: 14 BRPM | SYSTOLIC BLOOD PRESSURE: 148 MMHG | HEIGHT: 63 IN | TEMPERATURE: 98 F | DIASTOLIC BLOOD PRESSURE: 63 MMHG

## 2020-07-02 PROCEDURE — 99214 OFFICE O/P EST MOD 30 MIN: CPT

## 2020-07-02 PROCEDURE — 93931 UPPER EXTREMITY STUDY: CPT

## 2020-07-06 ENCOUNTER — APPOINTMENT (OUTPATIENT)
Dept: NEPHROLOGY | Facility: CLINIC | Age: 80
End: 2020-07-06
Payer: MEDICARE

## 2020-07-06 VITALS
HEART RATE: 84 BPM | WEIGHT: 150 LBS | BODY MASS INDEX: 26.58 KG/M2 | SYSTOLIC BLOOD PRESSURE: 142 MMHG | DIASTOLIC BLOOD PRESSURE: 60 MMHG | HEIGHT: 63 IN

## 2020-07-06 DIAGNOSIS — N18.4 CHRONIC KIDNEY DISEASE, STAGE 4 (SEVERE): ICD-10-CM

## 2020-07-06 PROCEDURE — 99214 OFFICE O/P EST MOD 30 MIN: CPT

## 2020-07-06 NOTE — HISTORY OF PRESENT ILLNESS
[FreeTextEntry1] : 79 year old man with ESRD on HD via a tunneled right IJ dialysis catheter.\par He is s/p left brachiocephalic AVF creation by me on 2/28/2020.\par This is his first visit since surgery.\par He denies left arm pain or swelling.\par His  AVF has never been cannulated

## 2020-07-06 NOTE — HISTORY OF PRESENT ILLNESS
[FreeTextEntry1] : Last seen by Nephrology January 2020\par \par Patient accompanied by wife, Angela, during office visit today\par \par PCP: Christiano Torres\par Cardiology: Laci Lake\par Vascular: Casey Michael\par Neurology: Tip Batista\par \par PMH: ESRD on HD, Heart failure with preserved left ventricular function (HFpEF), Moderate aortic stenosis, IDDM, HTN, HLD, Alzheimer's dementia, Secondary hyperparathyroidism, Anemia, Atrial fibrillation, b/l carotid artery bruits, Chronic GERD , Exposure to COVID-19 virus \par \par Dialysis Center: [facility]\par Schedule: M/W/F schedule\par \par He is s/p left brachiocephalic AVF created 2/28/2020. He was previously getting HD via a tunneled right IJ dialysis catheter. Patient was cleared to use fistula by vascular (Dr. Michael). Today was the second time his fistula was accessed. Patient states the fistula site was bleeding when accessed. Discoloration around fistula site (will notify Dr. Wade)\par \par Patient's blood pressure during dialysis has been controlled. He denies headache, cramping, or lower extremity edema. Patient has an appointment scheduled with vascular to remove CVC. \par

## 2020-07-06 NOTE — ASSESSMENT
[FreeTextEntry1] : 79 year old man with ESRD on HD via a tunneled right IJ dialysis catheter.\par He is s/p left brachiocephalic AVF creation by me on 2/28/2020.\par His AVF clinically appears mature\par AVF duplex US confirms adequate volume flow and vein diameter.\par -OK to use AVF for HD\par -Will return after 2 weeks of cannulation to remove permacath

## 2020-07-06 NOTE — ASSESSMENT
[FreeTextEntry1] : PLAN:\par Medications reviewed\par F/U with vascular for CVC removal\par Will be followed in dialysis center by Dr. Wade\par Call with additional questions/concerns

## 2020-07-06 NOTE — PHYSICAL EXAM
[Normal Breath Sounds] : Normal breath sounds [Normal Rate and Rhythm] : normal rate and rhythm [2+] : left 2+ [No Rash or Lesion] : No rash or lesion [Alert] : alert [Oriented to Place] : oriented to place [Oriented to Person] : oriented to person [Oriented to Time] : oriented to time [Calm] : calm [JVD] : no jugular venous distention  [Abdomen Masses] : No abdominal masses [Ankle Swelling (On Exam)] : not present [Abdomen Tenderness] : ~T ~M No abdominal tenderness [de-identified] : Well appearing [FreeTextEntry1] : Mature appearing left brachiocephalic AVF. Good thrill\par No arm swelling. Left hand warm and well perfused

## 2020-07-06 NOTE — PHYSICAL EXAM
[General Appearance - Alert] : alert [General Appearance - In No Acute Distress] : in no acute distress [Sclera] : the sclera and conjunctiva were normal [Neck Appearance] : the appearance of the neck was normal [Outer Ear] : the ears and nose were normal in appearance [Neck Cervical Mass (___cm)] : no neck mass was observed [] : no respiratory distress [Respiration, Rhythm And Depth] : normal respiratory rhythm and effort [Exaggerated Use Of Accessory Muscles For Inspiration] : no accessory muscle use [Auscultation Breath Sounds / Voice Sounds] : lungs were clear to auscultation bilaterally [Heart Rate And Rhythm] : heart rate was normal and rhythm regular [Apical Impulse] : the apical impulse was normal [Heart Sounds Gallop] : no gallops [Heart Sounds] : normal S1 and S2 [Heart Sounds Pericardial Friction Rub] : no pericardial rub [Systolic grade ___/6] : A grade [unfilled]/6 systolic murmur was heard. [Arterial Pulses Carotid] : carotid pulses were normal with no bruits [Bowel Sounds] : normal bowel sounds [Edema] : there was no peripheral edema [Abdomen Soft] : soft [Abdomen Tenderness] : non-tender [Abdomen Mass (___ Cm)] : no abdominal mass palpated [Involuntary Movements] : no involuntary movements were seen [No CVA Tenderness] : no ~M costovertebral angle tenderness [Dialysis Catheter] : dialysis catheter [___ (cm) Fistula] : [unfilled] (cm) fistula [Bruit] : a bruit was present [Thrill] : a thrill was present [Skin Color & Pigmentation] : normal skin color and pigmentation [Affect] : the affect was normal [Mood] : the mood was normal [FreeTextEntry1] : Left arm fistula, site dry. +ecchymosis around fistula site

## 2020-07-16 NOTE — ED PROVIDER NOTE - CROS ED CARDIOVAS ALL NEG
Patient: Nancy Armendariz    Procedure Summary     Date:  07/15/20 Room / Location:  [facility] ROOM 21 / SURGERY SAME DAY [facility]    Anesthesia Start:  1046 Anesthesia Stop:  1154    Procedures:       RELEASE, TRIGGER FINGER-INDEX (Right Finger)      RELEASE, CARPAL TUNNEL (Right Hand)      RELEASE, CUBITAL TUNNEL (Right Arm Lower)      EXPLORATION, DECOMPRESSION, OR REPAIR, NERVE, ULNAR-AT WRIST (Right Wrist)      RELEASE, TENDON, ELBOW, EPICONDYLE, OR EPICONDYLAR STRIPPING OF ELBOW-MEDIAL (Right Elbow) Diagnosis:  (TRIGGER FINGER OF RIGHT INDEX FINGER, CARPAL TUNNEL SYNDROME RIGHT, MEDIAL EPICONDYLITIS RIGHT)    Surgeon:  Maynor Glass M.D. Responsible Provider:  Ray Rocha M.D.    Anesthesia Type:  general ASA Status:  2          Final Anesthesia Type: general  Last vitals  BP   Blood Pressure: 136/77    Temp   36.6 °C (97.9 °F)    Pulse   Pulse: 83   Resp   14    SpO2   95 %      Anesthesia Post Evaluation    Patient location during evaluation: PACU  Patient participation: complete - patient participated  Level of consciousness: awake and alert  Pain score: 3    Airway patency: patent  Anesthetic complications: no  Cardiovascular status: hemodynamically stable  Respiratory status: acceptable  Hydration status: euvolemic    PONV: none           Nurse Pain Score: 3 (NPRS)         - - -

## 2020-07-17 ENCOUNTER — APPOINTMENT (OUTPATIENT)
Dept: CARDIOLOGY | Facility: CLINIC | Age: 80
End: 2020-07-17

## 2020-07-23 ENCOUNTER — APPOINTMENT (OUTPATIENT)
Dept: VASCULAR SURGERY | Facility: CLINIC | Age: 80
End: 2020-07-23

## 2020-07-27 ENCOUNTER — RX RENEWAL (OUTPATIENT)
Age: 80
End: 2020-07-27

## 2020-07-30 ENCOUNTER — APPOINTMENT (OUTPATIENT)
Dept: VASCULAR SURGERY | Facility: CLINIC | Age: 80
End: 2020-07-30
Payer: MEDICARE

## 2020-07-30 VITALS
HEART RATE: 63 BPM | SYSTOLIC BLOOD PRESSURE: 141 MMHG | TEMPERATURE: 97.9 F | DIASTOLIC BLOOD PRESSURE: 61 MMHG | HEIGHT: 64 IN | OXYGEN SATURATION: 97 % | WEIGHT: 154 LBS | BODY MASS INDEX: 26.29 KG/M2

## 2020-07-30 PROCEDURE — 99213 OFFICE O/P EST LOW 20 MIN: CPT

## 2020-07-30 NOTE — ASSESSMENT
[FreeTextEntry1] : 79 year old man with ESRD on HD via a tunneled right IJ dialysis catheter.\par He is s/p left brachiocephalic AVF creation by me on 2/28/2020.\par Patient has been using left arm AVF for the last 3 weeks.\par He continues to have difficulty with cannulation.\par He has developed significant left arm swelling since he started using the AVF for HD.\par On examination there is clinical evidence of outflow vein stenosis\par Will schedule for left arm fistulogram.\par Plan discussed with patient and wife. All questions answered

## 2020-07-30 NOTE — HISTORY OF PRESENT ILLNESS
[de-identified] : Patient has been using left arm AVF for the last 3 weeks.\par He continues to have difficulty with cannulation.\par He has developed significant left arm swelling since he started using the AVF for HD. [FreeTextEntry1] : 79 year old man with ESRD on HD via a tunneled right IJ dialysis catheter.\par He is s/p left brachiocephalic AVF creation by me on 2/28/2020.\par

## 2020-07-31 ENCOUNTER — APPOINTMENT (OUTPATIENT)
Dept: CARDIOLOGY | Facility: CLINIC | Age: 80
End: 2020-07-31

## 2020-08-02 ENCOUNTER — APPOINTMENT (OUTPATIENT)
Dept: DISASTER EMERGENCY | Facility: CLINIC | Age: 80
End: 2020-08-02

## 2020-08-02 LAB — SARS-COV-2 N GENE NPH QL NAA+PROBE: NOT DETECTED

## 2020-08-05 ENCOUNTER — TRANSCRIPTION ENCOUNTER (OUTPATIENT)
Age: 80
End: 2020-08-05

## 2020-08-05 ENCOUNTER — RX CHANGE (OUTPATIENT)
Age: 80
End: 2020-08-05

## 2020-08-05 ENCOUNTER — INPATIENT (INPATIENT)
Facility: HOSPITAL | Age: 80
LOS: 0 days | Discharge: HOME | DRG: 252 | End: 2020-08-05
Attending: SURGERY | Admitting: SURGERY
Payer: MEDICARE

## 2020-08-05 VITALS
SYSTOLIC BLOOD PRESSURE: 150 MMHG | OXYGEN SATURATION: 97 % | DIASTOLIC BLOOD PRESSURE: 51 MMHG | HEART RATE: 65 BPM | RESPIRATION RATE: 18 BRPM

## 2020-08-05 VITALS
DIASTOLIC BLOOD PRESSURE: 70 MMHG | OXYGEN SATURATION: 99 % | SYSTOLIC BLOOD PRESSURE: 161 MMHG | HEART RATE: 56 BPM | RESPIRATION RATE: 16 BRPM | TEMPERATURE: 98 F

## 2020-08-05 DIAGNOSIS — Z96.0 PRESENCE OF UROGENITAL IMPLANTS: Chronic | ICD-10-CM

## 2020-08-05 DIAGNOSIS — N18.6 END STAGE RENAL DISEASE: ICD-10-CM

## 2020-08-05 DIAGNOSIS — Z95.9 PRESENCE OF CARDIAC AND VASCULAR IMPLANT AND GRAFT, UNSPECIFIED: Chronic | ICD-10-CM

## 2020-08-05 DIAGNOSIS — T82.858A STENOSIS OF OTHER VASCULAR PROSTHETIC DEVICES, IMPLANTS AND GRAFTS, INITIAL ENCOUNTER: ICD-10-CM

## 2020-08-05 DIAGNOSIS — I77.0 ARTERIOVENOUS FISTULA, ACQUIRED: Chronic | ICD-10-CM

## 2020-08-05 LAB
ALBUMIN SERPL ELPH-MCNC: 4.1 G/DL — SIGNIFICANT CHANGE UP (ref 3.3–5.2)
ALP SERPL-CCNC: 68 U/L — SIGNIFICANT CHANGE UP (ref 40–120)
ALT FLD-CCNC: 7 U/L — SIGNIFICANT CHANGE UP
ANION GAP SERPL CALC-SCNC: 11 MMOL/L — SIGNIFICANT CHANGE UP (ref 5–17)
APTT BLD: 32.4 SEC — SIGNIFICANT CHANGE UP (ref 27.5–35.5)
AST SERPL-CCNC: 15 U/L — SIGNIFICANT CHANGE UP
BASOPHILS # BLD AUTO: 0.06 K/UL — SIGNIFICANT CHANGE UP (ref 0–0.2)
BASOPHILS NFR BLD AUTO: 1.1 % — SIGNIFICANT CHANGE UP (ref 0–2)
BILIRUB SERPL-MCNC: 0.4 MG/DL — SIGNIFICANT CHANGE UP (ref 0.4–2)
BUN SERPL-MCNC: 45 MG/DL — HIGH (ref 8–20)
CALCIUM SERPL-MCNC: 8.5 MG/DL — LOW (ref 8.6–10.2)
CHLORIDE SERPL-SCNC: 92 MMOL/L — LOW (ref 98–107)
CO2 SERPL-SCNC: 30 MMOL/L — HIGH (ref 22–29)
CREAT SERPL-MCNC: 3.46 MG/DL — HIGH (ref 0.5–1.3)
EOSINOPHIL # BLD AUTO: 0.14 K/UL — SIGNIFICANT CHANGE UP (ref 0–0.5)
EOSINOPHIL NFR BLD AUTO: 2.6 % — SIGNIFICANT CHANGE UP (ref 0–6)
GLUCOSE BLDC GLUCOMTR-MCNC: 88 MG/DL — SIGNIFICANT CHANGE UP (ref 70–99)
GLUCOSE SERPL-MCNC: 121 MG/DL — HIGH (ref 70–99)
HCT VFR BLD CALC: 33.5 % — LOW (ref 39–50)
HGB BLD-MCNC: 10.9 G/DL — LOW (ref 13–17)
IMM GRANULOCYTES NFR BLD AUTO: 0.4 % — SIGNIFICANT CHANGE UP (ref 0–1.5)
INR BLD: 1.13 RATIO — SIGNIFICANT CHANGE UP (ref 0.88–1.16)
LYMPHOCYTES # BLD AUTO: 1.17 K/UL — SIGNIFICANT CHANGE UP (ref 1–3.3)
LYMPHOCYTES # BLD AUTO: 21.3 % — SIGNIFICANT CHANGE UP (ref 13–44)
MAGNESIUM SERPL-MCNC: 2.2 MG/DL — SIGNIFICANT CHANGE UP (ref 1.6–2.6)
MCHC RBC-ENTMCNC: 29.6 PG — SIGNIFICANT CHANGE UP (ref 27–34)
MCHC RBC-ENTMCNC: 32.5 GM/DL — SIGNIFICANT CHANGE UP (ref 32–36)
MCV RBC AUTO: 91 FL — SIGNIFICANT CHANGE UP (ref 80–100)
MONOCYTES # BLD AUTO: 0.72 K/UL — SIGNIFICANT CHANGE UP (ref 0–0.9)
MONOCYTES NFR BLD AUTO: 13.1 % — SIGNIFICANT CHANGE UP (ref 2–14)
NEUTROPHILS # BLD AUTO: 3.38 K/UL — SIGNIFICANT CHANGE UP (ref 1.8–7.4)
NEUTROPHILS NFR BLD AUTO: 61.5 % — SIGNIFICANT CHANGE UP (ref 43–77)
PLATELET # BLD AUTO: 118 K/UL — LOW (ref 150–400)
POTASSIUM SERPL-MCNC: 4.2 MMOL/L — SIGNIFICANT CHANGE UP (ref 3.5–5.3)
POTASSIUM SERPL-SCNC: 4.2 MMOL/L — SIGNIFICANT CHANGE UP (ref 3.5–5.3)
PROT SERPL-MCNC: 6.7 G/DL — SIGNIFICANT CHANGE UP (ref 6.6–8.7)
PROTHROM AB SERPL-ACNC: 13 SEC — SIGNIFICANT CHANGE UP (ref 10.6–13.6)
RBC # BLD: 3.68 M/UL — LOW (ref 4.2–5.8)
RBC # FLD: 14.5 % — SIGNIFICANT CHANGE UP (ref 10.3–14.5)
SODIUM SERPL-SCNC: 133 MMOL/L — LOW (ref 135–145)
WBC # BLD: 5.49 K/UL — SIGNIFICANT CHANGE UP (ref 3.8–10.5)
WBC # FLD AUTO: 5.49 K/UL — SIGNIFICANT CHANGE UP (ref 3.8–10.5)

## 2020-08-05 PROCEDURE — 85610 PROTHROMBIN TIME: CPT

## 2020-08-05 PROCEDURE — 36415 COLL VENOUS BLD VENIPUNCTURE: CPT

## 2020-08-05 PROCEDURE — C1725: CPT

## 2020-08-05 PROCEDURE — 85730 THROMBOPLASTIN TIME PARTIAL: CPT

## 2020-08-05 PROCEDURE — C1769: CPT

## 2020-08-05 PROCEDURE — 99261: CPT

## 2020-08-05 PROCEDURE — 82962 GLUCOSE BLOOD TEST: CPT

## 2020-08-05 PROCEDURE — 99153 MOD SED SAME PHYS/QHP EA: CPT

## 2020-08-05 PROCEDURE — C1894: CPT

## 2020-08-05 PROCEDURE — 36902 INTRO CATH DIALYSIS CIRCUIT: CPT

## 2020-08-05 PROCEDURE — 99152 MOD SED SAME PHYS/QHP 5/>YRS: CPT

## 2020-08-05 PROCEDURE — 80053 COMPREHEN METABOLIC PANEL: CPT

## 2020-08-05 PROCEDURE — 85027 COMPLETE CBC AUTOMATED: CPT

## 2020-08-05 PROCEDURE — 83735 ASSAY OF MAGNESIUM: CPT

## 2020-08-05 PROCEDURE — 93005 ELECTROCARDIOGRAM TRACING: CPT

## 2020-08-05 RX ORDER — INSULIN DEGLUDEC 100 U/ML
4 INJECTION, SOLUTION SUBCUTANEOUS
Qty: 0 | Refills: 0 | DISCHARGE

## 2020-08-05 NOTE — ASU PATIENT PROFILE, ADULT - PSH
AV fistula    S/P right pulmonary artery pressure sensor implant placement    Ureteral stent retained

## 2020-08-05 NOTE — H&P PST ADULT - NSICDXPASTSURGICALHX_GEN_ALL_CORE_FT
PAST SURGICAL HISTORY:  AV fistula     S/P right pulmonary artery pressure sensor implant placement     Ureteral stent retained

## 2020-08-05 NOTE — DISCHARGE NOTE PROVIDER - NSDCMRMEDTOKEN_GEN_ALL_CORE_FT
amLODIPine 10 mg oral tablet: 1 tab(s) orally once a day  aspirin 81 mg oral tablet, chewable: 1 tab(s) orally once a day  doxazosin 4 mg oral tablet: 1 tab(s) orally once a day (at bedtime)  ergocalciferol 2000 intl units oral capsule: 1 cap(s) orally once a day  ferrous sulfate 325 mg (65 mg elemental iron) oral tablet: 1 tab(s) orally once a day   hydrALAZINE 50 mg oral tablet: 1 tab(s) orally 3 times a day  isosorbide dinitrate 10 mg oral tablet: 1 tab(s) orally 3 times a day  pantoprazole 40 mg oral delayed release tablet: 1 tab(s) orally once a day (before a meal)  pravastatin 40 mg oral tablet: 1 tab(s) orally once a day  sodium bicarbonate 650 mg oral tablet: 1 tab(s) orally 2 times a day  tamsulosin 0.4 mg oral capsule: 1 cap(s) orally once a day (at bedtime)  torsemide 20 mg oral tablet: 1 tab(s) orally   Tresiba: 4 unit(s) subcutaneous once a day (at bedtime) amLODIPine 10 mg oral tablet: 1 tab(s) orally once a day  aspirin 81 mg oral tablet, chewable: 1 tab(s) orally once a day  doxazosin 4 mg oral tablet: 1 tab(s) orally once a day (at bedtime)  ergocalciferol 2000 intl units oral capsule: 1 cap(s) orally once a day  ferrous sulfate 325 mg (65 mg elemental iron) oral tablet: 1 tab(s) orally once a day   hydrALAZINE 50 mg oral tablet: 1 tab(s) orally 3 times a day  isosorbide dinitrate 10 mg oral tablet: 1 tab(s) orally 3 times a day  Lantus Solostar Pen 100 units/mL subcutaneous solution: 45 unit(s) subcutaneous once a day (at bedtime)  memantine 10 mg oral tablet: 1 tab(s) orally 2 times a day  Metoprolol Succinate ER 25 mg oral tablet, extended release: 1 tab(s) orally once a day  pravastatin 40 mg oral tablet: 1 tab(s) orally once a day  sodium bicarbonate 650 mg oral tablet: 1 tab(s) orally 2 times a day  tamsulosin 0.4 mg oral capsule: 1 cap(s) orally once a day (at bedtime)  torsemide 20 mg oral tablet: 1 tab(s) orally

## 2020-08-05 NOTE — ASU PATIENT PROFILE, ADULT - PMH
(HFpEF) heart failure with preserved ejection fraction    Alzheimer's dementia    Anemia due to chronic kidney disease, on chronic dialysis    Atrial fibrillation, unspecified type    BPH (benign prostatic hyperplasia)    Essential hypertension    GERD (gastroesophageal reflux disease)    History of insulin dependent diabetes mellitus    Hyperlipidemia, unspecified hyperlipidemia type    Hypothyroid    Moderate aortic stenosis    Stage 5 chronic kidney disease on chronic dialysis

## 2020-08-05 NOTE — PROGRESS NOTE ADULT - ASSESSMENT
79y Male   Procedure: Left arm angiography and peripheral PTA   Pre-op diagnosis: Malfunctioning AV fistula  Post-op diagnosis: Stenosis of the distal cephalic vein    1. Follow up as an outpatient with: Dr. Michael    2. Continue current medications    3. HD today    4. Discharge home after HD

## 2020-08-05 NOTE — H&P PST ADULT - NSICDXPASTMEDICALHX_GEN_ALL_CORE_FT
PAST MEDICAL HISTORY:  (HFpEF) heart failure with preserved ejection fraction     Alzheimer's dementia     Anemia due to chronic kidney disease, on chronic dialysis     Atrial fibrillation, unspecified type     BPH (benign prostatic hyperplasia)     Essential hypertension     GERD (gastroesophageal reflux disease)     History of insulin dependent diabetes mellitus     Hyperlipidemia, unspecified hyperlipidemia type     Hypothyroid     Moderate aortic stenosis     Stage 5 chronic kidney disease on chronic dialysis

## 2020-08-05 NOTE — DISCHARGE NOTE PROVIDER - HOSPITAL COURSE
78y/o male former smoker with history of CKD on HD, S/P AV fistula placement, AF, HFpEF, DM, HTN, HLD, Alzheimer's dementia who has been using his AV fistula for HD, and they have been having difficulty accessing it. He had a left arm angiography and peripheral PTA (A fistulogram was performed which revealed >70% stenosis of the distal cephalic vein with a pseudoaneurysm proximally. Balloon venoplasty of the stenosis was performed with an 8mm x 80mm MUSTANG balloon. Completion venogram showed complete resolution).

## 2020-08-05 NOTE — DISCHARGE NOTE PROVIDER - NSDCCPCAREPLAN_GEN_ALL_CORE_FT
PRINCIPAL DISCHARGE DIAGNOSIS  Diagnosis: Arteriovenous fistula stenosis  Assessment and Plan of Treatment: Follow up with Dr. Michael

## 2020-08-05 NOTE — H&P PST ADULT - ATTENDING COMMENTS
Patient well known to me with malfunctioning left arm AVF.  Plan is for left arm fistulogram and possible intervention.  Plan discussed with patient and wife. informed consent obtained

## 2020-08-05 NOTE — H&P PST ADULT - ASSESSMENT
80y/o male former smoker with history of CKD on HD, S/P AV fistula placement, AF, HFpEF, DM, HTN, HLD, Alzheimer's dementia who has been using his AV fistula for HD, and they have been having difficulty accessing it.

## 2020-08-05 NOTE — BRIEF OPERATIVE NOTE - NSICDXBRIEFPROCEDURE_GEN_ALL_CORE_FT
PROCEDURES:  Angioplasty, AV fistula, central segment, using balloon 05-Aug-2020 11:14:42  Phillip New  Fistulogram 05-Aug-2020 11:14:28 central stenossi Phillip New

## 2020-08-05 NOTE — PROGRESS NOTE ADULT - SUBJECTIVE AND OBJECTIVE BOX
Department of Cardiology                                                                  [facility]/[place] [address]                                                            Telephone: [phone]. Fax:[phone]                                                                           Cardiac Catheterization Note       Subjective:  79y  Male who had a left arm angiography and peripheral PTA (A fistulogram was performed which revealed >70% stenosis of the distal cephalic vein with a pseudoaneurysm proximally. Balloon venoplasty of the stenosis was performed with an 8mm x 80mm MUSTANG balloon. Completion venogram showed complete resolution).    PAST MEDICAL & SURGICAL HISTORY:  Hypothyroid  Moderate aortic stenosis  Hyperlipidemia, unspecified hyperlipidemia type  Essential hypertension  GERD (gastroesophageal reflux disease)  History of insulin dependent diabetes mellitus  Stage 5 chronic kidney disease on chronic dialysis  Atrial fibrillation, unspecified type  (HFpEF) heart failure with preserved ejection fraction  Anemia due to chronic kidney disease, on chronic dialysis  Alzheimer's dementia  BPH (benign prostatic hyperplasia)  AV fistula  S/P right pulmonary artery pressure sensor implant placement  Ureteral stent retained    FAMILY HISTORY:  Family history of Alzheimer's disease (Mother, Sibling)  Family history of cerebrovascular accident (CVA) (Sibling, Sibling)  Family history of coronary artery disease (Mother)  FH: type 2 diabetes: mother    Home Medications:  amLODIPine 10 mg oral tablet: 1 tab(s) orally once a day (05 Aug 2020 08:22)  aspirin 81 mg oral tablet, chewable: 1 tab(s) orally once a day (05 Aug 2020 08:22)  hydrALAZINE 50 mg oral tablet: 1 tab(s) orally 3 times a day (05 Aug 2020 08:22)  pantoprazole 40 mg oral delayed release tablet: 1 tab(s) orally once a day (before a meal) (05 Aug 2020 08:22)  pravastatin 40 mg oral tablet: 1 tab(s) orally once a day (05 Aug 2020 08:22)  sodium bicarbonate 650 mg oral tablet: 1 tab(s) orally 2 times a day (05 Aug 2020 08:22)  torsemide 20 mg oral tablet: 1 tab(s) orally  (06 Mar 2020 18:42)  Tresiba: 4 unit(s) subcutaneous once a day (at bedtime) (09 Jan 2020 11:59)    Patient is a 79y old  Male who presents with a chief complaint of AVF Malfunction (05 Aug 2020 10:39)    HPI: 78y/o male former smoker with history of CKD on HD, S/P AV fistula placement, AF, HFpEF, DM, HTN, HLD, Alzheimer's dementia who has been using his AV fistula for HD, and they have been having difficulty accessing it.     General: No fatigue, no fevers/chills  Respiratory: No dyspnea, no cough, no wheeze  CV: No chest pain, no palpitations  Abd: No nausea  Neuro: No headache, no dizziness    No Known Allergies    Objective:  Vital Signs Last 24 Hrs  T(C): 36.5 (05 Aug 2020 07:55), Max: 36.5 (05 Aug 2020 07:55)  T(F): 97.7 (05 Aug 2020 07:55), Max: 97.7 (05 Aug 2020 07:55)  HR: 56 (05 Aug 2020 07:55) (56 - 56)  BP: 161/70 (05 Aug 2020 07:55) (161/70 - 161/70)  RR: 16 (05 Aug 2020 07:55) (16 - 16)  SpO2: 99% (05 Aug 2020 07:55) (99% - 99%)    CM: SR  Neuro: A&OX3, memory deficit   HEENT: NC, AT  Lungs: CTA B/L  CV: S1, S2, no murmur, RRR  Abd: Soft  Extremity: Right brachial access site: no bleeding, fingers warm with good cap refil, Left AV fistula, good bruit, good thrill                          10.9   5.49  )-----------( 118      ( 05 Aug 2020 08:26 )             33.5     08-05    133  |  92    |  45.0  ----------------------------<  121  4.2   |  30.0  |  3.46    Ca    8.5      05 Aug 2020 08:26  Mg     2.2     08-05    TPro  6.7  /  Alb  4.1  /  TBili  0.4  /  DBili  x   /  AST  15  /  ALT  7   /  AlkPhos  68  08-05    PT/INR - ( 05 Aug 2020 08:26 )   PT: 13.0 sec;   INR: 1.13 ratio    PTT - ( 05 Aug 2020 08:26 )  PTT:32.4 sec

## 2020-08-05 NOTE — H&P PST ADULT - OTHER CARE PROVIDERS
Casey Michael (Vascular Surgery, [address], [address], [place] [address], [phone]), Roz Faustin ([facility], [address] [facility], [place] [address], [phone]), Hong Wade(Nephrology, [address] [place] [address], [phone])

## 2020-08-05 NOTE — CONSULT NOTE ADULT - ASSESSMENT
78y/o male former smoker with history of ESRD  on HD, S/P AV fistula placement, AF, HFpEF, DM, HTN, Alzheimer's dementia who has been using his AV fistula for HD, and they have been having     difficulty accessing it.     Patient well known to me with malfunctioning left arm AVF.    Plan is for left arm fistulogram and possible intervention.    Plan discussed with patient and wife. informed consent obtained .     HD this PM,

## 2020-08-05 NOTE — CONSULT NOTE ADULT - SUBJECTIVE AND OBJECTIVE BOX
[facility] DIVISION OF KIDNEY DISEASES AND HYPERTENSION -- INITIAL CONSULT NOTE  --------------------------------------------------------------------------------  HPI:80 yo male, PMH of HFpEF, CKD4, HTN, DM2 on insulin, advanced dementia, prior GIBs, presents today sent in by Cardiology for evaluation of concern for GI bleeding in setting of HFpEF exacerbation. Patient's wife, who is primary historian given patient's demented state, says that for last few days, patient has had multiple episodes of dark tarry stools, wife uncertain of quantity of them due to patient being incontinent and wife not actively observing. She contacted cardiology (Dr Faustin) for concern of his increased dyspnea and leg swelling who advised presenting for evaluation then when wife took patient to office, was advised to go to ER for evaluation given the concern of bleeding. Per wife, patient always denies any complaints and always says that he is fine.     In ER: noted to have worsening renal function, renal consulted with no plan for urgent HD. Cardio consulted, noted to have improved cardiomems with plans to pull back on aggressive diuresis given worsened renal function.     PAST HISTORY  --------------------------------------------------------------------------------  PAST MEDICAL & SURGICAL HISTORY:  Hypothyroid  Moderate aortic stenosis  Hyperlipidemia, unspecified hyperlipidemia type  Essential hypertension  GERD (gastroesophageal reflux disease)  History of insulin dependent diabetes mellitus  Stage 5 chronic kidney disease on chronic dialysis  Atrial fibrillation, unspecified type  (HFpEF) heart failure with preserved ejection fraction  Anemia due to chronic kidney disease, on chronic dialysis  Alzheimer's dementia  BPH (benign prostatic hyperplasia)  AV fistula  S/P right pulmonary artery pressure sensor implant placement  Ureteral stent retained    FAMILY HISTORY:  Family history of Alzheimer's disease (Mother, Sibling)  Family history of cerebrovascular accident (CVA) (Sibling, Sibling)  Family history of coronary artery disease (Mother)  FH: type 2 diabetes: mother    PAST SOCIAL HISTORY:    ALLERGIES & MEDICATIONS  --------------------------------------------------------------------------------  Allergies    No Known Allergies    Intolerances    Standing Inpatient Medications    PRN Inpatient Medications    REVIEW OF SYSTEMS  --------------------------------------------------------------------------------  Gen: No weight changes, fatigue, fevers/chills, weakness  Skin: No rashes  Head/Eyes/Ears/Mouth: No headache; Normal hearing; Normal vision w/o blurriness; No sinus pain/discomfort, sore throat  Respiratory: No dyspnea, cough, wheezing, hemoptysis  CV: No chest pain, PND, orthopnea  GI: No abdominal pain, diarrhea, constipation, nausea, vomiting, melena, hematochezia  : No increased frequency, dysuria, hematuria, nocturia  MSK: No joint pain/swelling; no back pain; no edema  Neuro: No dizziness/lightheadedness, weakness, seizures, numbness, tingling  Heme: No easy bruising or bleeding  Endo: No heat/cold intolerance  Psych: No significant nervousness, anxiety, stress, depression    All other systems were reviewed and are negative, except as noted.    VITALS/PHYSICAL EXAM  --------------------------------------------------------------------------------  T(C): 36.5 (08-05-20 @ 07:55), Max: 36.5 (08-05-20 @ 07:55)  HR: 56 (08-05-20 @ 07:55) (56 - 56)  BP: 161/70 (08-05-20 @ 07:55) (161/70 - 161/70)  RR: 16 (08-05-20 @ 07:55) (16 - 16)  SpO2: 99% (08-05-20 @ 07:55) (99% - 99%)  Height (cm): 162.6 (08-05-20 @ 07:47)  Weight (kg): 80.739 (08-05-20 @ 07:47)  BMI (kg/m2): 30.5 (08-05-20 @ 07:47)  BSA (m2): 1.86 (08-05-20 @ 07:47)    Physical Exam:  	Gen: NAD, well-appearing  	HEENT: PERRL, supple neck, clear oropharynx  	Pulm: CTA B/L  	CV: RRR, S1S2; no rub  	Back: No spinal or CVA tenderness; no sacral edema  	Abd: +BS, soft, nontender/nondistended  	: No suprapubic tenderness  	UE: Warm, FROM, no clubbing, intact strength; no edema; no asterixis  	LE: Warm, FROM, no clubbing, intact strength; no edema  	Neuro: No focal deficits, intact gait  	Psych: Normal affect and mood  	Skin: Warm, without rashes  	Vascular access:    LABS/STUDIES  --------------------------------------------------------------------------------              10.9   5.49  >-----------<  118      [08-05-20 @ 08:26]              33.5     133  |  92  |  45.0  ----------------------------<  121      [08-05-20 @ 08:26]  4.2   |  30.0  |  3.46        Ca     8.5     [08-05-20 @ 08:26]      Mg     2.2     [08-05-20 @ 08:26]    TPro  6.7  /  Alb  4.1  /  TBili  0.4  /  DBili  x   /  AST  15  /  ALT  7   /  AlkPhos  68  [08-05-20 @ 08:26]    PT/INR: PT 13.0 , INR 1.13       [08-05-20 @ 08:26]  PTT: 32.4       [08-05-20 @ 08:26]      Creatinine Trend:  SCr 3.46 [08-05 @ 08:26]    Urinalysis - [02-24-20 @ 14:06]      Color Yellow / Appearance Clear / SG 1.010 / pH 6.0      Gluc Negative / Ketone Negative  / Bili Negative / Urobili Negative       Blood Trace / Protein 30 / Leuk Est Moderate / Nitrite Negative      RBC 0-2 / WBC >50 / Hyaline  / Gran  / Sq Epi  / Non Sq Epi Occasional / Bacteria Few      Iron 23, TIBC 319, %sat 7      [02-22-20 @ 11:11]  Ferritin 41      [02-22-20 @ 11:11]  PTH -- (Ca 8.3)      [12-12-19 @ 19:36]   164  Vitamin D (25OH) 20.3      [12-12-19 @ 19:36]  HbA1c 8.1      [12-11-19 @ 06:47]  TSH 6.62      [02-23-20 @ 10:16]    HBsAb <3.0      [02-24-20 @ 15:46]  HBsAb Nonreact      [12-31-19 @ 08:48]  HBsAg Nonreact      [02-24-20 @ 15:46]  HBcAb Nonreact      [02-24-20 @ 15:46]  HCV 0.12, Nonreact      [02-24-20 @ 15:46]

## 2020-08-05 NOTE — DISCHARGE NOTE PROVIDER - CARE PROVIDER_API CALL
Casey Michael  SURGERY  [address] [place] [address]  Phone: [phone]  Fax: [phone]  Established Patient  Follow Up Time:

## 2020-08-05 NOTE — DISCHARGE NOTE PROVIDER - NSDCCPTREATMENT_GEN_ALL_CORE_FT
PRINCIPAL PROCEDURE  Procedure: Percutaneous transluminal angioplasty of arteriovenous fistula  Findings and Treatment: 8mm x 80mm REBECCA balloon

## 2020-08-05 NOTE — DISCHARGE NOTE PROVIDER - NSDCFUSCHEDAPPT_GEN_ALL_CORE_FT
JEMIMA SANTOS ; 08/13/2020 ; [facility] [address] JEMIMA Garcia ; 10/06/2020 ; [facility] [address] JEMIMA SANTOS ; 08/13/2020 ; [facility] [address] JEMIMA Garcia ; 10/06/2020 ; [facility] [address]

## 2020-08-05 NOTE — H&P PST ADULT - HISTORY OF PRESENT ILLNESS
The patient is a poor historian secondary to memory deficit.     80y/o male former smoker with history of CKD on HD, S/P AV fistula placement, AF, HFpEF, DM, HTN, HLD, Alzheimer's dementia who has been using his AV fistula for HD, and they have been having difficulty accessing it.     Echo: 12/10/2019  Left Ventricle: The left ventricular internal cavity size is normal. Left ventricular wall thickness is normal.  Global LV systolic function was normal. Left ventricular ejection fraction, by visual estimation, is 60 to 65%. Spectral Doppler shows pseudonormal pattern of left ventricular myocardial filling (Grade II diastolic dysfunction). Elevated mean left atrial pressure.  Right Ventricle: The right ventricular size is normal. RV systolic function is normal.  Left Atrium: Normal left atrial size.  Right Atrium: Normal right atrial size.  Pericardium: There is no evidence of pericardial effusion. There is a significant pericardial fat pad present. There is a moderate pleural effusion in both left and right lateral regions.  Mitral Valve: Mild thickening and calcification of the anterior and posterior mitral valve leaflets. There is mild mitral annular calcification. Mild mitral valve regurgitation is seen.  Tricuspid Valve: The tricuspid valve is normal in structure. Trivial tricuspid regurgitation is visualized. The flow in the hepatic veins is normal during ventricular systole.  Aortic Valve: The aortic valve is trileaflet. Moderate to severe aortic stenosis is present. The peak aortic velocity was obtained from the apical view. Mild aortic valve regurgitation is seen.  Pulmonic Valve: The pulmonic valve is normal. Trace pulmonic valve regurgitation.  Aorta: The aortic root and ascending aorta are structurally normal, with no evidence of dilitation.  Pulmonary Artery: The main pulmonary artery is normal in size.  Venous: A normal flow pattern is recorded from the right upper pulmonary vein. The inferior vena cava was dilated, with respiratory size variation less than50%.

## 2020-08-05 NOTE — H&P PST ADULT - PRIMARY CARE PROVIDER
Christiano Torres ([facility] [address] [facility] [address], [place] [address], [phone])

## 2020-08-05 NOTE — DISCHARGE NOTE NURSING/CASE MANAGEMENT/SOCIAL WORK - PATIENT PORTAL LINK FT
You can access the FollowMyHealth Patient Portal offered by [facility] by registering at the following website: [email or web address]. By joining [employer]’s FollowMyHealth portal, you will also be able to view your health information using other applications (apps) compatible with our system.

## 2020-08-07 ENCOUNTER — RX CHANGE (OUTPATIENT)
Age: 80
End: 2020-08-07

## 2020-08-10 ENCOUNTER — RX CHANGE (OUTPATIENT)
Age: 80
End: 2020-08-10

## 2020-08-13 ENCOUNTER — NON-APPOINTMENT (OUTPATIENT)
Age: 80
End: 2020-08-13

## 2020-08-13 ENCOUNTER — APPOINTMENT (OUTPATIENT)
Dept: ELECTROPHYSIOLOGY | Facility: CLINIC | Age: 80
End: 2020-08-13
Payer: MEDICARE

## 2020-08-13 VITALS
DIASTOLIC BLOOD PRESSURE: 52 MMHG | OXYGEN SATURATION: 97 % | SYSTOLIC BLOOD PRESSURE: 120 MMHG | TEMPERATURE: 97.6 F | HEIGHT: 64 IN | WEIGHT: 154 LBS | BODY MASS INDEX: 26.29 KG/M2 | RESPIRATION RATE: 16 BRPM | HEART RATE: 65 BPM

## 2020-08-13 PROCEDURE — 99213 OFFICE O/P EST LOW 20 MIN: CPT

## 2020-08-13 PROCEDURE — 93000 ELECTROCARDIOGRAM COMPLETE: CPT

## 2020-08-13 NOTE — CARDIOLOGY SUMMARY
[No Ischemia] : no Ischemia [LVEF ___%] : LVEF [unfilled]% [None] : normal LV function [Mild] : mild mitral regurgitation [Enlarged] : enlarged LA size [___] : [unfilled] [de-identified] : feb 2018 : carotid US: mild atherosclerosis. no stenosis. \par feb 2019: Mild plaque . no stenosis.

## 2020-08-13 NOTE — REASON FOR VISIT
[Consultation] : a consultation regarding [Spouse] : spouse [Atrial Fibrillation] : atrial fibrillation [FreeTextEntry1] : ref Dr Faustin

## 2020-08-13 NOTE — DISCUSSION/SUMMARY
[FreeTextEntry1] : \par 79 year old gentleman with history of CKD recently started dialysis, HTN, DM, CHF with preserved LV function s/p Cardiomems, mod-severe AS, and atrial fibrillation s/p ILR implant. He had atrial fibrillation in the setting of a viral illness and possible pericarditis several years ago, but has not had known recurrence. He is at high risk of thromboembolic complications in the setting of AF with CHADSVASc score of 5, but is also at high risk of bleeding complications due to his prior severe GI bleed, renal failure, and dementia. Given lack of documented recurrent AF, the risks of anticoagulation outweigh the benefits at this time. A recent 2 day Holter revealed brief episodes of irregular SVT, but no sustained AF. Further monitoring would be useful to clarify if he does have recurrent AF and guide further management, including consideration of left atrial appendage occlusion. Long-term monitoring would be most useful for this purpose, with an implantable loop recorder. As his current ILR is at EOS and no longer functioning, a new ILR could be implanted. The risks and benefits of ILR removal/reimplant were reviewed, including minor procedure related risks such as bleeding and infection. \par -ILR removal and reimplant. \par -continue ASA for now\par -vascular followup for management of dialysis access\par

## 2020-08-13 NOTE — PHYSICAL EXAM
[General Appearance - Well Developed] : well developed [Normal Appearance] : normal appearance [Well Groomed] : well groomed [General Appearance - Well Nourished] : well nourished [General Appearance - In No Acute Distress] : no acute distress [No Deformities] : no deformities [Normal Conjunctiva] : the conjunctiva exhibited no abnormalities [Eyelids - No Xanthelasma] : the eyelids demonstrated no xanthelasmas [Normal Oral Mucosa] : normal oral mucosa [No Oral Cyanosis] : no oral cyanosis [No Oral Pallor] : no oral pallor [Normal Jugular Venous A Waves Present] : normal jugular venous A waves present [Normal Jugular Venous V Waves Present] : normal jugular venous V waves present [No Jugular Venous Perez A Waves] : no jugular venous perez A waves [Exaggerated Use Of Accessory Muscles For Inspiration] : no accessory muscle use [Respiration, Rhythm And Depth] : normal respiratory rhythm and effort [Auscultation Breath Sounds / Voice Sounds] : lungs were clear to auscultation bilaterally [Heart Sounds] : normal S1 and S2 [Heart Rate And Rhythm] : heart rate and rhythm were normal [Edema] : no peripheral edema present [Veins - Varicosity Changes] : no varicosital changes were noted in the lower extremities [Abdomen Tenderness] : non-tender [Abdomen Soft] : soft [Abdomen Mass (___ Cm)] : no abdominal mass palpated [Nail Clubbing] : no clubbing of the fingernails [Cyanosis, Localized] : no localized cyanosis [Petechial Hemorrhages (___cm)] : no petechial hemorrhages [] : no ischemic changes [Skin Turgor] : normal skin turgor [No Skin Ulcers] : no skin ulcer [Mood] : the mood was normal [No Anxiety] : not feeling anxious [FreeTextEntry1] : AAO<3

## 2020-08-13 NOTE — HISTORY OF PRESENT ILLNESS
[FreeTextEntry1] : 79 year old gentleman with history of CKD recently started dialysis, HTN, DM, CHF with preserved LV function s/p Cardiomems, mod-severe AS, and atrial fibrillation s/p ILR implant. \par He had an episode of atrial fibrillation which occurred in the setting of a viral illness, pericardial effusion, and suspected pericarditis. He underwent DCCV 1/2015, and was on anticoagulation with Coumadin. An ILR was implanted for ongoing monitoring on 1/12/16.\par He did have a GI bleed in the setting of a supratherapeutic INR, and Hgb <4. He has since been on ASA 325mg qd. He has had difficulty with NOAC medications due to cost and his ESRD. A watchman procedure had been discussed, but his residual AF burden was unclear. His ILR reached CARLOS last year, and is no longer functioning. \par A Holter monitor performed for 2 days 6/9/20 revealed brief episodes of irregular SVT up to 8 beats with rates up to 150 bpm, but no clear AF. A longer-term event monitor was planned but not performed. \par He feels generally well and denies any palpitation, chest pain, lightheadedness, or syncope. He has Alzheimers dementia which has been progressive. His wife reports he has not had complaints. \par

## 2020-08-27 ENCOUNTER — APPOINTMENT (OUTPATIENT)
Dept: VASCULAR SURGERY | Facility: CLINIC | Age: 80
End: 2020-08-27
Payer: MEDICARE

## 2020-08-27 VITALS
HEIGHT: 64 IN | SYSTOLIC BLOOD PRESSURE: 129 MMHG | OXYGEN SATURATION: 96 % | DIASTOLIC BLOOD PRESSURE: 57 MMHG | WEIGHT: 154 LBS | HEART RATE: 69 BPM | BODY MASS INDEX: 26.29 KG/M2 | TEMPERATURE: 97.7 F

## 2020-08-27 PROBLEM — I35.0 NONRHEUMATIC AORTIC (VALVE) STENOSIS: Chronic | Status: ACTIVE | Noted: 2020-08-05

## 2020-08-27 PROBLEM — Z86.39 PERSONAL HISTORY OF OTHER ENDOCRINE, NUTRITIONAL AND METABOLIC DISEASE: Chronic | Status: ACTIVE | Noted: 2020-08-05

## 2020-08-27 PROBLEM — E78.5 HYPERLIPIDEMIA, UNSPECIFIED: Chronic | Status: ACTIVE | Noted: 2020-08-05

## 2020-08-27 PROBLEM — I50.30 UNSPECIFIED DIASTOLIC (CONGESTIVE) HEART FAILURE: Chronic | Status: ACTIVE | Noted: 2020-08-05

## 2020-08-27 PROBLEM — E03.9 HYPOTHYROIDISM, UNSPECIFIED: Chronic | Status: ACTIVE | Noted: 2020-08-05

## 2020-08-27 PROBLEM — K21.9 GASTRO-ESOPHAGEAL REFLUX DISEASE WITHOUT ESOPHAGITIS: Chronic | Status: ACTIVE | Noted: 2020-08-05

## 2020-08-27 PROBLEM — I10 ESSENTIAL (PRIMARY) HYPERTENSION: Chronic | Status: ACTIVE | Noted: 2020-08-05

## 2020-08-27 PROCEDURE — 36589 REMOVAL TUNNELED CV CATH: CPT

## 2020-09-04 ENCOUNTER — APPOINTMENT (OUTPATIENT)
Dept: CARDIOLOGY | Facility: CLINIC | Age: 80
End: 2020-09-04

## 2020-09-09 ENCOUNTER — RX RENEWAL (OUTPATIENT)
Age: 80
End: 2020-09-09

## 2020-09-21 RX ORDER — BLOOD-GLUCOSE METER
W/DEVICE EACH MISCELLANEOUS
Qty: 1 | Refills: 0 | Status: ACTIVE | COMMUNITY
Start: 2020-09-18 | End: 1900-01-01

## 2020-10-17 NOTE — PATIENT PROFILE ADULT - NSPROHMCARDIOSYMPCOND_GEN_A_NUR
Mood Disorder current/past/Psychotic disorder current/past/Hopelessness or despair/Access to lethal methods (pills, firearm, etc.: Ask specifically about presence or absence of a firearm in the home or ease of accessing
hypertension

## 2020-11-02 NOTE — DISCHARGE NOTE PROVIDER - NSDCCPTREATMENT_GEN_ALL_CORE_FT
PRINCIPAL PROCEDURE  Procedure: Insertion of loop recorder  Findings and Treatment: Loop Recorder Incision Care:     - Remove the plastic and gauze dressing after 24 hours.   - Do not touch the incision until it is completely healed.   - There is Dermabond (skin glue) on your incision, which will start to flake off on its own over the next 2-3 weeks. Do not pick at or peal off the Dermabond.   - Do not apply soaps, creams, lotions, ointments or powders to the incision until it is completely healed.  - You should call the doctor if you notice redness, drainage, swelling, increased tenderness, hot sensation around the  incision, bleeding or incision edges pulling apart.

## 2020-11-02 NOTE — DISCHARGE NOTE PROVIDER - CARE PROVIDER_API CALL
Naresh Jeffers  CARDIOLOGY  [address], [address]  [place] [address]  Phone: [phone]  Fax: [phone]  Established Patient  Follow Up Time: 2 weeks

## 2020-11-02 NOTE — DISCHARGE NOTE PROVIDER - HOSPITAL COURSE
80 year old gentleman with history of ERSD on HD, HTN, DM, CHF with preserved LV function s/p Cardiomems, mod-severe AS, and atrial fibrillation s/p ILR implant for surveillance who now has ILR at EOS. Patient now s/p ILR explant and new ILR implant.

## 2020-11-02 NOTE — DISCHARGE NOTE PROVIDER - NSDCCPCAREPLAN_GEN_ALL_CORE_FT
PRINCIPAL DISCHARGE DIAGNOSIS  Diagnosis: Encounter for loop recorder at end of battery life  Assessment and Plan of Treatment:

## 2020-11-02 NOTE — DISCHARGE NOTE NURSING/CASE MANAGEMENT/SOCIAL WORK - PATIENT PORTAL LINK FT
You can access the FollowMyHealth Patient Portal offered by [facility] by registering at the following website: [email or web address]. By joining [employer]’s FollowMyHealth portal, you will also be able to view your health information using other applications (apps) compatible with our system.

## 2020-11-02 NOTE — H&P PST ADULT - ASSESSMENT
79 year old gentleman with history of ERSD on HD, HTN, DM, CHF with preserved LV function s/p Cardiomems, mod-severe AS, and atrial fibrillation s/p ILR implant for surveillance who now has ILR at EOS. Plan for ILR explant and possible ILR re-implant.     - COVID negative on 10/31/2020

## 2020-11-02 NOTE — H&P PST ADULT - NSICDXFAMILYHX_GEN_ALL_CORE_FT
FAMILY HISTORY:  Family history of Alzheimer's disease  Family history of cerebrovascular accident (CVA)  Family history of coronary artery disease  FH: type 2 diabetes, mother

## 2020-11-02 NOTE — DISCHARGE NOTE PROVIDER - NSDCMRMEDTOKEN_GEN_ALL_CORE_FT
amLODIPine 10 mg oral tablet: 1 tab(s) orally once a day  aspirin 81 mg oral tablet, chewable: 1 tab(s) orally once a day  calcitriol 0.25 mcg oral capsule: 1 cap(s) orally once a day  doxazosin 4 mg oral tablet: 1 tab(s) orally once a day (at bedtime)  ergocalciferol 2000 intl units oral capsule: 1 cap(s) orally once a day  ferrous sulfate 325 mg (65 mg elemental iron) oral tablet: 1 tab(s) orally once a day   hydrALAZINE 50 mg oral tablet: 1 tab(s) orally 3 times a day  isosorbide dinitrate 10 mg oral tablet: 1 tab(s) orally 3 times a day  Lantus Solostar Pen 100 units/mL subcutaneous solution: 45 unit(s) subcutaneous once a day (at bedtime)  memantine 10 mg oral tablet: 1 tab(s) orally 2 times a day  Metoprolol Succinate ER 25 mg oral tablet, extended release: 1 tab(s) orally once a day  pravastatin 40 mg oral tablet: 1 tab(s) orally once a day  sodium bicarbonate 650 mg oral tablet: 1 tab(s) orally 2 times a day  tamsulosin 0.4 mg oral capsule: 1 cap(s) orally once a day (at bedtime)  torsemide 20 mg oral tablet: 1 tab(s) orally 2 times a day  Vitamin D3 2000 intl units (50 mcg) oral capsule: orally once a day

## 2020-11-23 NOTE — HISTORY OF PRESENT ILLNESS
[FreeTextEntry1] : Mr. Aldana is a 80 year old gentleman with history of CKD recently started dialysis, HTN, DM, CHF with preserved LV function s/p Cardiomems, mod-severe AS, and atrial fibrillation s/p ILR implant. \par \par To summarize his history he had an episode of atrial fibrillation which occurred in the setting of a viral illness, pericardial effusion, and suspected pericarditis. He underwent DCCV 1/2015, and was on anticoagulation with Coumadin. An ILR was implanted for ongoing monitoring on 1/12/16.\par He did have a GI bleed in the setting of a supratherapeutic INR, and Hgb <4. He has since been on ASA 325mg qd. He has had difficulty with NOAC medications due to cost and his ESRD. A watchman procedure had been discussed, but his residual AF burden was unclear. His ILR reached CARLOS last year, and is no longer functioning and is now s/p ILR explant and re-implant on 11/2/20.\par  He has Alzheimers dementia which has been progressive. \par \par INCOMPLETE NOTE NOT YET SEEN

## 2020-11-25 NOTE — DISCUSSION/SUMMARY
[FreeTextEntry1] : Mr. Skinner is a 80 y/m with history of CKD, HTN, DM, CHF with preserved LV function, cardiomems implant , AS, and atrial fibrillation. He initally had AF in 1/2015 in the setting of suspected pericarditis, and underwent DCCV. He was initially treated with coumadin, but anticoagulation has been limited by GI bleeding in the setting of supratherapeutic INR. an ILR was implanted on 1/12/16 and no clear recurrent AF was noted. His ILR recently reached EOS, and new ILR implant was recommended for further arrhythmia monitoring off AC. He underwent ILR explant/reimplant on 11/2/20.\par \par Since procdure he has felt well. Denies pain from implant site. Left parasternal incision well approximated without erythema, edema, or exudate. Remote ILR monitoring reveals ST with APCs, no definitive AF. \par \par Recommendation: Site care, remote monitoring reviewed. To continue cardiology follow up with Dr. Faustin. Ep follow up PRN. \par \par Jenny Mcmullen ANP-C

## 2020-11-25 NOTE — PHYSICAL EXAM
[General Appearance - Well Developed] : well developed [] : no respiratory distress [Clean] : clean [Dry] : dry [Healing Well] : healing well [FreeTextEntry1] : Left parasternal

## 2020-11-25 NOTE — HISTORY OF PRESENT ILLNESS
[de-identified] : Mr. Skinner is a 80 y/m with history of CKD, HTN, DM, CHF with preserved LV function, cardiomems implant , AS, and atrial fibrillation. He initally had AF in 1/2015 in the setting of suspected pericarditis, and underwent DCCV. He was initially treated with coumadin, but anticoagulation has been limited by GI bleeding in the setting of supratherapeutic INR. an ILR was implanted on 1/12/16 and no clear recurrent AF was noted. His ILR recently reached EOS, and new ILR implant was recommended for further arrhythmia monitoring off AC. He underwent ILR explant/reimplant on 11/2/20.\par \par Since procdure he has felt well. Denies pain from implant site. Left parasternal incision well approximated without erythema, edema, or exudate. Remote ILR monitoring reveals ST with APCs, no definitive AF.

## 2020-12-02 NOTE — ED PROVIDER NOTE - PROGRESS NOTE DETAILS
seen by vascular, no need to image fistula with good strong thrill, recommend venous study of forearm, if negative will palce compressive dressing after HD. -Dion DO

## 2020-12-02 NOTE — H&P ADULT - ASSESSMENT
79 yo male, PMH of HFpEF s/p cardiomem, ESRD on HD (MWF), HTN, DM2 on insulin, advanced dementia, prior GIB who presented to the ED with left arm swelling.     left arm swelling  - vascular consulted, NPO after midnight for fistulogram tomorrow    ESRD on HD  - nephrology consulted, Hd today  - resume home meds    HTN  - resume Amlodipine, Isosorbide dinitrite, Metoprolol, Hydralazine    HFpEF  - not in acute exacerbation  - resume Torsemide    IDDM   only in lantus 8 units per wife  - patient will be npo for tomorrow so will given 4 units tonight  - iss and hypoglycemic protocol     Dementia  - Namenda 79 yo male, PMH of HFpEF s/p cardiomem, ESRD on HD (MWF), HTN, DM2 on insulin, advanced dementia, prior GIB who presented to the ED with left arm swelling.     left arm swelling  - vascular consulted, NPO after midnight for fistulogram tomorrow    ESRD on HD  - nephrology consulted, Hd today  - resume home meds    HTN  - resume Amlodipine, Isosorbide dinitrite, Metoprolol, Hydralazine    HFpEF  - not in acute exacerbation  - resume Torsemide    IDDM   only in lantus 8 units per wife  - patient will be npo for tomorrow so will hold lantus  - iss and hypoglycemic protocol     anemia  - po iron  - epo per nephro    Dementia  - Namenda     DVT ppx: 81 yo male, PMH of HFpEF s/p cardiomem, ESRD on HD (MWF), HTN, DM2 on insulin, advanced dementia, prior GIB who presented to the ED with left arm swelling.     left arm swelling  - vascular consulted, NPO after midnight for fistulogram tomorrow    ESRD on HD  - nephrology consulted, Hd today  - resume home meds    HTN  - resume Amlodipine, Isosorbide dinitrite, Metoprolol, Hydralazine    HFpEF  - not in acute exacerbation  - resume Torsemide    IDDM   only in lantus 8 units per wife  - patient will be npo for tomorrow so will hold lantus  - iss and hypoglycemic protocol     anemia  - po iron  - epo per nephro    Dementia  - Namenda     DVT ppx: HSQ ok by vasc surg  wife wade updated over the phone and home meds reviewed and verified with her.

## 2020-12-02 NOTE — CONSULT NOTE ADULT - SUBJECTIVE AND OBJECTIVE BOX
[facility] DIVISION OF KIDNEY DISEASES AND HYPERTENSION -- INITIAL CONSULT NOTE  --------------------------------------------------------------------------------  HPI:   80y Male with PMH of ESRD on HD throughout an AVF on the L brachio-cephalic by Dr. Michael in February 2020 that was posteriorly ballooned in August 2020. Patient comes today from HD because the arm was swollen and lightly cooler than the R forearm and R hand.   Patient states he had HD with no issues on Monday (has HD MWF), and states that the cooler L forearm has always been like that. Wife, who lives with him states she had never noticed before that one extremity was cooler than the other one but states she "doesn't touch him". Patient states no pain or motor or sensory deficits.    Seen/examined; lying in bed in NAD; accompanied by wife; consented for HD;      PAST HISTORY  --------------------------------------------------------------------------------  PAST MEDICAL & SURGICAL HISTORY:  Hypothyroid    Moderate aortic stenosis    Hyperlipidemia, unspecified hyperlipidemia type    Essential hypertension    GERD (gastroesophageal reflux disease)    History of insulin dependent diabetes mellitus    Stage 5 chronic kidney disease on chronic dialysis    Atrial fibrillation, unspecified type    (HFpEF) heart failure with preserved ejection fraction    Anemia due to chronic kidney disease, on chronic dialysis    Alzheimer&#x27;s dementia    BPH (benign prostatic hyperplasia)    AV fistula    S/P right pulmonary artery pressure sensor implant placement    Ureteral stent retained      FAMILY HISTORY:  Family history of Alzheimer&#x27;s disease    Family history of cerebrovascular accident (CVA)    Family history of coronary artery disease    FH: type 2 diabetes  mother      PAST SOCIAL HISTORY:    ALLERGIES & MEDICATIONS  --------------------------------------------------------------------------------  Allergies    No Known Allergies    Intolerances      Standing Inpatient Medications    PRN Inpatient Medications      REVIEW OF SYSTEMS  --------------------------------------------------------------------------------  Gen: No weight changes, fatigue, fevers/chills, weakness  Skin: No rashes  Head/Eyes/Ears/Mouth: No headache; Normal hearing; Normal vision w/o blurriness; No sinus pain/discomfort, sore throat  Respiratory: No dyspnea, cough, wheezing, hemoptysis  CV: No chest pain, PND, orthopnea  GI: No abdominal pain, diarrhea, constipation, nausea, vomiting, melena, hematochezia  : No increased frequency, dysuria, hematuria, nocturia  MSK: No joint pain/swelling; no back pain; no edema  Neuro: No dizziness/lightheadedness, weakness, seizures, numbness, tingling  Heme: No easy bruising or bleeding  Endo: No heat/cold intolerance  Psych: No significant nervousness, anxiety, stress, depression    All other systems were reviewed and are negative, except as noted.    VITALS/PHYSICAL EXAM  --------------------------------------------------------------------------------  T(C): 36.3 (12-02-20 @ 11:18), Max: 36.3 (12-02-20 @ 11:18)  HR: 61 (12-02-20 @ 11:18) (61 - 61)  BP: 157/64 (12-02-20 @ 11:18) (157/64 - 157/64)  RR: 16 (12-02-20 @ 11:18) (16 - 16)  SpO2: 98% (12-02-20 @ 11:18) (98% - 98%)  Wt(kg): --  Height (cm): 162.6 (12-02-20 @ 11:18)  Weight (kg): 70.3 (12-02-20 @ 11:18)  BMI (kg/m2): 26.6 (12-02-20 @ 11:18)  BSA (m2): 1.76 (12-02-20 @ 11:18)      Physical Exam:  	Gen: NAD, well-appearing  	HEENT: PERRL, supple neck, clear oropharynx  	Pulm: CTA B/L  	CV: RRR, S1S2; no rub  	Back: No spinal or CVA tenderness; no sacral edema  	Abd: +BS, soft, nontender/nondistended  	: No suprapubic tenderness  	UE: Warm, FROM, no clubbing, intact strength; no edema; no asterixis  	LE: Warm, FROM, no clubbing, intact strength; no edema  	Neuro: No focal deficits, intact gait  	Psych: Normal affect and mood  	Skin: Warm, without rashes  	Vascular access: L AVF; mild swelling; +BT    LABS/STUDIES  --------------------------------------------------------------------------------              11.0   7.48  >-----------<  134      [12-02-20 @ 12:59]              33.6     134  |  97  |  58.0  ----------------------------<  136      [12-02-20 @ 12:59]  4.6   |  27.0  |  4.00        Ca     8.9     [12-02-20 @ 12:59]      Mg     2.1     [12-02-20 @ 12:59]      Phos  4.2     [12-02-20 @ 12:59]    TPro  7.2  /  Alb  3.9  /  TBili  0.3  /  DBili  x   /  AST  17  /  ALT  10  /  AlkPhos  67  [12-02-20 @ 12:59]          Creatinine Trend:  SCr 4.00 [12-02 @ 12:59]    Urinalysis - [02-24-20 @ 14:06]      Color Yellow / Appearance Clear / SG 1.010 / pH 6.0      Gluc Negative / Ketone Negative  / Bili Negative / Urobili Negative       Blood Trace / Protein 30 / Leuk Est Moderate / Nitrite Negative      RBC 0-2 / WBC >50 / Hyaline  / Gran  / Sq Epi  / Non Sq Epi Occasional / Bacteria Few      Iron 23, TIBC 319, %sat 7      [02-22-20 @ 11:11]  Ferritin 41      [02-22-20 @ 11:11]  PTH -- (Ca 8.3)      [12-12-19 @ 19:36]   164  Vitamin D (25OH) 20.3      [12-12-19 @ 19:36]  HbA1c 8.1      [12-11-19 @ 06:47]  TSH 6.62      [02-23-20 @ 10:16]    HBsAb <3.0      [02-24-20 @ 15:46]  HBsAb Nonreact      [12-31-19 @ 08:48]  HBsAg Nonreact      [02-24-20 @ 15:46]  HBcAb Nonreact      [02-24-20 @ 15:46]  HCV 0.12, Nonreact      [02-24-20 @ 15:46]

## 2020-12-02 NOTE — H&P ADULT - RS GEN PE MLT RESP DETAILS PC
clear to auscultation bilaterally/breath sounds equal/airway patent/good air movement/normal/respirations non-labored/no chest wall tenderness

## 2020-12-02 NOTE — CONSULT NOTE ADULT - ASSESSMENT
1) ESRD on HD  2) MBD of renal dx  3) Anemia of renal dx  4) Vol HTN    HD today after duplex of AVF  Holding KD; anemia at goal  Phos @ goal; start sevelamer 800mg TID  Continue antihypertensives    edgar Ashley

## 2020-12-02 NOTE — CONSULT NOTE ADULT - ASSESSMENT
Patient is a 80y Male with PMH of ESRD on HD throughout an AVF on the L brachio-cephalic by Dr. Michael in February 2020 that was posteriorly ballooned in August 2020. Patient is brought from HD today because of swelling in the LUE, with a palpable thrill in the AVF.       - LUE AVF patency: Will order AVF duplex and if patent will send the patient for HD in house.   - Swelling in the LUE: After HD is successfully performed. Place ace wrap the arm and keep elevated.     DISPO pending AVF duplex and HD.     Discussed with Alec FELICIANO who agrees. Patient is a 80y Male with PMH of ESRD on HD throughout an AVF on the L brachio-cephalic by Dr. Michael in February 2020 that was posteriorly ballooned in August 2020. Patient is brought from HD today because of swelling in the LUE, with a palpable thrill in the AVF.       - LUE AVF patency: Will order AVF duplex and if patent will send the patient for HD in house.   - Swelling in the LUE: After HD is successfully performed. Place ace wrap the arm and keep elevated.     ADDENDUM:  - Will book for LUE fistulogram, concern for central stenosis  - Pre-op  - Discussed with Dr. Mejia    Discussed with Alec FELICIANO who agrees.

## 2020-12-02 NOTE — ED PROVIDER NOTE - CLINICAL SUMMARY MEDICAL DECISION MAKING FREE TEXT BOX
patient with cool left extremity- neuro intact, diffuse swelling, no rash unliekly infection. will check labs, sono. nephro/vascular consult

## 2020-12-02 NOTE — H&P ADULT - HISTORY OF PRESENT ILLNESS
81 yo male, PMH of HFpEF s/p cardiomem, ESRD on HD (MWF), HTN, DM2 on insulin, advanced dementia, prior GIB who presented to the ED with left arm swelling. Arm swelling started yesterday. It is slightly painful but he is able to move it with out issues. He last had dialysis on Monday without any issues. He kathy fevers, chills, cough and shortness of breath.  patient has an  AVF on the L brachio-cephalic by Dr. Michael in February 2020 that was posteriorly ballooned in August 2020.     In the ER he was seen by vascular surgery and underwent  AVF duplex. Nephrology was consulted and he is currently undergoing HD.     patient Denies any pain during my exam. He offers no complaints.

## 2020-12-02 NOTE — ED PROVIDER NOTE - PHYSICAL EXAMINATION
Gen: NAD, AOx3  Head: NCAT  HEENT: EOMI, oral mucosa moist, normal conjunctiva, neck supple  Lung: CTAB, no respiratory distress  CV: rrr, no murmur, +rt arm cap refill <2sec, Lt UE- cap refill prolong hand/forearm cool +thrill fistula  Abd: soft, NTND  MSK: +1 swelling left arm from fistula in UE/forearm/hand  Neuro: No focal neurologic deficits, 5/5 strength, sensation itnact b/l UE  Skin: No rash   Psych: normal affect

## 2020-12-02 NOTE — CONSULT NOTE ADULT - SUBJECTIVE AND OBJECTIVE BOX
VASCULAR SURGERY CONSULT    Consulting surgical team: VASCULAR Surgery  Consulting attending: Alec FELICIANO  Patient seen and examined: 12-02-20 @ 13:13    HPI:  Patient is a 80y Male with PMH of ESRD on HD throughout an AVF on the L brachio-cephalic by Dr. Michael in February 2020 that was posteriorly ballooned in August 2020. Patient comes today from HD because the arm was swollen and lightly cooler than the R forearm and R hand.   Patient states he had HD with no issues on Monday (has HD MWF), and states that the cooler L forearm has always been like that. Wife, who lives with him states she had never noticed before that one extremity was cooler than the other one but states she "doesn't touch him". Patient states no pain or motor or sensory deficits.        PAST MEDICAL HISTORY:  Hypothyroid    Moderate aortic stenosis    Hyperlipidemia, unspecified hyperlipidemia type    Essential hypertension    GERD (gastroesophageal reflux disease)    History of insulin dependent diabetes mellitus    Stage 5 chronic kidney disease on chronic dialysis    Atrial fibrillation, unspecified type    (HFpEF) heart failure with preserved ejection fraction    Anemia due to chronic kidney disease, on chronic dialysis    Alzheimer&#x27;s dementia    Hyperkalemia    AID (acute kidney injury)    BPH (benign prostatic hyperplasia)    CKD (chronic kidney disease)    Hyperlipemia    Hypertension    Diabetes        PAST SURGICAL HISTORY:  AV fistula    S/P right pulmonary artery pressure sensor implant placement    Ureteral stent retained        ALLERGIES:  No Known Allergies      MEDICATIONS  (STANDING):    MEDICATIONS  (PRN):      VITALS & I/Os:  Vital Signs Last 24 Hrs  T(C): 36.3 (02 Dec 2020 11:18), Max: 36.3 (02 Dec 2020 11:18)  T(F): 97.3 (02 Dec 2020 11:18), Max: 97.3 (02 Dec 2020 11:18)  HR: 61 (02 Dec 2020 11:18) (61 - 61)  BP: 157/64 (02 Dec 2020 11:18) (157/64 - 157/64)  BP(mean): --  RR: 16 (02 Dec 2020 11:18) (16 - 16)  SpO2: 98% (02 Dec 2020 11:18) (98% - 98%)  CAPILLARY BLOOD GLUCOSE          I&O's Summary        GEN: NAD, alert and oriented x 3  HEENT: WNL  CHEST: Symmetrical chest rise, breath sounds CTAB  HEART: RRR, non-muffled heart sounds  ABD: Soft, non-tender, non-distended  EXT/VASC:       RUE: good distal palpable pulses 2+, no edema, no hematomas.   LUE: Brachio-cephalic AVF with good thrill from the incision in the antecubital fossa to near the axilla, not indurated. Small 5 cm hematoma in the antecubital fossa. There is lightly cooler skin to touch starting in the L forearm all the way to the hand compared to the RUE. There is good radial pulse 2+. No motor or sensory deficits.     LABS:                        11.0   7.48  )-----------( 134      ( 02 Dec 2020 12:59 )             33.6                           IMAGING:

## 2020-12-02 NOTE — ED PROVIDER NOTE - NS ED ROS FT
ROS: no CP/SOB. no cough. no fever. no n/v/d/c. no abd pain. no rash. no bleeding. no urinary complaints. no weakness. no vision changes. no HA. no neck/back pain. +extremity swelling. No change in mental status.

## 2020-12-02 NOTE — ED ADULT TRIAGE NOTE - CHIEF COMPLAINT QUOTE
pt has left av fistula. left arm has been painful and edematous. left hand is colder than right hand. + bruit and thrill. sent in by nephrology for eval.

## 2020-12-02 NOTE — CHART NOTE - NSCHARTNOTEFT_GEN_A_CORE
Surgery Preop Note  Preop Dx:LUE swelling    Planned Procedure Date: 12/3    Planned Procedure: fistulogram, poss stent    Medical Clearance:             [  ] Medical clearance obtained, see note on _____            [ x ] Medical clearance not indicated.             [  ] Cardiology clearance obtained, see note on _____    Changes to Diet:             [ x ] Patient to be NPO except meds starting at 23:59 on 12/2            [  ] Hold TF starting at 23:59 on _____            [  ] Crystalloid IVF at rate of ____ to start at 23:59 on _____    Medication Changes:            [ x ] hold oral antiglycemic medications, place patient on ISS            [ x ] Hold all ACEI or ARB medications            [ x ] Switch extended release medications to equivalent instant release formulations.     Anticoagulation/DVT PPx Restrictions:            [ x ] No restrictions, continue prophylactic lovenox/heparin            [  ] Hold antiplatelet therapy            [  ] Hold Heparin drip starting at _____ on _____    CXR:             [ x ] CXR taken on 12/2 reviewed, no acute abnormalities.             [  ] Preop CXR not indicated.     EKG:             [ x ] Last EKG on 12/2 reviewed, No acute abnormalities            [  ] Preop EKG not indicated.            [  ] EKG on ____ reviewed, found to have following abnormalities:     Echo:             [  ] Last Echocardiogram done on _____, showed EF of ___%            [ x ] Preop echocardiogram not indicated.     Type and Screen:             [ x ] Minimal blood loss anticipated, Type and screen not indicated            [  ] Type and screen up to date, last performed on ______            [  ] Type and screen to be performed w/ am labs on _____    Blood Products:            [ x ] minimal blood loss anticipated, no blood products indicated            [  ] please transfuse patient __U PRBC on evening of ___ prior to surgery            [  ] __U PRBC on hold for OR            [  ] __U FFP on hold for OR                                [  ] __U Cryo on hold for OR            [  ] __U Platelet on hold for OR    Preoperative AM labs:             [x  ] CBC, BMP, Mg, Phos, Ptt, INR, type and screen Surgery Preop Note  Preop Dx:LUE swelling    Planned Procedure Date: 12/3    Planned Procedure: fistulogram, poss stent    Medical Clearance:             [ x ] Medical clearance obtained, see note on 12/2            [  ] Medical clearance not indicated.             [  ] Cardiology clearance obtained, see note on _____    Changes to Diet:             [ x ] Patient to be NPO except meds starting at 23:59 on 12/2            [  ] Hold TF starting at 23:59 on _____            [  ] Crystalloid IVF at rate of ____ to start at 23:59 on _____    Medication Changes:            [ x ] hold oral antiglycemic medications, place patient on ISS            [ x ] Hold all ACEI or ARB medications            [ x ] Switch extended release medications to equivalent instant release formulations.     Anticoagulation/DVT PPx Restrictions:            [ x ] No restrictions, continue prophylactic lovenox/heparin            [  ] Hold antiplatelet therapy            [  ] Hold Heparin drip starting at _____ on _____    CXR:             [ x ] CXR taken on 12/3 pending results            [  ] Preop CXR not indicated.     EKG:             [ x ] Last EKG on 12/2 pending results            [  ] Preop EKG not indicated.            [  ] EKG on ____ reviewed, found to have following abnormalities:     Echo:             [  ] Last Echocardiogram done on _____, showed EF of ___%            [ x ] Preop echocardiogram not indicated.     Type and Screen:             [  ] Minimal blood loss anticipated, Type and screen not indicated            [ x ] Type and screen up to date, last performed on 12/2            [  ] Type and screen to be performed w/ am labs on _____    Blood Products:            [ x ] minimal blood loss anticipated, no blood products indicated            [  ] please transfuse patient __U PRBC on evening of ___ prior to surgery            [  ] __U PRBC on hold for OR            [  ] __U FFP on hold for OR                                [  ] __U Cryo on hold for OR            [  ] __U Platelet on hold for OR    Preoperative AM labs:             [x  ] CBC, BMP, Mg, Phos, Ptt, INR, type and screen

## 2020-12-02 NOTE — ED PROVIDER NOTE - OBJECTIVE STATEMENT
81yo M with ESRD (MWF) last went monday no issues, today complaint of left arm pain/swelling, ongoing for 2 days, no pain during HD. told wife 'didn't feel well' this AM. no fever. no rash. patient believes left hand is always cold, but wife ws never aware of that symptom. went to HD who sent to ED.     nephro- ilamathi  vascular- [facility]

## 2020-12-02 NOTE — H&P ADULT - NSHPSOCIALHISTORY_GEN_ALL_CORE
"Patient calling to report that she has an active shingles rash that she has taken valacyclovir for, in addition to OTC pain medication as needed. She reports that her rashes are nearly resolved, however she does have small shingles rash in pelvic area near the crease of her legs. She reports there are only three \"tiny\" bumps left in the crease of her leg but \"typically angiogram site goes lower than were those are\". She would like to verify if she is safe to proceed with angio scheduled 10/9. She reports that her PCP told her that because it is viral and not bacteria that he feels she can proceed but to verify with cardiology just to be sure.     Per Dr. Mg if she is able to lay flat without discomfort and has completed antiviral RX, patient is safe to proceed.     Returned call to patient verifying that she does not have any discomfort in laying flat as she has no shingles rash on her back. She reports also that she has taken valacyclovir. Provided her with Dr. Mg recommendations that she may proceed with angio. Patient states understanding and is very thankful for assistance. She states no further questions at this time.   "
denies current tobacco or alcohol use

## 2020-12-03 NOTE — PROGRESS NOTE ADULT - SUBJECTIVE AND OBJECTIVE BOX
Cardiology Nurse Practitioner Progress note:     s/p LUE fistulogram/ POBA/ stent via #6Fr LRA  by Dr Tadeo    44cc contrast utilized, 3,000 units Heparin during procedure      Patient feels well.  Denies chest pain, shortness of breath, dizziness or palpitations at this time.      TELE:no ectopy, NSR 60s  GENERAL: appears comfortable, denies any c/o pain  CV: RRR, S1 S2, no murmur, rub, clicks or gallop noted  RESP: LCTA bilaterally, no wheeze, no rhonchi or crackles noted  GI/: soft, NT/ND  NEURO: AOx4, no focal deficits  EXTREMITIES: no edema, clubbing or cyanosis noted  PROCEDURE SITE:  LUE fistula drsg dry and intact/ no bleeding or hematoma detected  Left  radial hemoband dressing intact site CDI with no bleeding, no hematoma, patient able to move all digits with capillary refill < 3 seconds, fingers warm-hemoband to be removed @ 1500      T(C): 36.7 (12-03-20 @ 08:09), Max: 37.1 (12-02-20 @ 18:36)  HR: 63 (12-03-20 @ 13:00) (63 - 86)  BP: 147/62 (12-03-20 @ 13:00) (142/53 - 163/68)  RR: 18 (12-03-20 @ 13:00) (16 - 19)  SpO2: 95% (12-03-20 @ 13:00) (94% - 96%)  Wt(kg): --  CBC Full  -  ( 03 Dec 2020 04:21 )  WBC Count : 6.24 K/uL  RBC Count : 3.69 M/uL  Hemoglobin : 10.9 g/dL  Hematocrit : 33.2 %  Platelet Count - Automated : 131 K/uL  Mean Cell Volume : 90.0 fl  Mean Cell Hemoglobin : 29.5 pg  Mean Cell Hemoglobin Concentration : 32.8 gm/dL  Auto Neutrophil # : x  Auto Lymphocyte # : x  Auto Monocyte # : x  Auto Eosinophil # : x  Auto Basophil # : x  Auto Neutrophil % : x  Auto Lymphocyte % : x  Auto Monocyte % : x  Auto Eosinophil % : x  Auto Basophil % : x    12-03    137  |  99  |  28.0<H>  ----------------------------<  113<H>  3.8   |  28.0  |  2.49<H>    Ca    8.6      03 Dec 2020 04:21  Phos  3.4     12-03  Mg     2.2     12-03    TPro  6.8  /  Alb  3.8  /  TBili  0.4  /  DBili  x   /  AST  16  /  ALT  9   /  AlkPhos  68  12-03            MEDICATIONS  (STANDING):  amLODIPine   Tablet 10 milliGRAM(s) Oral daily  aspirin  chewable 81 milliGRAM(s) Oral daily  atorvastatin 10 milliGRAM(s) Oral at bedtime  calcitriol   Capsule 0.25 MICROGram(s) Oral daily  dextrose 40% Gel 15 Gram(s) Oral once  dextrose 5%. 1000 milliLiter(s) (50 mL/Hr) IV Continuous <Continuous>  dextrose 5%. 1000 milliLiter(s) (100 mL/Hr) IV Continuous <Continuous>  dextrose 50% Injectable 25 Gram(s) IV Push once  dextrose 50% Injectable 12.5 Gram(s) IV Push once  dextrose 50% Injectable 25 Gram(s) IV Push once  doxazosin 4 milliGRAM(s) Oral at bedtime  ferrous    sulfate 325 milliGRAM(s) Oral daily  glucagon  Injectable 1 milliGRAM(s) IntraMuscular once  heparin   Injectable 5000 Unit(s) SubCutaneous every 12 hours  hydrALAZINE 50 milliGRAM(s) Oral three times a day  insulin lispro (ADMELOG) corrective regimen sliding scale   SubCutaneous three times a day before meals  insulin lispro (ADMELOG) corrective regimen sliding scale   SubCutaneous at bedtime  isosorbide   dinitrate Tablet (ISORDIL) 10 milliGRAM(s) Oral three times a day  memantine 10 milliGRAM(s) Oral two times a day  metoprolol succinate ER 25 milliGRAM(s) Oral daily  sodium bicarbonate 650 milliGRAM(s) Oral two times a day  tamsulosin 0.4 milliGRAM(s) Oral at bedtime  torsemide 20 milliGRAM(s) Oral two times a day    MEDICATIONS  (PRN):  acetaminophen   Tablet .. 650 milliGRAM(s) Oral every 6 hours PRN Temp greater or equal to 38C (100.4F), Mild Pain (1 - 3)  melatonin 3 milliGRAM(s) Oral at bedtime PRN Sleep      s/p LUE fistulogram/ POBA/ stent via #6Fr LRA  by Dr Tadeo      ASSESSMENT/PLAN:      LRA precautions/ protocol  Remove hemoband @ 1500  HOB 45-90 degrees  Resume renal diet   LUE fistula ok for HD as per Dr Tadeo Vascular Surgery  Hospitalist to determine plan for Discharge/HD    Cardiology Nurse Practitioner Progress note:     s/p LUE fistulogram/ POBA/ stent via #6Fr LRA  by Dr Tadeo    44cc contrast utilized, 3,000 units Heparin during procedure      Patient feels well.  Denies chest pain, shortness of breath, dizziness or palpitations at this time.      TELE:no ectopy, NSR 60s  GENERAL: appears comfortable, denies any c/o pain  CV: RRR, S1 S2, no murmur, rub, clicks or gallop noted  RESP: LCTA bilaterally, no wheeze, no rhonchi or crackles noted  GI/: soft, NT/ND  NEURO: AOx4, no focal deficits  EXTREMITIES: no edema, clubbing or cyanosis noted  PROCEDURE SITE:  LUE fistula drsg dry and intact/ no bleeding or hematoma detected  Left  radial hemoband dressing intact site CDI with no bleeding, no hematoma, patient able to move all digits with capillary refill < 3 seconds, fingers warm-hemoband to be removed @ 1500      T(C): 36.7 (12-03-20 @ 08:09), Max: 37.1 (12-02-20 @ 18:36)  HR: 63 (12-03-20 @ 13:00) (63 - 86)  BP: 147/62 (12-03-20 @ 13:00) (142/53 - 163/68)  RR: 18 (12-03-20 @ 13:00) (16 - 19)  SpO2: 95% (12-03-20 @ 13:00) (94% - 96%)  Wt(kg): --  CBC Full  -  ( 03 Dec 2020 04:21 )  WBC Count : 6.24 K/uL  RBC Count : 3.69 M/uL  Hemoglobin : 10.9 g/dL  Hematocrit : 33.2 %  Platelet Count - Automated : 131 K/uL  Mean Cell Volume : 90.0 fl  Mean Cell Hemoglobin : 29.5 pg  Mean Cell Hemoglobin Concentration : 32.8 gm/dL  Auto Neutrophil # : x  Auto Lymphocyte # : x  Auto Monocyte # : x  Auto Eosinophil # : x  Auto Basophil # : x  Auto Neutrophil % : x  Auto Lymphocyte % : x  Auto Monocyte % : x  Auto Eosinophil % : x  Auto Basophil % : x    12-03    137  |  99  |  28.0<H>  ----------------------------<  113<H>  3.8   |  28.0  |  2.49<H>    Ca    8.6      03 Dec 2020 04:21  Phos  3.4     12-03  Mg     2.2     12-03    TPro  6.8  /  Alb  3.8  /  TBili  0.4  /  DBili  x   /  AST  16  /  ALT  9   /  AlkPhos  68  12-03            MEDICATIONS  (STANDING):  amLODIPine   Tablet 10 milliGRAM(s) Oral daily  aspirin  chewable 81 milliGRAM(s) Oral daily  atorvastatin 10 milliGRAM(s) Oral at bedtime  calcitriol   Capsule 0.25 MICROGram(s) Oral daily  dextrose 40% Gel 15 Gram(s) Oral once  dextrose 5%. 1000 milliLiter(s) (50 mL/Hr) IV Continuous <Continuous>  dextrose 5%. 1000 milliLiter(s) (100 mL/Hr) IV Continuous <Continuous>  dextrose 50% Injectable 25 Gram(s) IV Push once  dextrose 50% Injectable 12.5 Gram(s) IV Push once  dextrose 50% Injectable 25 Gram(s) IV Push once  doxazosin 4 milliGRAM(s) Oral at bedtime  ferrous    sulfate 325 milliGRAM(s) Oral daily  glucagon  Injectable 1 milliGRAM(s) IntraMuscular once  heparin   Injectable 5000 Unit(s) SubCutaneous every 12 hours  hydrALAZINE 50 milliGRAM(s) Oral three times a day  insulin lispro (ADMELOG) corrective regimen sliding scale   SubCutaneous three times a day before meals  insulin lispro (ADMELOG) corrective regimen sliding scale   SubCutaneous at bedtime  isosorbide   dinitrate Tablet (ISORDIL) 10 milliGRAM(s) Oral three times a day  memantine 10 milliGRAM(s) Oral two times a day  metoprolol succinate ER 25 milliGRAM(s) Oral daily  sodium bicarbonate 650 milliGRAM(s) Oral two times a day  tamsulosin 0.4 milliGRAM(s) Oral at bedtime  torsemide 20 milliGRAM(s) Oral two times a day    MEDICATIONS  (PRN):  acetaminophen   Tablet .. 650 milliGRAM(s) Oral every 6 hours PRN Temp greater or equal to 38C (100.4F), Mild Pain (1 - 3)  melatonin 3 milliGRAM(s) Oral at bedtime PRN Sleep      s/p LUE fistulogram/ POBA/ stent via #6Fr LRA  by Dr Tadeo      ASSESSMENT/PLAN:      LRA precautions/ protocol  Remove hemoband @ 1500  HOB 45-90 degrees  Resume renal diet   LUE fistula ok for HD as per Dr Tadeo Vascular Surgery  Hospitalist to Discharge pt home @ 1600  Pt to have HD tomorrow-(he's on Mon-Wed-Fri schedule)   Cardiology Nurse Practitioner Progress note:     s/p LUE fistulogram/ POBA/ stent via #6Fr LRA  by Dr Tadeo    44cc contrast utilized, 3,000 units Heparin during procedure      Patient feels well.  Denies chest pain, shortness of breath, dizziness or palpitations at this time.      TELE:no ectopy, NSR 60s  GENERAL: appears comfortable, denies any c/o pain  CV: RRR, S1 S2, no murmur, rub, clicks or gallop noted  RESP: LCTA bilaterally, no wheeze, no rhonchi or crackles noted  GI/: soft, NT/ND  NEURO: AOx4, no focal deficits  EXTREMITIES: no edema, clubbing or cyanosis noted  PROCEDURE SITE:  LUE fistula drsg dry and intact/ no bleeding or hematoma detected  Left  radial hemoband dressing intact site CDI with no bleeding, no hematoma, patient able to move all digits with capillary refill < 3 seconds, fingers warm-hemoband to be removed @ 1500      T(C): 36.7 (12-03-20 @ 08:09), Max: 37.1 (12-02-20 @ 18:36)  HR: 63 (12-03-20 @ 13:00) (63 - 86)  BP: 147/62 (12-03-20 @ 13:00) (142/53 - 163/68)  RR: 18 (12-03-20 @ 13:00) (16 - 19)  SpO2: 95% (12-03-20 @ 13:00) (94% - 96%)  Wt(kg): --  CBC Full  -  ( 03 Dec 2020 04:21 )  WBC Count : 6.24 K/uL  RBC Count : 3.69 M/uL  Hemoglobin : 10.9 g/dL  Hematocrit : 33.2 %  Platelet Count - Automated : 131 K/uL  Mean Cell Volume : 90.0 fl  Mean Cell Hemoglobin : 29.5 pg  Mean Cell Hemoglobin Concentration : 32.8 gm/dL  Auto Neutrophil # : x  Auto Lymphocyte # : x  Auto Monocyte # : x  Auto Eosinophil # : x  Auto Basophil # : x  Auto Neutrophil % : x  Auto Lymphocyte % : x  Auto Monocyte % : x  Auto Eosinophil % : x  Auto Basophil % : x    12-03    137  |  99  |  28.0<H>  ----------------------------<  113<H>  3.8   |  28.0  |  2.49<H>    Ca    8.6      03 Dec 2020 04:21  Phos  3.4     12-03  Mg     2.2     12-03    TPro  6.8  /  Alb  3.8  /  TBili  0.4  /  DBili  x   /  AST  16  /  ALT  9   /  AlkPhos  68  12-03            MEDICATIONS  (STANDING):  amLODIPine   Tablet 10 milliGRAM(s) Oral daily  aspirin  chewable 81 milliGRAM(s) Oral daily  atorvastatin 10 milliGRAM(s) Oral at bedtime  calcitriol   Capsule 0.25 MICROGram(s) Oral daily  dextrose 40% Gel 15 Gram(s) Oral once  dextrose 5%. 1000 milliLiter(s) (50 mL/Hr) IV Continuous <Continuous>  dextrose 5%. 1000 milliLiter(s) (100 mL/Hr) IV Continuous <Continuous>  dextrose 50% Injectable 25 Gram(s) IV Push once  dextrose 50% Injectable 12.5 Gram(s) IV Push once  dextrose 50% Injectable 25 Gram(s) IV Push once  doxazosin 4 milliGRAM(s) Oral at bedtime  ferrous    sulfate 325 milliGRAM(s) Oral daily  glucagon  Injectable 1 milliGRAM(s) IntraMuscular once  heparin   Injectable 5000 Unit(s) SubCutaneous every 12 hours  hydrALAZINE 50 milliGRAM(s) Oral three times a day  insulin lispro (ADMELOG) corrective regimen sliding scale   SubCutaneous three times a day before meals  insulin lispro (ADMELOG) corrective regimen sliding scale   SubCutaneous at bedtime  isosorbide   dinitrate Tablet (ISORDIL) 10 milliGRAM(s) Oral three times a day  memantine 10 milliGRAM(s) Oral two times a day  metoprolol succinate ER 25 milliGRAM(s) Oral daily  sodium bicarbonate 650 milliGRAM(s) Oral two times a day  tamsulosin 0.4 milliGRAM(s) Oral at bedtime  torsemide 20 milliGRAM(s) Oral two times a day    MEDICATIONS  (PRN):  acetaminophen   Tablet .. 650 milliGRAM(s) Oral every 6 hours PRN Temp greater or equal to 38C (100.4F), Mild Pain (1 - 3)  melatonin 3 milliGRAM(s) Oral at bedtime PRN Sleep      s/p LUE fistulogram/ POBA/ stent via #6Fr LRA  by Dr Tadeo      ASSESSMENT/PLAN:      LRA precautions/ protocol  Remove hemoband @ 1500  HOB 45-90 degrees  Resume renal diet   LUE fistula ok for HD as per Dr Tadeo Vascular Surgery  Hospitalist to Discharge pt home @ 1600  Pt to have HD tomorrow-(he's on Mon-Wed-Fri schedule)  Follow up with Dr Michael in 2 weeks (Vascular Surgery)

## 2020-12-03 NOTE — PROGRESS NOTE ADULT - SUBJECTIVE AND OBJECTIVE BOX
INTERVAL HPI/OVERNIGHT EVENTS/SUBJECTIVE:  Patient s/p left fistulogram, angioplasty of left innominate and stent of left cephalic vein. Patient offers no complaints. Planned for discharge home.   Denies cp, sob, n/v/d, numbness/tingling, temp changes in extremities.     ICU Vital Signs Last 24 Hrs  T(C): 36.7 (03 Dec 2020 08:09), Max: 37.1 (02 Dec 2020 18:36)  T(F): 98 (03 Dec 2020 08:09), Max: 98.8 (02 Dec 2020 18:36)  HR: 64 (03 Dec 2020 15:00) (62 - 86)  BP: 153/67 (03 Dec 2020 15:00) (142/53 - 163/68)  BP(mean): --  ABP: --  ABP(mean): --  RR: 18 (03 Dec 2020 15:00) (16 - 19)  SpO2: 96% (03 Dec 2020 15:00) (94% - 96%)    MEDICATIONS  (STANDING):  amLODIPine   Tablet 10 milliGRAM(s) Oral daily  aspirin  chewable 81 milliGRAM(s) Oral daily  atorvastatin 10 milliGRAM(s) Oral at bedtime  calcitriol   Capsule 0.25 MICROGram(s) Oral daily  dextrose 40% Gel 15 Gram(s) Oral once  dextrose 5%. 1000 milliLiter(s) (50 mL/Hr) IV Continuous <Continuous>  dextrose 5%. 1000 milliLiter(s) (100 mL/Hr) IV Continuous <Continuous>  dextrose 50% Injectable 25 Gram(s) IV Push once  dextrose 50% Injectable 12.5 Gram(s) IV Push once  dextrose 50% Injectable 25 Gram(s) IV Push once  doxazosin 4 milliGRAM(s) Oral at bedtime  ferrous    sulfate 325 milliGRAM(s) Oral daily  glucagon  Injectable 1 milliGRAM(s) IntraMuscular once  heparin   Injectable 5000 Unit(s) SubCutaneous every 12 hours  hydrALAZINE 50 milliGRAM(s) Oral three times a day  insulin lispro (ADMELOG) corrective regimen sliding scale   SubCutaneous three times a day before meals  insulin lispro (ADMELOG) corrective regimen sliding scale   SubCutaneous at bedtime  isosorbide   dinitrate Tablet (ISORDIL) 10 milliGRAM(s) Oral three times a day  memantine 10 milliGRAM(s) Oral two times a day  metoprolol succinate ER 25 milliGRAM(s) Oral daily  sodium bicarbonate 650 milliGRAM(s) Oral two times a day  tamsulosin 0.4 milliGRAM(s) Oral at bedtime  torsemide 20 milliGRAM(s) Oral two times a day    MEDICATIONS  (PRN):  acetaminophen   Tablet .. 650 milliGRAM(s) Oral every 6 hours PRN Temp greater or equal to 38C (100.4F), Mild Pain (1 - 3)  melatonin 3 milliGRAM(s) Oral at bedtime PRN Sleep    MISC:     PHYSICAL EXAM:  Gen: NAD, laying comfortably  Neurological: AAOx3  Pulmonary: non-labored, no conversational dyspnea  Cardiovascular: RRR  Gastrointestinal: soft, NTND  Extremities: LUE radial arm band removed, mild swelling, no active bleeding or hematoma. + L radial pulse, dressing applied.   + LUE swelling, unchanged from prior to surgery  mild oozing at access site of AVF- light pressure held and hemostatic dressing applied  no sensory or motor defitices of UE  + pulsatile palpable thrill   no TTP      LABS:  CBC Full  -  ( 03 Dec 2020 04:21 )  WBC Count : 6.24 K/uL  RBC Count : 3.69 M/uL  Hemoglobin : 10.9 g/dL  Hematocrit : 33.2 %  Platelet Count - Automated : 131 K/uL  Mean Cell Volume : 90.0 fl  Mean Cell Hemoglobin : 29.5 pg  Mean Cell Hemoglobin Concentration : 32.8 gm/dL  Auto Neutrophil # : x  Auto Lymphocyte # : x  Auto Monocyte # : x  Auto Eosinophil # : x  Auto Basophil # : x  Auto Neutrophil % : x  Auto Lymphocyte % : x  Auto Monocyte % : x  Auto Eosinophil % : x  Auto Basophil % : x    12-03    137  |  99  |  28.0<H>  ----------------------------<  113<H>  3.8   |  28.0  |  2.49<H>    Ca    8.6      03 Dec 2020 04:21  Phos  3.4     12-03  Mg     2.2     12-03    TPro  6.8  /  Alb  3.8  /  TBili  0.4  /  DBili  x   /  AST  16  /  ALT  9   /  AlkPhos  68  12-03    PT/INR - ( 02 Dec 2020 12:59 )   PT: 12.7 sec;   INR: 1.10 ratio         PTT - ( 02 Dec 2020 12:59 )  PTT:31.2 sec    RECENT CULTURES:      LIVER FUNCTIONS - ( 03 Dec 2020 04:21 )  Alb: 3.8 g/dL / Pro: 6.8 g/dL / ALK PHOS: 68 U/L / ALT: 9 U/L / AST: 16 U/L / GGT: x           ASSESSMENT/PLAN:  80yMale presenting with: LUE swelling. s/p fistulogram, L innominate vein angioplasty and L cephalic vein stent.   -ok to discharge home today from vasc standpoint  -ok to use AVF for HD  -please f/u with Dr. Michael in 2 weeks

## 2020-12-03 NOTE — PROGRESS NOTE ADULT - SUBJECTIVE AND OBJECTIVE BOX
[facility] DIVISION OF KIDNEY DISEASES AND HYPERTENSION -- FOLLOW UP NOTE  --------------------------------------------------------------------------------  Chief Complaint:    24 hour events/subjective:        PAST HISTORY  --------------------------------------------------------------------------------  No significant changes to PMH, PSH, FHx, SHx, unless otherwise noted    ALLERGIES & MEDICATIONS  --------------------------------------------------------------------------------  Allergies    No Known Allergies    Intolerances      Standing Inpatient Medications  amLODIPine   Tablet 10 milliGRAM(s) Oral daily  aspirin  chewable 81 milliGRAM(s) Oral daily  atorvastatin 10 milliGRAM(s) Oral at bedtime  calcitriol   Capsule 0.25 MICROGram(s) Oral daily  dextrose 40% Gel 15 Gram(s) Oral once  dextrose 5%. 1000 milliLiter(s) IV Continuous <Continuous>  dextrose 5%. 1000 milliLiter(s) IV Continuous <Continuous>  dextrose 50% Injectable 25 Gram(s) IV Push once  dextrose 50% Injectable 12.5 Gram(s) IV Push once  dextrose 50% Injectable 25 Gram(s) IV Push once  doxazosin 4 milliGRAM(s) Oral at bedtime  ferrous    sulfate 325 milliGRAM(s) Oral daily  glucagon  Injectable 1 milliGRAM(s) IntraMuscular once  heparin   Injectable 5000 Unit(s) SubCutaneous every 12 hours  hydrALAZINE 50 milliGRAM(s) Oral three times a day  insulin lispro (ADMELOG) corrective regimen sliding scale   SubCutaneous three times a day before meals  insulin lispro (ADMELOG) corrective regimen sliding scale   SubCutaneous at bedtime  isosorbide   dinitrate Tablet (ISORDIL) 10 milliGRAM(s) Oral three times a day  memantine 10 milliGRAM(s) Oral two times a day  metoprolol succinate ER 25 milliGRAM(s) Oral daily  sodium bicarbonate 650 milliGRAM(s) Oral two times a day  tamsulosin 0.4 milliGRAM(s) Oral at bedtime  torsemide 20 milliGRAM(s) Oral two times a day    PRN Inpatient Medications  acetaminophen   Tablet .. 650 milliGRAM(s) Oral every 6 hours PRN  melatonin 3 milliGRAM(s) Oral at bedtime PRN      REVIEW OF SYSTEMS  --------------------------------------------------------------------------------  Gen: No weight changes, fatigue, fevers/chills, weakness  Skin: No rashes  Head/Eyes/Ears/Mouth: No headache; Normal hearing; Normal vision w/o blurriness; No sinus pain/discomfort, sore throat  Respiratory: No dyspnea, cough, wheezing, hemoptysis  CV: No chest pain, PND, orthopnea  GI: No abdominal pain, diarrhea, constipation, nausea, vomiting, melena, hematochezia  : No increased frequency, dysuria, hematuria, nocturia  MSK: No joint pain/swelling; no back pain; no edema  Neuro: No dizziness/lightheadedness, weakness, seizures, numbness, tingling  Heme: No easy bruising or bleeding  Endo: No heat/cold intolerance  Psych: No significant nervousness, anxiety, stress, depression    All other systems were reviewed and are negative, except as noted.    VITALS/PHYSICAL EXAM  --------------------------------------------------------------------------------  T(C): 36.7 (12-03-20 @ 08:09), Max: 37.1 (12-02-20 @ 18:36)  HR: 66 (12-03-20 @ 08:09) (61 - 86)  BP: 142/53 (12-03-20 @ 08:09) (142/53 - 157/64)  RR: 16 (12-03-20 @ 08:09) (16 - 19)  SpO2: 94% (12-03-20 @ 08:09) (94% - 98%)  Wt(kg): --  Height (cm): 162.6 (12-02-20 @ 11:18)  Weight (kg): 70.3 (12-02-20 @ 11:18)  BMI (kg/m2): 26.6 (12-02-20 @ 11:18)  BSA (m2): 1.76 (12-02-20 @ 11:18)      Physical Exam:  	Gen: NAD, well-appearing  	HEENT: PERRL, supple neck, clear oropharynx  	Pulm: CTA B/L  	CV: RRR, S1S2; no rub  	Back: No spinal or CVA tenderness; no sacral edema  	Abd: +BS, soft, nontender/nondistended  	: No suprapubic tenderness  	UE: Warm, FROM, no clubbing, intact strength; no edema; no asterixis  	LE: Warm, FROM, no clubbing, intact strength; no edema  	Neuro: No focal deficits, intact gait  	Psych: Normal affect and mood  	Skin: Warm, without rashes  	Vascular access:    LABS/STUDIES  --------------------------------------------------------------------------------              10.9   6.24  >-----------<  131      [12-03-20 @ 04:21]              33.2     137  |  99  |  28.0  ----------------------------<  113      [12-03-20 @ 04:21]  3.8   |  28.0  |  2.49        Ca     8.6     [12-03-20 @ 04:21]      Mg     2.2     [12-03-20 @ 04:21]      Phos  3.4     [12-03-20 @ 04:21]    TPro  6.8  /  Alb  3.8  /  TBili  0.4  /  DBili  x   /  AST  16  /  ALT  9   /  AlkPhos  68  [12-03-20 @ 04:21]    PT/INR: PT 12.7 , INR 1.10       [12-02-20 @ 12:59]  PTT: 31.2       [12-02-20 @ 12:59]      Creatinine Trend:  SCr 2.49 [12-03 @ 04:21]  SCr 4.00 [12-02 @ 12:59]        Iron 23, TIBC 319, %sat 7      [02-22-20 @ 11:11]  Ferritin 41      [02-22-20 @ 11:11]  PTH -- (Ca 8.3)      [12-12-19 @ 19:36]   164  Vitamin D (25OH) 20.3      [12-12-19 @ 19:36]  HbA1c 8.1      [12-11-19 @ 06:47]  TSH 6.62      [02-23-20 @ 10:16]       [facility] DIVISION OF KIDNEY DISEASES AND HYPERTENSION -- FOLLOW UP NOTE  --------------------------------------------------------------------------------  Chief Complaint: ESRD on HD    24 hour events/subjective:  s/p HD yesterday; tolerated well      PAST HISTORY  --------------------------------------------------------------------------------  No significant changes to PMH, PSH, FHx, SHx, unless otherwise noted    ALLERGIES & MEDICATIONS  --------------------------------------------------------------------------------  Allergies    No Known Allergies    Intolerances      Standing Inpatient Medications  amLODIPine   Tablet 10 milliGRAM(s) Oral daily  aspirin  chewable 81 milliGRAM(s) Oral daily  atorvastatin 10 milliGRAM(s) Oral at bedtime  calcitriol   Capsule 0.25 MICROGram(s) Oral daily  dextrose 40% Gel 15 Gram(s) Oral once  dextrose 5%. 1000 milliLiter(s) IV Continuous <Continuous>  dextrose 5%. 1000 milliLiter(s) IV Continuous <Continuous>  dextrose 50% Injectable 25 Gram(s) IV Push once  dextrose 50% Injectable 12.5 Gram(s) IV Push once  dextrose 50% Injectable 25 Gram(s) IV Push once  doxazosin 4 milliGRAM(s) Oral at bedtime  ferrous    sulfate 325 milliGRAM(s) Oral daily  glucagon  Injectable 1 milliGRAM(s) IntraMuscular once  heparin   Injectable 5000 Unit(s) SubCutaneous every 12 hours  hydrALAZINE 50 milliGRAM(s) Oral three times a day  insulin lispro (ADMELOG) corrective regimen sliding scale   SubCutaneous three times a day before meals  insulin lispro (ADMELOG) corrective regimen sliding scale   SubCutaneous at bedtime  isosorbide   dinitrate Tablet (ISORDIL) 10 milliGRAM(s) Oral three times a day  memantine 10 milliGRAM(s) Oral two times a day  metoprolol succinate ER 25 milliGRAM(s) Oral daily  sodium bicarbonate 650 milliGRAM(s) Oral two times a day  tamsulosin 0.4 milliGRAM(s) Oral at bedtime  torsemide 20 milliGRAM(s) Oral two times a day    PRN Inpatient Medications  acetaminophen   Tablet .. 650 milliGRAM(s) Oral every 6 hours PRN  melatonin 3 milliGRAM(s) Oral at bedtime PRN      REVIEW OF SYSTEMS  --------------------------------------------------------------------------------  Gen: No weight changes, fatigue, fevers/chills, weakness  Skin: No rashes  Head/Eyes/Ears/Mouth: No headache; Normal hearing; Normal vision w/o blurriness; No sinus pain/discomfort, sore throat  Respiratory: No dyspnea, cough, wheezing, hemoptysis  CV: No chest pain, PND, orthopnea  GI: No abdominal pain, diarrhea, constipation, nausea, vomiting, melena, hematochezia  : No increased frequency, dysuria, hematuria, nocturia  MSK: No joint pain/swelling; no back pain; no edema  Neuro: No dizziness/lightheadedness, weakness, seizures, numbness, tingling  Heme: No easy bruising or bleeding  Endo: No heat/cold intolerance  Psych: No significant nervousness, anxiety, stress, depression    All other systems were reviewed and are negative, except as noted.    VITALS/PHYSICAL EXAM  --------------------------------------------------------------------------------  T(C): 36.7 (12-03-20 @ 08:09), Max: 37.1 (12-02-20 @ 18:36)  HR: 66 (12-03-20 @ 08:09) (61 - 86)  BP: 142/53 (12-03-20 @ 08:09) (142/53 - 157/64)  RR: 16 (12-03-20 @ 08:09) (16 - 19)  SpO2: 94% (12-03-20 @ 08:09) (94% - 98%)  Wt(kg): --  Height (cm): 162.6 (12-02-20 @ 11:18)  Weight (kg): 70.3 (12-02-20 @ 11:18)  BMI (kg/m2): 26.6 (12-02-20 @ 11:18)  BSA (m2): 1.76 (12-02-20 @ 11:18)      Physical Exam:  	Gen: NAD  	HEENT: , supple neck, clear oropharynx  	Pulm: CTA B/L  	CV: RRR, S1S2; no rub  	Back: No spinal or CVA tenderness; no sacral edema  	Abd: +BS, soft, nontender/nondistended  	: No suprapubic tenderness  	UE: Warm, no edema; no asterixis  	LE: Warm,; no edema  	Neuro: No focal deficits  	Psych: Normal affect and mood  	Skin: Warm, without rashes  	Vascular access: CASSIA BRYANT    LABS/STUDIES  --------------------------------------------------------------------------------              10.9   6.24  >-----------<  131      [12-03-20 @ 04:21]              33.2     137  |  99  |  28.0  ----------------------------<  113      [12-03-20 @ 04:21]  3.8   |  28.0  |  2.49        Ca     8.6     [12-03-20 @ 04:21]      Mg     2.2     [12-03-20 @ 04:21]      Phos  3.4     [12-03-20 @ 04:21]    TPro  6.8  /  Alb  3.8  /  TBili  0.4  /  DBili  x   /  AST  16  /  ALT  9   /  AlkPhos  68  [12-03-20 @ 04:21]    PT/INR: PT 12.7 , INR 1.10       [12-02-20 @ 12:59]  PTT: 31.2       [12-02-20 @ 12:59]      Creatinine Trend:  SCr 2.49 [12-03 @ 04:21]  SCr 4.00 [12-02 @ 12:59]        Iron 23, TIBC 319, %sat 7      [02-22-20 @ 11:11]  Ferritin 41      [02-22-20 @ 11:11]  PTH -- (Ca 8.3)      [12-12-19 @ 19:36]   164  Vitamin D (25OH) 20.3      [12-12-19 @ 19:36]  HbA1c 8.1      [12-11-19 @ 06:47]  TSH 6.62      [02-23-20 @ 10:16]

## 2020-12-03 NOTE — PROGRESS NOTE ADULT - SUBJECTIVE AND OBJECTIVE BOX
81 yo male, PMH of HFpEF s/p cardiomem, ESRD on HD (MWF), HTN, DM2 on insulin, advanced dementia, prior GIB who presented to the ED with left arm swelling. Vascular consulted.  Pt presenting to Cath Holding area this am for fistulogram secondary to left arm swelling    General: No fatigue, no fevers/chills  Respiratory: No dyspnea, no cough, no wheeze  CV: No chest pain, no palpitations  Abd: No nausea  Neuro: No headache, no dizziness    CM: SR  Neuro: A&OX3, CN 2-12 intact  HEENT: NC, AT  Lungs: CTA B/L  CV: S1, S2, no murmur, RRR  Abd: Soft  Extremity: + distal pulses    PRE-PROCEDURE ASSESSMENT  -NPO after midnight confirmed  -Patient seen and examined  -Labs reviewed  -Pre-procedure teaching completed with patient   - consent obtained   -Questions answered    ASA-4  MALL-  Creat-2.49  eGFR-24  BRA-         79 yo male, PMH of HFpEF s/p cardiomem, ESRD on HD (MWF), HTN, DM2 on insulin, advanced dementia, prior GIB who presented to the ED with left arm swelling. Vascular consulted.  Pt presenting to Cath Holding area this am for fistulogram secondary to left arm swelling    General: No fatigue, no fevers/chills  Respiratory: No dyspnea, no cough, no wheeze  CV: No chest pain, no palpitations  Abd: No nausea  Neuro: No headache, no dizziness    CM: SR  Neuro: A&OX3, CN 2-12 intact  HEENT: NC, AT  Lungs: CTA B/L  CV: S1, S2, no murmur, RRR  Abd: Soft  Extremity: + distal pulses    PRE-PROCEDURE ASSESSMENT  -NPO after midnight confirmed  -Patient seen and examined  -Labs reviewed  -Pre-procedure teaching completed with patient   - consent obtained   -Questions answered    ASA-4  MALL-2  Creat-2.49  eGFR-24  BRA-4.5%         79 yo male, PMH of HFpEF s/p cardiomem, ESRD on HD (MWF), HTN, DM2 on insulin, advanced dementia, prior GIB who presented to the ED with left arm swelling. Vascular consulted.  Pt presenting to Cath Holding area this am for LUE  fistulogram secondary to left arm swelling    General: No fatigue, no fevers/chills  Respiratory: No dyspnea, no cough, no wheeze  CV: No chest pain, no palpitations  Abd: No nausea  Neuro: No headache, no dizziness    CM: SR  Neuro: A&OX3, CN 2-12 intact  HEENT: NC, AT  Lungs: CTA B/L  CV: S1, S2, no murmur, RRR  Abd: Soft  Extremity: + distal pulses    PRE-PROCEDURE ASSESSMENT  -NPO after midnight confirmed  -Patient seen and examined  -Labs reviewed  -Pre-procedure teaching completed with patient   - consent obtained   -Questions answered    ASA-4  MALL-2  Creat-2.49  eGFR-24  BRA-4.5%         79 yo male, PMH of HFpEF s/p cardiomem, ESRD on HD (MWF), HTN, DM2 on insulin, advanced dementia, prior GIB who presented to the ED with left arm swelling. Vascular consulted.  Pt presenting to Cath Holding area this am for LUE  fistulogram secondary to left arm swelling    General: No fatigue, no fevers/chills  Respiratory: No dyspnea, no cough, no wheeze  CV: No chest pain, no palpitations  Abd: No nausea  Neuro: No headache, no dizziness    CM: AF  Neuro: A&OX3, CN 2-12 intact  HEENT: NC, AT  Lungs: CTA B/L  CV: S1, S2, no murmur, RRR  Abd: Soft  Extremity: + distal pulses    PRE-PROCEDURE ASSESSMENT  -NPO after midnight confirmed  -Patient seen and examined  -Labs reviewed  -Pre-procedure teaching completed with patient   - consent obtained   -Questions answered    ASA-4  MALL-2  Creat-2.49  eGFR-24  BRA-4.5%         81 yo male, PMH of HFpEF s/p cardiomem, ESRD on HD (MWF), HTN, DM2 on insulin, advanced dementia, prior GIB who presented to the ED with left arm swelling. Vascular consulted.  Pt presenting to Cath Holding area this am for LUE  fistulogram secondary to left arm swelling    General: No fatigue, no fevers/chills  Respiratory: No dyspnea, no cough, no wheeze  CV: No chest pain, no palpitations  Abd: No nausea  Neuro: No headache, no dizziness    CM: RSR   Neuro: A&OX3, CN 2-12 intact  HEENT: NC, AT  Lungs: CTA B/L  CV: S1, S2, no murmur, RRR  Abd: Soft, NT, NABS  Extremity: + distal pulses    PRE-PROCEDURE ASSESSMENT  -NPO after midnight confirmed  -Patient seen and examined  -Labs reviewed  -Pre-procedure teaching completed with patient   - consent obtained   -Questions answered    ASA-4  MALL-2  Creat-2.49  eGFR-24  BRA-4.5%

## 2020-12-03 NOTE — DISCHARGE NOTE PROVIDER - NSDCMRMEDTOKEN_GEN_ALL_CORE_FT
amLODIPine 10 mg oral tablet: 1 tab(s) orally once a day  aspirin 81 mg oral tablet, chewable: 1 tab(s) orally once a day  calcitriol 0.25 mcg oral capsule: 1 cap(s) orally once a day  doxazosin 4 mg oral tablet: 1 tab(s) orally once a day (at bedtime)  ergocalciferol 2000 intl units oral capsule: 1 cap(s) orally once a day  ferrous sulfate 325 mg (65 mg elemental iron) oral tablet: 1 tab(s) orally once a day   hydrALAZINE 50 mg oral tablet: 1 tab(s) orally 3 times a day  isosorbide dinitrate 10 mg oral tablet: 1 tab(s) orally 3 times a day  Lantus Solostar Pen 100 units/mL subcutaneous solution: 8 unit(s) subcutaneous once a day (at bedtime)  memantine 10 mg oral tablet: 1 tab(s) orally 2 times a day  Metoprolol Succinate ER 25 mg oral tablet, extended release: 1 tab(s) orally once a day  pravastatin 40 mg oral tablet: 1 tab(s) orally once a day  sodium bicarbonate 650 mg oral tablet: 1 tab(s) orally 2 times a day  tamsulosin 0.4 mg oral capsule: 1 cap(s) orally once a day (at bedtime)  torsemide 20 mg oral tablet: 1 tab(s) orally 2 times a day  Vitamin D3 2000 intl units (50 mcg) oral capsule: orally once a day

## 2020-12-03 NOTE — DISCHARGE NOTE NURSING/CASE MANAGEMENT/SOCIAL WORK - PATIENT PORTAL LINK FT
You can access the FollowMyHealth Patient Portal offered by [facility] by registering at the following website: [email or web address]. By joining [facility]’s FollowMyHealth portal, you will also be able to view your health information using other applications (apps) compatible with our system.

## 2020-12-03 NOTE — DISCHARGE NOTE PROVIDER - NSDCCPCAREPLAN_GEN_ALL_CORE_FT
PRINCIPAL DISCHARGE DIAGNOSIS  Diagnosis: Arm swelling  Assessment and Plan of Treatment: LUE  fistulogram/ POBA/ Stent via LRA by Vascular Surgery

## 2020-12-03 NOTE — DISCHARGE NOTE PROVIDER - NSDCFUSCHEDAPPT_GEN_ALL_CORE_FT
JEMIMA SANTOS ; 12/09/2020 ; [facility] [address]  SANTOS, JOSE ; 12/22/2020 ; [facility] [address] ; 01/13/2021 ; [facility] [address]  SANTOS, JOSE ; 02/17/2021 ; [facility] [address]

## 2020-12-03 NOTE — DISCHARGE NOTE PROVIDER - CARE PROVIDER_API CALL
Casey Michael  SURGERY  [address], [address] [place] [address]  Phone: [phone]  Fax: [phone]  Follow Up Time:

## 2020-12-03 NOTE — PROGRESS NOTE ADULT - SUBJECTIVE AND OBJECTIVE BOX
Chief Complaint:  needs HD    SUBJECTIVE / OVERNIGHT EVENTS: no acute events reported overnight.  Pt offers no acute complaints at this time.     Patient denies chest pain, SOB, abd pain, N/V, fever, chills, dysuria or any other complaints. All remainder ROS negative.       I&O's Summary        PHYSICAL EXAM:  Vital Signs Last 24 Hrs  T(C): 36.7 (03 Dec 2020 08:09), Max: 37.1 (02 Dec 2020 18:36)  T(F): 98 (03 Dec 2020 08:09), Max: 98.8 (02 Dec 2020 18:36)  HR: 63 (03 Dec 2020 13:00) (63 - 86)  BP: 147/62 (03 Dec 2020 13:00) (142/53 - 163/68)  BP(mean): --  RR: 18 (03 Dec 2020 13:00) (16 - 19)  SpO2: 95% (03 Dec 2020 13:00) (94% - 96%)      CONSTITUTIONAL: pt examined bedside, laying comfortably in bed in NAD  HEENT: NC/AT, moist oral mucosa, clear conjunctiva, sclera nonicteric, EOMI  RESPIRATORY: Normal respiratory effort; CTA b/l, no wheezing, rhonchi, rales  CARDIOVASCULAR: RRR, normal S1 and S2, no murmur/rub/gallop  ABDOMEN: soft, NT/ND, normoactive bowel sounds, no rebound/guarding, no HSM  MUSCLOSKELETAL:  no joint swelling or tenderness to palpation  EXTREMITIES: No cynaosis, no clubbing, no lower extremity edema; Peripheral pulses are 2+ bilaterally  PSYCH: affect appropriate and cooperative  NEUROLOGY: A+O to person, place, and time, no focal neurologic deficits appreciated   SKIN: No rashes or no palpable lesions        LABS:                        10.9   6.24  )-----------( 131      ( 03 Dec 2020 04:21 )             33.2     12-03    137  |  99  |  28.0<H>  ----------------------------<  113<H>  3.8   |  28.0  |  2.49<H>    Ca    8.6      03 Dec 2020 04:21  Phos  3.4     12-03  Mg     2.2     12-03    TPro  6.8  /  Alb  3.8  /  TBili  0.4  /  DBili  x   /  AST  16  /  ALT  9   /  AlkPhos  68  12-03    PT/INR - ( 02 Dec 2020 12:59 )   PT: 12.7 sec;   INR: 1.10 ratio         PTT - ( 02 Dec 2020 12:59 )  PTT:31.2 sec          CAPILLARY BLOOD GLUCOSE      POCT Blood Glucose.: 103 mg/dL (03 Dec 2020 08:18)  POCT Blood Glucose.: 123 mg/dL (02 Dec 2020 23:40)        RADIOLOGY & ADDITIONAL TESTS:          MEDICATIONS  (STANDING):  amLODIPine   Tablet 10 milliGRAM(s) Oral daily  aspirin  chewable 81 milliGRAM(s) Oral daily  atorvastatin 10 milliGRAM(s) Oral at bedtime  calcitriol   Capsule 0.25 MICROGram(s) Oral daily  dextrose 40% Gel 15 Gram(s) Oral once  dextrose 5%. 1000 milliLiter(s) (50 mL/Hr) IV Continuous <Continuous>  dextrose 5%. 1000 milliLiter(s) (100 mL/Hr) IV Continuous <Continuous>  dextrose 50% Injectable 25 Gram(s) IV Push once  dextrose 50% Injectable 12.5 Gram(s) IV Push once  dextrose 50% Injectable 25 Gram(s) IV Push once  doxazosin 4 milliGRAM(s) Oral at bedtime  ferrous    sulfate 325 milliGRAM(s) Oral daily  glucagon  Injectable 1 milliGRAM(s) IntraMuscular once  heparin   Injectable 5000 Unit(s) SubCutaneous every 12 hours  hydrALAZINE 50 milliGRAM(s) Oral three times a day  insulin lispro (ADMELOG) corrective regimen sliding scale   SubCutaneous three times a day before meals  insulin lispro (ADMELOG) corrective regimen sliding scale   SubCutaneous at bedtime  isosorbide   dinitrate Tablet (ISORDIL) 10 milliGRAM(s) Oral three times a day  memantine 10 milliGRAM(s) Oral two times a day  metoprolol succinate ER 25 milliGRAM(s) Oral daily  sodium bicarbonate 650 milliGRAM(s) Oral two times a day  tamsulosin 0.4 milliGRAM(s) Oral at bedtime  torsemide 20 milliGRAM(s) Oral two times a day    MEDICATIONS  (PRN):  acetaminophen   Tablet .. 650 milliGRAM(s) Oral every 6 hours PRN Temp greater or equal to 38C (100.4F), Mild Pain (1 - 3)  melatonin 3 milliGRAM(s) Oral at bedtime PRN Sleep                                     Chief Complaint:  needs HD    SUBJECTIVE / OVERNIGHT EVENTS: no acute events reported overnight.  Pt offers no acute complaints at this time.     Patient denies chest pain, SOB, abd pain, N/V, fever, chills, dysuria or any other complaints. All remainder ROS negative.       I&O's Summary        PHYSICAL EXAM:  Vital Signs Last 24 Hrs  T(C): 36.7 (03 Dec 2020 08:09), Max: 37.1 (02 Dec 2020 18:36)  T(F): 98 (03 Dec 2020 08:09), Max: 98.8 (02 Dec 2020 18:36)  HR: 63 (03 Dec 2020 13:00) (63 - 86)  BP: 147/62 (03 Dec 2020 13:00) (142/53 - 163/68)  BP(mean): --  RR: 18 (03 Dec 2020 13:00) (16 - 19)  SpO2: 95% (03 Dec 2020 13:00) (94% - 96%)      CONSTITUTIONAL: pt examined bedside, laying comfortably in bed in NAD  HEENT: NC/AT, moist oral mucosa, clear conjunctiva, sclera nonicteric  RESPIRATORY: Normal respiratory effort; CTA b/l, no wheezing, rhonchi, rales  CARDIOVASCULAR: RRR, normal S1 and S2  ABDOMEN: soft, NT/ND, normoactive bowel sounds  EXTREMITIES: No cynaosis, no clubbing, no lower extremity edema, L-AVF, Peripheral pulses are 2+ bilaterally  PSYCH: affect appropriate and cooperative  NEUROLOGY: A+O to person, place, and time, no focal neurologic deficits appreciated   SKIN: No rashes or no palpable lesions        LABS:                        10.9   6.24  )-----------( 131      ( 03 Dec 2020 04:21 )             33.2     12-03    137  |  99  |  28.0<H>  ----------------------------<  113<H>  3.8   |  28.0  |  2.49<H>    Ca    8.6      03 Dec 2020 04:21  Phos  3.4     12-03  Mg     2.2     12-03    TPro  6.8  /  Alb  3.8  /  TBili  0.4  /  DBili  x   /  AST  16  /  ALT  9   /  AlkPhos  68  12-03    PT/INR - ( 02 Dec 2020 12:59 )   PT: 12.7 sec;   INR: 1.10 ratio         PTT - ( 02 Dec 2020 12:59 )  PTT:31.2 sec          CAPILLARY BLOOD GLUCOSE      POCT Blood Glucose.: 103 mg/dL (03 Dec 2020 08:18)  POCT Blood Glucose.: 123 mg/dL (02 Dec 2020 23:40)        RADIOLOGY & ADDITIONAL TESTS:          MEDICATIONS  (STANDING):  amLODIPine   Tablet 10 milliGRAM(s) Oral daily  aspirin  chewable 81 milliGRAM(s) Oral daily  atorvastatin 10 milliGRAM(s) Oral at bedtime  calcitriol   Capsule 0.25 MICROGram(s) Oral daily  dextrose 40% Gel 15 Gram(s) Oral once  dextrose 5%. 1000 milliLiter(s) (50 mL/Hr) IV Continuous <Continuous>  dextrose 5%. 1000 milliLiter(s) (100 mL/Hr) IV Continuous <Continuous>  dextrose 50% Injectable 25 Gram(s) IV Push once  dextrose 50% Injectable 12.5 Gram(s) IV Push once  dextrose 50% Injectable 25 Gram(s) IV Push once  doxazosin 4 milliGRAM(s) Oral at bedtime  ferrous    sulfate 325 milliGRAM(s) Oral daily  glucagon  Injectable 1 milliGRAM(s) IntraMuscular once  heparin   Injectable 5000 Unit(s) SubCutaneous every 12 hours  hydrALAZINE 50 milliGRAM(s) Oral three times a day  insulin lispro (ADMELOG) corrective regimen sliding scale   SubCutaneous three times a day before meals  insulin lispro (ADMELOG) corrective regimen sliding scale   SubCutaneous at bedtime  isosorbide   dinitrate Tablet (ISORDIL) 10 milliGRAM(s) Oral three times a day  memantine 10 milliGRAM(s) Oral two times a day  metoprolol succinate ER 25 milliGRAM(s) Oral daily  sodium bicarbonate 650 milliGRAM(s) Oral two times a day  tamsulosin 0.4 milliGRAM(s) Oral at bedtime  torsemide 20 milliGRAM(s) Oral two times a day    MEDICATIONS  (PRN):  acetaminophen   Tablet .. 650 milliGRAM(s) Oral every 6 hours PRN Temp greater or equal to 38C (100.4F), Mild Pain (1 - 3)  melatonin 3 milliGRAM(s) Oral at bedtime PRN Sleep

## 2020-12-03 NOTE — PROGRESS NOTE ADULT - ASSESSMENT
1) ESRD on HD  2) MBD of renal dx  3) Anemia of renal dx  4)  HTN    No concern for AVF stenosis  Resume HD MWF schedule  Continue phos binders with meals  KD as per protocol  Continue antihypertensive regimen.

## 2020-12-03 NOTE — DISCHARGE NOTE PROVIDER - HOSPITAL COURSE
79 y/o Khmer speaking M w/ a PMH of HFpEF s/p cardiomem, ESRD on HD (MWF), HTN, DM2 on insulin, advanced dementia, prior GIB  presented to the ED w/ L-arm swelling that started 1 day prior to arrival and was associated w/ mild pain.  AVF of L brachio-cephalic was originally placed by Dr. Michael February 2020 and was posteriorly ballooned in August 2020.  Pt was evaluated by vascular surgery given concern for possible stenosis of AVF.  Pt underwent LUE fistulogram w/ subsequent stent placement by Dr. Tadeo.   Pt tolerated the procedure well and Vascular surgery has cleared LUE fistula for HD use.   Pt will be discharged home today and will c/w HD as planned tomorrow.  Pt will f/u w/ Dr. Michael in 2 weeks.

## 2020-12-03 NOTE — PROGRESS NOTE ADULT - ASSESSMENT
79 yo male, PMH of HFpEF s/p cardiomem, ESRD on HD (MWF), HTN, DM2 on insulin, advanced dementia, prior GIB who presented to the ED with left arm swelling.     left arm swelling  - s/p LUE fistulogram/ POBA/ stent   - Pt tolerated the procedure well  - Vascular cleared AVF for HD use      ESRD on HD (MWF)  - Will resume HD as per schedule  - resume home meds    HTN  - resume Amlodipine, Isosorbide dinitrite, Metoprolol, Hydralazine    HFpEF  - not in acute exacerbation  - resume Torsemide    IDDM   - Resume home meds  - iss and hypoglycemic protocol     Anemia  - po iron  - epo per nephro    Dementia  - Namenda     Dispo: pt will be discharged home and c/w HD as planned tomorrow. Cleared to use AVF by vascular.

## 2020-12-03 NOTE — DISCHARGE NOTE PROVIDER - NSDCACTIVITY_GEN_ALL_CORE
Do not drive or operate machinery/Stairs allowed/Walking - Indoors allowed/No heavy lifting/straining/Walking - Outdoors allowed/Showering allowed

## 2020-12-10 NOTE — PHYSICAL EXAM
[JVD] : no jugular venous distention  [Normal Breath Sounds] : Normal breath sounds [Normal Rate and Rhythm] : normal rate and rhythm [2+] : left 2+ [Ankle Swelling (On Exam)] : not present [Abdomen Masses] : No abdominal masses [Abdomen Tenderness] : ~T ~M No abdominal tenderness [No Rash or Lesion] : No rash or lesion [Alert] : alert [Oriented to Person] : oriented to person [Oriented to Place] : oriented to place [Oriented to Time] : oriented to time [Calm] : calm [de-identified] : Well appearing [FreeTextEntry1] : mild left arm edema\par Left arm AVF with good thrill

## 2020-12-10 NOTE — HISTORY OF PRESENT ILLNESS
[FreeTextEntry1] : 79 year old man with ESRD on HD via a tunneled right IJ dialysis catheter.\par He is s/p left brachiocephalic AVF creation by me on 2/28/2020.\par  [de-identified] : Patient admitted to [facility] 1 week ago with left arm swelling and AVF infiltration\par Fistulogram revealed stenosis in the outflow vein and central venous stenosis for which he underwent venoplasty and stenting\par His left arm swelling has significantly improved but HD center reports very elevated arterial pressure

## 2020-12-10 NOTE — ASSESSMENT
[FreeTextEntry1] : 80 year old man with ESRD on HD via a tunneled right IJ dialysis catheter.\par He is s/p left brachiocephalic AVF creation by me on 2/28/2020.\par Patient admitted to [facility] 1 week ago with left arm swelling and AVF infiltration\par Fistulogram revealed stenosis in the outflow vein and central venous stenosis for which he underwent venoplasty and stenting\par His left arm swelling has significantly improved but HD center reports very elevated arterial pressure\par Will schedule for left arm fistulogram.\par Plan discussed with patient and wife. All questions answered. \par \par

## 2020-12-18 PROBLEM — Z87.19 HISTORY OF GASTROINTESTINAL HEMORRHAGE: Status: RESOLVED | Noted: 2020-01-01 | Resolved: 2020-01-01

## 2020-12-18 PROBLEM — Z86.39 HISTORY OF TYPE 2 DIABETES MELLITUS: Status: RESOLVED | Noted: 2020-01-01 | Resolved: 2020-01-01

## 2020-12-18 PROBLEM — Z86.59 HISTORY OF DEMENTIA: Status: RESOLVED | Noted: 2020-01-01 | Resolved: 2020-01-01

## 2020-12-18 PROBLEM — I50.20 SYSTOLIC HF (HEART FAILURE): Status: RESOLVED | Noted: 2020-01-01 | Resolved: 2020-01-01

## 2021-01-01 ENCOUNTER — NON-APPOINTMENT (OUTPATIENT)
Age: 81
End: 2021-01-01

## 2021-01-01 ENCOUNTER — RX RENEWAL (OUTPATIENT)
Age: 81
End: 2021-01-01

## 2021-01-01 ENCOUNTER — APPOINTMENT (OUTPATIENT)
Dept: ELECTROPHYSIOLOGY | Facility: CLINIC | Age: 81
End: 2021-01-01
Payer: MEDICARE

## 2021-01-01 ENCOUNTER — APPOINTMENT (OUTPATIENT)
Dept: VASCULAR SURGERY | Facility: CLINIC | Age: 81
End: 2021-01-01
Payer: MEDICARE

## 2021-01-01 ENCOUNTER — APPOINTMENT (OUTPATIENT)
Dept: CARDIOLOGY | Facility: CLINIC | Age: 81
End: 2021-01-01
Payer: MEDICARE

## 2021-01-01 ENCOUNTER — OUTPATIENT (OUTPATIENT)
Dept: OUTPATIENT SERVICES | Facility: HOSPITAL | Age: 81
LOS: 1 days | Discharge: HOME | End: 2021-01-01
Payer: MEDICARE

## 2021-01-01 ENCOUNTER — APPOINTMENT (OUTPATIENT)
Dept: PULMONOLOGY | Facility: CLINIC | Age: 81
End: 2021-01-01
Payer: MEDICARE

## 2021-01-01 ENCOUNTER — OUTPATIENT (OUTPATIENT)
Dept: OUTPATIENT SERVICES | Facility: HOSPITAL | Age: 81
LOS: 1 days | End: 2021-01-01
Payer: MEDICARE

## 2021-01-01 ENCOUNTER — APPOINTMENT (OUTPATIENT)
Dept: FAMILY MEDICINE | Facility: CLINIC | Age: 81
End: 2021-01-01
Payer: MEDICARE

## 2021-01-01 ENCOUNTER — LABORATORY RESULT (OUTPATIENT)
Age: 81
End: 2021-01-01

## 2021-01-01 ENCOUNTER — APPOINTMENT (OUTPATIENT)
Dept: CARDIOLOGY | Facility: CLINIC | Age: 81
End: 2021-01-01

## 2021-01-01 ENCOUNTER — INPATIENT (INPATIENT)
Facility: HOSPITAL | Age: 81
LOS: 2 days | Discharge: HOME | DRG: 981 | End: 2021-06-18
Attending: THORACIC SURGERY (CARDIOTHORACIC VASCULAR SURGERY) | Admitting: THORACIC SURGERY (CARDIOTHORACIC VASCULAR SURGERY)
Payer: MEDICARE

## 2021-01-01 ENCOUNTER — APPOINTMENT (OUTPATIENT)
Dept: THORACIC SURGERY | Facility: CLINIC | Age: 81
End: 2021-01-01
Payer: MEDICARE

## 2021-01-01 ENCOUNTER — APPOINTMENT (OUTPATIENT)
Dept: RADIOLOGY | Facility: CLINIC | Age: 81
End: 2021-01-01
Payer: MEDICARE

## 2021-01-01 ENCOUNTER — TRANSCRIPTION ENCOUNTER (OUTPATIENT)
Age: 81
End: 2021-01-01

## 2021-01-01 ENCOUNTER — APPOINTMENT (OUTPATIENT)
Dept: DISASTER EMERGENCY | Facility: CLINIC | Age: 81
End: 2021-01-01

## 2021-01-01 ENCOUNTER — APPOINTMENT (OUTPATIENT)
Dept: CARDIOTHORACIC SURGERY | Facility: CLINIC | Age: 81
End: 2021-01-01
Payer: MEDICARE

## 2021-01-01 ENCOUNTER — INPATIENT (INPATIENT)
Facility: HOSPITAL | Age: 81
LOS: 1 days | Discharge: HOME | DRG: 56 | End: 2021-09-17
Attending: STUDENT IN AN ORGANIZED HEALTH CARE EDUCATION/TRAINING PROGRAM | Admitting: FAMILY MEDICINE
Payer: MEDICARE

## 2021-01-01 ENCOUNTER — RESULT REVIEW (OUTPATIENT)
Age: 81
End: 2021-01-01

## 2021-01-01 ENCOUNTER — APPOINTMENT (OUTPATIENT)
Dept: NEUROLOGY | Facility: CLINIC | Age: 81
End: 2021-01-01
Payer: MEDICARE

## 2021-01-01 ENCOUNTER — APPOINTMENT (OUTPATIENT)
Dept: NEPHROLOGY | Facility: CLINIC | Age: 81
End: 2021-01-01
Payer: MEDICARE

## 2021-01-01 ENCOUNTER — APPOINTMENT (OUTPATIENT)
Dept: NEPHROLOGY | Facility: CLINIC | Age: 81
End: 2021-01-01

## 2021-01-01 ENCOUNTER — EMERGENCY (EMERGENCY)
Facility: HOSPITAL | Age: 81
LOS: 1 days | Discharge: HOME | End: 2021-01-01
Attending: STUDENT IN AN ORGANIZED HEALTH CARE EDUCATION/TRAINING PROGRAM
Payer: MEDICARE

## 2021-01-01 ENCOUNTER — RX CHANGE (OUTPATIENT)
Age: 81
End: 2021-01-01

## 2021-01-01 ENCOUNTER — APPOINTMENT (OUTPATIENT)
Dept: THORACIC SURGERY | Facility: HOSPITAL | Age: 81
End: 2021-01-01

## 2021-01-01 ENCOUNTER — APPOINTMENT (OUTPATIENT)
Dept: VASCULAR SURGERY | Facility: CLINIC | Age: 81
End: 2021-01-01

## 2021-01-01 ENCOUNTER — APPOINTMENT (OUTPATIENT)
Dept: FAMILY MEDICINE | Facility: CLINIC | Age: 81
End: 2021-01-01

## 2021-01-01 ENCOUNTER — INPATIENT (INPATIENT)
Facility: HOSPITAL | Age: 81
LOS: 5 days | Discharge: HOME | DRG: 291 | End: 2021-05-26
Attending: INTERNAL MEDICINE | Admitting: INTERNAL MEDICINE
Payer: MEDICARE

## 2021-01-01 ENCOUNTER — EMERGENCY (EMERGENCY)
Facility: HOSPITAL | Age: 81
LOS: 1 days | Discharge: HOME | End: 2021-01-01
Attending: EMERGENCY MEDICINE
Payer: MEDICARE

## 2021-01-01 VITALS
TEMPERATURE: 97.4 F | DIASTOLIC BLOOD PRESSURE: 76 MMHG | HEART RATE: 62 BPM | HEIGHT: 64 IN | OXYGEN SATURATION: 93 % | WEIGHT: 156 LBS | SYSTOLIC BLOOD PRESSURE: 154 MMHG | BODY MASS INDEX: 26.63 KG/M2

## 2021-01-01 VITALS
HEART RATE: 90 BPM | RESPIRATION RATE: 18 BRPM | TEMPERATURE: 98 F | DIASTOLIC BLOOD PRESSURE: 67 MMHG | SYSTOLIC BLOOD PRESSURE: 132 MMHG | OXYGEN SATURATION: 96 %

## 2021-01-01 VITALS
WEIGHT: 115 LBS | HEIGHT: 63 IN | OXYGEN SATURATION: 95 % | BODY MASS INDEX: 20.38 KG/M2 | SYSTOLIC BLOOD PRESSURE: 140 MMHG | DIASTOLIC BLOOD PRESSURE: 74 MMHG | RESPIRATION RATE: 16 BRPM | TEMPERATURE: 98 F | HEART RATE: 76 BPM

## 2021-01-01 VITALS
SYSTOLIC BLOOD PRESSURE: 129 MMHG | BODY MASS INDEX: 24.24 KG/M2 | OXYGEN SATURATION: 96 % | HEIGHT: 64 IN | DIASTOLIC BLOOD PRESSURE: 59 MMHG | HEART RATE: 89 BPM | WEIGHT: 142 LBS | RESPIRATION RATE: 17 BRPM

## 2021-01-01 VITALS
RESPIRATION RATE: 20 BRPM | OXYGEN SATURATION: 90 % | WEIGHT: 160.94 LBS | SYSTOLIC BLOOD PRESSURE: 132 MMHG | HEIGHT: 64 IN | HEART RATE: 71 BPM | DIASTOLIC BLOOD PRESSURE: 69 MMHG | TEMPERATURE: 99 F

## 2021-01-01 VITALS
HEART RATE: 82 BPM | DIASTOLIC BLOOD PRESSURE: 54 MMHG | RESPIRATION RATE: 18 BRPM | OXYGEN SATURATION: 94 % | TEMPERATURE: 99 F | SYSTOLIC BLOOD PRESSURE: 131 MMHG

## 2021-01-01 VITALS
RESPIRATION RATE: 16 BRPM | SYSTOLIC BLOOD PRESSURE: 158 MMHG | DIASTOLIC BLOOD PRESSURE: 62 MMHG | HEIGHT: 64 IN | OXYGEN SATURATION: 96 % | WEIGHT: 126 LBS | HEART RATE: 82 BPM | BODY MASS INDEX: 21.51 KG/M2 | TEMPERATURE: 206.96 F

## 2021-01-01 VITALS
TEMPERATURE: 97.1 F | SYSTOLIC BLOOD PRESSURE: 114 MMHG | HEIGHT: 64 IN | BODY MASS INDEX: 24.41 KG/M2 | RESPIRATION RATE: 12 BRPM | OXYGEN SATURATION: 94 % | HEART RATE: 82 BPM | WEIGHT: 143 LBS | DIASTOLIC BLOOD PRESSURE: 42 MMHG

## 2021-01-01 VITALS
HEART RATE: 68 BPM | HEIGHT: 64 IN | RESPIRATION RATE: 16 BRPM | SYSTOLIC BLOOD PRESSURE: 130 MMHG | BODY MASS INDEX: 27.49 KG/M2 | TEMPERATURE: 98.1 F | WEIGHT: 161 LBS | DIASTOLIC BLOOD PRESSURE: 64 MMHG | OXYGEN SATURATION: 92 %

## 2021-01-01 VITALS
TEMPERATURE: 98 F | HEART RATE: 80 BPM | RESPIRATION RATE: 16 BRPM | DIASTOLIC BLOOD PRESSURE: 51 MMHG | HEIGHT: 64 IN | SYSTOLIC BLOOD PRESSURE: 138 MMHG | WEIGHT: 156.53 LBS

## 2021-01-01 VITALS
HEART RATE: 68 BPM | DIASTOLIC BLOOD PRESSURE: 77 MMHG | TEMPERATURE: 97.3 F | OXYGEN SATURATION: 90 % | BODY MASS INDEX: 26.63 KG/M2 | HEIGHT: 64 IN | SYSTOLIC BLOOD PRESSURE: 155 MMHG | WEIGHT: 156 LBS

## 2021-01-01 VITALS
HEIGHT: 63 IN | HEART RATE: 79 BPM | OXYGEN SATURATION: 100 % | DIASTOLIC BLOOD PRESSURE: 70 MMHG | BODY MASS INDEX: 21.79 KG/M2 | WEIGHT: 123 LBS | TEMPERATURE: 208.4 F | SYSTOLIC BLOOD PRESSURE: 138 MMHG

## 2021-01-01 VITALS
SYSTOLIC BLOOD PRESSURE: 141 MMHG | TEMPERATURE: 97.8 F | OXYGEN SATURATION: 97 % | HEIGHT: 64 IN | BODY MASS INDEX: 24.24 KG/M2 | WEIGHT: 142 LBS | HEART RATE: 86 BPM | DIASTOLIC BLOOD PRESSURE: 54 MMHG

## 2021-01-01 VITALS
SYSTOLIC BLOOD PRESSURE: 127 MMHG | WEIGHT: 131 LBS | DIASTOLIC BLOOD PRESSURE: 63 MMHG | HEIGHT: 64 IN | BODY MASS INDEX: 22.36 KG/M2 | HEART RATE: 95 BPM | RESPIRATION RATE: 18 BRPM | OXYGEN SATURATION: 95 % | TEMPERATURE: 208.76 F

## 2021-01-01 VITALS
OXYGEN SATURATION: 95 % | RESPIRATION RATE: 16 BRPM | SYSTOLIC BLOOD PRESSURE: 120 MMHG | HEART RATE: 92 BPM | DIASTOLIC BLOOD PRESSURE: 70 MMHG

## 2021-01-01 VITALS
SYSTOLIC BLOOD PRESSURE: 120 MMHG | HEART RATE: 69 BPM | DIASTOLIC BLOOD PRESSURE: 52 MMHG | OXYGEN SATURATION: 94 % | WEIGHT: 158 LBS | TEMPERATURE: 97.2 F | BODY MASS INDEX: 26.98 KG/M2 | HEIGHT: 64 IN

## 2021-01-01 VITALS
HEIGHT: 61 IN | RESPIRATION RATE: 20 BRPM | SYSTOLIC BLOOD PRESSURE: 108 MMHG | OXYGEN SATURATION: 100 % | HEART RATE: 93 BPM | DIASTOLIC BLOOD PRESSURE: 56 MMHG | WEIGHT: 119.93 LBS | TEMPERATURE: 98 F

## 2021-01-01 VITALS
TEMPERATURE: 98 F | OXYGEN SATURATION: 99 % | OXYGEN SATURATION: 90 % | HEIGHT: 64 IN | DIASTOLIC BLOOD PRESSURE: 64 MMHG | TEMPERATURE: 99 F | WEIGHT: 160.94 LBS | HEART RATE: 72 BPM | HEART RATE: 62 BPM | DIASTOLIC BLOOD PRESSURE: 58 MMHG | SYSTOLIC BLOOD PRESSURE: 153 MMHG | RESPIRATION RATE: 18 BRPM | SYSTOLIC BLOOD PRESSURE: 121 MMHG | RESPIRATION RATE: 24 BRPM

## 2021-01-01 VITALS
TEMPERATURE: 97.6 F | BODY MASS INDEX: 26.46 KG/M2 | SYSTOLIC BLOOD PRESSURE: 130 MMHG | WEIGHT: 155 LBS | HEIGHT: 64 IN | DIASTOLIC BLOOD PRESSURE: 68 MMHG

## 2021-01-01 VITALS
SYSTOLIC BLOOD PRESSURE: 106 MMHG | DIASTOLIC BLOOD PRESSURE: 70 MMHG | OXYGEN SATURATION: 99 % | RESPIRATION RATE: 20 BRPM | HEART RATE: 70 BPM | HEIGHT: 61 IN

## 2021-01-01 VITALS
DIASTOLIC BLOOD PRESSURE: 57 MMHG | RESPIRATION RATE: 18 BRPM | WEIGHT: 142 LBS | TEMPERATURE: 97.52 F | BODY MASS INDEX: 24.24 KG/M2 | HEART RATE: 82 BPM | SYSTOLIC BLOOD PRESSURE: 143 MMHG | HEIGHT: 64 IN | OXYGEN SATURATION: 97 %

## 2021-01-01 VITALS
TEMPERATURE: 95.7 F | DIASTOLIC BLOOD PRESSURE: 72 MMHG | HEART RATE: 69 BPM | HEIGHT: 64 IN | SYSTOLIC BLOOD PRESSURE: 122 MMHG | RESPIRATION RATE: 16 BRPM | WEIGHT: 156 LBS | BODY MASS INDEX: 26.63 KG/M2 | OXYGEN SATURATION: 94 %

## 2021-01-01 VITALS
SYSTOLIC BLOOD PRESSURE: 142 MMHG | HEART RATE: 60 BPM | HEIGHT: 64 IN | TEMPERATURE: 97.6 F | BODY MASS INDEX: 25.78 KG/M2 | DIASTOLIC BLOOD PRESSURE: 60 MMHG | WEIGHT: 151 LBS

## 2021-01-01 VITALS
BODY MASS INDEX: 55.81 KG/M2 | HEART RATE: 71 BPM | TEMPERATURE: 97.5 F | RESPIRATION RATE: 16 BRPM | WEIGHT: 315 LBS | SYSTOLIC BLOOD PRESSURE: 120 MMHG | HEIGHT: 63 IN | OXYGEN SATURATION: 94 % | DIASTOLIC BLOOD PRESSURE: 80 MMHG

## 2021-01-01 VITALS — OXYGEN SATURATION: 99 % | RESPIRATION RATE: 18 BRPM

## 2021-01-01 VITALS
HEART RATE: 61 BPM | SYSTOLIC BLOOD PRESSURE: 160 MMHG | OXYGEN SATURATION: 94 % | DIASTOLIC BLOOD PRESSURE: 69 MMHG | RESPIRATION RATE: 17 BRPM

## 2021-01-01 VITALS
TEMPERATURE: 98 F | SYSTOLIC BLOOD PRESSURE: 140 MMHG | HEIGHT: 61 IN | OXYGEN SATURATION: 95 % | TEMPERATURE: 97.2 F | SYSTOLIC BLOOD PRESSURE: 126 MMHG | HEART RATE: 95 BPM | HEIGHT: 64 IN | BODY MASS INDEX: 26.63 KG/M2 | DIASTOLIC BLOOD PRESSURE: 80 MMHG | DIASTOLIC BLOOD PRESSURE: 56 MMHG | OXYGEN SATURATION: 94 % | HEART RATE: 78 BPM | RESPIRATION RATE: 16 BRPM | RESPIRATION RATE: 14 BRPM | WEIGHT: 156 LBS

## 2021-01-01 VITALS
RESPIRATION RATE: 17 BRPM | HEIGHT: 64 IN | OXYGEN SATURATION: 94 % | HEART RATE: 73 BPM | TEMPERATURE: 97.8 F | WEIGHT: 149 LBS | SYSTOLIC BLOOD PRESSURE: 102 MMHG | BODY MASS INDEX: 25.44 KG/M2 | DIASTOLIC BLOOD PRESSURE: 46 MMHG

## 2021-01-01 VITALS
SYSTOLIC BLOOD PRESSURE: 103 MMHG | OXYGEN SATURATION: 96 % | TEMPERATURE: 99 F | HEART RATE: 87 BPM | RESPIRATION RATE: 18 BRPM | HEIGHT: 66 IN | DIASTOLIC BLOOD PRESSURE: 63 MMHG

## 2021-01-01 VITALS
SYSTOLIC BLOOD PRESSURE: 133 MMHG | RESPIRATION RATE: 18 BRPM | TEMPERATURE: 98 F | HEIGHT: 66 IN | OXYGEN SATURATION: 97 % | DIASTOLIC BLOOD PRESSURE: 53 MMHG | HEART RATE: 80 BPM

## 2021-01-01 VITALS
SYSTOLIC BLOOD PRESSURE: 117 MMHG | TEMPERATURE: 98 F | OXYGEN SATURATION: 92 % | DIASTOLIC BLOOD PRESSURE: 51 MMHG | HEIGHT: 64 IN | RESPIRATION RATE: 20 BRPM | HEART RATE: 78 BPM | WEIGHT: 152.56 LBS

## 2021-01-01 VITALS — BODY MASS INDEX: 25.23 KG/M2 | DIASTOLIC BLOOD PRESSURE: 60 MMHG | SYSTOLIC BLOOD PRESSURE: 120 MMHG | WEIGHT: 147 LBS

## 2021-01-01 VITALS — OXYGEN SATURATION: 92 % | HEART RATE: 58 BPM | RESPIRATION RATE: 16 BRPM

## 2021-01-01 VITALS
RESPIRATION RATE: 17 BRPM | WEIGHT: 131 LBS | OXYGEN SATURATION: 96 % | SYSTOLIC BLOOD PRESSURE: 132 MMHG | DIASTOLIC BLOOD PRESSURE: 60 MMHG | TEMPERATURE: 204.8 F | BODY MASS INDEX: 22.36 KG/M2 | HEART RATE: 101 BPM | HEIGHT: 64 IN

## 2021-01-01 VITALS
OXYGEN SATURATION: 91 % | RESPIRATION RATE: 16 BRPM | HEART RATE: 67 BPM | TEMPERATURE: 97.2 F | WEIGHT: 146 LBS | DIASTOLIC BLOOD PRESSURE: 78 MMHG | SYSTOLIC BLOOD PRESSURE: 128 MMHG | HEIGHT: 64 IN | BODY MASS INDEX: 24.92 KG/M2

## 2021-01-01 VITALS — HEART RATE: 74 BPM

## 2021-01-01 VITALS
SYSTOLIC BLOOD PRESSURE: 156 MMHG | HEART RATE: 66 BPM | RESPIRATION RATE: 16 BRPM | OXYGEN SATURATION: 98 % | DIASTOLIC BLOOD PRESSURE: 70 MMHG

## 2021-01-01 VITALS
SYSTOLIC BLOOD PRESSURE: 120 MMHG | RESPIRATION RATE: 16 BRPM | TEMPERATURE: 96.4 F | HEART RATE: 87 BPM | OXYGEN SATURATION: 95 % | DIASTOLIC BLOOD PRESSURE: 78 MMHG | HEIGHT: 64 IN

## 2021-01-01 VITALS
DIASTOLIC BLOOD PRESSURE: 54 MMHG | HEART RATE: 64 BPM | TEMPERATURE: 98.4 F | WEIGHT: 156 LBS | OXYGEN SATURATION: 94 % | HEIGHT: 64 IN | BODY MASS INDEX: 26.63 KG/M2 | SYSTOLIC BLOOD PRESSURE: 142 MMHG

## 2021-01-01 DIAGNOSIS — H26.9 UNSPECIFIED CATARACT: Chronic | ICD-10-CM

## 2021-01-01 DIAGNOSIS — N25.81 SECONDARY HYPERPARATHYROIDISM OF RENAL ORIGIN: ICD-10-CM

## 2021-01-01 DIAGNOSIS — Z99.2 DEPENDENCE ON RENAL DIALYSIS: ICD-10-CM

## 2021-01-01 DIAGNOSIS — J90 PLEURAL EFFUSION, NOT ELSEWHERE CLASSIFIED: ICD-10-CM

## 2021-01-01 DIAGNOSIS — F02.80 ALZHEIMER'S DISEASE, UNSPECIFIED: ICD-10-CM

## 2021-01-01 DIAGNOSIS — Z82.0 FAMILY HISTORY OF EPILEPSY AND OTHER DISEASES OF THE NERVOUS SYSTEM: ICD-10-CM

## 2021-01-01 DIAGNOSIS — I77.0 ARTERIOVENOUS FISTULA, ACQUIRED: Chronic | ICD-10-CM

## 2021-01-01 DIAGNOSIS — Z98.890 OTHER SPECIFIED POSTPROCEDURAL STATES: ICD-10-CM

## 2021-01-01 DIAGNOSIS — N18.9 CHRONIC KIDNEY DISEASE, UNSPECIFIED: ICD-10-CM

## 2021-01-01 DIAGNOSIS — I10 ESSENTIAL (PRIMARY) HYPERTENSION: ICD-10-CM

## 2021-01-01 DIAGNOSIS — Z20.822 CONTACT WITH AND (SUSPECTED) EXPOSURE TO COVID-19: ICD-10-CM

## 2021-01-01 DIAGNOSIS — Z96.0 PRESENCE OF UROGENITAL IMPLANTS: Chronic | ICD-10-CM

## 2021-01-01 DIAGNOSIS — N39.0 URINARY TRACT INFECTION, SITE NOT SPECIFIED: ICD-10-CM

## 2021-01-01 DIAGNOSIS — Z95.9 PRESENCE OF CARDIAC AND VASCULAR IMPLANT AND GRAFT, UNSPECIFIED: Chronic | ICD-10-CM

## 2021-01-01 DIAGNOSIS — Z86.79 PERSONAL HISTORY OF OTHER DISEASES OF THE CIRCULATORY SYSTEM: ICD-10-CM

## 2021-01-01 DIAGNOSIS — I35.0 NONRHEUMATIC AORTIC (VALVE) STENOSIS: ICD-10-CM

## 2021-01-01 DIAGNOSIS — Z87.891 PERSONAL HISTORY OF NICOTINE DEPENDENCE: ICD-10-CM

## 2021-01-01 DIAGNOSIS — Z83.438 FAMILY HISTORY OF OTHER DISORDER OF LIPOPROTEIN METABOLISM AND OTHER LIPIDEMIA: ICD-10-CM

## 2021-01-01 DIAGNOSIS — F41.9 ANXIETY DISORDER, UNSPECIFIED: ICD-10-CM

## 2021-01-01 DIAGNOSIS — Z87.448 PERSONAL HISTORY OF OTHER DISEASES OF URINARY SYSTEM: ICD-10-CM

## 2021-01-01 DIAGNOSIS — I48.0 PAROXYSMAL ATRIAL FIBRILLATION: ICD-10-CM

## 2021-01-01 DIAGNOSIS — E03.9 HYPOTHYROIDISM, UNSPECIFIED: ICD-10-CM

## 2021-01-01 DIAGNOSIS — D64.9 ANEMIA, UNSPECIFIED: ICD-10-CM

## 2021-01-01 DIAGNOSIS — R06.02 SHORTNESS OF BREATH: ICD-10-CM

## 2021-01-01 DIAGNOSIS — Z00.00 ENCOUNTER FOR GENERAL ADULT MEDICAL EXAMINATION W/OUT ABNORMAL FINDINGS: ICD-10-CM

## 2021-01-01 DIAGNOSIS — J06.9 ACUTE UPPER RESPIRATORY INFECTION, UNSPECIFIED: ICD-10-CM

## 2021-01-01 DIAGNOSIS — N18.6 END STAGE RENAL DISEASE: ICD-10-CM

## 2021-01-01 DIAGNOSIS — Z01.818 ENCOUNTER FOR OTHER PREPROCEDURAL EXAMINATION: ICD-10-CM

## 2021-01-01 DIAGNOSIS — Z23 ENCOUNTER FOR IMMUNIZATION: ICD-10-CM

## 2021-01-01 DIAGNOSIS — Z92.29 PERSONAL HISTORY OF OTHER DRUG THERAPY: ICD-10-CM
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
## 2021-01-01 DIAGNOSIS — R09.89 OTHER SPECIFIED SYMPTOMS AND SIGNS INVOLVING THE CIRCULATORY AND RESPIRATORY SYSTEMS: ICD-10-CM

## 2021-01-01 DIAGNOSIS — N40.0 BENIGN PROSTATIC HYPERPLASIA WITHOUT LOWER URINARY TRACT SYMPTOMS: ICD-10-CM

## 2021-01-01 DIAGNOSIS — R39.9 UNSPECIFIED SYMPTOMS AND SIGNS INVOLVING THE GENITOURINARY SYSTEM: ICD-10-CM

## 2021-01-01 DIAGNOSIS — U07.1 COVID-19: ICD-10-CM

## 2021-01-01 DIAGNOSIS — E11.9 TYPE 2 DIABETES MELLITUS WITHOUT COMPLICATIONS: ICD-10-CM

## 2021-01-01 DIAGNOSIS — Z78.9 OTHER SPECIFIED HEALTH STATUS: ICD-10-CM

## 2021-01-01 DIAGNOSIS — G93.41 METABOLIC ENCEPHALOPATHY: ICD-10-CM

## 2021-01-01 DIAGNOSIS — F03.90 UNSPECIFIED DEMENTIA WITHOUT BEHAVIORAL DISTURBANCE: ICD-10-CM

## 2021-01-01 DIAGNOSIS — I50.30 UNSPECIFIED DIASTOLIC (CONGESTIVE) HEART FAILURE: ICD-10-CM

## 2021-01-01 DIAGNOSIS — I48.91 UNSPECIFIED ATRIAL FIBRILLATION: ICD-10-CM

## 2021-01-01 DIAGNOSIS — E78.5 HYPERLIPIDEMIA, UNSPECIFIED: ICD-10-CM

## 2021-01-01 DIAGNOSIS — Z99.2 END STAGE RENAL DISEASE: ICD-10-CM

## 2021-01-01 DIAGNOSIS — R60.0 LOCALIZED EDEMA: ICD-10-CM

## 2021-01-01 DIAGNOSIS — Z09 ENCOUNTER FOR FOLLOW-UP EXAMINATION AFTER COMPLETED TREATMENT FOR CONDITIONS OTHER THAN MALIGNANT NEOPLASM: ICD-10-CM

## 2021-01-01 DIAGNOSIS — I50.32 CHRONIC DIASTOLIC (CONGESTIVE) HEART FAILURE: ICD-10-CM

## 2021-01-01 DIAGNOSIS — R63.0 ANOREXIA: ICD-10-CM

## 2021-01-01 DIAGNOSIS — I77.0 ARTERIOVENOUS FISTULA, ACQUIRED: ICD-10-CM

## 2021-01-01 DIAGNOSIS — Z02.9 ENCOUNTER FOR ADMINISTRATIVE EXAMINATIONS, UNSPECIFIED: ICD-10-CM

## 2021-01-01 DIAGNOSIS — N40.0 BENIGN PROSTATIC HYPERPLASIA WITHOUT LOWER URINARY TRACT SYMPMS: ICD-10-CM

## 2021-01-01 DIAGNOSIS — Z98.62 PERIPHERAL VASCULAR ANGIOPLASTY STATUS: ICD-10-CM

## 2021-01-01 DIAGNOSIS — G30.9 ALZHEIMER'S DISEASE, UNSPECIFIED: ICD-10-CM

## 2021-01-01 DIAGNOSIS — Z95.818 PRESENCE OF OTHER CARDIAC IMPLANTS AND GRAFTS: ICD-10-CM

## 2021-01-01 DIAGNOSIS — Z83.3 FAMILY HISTORY OF DIABETES MELLITUS: ICD-10-CM

## 2021-01-01 DIAGNOSIS — K21.9 GASTRO-ESOPHAGEAL REFLUX DISEASE W/OUT ESOPHAGITIS: ICD-10-CM

## 2021-01-01 DIAGNOSIS — J03.90 ACUTE TONSILLITIS, UNSPECIFIED: ICD-10-CM

## 2021-01-01 DIAGNOSIS — Z87.09 PERSONAL HISTORY OF OTHER DISEASES OF THE RESPIRATORY SYSTEM: ICD-10-CM

## 2021-01-01 DIAGNOSIS — D72.829 ELEVATED WHITE BLOOD CELL COUNT, UNSPECIFIED: ICD-10-CM

## 2021-01-01 DIAGNOSIS — R77.8 OTHER SPECIFIED ABNORMALITIES OF PLASMA PROTEINS: ICD-10-CM

## 2021-01-01 DIAGNOSIS — I50.33 ACUTE ON CHRONIC DIASTOLIC (CONGESTIVE) HEART FAILURE: ICD-10-CM

## 2021-01-01 DIAGNOSIS — Z82.49 FAMILY HISTORY OF ISCHEMIC HEART DISEASE AND OTHER DISEASES OF THE CIRCULATORY SYSTEM: ICD-10-CM

## 2021-01-01 DIAGNOSIS — J95.811 POSTPROCEDURAL PNEUMOTHORAX: ICD-10-CM

## 2021-01-01 LAB
A1C WITH ESTIMATED AVERAGE GLUCOSE RESULT: 5.9 % — HIGH (ref 4–5.6)
A1C WITH ESTIMATED AVERAGE GLUCOSE RESULT: 7.2 % — HIGH (ref 4–5.6)
ALBUMIN FLD-MCNC: 1.7 G/DL — SIGNIFICANT CHANGE UP
ALBUMIN FLD-MCNC: 2 G/DL — SIGNIFICANT CHANGE UP
ALBUMIN SERPL ELPH-MCNC: 2.8 G/DL — LOW (ref 3.3–5.2)
ALBUMIN SERPL ELPH-MCNC: 2.9 G/DL — LOW (ref 3.3–5.2)
ALBUMIN SERPL ELPH-MCNC: 3 G/DL — LOW (ref 3.3–5.2)
ALBUMIN SERPL ELPH-MCNC: 3.3 G/DL — SIGNIFICANT CHANGE UP (ref 3.3–5.2)
ALBUMIN SERPL ELPH-MCNC: 3.4 G/DL — SIGNIFICANT CHANGE UP (ref 3.3–5.2)
ALBUMIN SERPL ELPH-MCNC: 3.4 G/DL — SIGNIFICANT CHANGE UP (ref 3.3–5.2)
ALBUMIN SERPL ELPH-MCNC: 3.8 G/DL
ALBUMIN SERPL ELPH-MCNC: 3.8 G/DL — SIGNIFICANT CHANGE UP (ref 3.3–5.2)
ALBUMIN SERPL ELPH-MCNC: 4 G/DL — SIGNIFICANT CHANGE UP (ref 3.3–5.2)
ALBUMIN SERPL ELPH-MCNC: 4 G/DL — SIGNIFICANT CHANGE UP (ref 3.3–5.2)
ALBUMIN SERPL ELPH-MCNC: 4.1 G/DL
ALBUMIN SERPL ELPH-MCNC: 4.4 G/DL
ALBUMIN SERPL ELPH-MCNC: 4.6 G/DL
ALP BLD-CCNC: 75 U/L
ALP BLD-CCNC: 77 U/L
ALP BLD-CCNC: 90 U/L
ALP BLD-CCNC: 95 U/L
ALP SERPL-CCNC: 108 U/L — SIGNIFICANT CHANGE UP (ref 40–120)
ALP SERPL-CCNC: 56 U/L — SIGNIFICANT CHANGE UP (ref 40–120)
ALP SERPL-CCNC: 60 U/L — SIGNIFICANT CHANGE UP (ref 40–120)
ALP SERPL-CCNC: 64 U/L — SIGNIFICANT CHANGE UP (ref 40–120)
ALP SERPL-CCNC: 65 U/L — SIGNIFICANT CHANGE UP (ref 40–120)
ALP SERPL-CCNC: 68 U/L — SIGNIFICANT CHANGE UP (ref 40–120)
ALP SERPL-CCNC: 69 U/L — SIGNIFICANT CHANGE UP (ref 40–120)
ALP SERPL-CCNC: 79 U/L — SIGNIFICANT CHANGE UP (ref 40–120)
ALP SERPL-CCNC: 93 U/L — SIGNIFICANT CHANGE UP (ref 40–120)
ALT FLD-CCNC: 5 U/L — SIGNIFICANT CHANGE UP
ALT FLD-CCNC: 6 U/L — SIGNIFICANT CHANGE UP
ALT FLD-CCNC: 6 U/L — SIGNIFICANT CHANGE UP
ALT FLD-CCNC: 7 U/L — SIGNIFICANT CHANGE UP
ALT FLD-CCNC: 9 U/L — SIGNIFICANT CHANGE UP
ALT FLD-CCNC: 9 U/L — SIGNIFICANT CHANGE UP
ALT FLD-CCNC: <5 U/L — SIGNIFICANT CHANGE UP
ALT SERPL-CCNC: 10 U/L
ALT SERPL-CCNC: 8 U/L
ALT SERPL-CCNC: 9 U/L
ALT SERPL-CCNC: 9 U/L
ANION GAP SERPL CALC-SCNC: 10 MMOL/L — SIGNIFICANT CHANGE UP (ref 5–17)
ANION GAP SERPL CALC-SCNC: 11 MMOL/L — SIGNIFICANT CHANGE UP (ref 5–17)
ANION GAP SERPL CALC-SCNC: 11 MMOL/L — SIGNIFICANT CHANGE UP (ref 5–17)
ANION GAP SERPL CALC-SCNC: 13 MMOL/L — SIGNIFICANT CHANGE UP (ref 5–17)
ANION GAP SERPL CALC-SCNC: 14 MMOL/L — SIGNIFICANT CHANGE UP (ref 5–17)
ANION GAP SERPL CALC-SCNC: 15 MMOL/L
ANION GAP SERPL CALC-SCNC: 16 MMOL/L
ANION GAP SERPL CALC-SCNC: 16 MMOL/L — SIGNIFICANT CHANGE UP (ref 5–17)
ANION GAP SERPL CALC-SCNC: 17 MMOL/L — SIGNIFICANT CHANGE UP (ref 5–17)
ANION GAP SERPL CALC-SCNC: 19 MMOL/L — HIGH (ref 5–17)
ANION GAP SERPL CALC-SCNC: 22 MMOL/L — HIGH (ref 5–17)
ANION GAP SERPL CALC-SCNC: 7 MMOL/L — SIGNIFICANT CHANGE UP (ref 5–17)
ANION GAP SERPL CALC-SCNC: 9 MMOL/L — SIGNIFICANT CHANGE UP (ref 5–17)
ANISOCYTOSIS BLD QL: SLIGHT — SIGNIFICANT CHANGE UP
APPEARANCE: ABNORMAL
APTT BLD: 28.8 SEC — SIGNIFICANT CHANGE UP (ref 27.5–35.5)
APTT BLD: 30.1 SEC — SIGNIFICANT CHANGE UP (ref 27.5–35.5)
APTT BLD: 32.3 SEC — SIGNIFICANT CHANGE UP (ref 27.5–35.5)
AST SERPL-CCNC: 10 U/L — SIGNIFICANT CHANGE UP
AST SERPL-CCNC: 11 U/L — SIGNIFICANT CHANGE UP
AST SERPL-CCNC: 12 U/L — SIGNIFICANT CHANGE UP
AST SERPL-CCNC: 13 U/L
AST SERPL-CCNC: 13 U/L — SIGNIFICANT CHANGE UP
AST SERPL-CCNC: 13 U/L — SIGNIFICANT CHANGE UP
AST SERPL-CCNC: 14 U/L — SIGNIFICANT CHANGE UP
AST SERPL-CCNC: 15 U/L — SIGNIFICANT CHANGE UP
AST SERPL-CCNC: 16 U/L
AST SERPL-CCNC: 16 U/L — SIGNIFICANT CHANGE UP
AST SERPL-CCNC: 17 U/L — SIGNIFICANT CHANGE UP
AST SERPL-CCNC: 18 U/L
AST SERPL-CCNC: 18 U/L
B PERT IGG+IGM PNL SER: ABNORMAL
B PERT IGG+IGM PNL SER: CLEAR — SIGNIFICANT CHANGE UP
BASOPHILS # BLD AUTO: 0.04 K/UL — SIGNIFICANT CHANGE UP (ref 0–0.2)
BASOPHILS # BLD AUTO: 0.05 K/UL — SIGNIFICANT CHANGE UP (ref 0–0.2)
BASOPHILS # BLD AUTO: 0.06 K/UL
BASOPHILS # BLD AUTO: 0.06 K/UL — SIGNIFICANT CHANGE UP (ref 0–0.2)
BASOPHILS # BLD AUTO: 0.07 K/UL
BASOPHILS # BLD AUTO: 0.07 K/UL — SIGNIFICANT CHANGE UP (ref 0–0.2)
BASOPHILS # BLD AUTO: 0.08 K/UL
BASOPHILS # BLD AUTO: 0.08 K/UL — SIGNIFICANT CHANGE UP (ref 0–0.2)
BASOPHILS # BLD AUTO: 0.09 K/UL
BASOPHILS # BLD AUTO: 0.09 K/UL — SIGNIFICANT CHANGE UP (ref 0–0.2)
BASOPHILS # BLD AUTO: 0.09 K/UL — SIGNIFICANT CHANGE UP (ref 0–0.2)
BASOPHILS # BLD AUTO: 0.12 K/UL
BASOPHILS # BLD AUTO: 0.15 K/UL — SIGNIFICANT CHANGE UP (ref 0–0.2)
BASOPHILS NFR BLD AUTO: 0.4 % — SIGNIFICANT CHANGE UP (ref 0–2)
BASOPHILS NFR BLD AUTO: 0.5 %
BASOPHILS NFR BLD AUTO: 0.5 % — SIGNIFICANT CHANGE UP (ref 0–2)
BASOPHILS NFR BLD AUTO: 0.6 % — SIGNIFICANT CHANGE UP (ref 0–2)
BASOPHILS NFR BLD AUTO: 0.7 % — SIGNIFICANT CHANGE UP (ref 0–2)
BASOPHILS NFR BLD AUTO: 0.8 % — SIGNIFICANT CHANGE UP (ref 0–2)
BASOPHILS NFR BLD AUTO: 0.9 %
BASOPHILS NFR BLD AUTO: 0.9 %
BASOPHILS NFR BLD AUTO: 0.9 % — SIGNIFICANT CHANGE UP (ref 0–2)
BASOPHILS NFR BLD AUTO: 1 %
BASOPHILS NFR BLD AUTO: 1 % — SIGNIFICANT CHANGE UP (ref 0–2)
BASOPHILS NFR BLD AUTO: 1.1 %
BASOPHILS NFR BLD AUTO: 1.4 % — SIGNIFICANT CHANGE UP (ref 0–2)
BILIRUB SERPL-MCNC: 0.2 MG/DL
BILIRUB SERPL-MCNC: 0.2 MG/DL — LOW (ref 0.4–2)
BILIRUB SERPL-MCNC: 0.3 MG/DL — LOW (ref 0.4–2)
BILIRUB SERPL-MCNC: 0.3 MG/DL — LOW (ref 0.4–2)
BILIRUB SERPL-MCNC: 0.4 MG/DL
BILIRUB SERPL-MCNC: 0.4 MG/DL — SIGNIFICANT CHANGE UP (ref 0.4–2)
BILIRUB SERPL-MCNC: 0.5 MG/DL — SIGNIFICANT CHANGE UP (ref 0.4–2)
BILIRUBIN URINE: NEGATIVE
BLD GP AB SCN SERPL QL: SIGNIFICANT CHANGE UP
BLOOD URINE: ABNORMAL
BUN SERPL-MCNC: 17.9 MG/DL — SIGNIFICANT CHANGE UP (ref 8–20)
BUN SERPL-MCNC: 23 MG/DL — HIGH (ref 8–20)
BUN SERPL-MCNC: 23.3 MG/DL — HIGH (ref 8–20)
BUN SERPL-MCNC: 27.9 MG/DL — HIGH (ref 8–20)
BUN SERPL-MCNC: 28 MG/DL — HIGH (ref 8–20)
BUN SERPL-MCNC: 28.5 MG/DL — HIGH (ref 8–20)
BUN SERPL-MCNC: 29 MG/DL — HIGH (ref 8–20)
BUN SERPL-MCNC: 30 MG/DL — HIGH (ref 8–20)
BUN SERPL-MCNC: 30 MG/DL — HIGH (ref 8–20)
BUN SERPL-MCNC: 30.6 MG/DL — HIGH (ref 8–20)
BUN SERPL-MCNC: 37 MG/DL
BUN SERPL-MCNC: 42 MG/DL — HIGH (ref 8–20)
BUN SERPL-MCNC: 44 MG/DL
BUN SERPL-MCNC: 45 MG/DL
BUN SERPL-MCNC: 45 MG/DL
BUN SERPL-MCNC: 48 MG/DL — HIGH (ref 8–20)
BUN SERPL-MCNC: 49 MG/DL — HIGH (ref 8–20)
BUN SERPL-MCNC: 50 MG/DL — HIGH (ref 8–20)
BUN SERPL-MCNC: 50.1 MG/DL — HIGH (ref 8–20)
BUN SERPL-MCNC: 51.9 MG/DL — HIGH (ref 8–20)
BUN SERPL-MCNC: 52 MG/DL — HIGH (ref 8–20)
BUN SERPL-MCNC: 61 MG/DL — HIGH (ref 8–20)
BUN SERPL-MCNC: 66 MG/DL — HIGH (ref 8–20)
BUN SERPL-MCNC: 72.9 MG/DL — HIGH (ref 8–20)
CALCIUM SERPL-MCNC: 7.9 MG/DL — LOW (ref 8.6–10.2)
CALCIUM SERPL-MCNC: 8 MG/DL — LOW (ref 8.6–10.2)
CALCIUM SERPL-MCNC: 8.1 MG/DL — LOW (ref 8.6–10.2)
CALCIUM SERPL-MCNC: 8.2 MG/DL — LOW (ref 8.6–10.2)
CALCIUM SERPL-MCNC: 8.3 MG/DL — LOW (ref 8.6–10.2)
CALCIUM SERPL-MCNC: 8.3 MG/DL — LOW (ref 8.6–10.2)
CALCIUM SERPL-MCNC: 8.4 MG/DL — LOW (ref 8.6–10.2)
CALCIUM SERPL-MCNC: 8.4 MG/DL — LOW (ref 8.6–10.2)
CALCIUM SERPL-MCNC: 8.5 MG/DL — LOW (ref 8.6–10.2)
CALCIUM SERPL-MCNC: 8.6 MG/DL — SIGNIFICANT CHANGE UP (ref 8.6–10.2)
CALCIUM SERPL-MCNC: 8.7 MG/DL
CALCIUM SERPL-MCNC: 8.7 MG/DL — SIGNIFICANT CHANGE UP (ref 8.6–10.2)
CALCIUM SERPL-MCNC: 8.8 MG/DL — SIGNIFICANT CHANGE UP (ref 8.6–10.2)
CALCIUM SERPL-MCNC: 8.9 MG/DL — SIGNIFICANT CHANGE UP (ref 8.6–10.2)
CALCIUM SERPL-MCNC: 9 MG/DL
CALCIUM SERPL-MCNC: 9 MG/DL — SIGNIFICANT CHANGE UP (ref 8.6–10.2)
CALCIUM SERPL-MCNC: 9.1 MG/DL
CALCIUM SERPL-MCNC: 9.2 MG/DL
CALCIUM SERPL-MCNC: 9.2 MG/DL — SIGNIFICANT CHANGE UP (ref 8.6–10.2)
CALCIUM SERPL-MCNC: 9.5 MG/DL — SIGNIFICANT CHANGE UP (ref 8.6–10.2)
CHLORIDE SERPL-SCNC: 100 MMOL/L — SIGNIFICANT CHANGE UP (ref 98–107)
CHLORIDE SERPL-SCNC: 91 MMOL/L — LOW (ref 98–107)
CHLORIDE SERPL-SCNC: 91 MMOL/L — LOW (ref 98–107)
CHLORIDE SERPL-SCNC: 92 MMOL/L
CHLORIDE SERPL-SCNC: 92 MMOL/L
CHLORIDE SERPL-SCNC: 92 MMOL/L — LOW (ref 98–107)
CHLORIDE SERPL-SCNC: 93 MMOL/L
CHLORIDE SERPL-SCNC: 93 MMOL/L
CHLORIDE SERPL-SCNC: 93 MMOL/L — LOW (ref 98–107)
CHLORIDE SERPL-SCNC: 93 MMOL/L — LOW (ref 98–107)
CHLORIDE SERPL-SCNC: 94 MMOL/L — LOW (ref 98–107)
CHLORIDE SERPL-SCNC: 95 MMOL/L — LOW (ref 98–107)
CHLORIDE SERPL-SCNC: 96 MMOL/L — LOW (ref 98–107)
CHLORIDE SERPL-SCNC: 96 MMOL/L — LOW (ref 98–107)
CHLORIDE SERPL-SCNC: 99 MMOL/L — SIGNIFICANT CHANGE UP (ref 98–107)
CHOLEST SERPL-MCNC: 140 MG/DL
CHOLEST SERPL-MCNC: 151 MG/DL
CK SERPL-CCNC: 16 U/L — LOW (ref 30–200)
CK SERPL-CCNC: 40 U/L — SIGNIFICANT CHANGE UP (ref 30–200)
CO2 SERPL-SCNC: 25 MMOL/L
CO2 SERPL-SCNC: 25 MMOL/L — SIGNIFICANT CHANGE UP (ref 22–29)
CO2 SERPL-SCNC: 26 MMOL/L
CO2 SERPL-SCNC: 26 MMOL/L — SIGNIFICANT CHANGE UP (ref 22–29)
CO2 SERPL-SCNC: 26 MMOL/L — SIGNIFICANT CHANGE UP (ref 22–29)
CO2 SERPL-SCNC: 27 MMOL/L — SIGNIFICANT CHANGE UP (ref 22–29)
CO2 SERPL-SCNC: 28 MMOL/L — SIGNIFICANT CHANGE UP (ref 22–29)
CO2 SERPL-SCNC: 29 MMOL/L
CO2 SERPL-SCNC: 29 MMOL/L — SIGNIFICANT CHANGE UP (ref 22–29)
CO2 SERPL-SCNC: 29 MMOL/L — SIGNIFICANT CHANGE UP (ref 22–29)
CO2 SERPL-SCNC: 30 MMOL/L
CO2 SERPL-SCNC: 30 MMOL/L — HIGH (ref 22–29)
CO2 SERPL-SCNC: 30 MMOL/L — HIGH (ref 22–29)
CO2 SERPL-SCNC: 31 MMOL/L — HIGH (ref 22–29)
CO2 SERPL-SCNC: 32 MMOL/L — HIGH (ref 22–29)
COLOR FLD: YELLOW
COLOR FLD: YELLOW
COLOR: YELLOW
COVID-19 SPIKE DOMAIN AB INTERP: POSITIVE
COVID-19 SPIKE DOMAIN ANTIBODY RESULT: 11.8 U/ML — HIGH
COVID-19 SPIKE DOMAIN ANTIBODY RESULT: 2.98 U/ML — HIGH
COVID-19 SPIKE DOMAIN ANTIBODY RESULT: 5.91 U/ML — HIGH
CREAT SERPL-MCNC: 2.2 MG/DL — HIGH (ref 0.5–1.3)
CREAT SERPL-MCNC: 2.2 MG/DL — HIGH (ref 0.5–1.3)
CREAT SERPL-MCNC: 2.47 MG/DL — HIGH (ref 0.5–1.3)
CREAT SERPL-MCNC: 2.51 MG/DL
CREAT SERPL-MCNC: 2.52 MG/DL
CREAT SERPL-MCNC: 2.55 MG/DL — HIGH (ref 0.5–1.3)
CREAT SERPL-MCNC: 2.58 MG/DL — HIGH (ref 0.5–1.3)
CREAT SERPL-MCNC: 2.78 MG/DL — HIGH (ref 0.5–1.3)
CREAT SERPL-MCNC: 2.82 MG/DL — HIGH (ref 0.5–1.3)
CREAT SERPL-MCNC: 2.9 MG/DL — HIGH (ref 0.5–1.3)
CREAT SERPL-MCNC: 3.02 MG/DL — HIGH (ref 0.5–1.3)
CREAT SERPL-MCNC: 3.12 MG/DL — HIGH (ref 0.5–1.3)
CREAT SERPL-MCNC: 3.13 MG/DL — HIGH (ref 0.5–1.3)
CREAT SERPL-MCNC: 3.29 MG/DL — HIGH (ref 0.5–1.3)
CREAT SERPL-MCNC: 3.33 MG/DL — HIGH (ref 0.5–1.3)
CREAT SERPL-MCNC: 3.34 MG/DL
CREAT SERPL-MCNC: 3.45 MG/DL — HIGH (ref 0.5–1.3)
CREAT SERPL-MCNC: 3.45 MG/DL — HIGH (ref 0.5–1.3)
CREAT SERPL-MCNC: 3.47 MG/DL — HIGH (ref 0.5–1.3)
CREAT SERPL-MCNC: 3.52 MG/DL — HIGH (ref 0.5–1.3)
CREAT SERPL-MCNC: 3.62 MG/DL — HIGH (ref 0.5–1.3)
CREAT SERPL-MCNC: 3.7 MG/DL
CREAT SERPL-MCNC: 4 MG/DL — HIGH (ref 0.5–1.3)
CREAT SERPL-MCNC: 4.17 MG/DL — HIGH (ref 0.5–1.3)
CULTURE RESULTS: SIGNIFICANT CHANGE UP
D DIMER BLD IA.RAPID-MCNC: 453 NG/ML DDU — HIGH
EOSINOPHIL # BLD AUTO: 0 K/UL — SIGNIFICANT CHANGE UP (ref 0–0.5)
EOSINOPHIL # BLD AUTO: 0.01 K/UL — SIGNIFICANT CHANGE UP (ref 0–0.5)
EOSINOPHIL # BLD AUTO: 0.03 K/UL — SIGNIFICANT CHANGE UP (ref 0–0.5)
EOSINOPHIL # BLD AUTO: 0.05 K/UL
EOSINOPHIL # BLD AUTO: 0.09 K/UL
EOSINOPHIL # BLD AUTO: 0.11 K/UL — SIGNIFICANT CHANGE UP (ref 0–0.5)
EOSINOPHIL # BLD AUTO: 0.12 K/UL
EOSINOPHIL # BLD AUTO: 0.13 K/UL — SIGNIFICANT CHANGE UP (ref 0–0.5)
EOSINOPHIL # BLD AUTO: 0.14 K/UL
EOSINOPHIL # BLD AUTO: 0.14 K/UL — SIGNIFICANT CHANGE UP (ref 0–0.5)
EOSINOPHIL # BLD AUTO: 0.2 K/UL
EOSINOPHIL # BLD AUTO: 0.2 K/UL — SIGNIFICANT CHANGE UP (ref 0–0.5)
EOSINOPHIL # BLD AUTO: 0.21 K/UL — SIGNIFICANT CHANGE UP (ref 0–0.5)
EOSINOPHIL # BLD AUTO: 0.22 K/UL — SIGNIFICANT CHANGE UP (ref 0–0.5)
EOSINOPHIL # BLD AUTO: 0.26 K/UL — SIGNIFICANT CHANGE UP (ref 0–0.5)
EOSINOPHIL # BLD AUTO: 0.27 K/UL — SIGNIFICANT CHANGE UP (ref 0–0.5)
EOSINOPHIL NFR BLD AUTO: 0 % — SIGNIFICANT CHANGE UP (ref 0–6)
EOSINOPHIL NFR BLD AUTO: 0.1 % — SIGNIFICANT CHANGE UP (ref 0–6)
EOSINOPHIL NFR BLD AUTO: 0.2 % — SIGNIFICANT CHANGE UP (ref 0–6)
EOSINOPHIL NFR BLD AUTO: 0.5 %
EOSINOPHIL NFR BLD AUTO: 0.9 %
EOSINOPHIL NFR BLD AUTO: 1 %
EOSINOPHIL NFR BLD AUTO: 1.1 % — SIGNIFICANT CHANGE UP (ref 0–6)
EOSINOPHIL NFR BLD AUTO: 1.4 % — SIGNIFICANT CHANGE UP (ref 0–6)
EOSINOPHIL NFR BLD AUTO: 1.4 % — SIGNIFICANT CHANGE UP (ref 0–6)
EOSINOPHIL NFR BLD AUTO: 1.8 %
EOSINOPHIL NFR BLD AUTO: 2.1 % — SIGNIFICANT CHANGE UP (ref 0–6)
EOSINOPHIL NFR BLD AUTO: 2.5 % — SIGNIFICANT CHANGE UP (ref 0–6)
EOSINOPHIL NFR BLD AUTO: 2.7 %
EOSINOPHIL NFR BLD AUTO: 3.3 % — SIGNIFICANT CHANGE UP (ref 0–6)
EOSINOPHIL NFR BLD AUTO: 3.5 % — SIGNIFICANT CHANGE UP (ref 0–6)
EOSINOPHIL NFR BLD AUTO: 3.6 % — SIGNIFICANT CHANGE UP (ref 0–6)
ERYTHROCYTE [SEDIMENTATION RATE] IN BLOOD BY WESTERGREN METHOD: 16 MM/HR
ERYTHROCYTE [SEDIMENTATION RATE] IN BLOOD BY WESTERGREN METHOD: 46 MM/HR
ERYTHROCYTE [SEDIMENTATION RATE] IN BLOOD BY WESTERGREN METHOD: 8 MM/HR
ESTIMATED AVERAGE GLUCOSE: 103 MG/DL
ESTIMATED AVERAGE GLUCOSE: 123 MG/DL — HIGH (ref 68–114)
ESTIMATED AVERAGE GLUCOSE: 160 MG/DL — HIGH (ref 68–114)
FLUID INTAKE SUBSTANCE CLASS: SIGNIFICANT CHANGE UP
FLUID INTAKE SUBSTANCE CLASS: SIGNIFICANT CHANGE UP
FLUID SEGMENTED GRANULOCYTES: 2 % — SIGNIFICANT CHANGE UP
FLUID SEGMENTED GRANULOCYTES: 2 % — SIGNIFICANT CHANGE UP
FOLATE SERPL-MCNC: 5.1 NG/ML
GIANT PLATELETS BLD QL SMEAR: PRESENT — SIGNIFICANT CHANGE UP
GLUCOSE BLDC GLUCOMTR-MCNC: 110 MG/DL — HIGH (ref 70–99)
GLUCOSE BLDC GLUCOMTR-MCNC: 111 MG/DL — HIGH (ref 70–99)
GLUCOSE BLDC GLUCOMTR-MCNC: 114 MG/DL — HIGH (ref 70–99)
GLUCOSE BLDC GLUCOMTR-MCNC: 115 MG/DL — HIGH (ref 70–99)
GLUCOSE BLDC GLUCOMTR-MCNC: 117 MG/DL — HIGH (ref 70–99)
GLUCOSE BLDC GLUCOMTR-MCNC: 119 MG/DL — HIGH (ref 70–99)
GLUCOSE BLDC GLUCOMTR-MCNC: 125 MG/DL — HIGH (ref 70–99)
GLUCOSE BLDC GLUCOMTR-MCNC: 128 MG/DL — HIGH (ref 70–99)
GLUCOSE BLDC GLUCOMTR-MCNC: 131 MG/DL — HIGH (ref 70–99)
GLUCOSE BLDC GLUCOMTR-MCNC: 133 MG/DL — HIGH (ref 70–99)
GLUCOSE BLDC GLUCOMTR-MCNC: 143 MG/DL — HIGH (ref 70–99)
GLUCOSE BLDC GLUCOMTR-MCNC: 145 MG/DL — HIGH (ref 70–99)
GLUCOSE BLDC GLUCOMTR-MCNC: 147 MG/DL — HIGH (ref 70–99)
GLUCOSE BLDC GLUCOMTR-MCNC: 149 MG/DL — HIGH (ref 70–99)
GLUCOSE BLDC GLUCOMTR-MCNC: 151 MG/DL — HIGH (ref 70–99)
GLUCOSE BLDC GLUCOMTR-MCNC: 153 MG/DL — HIGH (ref 70–99)
GLUCOSE BLDC GLUCOMTR-MCNC: 154 MG/DL — HIGH (ref 70–99)
GLUCOSE BLDC GLUCOMTR-MCNC: 156 MG/DL — HIGH (ref 70–99)
GLUCOSE BLDC GLUCOMTR-MCNC: 159 MG/DL — HIGH (ref 70–99)
GLUCOSE BLDC GLUCOMTR-MCNC: 159 MG/DL — HIGH (ref 70–99)
GLUCOSE BLDC GLUCOMTR-MCNC: 167 MG/DL — HIGH (ref 70–99)
GLUCOSE BLDC GLUCOMTR-MCNC: 168 MG/DL — HIGH (ref 70–99)
GLUCOSE BLDC GLUCOMTR-MCNC: 169 MG/DL — HIGH (ref 70–99)
GLUCOSE BLDC GLUCOMTR-MCNC: 177 MG/DL — HIGH (ref 70–99)
GLUCOSE BLDC GLUCOMTR-MCNC: 200 MG/DL — HIGH (ref 70–99)
GLUCOSE BLDC GLUCOMTR-MCNC: 202 MG/DL — HIGH (ref 70–99)
GLUCOSE BLDC GLUCOMTR-MCNC: 202 MG/DL — HIGH (ref 70–99)
GLUCOSE BLDC GLUCOMTR-MCNC: 211 MG/DL — HIGH (ref 70–99)
GLUCOSE BLDC GLUCOMTR-MCNC: 213 MG/DL — HIGH (ref 70–99)
GLUCOSE BLDC GLUCOMTR-MCNC: 224 MG/DL — HIGH (ref 70–99)
GLUCOSE BLDC GLUCOMTR-MCNC: 226 MG/DL — HIGH (ref 70–99)
GLUCOSE BLDC GLUCOMTR-MCNC: 247 MG/DL — HIGH (ref 70–99)
GLUCOSE BLDC GLUCOMTR-MCNC: 264 MG/DL — HIGH (ref 70–99)
GLUCOSE BLDC GLUCOMTR-MCNC: 272 MG/DL — HIGH (ref 70–99)
GLUCOSE BLDC GLUCOMTR-MCNC: 324 MG/DL — HIGH (ref 70–99)
GLUCOSE BLDC GLUCOMTR-MCNC: 386 MG/DL — HIGH (ref 70–99)
GLUCOSE BLDC GLUCOMTR-MCNC: 44 MG/DL — CRITICAL LOW (ref 70–99)
GLUCOSE BLDC GLUCOMTR-MCNC: 45 MG/DL — CRITICAL LOW (ref 70–99)
GLUCOSE BLDC GLUCOMTR-MCNC: 54 MG/DL — CRITICAL LOW (ref 70–99)
GLUCOSE BLDC GLUCOMTR-MCNC: 55 MG/DL — LOW (ref 70–99)
GLUCOSE BLDC GLUCOMTR-MCNC: 58 MG/DL — LOW (ref 70–99)
GLUCOSE BLDC GLUCOMTR-MCNC: 62 MG/DL — LOW (ref 70–99)
GLUCOSE BLDC GLUCOMTR-MCNC: 75 MG/DL — SIGNIFICANT CHANGE UP (ref 70–99)
GLUCOSE BLDC GLUCOMTR-MCNC: 83 MG/DL — SIGNIFICANT CHANGE UP (ref 70–99)
GLUCOSE BLDC GLUCOMTR-MCNC: 84 MG/DL — SIGNIFICANT CHANGE UP (ref 70–99)
GLUCOSE BLDC GLUCOMTR-MCNC: 94 MG/DL — SIGNIFICANT CHANGE UP (ref 70–99)
GLUCOSE BLDC GLUCOMTR-MCNC: 94 MG/DL — SIGNIFICANT CHANGE UP (ref 70–99)
GLUCOSE BLDC GLUCOMTR-MCNC: 98 MG/DL — SIGNIFICANT CHANGE UP (ref 70–99)
GLUCOSE FLD-MCNC: 161 MG/DL — SIGNIFICANT CHANGE UP
GLUCOSE FLD-MCNC: 193 MG/DL — SIGNIFICANT CHANGE UP
GLUCOSE QUALITATIVE U: NEGATIVE
GLUCOSE SERPL-MCNC: 107 MG/DL — HIGH (ref 70–99)
GLUCOSE SERPL-MCNC: 112 MG/DL — HIGH (ref 70–99)
GLUCOSE SERPL-MCNC: 116 MG/DL — HIGH (ref 70–99)
GLUCOSE SERPL-MCNC: 119 MG/DL
GLUCOSE SERPL-MCNC: 120 MG/DL — HIGH (ref 70–99)
GLUCOSE SERPL-MCNC: 124 MG/DL — HIGH (ref 70–99)
GLUCOSE SERPL-MCNC: 134 MG/DL — HIGH (ref 70–99)
GLUCOSE SERPL-MCNC: 154 MG/DL — HIGH (ref 70–99)
GLUCOSE SERPL-MCNC: 156 MG/DL — HIGH (ref 70–99)
GLUCOSE SERPL-MCNC: 162 MG/DL — HIGH (ref 70–99)
GLUCOSE SERPL-MCNC: 164 MG/DL — HIGH (ref 70–99)
GLUCOSE SERPL-MCNC: 222 MG/DL — HIGH (ref 70–99)
GLUCOSE SERPL-MCNC: 223 MG/DL — HIGH (ref 70–99)
GLUCOSE SERPL-MCNC: 226 MG/DL
GLUCOSE SERPL-MCNC: 226 MG/DL — HIGH (ref 70–99)
GLUCOSE SERPL-MCNC: 247 MG/DL — HIGH (ref 70–99)
GLUCOSE SERPL-MCNC: 251 MG/DL
GLUCOSE SERPL-MCNC: 337 MG/DL — HIGH (ref 70–99)
GLUCOSE SERPL-MCNC: 345 MG/DL
GLUCOSE SERPL-MCNC: 44 MG/DL — CRITICAL LOW (ref 70–99)
GLUCOSE SERPL-MCNC: 49 MG/DL — CRITICAL LOW (ref 70–99)
GLUCOSE SERPL-MCNC: 581 MG/DL — CRITICAL HIGH (ref 70–99)
GLUCOSE SERPL-MCNC: 82 MG/DL — SIGNIFICANT CHANGE UP (ref 70–99)
GLUCOSE SERPL-MCNC: 92 MG/DL — SIGNIFICANT CHANGE UP (ref 70–99)
GRAM STN FLD: SIGNIFICANT CHANGE UP
GRAM STN FLD: SIGNIFICANT CHANGE UP
HBA1C MFR BLD HPLC: 5.2 %
HBV SURFACE AB SER-ACNC: <3 MIU/ML — LOW
HBV SURFACE AG SER-ACNC: SIGNIFICANT CHANGE UP
HCT VFR BLD CALC: 26 % — LOW (ref 39–50)
HCT VFR BLD CALC: 26.2 % — LOW (ref 39–50)
HCT VFR BLD CALC: 26.7 %
HCT VFR BLD CALC: 27.2 % — LOW (ref 39–50)
HCT VFR BLD CALC: 29 % — LOW (ref 39–50)
HCT VFR BLD CALC: 29.6 % — LOW (ref 39–50)
HCT VFR BLD CALC: 30.9 % — LOW (ref 39–50)
HCT VFR BLD CALC: 31.4 % — LOW (ref 39–50)
HCT VFR BLD CALC: 31.9 % — LOW (ref 39–50)
HCT VFR BLD CALC: 32.2 % — LOW (ref 39–50)
HCT VFR BLD CALC: 32.4 % — LOW (ref 39–50)
HCT VFR BLD CALC: 33 % — LOW (ref 39–50)
HCT VFR BLD CALC: 33.1 % — LOW (ref 39–50)
HCT VFR BLD CALC: 34.2 % — LOW (ref 39–50)
HCT VFR BLD CALC: 34.4 %
HCT VFR BLD CALC: 37.6 %
HCT VFR BLD CALC: 40.2 % — SIGNIFICANT CHANGE UP (ref 39–50)
HCT VFR BLD CALC: 42.2 % — SIGNIFICANT CHANGE UP (ref 39–50)
HCT VFR BLD CALC: 42.4 % — SIGNIFICANT CHANGE UP (ref 39–50)
HCT VFR BLD CALC: 42.8 %
HCT VFR BLD CALC: 45.7 %
HCT VFR BLD CALC: 46.3 % — SIGNIFICANT CHANGE UP (ref 39–50)
HCT VFR BLD CALC: 47.1 % — SIGNIFICANT CHANGE UP (ref 39–50)
HCV AB S/CO SERPL IA: 0.11 S/CO — SIGNIFICANT CHANGE UP (ref 0–0.99)
HCV AB SERPL-IMP: SIGNIFICANT CHANGE UP
HDLC SERPL-MCNC: 48 MG/DL
HDLC SERPL-MCNC: 51 MG/DL
HGB BLD-MCNC: 10.4 G/DL — LOW (ref 13–17)
HGB BLD-MCNC: 10.4 G/DL — LOW (ref 13–17)
HGB BLD-MCNC: 10.5 G/DL — LOW (ref 13–17)
HGB BLD-MCNC: 10.7 G/DL — LOW (ref 13–17)
HGB BLD-MCNC: 10.7 G/DL — LOW (ref 13–17)
HGB BLD-MCNC: 10.8 G/DL
HGB BLD-MCNC: 11.3 G/DL — LOW (ref 13–17)
HGB BLD-MCNC: 11.4 G/DL
HGB BLD-MCNC: 12.9 G/DL — LOW (ref 13–17)
HGB BLD-MCNC: 13 G/DL — SIGNIFICANT CHANGE UP (ref 13–17)
HGB BLD-MCNC: 13.1 G/DL
HGB BLD-MCNC: 13.6 G/DL — SIGNIFICANT CHANGE UP (ref 13–17)
HGB BLD-MCNC: 14.3 G/DL
HGB BLD-MCNC: 14.6 G/DL — SIGNIFICANT CHANGE UP (ref 13–17)
HGB BLD-MCNC: 15.1 G/DL — SIGNIFICANT CHANGE UP (ref 13–17)
HGB BLD-MCNC: 8 G/DL
HGB BLD-MCNC: 8.3 G/DL — LOW (ref 13–17)
HGB BLD-MCNC: 8.4 G/DL — LOW (ref 13–17)
HGB BLD-MCNC: 9.1 G/DL — LOW (ref 13–17)
HGB BLD-MCNC: 9.1 G/DL — LOW (ref 13–17)
HGB BLD-MCNC: 9.7 G/DL — LOW (ref 13–17)
HGB BLD-MCNC: 9.7 G/DL — LOW (ref 13–17)
HGB BLD-MCNC: 9.9 G/DL — LOW (ref 13–17)
IMM GRANULOCYTES NFR BLD AUTO: 0.3 %
IMM GRANULOCYTES NFR BLD AUTO: 0.3 % — SIGNIFICANT CHANGE UP (ref 0–1.5)
IMM GRANULOCYTES NFR BLD AUTO: 0.4 %
IMM GRANULOCYTES NFR BLD AUTO: 0.4 %
IMM GRANULOCYTES NFR BLD AUTO: 0.4 % — SIGNIFICANT CHANGE UP (ref 0–1.5)
IMM GRANULOCYTES NFR BLD AUTO: 0.5 %
IMM GRANULOCYTES NFR BLD AUTO: 0.5 % — SIGNIFICANT CHANGE UP (ref 0–1.5)
IMM GRANULOCYTES NFR BLD AUTO: 0.6 %
IMM GRANULOCYTES NFR BLD AUTO: 0.6 % — SIGNIFICANT CHANGE UP (ref 0–1.5)
IMM GRANULOCYTES NFR BLD AUTO: 0.6 % — SIGNIFICANT CHANGE UP (ref 0–1.5)
IMM GRANULOCYTES NFR BLD AUTO: 0.9 % — SIGNIFICANT CHANGE UP (ref 0–1.5)
IMM GRANULOCYTES NFR BLD AUTO: 1.1 % — SIGNIFICANT CHANGE UP (ref 0–1.5)
INR BLD: 1.09 RATIO — SIGNIFICANT CHANGE UP (ref 0.88–1.16)
INR BLD: 1.2 RATIO — HIGH (ref 0.88–1.16)
INR BLD: 1.22 RATIO — HIGH (ref 0.88–1.16)
KETONES URINE: NEGATIVE
LACTATE BLDV-MCNC: 2.4 MMOL/L — HIGH (ref 0.5–2)
LACTATE BLDV-MCNC: 3 MMOL/L — HIGH (ref 0.5–2)
LDH SERPL L TO P-CCNC: 158 U/L — SIGNIFICANT CHANGE UP (ref 98–192)
LDH SERPL L TO P-CCNC: 88 U/L — SIGNIFICANT CHANGE UP
LDH SERPL L TO P-CCNC: 92 U/L — SIGNIFICANT CHANGE UP
LDLC SERPL CALC-MCNC: 70 MG/DL
LDLC SERPL CALC-MCNC: 83 MG/DL
LEUKOCYTE ESTERASE URINE: ABNORMAL
LYMPHOCYTES # BLD AUTO: 0.57 K/UL — LOW (ref 1–3.3)
LYMPHOCYTES # BLD AUTO: 0.88 K/UL — LOW (ref 1–3.3)
LYMPHOCYTES # BLD AUTO: 0.94 K/UL — LOW (ref 1–3.3)
LYMPHOCYTES # BLD AUTO: 0.96 K/UL — LOW (ref 1–3.3)
LYMPHOCYTES # BLD AUTO: 0.99 K/UL — LOW (ref 1–3.3)
LYMPHOCYTES # BLD AUTO: 1.1 K/UL — SIGNIFICANT CHANGE UP (ref 1–3.3)
LYMPHOCYTES # BLD AUTO: 1.12 K/UL — SIGNIFICANT CHANGE UP (ref 1–3.3)
LYMPHOCYTES # BLD AUTO: 1.16 K/UL
LYMPHOCYTES # BLD AUTO: 1.18 K/UL — SIGNIFICANT CHANGE UP (ref 1–3.3)
LYMPHOCYTES # BLD AUTO: 1.29 K/UL — SIGNIFICANT CHANGE UP (ref 1–3.3)
LYMPHOCYTES # BLD AUTO: 1.32 K/UL
LYMPHOCYTES # BLD AUTO: 1.49 K/UL — SIGNIFICANT CHANGE UP (ref 1–3.3)
LYMPHOCYTES # BLD AUTO: 1.66 K/UL
LYMPHOCYTES # BLD AUTO: 1.69 K/UL — SIGNIFICANT CHANGE UP (ref 1–3.3)
LYMPHOCYTES # BLD AUTO: 1.7 K/UL
LYMPHOCYTES # BLD AUTO: 1.8 K/UL
LYMPHOCYTES # BLD AUTO: 1.89 K/UL — SIGNIFICANT CHANGE UP (ref 1–3.3)
LYMPHOCYTES # BLD AUTO: 11.5 % — LOW (ref 13–44)
LYMPHOCYTES # BLD AUTO: 13.6 % — SIGNIFICANT CHANGE UP (ref 13–44)
LYMPHOCYTES # BLD AUTO: 13.9 % — SIGNIFICANT CHANGE UP (ref 13–44)
LYMPHOCYTES # BLD AUTO: 15.1 % — SIGNIFICANT CHANGE UP (ref 13–44)
LYMPHOCYTES # BLD AUTO: 17 % — SIGNIFICANT CHANGE UP (ref 13–44)
LYMPHOCYTES # BLD AUTO: 17.6 % — SIGNIFICANT CHANGE UP (ref 13–44)
LYMPHOCYTES # BLD AUTO: 19.5 % — SIGNIFICANT CHANGE UP (ref 13–44)
LYMPHOCYTES # BLD AUTO: 2.01 K/UL — SIGNIFICANT CHANGE UP (ref 1–3.3)
LYMPHOCYTES # BLD AUTO: 21.6 % — SIGNIFICANT CHANGE UP (ref 13–44)
LYMPHOCYTES # BLD AUTO: 5 % — LOW (ref 13–44)
LYMPHOCYTES # BLD AUTO: 5.3 % — LOW (ref 13–44)
LYMPHOCYTES # BLD AUTO: 6.9 % — LOW (ref 13–44)
LYMPHOCYTES # BLD AUTO: 9.4 % — LOW (ref 13–44)
LYMPHOCYTES # BLD AUTO: 9.5 % — LOW (ref 13–44)
LYMPHOCYTES # FLD: 30 % — SIGNIFICANT CHANGE UP
LYMPHOCYTES # FLD: 92 % — SIGNIFICANT CHANGE UP
LYMPHOCYTES NFR BLD AUTO: 14.6 %
LYMPHOCYTES NFR BLD AUTO: 15.2 %
LYMPHOCYTES NFR BLD AUTO: 15.6 %
LYMPHOCYTES NFR BLD AUTO: 16.8 %
LYMPHOCYTES NFR BLD AUTO: 17.7 %
MAGNESIUM SERPL-MCNC: 1.9 MG/DL — SIGNIFICANT CHANGE UP (ref 1.6–2.6)
MAGNESIUM SERPL-MCNC: 2 MG/DL — SIGNIFICANT CHANGE UP (ref 1.6–2.6)
MAGNESIUM SERPL-MCNC: 2.1 MG/DL — SIGNIFICANT CHANGE UP (ref 1.6–2.6)
MAGNESIUM SERPL-MCNC: 2.2 MG/DL — SIGNIFICANT CHANGE UP (ref 1.6–2.6)
MAN DIFF?: NORMAL
MANUAL SMEAR VERIFICATION: SIGNIFICANT CHANGE UP
MCHC RBC-ENTMCNC: 28.2 PG — SIGNIFICANT CHANGE UP (ref 27–34)
MCHC RBC-ENTMCNC: 28.3 PG — SIGNIFICANT CHANGE UP (ref 27–34)
MCHC RBC-ENTMCNC: 28.3 PG — SIGNIFICANT CHANGE UP (ref 27–34)
MCHC RBC-ENTMCNC: 28.4 PG — SIGNIFICANT CHANGE UP (ref 27–34)
MCHC RBC-ENTMCNC: 28.4 PG — SIGNIFICANT CHANGE UP (ref 27–34)
MCHC RBC-ENTMCNC: 28.5 PG
MCHC RBC-ENTMCNC: 28.5 PG — SIGNIFICANT CHANGE UP (ref 27–34)
MCHC RBC-ENTMCNC: 28.7 PG
MCHC RBC-ENTMCNC: 28.7 PG — SIGNIFICANT CHANGE UP (ref 27–34)
MCHC RBC-ENTMCNC: 28.8 PG
MCHC RBC-ENTMCNC: 28.8 PG — SIGNIFICANT CHANGE UP (ref 27–34)
MCHC RBC-ENTMCNC: 28.9 PG — SIGNIFICANT CHANGE UP (ref 27–34)
MCHC RBC-ENTMCNC: 29 PG — SIGNIFICANT CHANGE UP (ref 27–34)
MCHC RBC-ENTMCNC: 29 PG — SIGNIFICANT CHANGE UP (ref 27–34)
MCHC RBC-ENTMCNC: 29.1 PG — SIGNIFICANT CHANGE UP (ref 27–34)
MCHC RBC-ENTMCNC: 29.1 PG — SIGNIFICANT CHANGE UP (ref 27–34)
MCHC RBC-ENTMCNC: 29.2 PG — SIGNIFICANT CHANGE UP (ref 27–34)
MCHC RBC-ENTMCNC: 29.8 PG
MCHC RBC-ENTMCNC: 29.9 PG
MCHC RBC-ENTMCNC: 29.9 PG — SIGNIFICANT CHANGE UP (ref 27–34)
MCHC RBC-ENTMCNC: 30 GM/DL
MCHC RBC-ENTMCNC: 30.3 GM/DL
MCHC RBC-ENTMCNC: 30.6 GM/DL
MCHC RBC-ENTMCNC: 30.8 GM/DL — LOW (ref 32–36)
MCHC RBC-ENTMCNC: 30.9 GM/DL — LOW (ref 32–36)
MCHC RBC-ENTMCNC: 31.3 GM/DL
MCHC RBC-ENTMCNC: 31.4 GM/DL
MCHC RBC-ENTMCNC: 31.4 GM/DL — LOW (ref 32–36)
MCHC RBC-ENTMCNC: 31.4 GM/DL — LOW (ref 32–36)
MCHC RBC-ENTMCNC: 31.5 GM/DL — LOW (ref 32–36)
MCHC RBC-ENTMCNC: 31.5 GM/DL — LOW (ref 32–36)
MCHC RBC-ENTMCNC: 31.7 GM/DL — LOW (ref 32–36)
MCHC RBC-ENTMCNC: 32.1 GM/DL — SIGNIFICANT CHANGE UP (ref 32–36)
MCHC RBC-ENTMCNC: 32.3 GM/DL — SIGNIFICANT CHANGE UP (ref 32–36)
MCHC RBC-ENTMCNC: 32.3 GM/DL — SIGNIFICANT CHANGE UP (ref 32–36)
MCHC RBC-ENTMCNC: 32.9 GM/DL — SIGNIFICANT CHANGE UP (ref 32–36)
MCHC RBC-ENTMCNC: 33 GM/DL — SIGNIFICANT CHANGE UP (ref 32–36)
MCHC RBC-ENTMCNC: 33.2 GM/DL — SIGNIFICANT CHANGE UP (ref 32–36)
MCHC RBC-ENTMCNC: 33.4 GM/DL — SIGNIFICANT CHANGE UP (ref 32–36)
MCHC RBC-ENTMCNC: 33.5 GM/DL — SIGNIFICANT CHANGE UP (ref 32–36)
MCV RBC AUTO: 87.1 FL — SIGNIFICANT CHANGE UP (ref 80–100)
MCV RBC AUTO: 87.7 FL — SIGNIFICANT CHANGE UP (ref 80–100)
MCV RBC AUTO: 87.7 FL — SIGNIFICANT CHANGE UP (ref 80–100)
MCV RBC AUTO: 88.1 FL — SIGNIFICANT CHANGE UP (ref 80–100)
MCV RBC AUTO: 89.1 FL — SIGNIFICANT CHANGE UP (ref 80–100)
MCV RBC AUTO: 89.3 FL — SIGNIFICANT CHANGE UP (ref 80–100)
MCV RBC AUTO: 89.5 FL — SIGNIFICANT CHANGE UP (ref 80–100)
MCV RBC AUTO: 89.8 FL — SIGNIFICANT CHANGE UP (ref 80–100)
MCV RBC AUTO: 90.1 FL — SIGNIFICANT CHANGE UP (ref 80–100)
MCV RBC AUTO: 90.2 FL — SIGNIFICANT CHANGE UP (ref 80–100)
MCV RBC AUTO: 90.3 FL — SIGNIFICANT CHANGE UP (ref 80–100)
MCV RBC AUTO: 90.4 FL — SIGNIFICANT CHANGE UP (ref 80–100)
MCV RBC AUTO: 90.5 FL — SIGNIFICANT CHANGE UP (ref 80–100)
MCV RBC AUTO: 90.6 FL — SIGNIFICANT CHANGE UP (ref 80–100)
MCV RBC AUTO: 91.1 FL — SIGNIFICANT CHANGE UP (ref 80–100)
MCV RBC AUTO: 91.5 FL
MCV RBC AUTO: 91.7 FL — SIGNIFICANT CHANGE UP (ref 80–100)
MCV RBC AUTO: 91.9 FL — SIGNIFICANT CHANGE UP (ref 80–100)
MCV RBC AUTO: 92.1 FL — SIGNIFICANT CHANGE UP (ref 80–100)
MCV RBC AUTO: 94.1 FL
MCV RBC AUTO: 95 FL
MCV RBC AUTO: 95.2 FL
MCV RBC AUTO: 98.7 FL
MESOTHL CELL # FLD: 60 % — SIGNIFICANT CHANGE UP
MONOCYTES # BLD AUTO: 0.68 K/UL — SIGNIFICANT CHANGE UP (ref 0–0.9)
MONOCYTES # BLD AUTO: 0.74 K/UL — SIGNIFICANT CHANGE UP (ref 0–0.9)
MONOCYTES # BLD AUTO: 0.8 K/UL
MONOCYTES # BLD AUTO: 0.81 K/UL — SIGNIFICANT CHANGE UP (ref 0–0.9)
MONOCYTES # BLD AUTO: 0.83 K/UL — SIGNIFICANT CHANGE UP (ref 0–0.9)
MONOCYTES # BLD AUTO: 0.85 K/UL
MONOCYTES # BLD AUTO: 0.86 K/UL — SIGNIFICANT CHANGE UP (ref 0–0.9)
MONOCYTES # BLD AUTO: 0.87 K/UL — SIGNIFICANT CHANGE UP (ref 0–0.9)
MONOCYTES # BLD AUTO: 0.89 K/UL — SIGNIFICANT CHANGE UP (ref 0–0.9)
MONOCYTES # BLD AUTO: 0.94 K/UL — HIGH (ref 0–0.9)
MONOCYTES # BLD AUTO: 0.98 K/UL
MONOCYTES # BLD AUTO: 0.99 K/UL
MONOCYTES # BLD AUTO: 0.99 K/UL — HIGH (ref 0–0.9)
MONOCYTES # BLD AUTO: 0.99 K/UL — HIGH (ref 0–0.9)
MONOCYTES # BLD AUTO: 1.06 K/UL — HIGH (ref 0–0.9)
MONOCYTES # BLD AUTO: 1.08 K/UL
MONOCYTES # BLD AUTO: 1.16 K/UL — HIGH (ref 0–0.9)
MONOCYTES # BLD AUTO: 1.75 K/UL — HIGH (ref 0–0.9)
MONOCYTES NFR BLD AUTO: 10.5 % — SIGNIFICANT CHANGE UP (ref 2–14)
MONOCYTES NFR BLD AUTO: 10.6 % — SIGNIFICANT CHANGE UP (ref 2–14)
MONOCYTES NFR BLD AUTO: 10.6 % — SIGNIFICANT CHANGE UP (ref 2–14)
MONOCYTES NFR BLD AUTO: 10.7 %
MONOCYTES NFR BLD AUTO: 11.1 %
MONOCYTES NFR BLD AUTO: 11.1 % — SIGNIFICANT CHANGE UP (ref 2–14)
MONOCYTES NFR BLD AUTO: 11.2 % — SIGNIFICANT CHANGE UP (ref 2–14)
MONOCYTES NFR BLD AUTO: 11.2 % — SIGNIFICANT CHANGE UP (ref 2–14)
MONOCYTES NFR BLD AUTO: 11.4 % — SIGNIFICANT CHANGE UP (ref 2–14)
MONOCYTES NFR BLD AUTO: 12.1 % — SIGNIFICANT CHANGE UP (ref 2–14)
MONOCYTES NFR BLD AUTO: 5 % — SIGNIFICANT CHANGE UP (ref 2–14)
MONOCYTES NFR BLD AUTO: 6.2 % — SIGNIFICANT CHANGE UP (ref 2–14)
MONOCYTES NFR BLD AUTO: 7.3 % — SIGNIFICANT CHANGE UP (ref 2–14)
MONOCYTES NFR BLD AUTO: 7.9 % — SIGNIFICANT CHANGE UP (ref 2–14)
MONOCYTES NFR BLD AUTO: 8.8 %
MONOCYTES NFR BLD AUTO: 9.1 %
MONOCYTES NFR BLD AUTO: 9.2 % — SIGNIFICANT CHANGE UP (ref 2–14)
MONOCYTES NFR BLD AUTO: 9.9 %
MONOS+MACROS # FLD: 6 % — SIGNIFICANT CHANGE UP
MONOS+MACROS # FLD: 8 % — SIGNIFICANT CHANGE UP
MRSA PCR RESULT.: SIGNIFICANT CHANGE UP
NEUTROPHILS # BLD AUTO: 11.49 K/UL — HIGH (ref 1.8–7.4)
NEUTROPHILS # BLD AUTO: 11.97 K/UL — HIGH (ref 1.8–7.4)
NEUTROPHILS # BLD AUTO: 13.76 K/UL — HIGH (ref 1.8–7.4)
NEUTROPHILS # BLD AUTO: 4.39 K/UL — SIGNIFICANT CHANGE UP (ref 1.8–7.4)
NEUTROPHILS # BLD AUTO: 4.99 K/UL — SIGNIFICANT CHANGE UP (ref 1.8–7.4)
NEUTROPHILS # BLD AUTO: 5.05 K/UL
NEUTROPHILS # BLD AUTO: 5.35 K/UL — SIGNIFICANT CHANGE UP (ref 1.8–7.4)
NEUTROPHILS # BLD AUTO: 5.38 K/UL
NEUTROPHILS # BLD AUTO: 5.68 K/UL — SIGNIFICANT CHANGE UP (ref 1.8–7.4)
NEUTROPHILS # BLD AUTO: 6.05 K/UL — SIGNIFICANT CHANGE UP (ref 1.8–7.4)
NEUTROPHILS # BLD AUTO: 6.66 K/UL — SIGNIFICANT CHANGE UP (ref 1.8–7.4)
NEUTROPHILS # BLD AUTO: 7.03 K/UL — SIGNIFICANT CHANGE UP (ref 1.8–7.4)
NEUTROPHILS # BLD AUTO: 7.04 K/UL
NEUTROPHILS # BLD AUTO: 7.82 K/UL — HIGH (ref 1.8–7.4)
NEUTROPHILS # BLD AUTO: 8 K/UL
NEUTROPHILS # BLD AUTO: 9.18 K/UL
NEUTROPHILS # BLD AUTO: 9.66 K/UL — HIGH (ref 1.8–7.4)
NEUTROPHILS # BLD AUTO: 9.79 K/UL — HIGH (ref 1.8–7.4)
NEUTROPHILS NFR BLD AUTO: 65.1 % — SIGNIFICANT CHANGE UP (ref 43–77)
NEUTROPHILS NFR BLD AUTO: 66.6 % — SIGNIFICANT CHANGE UP (ref 43–77)
NEUTROPHILS NFR BLD AUTO: 67.1 % — SIGNIFICANT CHANGE UP (ref 43–77)
NEUTROPHILS NFR BLD AUTO: 67.6 %
NEUTROPHILS NFR BLD AUTO: 68.7 % — SIGNIFICANT CHANGE UP (ref 43–77)
NEUTROPHILS NFR BLD AUTO: 68.7 % — SIGNIFICANT CHANGE UP (ref 43–77)
NEUTROPHILS NFR BLD AUTO: 70 % — SIGNIFICANT CHANGE UP (ref 43–77)
NEUTROPHILS NFR BLD AUTO: 70.5 %
NEUTROPHILS NFR BLD AUTO: 71.2 %
NEUTROPHILS NFR BLD AUTO: 73.2 %
NEUTROPHILS NFR BLD AUTO: 73.4 % — SIGNIFICANT CHANGE UP (ref 43–77)
NEUTROPHILS NFR BLD AUTO: 74.5 %
NEUTROPHILS NFR BLD AUTO: 77.6 % — HIGH (ref 43–77)
NEUTROPHILS NFR BLD AUTO: 77.8 % — HIGH (ref 43–77)
NEUTROPHILS NFR BLD AUTO: 82.3 % — HIGH (ref 43–77)
NEUTROPHILS NFR BLD AUTO: 83.4 % — HIGH (ref 43–77)
NEUTROPHILS NFR BLD AUTO: 86.6 % — HIGH (ref 43–77)
NEUTROPHILS NFR BLD AUTO: 87.4 % — HIGH (ref 43–77)
NEUTS BAND # BLD: 0.9 % — SIGNIFICANT CHANGE UP (ref 0–8)
NITRITE URINE: POSITIVE
NONHDLC SERPL-MCNC: 104 MG/DL
NONHDLC SERPL-MCNC: 89 MG/DL
NT-PROBNP SERPL-SCNC: HIGH PG/ML (ref 0–300)
OVALOCYTES BLD QL SMEAR: SLIGHT — SIGNIFICANT CHANGE UP
PH FLD: 8 — SIGNIFICANT CHANGE UP
PH FLD: 8.5 — SIGNIFICANT CHANGE UP
PH URINE: 6.5
PHOSPHATE SERPL-MCNC: 3 MG/DL — SIGNIFICANT CHANGE UP (ref 2.4–4.7)
PHOSPHATE SERPL-MCNC: 3.2 MG/DL — SIGNIFICANT CHANGE UP (ref 2.4–4.7)
PHOSPHATE SERPL-MCNC: 3.3 MG/DL — SIGNIFICANT CHANGE UP (ref 2.4–4.7)
PHOSPHATE SERPL-MCNC: 3.3 MG/DL — SIGNIFICANT CHANGE UP (ref 2.4–4.7)
PHOSPHATE SERPL-MCNC: 4 MG/DL — SIGNIFICANT CHANGE UP (ref 2.4–4.7)
PHOSPHATE SERPL-MCNC: 4.8 MG/DL — HIGH (ref 2.4–4.7)
PLAT MORPH BLD: NORMAL — SIGNIFICANT CHANGE UP
PLATELET # BLD AUTO: 144 K/UL — LOW (ref 150–400)
PLATELET # BLD AUTO: 154 K/UL — SIGNIFICANT CHANGE UP (ref 150–400)
PLATELET # BLD AUTO: 154 K/UL — SIGNIFICANT CHANGE UP (ref 150–400)
PLATELET # BLD AUTO: 155 K/UL — SIGNIFICANT CHANGE UP (ref 150–400)
PLATELET # BLD AUTO: 158 K/UL — SIGNIFICANT CHANGE UP (ref 150–400)
PLATELET # BLD AUTO: 163 K/UL
PLATELET # BLD AUTO: 164 K/UL
PLATELET # BLD AUTO: 165 K/UL — SIGNIFICANT CHANGE UP (ref 150–400)
PLATELET # BLD AUTO: 178 K/UL — SIGNIFICANT CHANGE UP (ref 150–400)
PLATELET # BLD AUTO: 179 K/UL — SIGNIFICANT CHANGE UP (ref 150–400)
PLATELET # BLD AUTO: 188 K/UL — SIGNIFICANT CHANGE UP (ref 150–400)
PLATELET # BLD AUTO: 203 K/UL — SIGNIFICANT CHANGE UP (ref 150–400)
PLATELET # BLD AUTO: 205 K/UL — SIGNIFICANT CHANGE UP (ref 150–400)
PLATELET # BLD AUTO: 214 K/UL — SIGNIFICANT CHANGE UP (ref 150–400)
PLATELET # BLD AUTO: 216 K/UL — SIGNIFICANT CHANGE UP (ref 150–400)
PLATELET # BLD AUTO: 218 K/UL
PLATELET # BLD AUTO: 220 K/UL — SIGNIFICANT CHANGE UP (ref 150–400)
PLATELET # BLD AUTO: 223 K/UL — SIGNIFICANT CHANGE UP (ref 150–400)
PLATELET # BLD AUTO: 229 K/UL
PLATELET # BLD AUTO: 231 K/UL — SIGNIFICANT CHANGE UP (ref 150–400)
PLATELET # BLD AUTO: 233 K/UL — SIGNIFICANT CHANGE UP (ref 150–400)
PLATELET # BLD AUTO: 251 K/UL — SIGNIFICANT CHANGE UP (ref 150–400)
PLATELET # BLD AUTO: 268 K/UL
POTASSIUM SERPL-MCNC: 3.4 MMOL/L — LOW (ref 3.5–5.3)
POTASSIUM SERPL-MCNC: 3.4 MMOL/L — LOW (ref 3.5–5.3)
POTASSIUM SERPL-MCNC: 3.7 MMOL/L — SIGNIFICANT CHANGE UP (ref 3.5–5.3)
POTASSIUM SERPL-MCNC: 3.7 MMOL/L — SIGNIFICANT CHANGE UP (ref 3.5–5.3)
POTASSIUM SERPL-MCNC: 3.8 MMOL/L — SIGNIFICANT CHANGE UP (ref 3.5–5.3)
POTASSIUM SERPL-MCNC: 3.9 MMOL/L — SIGNIFICANT CHANGE UP (ref 3.5–5.3)
POTASSIUM SERPL-MCNC: 4 MMOL/L — SIGNIFICANT CHANGE UP (ref 3.5–5.3)
POTASSIUM SERPL-MCNC: 4.1 MMOL/L — SIGNIFICANT CHANGE UP (ref 3.5–5.3)
POTASSIUM SERPL-MCNC: 4.1 MMOL/L — SIGNIFICANT CHANGE UP (ref 3.5–5.3)
POTASSIUM SERPL-MCNC: 4.2 MMOL/L — SIGNIFICANT CHANGE UP (ref 3.5–5.3)
POTASSIUM SERPL-MCNC: 4.3 MMOL/L — SIGNIFICANT CHANGE UP (ref 3.5–5.3)
POTASSIUM SERPL-MCNC: 4.4 MMOL/L — SIGNIFICANT CHANGE UP (ref 3.5–5.3)
POTASSIUM SERPL-SCNC: 3.4 MMOL/L — LOW (ref 3.5–5.3)
POTASSIUM SERPL-SCNC: 3.4 MMOL/L — LOW (ref 3.5–5.3)
POTASSIUM SERPL-SCNC: 3.7 MMOL/L — SIGNIFICANT CHANGE UP (ref 3.5–5.3)
POTASSIUM SERPL-SCNC: 3.7 MMOL/L — SIGNIFICANT CHANGE UP (ref 3.5–5.3)
POTASSIUM SERPL-SCNC: 3.8 MMOL/L — SIGNIFICANT CHANGE UP (ref 3.5–5.3)
POTASSIUM SERPL-SCNC: 3.9 MMOL/L — SIGNIFICANT CHANGE UP (ref 3.5–5.3)
POTASSIUM SERPL-SCNC: 4 MMOL/L
POTASSIUM SERPL-SCNC: 4 MMOL/L — SIGNIFICANT CHANGE UP (ref 3.5–5.3)
POTASSIUM SERPL-SCNC: 4.1 MMOL/L
POTASSIUM SERPL-SCNC: 4.1 MMOL/L — SIGNIFICANT CHANGE UP (ref 3.5–5.3)
POTASSIUM SERPL-SCNC: 4.1 MMOL/L — SIGNIFICANT CHANGE UP (ref 3.5–5.3)
POTASSIUM SERPL-SCNC: 4.2 MMOL/L — SIGNIFICANT CHANGE UP (ref 3.5–5.3)
POTASSIUM SERPL-SCNC: 4.3 MMOL/L — SIGNIFICANT CHANGE UP (ref 3.5–5.3)
POTASSIUM SERPL-SCNC: 4.4 MMOL/L
POTASSIUM SERPL-SCNC: 4.4 MMOL/L
POTASSIUM SERPL-SCNC: 4.4 MMOL/L — SIGNIFICANT CHANGE UP (ref 3.5–5.3)
PROT FLD-MCNC: 3.3 G/DL — SIGNIFICANT CHANGE UP
PROT FLD-MCNC: 3.8 G/DL — SIGNIFICANT CHANGE UP
PROT SERPL-MCNC: 6.1 G/DL — LOW (ref 6.6–8.7)
PROT SERPL-MCNC: 6.2 G/DL — LOW (ref 6.6–8.7)
PROT SERPL-MCNC: 6.7 G/DL — SIGNIFICANT CHANGE UP (ref 6.6–8.7)
PROT SERPL-MCNC: 6.9 G/DL — SIGNIFICANT CHANGE UP (ref 6.6–8.7)
PROT SERPL-MCNC: 7.2 G/DL — SIGNIFICANT CHANGE UP (ref 6.6–8.7)
PROT SERPL-MCNC: 7.3 G/DL
PROT SERPL-MCNC: 7.3 G/DL
PROT SERPL-MCNC: 7.3 G/DL — SIGNIFICANT CHANGE UP (ref 6.6–8.7)
PROT SERPL-MCNC: 7.4 G/DL — SIGNIFICANT CHANGE UP (ref 6.6–8.7)
PROT SERPL-MCNC: 7.5 G/DL
PROT SERPL-MCNC: 7.6 G/DL
PROT SERPL-MCNC: 7.6 G/DL — SIGNIFICANT CHANGE UP (ref 6.6–8.7)
PROT SERPL-MCNC: 8.6 G/DL — SIGNIFICANT CHANGE UP (ref 6.6–8.7)
PROTEIN URINE: ABNORMAL
PROTHROM AB SERPL-ACNC: 12.6 SEC — SIGNIFICANT CHANGE UP (ref 10.6–13.6)
PROTHROM AB SERPL-ACNC: 13.8 SEC — HIGH (ref 10.6–13.6)
PROTHROM AB SERPL-ACNC: 14 SEC — HIGH (ref 10.6–13.6)
RAPID RVP RESULT: SIGNIFICANT CHANGE UP
RBC # BLD: 2.81 M/UL
RBC # BLD: 2.88 M/UL — LOW (ref 4.2–5.8)
RBC # BLD: 2.94 M/UL — LOW (ref 4.2–5.8)
RBC # BLD: 3.04 M/UL — LOW (ref 4.2–5.8)
RBC # BLD: 3.2 M/UL — LOW (ref 4.2–5.8)
RBC # BLD: 3.4 M/UL — LOW (ref 4.2–5.8)
RBC # BLD: 3.41 M/UL — LOW (ref 4.2–5.8)
RBC # BLD: 3.43 M/UL — LOW (ref 4.2–5.8)
RBC # BLD: 3.58 M/UL — LOW (ref 4.2–5.8)
RBC # BLD: 3.62 M/UL — LOW (ref 4.2–5.8)
RBC # BLD: 3.65 M/UL — LOW (ref 4.2–5.8)
RBC # BLD: 3.67 M/UL — LOW (ref 4.2–5.8)
RBC # BLD: 3.67 M/UL — LOW (ref 4.2–5.8)
RBC # BLD: 3.76 M/UL
RBC # BLD: 3.81 M/UL
RBC # BLD: 3.9 M/UL — LOW (ref 4.2–5.8)
RBC # BLD: 4.5 M/UL — SIGNIFICANT CHANGE UP (ref 4.2–5.8)
RBC # BLD: 4.55 M/UL
RBC # BLD: 4.59 M/UL — SIGNIFICANT CHANGE UP (ref 4.2–5.8)
RBC # BLD: 4.72 M/UL — SIGNIFICANT CHANGE UP (ref 4.2–5.8)
RBC # BLD: 4.8 M/UL
RBC # BLD: 5.05 M/UL — SIGNIFICANT CHANGE UP (ref 4.2–5.8)
RBC # BLD: 5.17 M/UL — SIGNIFICANT CHANGE UP (ref 4.2–5.8)
RBC # FLD: 13.1 % — SIGNIFICANT CHANGE UP (ref 10.3–14.5)
RBC # FLD: 13.2 % — SIGNIFICANT CHANGE UP (ref 10.3–14.5)
RBC # FLD: 13.5 % — SIGNIFICANT CHANGE UP (ref 10.3–14.5)
RBC # FLD: 13.5 % — SIGNIFICANT CHANGE UP (ref 10.3–14.5)
RBC # FLD: 13.6 % — SIGNIFICANT CHANGE UP (ref 10.3–14.5)
RBC # FLD: 13.6 % — SIGNIFICANT CHANGE UP (ref 10.3–14.5)
RBC # FLD: 13.8 % — SIGNIFICANT CHANGE UP (ref 10.3–14.5)
RBC # FLD: 14 %
RBC # FLD: 14 % — SIGNIFICANT CHANGE UP (ref 10.3–14.5)
RBC # FLD: 14.1 %
RBC # FLD: 14.1 % — SIGNIFICANT CHANGE UP (ref 10.3–14.5)
RBC # FLD: 14.1 % — SIGNIFICANT CHANGE UP (ref 10.3–14.5)
RBC # FLD: 14.2 % — SIGNIFICANT CHANGE UP (ref 10.3–14.5)
RBC # FLD: 14.3 % — SIGNIFICANT CHANGE UP (ref 10.3–14.5)
RBC # FLD: 14.4 % — SIGNIFICANT CHANGE UP (ref 10.3–14.5)
RBC # FLD: 14.9 %
RBC # FLD: 15.2 % — HIGH (ref 10.3–14.5)
RBC # FLD: 15.3 % — HIGH (ref 10.3–14.5)
RBC # FLD: 15.5 %
RBC # FLD: 15.6 % — HIGH (ref 10.3–14.5)
RBC # FLD: 15.7 %
RBC BLD AUTO: SIGNIFICANT CHANGE UP
RCV VOL RI: 4000 /UL — HIGH (ref 0–0)
RCV VOL RI: 60 /UL — HIGH (ref 0–0)
S AUREUS DNA NOSE QL NAA+PROBE: SIGNIFICANT CHANGE UP
SARS-COV-2 IGG+IGM SERPL QL IA: 11.8 U/ML — HIGH
SARS-COV-2 IGG+IGM SERPL QL IA: 2.98 U/ML — HIGH
SARS-COV-2 IGG+IGM SERPL QL IA: 5.91 U/ML — HIGH
SARS-COV-2 IGG+IGM SERPL QL IA: POSITIVE
SARS-COV-2 N GENE NPH QL NAA+PROBE: NOT DETECTED
SARS-COV-2 RNA SPEC QL NAA+PROBE: SIGNIFICANT CHANGE UP
SODIUM SERPL-SCNC: 130 MMOL/L — LOW (ref 135–145)
SODIUM SERPL-SCNC: 132 MMOL/L — LOW (ref 135–145)
SODIUM SERPL-SCNC: 133 MMOL/L
SODIUM SERPL-SCNC: 133 MMOL/L — LOW (ref 135–145)
SODIUM SERPL-SCNC: 133 MMOL/L — LOW (ref 135–145)
SODIUM SERPL-SCNC: 134 MMOL/L
SODIUM SERPL-SCNC: 134 MMOL/L — LOW (ref 135–145)
SODIUM SERPL-SCNC: 135 MMOL/L — SIGNIFICANT CHANGE UP (ref 135–145)
SODIUM SERPL-SCNC: 136 MMOL/L — SIGNIFICANT CHANGE UP (ref 135–145)
SODIUM SERPL-SCNC: 137 MMOL/L — SIGNIFICANT CHANGE UP (ref 135–145)
SODIUM SERPL-SCNC: 138 MMOL/L
SODIUM SERPL-SCNC: 138 MMOL/L — SIGNIFICANT CHANGE UP (ref 135–145)
SODIUM SERPL-SCNC: 139 MMOL/L
SODIUM SERPL-SCNC: 140 MMOL/L — SIGNIFICANT CHANGE UP (ref 135–145)
SODIUM SERPL-SCNC: 141 MMOL/L — SIGNIFICANT CHANGE UP (ref 135–145)
SODIUM SERPL-SCNC: 141 MMOL/L — SIGNIFICANT CHANGE UP (ref 135–145)
SPECIFIC GRAVITY URINE: 1.01
SPECIMEN SOURCE: SIGNIFICANT CHANGE UP
T3 SERPL-MCNC: 70 NG/DL — LOW (ref 80–200)
T4 AB SER-ACNC: 9.5 UG/DL — SIGNIFICANT CHANGE UP (ref 4.5–12)
TOTAL NUCLEATED CELL COUNT, BODY FLUID: 379 /UL — SIGNIFICANT CHANGE UP
TOTAL NUCLEATED CELL COUNT, BODY FLUID: 519 /UL — SIGNIFICANT CHANGE UP
TRIGL SERPL-MCNC: 104 MG/DL
TRIGL SERPL-MCNC: 98 MG/DL
TROPONIN T SERPL-MCNC: 0.03 NG/ML — SIGNIFICANT CHANGE UP (ref 0–0.06)
TROPONIN T SERPL-MCNC: 0.04 NG/ML — SIGNIFICANT CHANGE UP (ref 0–0.06)
TROPONIN T SERPL-MCNC: 0.06 NG/ML — SIGNIFICANT CHANGE UP (ref 0–0.06)
TROPONIN T SERPL-MCNC: 0.06 NG/ML — SIGNIFICANT CHANGE UP (ref 0–0.06)
TROPONIN T SERPL-MCNC: 0.07 NG/ML — HIGH (ref 0–0.06)
TSH SERPL-ACNC: 4.02 UIU/ML
TSH SERPL-ACNC: 5.91 UIU/ML
TSH SERPL-MCNC: 3.73 UIU/ML — SIGNIFICANT CHANGE UP (ref 0.27–4.2)
TUBE TYPE: SIGNIFICANT CHANGE UP
TUBE TYPE: SIGNIFICANT CHANGE UP
UROBILINOGEN URINE: NORMAL
VIT B12 SERPL-MCNC: 491 PG/ML
WBC # BLD: 10.06 K/UL — SIGNIFICANT CHANGE UP (ref 3.8–10.5)
WBC # BLD: 11.32 K/UL — HIGH (ref 3.8–10.5)
WBC # BLD: 12.45 K/UL — HIGH (ref 3.8–10.5)
WBC # BLD: 12.46 K/UL — HIGH (ref 3.8–10.5)
WBC # BLD: 13.13 K/UL — HIGH (ref 3.8–10.5)
WBC # BLD: 13.68 K/UL — HIGH (ref 3.8–10.5)
WBC # BLD: 13.77 K/UL — HIGH (ref 3.8–10.5)
WBC # BLD: 16.54 K/UL — HIGH (ref 3.8–10.5)
WBC # BLD: 6.59 K/UL — SIGNIFICANT CHANGE UP (ref 3.8–10.5)
WBC # BLD: 7.13 K/UL — SIGNIFICANT CHANGE UP (ref 3.8–10.5)
WBC # BLD: 7.45 K/UL — SIGNIFICANT CHANGE UP (ref 3.8–10.5)
WBC # BLD: 7.79 K/UL — SIGNIFICANT CHANGE UP (ref 3.8–10.5)
WBC # BLD: 8.18 K/UL — SIGNIFICANT CHANGE UP (ref 3.8–10.5)
WBC # BLD: 8.46 K/UL — SIGNIFICANT CHANGE UP (ref 3.8–10.5)
WBC # BLD: 9.02 K/UL — SIGNIFICANT CHANGE UP (ref 3.8–10.5)
WBC # BLD: 9.3 K/UL — SIGNIFICANT CHANGE UP (ref 3.8–10.5)
WBC # BLD: 9.58 K/UL — SIGNIFICANT CHANGE UP (ref 3.8–10.5)
WBC # BLD: 9.7 K/UL — SIGNIFICANT CHANGE UP (ref 3.8–10.5)
WBC # FLD AUTO: 10.06 K/UL — SIGNIFICANT CHANGE UP (ref 3.8–10.5)
WBC # FLD AUTO: 10.91 K/UL
WBC # FLD AUTO: 11.32 K/UL — HIGH (ref 3.8–10.5)
WBC # FLD AUTO: 12.31 K/UL
WBC # FLD AUTO: 12.45 K/UL — HIGH (ref 3.8–10.5)
WBC # FLD AUTO: 12.46 K/UL — HIGH (ref 3.8–10.5)
WBC # FLD AUTO: 13.13 K/UL — HIGH (ref 3.8–10.5)
WBC # FLD AUTO: 13.68 K/UL — HIGH (ref 3.8–10.5)
WBC # FLD AUTO: 13.77 K/UL — HIGH (ref 3.8–10.5)
WBC # FLD AUTO: 16.54 K/UL — HIGH (ref 3.8–10.5)
WBC # FLD AUTO: 6.59 K/UL — SIGNIFICANT CHANGE UP (ref 3.8–10.5)
WBC # FLD AUTO: 7.13 K/UL — SIGNIFICANT CHANGE UP (ref 3.8–10.5)
WBC # FLD AUTO: 7.45 K/UL — SIGNIFICANT CHANGE UP (ref 3.8–10.5)
WBC # FLD AUTO: 7.47 K/UL
WBC # FLD AUTO: 7.64 K/UL
WBC # FLD AUTO: 7.79 K/UL — SIGNIFICANT CHANGE UP (ref 3.8–10.5)
WBC # FLD AUTO: 8.18 K/UL — SIGNIFICANT CHANGE UP (ref 3.8–10.5)
WBC # FLD AUTO: 8.46 K/UL — SIGNIFICANT CHANGE UP (ref 3.8–10.5)
WBC # FLD AUTO: 9.02 K/UL — SIGNIFICANT CHANGE UP (ref 3.8–10.5)
WBC # FLD AUTO: 9.3 K/UL — SIGNIFICANT CHANGE UP (ref 3.8–10.5)
WBC # FLD AUTO: 9.58 K/UL — SIGNIFICANT CHANGE UP (ref 3.8–10.5)
WBC # FLD AUTO: 9.7 K/UL — SIGNIFICANT CHANGE UP (ref 3.8–10.5)
WBC # FLD AUTO: 9.89 K/UL

## 2021-01-01 PROCEDURE — G2066: CPT

## 2021-01-01 PROCEDURE — 99214 OFFICE O/P EST MOD 30 MIN: CPT

## 2021-01-01 PROCEDURE — 85018 HEMOGLOBIN: CPT | Mod: QW

## 2021-01-01 PROCEDURE — 99153 MOD SED SAME PHYS/QHP EA: CPT

## 2021-01-01 PROCEDURE — 90937 HEMODIALYSIS REPEATED EVAL: CPT

## 2021-01-01 PROCEDURE — 93010 ELECTROCARDIOGRAM REPORT: CPT

## 2021-01-01 PROCEDURE — 93005 ELECTROCARDIOGRAM TRACING: CPT

## 2021-01-01 PROCEDURE — 71046 X-RAY EXAM CHEST 2 VIEWS: CPT

## 2021-01-01 PROCEDURE — 83036 HEMOGLOBIN GLYCOSYLATED A1C: CPT | Mod: QW

## 2021-01-01 PROCEDURE — 85025 COMPLETE CBC W/AUTO DIFF WBC: CPT

## 2021-01-01 PROCEDURE — 99072 ADDL SUPL MATRL&STAF TM PHE: CPT

## 2021-01-01 PROCEDURE — 99212 OFFICE O/P EST SF 10 MIN: CPT

## 2021-01-01 PROCEDURE — 82945 GLUCOSE OTHER FLUID: CPT

## 2021-01-01 PROCEDURE — 83880 ASSAY OF NATRIURETIC PEPTIDE: CPT

## 2021-01-01 PROCEDURE — 99285 EMERGENCY DEPT VISIT HI MDM: CPT | Mod: 25

## 2021-01-01 PROCEDURE — 96374 THER/PROPH/DIAG INJ IV PUSH: CPT

## 2021-01-01 PROCEDURE — C1725: CPT

## 2021-01-01 PROCEDURE — 88305 TISSUE EXAM BY PATHOLOGIST: CPT | Mod: 26

## 2021-01-01 PROCEDURE — 84436 ASSAY OF TOTAL THYROXINE: CPT

## 2021-01-01 PROCEDURE — 32550 INSERT PLEURAL CATH: CPT | Mod: LT

## 2021-01-01 PROCEDURE — 93000 ELECTROCARDIOGRAM COMPLETE: CPT

## 2021-01-01 PROCEDURE — 84443 ASSAY THYROID STIM HORMONE: CPT

## 2021-01-01 PROCEDURE — 99213 OFFICE O/P EST LOW 20 MIN: CPT

## 2021-01-01 PROCEDURE — 85027 COMPLETE CBC AUTOMATED: CPT

## 2021-01-01 PROCEDURE — 71045 X-RAY EXAM CHEST 1 VIEW: CPT | Mod: 26

## 2021-01-01 PROCEDURE — 88112 CYTOPATH CELL ENHANCE TECH: CPT | Mod: 26

## 2021-01-01 PROCEDURE — 83605 ASSAY OF LACTIC ACID: CPT

## 2021-01-01 PROCEDURE — 99223 1ST HOSP IP/OBS HIGH 75: CPT

## 2021-01-01 PROCEDURE — 99221 1ST HOSP IP/OBS SF/LOW 40: CPT | Mod: 25

## 2021-01-01 PROCEDURE — 88112 CYTOPATH CELL ENHANCE TECH: CPT

## 2021-01-01 PROCEDURE — 93306 TTE W/DOPPLER COMPLETE: CPT

## 2021-01-01 PROCEDURE — 80048 BASIC METABOLIC PNL TOTAL CA: CPT

## 2021-01-01 PROCEDURE — 94010 BREATHING CAPACITY TEST: CPT

## 2021-01-01 PROCEDURE — 71045 X-RAY EXAM CHEST 1 VIEW: CPT | Mod: 26,77

## 2021-01-01 PROCEDURE — 82962 GLUCOSE BLOOD TEST: CPT

## 2021-01-01 PROCEDURE — 99232 SBSQ HOSP IP/OBS MODERATE 35: CPT

## 2021-01-01 PROCEDURE — 73502 X-RAY EXAM HIP UNI 2-3 VIEWS: CPT

## 2021-01-01 PROCEDURE — 99261: CPT

## 2021-01-01 PROCEDURE — 71250 CT THORAX DX C-: CPT | Mod: 26,MA

## 2021-01-01 PROCEDURE — 99233 SBSQ HOSP IP/OBS HIGH 50: CPT

## 2021-01-01 PROCEDURE — 83735 ASSAY OF MAGNESIUM: CPT

## 2021-01-01 PROCEDURE — 36415 COLL VENOUS BLD VENIPUNCTURE: CPT

## 2021-01-01 PROCEDURE — 86900 BLOOD TYPING SEROLOGIC ABO: CPT

## 2021-01-01 PROCEDURE — 73502 X-RAY EXAM HIP UNI 2-3 VIEWS: CPT | Mod: 26,LT

## 2021-01-01 PROCEDURE — 99152 MOD SED SAME PHYS/QHP 5/>YRS: CPT

## 2021-01-01 PROCEDURE — 86769 SARS-COV-2 COVID-19 ANTIBODY: CPT

## 2021-01-01 PROCEDURE — 72192 CT PELVIS W/O DYE: CPT

## 2021-01-01 PROCEDURE — 96372 THER/PROPH/DIAG INJ SC/IM: CPT

## 2021-01-01 PROCEDURE — 99214 OFFICE O/P EST MOD 30 MIN: CPT | Mod: 25

## 2021-01-01 PROCEDURE — 93990 DOPPLER FLOW TESTING: CPT

## 2021-01-01 PROCEDURE — 71260 CT THORAX DX C+: CPT

## 2021-01-01 PROCEDURE — 71045 X-RAY EXAM CHEST 1 VIEW: CPT

## 2021-01-01 PROCEDURE — 80053 COMPREHEN METABOLIC PANEL: CPT

## 2021-01-01 PROCEDURE — C1894: CPT

## 2021-01-01 PROCEDURE — 71250 CT THORAX DX C-: CPT | Mod: MA

## 2021-01-01 PROCEDURE — 72192 CT PELVIS W/O DYE: CPT | Mod: 26,MA

## 2021-01-01 PROCEDURE — 86850 RBC ANTIBODY SCREEN: CPT

## 2021-01-01 PROCEDURE — 93880 EXTRACRANIAL BILAT STUDY: CPT

## 2021-01-01 PROCEDURE — 99215 OFFICE O/P EST HI 40 MIN: CPT

## 2021-01-01 PROCEDURE — 87205 SMEAR GRAM STAIN: CPT

## 2021-01-01 PROCEDURE — 99215 OFFICE O/P EST HI 40 MIN: CPT | Mod: 25

## 2021-01-01 PROCEDURE — 32555 ASPIRATE PLEURA W/ IMAGING: CPT

## 2021-01-01 PROCEDURE — 87075 CULTR BACTERIA EXCEPT BLOOD: CPT

## 2021-01-01 PROCEDURE — 84157 ASSAY OF PROTEIN OTHER: CPT

## 2021-01-01 PROCEDURE — C1887: CPT

## 2021-01-01 PROCEDURE — 93298 REM INTERROG DEV EVAL SCRMS: CPT

## 2021-01-01 PROCEDURE — 72125 CT NECK SPINE W/O DYE: CPT

## 2021-01-01 PROCEDURE — 36901 INTRO CATH DIALYSIS CIRCUIT: CPT

## 2021-01-01 PROCEDURE — 71046 X-RAY EXAM CHEST 2 VIEWS: CPT | Mod: 26

## 2021-01-01 PROCEDURE — 97163 PT EVAL HIGH COMPLEX 45 MIN: CPT

## 2021-01-01 PROCEDURE — 74174 CTA ABD&PLVS W/CONTRAST: CPT

## 2021-01-01 PROCEDURE — C1729: CPT

## 2021-01-01 PROCEDURE — 87070 CULTURE OTHR SPECIMN AEROBIC: CPT

## 2021-01-01 PROCEDURE — 99239 HOSP IP/OBS DSCHRG MGMT >30: CPT

## 2021-01-01 PROCEDURE — 99285 EMERGENCY DEPT VISIT HI MDM: CPT

## 2021-01-01 PROCEDURE — 93356 MYOCRD STRAIN IMG SPCKL TRCK: CPT

## 2021-01-01 PROCEDURE — 70450 CT HEAD/BRAIN W/O DYE: CPT

## 2021-01-01 PROCEDURE — 83615 LACTATE (LD) (LDH) ENZYME: CPT

## 2021-01-01 PROCEDURE — 83986 ASSAY PH BODY FLUID NOS: CPT

## 2021-01-01 PROCEDURE — 85610 PROTHROMBIN TIME: CPT

## 2021-01-01 PROCEDURE — 84484 ASSAY OF TROPONIN QUANT: CPT

## 2021-01-01 PROCEDURE — 32557 INSERT CATH PLEURA W/ IMAGE: CPT

## 2021-01-01 PROCEDURE — 71260 CT THORAX DX C+: CPT | Mod: 26

## 2021-01-01 PROCEDURE — 76942 ECHO GUIDE FOR BIOPSY: CPT

## 2021-01-01 PROCEDURE — 99238 HOSP IP/OBS DSCHRG MGMT 30/<: CPT

## 2021-01-01 PROCEDURE — 99231 SBSQ HOSP IP/OBS SF/LOW 25: CPT

## 2021-01-01 PROCEDURE — 84480 ASSAY TRIIODOTHYRONINE (T3): CPT

## 2021-01-01 PROCEDURE — 99284 EMERGENCY DEPT VISIT MOD MDM: CPT | Mod: 25

## 2021-01-01 PROCEDURE — 87640 STAPH A DNA AMP PROBE: CPT

## 2021-01-01 PROCEDURE — 71045 X-RAY EXAM CHEST 1 VIEW: CPT | Mod: 26,77,76

## 2021-01-01 PROCEDURE — 72125 CT NECK SPINE W/O DYE: CPT | Mod: 26

## 2021-01-01 PROCEDURE — 81003 URINALYSIS AUTO W/O SCOPE: CPT | Mod: QW

## 2021-01-01 PROCEDURE — 75574 CT ANGIO HRT W/3D IMAGE: CPT | Mod: 26

## 2021-01-01 PROCEDURE — 75574 CT ANGIO HRT W/3D IMAGE: CPT

## 2021-01-01 PROCEDURE — 87040 BLOOD CULTURE FOR BACTERIA: CPT

## 2021-01-01 PROCEDURE — U0003: CPT

## 2021-01-01 PROCEDURE — 82042 OTHER SOURCE ALBUMIN QUAN EA: CPT

## 2021-01-01 PROCEDURE — 70450 CT HEAD/BRAIN W/O DYE: CPT | Mod: 26

## 2021-01-01 PROCEDURE — 93458 L HRT ARTERY/VENTRICLE ANGIO: CPT | Mod: 26

## 2021-01-01 PROCEDURE — 88305 TISSUE EXAM BY PATHOLOGIST: CPT

## 2021-01-01 PROCEDURE — C1769: CPT

## 2021-01-01 PROCEDURE — 36907 BALO ANGIOP CTR DIALYSIS SEG: CPT

## 2021-01-01 PROCEDURE — 92610 EVALUATE SWALLOWING FUNCTION: CPT

## 2021-01-01 PROCEDURE — U0005: CPT

## 2021-01-01 PROCEDURE — 99291 CRITICAL CARE FIRST HOUR: CPT

## 2021-01-01 PROCEDURE — 70450 CT HEAD/BRAIN W/O DYE: CPT | Mod: 26,MA

## 2021-01-01 PROCEDURE — 86901 BLOOD TYPING SEROLOGIC RH(D): CPT

## 2021-01-01 PROCEDURE — 89051 BODY FLUID CELL COUNT: CPT

## 2021-01-01 PROCEDURE — 32555 ASPIRATE PLEURA W/ IMAGING: CPT | Mod: LT

## 2021-01-01 PROCEDURE — 86803 HEPATITIS C AB TEST: CPT

## 2021-01-01 PROCEDURE — 85379 FIBRIN DEGRADATION QUANT: CPT

## 2021-01-01 PROCEDURE — 99497 ADVNCD CARE PLAN 30 MIN: CPT

## 2021-01-01 PROCEDURE — G0444 DEPRESSION SCREEN ANNUAL: CPT | Mod: 59

## 2021-01-01 PROCEDURE — 82550 ASSAY OF CK (CPK): CPT

## 2021-01-01 PROCEDURE — 94660 CPAP INITIATION&MGMT: CPT

## 2021-01-01 PROCEDURE — 86706 HEP B SURFACE ANTIBODY: CPT

## 2021-01-01 PROCEDURE — 99495 TRANSJ CARE MGMT MOD F2F 14D: CPT

## 2021-01-01 PROCEDURE — ZZZZZ: CPT

## 2021-01-01 PROCEDURE — 93458 L HRT ARTERY/VENTRICLE ANGIO: CPT

## 2021-01-01 PROCEDURE — 84100 ASSAY OF PHOSPHORUS: CPT

## 2021-01-01 PROCEDURE — 83036 HEMOGLOBIN GLYCOSYLATED A1C: CPT

## 2021-01-01 PROCEDURE — 86923 COMPATIBILITY TEST ELECTRIC: CPT

## 2021-01-01 PROCEDURE — 87641 MR-STAPH DNA AMP PROBE: CPT

## 2021-01-01 PROCEDURE — 87340 HEPATITIS B SURFACE AG IA: CPT

## 2021-01-01 PROCEDURE — 93306 TTE W/DOPPLER COMPLETE: CPT | Mod: 26

## 2021-01-01 PROCEDURE — 74174 CTA ABD&PLVS W/CONTRAST: CPT | Mod: 26

## 2021-01-01 PROCEDURE — 87635 SARS-COV-2 COVID-19 AMP PRB: CPT

## 2021-01-01 PROCEDURE — G0463: CPT

## 2021-01-01 PROCEDURE — 85730 THROMBOPLASTIN TIME PARTIAL: CPT

## 2021-01-01 PROCEDURE — 0225U NFCT DS DNA&RNA 21 SARSCOV2: CPT

## 2021-01-01 PROCEDURE — 99221 1ST HOSP IP/OBS SF/LOW 40: CPT

## 2021-01-01 PROCEDURE — 99223 1ST HOSP IP/OBS HIGH 75: CPT | Mod: 25

## 2021-01-01 RX ORDER — POLYETHYLENE GLYCOL 3350 17 G/17G
17 POWDER, FOR SOLUTION ORAL ONCE
Refills: 0 | Status: DISCONTINUED | OUTPATIENT
Start: 2021-01-01 | End: 2021-01-01

## 2021-01-01 RX ORDER — LEVOTHYROXINE SODIUM 125 MCG
50 TABLET ORAL DAILY
Refills: 0 | Status: DISCONTINUED | OUTPATIENT
Start: 2021-01-01 | End: 2021-01-01

## 2021-01-01 RX ORDER — SODIUM CHLORIDE 9 MG/ML
500 INJECTION INTRAMUSCULAR; INTRAVENOUS; SUBCUTANEOUS ONCE
Refills: 0 | Status: COMPLETED | OUTPATIENT
Start: 2021-01-01 | End: 2021-01-01

## 2021-01-01 RX ORDER — ENOXAPARIN SODIUM 100 MG/ML
14 INJECTION SUBCUTANEOUS
Qty: 0 | Refills: 0 | DISCHARGE

## 2021-01-01 RX ORDER — ATORVASTATIN CALCIUM 80 MG/1
40 TABLET, FILM COATED ORAL AT BEDTIME
Refills: 0 | Status: DISCONTINUED | OUTPATIENT
Start: 2021-01-01 | End: 2021-01-01

## 2021-01-01 RX ORDER — CEFTRIAXONE 500 MG/1
1000 INJECTION, POWDER, FOR SOLUTION INTRAMUSCULAR; INTRAVENOUS ONCE
Refills: 0 | Status: COMPLETED | OUTPATIENT
Start: 2021-01-01 | End: 2021-01-01

## 2021-01-01 RX ORDER — INSULIN GLARGINE 100 [IU]/ML
20 INJECTION, SOLUTION SUBCUTANEOUS AT BEDTIME
Refills: 0 | Status: DISCONTINUED | OUTPATIENT
Start: 2021-01-01 | End: 2021-01-01

## 2021-01-01 RX ORDER — SODIUM CHLORIDE 9 MG/ML
1000 INJECTION, SOLUTION INTRAVENOUS
Refills: 0 | Status: DISCONTINUED | OUTPATIENT
Start: 2021-01-01 | End: 2021-01-01

## 2021-01-01 RX ORDER — GLUCAGON INJECTION, SOLUTION 0.5 MG/.1ML
1 INJECTION, SOLUTION SUBCUTANEOUS ONCE
Refills: 0 | Status: DISCONTINUED | OUTPATIENT
Start: 2021-01-01 | End: 2021-01-01

## 2021-01-01 RX ORDER — AMLODIPINE BESYLATE 2.5 MG/1
10 TABLET ORAL DAILY
Refills: 0 | Status: DISCONTINUED | OUTPATIENT
Start: 2021-01-01 | End: 2021-01-01

## 2021-01-01 RX ORDER — AMLODIPINE BESYLATE 10 MG/1
10 TABLET ORAL DAILY
Qty: 90 | Refills: 3 | Status: DISCONTINUED | COMMUNITY
Start: 2019-12-03 | End: 2021-01-01

## 2021-01-01 RX ORDER — DRONABINOL 5 MG/1
5 CAPSULE ORAL TWICE DAILY
Qty: 60 | Refills: 0 | Status: ACTIVE | COMMUNITY
Start: 2021-01-01 | End: 1900-01-01

## 2021-01-01 RX ORDER — DEXTROSE 50 % IN WATER 50 %
12.5 SYRINGE (ML) INTRAVENOUS ONCE
Refills: 0 | Status: DISCONTINUED | OUTPATIENT
Start: 2021-01-01 | End: 2021-01-01

## 2021-01-01 RX ORDER — HYDRALAZINE HCL 50 MG
50 TABLET ORAL THREE TIMES A DAY
Refills: 0 | Status: DISCONTINUED | OUTPATIENT
Start: 2021-01-01 | End: 2021-01-01

## 2021-01-01 RX ORDER — CHLORHEXIDINE GLUCONATE 213 G/1000ML
1 SOLUTION TOPICAL ONCE
Refills: 0 | Status: DISCONTINUED | OUTPATIENT
Start: 2021-01-01 | End: 2021-01-01

## 2021-01-01 RX ORDER — PIPERACILLIN AND TAZOBACTAM 4; .5 G/20ML; G/20ML
2.25 INJECTION, POWDER, LYOPHILIZED, FOR SOLUTION INTRAVENOUS EVERY 12 HOURS
Refills: 0 | Status: DISCONTINUED | OUTPATIENT
Start: 2021-01-01 | End: 2021-01-01

## 2021-01-01 RX ORDER — ATORVASTATIN CALCIUM 80 MG/1
10 TABLET, FILM COATED ORAL AT BEDTIME
Refills: 0 | Status: DISCONTINUED | OUTPATIENT
Start: 2021-01-01 | End: 2021-01-01

## 2021-01-01 RX ORDER — INSULIN GLARGINE 100 [IU]/ML
14 INJECTION, SOLUTION SUBCUTANEOUS AT BEDTIME
Refills: 0 | Status: DISCONTINUED | OUTPATIENT
Start: 2021-01-01 | End: 2021-01-01

## 2021-01-01 RX ORDER — CHOLECALCIFEROL (VITAMIN D3) 125 MCG
2000 CAPSULE ORAL DAILY
Refills: 0 | Status: DISCONTINUED | OUTPATIENT
Start: 2021-01-01 | End: 2021-01-01

## 2021-01-01 RX ORDER — ENOXAPARIN SODIUM 100 MG/ML
8 INJECTION SUBCUTANEOUS
Qty: 0 | Refills: 0 | DISCHARGE

## 2021-01-01 RX ORDER — ASPIRIN/CALCIUM CARB/MAGNESIUM 324 MG
325 TABLET ORAL ONCE
Refills: 0 | Status: COMPLETED | OUTPATIENT
Start: 2021-01-01 | End: 2021-01-01

## 2021-01-01 RX ORDER — INSULIN LISPRO 100/ML
VIAL (ML) SUBCUTANEOUS
Refills: 0 | Status: DISCONTINUED | OUTPATIENT
Start: 2021-01-01 | End: 2021-01-01

## 2021-01-01 RX ORDER — MEMANTINE HYDROCHLORIDE 10 MG/1
10 TABLET ORAL
Refills: 0 | Status: DISCONTINUED | OUTPATIENT
Start: 2021-01-01 | End: 2021-01-01

## 2021-01-01 RX ORDER — ISOSORBIDE MONONITRATE 60 MG/1
30 TABLET, EXTENDED RELEASE ORAL DAILY
Refills: 0 | Status: DISCONTINUED | OUTPATIENT
Start: 2021-01-01 | End: 2021-01-01

## 2021-01-01 RX ORDER — MEMANTINE HYDROCHLORIDE 10 MG/1
10 TABLET, FILM COATED ORAL
Qty: 180 | Refills: 1 | Status: ACTIVE | COMMUNITY
Start: 2020-03-09 | End: 1900-01-01

## 2021-01-01 RX ORDER — ERGOCALCIFEROL 1.25 MG/1
CAPSULE ORAL DAILY
Refills: 0 | Status: DISCONTINUED | COMMUNITY
End: 2021-01-01

## 2021-01-01 RX ORDER — ERYTHROPOIETIN 10000 [IU]/ML
10000 INJECTION, SOLUTION INTRAVENOUS; SUBCUTANEOUS
Refills: 0 | Status: DISCONTINUED | OUTPATIENT
Start: 2021-01-01 | End: 2021-01-01

## 2021-01-01 RX ORDER — DOXAZOSIN 4 MG/1
4 TABLET ORAL
Qty: 90 | Refills: 2 | Status: ACTIVE | COMMUNITY
Start: 2019-12-19 | End: 1900-01-01

## 2021-01-01 RX ORDER — METOPROLOL SUCCINATE 25 MG/1
25 TABLET, EXTENDED RELEASE ORAL DAILY
Qty: 90 | Refills: 1 | Status: ACTIVE | COMMUNITY
Start: 2020-07-13 | End: 1900-01-01

## 2021-01-01 RX ORDER — DEXTROSE 50 % IN WATER 50 %
12.5 SYRINGE (ML) INTRAVENOUS ONCE
Refills: 0 | Status: COMPLETED | OUTPATIENT
Start: 2021-01-01 | End: 2021-01-01

## 2021-01-01 RX ORDER — PIPERACILLIN AND TAZOBACTAM 4; .5 G/20ML; G/20ML
3.38 INJECTION, POWDER, LYOPHILIZED, FOR SOLUTION INTRAVENOUS ONCE
Refills: 0 | Status: COMPLETED | OUTPATIENT
Start: 2021-01-01 | End: 2021-01-01

## 2021-01-01 RX ORDER — SODIUM BICARBONATE 1 MEQ/ML
1 SYRINGE (ML) INTRAVENOUS
Qty: 60 | Refills: 0
Start: 2021-01-01 | End: 2021-01-01

## 2021-01-01 RX ORDER — INSULIN LISPRO 100/ML
VIAL (ML) SUBCUTANEOUS AT BEDTIME
Refills: 0 | Status: DISCONTINUED | OUTPATIENT
Start: 2021-01-01 | End: 2021-01-01

## 2021-01-01 RX ORDER — DEXTROSE 50 % IN WATER 50 %
25 SYRINGE (ML) INTRAVENOUS ONCE
Refills: 0 | Status: DISCONTINUED | OUTPATIENT
Start: 2021-01-01 | End: 2021-01-01

## 2021-01-01 RX ORDER — FERROUS SULFATE 325(65) MG
325 TABLET ORAL DAILY
Refills: 0 | Status: DISCONTINUED | OUTPATIENT
Start: 2021-01-01 | End: 2021-01-01

## 2021-01-01 RX ORDER — SACCHAROMYCES BOULARDII 250 MG
250 POWDER IN PACKET (EA) ORAL
Refills: 0 | Status: DISCONTINUED | OUTPATIENT
Start: 2021-01-01 | End: 2021-01-01

## 2021-01-01 RX ORDER — FENTANYL CITRATE 50 UG/ML
25 INJECTION INTRAVENOUS
Refills: 0 | Status: DISCONTINUED | OUTPATIENT
Start: 2021-01-01 | End: 2021-01-01

## 2021-01-01 RX ORDER — METOPROLOL TARTRATE 50 MG
25 TABLET ORAL DAILY
Refills: 0 | Status: DISCONTINUED | OUTPATIENT
Start: 2021-01-01 | End: 2021-01-01

## 2021-01-01 RX ORDER — HYDRALAZINE HCL 50 MG
1 TABLET ORAL
Qty: 90 | Refills: 0
Start: 2021-01-01 | End: 2021-01-01

## 2021-01-01 RX ORDER — CHOLECALCIFEROL (VITAMIN D3) 125 MCG
1 CAPSULE ORAL
Qty: 0 | Refills: 0 | DISCHARGE

## 2021-01-01 RX ORDER — ASPIRIN/CALCIUM CARB/MAGNESIUM 324 MG
81 TABLET ORAL ONCE
Refills: 0 | Status: COMPLETED | OUTPATIENT
Start: 2021-01-01 | End: 2021-01-01

## 2021-01-01 RX ORDER — HEPARIN SODIUM 5000 [USP'U]/ML
5000 INJECTION INTRAVENOUS; SUBCUTANEOUS EVERY 8 HOURS
Refills: 0 | Status: DISCONTINUED | OUTPATIENT
Start: 2021-01-01 | End: 2021-01-01

## 2021-01-01 RX ORDER — ENOXAPARIN SODIUM 100 MG/ML
5 INJECTION SUBCUTANEOUS
Qty: 0 | Refills: 0 | DISCHARGE

## 2021-01-01 RX ORDER — INSULIN DEGLUDEC 100 U/ML
0 INJECTION, SOLUTION SUBCUTANEOUS
Qty: 0 | Refills: 0 | DISCHARGE

## 2021-01-01 RX ORDER — ENOXAPARIN SODIUM 100 MG/ML
6 INJECTION SUBCUTANEOUS
Qty: 0 | Refills: 0 | DISCHARGE

## 2021-01-01 RX ORDER — METOPROLOL TARTRATE 50 MG
1 TABLET ORAL
Qty: 30 | Refills: 0
Start: 2021-01-01 | End: 2021-01-01

## 2021-01-01 RX ORDER — CALCITRIOL 0.5 UG/1
1 CAPSULE ORAL
Qty: 0 | Refills: 0 | DISCHARGE

## 2021-01-01 RX ORDER — ONDANSETRON 8 MG/1
4 TABLET, FILM COATED ORAL EVERY 6 HOURS
Refills: 0 | Status: DISCONTINUED | OUTPATIENT
Start: 2021-01-01 | End: 2021-01-01

## 2021-01-01 RX ORDER — INSULIN GLARGINE 100 [IU]/ML
16 INJECTION, SOLUTION SUBCUTANEOUS AT BEDTIME
Refills: 0 | Status: DISCONTINUED | OUTPATIENT
Start: 2021-01-01 | End: 2021-01-01

## 2021-01-01 RX ORDER — LEVOTHYROXINE SODIUM 0.05 MG/1
50 TABLET ORAL
Qty: 90 | Refills: 0 | Status: ACTIVE | COMMUNITY
Start: 2021-01-01 | End: 1900-01-01

## 2021-01-01 RX ORDER — PIPERACILLIN AND TAZOBACTAM 4; .5 G/20ML; G/20ML
3.38 INJECTION, POWDER, LYOPHILIZED, FOR SOLUTION INTRAVENOUS EVERY 12 HOURS
Refills: 0 | Status: DISCONTINUED | OUTPATIENT
Start: 2021-01-01 | End: 2021-01-01

## 2021-01-01 RX ORDER — TAMSULOSIN HYDROCHLORIDE 0.4 MG/1
0.4 CAPSULE ORAL AT BEDTIME
Refills: 0 | Status: DISCONTINUED | OUTPATIENT
Start: 2021-01-01 | End: 2021-01-01

## 2021-01-01 RX ORDER — ONDANSETRON 8 MG/1
4 TABLET, FILM COATED ORAL ONCE
Refills: 0 | Status: DISCONTINUED | OUTPATIENT
Start: 2021-01-01 | End: 2021-01-01

## 2021-01-01 RX ORDER — INSULIN HUMAN 100 [IU]/ML
8 INJECTION, SOLUTION SUBCUTANEOUS ONCE
Refills: 0 | Status: COMPLETED | OUTPATIENT
Start: 2021-01-01 | End: 2021-01-01

## 2021-01-01 RX ORDER — CHLORHEXIDINE GLUCONATE 213 G/1000ML
1 SOLUTION TOPICAL
Refills: 0 | Status: DISCONTINUED | OUTPATIENT
Start: 2021-01-01 | End: 2021-01-01

## 2021-01-01 RX ORDER — SODIUM CHLORIDE 9 MG/ML
3 INJECTION INTRAMUSCULAR; INTRAVENOUS; SUBCUTANEOUS EVERY 8 HOURS
Refills: 0 | Status: DISCONTINUED | OUTPATIENT
Start: 2021-01-01 | End: 2021-01-01

## 2021-01-01 RX ORDER — LEVOTHYROXINE SODIUM 125 MCG
1 TABLET ORAL
Qty: 0 | Refills: 0 | DISCHARGE

## 2021-01-01 RX ORDER — MEMANTINE HYDROCHLORIDE 10 MG/1
1 TABLET ORAL
Qty: 0 | Refills: 0 | DISCHARGE

## 2021-01-01 RX ORDER — INSULIN LISPRO 100/ML
2 VIAL (ML) SUBCUTANEOUS
Refills: 0 | Status: DISCONTINUED | OUTPATIENT
Start: 2021-01-01 | End: 2021-01-01

## 2021-01-01 RX ORDER — SODIUM BICARBONATE 1 MEQ/ML
650 SYRINGE (ML) INTRAVENOUS
Refills: 0 | Status: DISCONTINUED | OUTPATIENT
Start: 2021-01-01 | End: 2021-01-01

## 2021-01-01 RX ORDER — AMOXICILLIN AND CLAVULANATE POTASSIUM 500; 125 MG/1; MG/1
500-125 TABLET, FILM COATED ORAL
Qty: 7 | Refills: 0 | Status: DISCONTINUED | COMMUNITY
Start: 2021-01-01 | End: 2021-01-01

## 2021-01-01 RX ORDER — SODIUM BICARBONATE 650 MG/1
650 TABLET ORAL
Qty: 60 | Refills: 6 | Status: DISCONTINUED | COMMUNITY
Start: 2019-06-26 | End: 2021-01-01

## 2021-01-01 RX ORDER — HEPARIN SODIUM 5000 [USP'U]/ML
5000 INJECTION INTRAVENOUS; SUBCUTANEOUS EVERY 12 HOURS
Refills: 0 | Status: DISCONTINUED | OUTPATIENT
Start: 2021-01-01 | End: 2021-01-01

## 2021-01-01 RX ORDER — IRON SUCROSE 20 MG/ML
100 INJECTION, SOLUTION INTRAVENOUS
Refills: 0 | Status: DISCONTINUED | OUTPATIENT
Start: 2021-01-01 | End: 2021-01-01

## 2021-01-01 RX ORDER — ISOSORBIDE DINITRATE 10 MG/1
10 TABLET ORAL
Qty: 270 | Refills: 3 | Status: DISCONTINUED | COMMUNITY
Start: 2019-12-16 | End: 2021-01-01

## 2021-01-01 RX ORDER — INSULIN GLARGINE 100 [IU]/ML
100 INJECTION, SOLUTION SUBCUTANEOUS
Qty: 5 | Refills: 0 | Status: ACTIVE | COMMUNITY
Start: 2020-05-12 | End: 1900-01-01

## 2021-01-01 RX ORDER — TAMSULOSIN HYDROCHLORIDE 0.4 MG/1
0.4 CAPSULE ORAL
Qty: 90 | Refills: 0 | Status: DISCONTINUED | COMMUNITY
Start: 2019-12-19 | End: 2021-01-01

## 2021-01-01 RX ORDER — TORSEMIDE 10 MG/1
10 TABLET ORAL AS DIRECTED
Refills: 0 | Status: ACTIVE | COMMUNITY

## 2021-01-01 RX ORDER — LEVOTHYROXINE SODIUM 137 UG/1
TABLET ORAL
Refills: 0 | Status: ACTIVE | COMMUNITY

## 2021-01-01 RX ORDER — ISOSORBIDE MONONITRATE 30 MG/1
30 TABLET, EXTENDED RELEASE ORAL DAILY
Qty: 90 | Refills: 3 | Status: COMPLETED | COMMUNITY
Start: 2021-01-01 | End: 2021-01-01

## 2021-01-01 RX ORDER — LIDOCAINE AND PRILOCAINE 25; 25 MG/G; MG/G
2.5-2.5 CREAM TOPICAL
Qty: 6 | Refills: 3 | Status: DISCONTINUED | COMMUNITY
Start: 2021-01-01 | End: 2021-01-01

## 2021-01-01 RX ORDER — TORSEMIDE 20 MG/1
20 TABLET ORAL
Qty: 120 | Refills: 3 | Status: DISCONTINUED | COMMUNITY
Start: 2020-01-22 | End: 2021-01-01

## 2021-01-01 RX ORDER — SODIUM CHLORIDE 9 MG/ML
3 INJECTION INTRAMUSCULAR; INTRAVENOUS; SUBCUTANEOUS ONCE
Refills: 0 | Status: DISCONTINUED | OUTPATIENT
Start: 2021-01-01 | End: 2021-01-01

## 2021-01-01 RX ORDER — PEN NEEDLE, DIABETIC 29 G X1/2"
32G X 4 MM NEEDLE, DISPOSABLE MISCELLANEOUS
Qty: 1 | Refills: 6 | Status: ACTIVE | COMMUNITY
Start: 2019-07-15 | End: 1900-01-01

## 2021-01-01 RX ORDER — ASPIRIN/CALCIUM CARB/MAGNESIUM 324 MG
81 TABLET ORAL DAILY
Refills: 0 | Status: DISCONTINUED | OUTPATIENT
Start: 2021-01-01 | End: 2021-01-01

## 2021-01-01 RX ORDER — METOPROLOL TARTRATE 50 MG
1 TABLET ORAL
Qty: 0 | Refills: 0 | DISCHARGE
Start: 2021-01-01 | End: 2021-01-01

## 2021-01-01 RX ORDER — ENOXAPARIN SODIUM 100 MG/ML
45 INJECTION SUBCUTANEOUS
Qty: 0 | Refills: 0 | DISCHARGE

## 2021-01-01 RX ORDER — DEXTROSE 50 % IN WATER 50 %
15 SYRINGE (ML) INTRAVENOUS ONCE
Refills: 0 | Status: DISCONTINUED | OUTPATIENT
Start: 2021-01-01 | End: 2021-01-01

## 2021-01-01 RX ORDER — BLOOD PRESSURE TEST KIT
KIT MISCELLANEOUS
Qty: 1 | Refills: 0 | Status: ACTIVE | COMMUNITY
Start: 2021-01-01 | End: 1900-01-01

## 2021-01-01 RX ORDER — INSULIN DEGLUDEC INJECTION 100 U/ML
100 INJECTION, SOLUTION SUBCUTANEOUS
Refills: 0 | Status: ACTIVE | COMMUNITY

## 2021-01-01 RX ORDER — ACETAMINOPHEN 500 MG
650 TABLET ORAL EVERY 6 HOURS
Refills: 0 | Status: DISCONTINUED | OUTPATIENT
Start: 2021-01-01 | End: 2021-01-01

## 2021-01-01 RX ORDER — CALCITRIOL 0.25 UG/1
0.25 CAPSULE, LIQUID FILLED ORAL
Qty: 30 | Refills: 5 | Status: DISCONTINUED | COMMUNITY
Start: 2020-01-18 | End: 2021-01-01

## 2021-01-01 RX ORDER — AZITHROMYCIN 250 MG/1
250 TABLET, FILM COATED ORAL
Qty: 1 | Refills: 0 | Status: ACTIVE | COMMUNITY
Start: 2021-01-01 | End: 1900-01-01

## 2021-01-01 RX ORDER — SACCHAROMYCES BOULARDII 250 MG
1 POWDER IN PACKET (EA) ORAL
Qty: 2 | Refills: 0
Start: 2021-01-01 | End: 2021-01-01

## 2021-01-01 RX ORDER — INSULIN GLARGINE 100 [IU]/ML
5 INJECTION, SOLUTION SUBCUTANEOUS AT BEDTIME
Refills: 0 | Status: DISCONTINUED | OUTPATIENT
Start: 2021-01-01 | End: 2021-01-01

## 2021-01-01 RX ORDER — OXYCODONE AND ACETAMINOPHEN 5; 325 MG/1; MG/1
1 TABLET ORAL ONCE
Refills: 0 | Status: DISCONTINUED | OUTPATIENT
Start: 2021-01-01 | End: 2021-01-01

## 2021-01-01 RX ORDER — OXYCODONE AND ACETAMINOPHEN 5; 325 MG/1; MG/1
5-325 TABLET ORAL
Qty: 28 | Refills: 0 | Status: COMPLETED | COMMUNITY
Start: 2021-01-01 | End: 2021-01-01

## 2021-01-01 RX ORDER — FUROSEMIDE 40 MG
80 TABLET ORAL ONCE
Refills: 0 | Status: COMPLETED | OUTPATIENT
Start: 2021-01-01 | End: 2021-01-01

## 2021-01-01 RX ORDER — HYDRALAZINE HYDROCHLORIDE 50 MG/1
50 TABLET ORAL 3 TIMES DAILY
Qty: 270 | Refills: 3 | Status: ACTIVE | COMMUNITY
Start: 2019-12-19 | End: 1900-01-01

## 2021-01-01 RX ORDER — FUROSEMIDE 40 MG
40 TABLET ORAL ONCE
Refills: 0 | Status: DISCONTINUED | OUTPATIENT
Start: 2021-01-01 | End: 2021-01-01

## 2021-01-01 RX ORDER — CHLORHEXIDINE GLUCONATE 4 %
325 (65 FE) LIQUID (ML) TOPICAL DAILY
Qty: 90 | Refills: 1 | Status: DISCONTINUED | COMMUNITY
Start: 2020-01-23 | End: 2021-01-01

## 2021-01-01 RX ORDER — SODIUM BICARBONATE 1 MEQ/ML
1 SYRINGE (ML) INTRAVENOUS
Qty: 0 | Refills: 0 | DISCHARGE

## 2021-01-01 RX ORDER — CHOLECALCIFEROL (VITAMIN D3) 125 MCG
0 CAPSULE ORAL
Qty: 0 | Refills: 0 | DISCHARGE

## 2021-01-01 RX ORDER — METOPROLOL TARTRATE 50 MG
1 TABLET ORAL
Qty: 0 | Refills: 0 | DISCHARGE

## 2021-01-01 RX ORDER — DRONABINOL 2.5 MG/1
2.5 CAPSULE ORAL
Qty: 30 | Refills: 0 | Status: ACTIVE | COMMUNITY
Start: 1900-01-01 | End: 1900-01-01

## 2021-01-01 RX ORDER — DOXAZOSIN MESYLATE 4 MG
4 TABLET ORAL AT BEDTIME
Refills: 0 | Status: DISCONTINUED | OUTPATIENT
Start: 2021-01-01 | End: 2021-01-01

## 2021-01-01 RX ORDER — CIPROFLOXACIN HYDROCHLORIDE 500 MG/1
500 TABLET, FILM COATED ORAL
Qty: 14 | Refills: 0 | Status: COMPLETED | COMMUNITY
Start: 2021-01-01 | End: 2021-01-01

## 2021-01-01 RX ORDER — ISOSORBIDE MONONITRATE 60 MG/1
1 TABLET, EXTENDED RELEASE ORAL
Qty: 0 | Refills: 0 | DISCHARGE

## 2021-01-01 RX ORDER — SENNA PLUS 8.6 MG/1
2 TABLET ORAL AT BEDTIME
Refills: 0 | Status: DISCONTINUED | OUTPATIENT
Start: 2021-01-01 | End: 2021-01-01

## 2021-01-01 RX ORDER — DEXTROSE 50 % IN WATER 50 %
25 SYRINGE (ML) INTRAVENOUS ONCE
Refills: 0 | Status: COMPLETED | OUTPATIENT
Start: 2021-01-01 | End: 2021-01-01

## 2021-01-01 RX ORDER — INSULIN GLARGINE 100 [IU]/ML
45 INJECTION, SOLUTION SUBCUTANEOUS AT BEDTIME
Refills: 0 | Status: DISCONTINUED | OUTPATIENT
Start: 2021-01-01 | End: 2021-01-01

## 2021-01-01 RX ORDER — CHROMIUM 200 MCG
TABLET ORAL
Refills: 0 | Status: ACTIVE | COMMUNITY

## 2021-01-01 RX ORDER — BLOOD SUGAR DIAGNOSTIC
STRIP MISCELLANEOUS 4 TIMES DAILY
Qty: 2 | Refills: 3 | Status: ACTIVE | COMMUNITY
Start: 2020-09-18 | End: 1900-01-01

## 2021-01-01 RX ORDER — LIDOCAINE HCL 20 MG/ML
10 VIAL (ML) INJECTION ONCE
Refills: 0 | Status: COMPLETED | OUTPATIENT
Start: 2021-01-01 | End: 2021-01-01

## 2021-01-01 RX ADMIN — Medication 25 MILLIGRAM(S): at 05:55

## 2021-01-01 RX ADMIN — Medication 80 MILLIGRAM(S): at 10:23

## 2021-01-01 RX ADMIN — MEMANTINE HYDROCHLORIDE 10 MILLIGRAM(S): 10 TABLET ORAL at 06:06

## 2021-01-01 RX ADMIN — Medication 25 MILLIGRAM(S): at 06:06

## 2021-01-01 RX ADMIN — MEMANTINE HYDROCHLORIDE 10 MILLIGRAM(S): 10 TABLET ORAL at 06:10

## 2021-01-01 RX ADMIN — Medication 50 MILLIGRAM(S): at 06:06

## 2021-01-01 RX ADMIN — Medication 2 UNIT(S): at 17:41

## 2021-01-01 RX ADMIN — Medication 20 MILLIGRAM(S): at 06:10

## 2021-01-01 RX ADMIN — Medication 2000 UNIT(S): at 11:04

## 2021-01-01 RX ADMIN — ISOSORBIDE MONONITRATE 30 MILLIGRAM(S): 60 TABLET, EXTENDED RELEASE ORAL at 11:40

## 2021-01-01 RX ADMIN — Medication 40 MILLIGRAM(S): at 05:55

## 2021-01-01 RX ADMIN — Medication 80 MILLIGRAM(S): at 08:23

## 2021-01-01 RX ADMIN — Medication 50 MICROGRAM(S): at 05:22

## 2021-01-01 RX ADMIN — Medication 50 MICROGRAM(S): at 06:06

## 2021-01-01 RX ADMIN — ATORVASTATIN CALCIUM 40 MILLIGRAM(S): 80 TABLET, FILM COATED ORAL at 21:17

## 2021-01-01 RX ADMIN — Medication 325 MILLIGRAM(S): at 08:29

## 2021-01-01 RX ADMIN — SODIUM CHLORIDE 3 MILLILITER(S): 9 INJECTION INTRAMUSCULAR; INTRAVENOUS; SUBCUTANEOUS at 21:18

## 2021-01-01 RX ADMIN — Medication 250 MILLIGRAM(S): at 05:33

## 2021-01-01 RX ADMIN — Medication 25 MILLIGRAM(S): at 05:22

## 2021-01-01 RX ADMIN — Medication 650 MILLIGRAM(S): at 16:23

## 2021-01-01 RX ADMIN — HEPARIN SODIUM 5000 UNIT(S): 5000 INJECTION INTRAVENOUS; SUBCUTANEOUS at 06:06

## 2021-01-01 RX ADMIN — Medication 81 MILLIGRAM(S): at 11:04

## 2021-01-01 RX ADMIN — Medication 650 MILLIGRAM(S): at 17:33

## 2021-01-01 RX ADMIN — Medication 250 MILLIGRAM(S): at 17:34

## 2021-01-01 RX ADMIN — ATORVASTATIN CALCIUM 40 MILLIGRAM(S): 80 TABLET, FILM COATED ORAL at 23:19

## 2021-01-01 RX ADMIN — Medication 2 UNIT(S): at 07:34

## 2021-01-01 RX ADMIN — Medication 40 MILLIGRAM(S): at 19:46

## 2021-01-01 RX ADMIN — Medication 50 MILLIGRAM(S): at 21:16

## 2021-01-01 RX ADMIN — Medication 81 MILLIGRAM(S): at 13:26

## 2021-01-01 RX ADMIN — MEMANTINE HYDROCHLORIDE 10 MILLIGRAM(S): 10 TABLET ORAL at 17:32

## 2021-01-01 RX ADMIN — HEPARIN SODIUM 5000 UNIT(S): 5000 INJECTION INTRAVENOUS; SUBCUTANEOUS at 21:27

## 2021-01-01 RX ADMIN — Medication 50 MILLIGRAM(S): at 06:02

## 2021-01-01 RX ADMIN — Medication 2: at 12:08

## 2021-01-01 RX ADMIN — ISOSORBIDE MONONITRATE 30 MILLIGRAM(S): 60 TABLET, EXTENDED RELEASE ORAL at 15:13

## 2021-01-01 RX ADMIN — ATORVASTATIN CALCIUM 40 MILLIGRAM(S): 80 TABLET, FILM COATED ORAL at 22:29

## 2021-01-01 RX ADMIN — Medication 650 MILLIGRAM(S): at 05:20

## 2021-01-01 RX ADMIN — Medication 20 MILLIGRAM(S): at 00:44

## 2021-01-01 RX ADMIN — INSULIN HUMAN 8 UNIT(S): 100 INJECTION, SOLUTION SUBCUTANEOUS at 14:54

## 2021-01-01 RX ADMIN — Medication 20 MILLIGRAM(S): at 17:22

## 2021-01-01 RX ADMIN — Medication 25 MILLIGRAM(S): at 06:10

## 2021-01-01 RX ADMIN — Medication 25 MILLIGRAM(S): at 05:45

## 2021-01-01 RX ADMIN — AMLODIPINE BESYLATE 10 MILLIGRAM(S): 2.5 TABLET ORAL at 06:06

## 2021-01-01 RX ADMIN — Medication 50 MICROGRAM(S): at 05:55

## 2021-01-01 RX ADMIN — Medication 50 MILLIGRAM(S): at 05:20

## 2021-01-01 RX ADMIN — MEMANTINE HYDROCHLORIDE 10 MILLIGRAM(S): 10 TABLET ORAL at 21:09

## 2021-01-01 RX ADMIN — Medication 10: at 12:51

## 2021-01-01 RX ADMIN — Medication 50 MILLIGRAM(S): at 23:19

## 2021-01-01 RX ADMIN — ATORVASTATIN CALCIUM 40 MILLIGRAM(S): 80 TABLET, FILM COATED ORAL at 21:09

## 2021-01-01 RX ADMIN — Medication 650 MILLIGRAM(S): at 17:22

## 2021-01-01 RX ADMIN — MEMANTINE HYDROCHLORIDE 10 MILLIGRAM(S): 10 TABLET ORAL at 06:02

## 2021-01-01 RX ADMIN — HEPARIN SODIUM 5000 UNIT(S): 5000 INJECTION INTRAVENOUS; SUBCUTANEOUS at 13:48

## 2021-01-01 RX ADMIN — Medication 650 MILLIGRAM(S): at 06:06

## 2021-01-01 RX ADMIN — Medication 650 MILLIGRAM(S): at 05:22

## 2021-01-01 RX ADMIN — Medication 325 MILLIGRAM(S): at 11:28

## 2021-01-01 RX ADMIN — Medication 20 MILLIGRAM(S): at 05:20

## 2021-01-01 RX ADMIN — HEPARIN SODIUM 5000 UNIT(S): 5000 INJECTION INTRAVENOUS; SUBCUTANEOUS at 13:09

## 2021-01-01 RX ADMIN — Medication 20 MILLIGRAM(S): at 05:45

## 2021-01-01 RX ADMIN — MEMANTINE HYDROCHLORIDE 10 MILLIGRAM(S): 10 TABLET ORAL at 05:22

## 2021-01-01 RX ADMIN — TAMSULOSIN HYDROCHLORIDE 0.4 MILLIGRAM(S): 0.4 CAPSULE ORAL at 22:29

## 2021-01-01 RX ADMIN — Medication 25 GRAM(S): at 17:41

## 2021-01-01 RX ADMIN — Medication 81 MILLIGRAM(S): at 11:28

## 2021-01-01 RX ADMIN — INSULIN GLARGINE 20 UNIT(S): 100 INJECTION, SOLUTION SUBCUTANEOUS at 22:29

## 2021-01-01 RX ADMIN — HEPARIN SODIUM 5000 UNIT(S): 5000 INJECTION INTRAVENOUS; SUBCUTANEOUS at 17:34

## 2021-01-01 RX ADMIN — HEPARIN SODIUM 5000 UNIT(S): 5000 INJECTION INTRAVENOUS; SUBCUTANEOUS at 05:55

## 2021-01-01 RX ADMIN — Medication 2: at 22:29

## 2021-01-01 RX ADMIN — ATORVASTATIN CALCIUM 10 MILLIGRAM(S): 80 TABLET, FILM COATED ORAL at 22:19

## 2021-01-01 RX ADMIN — ISOSORBIDE MONONITRATE 30 MILLIGRAM(S): 60 TABLET, EXTENDED RELEASE ORAL at 08:29

## 2021-01-01 RX ADMIN — PIPERACILLIN AND TAZOBACTAM 25 GRAM(S): 4; .5 INJECTION, POWDER, LYOPHILIZED, FOR SOLUTION INTRAVENOUS at 05:56

## 2021-01-01 RX ADMIN — INSULIN GLARGINE 5 UNIT(S): 100 INJECTION, SOLUTION SUBCUTANEOUS at 21:17

## 2021-01-01 RX ADMIN — Medication 250 MILLIGRAM(S): at 05:55

## 2021-01-01 RX ADMIN — SODIUM CHLORIDE 100 MILLILITER(S): 9 INJECTION INTRAMUSCULAR; INTRAVENOUS; SUBCUTANEOUS at 13:00

## 2021-01-01 RX ADMIN — Medication 2: at 13:26

## 2021-01-01 RX ADMIN — Medication 1: at 07:33

## 2021-01-01 RX ADMIN — Medication 50 MILLIGRAM(S): at 22:19

## 2021-01-01 RX ADMIN — Medication 2000 UNIT(S): at 11:48

## 2021-01-01 RX ADMIN — SODIUM CHLORIDE 3 MILLILITER(S): 9 INJECTION INTRAMUSCULAR; INTRAVENOUS; SUBCUTANEOUS at 11:28

## 2021-01-01 RX ADMIN — MEMANTINE HYDROCHLORIDE 10 MILLIGRAM(S): 10 TABLET ORAL at 17:42

## 2021-01-01 RX ADMIN — ATORVASTATIN CALCIUM 10 MILLIGRAM(S): 80 TABLET, FILM COATED ORAL at 22:33

## 2021-01-01 RX ADMIN — Medication 20 MILLIGRAM(S): at 06:02

## 2021-01-01 RX ADMIN — ATORVASTATIN CALCIUM 10 MILLIGRAM(S): 80 TABLET, FILM COATED ORAL at 21:58

## 2021-01-01 RX ADMIN — SODIUM CHLORIDE 3 MILLILITER(S): 9 INJECTION INTRAMUSCULAR; INTRAVENOUS; SUBCUTANEOUS at 14:43

## 2021-01-01 RX ADMIN — MEMANTINE HYDROCHLORIDE 10 MILLIGRAM(S): 10 TABLET ORAL at 16:23

## 2021-01-01 RX ADMIN — Medication 40 MILLIGRAM(S): at 05:22

## 2021-01-01 RX ADMIN — Medication 81 MILLIGRAM(S): at 08:29

## 2021-01-01 RX ADMIN — HEPARIN SODIUM 5000 UNIT(S): 5000 INJECTION INTRAVENOUS; SUBCUTANEOUS at 06:02

## 2021-01-01 RX ADMIN — Medication 2 UNIT(S): at 17:38

## 2021-01-01 RX ADMIN — HEPARIN SODIUM 5000 UNIT(S): 5000 INJECTION INTRAVENOUS; SUBCUTANEOUS at 23:32

## 2021-01-01 RX ADMIN — Medication 50 MICROGRAM(S): at 05:45

## 2021-01-01 RX ADMIN — Medication 50 MILLIGRAM(S): at 13:08

## 2021-01-01 RX ADMIN — Medication 2000 UNIT(S): at 12:45

## 2021-01-01 RX ADMIN — Medication 20 MILLIGRAM(S): at 16:22

## 2021-01-01 RX ADMIN — Medication 50 MICROGRAM(S): at 05:32

## 2021-01-01 RX ADMIN — Medication 25 MILLIGRAM(S): at 06:02

## 2021-01-01 RX ADMIN — Medication 40 MILLIGRAM(S): at 06:06

## 2021-01-01 RX ADMIN — Medication 50 MILLIGRAM(S): at 13:26

## 2021-01-01 RX ADMIN — Medication 2000 UNIT(S): at 08:29

## 2021-01-01 RX ADMIN — MEMANTINE HYDROCHLORIDE 10 MILLIGRAM(S): 10 TABLET ORAL at 19:46

## 2021-01-01 RX ADMIN — Medication 2 UNIT(S): at 08:14

## 2021-01-01 RX ADMIN — HEPARIN SODIUM 5000 UNIT(S): 5000 INJECTION INTRAVENOUS; SUBCUTANEOUS at 21:58

## 2021-01-01 RX ADMIN — Medication 50 MILLIGRAM(S): at 21:27

## 2021-01-01 RX ADMIN — Medication 2000 UNIT(S): at 13:08

## 2021-01-01 RX ADMIN — Medication 4 MILLIGRAM(S): at 21:58

## 2021-01-01 RX ADMIN — SODIUM CHLORIDE 3 MILLILITER(S): 9 INJECTION INTRAMUSCULAR; INTRAVENOUS; SUBCUTANEOUS at 04:31

## 2021-01-01 RX ADMIN — Medication 50 MILLIGRAM(S): at 05:45

## 2021-01-01 RX ADMIN — Medication 40 MILLIGRAM(S): at 06:22

## 2021-01-01 RX ADMIN — Medication 50 MILLIGRAM(S): at 05:56

## 2021-01-01 RX ADMIN — ATORVASTATIN CALCIUM 10 MILLIGRAM(S): 80 TABLET, FILM COATED ORAL at 22:42

## 2021-01-01 RX ADMIN — Medication 50 MILLIGRAM(S): at 21:31

## 2021-01-01 RX ADMIN — Medication 325 MILLIGRAM(S): at 11:40

## 2021-01-01 RX ADMIN — HEPARIN SODIUM 5000 UNIT(S): 5000 INJECTION INTRAVENOUS; SUBCUTANEOUS at 05:33

## 2021-01-01 RX ADMIN — Medication 50 MILLIGRAM(S): at 22:33

## 2021-01-01 RX ADMIN — Medication 4 MILLIGRAM(S): at 22:33

## 2021-01-01 RX ADMIN — Medication 4 MILLIGRAM(S): at 22:18

## 2021-01-01 RX ADMIN — Medication 81 MILLIGRAM(S): at 13:08

## 2021-01-01 RX ADMIN — Medication 2 UNIT(S): at 13:27

## 2021-01-01 RX ADMIN — Medication 2000 UNIT(S): at 13:26

## 2021-01-01 RX ADMIN — Medication 2: at 16:47

## 2021-01-01 RX ADMIN — Medication 20 MILLIGRAM(S): at 17:34

## 2021-01-01 RX ADMIN — Medication 650 MILLIGRAM(S): at 06:09

## 2021-01-01 RX ADMIN — AMLODIPINE BESYLATE 10 MILLIGRAM(S): 2.5 TABLET ORAL at 05:22

## 2021-01-01 RX ADMIN — Medication 25 MILLIGRAM(S): at 06:22

## 2021-01-01 RX ADMIN — ISOSORBIDE MONONITRATE 30 MILLIGRAM(S): 60 TABLET, EXTENDED RELEASE ORAL at 11:28

## 2021-01-01 RX ADMIN — Medication 3: at 16:22

## 2021-01-01 RX ADMIN — Medication 325 MILLIGRAM(S): at 20:46

## 2021-01-01 RX ADMIN — Medication 650 MILLIGRAM(S): at 06:41

## 2021-01-01 RX ADMIN — Medication 2: at 07:47

## 2021-01-01 RX ADMIN — Medication 2: at 08:13

## 2021-01-01 RX ADMIN — Medication 2: at 16:39

## 2021-01-01 RX ADMIN — Medication 325 MILLIGRAM(S): at 11:48

## 2021-01-01 RX ADMIN — Medication 2000 UNIT(S): at 11:40

## 2021-01-01 RX ADMIN — HEPARIN SODIUM 5000 UNIT(S): 5000 INJECTION INTRAVENOUS; SUBCUTANEOUS at 13:28

## 2021-01-01 RX ADMIN — ERYTHROPOIETIN 10000 UNIT(S): 10000 INJECTION, SOLUTION INTRAVENOUS; SUBCUTANEOUS at 10:27

## 2021-01-01 RX ADMIN — PIPERACILLIN AND TAZOBACTAM 25 GRAM(S): 4; .5 INJECTION, POWDER, LYOPHILIZED, FOR SOLUTION INTRAVENOUS at 17:34

## 2021-01-01 RX ADMIN — Medication 25 GRAM(S): at 12:30

## 2021-01-01 RX ADMIN — Medication 50 MICROGRAM(S): at 06:40

## 2021-01-01 RX ADMIN — OXYCODONE AND ACETAMINOPHEN 1 TABLET(S): 5; 325 TABLET ORAL at 02:53

## 2021-01-01 RX ADMIN — MEMANTINE HYDROCHLORIDE 10 MILLIGRAM(S): 10 TABLET ORAL at 05:45

## 2021-01-01 RX ADMIN — SODIUM CHLORIDE 3 MILLILITER(S): 9 INJECTION INTRAMUSCULAR; INTRAVENOUS; SUBCUTANEOUS at 06:09

## 2021-01-01 RX ADMIN — Medication 4 MILLIGRAM(S): at 21:31

## 2021-01-01 RX ADMIN — Medication 650 MILLIGRAM(S): at 05:45

## 2021-01-01 RX ADMIN — Medication 50 MICROGRAM(S): at 06:21

## 2021-01-01 RX ADMIN — Medication 10 MILLILITER(S): at 19:00

## 2021-01-01 RX ADMIN — Medication 50 MICROGRAM(S): at 05:20

## 2021-01-01 RX ADMIN — MEMANTINE HYDROCHLORIDE 10 MILLIGRAM(S): 10 TABLET ORAL at 17:34

## 2021-01-01 RX ADMIN — MEMANTINE HYDROCHLORIDE 10 MILLIGRAM(S): 10 TABLET ORAL at 05:20

## 2021-01-01 RX ADMIN — Medication 1: at 16:10

## 2021-01-01 RX ADMIN — Medication 650 MILLIGRAM(S): at 17:42

## 2021-01-01 RX ADMIN — MEMANTINE HYDROCHLORIDE 10 MILLIGRAM(S): 10 TABLET ORAL at 17:22

## 2021-01-01 RX ADMIN — Medication 4 MILLIGRAM(S): at 21:27

## 2021-01-01 RX ADMIN — Medication 2: at 11:04

## 2021-01-01 RX ADMIN — Medication 50 MILLIGRAM(S): at 13:57

## 2021-01-01 RX ADMIN — IRON SUCROSE 210 MILLIGRAM(S): 20 INJECTION, SOLUTION INTRAVENOUS at 09:22

## 2021-01-01 RX ADMIN — MEMANTINE HYDROCHLORIDE 10 MILLIGRAM(S): 10 TABLET ORAL at 05:55

## 2021-01-01 RX ADMIN — SODIUM CHLORIDE 3 MILLILITER(S): 9 INJECTION INTRAMUSCULAR; INTRAVENOUS; SUBCUTANEOUS at 20:49

## 2021-01-01 RX ADMIN — HEPARIN SODIUM 5000 UNIT(S): 5000 INJECTION INTRAVENOUS; SUBCUTANEOUS at 21:31

## 2021-01-01 RX ADMIN — INSULIN GLARGINE 20 UNIT(S): 100 INJECTION, SOLUTION SUBCUTANEOUS at 21:17

## 2021-01-01 RX ADMIN — HEPARIN SODIUM 5000 UNIT(S): 5000 INJECTION INTRAVENOUS; SUBCUTANEOUS at 05:20

## 2021-01-01 RX ADMIN — Medication 20 MILLIGRAM(S): at 17:41

## 2021-01-01 RX ADMIN — Medication 81 MILLIGRAM(S): at 11:40

## 2021-01-01 RX ADMIN — Medication 50 MILLIGRAM(S): at 22:42

## 2021-01-01 RX ADMIN — Medication 650 MILLIGRAM(S): at 19:46

## 2021-01-01 RX ADMIN — ISOSORBIDE MONONITRATE 30 MILLIGRAM(S): 60 TABLET, EXTENDED RELEASE ORAL at 11:47

## 2021-01-01 RX ADMIN — CEFTRIAXONE 100 MILLIGRAM(S): 500 INJECTION, POWDER, FOR SOLUTION INTRAMUSCULAR; INTRAVENOUS at 14:38

## 2021-01-01 RX ADMIN — ATORVASTATIN CALCIUM 10 MILLIGRAM(S): 80 TABLET, FILM COATED ORAL at 21:27

## 2021-01-01 RX ADMIN — Medication 650 MILLIGRAM(S): at 06:22

## 2021-01-01 RX ADMIN — Medication 2: at 07:34

## 2021-01-01 RX ADMIN — PIPERACILLIN AND TAZOBACTAM 25 GRAM(S): 4; .5 INJECTION, POWDER, LYOPHILIZED, FOR SOLUTION INTRAVENOUS at 05:33

## 2021-01-01 RX ADMIN — MEMANTINE HYDROCHLORIDE 10 MILLIGRAM(S): 10 TABLET ORAL at 06:23

## 2021-01-01 RX ADMIN — Medication 325 MILLIGRAM(S): at 11:04

## 2021-01-01 RX ADMIN — Medication 40 MILLIGRAM(S): at 17:32

## 2021-01-01 RX ADMIN — ATORVASTATIN CALCIUM 10 MILLIGRAM(S): 80 TABLET, FILM COATED ORAL at 21:31

## 2021-01-01 RX ADMIN — Medication 4 MILLIGRAM(S): at 22:42

## 2021-01-01 RX ADMIN — Medication 8: at 17:41

## 2021-01-01 RX ADMIN — MEMANTINE HYDROCHLORIDE 10 MILLIGRAM(S): 10 TABLET ORAL at 05:33

## 2021-01-01 RX ADMIN — Medication 25 MILLIGRAM(S): at 06:41

## 2021-01-01 RX ADMIN — ISOSORBIDE MONONITRATE 30 MILLIGRAM(S): 60 TABLET, EXTENDED RELEASE ORAL at 13:08

## 2021-01-01 RX ADMIN — MEMANTINE HYDROCHLORIDE 10 MILLIGRAM(S): 10 TABLET ORAL at 06:41

## 2021-01-01 RX ADMIN — Medication 81 MILLIGRAM(S): at 12:45

## 2021-01-01 RX ADMIN — Medication 650 MILLIGRAM(S): at 06:02

## 2021-01-01 RX ADMIN — HEPARIN SODIUM 5000 UNIT(S): 5000 INJECTION INTRAVENOUS; SUBCUTANEOUS at 05:45

## 2021-01-01 RX ADMIN — Medication 81 MILLIGRAM(S): at 11:48

## 2021-01-01 RX ADMIN — Medication 325 MILLIGRAM(S): at 13:08

## 2021-01-01 RX ADMIN — Medication 2 UNIT(S): at 12:50

## 2021-01-01 RX ADMIN — Medication 12.5 GRAM(S): at 08:14

## 2021-01-01 RX ADMIN — Medication 50 MILLIGRAM(S): at 05:22

## 2021-01-01 RX ADMIN — Medication 25 MILLIGRAM(S): at 05:20

## 2021-01-01 RX ADMIN — Medication 50 MICROGRAM(S): at 06:10

## 2021-01-01 RX ADMIN — Medication 50 MILLIGRAM(S): at 06:22

## 2021-01-01 RX ADMIN — HEPARIN SODIUM 5000 UNIT(S): 5000 INJECTION INTRAVENOUS; SUBCUTANEOUS at 22:33

## 2021-01-01 RX ADMIN — Medication 50 MILLIGRAM(S): at 21:58

## 2021-01-01 RX ADMIN — SODIUM CHLORIDE 3 MILLILITER(S): 9 INJECTION INTRAMUSCULAR; INTRAVENOUS; SUBCUTANEOUS at 06:41

## 2021-01-01 RX ADMIN — Medication 81 MILLIGRAM(S): at 10:46

## 2021-01-01 RX ADMIN — Medication 2: at 01:09

## 2021-01-01 RX ADMIN — Medication 50 MICROGRAM(S): at 06:02

## 2021-01-01 RX ADMIN — AMLODIPINE BESYLATE 10 MILLIGRAM(S): 2.5 TABLET ORAL at 06:22

## 2021-01-05 NOTE — HISTORY OF PRESENT ILLNESS
[FreeTextEntry1] : HTN, atrial fibrillation, congestive heart failure and heart failure with preserve ejection fraction s/p cardiomem \par \par \par \par HPI for today: : he sleep s a lot.  No headahces. no dizziness. +_ dyspnea on exertion. getting hemodialyses .\par legs not swollen now.  he is complaitn with meds. \par checks cardiomem every day.\par \par \par old note:  : c/c : when parker the HD catheter will come off.  he has fistual 2 mths ago. getting hemodialyses with catheter. No headaches. Nodizziness. no dyspnea. \par no palpitations,. has cardiomem. at goal. \par  \par \par  \par \par ol dnote: no chest pain. no dyspnea. no headaches. no dizziness. \par old note: no chest pain. no dyspnea. routine follow up. compliant with meds. no fall. no bleeding. \par prior note: last visit, did not bring his pills. BP high, added Benica-HCTZ.  Compliant with emds. Did not bring his pills today again.  Also, said that did not  Benicar-HCTZ due to high co pay. Only taking one BP pill. Not compliant with diet. \par No chest pain. No dyspnea.\par OLD NOTE: This is a 74 year old man with history of HTN, DM, CKD (Stage 3) Atrial fibrillation, prior pneumonia and pleural effusions and pericardial effusion, recently admitted to hospital for rectal bleeding and Elevated INR Hb < 4 , INR =8 and acute on chronic renal failure. CT abd showed, diverticular disease.  Colonoscopy showed, diverticulitis, EGD showed  Patient is off AC. \par \par

## 2021-01-05 NOTE — CARDIOLOGY SUMMARY
[No Ischemia] : no Ischemia [LVEF ___%] : LVEF [unfilled]% [None] : normal LV function [Enlarged] : enlarged LA size [Mild] : mild mitral regurgitation [___] : [unfilled] [___] : [unfilled] [de-identified] : feb 2018 : carotid US: mild atherosclerosis. no stenosis. \par feb 2019: Mild plaque . no stenosis.

## 2021-01-05 NOTE — PHYSICAL EXAM
[General Appearance - Well Developed] : well developed [Normal Appearance] : normal appearance [Well Groomed] : well groomed [General Appearance - Well Nourished] : well nourished [No Deformities] : no deformities [General Appearance - In No Acute Distress] : no acute distress [Normal Conjunctiva] : the conjunctiva exhibited no abnormalities [Eyelids - No Xanthelasma] : the eyelids demonstrated no xanthelasmas [Normal Oral Mucosa] : normal oral mucosa [No Oral Pallor] : no oral pallor [No Oral Cyanosis] : no oral cyanosis [Normal Jugular Venous A Waves Present] : normal jugular venous A waves present [Normal Jugular Venous V Waves Present] : normal jugular venous V waves present [No Jugular Venous Perez A Waves] : no jugular venous perez A waves [Respiration, Rhythm And Depth] : normal respiratory rhythm and effort [Exaggerated Use Of Accessory Muscles For Inspiration] : no accessory muscle use [Auscultation Breath Sounds / Voice Sounds] : lungs were clear to auscultation bilaterally [Heart Rate And Rhythm] : heart rate and rhythm were normal [Heart Sounds] : normal S1 and S2 [Edema] : no peripheral edema present [Veins - Varicosity Changes] : no varicosital changes were noted in the lower extremities [Abdomen Soft] : soft [Abdomen Tenderness] : non-tender [Abdomen Mass (___ Cm)] : no abdominal mass palpated [Nail Clubbing] : no clubbing of the fingernails [Cyanosis, Localized] : no localized cyanosis [Petechial Hemorrhages (___cm)] : no petechial hemorrhages [Skin Color & Pigmentation] : normal skin color and pigmentation [] : no rash [No Skin Ulcers] : no skin ulcer [Oriented To Time, Place, And Person] : oriented to person, place, and time [Affect] : the affect was normal [Mood] : the mood was normal [No Anxiety] : not feeling anxious [FreeTextEntry1] : Dry hairless skin lack of lustere in the legs . Edema.

## 2021-01-05 NOTE — DISCUSSION/SUMMARY
[Patient] : the patient [Risks] : risks [Benefits] : benefits [Alternatives] : alternatives [___ Month(s)] : [unfilled] month(s) [With Me] : with me [FreeTextEntry1] : 79 M with HTN, DM, HLD, paroxsymal atrial fibrillation with Supra therapeutic INR and acute severe GI bleeding now off coumadin on ILR to evaluate for Afib burden,  Pt is s/p Cardiomems placement 1/3. reports today for lower ext. swelling. denies any SOB, chest pain, palpitations, syncope or near syncope. \par Plan d/w Dr. Faustin. \par \par \par Plan:\par 1). HF- heart failure with preserve ejection fraction   Pt is s/p Cardiomems   torsemide 20 mg twice a day. still makes urine. . . on hemodialyses . discuss with nephrolgy whether we need to contineu diuresis. euvolemic. PADP goal 17 mm Hgh. \par 2) Bilateral LE edema:  .  CKD. HFPEF.  Compressions stockings.  resolved. \par 3)  Paroxysmal Afib: s/p cardioversion Jan 2015, CHADSVASC >=3..  =  Xarelto not approved for life long or high co pay.  no coumadin as patient had bleeding on supra therapeutic INR.  ct  aspirin  81 mg.  s/p Loop recorder.  low afib burden so far.  No definite indication for watchman. \par 5) HTN: controlled. ct  current meds. \par 6) CAD evaluation : no significant coronary artery disease on cath \par 7) MODerate to severe  aortic stenosis:  CHF.   repeat echo. \par

## 2021-01-07 NOTE — ASSESSMENT
[FreeTextEntry1] : 80 year old man with ESRD on HD via a tunneled right IJ dialysis catheter.\par He is s/p left brachiocephalic AVF creation by me on 2/28/2020.\par Patient is S/P Fistulogram revealed stenosis in the outflow vein and central venous stenosis for which he underwent venoplasty and stenting\par He has had no further difficulties with arm swelling or post HD bleeding\par HD has been uneventful as per wife\par \par

## 2021-01-07 NOTE — PHYSICAL EXAM
[JVD] : no jugular venous distention  [Normal Breath Sounds] : Normal breath sounds [Normal Rate and Rhythm] : normal rate and rhythm [2+] : left 2+ [Ankle Swelling (On Exam)] : not present [Abdomen Masses] : No abdominal masses [Abdomen Tenderness] : ~T ~M No abdominal tenderness [No Rash or Lesion] : No rash or lesion [Alert] : alert [Oriented to Person] : oriented to person [Oriented to Place] : oriented to place [Oriented to Time] : oriented to time [Calm] : calm [de-identified] : Well appearing [FreeTextEntry1] : Left arm AVF with good thrill, + bruit\par No edema

## 2021-01-07 NOTE — HISTORY OF PRESENT ILLNESS
[FreeTextEntry1] : 79 year old man with ESRD on HD via a tunneled right IJ dialysis catheter.\par He is s/p left brachiocephalic AVF creation by me on 2/28/2020.\par Patient S/P L AV Fistulogram on 12/3/20 which revealed stenosis in the outflow vein and central venous stenosis for which he underwent venoplasty and stenting [de-identified] : He has had HD without any difficulty or post HD bleeding from L AVF. He has had no further arm swelling.

## 2021-01-12 NOTE — REVIEW OF SYSTEMS
[Incontinence] : incontinence [Frequency] : frequency [Negative] : Heme/Lymph [Dysuria] : no dysuria [Hematuria] : no hematuria

## 2021-01-12 NOTE — HISTORY OF PRESENT ILLNESS
[FreeTextEntry8] : 79 y/o M with hx of afib, aortic stenosis, alzheimers, HTN, HLD, ESRD on HD, and HF presents with wife for likely UTI. As per wife, pt is prone to UTIs and reports for the past week his urine has had a strong odor with urinary incontinence. No fever, abd pn, back pn, dysuria, hematuria or other associated sx. No other medical complaints at this time.

## 2021-01-12 NOTE — PLAN
[FreeTextEntry1] : 79 y/o M with hx of afib, aortic stenosis, Alzheimers, HTN, HLD, ESRD on HD, and HF presents with acute on chronic UTI for 1+ week with urinary incontinence. \par \par UA positive\par meds reconciled.\par script sent\par labs drawn \par care plan discussed \par f/u in 1 month

## 2021-01-12 NOTE — PLAN
[FreeTextEntry1] : 81 y/o M with hx of afib, aortic stenosis, Alzheimers, HTN, HLD, ESRD on HD, and HF presents with acute on chronic UTI for 1+ week with urinary incontinence. \par \par UA positive\par meds reconciled.\par script sent\par labs drawn \par care plan discussed \par f/u in 1 month

## 2021-04-09 NOTE — PATIENT PROFILE ADULT - ABILITY TO HEAR (WITH HEARING AID OR HEARING APPLIANCE IF NORMALLY USED):
Pt refusing lab draws at this time.  Discussed with MD.  MD ordered EMLA cream to be applied prior to blood draw to assist with analgesia.  Pt states he will allow a blood draw after midnight this evening.  MD aware.     Adequate: hears normal conversation without difficulty negative

## 2021-04-15 NOTE — ASSESSMENT
[FreeTextEntry1] : 80 year old man with ESRD on HD via a tunneled right IJ dialysis catheter.\par He is s/p left brachiocephalic AVF creation by me on 2/28/2020.\par S/P Fistulogram in November 2020 which revealed stenosis in the outflow vein and central venous stenosis for which he underwent venoplasty and stenting\par \par He now returns with worsening left arm swelling\par AVF duplex US shows  no stenosis within the fistula. His arm swelling is likely due to central venous stenosis\par Will schedule for left arm fistulogram.\par Plan discussed with patient and wife. All questions answered. \par

## 2021-04-15 NOTE — HISTORY OF PRESENT ILLNESS
[FreeTextEntry1] : 79 year old man with ESRD on HD via a tunneled right IJ dialysis catheter.\par He is s/p left brachiocephalic AVF creation by me on 2/28/2020.\par S/P Fistulogram in November 2020 which revealed stenosis in the outflow vein and central venous stenosis for which he underwent venoplasty and stenting\par  [de-identified] : Patient returns with 1 month of left arm swelling\par AV fistula is still functional for HD

## 2021-04-15 NOTE — PHYSICAL EXAM
[Normal Breath Sounds] : Normal breath sounds [Normal Rate and Rhythm] : normal rate and rhythm [2+] : left 2+ [No Rash or Lesion] : No rash or lesion [Alert] : alert [Calm] : calm [JVD] : no jugular venous distention  [Ankle Swelling (On Exam)] : not present [Abdomen Masses] : No abdominal masses [Abdomen Tenderness] : ~T ~M No abdominal tenderness [Oriented to Person] : disoriented to person [Oriented to Place] : disoriented to place [Oriented to Time] : disoriented to time [de-identified] : Well appearing [FreeTextEntry1] : Moderate left arm edema\par Left arm AVF with good thrill

## 2021-05-04 PROBLEM — F41.9 ANXIOUSNESS: Status: ACTIVE | Noted: 2020-06-09

## 2021-05-04 NOTE — HISTORY OF PRESENT ILLNESS
[Spouse] : spouse [FreeTextEntry1] : Patient in for follow up of DM HTN Dementia fatigue pending vascular procedure for vascular insufency as per wife patient with CRH and dementia that has fatigue and allows wife to do most of his speaking , patient according to wife completing ADLs with assistance

## 2021-05-04 NOTE — PLAN
[FreeTextEntry1] : Patient in for follow up of DM HTN Dementia fatigue pending vascular procedure for vascular insufency as per wife patient with CRH and dementia that has fatigue and allows wife to do most of his speaking , patient according to wife completing ADLs with assistance \par \par Labs and care plan reviewed\par Follow up with Cardio, Renal and Neuro and vascular surgery\par Wife states she managing\par rtc 6 weeks

## 2021-05-06 PROBLEM — I35.0 MODERATE AORTIC STENOSIS: Noted: 2017-01-04

## 2021-05-06 PROBLEM — Z23 ENCOUNTER FOR IMMUNIZATION: Status: RESOLVED | Noted: 2021-01-01 | Resolved: 2021-01-01

## 2021-05-06 PROBLEM — Z20.822 EXPOSURE TO COVID-19 VIRUS: Status: RESOLVED | Noted: 2020-05-12 | Resolved: 2021-01-01

## 2021-05-06 NOTE — HISTORY OF PRESENT ILLNESS
[FreeTextEntry1] : I saw this patient in the office today.\par \par As you recall, he has been having memory difficulty.\par This began in early 2019.\par Short-term is more affected than long-term. He frequently repeats questions.\par He denied any history of depression.\par \par I had started Namenda. \par \par His wife reports that he has continued to decline slowly.

## 2021-05-06 NOTE — PHYSICAL EXAM
[General Appearance - Alert] : alert [General Appearance - In No Acute Distress] : in no acute distress [Affect] : the affect was normal [Memory Remote] : remote memory was not impaired [Oriented to Person] : oriented to person [Vocabulary] : adequate range of vocabulary [Recall ___ / 3] : recall [unfilled] / 3 [Cranial Nerves Optic (II)] : visual acuity intact bilaterally,  visual fields full to confrontation, pupils equal round and reactive to light [Cranial Nerves Oculomotor (III)] : extraocular motion intact [Cranial Nerves Trigeminal (V)] : facial sensation intact symmetrically [Cranial Nerves Facial (VII)] : face symmetrical [Cranial Nerves Vestibulocochlear (VIII)] : hearing was intact bilaterally [Cranial Nerves Glossopharyngeal (IX)] : tongue and palate midline [Cranial Nerves Accessory (XI - Cranial And Spinal)] : head turning and shoulder shrug symmetric [Cranial Nerves Hypoglossal (XII)] : there was no tongue deviation with protrusion [Motor Tone] : muscle tone was normal in all four extremities [Motor Strength] : muscle strength was normal in all four extremities [Sensation Tactile Decrease] : light touch was intact [Sensation Pain / Temperature Decrease] : pain and temperature was intact [Sensation Vibration Decrease] : vibration was intact [Limited Balance] : the patient's balance was impaired [2+] : Patella left 2+ [Optic Disc Abnormality] : the optic disc were normal in size and color [Edema] : there was no peripheral edema [Involuntary Movements] : no involuntary movements were seen [Dysarthria] : no dysarthria [Aphasia] : no dysphasia/aphasia [Current Events] : inadequate knowledge of current events [Past History] : inadequate knowledge of personal past history [Romberg's Sign] : Romberg's sign was negtive [Tremor] : no tremor present [Coordination - Dysmetria Impaired Finger-to-Nose Bilateral] : not present [Plantar Reflex Right Only] : normal on the right [Plantar Reflex Left Only] : normal on the left [FreeTextEntry8] : Ambulation required a cane.

## 2021-05-06 NOTE — ASSESSMENT
[FreeTextEntry1] : This is a 80 year-old man with what is most likely Alzheimer's type dementia.\par Imaging demonstrated only chronic findings.\par Blood tests for reversible causes of which were unrevealing.\par \par He has continued to progress gradually. \par \par Given his history of heart disease I had avoided Aricept and related medications.\par I had started Namenda.\par I will continue this. \par \par \par

## 2021-05-06 NOTE — DATA REVIEWED
[de-identified] :    CT scan of the head was performed on 3/24/2020.\par The study demonstrated atrophy, and chronic ischemic change and lacunar infarcts per\par  [de-identified] : B12 level was 390\par TSH level was markedly elevated at 13.20, however, T4 level was normal.\par

## 2021-05-07 PROBLEM — Z92.29 HISTORY OF INFLUENZA VACCINATION: Status: RESOLVED | Noted: 2017-09-27 | Resolved: 2021-01-01

## 2021-05-07 PROBLEM — Z92.29 HISTORY OF INFLUENZA VACCINATION: Status: RESOLVED | Noted: 2018-09-24 | Resolved: 2021-01-01

## 2021-05-07 PROBLEM — Z87.09 HISTORY OF ACUTE BRONCHITIS: Status: RESOLVED | Noted: 2017-05-24 | Resolved: 2021-01-01

## 2021-05-07 PROBLEM — R39.9 UTI SYMPTOMS: Status: RESOLVED | Noted: 2018-06-30 | Resolved: 2021-01-01

## 2021-05-07 NOTE — PLAN
[FreeTextEntry1] : \par \par \par Medical clearance discussed with and reviewed by supervising attending Dr Lita Rae M.D.\par

## 2021-05-07 NOTE — PHYSICAL EXAM
[No Acute Distress] : no acute distress [Well Nourished] : well nourished [Well Developed] : well developed [Well-Appearing] : well-appearing [Normal Sclera/Conjunctiva] : normal sclera/conjunctiva [PERRL] : pupils equal round and reactive to light [EOMI] : extraocular movements intact [Normal Outer Ear/Nose] : the outer ears and nose were normal in appearance [Normal Oropharynx] : the oropharynx was normal [No JVD] : no jugular venous distention [No Lymphadenopathy] : no lymphadenopathy [Supple] : supple [Thyroid Normal, No Nodules] : the thyroid was normal and there were no nodules present [No Respiratory Distress] : no respiratory distress  [No Accessory Muscle Use] : no accessory muscle use [Clear to Auscultation] : lungs were clear to auscultation bilaterally [Normal Rate] : normal rate  [Regular Rhythm] : with a regular rhythm [Normal S1, S2] : normal S1 and S2 [No Murmur] : no murmur heard [No Carotid Bruits] : no carotid bruits [No Abdominal Bruit] : a ~M bruit was not heard ~T in the abdomen [No Varicosities] : no varicosities [Pedal Pulses Present] : the pedal pulses are present [No Edema] : there was no peripheral edema [No Palpable Aorta] : no palpable aorta [No Extremity Clubbing/Cyanosis] : no extremity clubbing/cyanosis [Soft] : abdomen soft [Non Tender] : non-tender [Non-distended] : non-distended [No Masses] : no abdominal mass palpated [No HSM] : no HSM [Normal Bowel Sounds] : normal bowel sounds [Normal Posterior Cervical Nodes] : no posterior cervical lymphadenopathy [Normal Anterior Cervical Nodes] : no anterior cervical lymphadenopathy [No CVA Tenderness] : no CVA  tenderness [No Spinal Tenderness] : no spinal tenderness [No Joint Swelling] : no joint swelling [Grossly Normal Strength/Tone] : grossly normal strength/tone [No Rash] : no rash [Coordination Grossly Intact] : coordination grossly intact [No Focal Deficits] : no focal deficits [Normal Gait] : normal gait [Deep Tendon Reflexes (DTR)] : deep tendon reflexes were 2+ and symmetric [Normal Affect] : the affect was normal [Normal Insight/Judgement] : insight and judgment were intact [de-identified] : Distal LUE swelling. LUE AVF + Thrill

## 2021-05-07 NOTE — RESULTS/DATA
[] : not indicated [de-identified] : Anemia of chronic disease [de-identified] : BUN/Cr elevated 2/2 CKD

## 2021-05-07 NOTE — HISTORY OF PRESENT ILLNESS
[Aortic Stenosis] : aortic stenosis [No Pertinent Pulmonary History] : no history of asthma, COPD, sleep apnea, or smoking [No Adverse Anesthesia Reaction] : no adverse anesthesia reaction in self or family member [Chronic Kidney Disease] : chronic kidney disease [Diabetes] : diabetes [(Patient denies any chest pain, claudication, dyspnea on exertion, orthopnea, palpitations or syncope)] : Patient denies any chest pain, claudication, dyspnea on exertion, orthopnea, palpitations or syncope [Anti-Platelet Agents: _____] : Anti-Platelet Agents: [unfilled] [Spouse] : spouse [FreeTextEntry1] : LEFT AV FISTULOGRAM POSSIBLE INTERVENTION ANGIOPLASTY. [FreeTextEntry2] : 5/12/21 [FreeTextEntry3] : Dr Alec Peña M.D. [FreeTextEntry5] : Severe Pulmonary Hypertension, CHF

## 2021-05-07 NOTE — ASSESSMENT
[High Risk Surgery - Intraperitoneal, Intrathoracic or Supringuinal Vascular Procedures] : High Risk Surgery - Intraperitoneal, Intrathoracic or Supringuinal Vascular Procedures - No (0) [Ischemic Heart Disease] : Ischemic Heart Disease - No (0) [Congestive Heart Failure] : Congestive Heart Failure - Yes (1) [Prior Cerebrovascular Accident or TIA] : Prior Cerebrovascular Accident or TIA - No (0) [Creatinine >= 2mg/dL (1 Point)] : Creatinine >= 2mg/dL - Yes (1) [Insulin-dependent Diabetic (1 Point)] : Insulin-dependent Diabetic - No (0) [2] : 2 , RCRI Class: III, Risk of Post-Op Cardiac Complications: 10.1%, 95% CI for Risk Estimate: 8.1% - 12.6% [No Further Testing Recommended] : no further testing recommended [Modify medications prior to procedure] : Modify medications prior to procedure [As per surgery] : as per surgery [Patient Optimized for Surgery] : Patient optimized for surgery [Continue anti-platelet treatment as is] : Continue current anti-platelet treatment [FreeTextEntry4] : No laboratory / presurgical testing required as per Vascular surgery. Pt is medically cleared for proposed procedure. [FreeTextEntry6] : Continue low dose ASA. [FreeTextEntry7] : Take anti-hypertensive medications with sip of water on the day of the procedure. Avoid NSAIDs.

## 2021-05-10 NOTE — H&P PST ADULT - NSANTHOSAYNRD_GEN_A_CORE
No. KAI screening performed.  STOP BANG Legend: 0-2 = LOW Risk; 3-4 = INTERMEDIATE Risk; 5-8 = HIGH Risk

## 2021-05-10 NOTE — H&P PST ADULT - ASSESSMENT
80 yr old male presents with wife ,  pt poor historian , history of alzheimers, htn ,  Diabetes , CRF on dialysis M-W-F , aortic stenosis. Pt presesnts with c/o left arm swelling  for about a month , left arm AVF functioning but wife states there is a blockage , pt is scheduled for left upper arm fistulogram  possible intervention.   OPIOID RISK TOOL    MAKENZIE EACH BOX THAT APPLIES AND ADD TOTALS AT THE END    FAMILY HISTORY OF SUBSTANCE ABUSE                 FEMALE         MALE                                                Alcohol                             [  ]1 pt          [  ]3pts                                               Illegal Durgs                     [  ]2 pts        [  ]3pts                                               Rx Drugs                           [  ]4 pts        [  ]4 pts    PERSONAL HISTORY OF SUBSTANCE ABUSE                                                                                          Alcohol                             [  ]3 pts       [  ]3 pts                                               Illegal Durgs                     [  ]4 pts        [  ]4 pts                                               Rx Drugs                           [  ]5 pts        [  ]5 pts    AGE BETWEEN 16-45 YEARS                                      [  ]1 pt         [  ]1 pt    HISTORY OF PREADOLESCENT   SEXUAL ABUSE                                                             [  ]3 pts        [  ]0pts    PSYCHOLOGICAL DISEASE                     ADD, OCD, Bipolar, Schizophrenia        [  ]2 pts         [  ]2 pts                      Depression                                               [  ]1 pt           [  ]1 pt           SCORING TOTAL   (add numbers and type here)              (*0**)                                     A score of 3 or lower indicated LOW risk for future opiod abuse  A score of 4 to 7 indicated moderate risk for future opiod abuse  A score of 8 or higher indicates a high risk for opiod abuse  CAPRINI VTE 2.0 SCORE [CLOT updated 2019]    AGE RELATED RISK FACTORS                                                       MOBILITY RELATED FACTORS  [ ] Age 41-60 years                                            (1 Point)                    [ ] Bed rest                                                        (1 Point)  [ ] Age: 61-74 years                                           (2 Points)                  [ ] Plaster cast                                                   (2 Points)  [X ] Age= 75 years                                              (3 Points)                    [ ] Bed bound for more than 72 hours                 (2 Points)    DISEASE RELATED RISK FACTORS                                               GENDER SPECIFIC FACTORS  [X ] Edema in the lower extremities                       (1 Point)              [ ] Pregnancy                                                     (1 Point)  [ ] Varicose veins                                               (1 Point)                     [ ] Post-partum < 6 weeks                                   (1 Point)             [X ] BMI > 25 Kg/m2                                            (1 Point)                     [ ] Hormonal therapy  or oral contraception          (1 Point)                 [ ] Sepsis (in the previous month)                        (1 Point)               [ ] History of pregnancy complications                 (1 point)  [ ] Pneumonia or serious lung disease                                               [ ] Unexplained or recurrent                     (1 Point)           (in the previous month)                               (1 Point)  [ ] Abnormal pulmonary function test                     (1 Point)                 SURGERY RELATED RISK FACTORS  [ ] Acute myocardial infarction                              (1 Point)               [ ]  Section                                             (1 Point)  [ ] Congestive heart failure (in the previous month)  (1 Point)      x[ ] Minor surgery                                                  (1 Point)   [ ] Inflammatory bowel disease                             (1 Point)               [ ] Arthroscopic surgery                                        (2 Points)  [ ] Central venous access                                      (2 Points)                [ ] General surgery lasting more than 45 minutes (2 points)  [ ] Malignancy- Present or previous                   (2 Points)                [ ] Elective arthroplasty                                         (5 points)    [ ] Stroke (in the previous month)                          (5 Points)                                                                                                                                                           HEMATOLOGY RELATED FACTORS                                                 TRAUMA RELATED RISK FACTORS  [ ] Prior episodes of VTE                                     (3 Points)                [ ] Fracture of the hip, pelvis, or leg                       (5 Points)  [ ] Positive family history for VTE                         (3 Points)             [ ] Acute spinal cord injury (in the previous month)  (5 Points)  [ ] Prothrombin 62797 A                                     (3 Points)               [ ] Paralysis  (less than 1 month)                             (5 Points)  [ ] Factor V Leiden                                             (3 Points)                  [ ] Multiple Trauma within 1 month                        (5 Points)  [ ] Lupus anticoagulants                                     (3 Points)                                                           [ ] Anticardiolipin antibodies                               (3 Points)                                                       [ ] High homocysteine in the blood                      (3 Points)                                             [ ] Other congenital or acquired thrombophilia      (3 Points)                                                [ ] Heparin induced thrombocytopenia                  (3 Points)                                     Total Score [      6    ]

## 2021-05-10 NOTE — H&P PST ADULT - EXTREMITIES COMMENTS
left arm swelling left av fistula +bruit + thrill  fingers warm radial pulse intact   trace edema prabhjot lower legs

## 2021-05-10 NOTE — H&P PST ADULT - NSICDXPASTMEDICALHX_GEN_ALL_CORE_FT
PAST MEDICAL HISTORY:  (HFpEF) heart failure with preserved ejection fraction     Alzheimer's dementia     Anemia due to chronic kidney disease, on chronic dialysis     Atrial fibrillation, unspecified type     BPH (benign prostatic hyperplasia)     Essential hypertension     GERD (gastroesophageal reflux disease)     History of insulin dependent diabetes mellitus     Hyperlipidemia, unspecified hyperlipidemia type     Hypothyroid     Moderate aortic stenosis     Stage 5 chronic kidney disease on chronic dialysis on dialysis m-w-f     PAST MEDICAL HISTORY:  (HFpEF) heart failure with preserved ejection fraction     Alzheimer's dementia poor historian    Anemia due to chronic kidney disease, on chronic dialysis m-w-f    Atrial fibrillation, unspecified type loop recorder in place    BPH (benign prostatic hyperplasia)     Essential hypertension     GERD (gastroesophageal reflux disease)     History of insulin dependent diabetes mellitus     Hyperlipidemia, unspecified hyperlipidemia type     Hypothyroid     Moderate aortic stenosis     Stage 5 chronic kidney disease on chronic dialysis on dialysis m-w-f

## 2021-05-10 NOTE — H&P PST ADULT - HISTORY OF PRESENT ILLNESS
80 yr old male presents to PST with wife tram, pt is poor historian with alzheimers / dementia. As per wife pt started Dialysis  in March 2020 , left arm fistula created 2/2020. ,PT  had venoplasty and stenting in Nov 2020 due to stenosis. Pt has dialysis M-W-F AT [facility] dialysis.  Pt wife states over past month his left arm is swollen , denies pain  is functioning for Dialysis , had it earlier today. Pt is scheduled for left upper extremity fistulogram on 5/12/21.

## 2021-05-10 NOTE — H&P PST ADULT - NSICDXPROBLEM_GEN_ALL_CORE_FT
PROBLEM DIAGNOSES  Problem: 2019 novel coronavirus disease (COVID-19)  Assessment and Plan: pre op pcr swab  vaccinated x2 pfizer    Problem: Diabetes  Assessment and Plan: fingerstick on admit     Problem: CRF (chronic renal failure)  Assessment and Plan: dialysis m-w-f    Problem: End stage renal disease on dialysis  Assessment and Plan: left upper extremity fistulogram possible intervention

## 2021-05-10 NOTE — ASU PATIENT PROFILE, ADULT - LEARNING ASSESSMENT (PATIENT) ADDITIONAL COMMENTS
pre-op instructions reviewed Pt was covid tested 5-9-21 at [address] [place] [address]

## 2021-05-10 NOTE — H&P PST ADULT - NSICDXPASTSURGICALHX_GEN_ALL_CORE_FT
PAST SURGICAL HISTORY:  AV fistula     S/P right pulmonary artery pressure sensor implant placement     Ureteral stent retained      PAST SURGICAL HISTORY:  AV fistula     Cataract     S/P right pulmonary artery pressure sensor implant placement     Ureteral stent retained

## 2021-05-10 NOTE — H&P PST ADULT - LAB RESULTS AND INTERPRETATION
5-11-21: K+3.4, BUN/Crit 2.47/23 (ESRD on Dialysis)A1C 7.2, (pt is a known diabetic on meds), called Dr. Torres's office and spoke to Dary.

## 2021-05-11 NOTE — CONSULT NOTE ADULT - PROBLEM SELECTOR RECOMMENDATION 3
CXR revealing of right middle lobe PNA. Afebrile, WBC mildly elevated (11K). Continue to trend. Obtain sputum culture, blood cultures, RVP, and procalcitonin. Treat empirically w/ Zosyn. Otezla Counseling: The side effects of Otezla were discussed with the patient, including but not limited to worsening or new depression, weight loss, diarrhea, nausea, upper respiratory tract infection, and headache. Patient instructed to call the office should any adverse effect occur.  The patient verbalized understanding of the proper use and possible adverse effects of Otezla.  All the patient's questions and concerns were addressed. Cephalexin Pregnancy And Lactation Text: This medication is Pregnancy Category B and considered safe during pregnancy.  It is also excreted in breast milk but can be used safely for shorter doses. Stelara Pregnancy And Lactation Text: This medication is Pregnancy Category B and is considered safe during pregnancy. It is unknown if this medication is excreted in breast milk. Methotrexate Counseling:  Patient counseled regarding adverse effects of methotrexate including but not limited to nausea, vomiting, abnormalities in liver function tests. Patients may develop mouth sores, rash, diarrhea, and abnormalities in blood counts. The patient understands that monitoring is required including LFT's and blood counts.  There is a rare possibility of scarring of the liver and lung problems that can occur when taking methotrexate. Persistent nausea, loss of appetite, pale stools, dark urine, cough, and shortness of breath should be reported immediately. Patient advised to discontinue methotrexate treatment at least three months before attempting to become pregnant.  I discussed the need for folate supplements while taking methotrexate.  These supplements can decrease side effects during methotrexate treatment. The patient verbalized understanding of the proper use and possible adverse effects of methotrexate.  All of the patient's questions and concerns were addressed. Colchicine Pregnancy And Lactation Text: This medication is Pregnancy Category C and isn't considered safe during pregnancy. It is excreted in breast milk. Taltz Counseling: I discussed with the patient the risks of ixekizumab including but not limited to immunosuppression, serious infections, worsening of inflammatory bowel disease and drug reactions.  The patient understands that monitoring is required including a PPD at baseline and must alert us or the primary physician if symptoms of infection or other concerning signs are noted. Clindamycin Counseling: I counseled the patient regarding use of clindamycin as an antibiotic for prophylactic and/or therapeutic purposes. Clindamycin is active against numerous classes of bacteria, including skin bacteria. Side effects may include nausea, diarrhea, gastrointestinal upset, rash, hives, yeast infections, and in rare cases, colitis. Detail Level: Detailed 5-Fu Counseling: 5-Fluorouracil Counseling:  I discussed with the patient the risks of 5-fluorouracil including but not limited to erythema, scaling, itching, weeping, crusting, and pain. Solaraze Counseling:  I discussed with the patient the risks of Solaraze including but not limited to erythema, scaling, itching, weeping, crusting, and pain. 5-Fu Pregnancy And Lactation Text: This medication is Pregnancy Category X and contraindicated in pregnancy and in women who may become pregnant. It is unknown if this medication is excreted in breast milk. Methotrexate Pregnancy And Lactation Text: This medication is Pregnancy Category X and is known to cause fetal harm. This medication is excreted in breast milk. Otezla Pregnancy And Lactation Text: This medication is Pregnancy Category C and it isn't known if it is safe during pregnancy. It is unknown if it is excreted in breast milk. Taltz Pregnancy And Lactation Text: The risk during pregnancy and breastfeeding is uncertain with this medication. Solaraze Pregnancy And Lactation Text: This medication is Pregnancy Category B and is considered safe. There is some data to suggest avoiding during the third trimester. It is unknown if this medication is excreted in breast milk. Albendazole Counseling:  I discussed with the patient the risks of albendazole including but not limited to cytopenia, kidney damage, nausea/vomiting and severe allergy.  The patient understands that this medication is being used in an off-label manner. Fluconazole Counseling:  Patient counseled regarding adverse effects of fluconazole including but not limited to headache, diarrhea, nausea, upset stomach, liver function test abnormalities, taste disturbance, and stomach pain.  There is a rare possibility of liver failure that can occur when taking fluconazole.  The patient understands that monitoring of LFTs and kidney function test may be required, especially at baseline. The patient verbalized understanding of the proper use and possible adverse effects of fluconazole.  All of the patient's questions and concerns were addressed. Clindamycin Pregnancy And Lactation Text: This medication can be used in pregnancy if certain situations. Clindamycin is also present in breast milk. Drysol Counseling:  I discussed with the patient the risks of drysol/aluminum chloride including but not limited to skin rash, itching, irritation, burning. Albendazole Pregnancy And Lactation Text: This medication is Pregnancy Category C and it isn't known if it is safe during pregnancy. It is also excreted in breast milk. Oxybutynin Counseling:  I discussed with the patient the risks of oxybutynin including but not limited to skin rash, drowsiness, dry mouth, difficulty urinating, and blurred vision. Topical Retinoid counseling:  Patient advised to apply a pea-sized amount only at bedtime and wait 30 minutes after washing their face before applying.  If too drying, patient may add a non-comedogenic moisturizer. The patient verbalized understanding of the proper use and possible adverse effects of retinoids.  All of the patient's questions and concerns were addressed. Dapsone Counseling: I discussed with the patient the risks of dapsone including but not limited to hemolytic anemia, agranulocytosis, rashes, methemoglobinemia, kidney failure, peripheral neuropathy, headaches, GI upset, and liver toxicity.  Patients who start dapsone require monitoring including baseline LFTs and weekly CBCs for the first month, then every month thereafter.  The patient verbalized understanding of the proper use and possible adverse effects of dapsone.  All of the patient's questions and concerns were addressed. Prednisone Counseling:  I discussed with the patient the risks of prolonged use of prednisone including but not limited to weight gain, insomnia, osteoporosis, mood changes, diabetes, susceptibility to infection, glaucoma and high blood pressure.  In cases where prednisone use is prolonged, patients should be monitored with blood pressure checks, serum glucose levels and an eye exam.  Additionally, the patient may need to be placed on GI prophylaxis, PCP prophylaxis, and calcium and vitamin D supplementation and/or a bisphosphonate.  The patient verbalized understanding of the proper use and the possible adverse effects of prednisone.  All of the patient's questions and concerns were addressed. Fluconazole Pregnancy And Lactation Text: This medication is Pregnancy Category C and it isn't know if it is safe during pregnancy. It is also excreted in breast milk. Dapsone Pregnancy And Lactation Text: This medication is Pregnancy Category C and is not considered safe during pregnancy or breast feeding. Doxycycline Counseling:  Patient counseled regarding possible photosensitivity and increased risk for sunburn.  Patient instructed to avoid sunlight, if possible.  When exposed to sunlight, patients should wear protective clothing, sunglasses, and sunscreen.  The patient was instructed to call the office immediately if the following severe adverse effects occur:  hearing changes, easy bruising/bleeding, severe headache, or vision changes.  The patient verbalized understanding of the proper use and possible adverse effects of doxycycline.  All of the patient's questions and concerns were addressed. Tremfya Counseling: I discussed with the patient the risks of guselkumab including but not limited to immunosuppression, serious infections, worsening of inflammatory bowel disease and drug reactions.  The patient understands that monitoring is required including a PPD at baseline and must alert us or the primary physician if symptoms of infection or other concerning signs are noted. Doxycycline Pregnancy And Lactation Text: This medication is Pregnancy Category D and not consider safe during pregnancy. It is also excreted in breast milk but is considered safe for shorter treatment courses. Prednisone Pregnancy And Lactation Text: This medication is Pregnancy Category C and it isn't know if it is safe during pregnancy. This medication is excreted in breast milk. Oxybutynin Pregnancy And Lactation Text: This medication is Pregnancy Category B and is considered safe during pregnancy. It is unknown if it is excreted in breast milk. Drysol Pregnancy And Lactation Text: This medication is considered safe during pregnancy and breast feeding. Ivermectin Counseling:  Patient instructed to take medication on an empty stomach with a full glass of water.  Patient informed of potential adverse effects including but not limited to nausea, diarrhea, dizziness, itching, and swelling of the extremities or lymph nodes.  The patient verbalized understanding of the proper use and possible adverse effects of ivermectin.  All of the patient's questions and concerns were addressed. Griseofulvin Counseling:  I discussed with the patient the risks of griseofulvin including but not limited to photosensitivity, cytopenia, liver damage, nausea/vomiting and severe allergy.  The patient understands that this medication is best absorbed when taken with a fatty meal (e.g., ice cream or french fries). Elidel Counseling: Patient may experience a mild burning sensation during topical application. Elidel is not approved in children less than 2 years of age. There have been case reports of hematologic and skin malignancies in patients using topical calcineurin inhibitors although causality is questionable. Topical Retinoid Pregnancy And Lactation Text: This medication is Pregnancy Category C. It is unknown if this medication is excreted in breast milk. Erivedge Counseling- I discussed with the patient the risks of Erivedge including but not limited to nausea, vomiting, diarrhea, constipation, weight loss, changes in the sense of taste, decreased appetite, muscle spasms, and hair loss.  The patient verbalized understanding of the proper use and possible adverse effects of Erivedge.  All of the patient's questions and concerns were addressed. Tazorac Counseling:  Patient advised that medication is irritating and drying.  Patient may need to apply sparingly and wash off after an hour before eventually leaving it on overnight.  The patient verbalized understanding of the proper use and possible adverse effects of tazorac.  All of the patient's questions and concerns were addressed. Birth Control Pills Counseling: Birth Control Pill Counseling: I discussed with the patient the potential side effects of OCPs including but not limited to increased risk of stroke, heart attack, thrombophlebitis, deep venous thrombosis, hepatic adenomas, breast changes, GI upset, headaches, and depression.  The patient verbalized understanding of the proper use and possible adverse effects of OCPs. All of the patient's questions and concerns were addressed. Griseofulvin Pregnancy And Lactation Text: This medication is Pregnancy Category X and is known to cause serious birth defects. It is unknown if this medication is excreted in breast milk but breast feeding should be avoided. Erythromycin Counseling:  I discussed with the patient the risks of erythromycin including but not limited to GI upset, allergic reaction, drug rash, diarrhea, increase in liver enzymes, and yeast infections. Include Pregnancy/Lactation Warning?: No Birth Control Pills Pregnancy And Lactation Text: This medication should be avoided if pregnant and for the first 30 days post-partum. Erivedge Pregnancy And Lactation Text: This medication is Pregnancy Category X and is absolutely contraindicated during pregnancy. It is unknown if it is excreted in breast milk. Ilumya Counseling: I discussed with the patient the risks of tildrakizumab including but not limited to immunosuppression, malignancy, posterior leukoencephalopathy syndrome, and serious infections.  The patient understands that monitoring is required including a PPD at baseline and must alert us or the primary physician if symptoms of infection or other concerning signs are noted. Xeljanz Counseling: I discussed with the patient the risks of Xeljanz therapy including increased risk of infection, liver issues, headache, diarrhea, or cold symptoms. Live vaccines should be avoided. They were instructed to call if they have any problems. Acitretin Counseling:  I discussed with the patient the risks of acitretin including but not limited to hair loss, dry lips/skin/eyes, liver damage, hyperlipidemia, depression/suicidal ideation, photosensitivity.  Serious rare side effects can include but are not limited to pancreatitis, pseudotumor cerebri, bony changes, clot formation/stroke/heart attack.  Patient understands that alcohol is contraindicated since it can result in liver toxicity and significantly prolong the elimination of the drug by many years. Itraconazole Counseling:  I discussed with the patient the risks of itraconazole including but not limited to liver damage, nausea/vomiting, neuropathy, and severe allergy.  The patient understands that this medication is best absorbed when taken with acidic beverages such as non-diet cola or ginger ale.  The patient understands that monitoring is required including baseline LFTs and repeat LFTs at intervals.  The patient understands that they are to contact us or the primary physician if concerning signs are noted. Gabapentin Counseling: I discussed with the patient the risks of gabapentin including but not limited to dizziness, somnolence, fatigue and ataxia. Tazorac Pregnancy And Lactation Text: This medication is not safe during pregnancy. It is unknown if this medication is excreted in breast milk. Xeltrinhz Pregnancy And Lactation Text: This medication is Pregnancy Category D and is not considered safe during pregnancy.  The risk during breast feeding is also uncertain. Erythromycin Pregnancy And Lactation Text: This medication is Pregnancy Category B and is considered safe during pregnancy. It is also excreted in breast milk. Acitretin Pregnancy And Lactation Text: This medication is Pregnancy Category X and should not be given to women who are pregnant or may become pregnant in the future. This medication is excreted in breast milk. Spironolactone Counseling: Patient advised regarding risks of diarrhea, abdominal pain, hyperkalemia, birth defects (for female patients), liver toxicity and renal toxicity. The patient may need blood work to monitor liver and kidney function and potassium levels while on therapy. The patient verbalized understanding of the proper use and possible adverse effects of spironolactone.  All of the patient's questions and concerns were addressed. Xolair Counseling:  Patient informed of potential adverse effects including but not limited to fever, muscle aches, rash and allergic reactions.  The patient verbalized understanding of the proper use and possible adverse effects of Xolair.  All of the patient's questions and concerns were addressed. Eucrisa Counseling: Patient may experience a mild burning sensation during topical application. Eucrisa is not approved in children less than 2 years of age. Topical Clindamycin Counseling: Patient counseled that this medication may cause skin irritation or allergic reactions.  In the event of skin irritation, the patient was advised to reduce the amount of the drug applied or use it less frequently.   The patient verbalized understanding of the proper use and possible adverse effects of clindamycin.  All of the patient's questions and concerns were addressed. Metronidazole Counseling:  I discussed with the patient the risks of metronidazole including but not limited to seizures, nausea/vomiting, a metallic taste in the mouth, nausea/vomiting and severe allergy. Bexarotene Counseling:  I discussed with the patient the risks of bexarotene including but not limited to hair loss, dry lips/skin/eyes, liver abnormalities, hyperlipidemia, pancreatitis, depression/suicidal ideation, photosensitivity, drug rash/allergic reactions, hypothyroidism, anemia, leukopenia, infection, cataracts, and teratogenicity.  Patient understands that they will need regular blood tests to check lipid profile, liver function tests, white blood cell count, thyroid function tests and pregnancy test if applicable. Eucrisa Pregnancy And Lactation Text: This medication has not been assigned a Pregnancy Risk Category but animal studies failed to show danger with the topical medication. It is unknown if the medication is excreted in breast milk. Spironolactone Pregnancy And Lactation Text: This medication can cause feminization of the male fetus and should be avoided during pregnancy. The active metabolite is also found in breast milk. Infliximab Counseling:  I discussed with the patient the risks of infliximab including but not limited to myelosuppression, immunosuppression, autoimmune hepatitis, demyelinating diseases, lymphoma, and serious infections.  The patient understands that monitoring is required including a PPD at baseline and must alert us or the primary physician if symptoms of infection or other concerning signs are noted. Ketoconazole Counseling:   Patient counseled regarding improving absorption with orange juice.  Adverse effects include but are not limited to breast enlargement, headache, diarrhea, nausea, upset stomach, liver function test abnormalities, taste disturbance, and stomach pain.  There is a rare possibility of liver failure that can occur when taking ketoconazole. The patient understands that monitoring of LFTs may be required, especially at baseline. The patient verbalized understanding of the proper use and possible adverse effects of ketoconazole.  All of the patient's questions and concerns were addressed. Metronidazole Pregnancy And Lactation Text: This medication is Pregnancy Category B and considered safe during pregnancy.  It is also excreted in breast milk. Cimzia Counseling:  I discussed with the patient the risks of Cimzia including but not limited to immunosuppression, allergic reactions and infections.  The patient understands that monitoring is required including a PPD at baseline and must alert us or the primary physician if symptoms of infection or other concerning signs are noted. Ketoconazole Pregnancy And Lactation Text: This medication is Pregnancy Category C and it isn't know if it is safe during pregnancy. It is also excreted in breast milk and breast feeding isn't recommended. Topical Sulfur Applications Counseling: Topical Sulfur Counseling: Patient counseled that this medication may cause skin irritation or allergic reactions.  In the event of skin irritation, the patient was advised to reduce the amount of the drug applied or use it less frequently.   The patient verbalized understanding of the proper use and possible adverse effects of topical sulfur application.  All of the patient's questions and concerns were addressed. SSKI Counseling:  I discussed with the patient the risks of SSKI including but not limited to thyroid abnormalities, metallic taste, GI upset, fever, headache, acne, arthralgias, paraesthesias, lymphadenopathy, easy bleeding, arrhythmias, and allergic reaction. Minocycline Counseling: Patient advised regarding possible photosensitivity and discoloration of the teeth, skin, lips, tongue and gums.  Patient instructed to avoid sunlight, if possible.  When exposed to sunlight, patients should wear protective clothing, sunglasses, and sunscreen.  The patient was instructed to call the office immediately if the following severe adverse effects occur:  hearing changes, easy bruising/bleeding, severe headache, or vision changes.  The patient verbalized understanding of the proper use and possible adverse effects of minocycline.  All of the patient's questions and concerns were addressed. Xolair Pregnancy And Lactation Text: This medication is Pregnancy Category B and is considered safe during pregnancy. This medication is excreted in breast milk. Bexarotene Pregnancy And Lactation Text: This medication is Pregnancy Category X and should not be given to women who are pregnant or may become pregnant. This medication should not be used if you are breast feeding. Hydroquinone Counseling:  Patient advised that medication may result in skin irritation, lightening (hypopigmentation), dryness, and burning.  In the event of skin irritation, the patient was advised to reduce the amount of the drug applied or use it less frequently.  Rarely, spots that are treated with hydroquinone can become darker (pseudoochronosis).  Should this occur, patient instructed to stop medication and call the office. The patient verbalized understanding of the proper use and possible adverse effects of hydroquinone.  All of the patient's questions and concerns were addressed. Rituxan Counseling:  I discussed with the patient the risks of Rituxan infusions. Side effects can include infusion reactions, severe drug rashes including mucocutaneous reactions, reactivation of latent hepatitis and other infections and rarely progressive multifocal leukoencephalopathy.  All of the patient's questions and concerns were addressed. Isotretinoin Counseling: Patient should get monthly blood tests, not donate blood, not drive at night if vision affected, not share medication, and not undergo elective surgery for 6 months after tx completed. Side effects reviewed, pt to contact office should one occur. Topical Sulfur Applications Pregnancy And Lactation Text: This medication is Pregnancy Category C and has an unknown safety profile during pregnancy. It is unknown if this topical medication is excreted in breast milk. Sski Pregnancy And Lactation Text: This medication is Pregnancy Category D and isn't considered safe during pregnancy. It is excreted in breast milk. Cimzia Pregnancy And Lactation Text: This medication crosses the placenta but can be considered safe in certain situations. Cimzia may be excreted in breast milk. Terbinafine Counseling: Patient counseling regarding adverse effects of terbinafine including but not limited to headache, diarrhea, rash, upset stomach, liver function test abnormalities, itching, taste/smell disturbance, nausea, abdominal pain, and flatulence.  There is a rare possibility of liver failure that can occur when taking terbinafine.  The patient understands that a baseline LFT and kidney function test may be required. The patient verbalized understanding of the proper use and possible adverse effects of terbinafine.  All of the patient's questions and concerns were addressed. Minocycline Pregnancy And Lactation Text: This medication is Pregnancy Category D and not consider safe during pregnancy. It is also excreted in breast milk. Isotretinoin Pregnancy And Lactation Text: This medication is Pregnancy Category X and is considered extremely dangerous during pregnancy. It is unknown if it is excreted in breast milk. Glycopyrrolate Counseling:  I discussed with the patient the risks of glycopyrrolate including but not limited to skin rash, drowsiness, dry mouth, difficulty urinating, and blurred vision. Cosentyx Counseling:  I discussed with the patient the risks of Cosentyx including but not limited to worsening of Crohn's disease, immunosuppression, allergic reactions and infections.  The patient understands that monitoring is required including a PPD at baseline and must alert us or the primary physician if symptoms of infection or other concerning signs are noted. Thalidomide Counseling: I discussed with the patient the risks of thalidomide including but not limited to birth defects, anxiety, weakness, chest pain, dizziness, cough and severe allergy. Rituxan Pregnancy And Lactation Text: This medication is Pregnancy Category C and it isn't know if it is safe during pregnancy. It is unknown if this medication is excreted in breast milk but similar antibodies are known to be excreted. Azathioprine Counseling:  I discussed with the patient the risks of azathioprine including but not limited to myelosuppression, immunosuppression, hepatotoxicity, lymphoma, and infections.  The patient understands that monitoring is required including baseline LFTs, Creatinine, possible TPMP genotyping and weekly CBCs for the first month and then every 2 weeks thereafter.  The patient verbalized understanding of the proper use and possible adverse effects of azathioprine.  All of the patient's questions and concerns were addressed. Terbinafine Pregnancy And Lactation Text: This medication is Pregnancy Category B and is considered safe during pregnancy. It is also excreted in breast milk and breast feeding isn't recommended. Quinolones Counseling:  I discussed with the patient the risks of fluoroquinolones including but not limited to GI upset, allergic reaction, drug rash, diarrhea, dizziness, photosensitivity, yeast infections, liver function test abnormalities, tendonitis/tendon rupture. Zyclara Counseling:  I discussed with the patient the risks of imiquimod including but not limited to erythema, scaling, itching, weeping, crusting, and pain.  Patient understands that the inflammatory response to imiquimod is variable from person to person and was educated regarded proper titration schedule.  If flu-like symptoms develop, patient knows to discontinue the medication and contact us. Siliq Counseling:  I discussed with the patient the risks of Siliq including but not limited to new or worsening depression, suicidal thoughts and behavior, immunosuppression, malignancy, posterior leukoencephalopathy syndrome, and serious infections.  The patient understands that monitoring is required including a PPD at baseline and must alert us or the primary physician if symptoms of infection or other concerning signs are noted. There is also a special program designed to monitor depression which is required with Siliq. Imiquimod Counseling:  I discussed with the patient the risks of imiquimod including but not limited to erythema, scaling, itching, weeping, crusting, and pain.  Patient understands that the inflammatory response to imiquimod is variable from person to person and was educated regarded proper titration schedule.  If flu-like symptoms develop, patient knows to discontinue the medication and contact us. Azathioprine Pregnancy And Lactation Text: This medication is Pregnancy Category D and isn't considered safe during pregnancy. It is unknown if this medication is excreted in breast milk. High Dose Vitamin A Counseling: Side effects reviewed, pt to contact office should one occur. Glycopyrrolate Pregnancy And Lactation Text: This medication is Pregnancy Category B and is considered safe during pregnancy. It is unknown if it is excreted breast milk. Cellcept Counseling:  I discussed with the patient the risks of mycophenolate mofetil including but not limited to infection/immunosuppression, GI upset, hypokalemia, hypercholesterolemia, bone marrow suppression, lymphoproliferative disorders, malignancy, GI ulceration/bleed/perforation, colitis, interstitial lung disease, kidney failure, progressive multifocal leukoencephalopathy, and birth defects.  The patient understands that monitoring is required including a baseline creatinine and regular CBC testing. In addition, patient must alert us immediately if symptoms of infection or other concerning signs are noted. High Dose Vitamin A Pregnancy And Lactation Text: High dose vitamin A therapy is contraindicated during pregnancy and breast feeding. Minoxidil Counseling: Minoxidil is a topical medication which can increase blood flow where it is applied. It is uncertain how this medication increases hair growth. Side effects are uncommon and include stinging and allergic reactions. Dupixent Counseling: I discussed with the patient the risks of dupilumab including but not limited to eye infection and irritation, cold sores, injection site reactions, worsening of asthma, allergic reactions and increased risk of parasitic infection.  Live vaccines should be avoided while taking dupilumab. Dupilumab will also interact with certain medications such as warfarin and cyclosporine. The patient understands that monitoring is required and they must alert us or the primary physician if symptoms of infection or other concerning signs are noted. Valtrex Counseling: I discussed with the patient the risks of valacyclovir including but not limited to kidney damage, nausea, vomiting and severe allergy.  The patient understands that if the infection seems to be worsening or is not improving, they are to call. Hydroxychloroquine Counseling:  I discussed with the patient that a baseline ophthalmologic exam is needed at the start of therapy and every year thereafter while on therapy. A CBC may also be warranted for monitoring.  The side effects of this medication were discussed with the patient, including but not limited to agranulocytosis, aplastic anemia, seizures, rashes, retinopathy, and liver toxicity. Patient instructed to call the office should any adverse effect occur.  The patient verbalized understanding of the proper use and possible adverse effects of Plaquenil.  All the patient's questions and concerns were addressed. Cimetidine Counseling:  I discussed with the patient the risks of Cimetidine including but not limited to gynecomastia, headache, diarrhea, nausea, drowsiness, arrhythmias, pancreatitis, skin rashes, psychosis, bone marrow suppression and kidney toxicity. Hydroxychloroquine Pregnancy And Lactation Text: This medication has been shown to cause fetal harm but it isn't assigned a Pregnancy Risk Category. There are small amounts excreted in breast milk. Rifampin Counseling: I discussed with the patient the risks of rifampin including but not limited to liver damage, kidney damage, red-orange body fluids, nausea/vomiting and severe allergy. Azithromycin Counseling:  I discussed with the patient the risks of azithromycin including but not limited to GI upset, allergic reaction, drug rash, diarrhea, and yeast infections. Simponi Counseling:  I discussed with the patient the risks of golimumab including but not limited to myelosuppression, immunosuppression, autoimmune hepatitis, demyelinating diseases, lymphoma, and serious infections.  The patient understands that monitoring is required including a PPD at baseline and must alert us or the primary physician if symptoms of infection or other concerning signs are noted. Dupixent Pregnancy And Lactation Text: This medication likely crosses the placenta but the risk for the fetus is uncertain. This medication is excreted in breast milk. Valtrex Pregnancy And Lactation Text: this medication is Pregnancy Category B and is considered safe during pregnancy. This medication is not directly found in breast milk but it's metabolite acyclovir is present. Arava Counseling:  Patient counseled regarding adverse effects of Arava including but not limited to nausea, vomiting, abnormalities in liver function tests. Patients may develop mouth sores, rash, diarrhea, and abnormalities in blood counts. The patient understands that monitoring is required including LFTs and blood counts.  There is a rare possibility of scarring of the liver and lung problems that can occur when taking methotrexate. Persistent nausea, loss of appetite, pale stools, dark urine, cough, and shortness of breath should be reported immediately. Patient advised to discontinue Arava treatment and consult with a physician prior to attempting conception. The patient will have to undergo a treatment to eliminate Arava from the body prior to conception. Azithromycin Pregnancy And Lactation Text: This medication is considered safe during pregnancy and is also secreted in breast milk. Cyclophosphamide Counseling:  I discussed with the patient the risks of cyclophosphamide including but not limited to hair loss, hormonal abnormalities, decreased fertility, abdominal pain, diarrhea, nausea and vomiting, bone marrow suppression and infection. The patient understands that monitoring is required while taking this medication. Rifampin Pregnancy And Lactation Text: This medication is Pregnancy Category C and it isn't know if it is safe during pregnancy. It is also excreted in breast milk and should not be used if you are breast feeding. Benzoyl Peroxide Counseling: Patient counseled that medicine may cause skin irritation and bleach clothing.  In the event of skin irritation, the patient was advised to reduce the amount of the drug applied or use it less frequently.   The patient verbalized understanding of the proper use and possible adverse effects of benzoyl peroxide.  All of the patient's questions and concerns were addressed. Doxepin Counseling:  Patient advised that the medication is sedating and not to drive a car after taking this medication. Patient informed of potential adverse effects including but not limited to dry mouth, urinary retention, and blurry vision.  The patient verbalized understanding of the proper use and possible adverse effects of doxepin.  All of the patient's questions and concerns were addressed. Picato Counseling:  I discussed with the patient the risks of Picato including but not limited to erythema, scaling, itching, weeping, crusting, and pain. Nsaids Counseling: NSAID Counseling: I discussed with the patient that NSAIDs should be taken with food. Prolonged use of NSAIDs can result in the development of stomach ulcers.  Patient advised to stop taking NSAIDs if abdominal pain occurs.  The patient verbalized understanding of the proper use and possible adverse effects of NSAIDs.  All of the patient's questions and concerns were addressed. Enbrel Counseling:  I discussed with the patient the risks of etanercept including but not limited to myelosuppression, immunosuppression, autoimmune hepatitis, demyelinating diseases, lymphoma, and infections.  The patient understands that monitoring is required including a PPD at baseline and must alert us or the primary physician if symptoms of infection or other concerning signs are noted. Sarecycline Counseling: Patient advised regarding possible photosensitivity and discoloration of the teeth, skin, lips, tongue and gums.  Patient instructed to avoid sunlight, if possible.  When exposed to sunlight, patients should wear protective clothing, sunglasses, and sunscreen.  The patient was instructed to call the office immediately if the following severe adverse effects occur:  hearing changes, easy bruising/bleeding, severe headache, or vision changes.  The patient verbalized understanding of the proper use and possible adverse effects of sarecycline.  All of the patient's questions and concerns were addressed. Bactrim Counseling:  I discussed with the patient the risks of sulfa antibiotics including but not limited to GI upset, allergic reaction, drug rash, diarrhea, dizziness, photosensitivity, and yeast infections.  Rarely, more serious reactions can occur including but not limited to aplastic anemia, agranulocytosis, methemoglobinemia, blood dyscrasias, liver or kidney failure, lung infiltrates or desquamative/blistering drug rashes. Benzoyl Peroxide Pregnancy And Lactation Text: This medication is Pregnancy Category C. It is unknown if benzoyl peroxide is excreted in breast milk. Nsaids Pregnancy And Lactation Text: These medications are considered safe up to 30 weeks gestation. It is excreted in breast milk. Doxepin Pregnancy And Lactation Text: This medication is Pregnancy Category C and it isn't known if it is safe during pregnancy. It is also excreted in breast milk and breast feeding isn't recommended. Skyrizi Counseling: I discussed with the patient the risks of risankizumab-rzaa including but not limited to immunosuppression, and serious infections.  The patient understands that monitoring is required including a PPD at baseline and must alert us or the primary physician if symptoms of infection or other concerning signs are noted. Cyclophosphamide Pregnancy And Lactation Text: This medication is Pregnancy Category D and it isn't considered safe during pregnancy. This medication is excreted in breast milk. Clofazimine Counseling:  I discussed with the patient the risks of clofazimine including but not limited to skin and eye pigmentation, liver damage, nausea/vomiting, gastrointestinal bleeding and allergy. Bactrim Pregnancy And Lactation Text: This medication is Pregnancy Category D and is known to cause fetal risk.  It is also excreted in breast milk. Humira Counseling:  I discussed with the patient the risks of adalimumab including but not limited to myelosuppression, immunosuppression, autoimmune hepatitis, demyelinating diseases, lymphoma, and serious infections.  The patient understands that monitoring is required including a PPD at baseline and must alert us or the primary physician if symptoms of infection or other concerning signs are noted. Cyclosporine Counseling:  I discussed with the patient the risks of cyclosporine including but not limited to hypertension, gingival hyperplasia,myelosuppression, immunosuppression, liver damage, kidney damage, neurotoxicity, lymphoma, and serious infections. The patient understands that monitoring is required including baseline blood pressure, CBC, CMP, lipid panel and uric acid, and then 1-2 times monthly CMP and blood pressure. Carac Counseling:  I discussed with the patient the risks of Carac including but not limited to erythema, scaling, itching, weeping, crusting, and pain. Odomzo Counseling- I discussed with the patient the risks of Odomzo including but not limited to nausea, vomiting, diarrhea, constipation, weight loss, changes in the sense of taste, decreased appetite, muscle spasms, and hair loss.  The patient verbalized understanding of the proper use and possible adverse effects of Odomzo.  All of the patient's questions and concerns were addressed. Stelara Counseling:  I discussed with the patient the risks of ustekinumab including but not limited to immunosuppression, malignancy, posterior leukoencephalopathy syndrome, and serious infections.  The patient understands that monitoring is required including a PPD at baseline and must alert us or the primary physician if symptoms of infection or other concerning signs are noted. Tetracycline Counseling: Patient counseled regarding possible photosensitivity and increased risk for sunburn.  Patient instructed to avoid sunlight, if possible.  When exposed to sunlight, patients should wear protective clothing, sunglasses, and sunscreen.  The patient was instructed to call the office immediately if the following severe adverse effects occur:  hearing changes, easy bruising/bleeding, severe headache, or vision changes.  The patient verbalized understanding of the proper use and possible adverse effects of tetracycline.  All of the patient's questions and concerns were addressed. Patient understands to avoid pregnancy while on therapy due to potential birth defects. Protopic Counseling: Patient may experience a mild burning sensation during topical application. Protopic is not approved in children less than 2 years of age. There have been case reports of hematologic and skin malignancies in patients using topical calcineurin inhibitors although causality is questionable. Hydroxyzine Counseling: Patient advised that the medication is sedating and not to drive a car after taking this medication.  Patient informed of potential adverse effects including but not limited to dry mouth, urinary retention, and blurry vision.  The patient verbalized understanding of the proper use and possible adverse effects of hydroxyzine.  All of the patient's questions and concerns were addressed. Cephalexin Counseling: I counseled the patient regarding use of cephalexin as an antibiotic for prophylactic and/or therapeutic purposes. Cephalexin (commonly prescribed under brand name Keflex) is a cephalosporin antibiotic which is active against numerous classes of bacteria, including most skin bacteria. Side effects may include nausea, diarrhea, gastrointestinal upset, rash, hives, yeast infections, and in rare cases, hepatitis, kidney disease, seizures, fever, confusion, neurologic symptoms, and others. Patients with severe allergies to penicillin medications are cautioned that there is about a 10% incidence of cross-reactivity with cephalosporins. When possible, patients with penicillin allergies should use alternatives to cephalosporins for antibiotic therapy. Hydroxyzine Pregnancy And Lactation Text: This medication is not safe during pregnancy and should not be taken. It is also excreted in breast milk and breast feeding isn't recommended. Colchicine Counseling:  Patient counseled regarding adverse effects including but not limited to stomach upset (nausea, vomiting, stomach pain, or diarrhea).  Patient instructed to limit alcohol consumption while taking this medication.  Colchicine may reduce blood counts especially with prolonged use.  The patient understands that monitoring of kidney function and blood counts may be required, especially at baseline. The patient verbalized understanding of the proper use and possible adverse effects of colchicine.  All of the patient's questions and concerns were addressed. Protopic Pregnancy And Lactation Text: This medication is Pregnancy Category C. It is unknown if this medication is excreted in breast milk when applied topically.

## 2021-05-12 NOTE — DISCHARGE NOTE PROVIDER - CARE PROVIDER_API CALL
Casey Michael)  Surgery  [address], [address] [place] [address]  Phone: [phone]  Fax: [phone]  Follow Up Time: 1 month

## 2021-05-12 NOTE — ASU DISCHARGE PLAN (ADULT/PEDIATRIC) - ASU DC SPECIAL INSTRUCTIONSFT
WOUND CARE: You may shower as usual, but do not soak or scrub the access site.  ACTIVITY: You may resume dialysis starting tomorrow. You should keep a Band-aid or similar over the suture, but the clear occlusive dressing may be removed as necessary to facilitate dialysis  DIET: You may resume your previous diet as tolerated  MEDICATIONS: You may resume your previous home medications. Take NSAIDs as needed for pain relief  FOLLOW-UP: Follow up in 2 weeks in surgery clinic. Clinic information is included in your discharge paperwork    Seek immediate medical attention if you develop fever, chest pain, shortness of breath, persistent vomiting, swelling over your procedure site that does not resolve after a few days, significant bleeding not responsive to a few minutes of direct pressure, or any other symptoms you find concerning.

## 2021-05-12 NOTE — ASU DISCHARGE PLAN (ADULT/PEDIATRIC) - CARE PROVIDER_API CALL
Casey Michael)  Surgery  [address] [facility], [address] [place] [address]  Phone: [phone]  Fax: [phone]  Follow Up Time: 2 weeks

## 2021-05-12 NOTE — DISCHARGE NOTE PROVIDER - NSDCMRMEDTOKEN_GEN_ALL_CORE_FT
amLODIPine 10 mg oral tablet: 1 tab(s) orally once a day  aspirin 81 mg oral tablet, chewable: 1 tab(s) orally once a day  doxazosin 4 mg oral tablet: 1 tab(s) orally once a day (at bedtime)  ergocalciferol 2000 intl units oral capsule: 1 cap(s) orally once a day  ferrous sulfate 325 mg (65 mg elemental iron) oral tablet: 1 tab(s) orally once a day   hydrALAZINE 50 mg oral tablet: 1 tab(s) orally 3 times a day  isosorbide mononitrate 30 mg oral tablet, extended release: 1 tab(s) orally once a day (in the morning)  Lantus Solostar Pen 100 units/mL subcutaneous solution: 45 unit(s) subcutaneous once a day (at bedtime)  levothyroxine 50 mcg (0.05 mg) oral tablet: 1 tab(s) orally once a day  memantine 10 mg oral tablet: 1 tab(s) orally 2 times a day  Metoprolol Succinate ER 25 mg oral tablet, extended release: 1 tab(s) orally once a day  pravastatin 40 mg oral tablet: 1 tab(s) orally once a day  sodium bicarbonate 650 mg oral tablet: 1 tab(s) orally 2 times a day  tamsulosin 0.4 mg oral capsule: 1 cap(s) orally once a day (at bedtime)  torsemide: 40 milligram(s) orally  torsemide 20 mg oral tablet: 1 tab(s) orally once a day (at bedtime)  Vitamin D3 2000 intl units (50 mcg) oral capsule: orally once a day

## 2021-05-12 NOTE — BRIEF OPERATIVE NOTE - OPERATION/FINDINGS
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 Prior stent in axillary vein noted. Balloon angioplasty performed in region of stenosis; repeat angiogram performed, with good flow noted and no reflux appreciated. Palpable thrill at end of case. Closure with 3-0 Prolene figure-of-8.

## 2021-05-12 NOTE — DISCHARGE NOTE NURSING/CASE MANAGEMENT/SOCIAL WORK - PATIENT PORTAL LINK FT
You can access the FollowMyHealth Patient Portal offered by [facility] by registering at the following website: [email or web address]. By joining [facility]’s FollowMyHealth portal, you will also be able to view your health information using other applications (apps) compatible with our system.

## 2021-05-12 NOTE — CHART NOTE - NSCHARTNOTEFT_GEN_A_CORE
Wanted to plan for HD after procedure. Spoke to Nephrology attending, Dr. Adams, who arranged for patient to receive HD tomorrow, 5/13 at [facility] for 3:00PM. Patient will then resume scheduled HD on Friday 5/14. Cath Lab NP notified. Will not be admitted after procedure.
Patient received for LUE fistulogram.    Patient is Alert Setswana speaking only. Poor historian even with .  Lungs CTA  S1S2 3/6 ELLEN  1+ pedal edema bilaterally; B/L pedal pulses +2 by palpation  +2-3 pitting edema to the LUE; strong thrill to AVF; Radial and ulnar pulses non palpable.    Patient awaiting LUE fistulogram.

## 2021-05-12 NOTE — DISCHARGE NOTE PROVIDER - NSDCFUSCHEDAPPT_GEN_ALL_CORE_FT
JEMIMA SANTOS ; 05/13/2021 ; [facility] Cardio [address] JEMIMA Crane ; 06/01/2021 ; NPP Nephro [address]  JEMIMA SANTOS ; 06/01/2021 ; [facility] Card[facility] [address] JEMIMA Crane ; 06/02/2021 ; [facility] [address] ; 07/07/2021 ; [facility] Cardio [facility] [address]

## 2021-05-12 NOTE — BRIEF OPERATIVE NOTE - NSICDXBRIEFPOSTOP_GEN_ALL_CORE_FT
POST-OP DIAGNOSIS:  ESRD (end stage renal disease) on dialysis 12-May-2021 15:20:53  Charles Brown

## 2021-05-12 NOTE — BRIEF OPERATIVE NOTE - COMMENTS
Patient scheduled for dialysis 5/13. Okay to use fistula for dialysis. Patient to follow-up with Vascular Surgery in 2 weeks for follow-up and suture removal

## 2021-05-12 NOTE — BRIEF OPERATIVE NOTE - NSICDXBRIEFPROCEDURE_GEN_ALL_CORE_FT
PROCEDURES:  AV fistulogram, with angioplasty if indicated 12-May-2021 15:19:59  Charles Brown  Angioplasty of axillary vein for stenosis 12-May-2021 15:20:23  Charles Brown

## 2021-05-17 PROBLEM — I48.91 UNSPECIFIED ATRIAL FIBRILLATION: Chronic | Status: ACTIVE | Noted: 2020-08-05

## 2021-05-17 PROBLEM — G30.9 ALZHEIMER'S DISEASE, UNSPECIFIED: Chronic | Status: ACTIVE | Noted: 2020-08-05

## 2021-05-17 PROBLEM — N18.6 END STAGE RENAL DISEASE: Chronic | Status: ACTIVE | Noted: 2020-08-05

## 2021-05-18 PROBLEM — R09.89 BRUIT OF RIGHT CAROTID ARTERY: Status: ACTIVE | Noted: 2018-01-03

## 2021-05-18 PROBLEM — Z98.62: Status: RESOLVED | Noted: 2021-01-01 | Resolved: 2021-01-01

## 2021-05-18 PROBLEM — Z98.890 S/P ARTERIOVENOUS (AV) FISTULA CREATION: Status: RESOLVED | Noted: 2021-01-01 | Resolved: 2021-01-01

## 2021-05-18 PROBLEM — R09.89 ABNORMAL LUNG SOUNDS: Status: ACTIVE | Noted: 2021-01-01

## 2021-05-18 PROBLEM — Z87.448 HISTORY OF END STAGE RENAL DISEASE: Status: RESOLVED | Noted: 2020-01-01 | Resolved: 2021-01-01

## 2021-05-18 NOTE — REVIEW OF SYSTEMS
[SOB] : shortness of breath [Dyspnea on exertion] : dyspnea during exertion [Chest Discomfort] : chest discomfort [Leg Claudication] : no intermittent leg claudication [Cough] : cough [Wheezing] : wheezing [Negative] : Heme/Lymph

## 2021-05-18 NOTE — HISTORY OF PRESENT ILLNESS
[FreeTextEntry1] : HTN, atrial fibrillation, congestive heart failure and heart failure with preserve ejection fraction s/p cardiomem \par \par HPI for today: : during hemodialyses yesterday he was very shortness of breathe . he had received oxygen during hemodialyses . he had a fitulogram and balooning done last week. for his left av fistula.  fo rthe obstruciton.  since the procedure he has been more short of breathe. unclear uif he received IV fluids\par 5/13: his cardiomems reading was very high. \par \par HPI for today: : he sleep s a lot.  No headahces. no dizziness. +_ dyspnea on exertion. getting hemodialyses .\par legs not swollen now.  he is complaitn with meds. \par checks cardiomem every day.\par \par \par old note:  : c/c : when parker the HD catheter will come off.  he has fistual 2 mths ago. getting hemodialyses with catheter. No headaches. Nodizziness. no dyspnea. \par no palpitations,. has cardiomem. at goal. \par  \par \par  \par \par ol dnote: no chest pain. no dyspnea. no headaches. no dizziness. \par old note: no chest pain. no dyspnea. routine follow up. compliant with meds. no fall. no bleeding. \par prior note: last visit, did not bring his pills. BP high, added Benica-HCTZ.  Compliant with emds. Did not bring his pills today again.  Also, said that did not  Benicar-HCTZ due to high co pay. Only taking one BP pill. Not compliant with diet. \par No chest pain. No dyspnea.\par OLD NOTE: This is a 74 year old man with history of HTN, DM, CKD (Stage 3) Atrial fibrillation, prior pneumonia and pleural effusions and pericardial effusion, recently admitted to hospital for rectal bleeding and Elevated INR Hb < 4 , INR =8 and acute on chronic renal failure. CT abd showed, diverticular disease.  Colonoscopy showed, diverticulitis, EGD showed  Patient is off AC. \par \par

## 2021-05-18 NOTE — DISCUSSION/SUMMARY
[Patient] : the patient [Risks] : risks [Benefits] : benefits [Alternatives] : alternatives [With Me] : with me [___ Month(s)] : in [unfilled] month(s) [FreeTextEntry1] : 79 M with HTN, DM, HLD, paroxsymal atrial fibrillation with Supra therapeutic INR and acute severe GI bleeding now off coumadin on ILR to evaluate for Afib burden,  Pt is s/p Cardiomems placement 1/3. reports today for lower ext. swelling. denies any SOB, chest pain, palpitations, syncope or near syncope. \par Plan d/w Dr. Faustin. \par \par \par Plan:\par 1). HF- heart failure with preserve ejection fraction   Pt is s/p Cardiomems   increase torsemide 40 in am and 20 in evening. . still makes urine. . . on hemodialyses . discuss with nephrolgy whether we need to contineu diuresis. euvolemic.  need PADP goal of 15. will reduce goal to 15.   Patietn has severe pulm HTN. and fels short of breathe. Xray .\par Bialteral pleural effusion on exam:  Xray . and  thoracocentesis if needed. \par OPulmonary PFT test. \par Texted dr garces to increase fluid removal during hemodialyses \par 2) Bilateral LE edema:  .  CKD. HFPEF.  Compressions stockings.   diuresis. as above \par 3)  Paroxysmal Afib: s/p cardioversion Jan 2015, CHADSVASC >=3..  =  Xarelto not approved for life long or high co pay.  no coumadin as patient had bleeding on supra therapeutic INR.  ct  aspirin  81 mg.  s/p Loop recorder.  low afib burden so far.  willevaluate for watchman next visit. \par 5) HTN: controlled. ct  current meds. \par 6) CAD evaluation : no significant coronary artery disease on cath \par 7) MODerate to severe  aortic stenosis:  CHF.   repeat echo next  year. \par

## 2021-05-18 NOTE — PHYSICAL EXAM
[Normal Appearance] : normal appearance [General Appearance - Well Developed] : well developed [Well Groomed] : well groomed [General Appearance - Well Nourished] : well nourished [No Deformities] : no deformities [General Appearance - In No Acute Distress] : no acute distress [Normal Conjunctiva] : the conjunctiva exhibited no abnormalities [Eyelids - No Xanthelasma] : the eyelids demonstrated no xanthelasmas [Normal Oral Mucosa] : normal oral mucosa [No Oral Pallor] : no oral pallor [No Oral Cyanosis] : no oral cyanosis [Normal Jugular Venous A Waves Present] : normal jugular venous A waves present [Normal Jugular Venous V Waves Present] : normal jugular venous V waves present [No Jugular Venous Perez A Waves] : no jugular venous perez A waves [Respiration, Rhythm And Depth] : normal respiratory rhythm and effort [Exaggerated Use Of Accessory Muscles For Inspiration] : no accessory muscle use [Auscultation Breath Sounds / Voice Sounds] : lungs were clear to auscultation bilaterally [Heart Rate And Rhythm] : heart rate and rhythm were normal [Heart Sounds] : normal S1 and S2 [Edema] : no peripheral edema present [Veins - Varicosity Changes] : no varicosital changes were noted in the lower extremities [Abdomen Soft] : soft [Abdomen Tenderness] : non-tender [Abdomen Mass (___ Cm)] : no abdominal mass palpated [Nail Clubbing] : no clubbing of the fingernails [Cyanosis, Localized] : no localized cyanosis [Petechial Hemorrhages (___cm)] : no petechial hemorrhages [Skin Color & Pigmentation] : normal skin color and pigmentation [] : no rash [No Skin Ulcers] : no skin ulcer [FreeTextEntry1] : Dry hairless skin lack of lustere in the legs . Edema.  [Oriented To Time, Place, And Person] : oriented to person, place, and time [Affect] : the affect was normal [Mood] : the mood was normal [No Anxiety] : not feeling anxious

## 2021-05-18 NOTE — CARDIOLOGY SUMMARY
[de-identified] : 5/18/2021:   [No Ischemia] : no Ischemia [LVEF ___%] : LVEF [unfilled]% [None] : normal LV function [Enlarged] : enlarged LA size [Mild] : mild mitral regurgitation [___] : [unfilled] [de-identified] : feb 2018 : carotid US: mild atherosclerosis. no stenosis. \par feb 2019: Mild plaque . no stenosis.

## 2021-05-20 NOTE — ED PROVIDER NOTE - PHYSICAL EXAMINATION
Gen: NAD, AOx3  Head: NCAT  HEENT: PERRL, EOMI, oral mucosa moist, normal conjunctiva, neck supple  Lung: mild tachypnea with belly breathing, decreased breath sounds bilateral   CV: rrr, no murmur, Normal perfusion   Abd: soft, NTND, no CVA tenderness  MSK: + bilateral lower extremity edema, no visible deformities  Neuro: No focal neurologic deficits  Skin: No rash   Psych: normal affect Gen: NAD, AOx3  Head: NCAT  HEENT: PERRL, EOMI, oral mucosa moist, normal conjunctiva, neck supple  Lung: mild tachypnea with belly breathing, decreased breath sounds bilateral   CV: rrr, no murmur, Normal perfusion   Abd: soft, NTND  MSK: +1 bilateral lower extremity edema, no visible deformities  Neuro: No focal neurologic deficits  Skin: No rash   Psych: normal affect

## 2021-05-20 NOTE — H&P ADULT - NSICDXPASTSURGICALHX_GEN_ALL_CORE_FT
PAST SURGICAL HISTORY:  AV fistula     Cataract     S/P right pulmonary artery pressure sensor implant placement     Ureteral stent retained

## 2021-05-20 NOTE — H&P ADULT - NSHPSOCIALHISTORY_GEN_ALL_CORE
Patient lives at home with wife, fully functional, showers, eats and dresses himself, no EtOH use, non-smoker - smoked cigarettes as a teenager

## 2021-05-20 NOTE — H&P ADULT - NSHPPHYSICALEXAM_GEN_ALL_CORE
Vital Signs Last 24 Hrs  T(C): 37.1 (20 May 2021 12:26), Max: 37.1 (20 May 2021 12:26)  T(F): 98.8 (20 May 2021 12:26), Max: 98.8 (20 May 2021 12:26)  HR: 71 (20 May 2021 12:26) (71 - 71)  BP: 132/69 (20 May 2021 12:26) (132/69 - 132/69)  RR: 20 (20 May 2021 12:26) (20 - 20)  SpO2: 90% (20 May 2021 12:26) (90% - 90%)    PHYSICAL EXAM:  GENERAL: NAD, lying in bed comfortably  HEAD:  Atraumatic, Normocephalic  EYES: conjunctiva and sclera clear  ENT: Moist mucous membranes  NECK: Supple, No JVD  CHEST/LUNG: decreased breath sounds bilaterally, No rales, rhonchi, wheezing, or rubs. Unlabored respirations  HEART: Regular rate and rhythm; No murmurs  ABDOMEN: Bowel sounds present; Soft, Nontender, Nondistended.   EXTREMITIES:  2+ Peripheral Pulses, brisk capillary refill. No clubbing, cyanosis, or edema  NERVOUS SYSTEM:  Alert & Oriented X3, speech clear. No deficits   MSK: FROM all 4 extremities, full and equal strength Vital Signs Last 24 Hrs  T(C): 37.1 (20 May 2021 12:26), Max: 37.1 (20 May 2021 12:26)  T(F): 98.8 (20 May 2021 12:26), Max: 98.8 (20 May 2021 12:26)  HR: 71 (20 May 2021 12:26) (71 - 71)  BP: 132/69 (20 May 2021 12:26) (132/69 - 132/69)  RR: 20 (20 May 2021 12:26) (20 - 20)  SpO2: 90% (20 May 2021 12:26) (90% - 90%)    PHYSICAL EXAM:  GENERAL: NAD, lying in bed comfortably  HEAD:  Atraumatic, Normocephalic  EYES: conjunctiva and sclera clear  ENT: Moist mucous membranes  NECK: Supple, No JVD  CHEST/LUNG: decreased breath sounds bilaterally, No rales, rhonchi, wheezing, or rubs. Unlabored respirations  HEART: Regular rate and rhythm; No murmurs  ABDOMEN: Bowel sounds present; Soft, Nontender, Nondistended.   EXTREMITIES:  2+ Peripheral Pulses, brisk capillary refill. No clubbing, cyanosis, 2+ bl/le  edema, LUE dialysis fistula  NERVOUS SYSTEM:  Alert & Oriented X3, speech clear. No deficits   MSK: FROM all 4 extremities, full and equal strength

## 2021-05-20 NOTE — ED PROVIDER NOTE - PMH
(HFpEF) heart failure with preserved ejection fraction    Alzheimer's dementia  poor historian  Anemia due to chronic kidney disease, on chronic dialysis  m-w-f  Atrial fibrillation, unspecified type  loop recorder in place  BPH (benign prostatic hyperplasia)    Essential hypertension    GERD (gastroesophageal reflux disease)    History of insulin dependent diabetes mellitus    Hyperlipidemia, unspecified hyperlipidemia type    Hypothyroid    Moderate aortic stenosis    Stage 5 chronic kidney disease on chronic dialysis  on dialysis m-w-f

## 2021-05-20 NOTE — ED PROVIDER NOTE - NS ED ROS FT
ROS: no CP, + SOB. no cough. no fever. no n/v/d/c. no abd pain. no rash. no bleeding. no urinary complaints. no weakness. no vision changes. no HA. no neck/back pain. no extremity swelling/deformity. No change in mental status. ROS: no CP, + SOB. no cough. no fever. no n/v/d/c. no abd pain. no rash. no bleeding. no urinary complaints. no weakness. no vision changes. no HA. no neck/back pain. no +extremity swelling No change in mental status.

## 2021-05-20 NOTE — ED ADULT NURSE NOTE - OBJECTIVE STATEMENT
pt sent to ED from cardiology office c/o "fluid in lungs." Pt reports SOB x 1 week becoming worse. As per cards- pt to have pleural effusions drained. Pt denies any chest pain, N/V/D, HA, dizziness, blurred vision, numbness/tingling,  symptoms. Pt placed on 2L NC for O2 92% on room air, breathing even & unlabored with O2. NSR on cardiac monitor. Pt and wife made aware of plan of care. Safety maintained.

## 2021-05-20 NOTE — ED PROVIDER NOTE - CLINICAL SUMMARY MEDICAL DECISION MAKING FREE TEXT BOX
Pt with tachypnea, per cardiology large pleural effusion, plan for thoracentesis, IR for drainage will require admission for dieresis

## 2021-05-20 NOTE — ED ADULT NURSE NOTE - NSIMPLEMENTINTERV_GEN_ALL_ED
Implemented All Fall Risk Interventions:  [place] to call system. Call bell, personal items and telephone within reach. Instruct patient to call for assistance. Room bathroom lighting operational. Non-slip footwear when patient is off stretcher. Physically safe environment: no spills, clutter or unnecessary equipment. Stretcher in lowest position, wheels locked, appropriate side rails in place. Provide visual cue, wrist band, yellow gown, etc. Monitor gait and stability. Monitor for mental status changes and reorient to person, place, and time. Review medications for side effects contributing to fall risk. Reinforce activity limits and safety measures with patient and family.

## 2021-05-20 NOTE — H&P ADULT - NSICDXPASTMEDICALHX_GEN_ALL_CORE_FT
PAST MEDICAL HISTORY:  (HFpEF) heart failure with preserved ejection fraction     Alzheimer's dementia poor historian    Anemia due to chronic kidney disease, on chronic dialysis m-w-f    Atrial fibrillation, unspecified type loop recorder in place    BPH (benign prostatic hyperplasia)     Essential hypertension     GERD (gastroesophageal reflux disease)     History of insulin dependent diabetes mellitus     Hyperlipidemia, unspecified hyperlipidemia type     Hypothyroid     Moderate aortic stenosis     Stage 5 chronic kidney disease on chronic dialysis on dialysis m-w-f

## 2021-05-20 NOTE — CONSULT NOTE ADULT - SUBJECTIVE AND OBJECTIVE BOX
[place] [facility]                                                               Office:  [address] [address]                                                              Telephone: [phone]. Fax:[phone]                                                                        CARDIOLOGY CONSULTATION NOTE                                                                                             Consult requested by:  Dr. Ashley  Reason for Consultation: Pleural Effusion  PMD:Dr. Torres  Primary Cardiology: Dr. Faustin  History obtained by: Patient and medical record   obtained: No    Chief complaint:    Patient is a 80y old  Male who presents with a chief complaint of       HPI: Pt is a 81 y/o male with medical history of pAF no AC d/t hx of anemia, non-obstructive CAD, chronic bradycardia, ILR placed 1/2016, Chronic Diastolic HFpEF (12/2019 EF 60-65%), Gr. II DD, Cardiomems (placed 1/2020) PA diastolic goal of 16, mod to severe AS, CKD 4, HTN, HLD, b/l carotid bruit, hyperkalemia, IDDMII, dementia who presents to [facility]ED for treatment of pleural effusion. Pt poor historian, wife at bedside. Pt wife verifies that patient has been having SOB recently. Pt cardiomems reading PAD 17.          REVIEW OF SYMPTOMS:     CONSTITUTIONAL: No fever, weight loss, or fatigue  ENMT:  No difficulty hearing, tinnitus, vertigo; No sinus or throat pain  NECK: No pain or stiffness  CARDIOVASCULAR: See HPI  RESPIRATORY: No Dyspnea on exertion, Shortness of breath, cough, wheezing  : No dysuria, no hematuria   GI: No dark color stool, no melena, no diarrhea, no constipation, no abdominal pain   NEURO: No headache, no dizziness, no slurred speech   MUSCULOSKELETAL: No joint pain or swelling; No muscle, back, or extremity pain  PSYCH: No agitation, no anxiety.    ALL OTHER REVIEW OF SYSTEMS ARE NEGATIVE.      PREVIOUS DIAGNOSTIC TESTING    ECHO FINDINGS:  < from: TTE Echo Complete w/Doppler (12.10.19 @ 13:21) >    Summary:   1. Technically difficult study.   2. Normal global left ventricular systolic function.   3. Left ventricular ejection fraction, by visual estimation, is 60 to   65%.   4. Spectral Doppler shows pseudonormal pattern of left ventricular   myocardial filling (Grade II diastolic dysfunction).   5. Elevated mean left atrial pressure. Mean LAP estimated at 22 mmHg.   6. Mild mitral annular calcification.   7. Mild thickening and calcification of the anterior and posterior   mitralvalve leaflets.   8. Mild mitral valve regurgitation.   9. Mild aortic regurgitation.  10. Moderate to severe aortic valve stenosis. Recommend SCAR for further   evaluation.  11. Moderate pleural effusion in both left and right lateral regions.  12. There is no evidence of pericardial effusion.        STRESS FINDINGS: < from: Nuclear Stress Test-Exercise (05.09.16 @ 08:28) >  IMPRESSIONS:Normal Study  * Exercise capacity: 4 METS, Poor for age and gender.  * HR Response: Appropriate.  * BP Response: Appropriate.  * ECG Abnormalities: There were no diagnostic changes.  * Arrhythmia: None.  * Negative ECG evidence of ischemia during exercise stress  test.  * The left ventricle was normal in size. Normal myocardial  perfusion scan, with no evidence of infarction or  inducible ischemia.  * Gated wall motion analysis is performed, and shows  normal wall motion with post stress LVEF of 70%. Normal LV  ejection fraction.        ALLERGIES: Allergies    No Known Allergies    Intolerances          PAST MEDICAL HISTORY  Diabetes    Hypertension    Hyperlipemia    CKD (chronic kidney disease)    BPH (benign prostatic hyperplasia)    ADI (acute kidney injury)    Hyperkalemia    Alzheimer&#x27;s dementia    Anemia due to chronic kidney disease, on chronic dialysis    (HFpEF) heart failure with preserved ejection fraction    Atrial fibrillation, unspecified type    Stage 5 chronic kidney disease on chronic dialysis    History of insulin dependent diabetes mellitus    GERD (gastroesophageal reflux disease)    Essential hypertension    Hyperlipidemia, unspecified hyperlipidemia type    Moderate aortic stenosis    Hypothyroid        PAST SURGICAL HISTORY  Ureteral stent retained    S/P right pulmonary artery pressure sensor implant placement    AV fistula    Cataract        FAMILY HISTORY:  FH: type 2 diabetes  mother    Family history of coronary artery disease    Family history of cerebrovascular accident (CVA)    Family history of Alzheimer&#x27;s disease        SOCIAL HISTORY:  Denies smoking/alcohol/drugs        CURRENT MEDICATIONS:           HOME MEDICATIONS:    amLODIPine 10 mg oral tablet: 1 tab(s) orally once a day (12 May 2021 14:16)  aspirin 81 mg oral tablet, chewable: 1 tab(s) orally once a day (12 May 2021 14:16)  hydrALAZINE 50 mg oral tablet: 1 tab(s) orally 3 times a day (12 May 2021 14:16)  isosorbide mononitrate 30 mg oral tablet, extended release: 1 tab(s) orally once a day (in the morning) (12 May 2021 14:16)  Lantus Solostar Pen 100 units/mL subcutaneous solution: 45 unit(s) subcutaneous once a day (at bedtime) (12 May 2021 14:16)  levothyroxine 50 mcg (0.05 mg) oral tablet: 1 tab(s) orally once a day (12 May 2021 14:16)  memantine 10 mg oral tablet: 1 tab(s) orally 2 times a day (12 May 2021 14:16)  Metoprolol Succinate ER 25 mg oral tablet, extended release: 1 tab(s) orally once a day (12 May 2021 14:16)  pravastatin 40 mg oral tablet: 1 tab(s) orally once a day (12 May 2021 14:16)  sodium bicarbonate 650 mg oral tablet: 1 tab(s) orally 2 times a day (12 May 2021 14:16)  torsemide: 40 milligram(s) orally (12 May 2021 14:16)  torsemide 20 mg oral tablet: 1 tab(s) orally once a day (at bedtime) (12 May 2021 14:16)  Vitamin D3 2000 intl units (50 mcg) oral capsule: orally once a day (12 May 2021 14:16)      Vital Signs Last 24 Hrs  T(C): 37.1 (20 May 2021 12:26), Max: 37.1 (20 May 2021 12:26)  T(F): 98.8 (20 May 2021 12:26), Max: 98.8 (20 May 2021 12:26)  HR: 71 (20 May 2021 12:26) (71 - 71)  BP: 132/69 (20 May 2021 12:26) (132/69 - 132/69)  RR: 20 (20 May 2021 12:26) (20 - 20)  SpO2: 90% (20 May 2021 12:26) (90% - 90%)      PHYSICAL EXAM:  Constitutional: Comfortable . No acute distress.   HEENT: Atraumatic and normocephalic , neck is supple . no JVD. No carotid bruit. PEERL   CNS: A&Ox3. No focal deficits. EOMI.    Lymph Nodes: Cervical : Not palpable.  Respiratory: bibasilar diminished breath sounds R>L  Cardiovascular: S1S2 RRR   Gastrointestinal: Soft non-tender and non distended, +Bowel sounds. negative Shirley's sign   Extremities: + bilateral lower extremity edema, +3/+3 nonpitting edema  Psychiatric: Calm . no agitation.  Skin: No skin rash/ulcers visualized to face, hands or feet.    Intake and output:     LABS:    INTERPRETATION OF TELEMETRY: NSR HR @ 70s  ECG:  NSR HR @ 73, inferoseptal lead      RADIOLOGY & ADDITIONAL STUDIES:     X-ray: < from: Xray Chest 2 Views PA/Lat (05.18.21 @ 14:10) >  IMPRESSION:    Moderate bilateral pleural effusions and/or basilar airspace disease.                                                                         [place] [facility]                                                               Office:  [address] [address]                                                              Telephone: [phone]. Fax:[phone]                                                                        CARDIOLOGY CONSULTATION NOTE                                                                                             Consult requested by:  Dr. Ashley  Reason for Consultation: Pleural Effusion  PMD:Dr. Torres  Primary Cardiology: Dr. Faustin  History obtained by: Patient and medical record   obtained: No    Chief complaint:    Patient is a 80y old  Male who presents with a chief complaint of pleural effusion      HPI: Pt is a 81 y/o male with medical history of pAF no AC d/t hx of anemia, non-obstructive CAD, chronic bradycardia, ILR placed 1/2016, Chronic Diastolic HFpEF (12/2019 EF 60-65%), Gr. II DD, Cardiomems (placed 1/2020) PA diastolic goal of 16, mod to severe AS, CKD 4, HTN, HLD, b/l carotid bruit, hyperkalemia, IDDMII, dementia who presents to [facility]ED for treatment of pleural effusion. Pt poor historian, wife at bedside. Pt wife verifies that patient has been having SOB recently. Pt cardiomems reading PAD 17. Pt was advised to have X-ray done as outpatient which revealed moderate bilateral pleural effusions and/or basilar airspace disease. Pt was told to increase his Torsemide dose and Dr. Faustin recommended to nephrology team to increase fluid off loading during dialysis. During dialysis, pt required supplemental o2. Pt wife then contacted outpatient cardiology office saying dyspnea was worsening so patient was advised to come to ED for treatment of pleural effusion. ECG-  NSR HR @ 73, inferoseptal lead abnormalities.           REVIEW OF SYMPTOMS:     CONSTITUTIONAL: No fever, weight loss, or fatigue  ENMT:  No difficulty hearing, tinnitus, vertigo; No sinus or throat pain  NECK: No pain or stiffness  CARDIOVASCULAR: See HPI  RESPIRATORY: No Dyspnea on exertion, Shortness of breath, cough, wheezing  : No dysuria, no hematuria   GI: No dark color stool, no melena, no diarrhea, no constipation, no abdominal pain   NEURO: No headache, no dizziness, no slurred speech   MUSCULOSKELETAL: No joint pain or swelling; No muscle, back, or extremity pain  PSYCH: No agitation, no anxiety.    ALL OTHER REVIEW OF SYSTEMS ARE NEGATIVE.      PREVIOUS DIAGNOSTIC TESTING    ECHO FINDINGS:  < from: TTE Echo Complete w/Doppler (12.10.19 @ 13:21) >    Summary:   1. Technically difficult study.   2. Normal global left ventricular systolic function.   3. Left ventricular ejection fraction, by visual estimation, is 60 to   65%.   4. Spectral Doppler shows pseudonormal pattern of left ventricular   myocardial filling (Grade II diastolic dysfunction).   5. Elevated mean left atrial pressure. Mean LAP estimated at 22 mmHg.   6. Mild mitral annular calcification.   7. Mild thickening and calcification of the anterior and posterior   mitralvalve leaflets.   8. Mild mitral valve regurgitation.   9. Mild aortic regurgitation.  10. Moderate to severe aortic valve stenosis. Recommend SCAR for further   evaluation.  11. Moderate pleural effusion in both left and right lateral regions.  12. There is no evidence of pericardial effusion.        STRESS FINDINGS: < from: Nuclear Stress Test-Exercise (05.09.16 @ 08:28) >  IMPRESSIONS:Normal Study  * Exercise capacity: 4 METS, Poor for age and gender.  * HR Response: Appropriate.  * BP Response: Appropriate.  * ECG Abnormalities: There were no diagnostic changes.  * Arrhythmia: None.  * Negative ECG evidence of ischemia during exercise stress  test.  * The left ventricle was normal in size. Normal myocardial  perfusion scan, with no evidence of infarction or  inducible ischemia.  * Gated wall motion analysis is performed, and shows  normal wall motion with post stress LVEF of 70%. Normal LV  ejection fraction.        ALLERGIES: Allergies    No Known Allergies    Intolerances          PAST MEDICAL HISTORY  Diabetes    Hypertension    Hyperlipemia    CKD (chronic kidney disease)    BPH (benign prostatic hyperplasia)    ADI (acute kidney injury)    Hyperkalemia    Alzheimer&#x27;s dementia    Anemia due to chronic kidney disease, on chronic dialysis    (HFpEF) heart failure with preserved ejection fraction    Atrial fibrillation, unspecified type    Stage 5 chronic kidney disease on chronic dialysis    History of insulin dependent diabetes mellitus    GERD (gastroesophageal reflux disease)    Essential hypertension    Hyperlipidemia, unspecified hyperlipidemia type    Moderate aortic stenosis    Hypothyroid        PAST SURGICAL HISTORY  Ureteral stent retained    S/P right pulmonary artery pressure sensor implant placement    AV fistula    Cataract        FAMILY HISTORY:  FH: type 2 diabetes  mother    Family history of coronary artery disease    Family history of cerebrovascular accident (CVA)    Family history of Alzheimer&#x27;s disease        SOCIAL HISTORY:  Denies smoking/alcohol/drugs        CURRENT MEDICATIONS:           HOME MEDICATIONS:    amLODIPine 10 mg oral tablet: 1 tab(s) orally once a day (12 May 2021 14:16)  aspirin 81 mg oral tablet, chewable: 1 tab(s) orally once a day (12 May 2021 14:16)  hydrALAZINE 50 mg oral tablet: 1 tab(s) orally 3 times a day (12 May 2021 14:16)  isosorbide mononitrate 30 mg oral tablet, extended release: 1 tab(s) orally once a day (in the morning) (12 May 2021 14:16)  Lantus Solostar Pen 100 units/mL subcutaneous solution: 45 unit(s) subcutaneous once a day (at bedtime) (12 May 2021 14:16)  levothyroxine 50 mcg (0.05 mg) oral tablet: 1 tab(s) orally once a day (12 May 2021 14:16)  memantine 10 mg oral tablet: 1 tab(s) orally 2 times a day (12 May 2021 14:16)  Metoprolol Succinate ER 25 mg oral tablet, extended release: 1 tab(s) orally once a day (12 May 2021 14:16)  pravastatin 40 mg oral tablet: 1 tab(s) orally once a day (12 May 2021 14:16)  sodium bicarbonate 650 mg oral tablet: 1 tab(s) orally 2 times a day (12 May 2021 14:16)  torsemide: 40 milligram(s) orally (12 May 2021 14:16)  torsemide 20 mg oral tablet: 1 tab(s) orally once a day (at bedtime) (12 May 2021 14:16)  Vitamin D3 2000 intl units (50 mcg) oral capsule: orally once a day (12 May 2021 14:16)      Vital Signs Last 24 Hrs  T(C): 37.1 (20 May 2021 12:26), Max: 37.1 (20 May 2021 12:26)  T(F): 98.8 (20 May 2021 12:26), Max: 98.8 (20 May 2021 12:26)  HR: 71 (20 May 2021 12:26) (71 - 71)  BP: 132/69 (20 May 2021 12:26) (132/69 - 132/69)  RR: 20 (20 May 2021 12:26) (20 - 20)  SpO2: 90% (20 May 2021 12:26) (90% - 90%)      PHYSICAL EXAM:  Constitutional: Comfortable . No acute distress.   HEENT: Atraumatic and normocephalic , neck is supple . no JVD. No carotid bruit. PEERL   CNS: A&Ox3. No focal deficits. EOMI.    Lymph Nodes: Cervical : Not palpable.  Respiratory: bibasilar diminished breath sounds R>L  Cardiovascular: S1S2 RRR   Gastrointestinal: Soft non-tender and non distended, +Bowel sounds. negative Shirley's sign   Extremities: + bilateral lower extremity edema, +3/+3 nonpitting edema  Psychiatric: Calm . no agitation.  Skin: No skin rash/ulcers visualized to face, hands or feet.    Intake and output:     LABS:    INTERPRETATION OF TELEMETRY: NSR HR @ 70s  ECG:  NSR HR @ 73, inferoseptal lead abnormalities     RADIOLOGY & ADDITIONAL STUDIES:     X-ray: < from: Xray Chest 2 Views PA/Lat (05.18.21 @ 14:10) >  IMPRESSION:    Moderate bilateral pleural effusions and/or basilar airspace disease.

## 2021-05-20 NOTE — H&P ADULT - ASSESSMENT
81 y/o male with PMHx significant for Chronic Diastolic HFpEF (Echo 12/2019: LVEF 60-65%), grade II diastolic dysfunction, cardiomems placed 1/2020, non-obstructive CAD, bradycardia, ILR in place (placed 1/2016), AS, HTN, HLD, Diabetes Mellitus on Insulin, ESRD on HD on M/W/F, Afib, Hypothyroidism, alzheimer's dementia presented to ER after c/o SOB requiring supplemental O2 while at Hemodialysis. Patient has been experiencing SOB since Monday, had outpatient chest X-ray done revealing moderate b/l pleural effusions, was advised to increase dose of Torsemide by Cardiologist and recommended increased fluid off loading during HD. At Hemodialysis patient was noted with worsening SOB, wife called Cardiologist's office and was advised to bring patient to ER. Patient was seen in ER patient was noted with SpO2 of 90%, EKG with NSR with HR: 73/min. Patient is scheduled for thoracentesis today.     #Bilateral Pleural effusions  #Acute on Chronic Diastolic Heart Failure, HFpEF   -CXR: Sizable bilateral pleural effusions with adjacent congestive findings, loop recorder and left-sided pulmonary artery sensor   -EKG reviewed, HR: 73/min   -Echo 12/2019: LVEF (60-65%), grade II diastolic dysfunction  -BNP: 40274  -Cardiomems in place and ILR   -Cardiology consult appreciated   -Continue Metoprolol ER 25mg daily   -Continue hydralazine 50mg po q8hrs   -Continue Imdur 30mg daily   -Planned for thoracentesis with IR today    #Hypertension  -Continue Amlodipine 10mg po daily   -Continue hydralazine 50mg po q8hrs   -Continue metoprolol 25mg daily   -Monitor and titrate meds PRN    #Diabetes Mellitus   -Hypoglycemia Protocol, Accuchecks AC&HS  -Insulin sliding scale   -Diet: Consistent Carbs w/ Evening Snack    #BPH  -Continue doxazosin 4mg po qhs     #Alzheimer's Dementia  -Continue Memantine 10mg po q12hrs     #ESRD on HD M/W/F  -Trend renal function   -Last session was today   -Continue HD   -F/u nephrology consult     #HLD  -Lipitor 40mg po qhs     #VTE prophylaxis   -Heparin 5000u q8hrs       Above discussed with Dr. Piper    79 y/o male with PMHx significant for Chronic Diastolic HFpEF (Echo 12/2019: LVEF 60-65%), grade II diastolic dysfunction, cardiomems placed 1/2020, non-obstructive CAD, bradycardia, ILR in place (placed 1/2016), AS, HTN, HLD, Diabetes Mellitus on Insulin, ESRD on HD on M/W/F, Afib, Hypothyroidism, alzheimer's dementia presented to ER after c/o SOB requiring supplemental O2 while at Hemodialysis. Patient has been experiencing SOB since Monday, had outpatient chest X-ray done revealing moderate b/l pleural effusions, was advised to increase dose of Torsemide by Cardiologist and recommended increased fluid off loading during HD. At Hemodialysis patient was noted with worsening SOB, wife called Cardiologist's office and was advised to bring patient to ER. Patient was seen in ER patient was noted with SpO2 of 90%, EKG with NSR with HR: 73/min. Patient is scheduled for thoracentesis today.     #Bilateral Pleural effusions  #Acute on Chronic Diastolic Heart Failure, HFpEF   -CXR: Sizable bilateral pleural effusions with adjacent congestive findings, loop recorder and left-sided pulmonary artery sensor   -EKG reviewed, HR: 73/min   -Echo 12/2019: LVEF (60-65%), grade II diastolic dysfunction  -BNP: 62910  -Cardiomems in place and ILR   -Cardiology consult appreciated   -Continue Metoprolol ER 25mg daily   -Continue hydralazine 50mg po q8hrs   -Continue Imdur 30mg daily   -Planned for thoracentesis with IR today    #Hypertension  -Continue Amlodipine 10mg po daily   -Continue hydralazine 50mg po q8hrs   -Continue metoprolol 25mg daily   -Monitor and titrate meds PRN    #Diabetes Mellitus   -Hypoglycemia Protocol, Accuchecks AC&HS  -Insulin sliding scale   -Diet: Consistent Carbs w/ Evening Snack    #BPH  -Continue doxazosin 4mg po qhs     #Alzheimer's Dementia  -Continue Memantine 10mg po q12hrs     #ESRD on HD M/W/F  -Trend renal function   -Last session was today   -Continue HD   -F/u nephrology consult     #Afib  -Not on AC, unable to afford DOAC, warfarin not tolerated   -Rate controlled    #HLD  -Lipitor 40mg po qhs     #VTE prophylaxis   -Heparin 5000u q8hrs       Above discussed with Dr. Piper    81 y/o male with PMHx significant for Chronic Diastolic HFpEF (Echo 12/2019: LVEF 60-65%), grade II diastolic dysfunction, cardiomems placed 1/2020, non-obstructive CAD, bradycardia, ILR in place (placed 1/2016), AS, HTN, HLD, Diabetes Mellitus on Insulin, ESRD on HD on M/W/F, Afib, Hypothyroidism, alzheimer's dementia presented to ER after c/o SOB requiring supplemental O2 while at Hemodialysis. Patient has been experiencing SOB since Monday, had outpatient chest X-ray done revealing moderate b/l pleural effusions, was advised to increase dose of Torsemide by Cardiologist and recommended increased fluid off loading during HD. At Hemodialysis patient was noted with worsening SOB, wife called Cardiologist's office and was advised to bring patient to ER. Patient was seen in ER patient was noted with SpO2 of 90%, EKG with NSR with HR: 73/min. Patient is scheduled for thoracentesis today.     #Bilateral Pleural effusions  #Acute on Chronic Diastolic Heart Failure, HFpEF   -CXR: Sizable bilateral pleural effusions with adjacent congestive findings, loop recorder and left-sided pulmonary artery sensor   -EKG reviewed, HR: 73/min   -Echo 12/2019: LVEF (60-65%), grade II diastolic dysfunction  -BNP: 57336  -Cardiomems in place and ILR   -Cardiology consult appreciated   -Continue Metoprolol ER 25mg daily   -Continue hydralazine 50mg po q8hrs   -Continue Imdur 30mg daily   -Planned for thoracentesis with IR today  -O2 to maintain O2 Sat > 92%  -strict I&Os & daily weights   -DASH/ low Na diet with fluid restriction  -Keep Mg >2 and K > 4    #Hypertension  -Continue Amlodipine 10mg po daily   -Continue hydralazine 50mg po q8hrs   -Continue metoprolol 25mg daily   -Monitor and titrate meds PRN    #Diabetes Mellitus   -Hypoglycemia Protocol, Accuchecks AC&HS  -Insulin sliding scale   -Patient on Lantus 6-7units qhs   -Diet: Consistent Carbs w/ Evening Snack    #BPH  -Continue doxazosin 4mg po qhs     #Alzheimer's Dementia  -Continue Memantine 10mg po q12hrs     #ESRD on HD M/W/F  -Trend renal function   -Last session was today   -Continue HD   -F/u nephrology consult     #Afib  -Not on AC, unable to afford DOAC, warfarin not tolerated   -Rate controlled    #HLD  -Lipitor 40mg po qhs     #VTE prophylaxis   -Heparin 5000u q8hrs       Above discussed with Dr. Piper    79 y/o male with PMHx significant for Chronic Diastolic HFpEF (Echo 12/2019: LVEF 60-65%), grade II diastolic dysfunction, cardiomems placed 1/2020, non-obstructive CAD, bradycardia, ILR in place (placed 1/2016), AS, HTN, HLD, Diabetes Mellitus on Insulin, ESRD on HD on M/W/F, Afib, Hypothyroidism, alzheimer's dementia presented to ER after c/o SOB requiring supplemental O2 while at Hemodialysis. Patient has been experiencing SOB since Monday, had outpatient chest X-ray done revealing moderate b/l pleural effusions, was advised to increase dose of Torsemide by Cardiologist and recommended increased fluid off loading during HD. At Hemodialysis patient was noted with worsening SOB, wife called Cardiologist's office and was advised to bring patient to ER. Patient was seen in ER patient was noted with SpO2 of 90%, EKG with NSR with HR: 73/min. Patient is scheduled for thoracentesis today.     #Bilateral Pleural effusions  #Acute on Chronic Diastolic Heart Failure, HFpEF  -Likely due to worsening valvular disease vs worsening HF  -CXR: Sizable bilateral pleural effusions with adjacent congestive findings, loop recorder and left-sided pulmonary artery sensor   -EKG reviewed, HR: 73/min   -Echo 12/2019: LVEF (60-65%), grade II diastolic dysfunction, Moderate AS   -BNP: 14961  -Cardiomems in place and ILR   -Cardiology consult appreciated   -Spoke with Dr. Faustin: to continue torsemide 40mg BID, repeat Echo as out patient, proceed with thoracentesis and HD   -Continue Metoprolol ER 25mg daily   -Continue hydralazine 50mg po q8hrs   -Continue Imdur 30mg daily   -Torsemide 40mg po BID   -Planned for thoracentesis with IR today  -O2 to maintain O2 Sat > 92%  -strict I&Os & daily weights   -DASH/ low Na diet with fluid restriction  -Keep Mg >2 and K > 4    #Hypertension  -Continue Amlodipine 10mg po daily   -Continue hydralazine 50mg po q8hrs   -Continue metoprolol 25mg daily   -Monitor and titrate meds PRN    #Diabetes Mellitus   -Hypoglycemia Protocol, Accuchecks AC&HS  -Insulin sliding scale   -Patient on Lantus 6-7units qhs   -Diet: Consistent Carbs w/ Evening Snack    #BPH  -Continue doxazosin 4mg po qhs     #Alzheimer's Dementia  -Continue Memantine 10mg po q12hrs     #ESRD on HD M/W/F  -Trend renal function   -Last session was today   -Continue HD   -F/u nephrology consult - Spoke with Dr. Preston     #Afib  -Not on AC, unable to afford DOAC, warfarin not tolerated   -[id number]: 5   -On Aspirin 81mg daily   -Rate controlled    #HLD  -Lipitor 40mg po qhs     #VTE prophylaxis   -SCDs for now       Above discussed with Dr. Piper

## 2021-05-20 NOTE — H&P ADULT - ATTENDING COMMENTS
Attending Attestation:    I saw and examined the patient, and was physically present for the key portions of the Evaluation and Management service provided. I agree with the above history, physical, and plan with the following additions:    EXAM:  General: NAD  Lungs: bilateral rales appreciated  Heart: nml RR  Abdomen: soft- non tender  Ext: slightly pitting edema bilaterally  Neuro: alert and oriented x3  Skin: intact    Brief A/P:  81 yo male being admitted for HF  strict I/O  resume oral diuretics  thoracentesis today - already feeling improved  will do thoracentesis tomorrow as well  for HD tomorrow  nephrology consulted

## 2021-05-20 NOTE — CONSULT NOTE ADULT - ATTENDING COMMENTS
kirill was seen in office for dyspnea on exertion . Cardiomems uncontrolled.  Was planned for outpatient thoracocentesis.   Patient called office. was asked to come to hospital .  will plan for thoracocentesis in hospital. s/p 1400 cc out. will plan for iother side tomorrow.  will lower cardiomems goal.  PADP to 13.    need [facility] hemodialysis .  ct torsemide.  will evaluate severeity of aortic stenosis. will compare with ouffice echos  discharge plan tomorrow. kirill was seen in office for dyspnea on exertion . Cardiomems uncontrolled.  Was planned for outpatient thoracocentesis.   Patient called office.  he was asked to come to hospital .  will plan for thoracocentesis in hospital. s/p 1400 cc out. will plan for iother side tomorrow.  will lower cardiomems goal.  PADP to 13.    need [facility] hemodialysis .  ct torsemide.   Chasity has moderate to severe aortic stenosis with high valvuloarterial impedance. patient had a cath in dec 2019 that showed only moderate  He was optimized with heart failure regimen and hemodialysis .  his cardiomems readings are abnormal due to insufficient fluid removal. We will get Ct surgery for TAVR evaluation so that they can follow up as outpatient .   family had been hesitant in the past since he was doing good with hemodialysis and cardiomem. Will reassess for tavr.  discharge plan tomorrow.

## 2021-05-20 NOTE — H&P ADULT - HISTORY OF PRESENT ILLNESS
81 y/o male with PMHx significant for Chronic Diastolic HFpEF (Echo 12/2019: LVEF 60-65%), grade II diastolic dysfunction, cardiomems placed 1/2020, non-obstructive CAD, bradycardia, ILR in place (placed 1/2016), AS, HTN, HLD, Diabetes Mellitus on Insulin, ESRD on HD on M/W/F, Afib, Hypothyroidism, alzheimer's dementia presented to ER after c/o SOB requiring supplemental O2 while at Hemodialysis. Patient has been experiencing SOB since Monday, had outpatient chest X-ray done revealing moderate b/l pleural effusions, was advised to increase dose of Torsemide by Cardiologist and recommended increased fluid off loading during HD. At Hemodialysis patient was noted with worsening SOB, wife called Cardiologist's office and was advised to bring patient to ER. Patient was seen in ER patient was noted with SpO2 of 90%, EKG with NSR with HR: 73/min. Patient is scheduled for thoracentesis today.

## 2021-05-20 NOTE — ED PROVIDER NOTE - NS_ ATTENDINGSCRIBEDETAILS _ED_A_ED_FT
I, Rayne Ashley, performed the initial face to face bedside interview with this patient regarding history of present illness, review of symptoms and relevant past medical, social and family history.  I completed an independent physical examination.  The history, relevant review of systems, past medical and surgical history, medical decision making, and physical examination was documented by the scribe in my presence and I attest to the accuracy of the documentation.

## 2021-05-20 NOTE — ED ADULT NURSE NOTE - PSH
AV fistula    Cataract    S/P right pulmonary artery pressure sensor implant placement    Ureteral stent retained

## 2021-05-20 NOTE — ED PROVIDER NOTE - OBJECTIVE STATEMENT
79 y/o male with PMHx of HTN, HLD, a-fib, ESRD, HTN, DM, HfpEF, hypothyroid, alzheimer dementia, aortic stenosis. Per pt wife pt has fluid in chest and kidney. Pt has had trouble breathing for a week. Pt denies CP, PT denies fevers, chill. Pt last dialyzed yesterday. Pt sent in by cardiology, for thoracentesis and drainage. Pt is on Asprin. Pt normally not on oxygen at home. Pt if fully vaccinated for covid 81 y/o male with PMHx of HTN, HLD, a-fib, ESRD, HTN, DM, HfpEF, hypothyroid, alzheimer dementia, aortic stenosis. Per pt wife pt has fluid in chest and kidney. Pt has had trouble breathing for a week. Pt denies CP, PT denies fevers, chill. Pt last dialyzed yesterday. Pt sent in by cardiology, for thoracentesis and drainage. Pt is on Asprin. Pt normally not on oxygen at home. Pt if fully vaccinated for covid no fevers

## 2021-05-20 NOTE — ED ADULT NURSE NOTE - ED STAT RN HANDOFF DETAILS
report given to MCKAYLA Ahuja in radiology. Pt stable, offers no complaints. 2L NC in place. Safety maintained

## 2021-05-20 NOTE — PROCEDURE NOTE - ADDITIONAL PROCEDURE DETAILS
left thora  6 f pigtail  1400 cc clear fluid  bandaid  no blood loss  check cxr left thora  6 f pigtail  1400 cc clear fluid  bandaid  no blood loss  check cxr    plan for RT thora tomorrow (too much fluid removed today for both sides to be done simultaneously)

## 2021-05-21 NOTE — PROGRESS NOTE ADULT - ASSESSMENT
81 y/o male with PMHx significant for Chronic Diastolic HFpEF (Echo 12/2019: LVEF 60-65%), grade II diastolic dysfunction, cardiomems placed 1/2020, non-obstructive CAD, bradycardia, ILR in place (placed 1/2016), AS, HTN, HLD, Diabetes Mellitus on Insulin, ESRD on HD on M/W/F, Afib, Hypothyroidism, alzheimer's dementia presented to ER after c/o SOB requiring supplemental O2 while at Hemodialysis. Patient has been experiencing SOB since Monday, had outpatient chest X-ray done revealing moderate b/l pleural effusions, was advised to increase dose of Torsemide by Cardiologist and recommended increased fluid off loading during HD. At Hemodialysis patient was noted with worsening SOB, wife called Cardiologist's office and was advised to bring patient to ER. Patient was seen in ER patient was noted with SpO2 of 90%, EKG with NSR with HR: 73/min. Patient is scheduled for thoracentesis today.     #Bilateral Pleural effusions  #Acute on Chronic Diastolic Heart Failure, HFpEF  -Likely due to worsening valvular disease vs worsening HF  -CXR: Sizable bilateral pleural effusions with adjacent congestive findings, loop recorder and left-sided pulmonary artery sensor   -EKG reviewed, HR: 73/min   -Echo 12/2019: LVEF (60-65%), grade II diastolic dysfunction, Moderate AS   -BNP: 75722  -Cardiomems in place and ILR   -Cardiology consult appreciated   -Spoke with Dr. Faustin: to continue torsemide 40mg BID, repeat Echo as out patient, proceed with thoracentesis and HD   -Continue Metoprolol ER 25mg daily   -Continue hydralazine 50mg po q8hrs   -Continue Imdur 30mg daily   -Torsemide 40mg po BID   -Planned for thoracentesis with IR today  -O2 to maintain O2 Sat > 92%  -strict I&Os & daily weights   -DASH/ low Na diet with fluid restriction  -Keep Mg >2 and K > 4    #Hypertension  -Continue Amlodipine 10mg po daily   -Continue hydralazine 50mg po q8hrs   -Continue metoprolol 25mg daily   -Monitor and titrate meds PRN    #Diabetes Mellitus   -Hypoglycemia Protocol, Accuchecks AC&HS  -Insulin sliding scale   -Patient on Lantus 6-7units qhs   -Diet: Consistent Carbs w/ Evening Snack    #BPH  -Continue doxazosin 4mg po qhs     #Alzheimer's Dementia  -Continue Memantine 10mg po q12hrs     #ESRD on HD M/W/F  -Trend renal function   -Last session was today   -Continue HD   -F/u nephrology consult - Spoke with Dr. Preston     #Afib  -Not on AC, unable to afford DOAC, warfarin not tolerated   -[id number]: 5   -On Aspirin 81mg daily   -Rate controlled    #HLD  -Lipitor 40mg po qhs     #VTE prophylaxis   -SCDs for now       Above discussed with Dr. Piper    81 y/o male with PMHx significant for Chronic Diastolic HFpEF (Echo 12/2019: LVEF 60-65%), grade II diastolic dysfunction, cardiomems placed 1/2020, non-obstructive CAD, bradycardia, ILR in place (placed 1/2016), AS, HTN, HLD, Diabetes Mellitus on Insulin, ESRD on HD on M/W/F, Afib, Hypothyroidism, alzheimer's dementia presented to ER after c/o SOB requiring supplemental O2 while at Hemodialysis. Patient has been experiencing SOB since Monday, had outpatient chest X-ray done revealing moderate b/l pleural effusions, was advised to increase dose of Torsemide by Cardiologist and recommended increased fluid off loading during HD. At Hemodialysis patient was noted with worsening SOB, wife called Cardiologist's office and was advised to bring patient to ER. Patient was seen in ER patient was noted with SpO2 of 90%, EKG with NSR with HR: 73/min. Patient is scheduled for thoracentesis.    #Bilateral Pleural effusions  #Acute on Chronic Diastolic Heart Failure, HFpEF  -Likely due to worsening valvular disease vs worsening HF  -CXR: Sizable bilateral pleural effusions with adjacent congestive findings, loop recorder and left-sided pulmonary artery sensor   -EKG reviewed, NSR, HR: 73/min    -Echo 12/2019: LVEF (60-65%), grade II diastolic dysfunction, Moderate AS   -F/u repeat Echocardiogram   -BNP: 13842  -Cardiomems in place and ILR   -Cardiology consult appreciated   -S/p Thoracentesis 5/20/2021 - 1400mls removed   -S/p R chest tube placement 5/21/2021, 1500mls removed   -Continue torsemide 40mg BID  -Continue Metoprolol ER 25mg daily   -Continue hydralazine 50mg po q8hrs   -Continue Imdur 30mg daily   -Torsemide 40mg po BID   -Planned for thoracentesis with IR today  -Supplemental O2 to maintain O2 Sat > 92%  -Strict I&Os & daily weights   -DASH/ low Na diet with fluid restriction  -Keep Mg >2 and K > 4    #Hypertension  -Continue Amlodipine 10mg po daily   -Continue hydralazine 50mg po q8hrs   -Continue metoprolol 25mg daily   -Monitor and titrate meds PRN    #Diabetes Mellitus   -Hypoglycemia Protocol, Accuchecks AC&HS  -Insulin sliding scale   -Patient on Lantus 6-7units qhs   -Diet: Consistent Carbs w/ Evening Snack    #BPH  -Continue doxazosin 4mg po qhs     #Alzheimer's Dementia  -Continue Memantine 10mg po q12hrs     #ESRD on HD M/W/F  -Trend renal function   -Last session was today   -Continue HD   -Nephrology consult appreciated     #Afib  -Not on AC, unable to afford DOAC, warfarin not tolerated   -[id number]: 5   -On Aspirin 81mg daily   -Rate controlled    #HLD  -Lipitor 40mg po qhs     #VTE prophylaxis   -SCDs for now       Above discussed with Dr. Piper    81 y/o male with PMHx significant for Chronic Diastolic HFpEF (Echo 12/2019: LVEF 60-65%), grade II diastolic dysfunction, cardiomems placed 1/2020, non-obstructive CAD, bradycardia, ILR in place (placed 1/2016), AS, HTN, HLD, Diabetes Mellitus on Insulin, ESRD on HD on M/W/F, Afib, Hypothyroidism, alzheimer's dementia presented to ER after c/o SOB requiring supplemental O2 while at Hemodialysis. Patient has been experiencing SOB since Monday, had outpatient chest X-ray done revealing moderate b/l pleural effusions, was advised to increase dose of Torsemide by Cardiologist and recommended increased fluid off loading during HD. At Hemodialysis patient was noted with worsening SOB, wife called Cardiologist's office and was advised to bring patient to ER. Patient was seen in ER patient was noted with SpO2 of 90%, EKG with NSR with HR: 73/min. Patient is scheduled for thoracentesis.    #Bilateral Pleural effusions  #Acute on Chronic Diastolic Heart Failure, HFpEF  -Likely due to worsening valvular disease vs worsening HF  -CXR: Sizable bilateral pleural effusions with adjacent congestive findings, loop recorder and left-sided pulmonary artery sensor   -EKG reviewed, NSR, HR: 73/min    -Echo 12/2019: LVEF (60-65%), grade II diastolic dysfunction, Moderate AS   -F/u repeat Echocardiogram   -BNP: 31479  -Cardiomems in place and ILR   -Cardiology consult appreciated   -S/p Thoracentesis 5/20/2021 - 1400mls removed   -S/p R chest tube placement 5/21/2021, 1500mls removed   -S/p furosemide 80mg IV x2   -Continue torsemide 40mg BID  -Continue Metoprolol ER 25mg daily   -Continue hydralazine 50mg po q8hrs   -Continue Imdur 30mg daily   -Torsemide 40mg po BID   -Supplemental O2 to maintain O2 Sat > 92%  -Strict I&Os & daily weights   -DASH/ low Na diet with fluid restriction  -Keep Mg >2 and K > 4    #Hypertension  -Continue Amlodipine 10mg po daily   -Continue hydralazine 50mg po q8hrs   -Continue metoprolol 25mg daily   -Monitor and titrate meds PRN    #Diabetes Mellitus   -Hypoglycemia Protocol, Accuchecks AC&HS  -Insulin sliding scale   -Patient on Lantus 6-7units qhs   -Diet: Consistent Carbs w/ Evening Snack    #BPH  -Continue doxazosin 4mg po qhs     #Alzheimer's Dementia  -Continue Memantine 10mg po q12hrs     #ESRD on HD M/W/F  -Trend renal function   -Last session was today   -Continue HD   -Nephrology consult appreciated     #Afib  -Not on AC, unable to afford DOAC, warfarin not tolerated   -[id number]: 5   -On Aspirin 81mg daily   -Rate controlled    #HLD  -Lipitor 40mg po qhs     #VTE prophylaxis   -SCDs for now       Above discussed with Dr. Piper    79 y/o male with PMHx significant for Chronic Diastolic HFpEF (Echo 12/2019: LVEF 60-65%), grade II diastolic dysfunction, cardiomems placed 1/2020, non-obstructive CAD, bradycardia, ILR in place (placed 1/2016), AS, HTN, HLD, Diabetes Mellitus on Insulin, ESRD on HD on M/W/F, Afib, Hypothyroidism, alzheimer's dementia presented to ER after c/o SOB requiring supplemental O2 while at Hemodialysis. Patient has been experiencing SOB since Monday, had outpatient chest X-ray done revealing moderate b/l pleural effusions, was advised to increase dose of Torsemide by Cardiologist and recommended increased fluid off loading during HD. At Hemodialysis patient was noted with worsening SOB, wife called Cardiologist's office and was advised to bring patient to ER. Patient was seen in ER patient was noted with SpO2 of 90%, EKG with NSR with HR: 73/min. Patient is scheduled for thoracentesis.    #Bilateral Pleural effusions  #Acute on Chronic Diastolic Heart Failure, HFpEF  -Likely due to worsening valvular disease vs worsening HF  -CXR: Sizable bilateral pleural effusions with adjacent congestive findings, loop recorder and left-sided pulmonary artery sensor   -EKG reviewed, NSR, HR: 73/min    -Echo 12/2019: LVEF (60-65%), grade II diastolic dysfunction, Moderate AS   -F/u repeat Echocardiogram   -BNP: 95980  -Cardiomems in place and ILR   -Cardiology consult appreciated   -S/p Thoracentesis 5/20/2021 - 1400mls removed   -S/p R chest tube placement 5/21/2021, 1500mls removed   Transudative fluid   -S/p furosemide 80mg IV x2   -Continue torsemide 40mg BID  -Continue Metoprolol ER 25mg daily   -Continue hydralazine 50mg po q8hrs   -Continue Imdur 30mg daily   -Torsemide 40mg po BID   -Supplemental O2 to maintain O2 Sat > 92%  -Strict I&Os & daily weights   -DASH/ low Na diet with fluid restriction  -Keep Mg >2 and K > 4    #Hypertension  -Continue Amlodipine 10mg po daily   -Continue hydralazine 50mg po q8hrs   -Continue metoprolol 25mg daily   -Monitor and titrate meds PRN    #Diabetes Mellitus   -Hypoglycemia Protocol, Accuchecks AC&HS  -Insulin sliding scale   -Patient on Lantus 6-7units qhs   -Diet: Consistent Carbs w/ Evening Snack    #BPH  -Continue doxazosin 4mg po qhs     #Alzheimer's Dementia  -Continue Memantine 10mg po q12hrs     #ESRD on HD M/W/F  -Trend renal function   -Last session was today   -Continue HD   -Nephrology consult appreciated     #Afib  -Not on AC, unable to afford DOAC, warfarin not tolerated   -[id number]: 5   -On Aspirin 81mg daily   -Rate controlled    #HLD  -Lipitor 40mg po qhs     #VTE prophylaxis   -SCDs for now       Above discussed with Dr. Piper    81 y/o male with PMHx significant for Chronic Diastolic HFpEF (Echo 12/2019: LVEF 60-65%), grade II diastolic dysfunction, cardiomems placed 1/2020, non-obstructive CAD, bradycardia, ILR in place (placed 1/2016), AS, HTN, HLD, Diabetes Mellitus on Insulin, ESRD on HD on M/W/F, Afib, Hypothyroidism, alzheimer's dementia presented to ER after c/o SOB requiring supplemental O2 while at Hemodialysis. Patient has been experiencing SOB since Monday, had outpatient chest X-ray done revealing moderate b/l pleural effusions, was advised to increase dose of Torsemide by Cardiologist and recommended increased fluid off loading during HD. At Hemodialysis patient was noted with worsening SOB, wife called Cardiologist's office and was advised to bring patient to ER. Patient was seen in ER patient was noted with SpO2 of 90%, EKG with NSR with HR: 73/min. Patient is scheduled for thoracentesis.    #Acute on Chronic HFpEF with bilateral pleural effusions - thoracentesis performed on 5/20 on L (1400cc) with R chest tube placed on 5/21 (1500cc and clamped) - appears transudative  - unclear cause for acute on chronic HFpEF - concern for worsening due to valvular disease vs worsening HF/ischemic disease (last cath in 2019)  - Cardiomems in place and ILR   - CXR: Sizable bilateral pleural effusions with adjacent congestive findings, loop recorder and left-sided pulmonary artery sensor   - EKG reviewed, NSR, HR: 73/min  - F/u repeat Echocardiogram   - Cardiology consult appreciated   - S/p furosemide 80mg IV x2 this morning  - Continue torsemide 40mg BID  - Continue Metoprolol ER 25mg daily   - Continue hydralazine 50mg po q8hrs   - Continue Imdur 30mg daily   - Supplemental O2 to maintain O2 Sat > 92%  - Strict I&Os & daily weights   - DASH/ low Na diet with fluid restriction of 1.2 L  - Keep Mg >2 and K > 4    #Hypertension  - Continue Amlodipine 10mg po daily   - Continue hydralazine 50mg po q8hrs   - Continue metoprolol 25mg daily   - Monitor and titrate meds PRN    #Diabetes Mellitus type II - Patient on Lantus 6-7units qhs at home  - Hypoglycemia Protocol, Accuchecks AC&HS  - Insulin sliding scale for now  - Diet: Consistent Carbs w/ Evening Snack    #BPH  - Continue doxazosin 4mg po qhs    #Alzheimer's Dementia  - Continue Memantine 10mg po q12hrs    #ESRD on HD M/W/F  - Trend renal function  - Last session was today  - Continue HD  - Nephrology consult appreciated    #Afib  - Not on AC, unable to afford DOAC, warfarin not tolerated   - [id number]: 5   - On Aspirin 81mg daily   - Rate controlled    #HLD  - Lipitor 40mg po qhs     #VTE prophylaxis   - SCDs for now     Above discussed with Dr. Piper

## 2021-05-21 NOTE — PROGRESS NOTE ADULT - SUBJECTIVE AND OBJECTIVE BOX
[facility]                                                        Office: [address] [address]                                                       Telephone: [phone]. Fax:[phone]                                                                             PROGRESS NOTE   Reason for follow up:  congestive heart failure and severe aortic stenosis                             Overnight: No new events.   Update:  today am.  Smita had a white count.  He is in acute distress. xray showed florid CHF.  unclear reason.  post procedure yesterday evening, smita was doing good. Comfortable. patient was never in acute distress yesterday.       Subjective: "  i feel short of breathe_"   Complains of:  + dyspnea   Review of symptoms: Cardiac:  No chest pain. +  dyspnea. No palpitations.  Respiratory:no cough. No dyspnea  Gastrointestinal: No diarrhea. No abdominal pain. No bleeding.     Past medical history: No updates.   Chronic conditions:  Hypertension: controlled. CHF: Stable.    	  Vitals:  T(C): 36.6 (05-21-21 @ 15:23), Max: 37.2 (05-21-21 @ 04:27)  HR: 92 (05-21-21 @ 15:52) (67 - 105)  BP: 144/55 (05-21-21 @ 15:52) (123/53 - 181/73)  RR: 18 (05-21-21 @ 15:52) (18 - 22)  SpO2: 98% (05-21-21 @ 15:52) (82% - 98%)  Wt(kg): --  I&O's Summary    Weight (kg): 73 (05-20 @ 12:26)    PHYSICAL EXAM:  Appearance: Comfortable. No acute distress  HEENT:  Head and neck: Atraumatic. Normocephalic.  Normal oral mucosa   Neurologic: A & O x 3, no focal deficits. EOMI , Cranial nerves are intact.  Lymphatic: No cervical lymphadenopathy  Cardiovascular: Normal S1 S2, No murmur, rubs/gallops. No JVD, No edema  Respiratory: bilateral aheezing and crepts and cracklezs.  Gastrointestinal:  Soft, Non-tender, + BS  Lower Extremities: No edema  Psychiatry: Patient is calm. No agitation. Mood & affect appropriate  Skin: No rashes/ ecchymoses/cyanosis/ulcers visualized on the face, hands or feet.    CURRENT MEDICATIONS:  amLODIPine   Tablet 10 milliGRAM(s) Oral daily  doxazosin 4 milliGRAM(s) Oral at bedtime  hydrALAZINE 50 milliGRAM(s) Oral three times a day  isosorbide   mononitrate ER Tablet (IMDUR) 30 milliGRAM(s) Oral daily  metoprolol succinate ER 25 milliGRAM(s) Oral daily  torsemide 40 milliGRAM(s) Oral two times a day    memantine  atorvastatin  dextrose 40% Gel  dextrose 50% Injectable  dextrose 50% Injectable  dextrose 50% Injectable  glucagon  Injectable  insulin lispro (ADMELOG) corrective regimen sliding scale  insulin lispro (ADMELOG) corrective regimen sliding scale  levothyroxine  aspirin  chewable  cholecalciferol  dextrose 5%.  dextrose 5%.  ferrous    sulfate  sodium bicarbonate      LABS:	 	  CARDIAC MARKERS ( 21 May 2021 15:07 )  x     / 0.04 ng/mL / x     / x     / x      p-BNP 21 May 2021 15:07: x    , CARDIAC MARKERS ( 20 May 2021 13:12 )  x     / 0.03 ng/mL / 40 U/L / x     / x      p-BNP 20 May 2021 13:12: 58215 pg/mL                            10.4   11.32 )-----------( 155      ( 21 May 2021 07:00 )             32.4     05-21    135  |  93<L>  |  42.0<H>  ----------------------------<  222<H>  4.2   |  26.0  |  3.45<H>    Ca    8.5<L>      21 May 2021 07:00  Phos  4.8     05-21  Mg     2.0     05-21    TPro  6.7  /  Alb  3.4  /  TBili  0.5  /  DBili  x   /  AST  10  /  ALT  5   /  AlkPhos  64  05-21    proBNP: Serum Pro-Brain Natriuretic Peptide: 54331 pg/mL (05-20 @ 13:12)    Lipid Profile:   HgA1c: TSH:     TELEMETRY: Reviewed    ECG:  Reviewed by me. 	< from: 12 Lead ECG (05.21.21 @ 14:04) >  Diagnosis Line Sinus rhythm with Premature atrial complexes    < end of copied text >      DIAGNOSTIC TESTING:  [ ] Echocardiogram:

## 2021-05-21 NOTE — CONSULT NOTE ADULT - ASSESSMENT
1) ESRD on HD  2) MBD of renal dx  3) Anemia of renal dx  4) Vol HTN    HD today  Fluid challenge ultrafiltration 2.5L  dw Dr Piper
79 y/o male with PMHx significant for Chronic Diastolic HFpEF (Echo 12/2019: LVEF 60-65%), grade II diastolic dysfunction, cardiomems placed 1/2020, non-obstructive CAD, bradycardia, ILR in place (placed 1/2016), AS, HTN, HLD, Diabetes Mellitus on Insulin, ESRD on HD on M/W/F, Afib, Hypothyroidism, alzheimer's dementia presented 5/14 to ER after c/o SOB requiring supplemental O2 while at Hemodialysis. CTS consulted for TAVR evaluation for moderate to severe AS.     PLAN   severe AS   d/w with patient need for TAVR evaluation, however, unable to reach patient's wife to further discuss plan at patients request.   Cardiology following   Pt to follow up as outpatient for TAVR evaluation and workup.   plan of care d/w with Dr. Lloyd  
Pt is a 79 y/o male with medical history of pAF no AC d/t hx of anemia, non-obstructive CAD, chronic bradycardia, ILR placed 1/2016, Chronic Diastolic HFpEF (12/2019 EF 60-65%), Gr. II DD, Cardiomems (placed 1/2020) PA diastolic goal of 16, mod to severe AS, CKD 4, HTN, HLD, b/l carotid bruit, hyperkalemia, IDDMII, dementia who presents to [facility]ED for treatment of pleural effusion. Pt poor historian, wife at bedside. Pt wife verifies that patient has been having SOB recently. Pt cardiomems reading PAD 17. Pt was advised to have X-ray done as outpatient which revealed moderate bilateral pleural effusions and/or basilar airspace disease. Pt was told to increase his Torsemide dose and Dr. Faustin recommended to nephrology team to increase fluid off loading during dialysis. During dialysis, pt required supplemental o2. Pt wife then contacted outpatient cardiology office saying dyspnea was worsening so patient was advised to come to ED for treatment of pleural effusion. ECG-  NSR HR @ 73, inferoseptal lead abnormalities.       Acute on Chronic HF  -Xray-Moderate bilateral pleural effusion and/or biasilar airspace dx.   -Echo-EF 60-65%, grade II diastolic dysfx., mild mitral annular calcification, mild thickening and calcification of the anterior and posterior mitral valve leaflets, mild mitral valve reurgitation, mild aortic regurg. moderate to severe aortic valve stenosis, moderate pleural effusion in both left and right lateral regions   -BNP-pending  -Plan for IR thoracentesis of bilateral pleural effusions  -Will determine diruesis plan after draining  -Cont. Hydralazine,Imdur, and Metoprolol    pAF  -ECG-NSR HR @ 73, inferoseptal lead abnormalities  -Pt not on AC d/t NOAC cost and did not tolerate coumadin  -Watchman outpatient evaluation planned      CKD Stage 4  -BUN/Creatnin-30/3.12  -Nephro evaluation recommended  -HD as per nephro team post thoracentesis    HTN  -Recent BP-132/69  -Cont. current medication regimen    HLD  -Cont. Pravastatin    
80y Male 81 y/o male with PMHx significant for Chronic Diastolic HFpEF (Echo 12/2019: LVEF 60-65%), grade II diastolic dysfunction, cardiomems placed 1/2020, non-obstructive CAD, bradycardia, ILR in place (placed 1/2016), AS, HTN, HLD, Diabetes Mellitus on Insulin, ESRD on HD on M/W/F, Afib, Hypothyroidism, alzheimer's dementia presented 5/14 to ER after c/o SOB requiring supplemental O2 while at Hemodialysis. Patient has been experiencing SOB since Monday, had outpatient chest X-ray done revealing moderate b/l pleural effusions, was advised to increase dose of Torsemide by Cardiologist Dr. Faustin recommended to nephrology team to increase fluid off loading during dialysis. During dialysis, pt required supplemental o2. Pt wife then contacted outpatient cardiology office saying dyspnea was worsening so patient was advised to come to ED for treatment of pleural effusion and recommended increased fluid off loading during HD.    Of note  Pt had left thoracentesis 5/20 with 1400cc fluid removed,  plan was for patient to get IR thoracentesis 5/21 on right, but became acutely SOB.  Placed on BIPAP and received IV lasix.  Thoracic surgery called for Right pigtail.

## 2021-05-21 NOTE — PROCEDURE NOTE - NSPROCDETAILS_GEN_ALL_CORE
Seldinger technique/dressing applied/secured in place/percutaneous/thoracostomy tube placed percutaneously

## 2021-05-21 NOTE — CONSULT NOTE ADULT - SUBJECTIVE AND OBJECTIVE BOX
Consult for surgeon:  Gabino    HPI:  79 y/o male with PMHx significant for Chronic Diastolic HFpEF (Echo 2019: LVEF 60-65%), grade II diastolic dysfunction, cardiomems placed 2020, non-obstructive CAD, bradycardia, ILR in place (placed 2016), AS, HTN, HLD, Diabetes Mellitus on Insulin, ESRD on HD on //, Afib, Hypothyroidism, alzheimer's dementia presented  to ER after c/o SOB requiring supplemental O2 while at Hemodialysis. Patient has been experiencing SOB since Monday, had outpatient chest X-ray done revealing moderate b/l pleural effusions, was advised to increase dose of Torsemide by Cardiologist Dr. Faustin recommended to nephrology team to increase fluid off loading during dialysis. During dialysis, pt required supplemental o2. Pt wife then contacted outpatient cardiology office saying dyspnea was worsening so patient was advised to come to ED for treatment of pleural effusion and recommended increased fluid off loading during HD.    Of note  Pt had left thoracentesis  with 1400cc fluid removed, Patient is scheduled for thoracentesis  to remove rest of fluid     Review of systems:  CONSTITUTIONAL: +fatigue   EYES: denies visual disturbances, or discharge  ENMT:  denies difficulty hearing, tinnitus, vertigo; No sinus or throat pain  RESPIRATORY: +cough, SOB  CARDIOVASCULAR: +DEJESUS  GASTROINTESTINAL: denies abdominal or epigastric pain. No nausea, vomiting, or hematemesis; No diarrhea or constipation. No melena or hematochezia.  GENITOURINARY: denies dysuria, frequency, hematuria, or incontinence  NEUROLOGICAL: denies headaches, memory loss, loss of strength, numbness, or tremors  SKIN: No itching, burning, rashes, or lesions   LYMPH NODES: No enlarged glands  MUSCULOSKELETAL: No joint pain or swelling; No muscle, back, or extremity pain        PAST MEDICAL & SURGICAL HISTORY:  BPH (benign prostatic hyperplasia)    Alzheimer&#x27;s dementia  poor historian    Anemia due to chronic kidney disease, on chronic dialysis  --    (HFpEF) heart failure with preserved ejection fraction    Atrial fibrillation, unspecified type  loop recorder in place    Stage 5 chronic kidney disease on chronic dialysis  on dialysis --    History of insulin dependent diabetes mellitus    GERD (gastroesophageal reflux disease)    Essential hypertension    Hyperlipidemia, unspecified hyperlipidemia type    Moderate aortic stenosis    Hypothyroid    Ureteral stent retained    S/P right pulmonary artery pressure sensor implant placement    AV fistula    Cataract        Home Medications:  MEDICATIONS  (PRN):  ondansetron Injectable 4 milliGRAM(s) IV Push every 6 hours PRN Nausea  senna 2 Tablet(s) Oral at bedtime PRN Constipation      Standing Medications:  MEDICATIONS  (STANDING):  amLODIPine   Tablet 10 milliGRAM(s) Oral daily  aspirin  chewable 81 milliGRAM(s) Oral daily  atorvastatin 10 milliGRAM(s) Oral at bedtime  cholecalciferol 2000 Unit(s) Oral daily  dextrose 40% Gel 15 Gram(s) Oral once  dextrose 5%. 1000 milliLiter(s) (50 mL/Hr) IV Continuous <Continuous>  dextrose 5%. 1000 milliLiter(s) (100 mL/Hr) IV Continuous <Continuous>  dextrose 50% Injectable 25 Gram(s) IV Push once  dextrose 50% Injectable 12.5 Gram(s) IV Push once  dextrose 50% Injectable 25 Gram(s) IV Push once  doxazosin 4 milliGRAM(s) Oral at bedtime  ferrous    sulfate 325 milliGRAM(s) Oral daily  glucagon  Injectable 1 milliGRAM(s) IntraMuscular once  hydrALAZINE 50 milliGRAM(s) Oral three times a day  insulin lispro (ADMELOG) corrective regimen sliding scale   SubCutaneous three times a day before meals  insulin lispro (ADMELOG) corrective regimen sliding scale   SubCutaneous at bedtime  isosorbide   mononitrate ER Tablet (IMDUR) 30 milliGRAM(s) Oral daily  levothyroxine 50 MICROGram(s) Oral daily  memantine 10 milliGRAM(s) Oral two times a day  metoprolol succinate ER 25 milliGRAM(s) Oral daily  sodium bicarbonate 650 milliGRAM(s) Oral two times a day  torsemide 40 milliGRAM(s) Oral two times a day      PRN Medications:  MEDICATIONS  (PRN):  ondansetron Injectable 4 milliGRAM(s) IV Push every 6 hours PRN Nausea  senna 2 Tablet(s) Oral at bedtime PRN Constipation        Allergies:  No Known Allergies      Social History:  Smoking          -Former smoker in [place]     Alcohol  occasionally    Illicit Drug use  -No illicit drug use   _____     ____    Residence  Private home with spouse    ADLs  Drives [yes]     Ambulation    [cane]     Stairs in home? 4 to enter and 12 to get to the bedroom       Physical Exam  T(F): 98.3 (21 @ 22:13)  HR: 105 (21 @ 23:18)  BP: 181/73 (21 @ 23:18)  RR: 22 (21 @ 23:20)  SpO2: 93% (21 @ 23:20)    General: no acute distress.                                                         Neuro: alert to self and oriented X2 .                    Chest: B/L crackles appreciated                                                                          CV: + systolic murmur                                                                  GI: soft, NT, ND, normoactive bowel sounds                                                                                             Extremities: + treace b/l edema  b/l lower extremities   Lower Extremity Pulses: + DP                        10.4   9.58  )-----------( 154      ( 20 May 2021 13:12 )             33.0         135  |  93<L>  |  30.0<H>  ----------------------------<  156<H>  3.8   |  30.0<H>  |  3.12<H>    Ca    8.8      20 May 2021 13:12  Phos  3.0       Mg     2.0         TPro  7.3  /  Alb  3.8  /  TBili  0.5  /  DBili  x   /  AST  14  /  ALT  7   /  AlkPhos  68      PT/INR - ( 20 May 2021 13:12 )   PT: 14.0 sec;   INR: 1.22 ratio         PTT - ( 20 May 2021 13:12 )  PTT:28.8 sec    CARDIAC MARKERS ( 20 May 2021 13:12 )  x     / 0.03 ng/mL / 40 U/L / x     / x              Ins/Outs:  I&O's Summary      Imaging    ECG-  12 Lead ECG:   Ventricular Rate 73 BPM    Atrial Rate 73 BPM    P-R Interval 152 ms    QRS Duration 72 ms    Q-T Interval 432 ms    QTC Calculation(Bazett) 475 ms    P Axis 49 degrees    R Axis -10 degrees    T Axis 37 degrees    Diagnosis Line Sinus rhythm with Premature atrial complexes with Aberrant conduction  Otherwise normal ECG    Confirmed by NICK EPSTEIN ([id number]) on 2021 2:59:37 PM (21 @ 12:53)      TTE-    < from: TTE Echo Complete w/Doppler (12.10.19 @ 13:21) >  Summary:   1. Technically difficult study.   2. Normal global left ventricular systolic function.   3. Left ventricular ejection fraction, by visual estimation, is 60 to   65%.   4. Spectral Doppler shows pseudonormal pattern of left ventricular   myocardial filling (Grade II diastolic dysfunction).   5. Elevated mean left atrial pressure. Mean LAP estimated at 22 mmHg.   6. Mild mitral annular calcification.   7. Mild thickening and calcification of the anterior and posterior   mitralvalve leaflets.   8. Mild mitral valve regurgitation.   9. Mild aortic regurgitation.  10. Moderate to severe aortic valve stenosis. Recommend SCAR for further   evaluation.  11. Moderate pleural effusion in both left and right lateral regions.  12. There is no evidence of pericardial effusion.    MD Danya Electronically signed on 12/10/2019 at 4:34:42 PM    < end of copied text >    SCAR-      Cath-  Cardiac Cath Lab - Adult:   [facility]  Department of Cardiology  [address] [place] [id number]  [phone]  Cath Lab Report -- Comprehensive Report  Patient: JEMIMA SANTOS  Study date: 2021  Account number: [id number]  MR number: [id number]  : 1940  Gender: Male  Race: O  Case Physician(s):  Casey Michael MD  Referring Physician:  Maninder Nunn MD  PROCEDURE:  --  AV Fistula Angiography.  --  Peripheral PTA.  Local anesthetic given. Left brachial artery access. AV Fistula  Angiography. RADIATION EXPOSURE: 5.5 min. Balloon angioplasty was  performed, using a Hastify OTW 12MM X 40MM X 75CM balloon, with 3  inflations and a maximum inflation pressure of 10 michelle. Balloon angioplasty  was performed, with 1 inflations and a maximum inflation pressure of 6  michelle. Peripheral PTA.  CONTRAST GIVEN: Visipaque 15 ml.  MEDICATIONS GIVEN: Midazolam, 0.5 mg, IV. Fentanyl, 25 mcg, IV. Fentanyl,  50 mcg, IV. 1% Lidocaine, 10 ml, subcutaneously.  COMPLICATIONS: No complications occurred during the cath lab visit.  DIAGNOSTIC IMPRESSIONS: Left arm AV fistula was accessed using a  micropuncture and a 7F sheath was placed. Left arm fistulagram showed a  widely patent cephalic vein fistula. There was a patent stent at the  cephalic subclavian junction.  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  The area of stenosis was crossed and venoplasty was performed using a 12mm  x 40 mm [place] balloon and then a 14mm x 40 mm [place] balloon  Completion venography revealed resolution of the stenosis  The wire and sheath were removed and hemostasis was achieved with a suture  INTERVENTIONAL IMPRESSIONS: Left arm AV fistula was accessed using a  micropuncture and a 7F sheath was placed. Left arm fistulagram showed a  widely patent cephalic vein fistula. There was a patent stent at the  cephalic subclavian junction.  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  The area of stenosis was crossed and venoplasty was performed using a 12mm  x 40 mm [place] balloon and then a 14mm x 40 mm [place] balloon  Completion venography revealed resolution of the stenosis  The wire and sheath were removed and hemostasis was achieved with a suture  Prepared and signed by  Casey Michael MD  Signed 2021 15:16:54  HEMODYNAMIC TABLES  Outputs:  NO PHASE  Outputs:  -- CALCULATIONS[age]  Outputs:  -- CALCULATIONS: Body Surface Area: 1.79  Outputs:  -- CALCULATIONS: Height in cm: 163.00  Outputs:  -- CALCULATIONS: Sex: Male  Outputs:  -- CALCULATIONS: Weight in k.00  Outputs:  -- OUTPUTS: O2 consumption: 223.37  Outputs:  -- OUTPUTS: Vo2 Indexed: 125.00 (21 @ 14:15)      CXR-      Carotid U/S-

## 2021-05-21 NOTE — CHART NOTE - NSCHARTNOTEFT_GEN_A_CORE
PA NOTE-MEDICINE  Follow Up    Pt previously assessed due Decrease in O2 Sat  CXR- Reaccumulation of Left Pleural Effusion-small-moderate  Administered stat dose of torsemide 20 mg po x 1 (After CXR DONE)    Pt states he breathing has improved     T(C): 36.8 (20 May 2021 22:13), Max: 37.1 (20 May 2021 12:26)  T(F): 98.3 (20 May 2021 22:13), Max: 98.8 (20 May 2021 12:26)  HR: 88 (21 May 2021 00:50) (66 - 105)  BP: 156/58 (21 May 2021 00:50) (132/69 - 181/73)  RR: 18 (21 May 2021 00:50) (18 - 22)  SpO2: 96% (21 May 2021 00:50) (82% - 100%) on 5 L NC    General: WDWN Male NAD resting comfortably no WOB  Cardiac: S1S2 + RRR  Lungs; Decrease in Crackles B/L Fair air exchange B/L A-B  Integument: No palor warm/dry    A/P   Reeval pt s/p Torsemide 20 mg stat due decrease in O2 sat/increase in B/L crackles  Pt feels Better with improvement in O2 sat and decrease in B/L crackles   Pt scheduled for R Thoracentesis this AM in addition to HD for fluid removal  Will sign out to day team  Continue to monitor  Call PA if any changes oin Pt status

## 2021-05-21 NOTE — CHART NOTE - NSCHARTNOTEFT_GEN_A_CORE
PA NOTE-MEDICINE    Asked to see Pt due to O2 Sat dropping from 93% on @ 2 L to 82%   NC increased to 5L  Now O2 Sat 92%    Pt is 81 y/o male with PMHx significant for Chronic Diastolic HFpEF (Echo 12/2019: LVEF 60-65%), grade II diastolic dysfunction, cardiomems placed 1/2020, non-obstructive CAD, bradycardia, ILR in place (placed 1/2016), AS, HTN, HLD, Diabetes Mellitus on Insulin, ESRD on HD on M/W/F, Afib, Hypothyroidism, alzheimer's dementia presented to ER after c/o SOB requiring supplemental O2 while at Hemodialysis. Patient has been experiencing SOB since Monday, had outpatient chest X-ray done revealing moderate b/l pleural effusions, was advised to increase dose of Torsemide by Cardiologist and recommended increased fluid off loading during HD. At Hemodialysis patient was noted with worsening SOB, wife called Cardiologist's office and was advised to bring patient to ER.   Pt had Left thoracentesis 5/20 with removal of 1400 cc clear fluid (too much fluid removed today for both sides to be done simultaneously)  Plan to Perform R Thoracentesis today ( along with Hemodialysis )      Pt Currently receiving Torsemide 40 mg 2 x Daily (but not given 5/20  6 PM dose due to " taking a home dose of Torsemide" ?     Pt Denies: CP  States a little more SOB but now feeling better with increase in O2 NC @ 5 L     T(C): 36.8 (20 May 2021 22:13), Max: 37.1 (20 May 2021 12:26)  T(F): 98.3 (20 May 2021 22:13), Max: 98.8 (20 May 2021 12:26)  HR: 105 (20 May 2021 23:18) (66 - 105)  BP: 181/73 (20 May 2021 23:18) (132/69 - 181/73)  RR: 22 (20 May 2021 23:20) (18 - 22)  SpO2: 93% (20 May 2021 23:20) (82% - 100%)    General: WDWN Male NAD Sl wob able to speak     Cardiac: S1S2 +   Lungs: + Crackles mid to base B/L  Fair air exchange  B/L   Integument: No palor warm/dry  Ext: No C/C/E x 4 ext     A/P Eval pt with decrease in O2 sat on 2 L Nc admitted 2/2 exacerbation CHF with B/L Pleural effusions ( L Thoracentesis 5/20) Scheduled for R thoracentesis along with Hemodialysis today )    CXR Stat  Torsemide 20 mg po x 1 now  Continue to monitor pt   Will Follow CXR and effect of Torsemide PA NOTE-MEDICINE    Asked to see Pt due to O2 Sat dropping from 93% to 82% on  2 L NC     O2 increased to 5L  Now O2 Sat 92%    Pt is 79 y/o male with PMHx significant for Chronic Diastolic HFpEF (Echo 12/2019: LVEF 60-65%), grade II diastolic dysfunction, cardiomems placed 1/2020, non-obstructive CAD, bradycardia, ILR in place (placed 1/2016), AS, HTN, HLD, Diabetes Mellitus on Insulin, ESRD on HD on M/W/F, Afib, Hypothyroidism, alzheimer's dementia presented to ER after c/o SOB requiring supplemental O2 while at Hemodialysis. Patient has been experiencing SOB since Monday, had outpatient chest X-ray done revealing moderate b/l pleural effusions, was advised to increase dose of Torsemide by Cardiologist and recommended increased fluid off loading during HD. At Hemodialysis patient was noted with worsening SOB, wife called Cardiologist's office and was advised to bring patient to ER.   Pt had Left thoracentesis 5/20 with removal of 1400 cc clear fluid (too much fluid removed today for both sides to be done simultaneously)  Plan to Perform R Thoracentesis today ( along with Hemodialysis )      Pt Currently receiving Torsemide 40 mg 2 x Daily (but not given 5/20  6 PM dose due to " taking a home dose of Torsemide" ?     Pt Denies: CP  States a little more SOB but now feeling better with increase in O2 NC @ 5 L     T(C): 36.8 (20 May 2021 22:13), Max: 37.1 (20 May 2021 12:26)  T(F): 98.3 (20 May 2021 22:13), Max: 98.8 (20 May 2021 12:26)  HR: 105 (20 May 2021 23:18) (66 - 105)  BP: 181/73 (20 May 2021 23:18) (132/69 - 181/73)  RR: 22 (20 May 2021 23:20) (18 - 22)  SpO2: 93% (20 May 2021 23:20) (82% - 100%)    General: WDWN Male NAD Sl wob able to speak     Cardiac: S1S2 +   Lungs: + Crackles mid to base B/L  Fair air exchange  B/L   Integument: No palor warm/dry  Ext: No C/C/E x 4 ext     A/P Eval pt with decrease in O2 sat on 2 L Nc admitted 2/2 exacerbation CHF with B/L Pleural effusions ( L Thoracentesis 5/20) Scheduled for R thoracentesis along with Hemodialysis today )    CXR Stat  Torsemide 20 mg po x 1 now  Continue to monitor pt   Will Follow CXR and effect of Torsemide

## 2021-05-21 NOTE — CONSULT NOTE ADULT - ATTENDING COMMENTS
Above H& P reviewed and independently verified. 79 y/o male with PMHx significant for Chronic Diastolic HFpEF (Echo 12/2019: LVEF 60-65%), grade II diastolic dysfunction, cardiomems placed 1/2020, non-obstructive CAD, bradycardia, ILR in place (placed 1/2016), AS, HTN, HLD, Diabetes Mellitus on Insulin, ESRD on HD on M/W/F, Afib, Hypothyroidism, alzheimer's dementia presented 5/14 to ER after c/o SOB requiring supplemental O2 while at Hemodialysis. He has large bilateral pleural effusions which are being drained. He has severe aortic stenosis on echo and needs further work up for possible TAVR. This was discussed with the patient.

## 2021-05-21 NOTE — PROGRESS NOTE ADULT - ASSESSMENT
Pt is a 81 y/o male with medical history of pAF no AC d/t hx of anemia, non-obstructive CAD, chronic bradycardia, ILR placed 1/2016, Chronic Diastolic HFpEF (12/2019 EF 60-65%), Gr. II DD, Cardiomems (placed 1/2020) PA diastolic goal of 16, mod to severe AS, CKD 4, HTN, HLD, b/l carotid bruit, hyperkalemia, IDDMII, dementia who presents to [facility]-ED for treatment of pleural effusion. Pt poor historian, wife at bedside. Pt wife verifies that patient has been having SOB recently. Pt cardiomems reading PAD 17. Pt was advised to have X-ray done as outpatient which revealed moderate bilateral pleural effusions and/or basilar airspace disease. Pt was told to increase his Torsemide dose and Dr. Faustin recommended to nephrology team to increase fluid off loading during dialysis. t wife then contacted outpatient cardiology office for procedure.     Acute on Chronic HF  -Xray-Moderate bilateral pleural effusion  s/p left  thoraocentesis  Today respidratory distress.  Unclear cause post procedure.    Pulmonary edema and respiratory failure. Unclear cause: Aspiration, or just flash pulmonary edema.      torsemide BID,  BP control. Ct home dose of BP meds  Will get BIPAP .  ECG , hemodialysis at bedside.  Blodo work.   Will get chest tube on the other side.   Evalute for aspiration penumonia if white count increases.   or reexpansion opulmonary edema.         pAF   not on anticoagulation .   -Watchman outpatient evaluation planned      CKD Stage 4  -BUN/Creatnin-30/3.12   stable.   HTN     -Cont.  home medication regimen    HLD  -Cont. Pravastatin    Deep venous thrombosis prophylaxis: heparin or lovenox SQ.     Critical care cardiology.  I have personally provided critical care time > 45 mins excluding time spent on separate procedures.   No ICU for now.  contrinue telemetry monitirng with BIPAP. reevaluate after hemodialysis adn thoracocentesis to triage accordingly.

## 2021-05-21 NOTE — PROGRESS NOTE ADULT - SUBJECTIVE AND OBJECTIVE BOX
HPI: 81 y/o male with PMHx significant for Chronic Diastolic HFpEF (Echo 12/2019: LVEF 60-65%), grade II diastolic dysfunction, cardiomems placed 1/2020, non-obstructive CAD, bradycardia, ILR in place (placed 1/2016), AS, HTN, HLD, Diabetes Mellitus on Insulin, ESRD on HD on M/W/F, Afib, Hypothyroidism, alzheimer's dementia presented to ER after c/o SOB requiring supplemental O2 while at Hemodialysis. Patient has been experiencing SOB since Monday, had outpatient chest X-ray done revealing moderate b/l pleural effusions, was advised to increase dose of Torsemide by Cardiologist and recommended increased fluid off loading during HD. At Hemodialysis patient was noted with worsening SOB, wife called Cardiologist's office and was advised to bring patient to ER. Patient was seen in ER patient was noted with SpO2 of 90%, EKG with NSR with HR: 73/min. Patient is scheduled for thoracentesis.  (20 May 2021 15:10)    Subjective: Patient seen and examined at bedside,       MEDICATIONS  (STANDING):  amLODIPine   Tablet 10 milliGRAM(s) Oral daily  aspirin  chewable 81 milliGRAM(s) Oral daily  atorvastatin 10 milliGRAM(s) Oral at bedtime  cholecalciferol 2000 Unit(s) Oral daily  dextrose 40% Gel 15 Gram(s) Oral once  dextrose 5%. 1000 milliLiter(s) (50 mL/Hr) IV Continuous <Continuous>  dextrose 5%. 1000 milliLiter(s) (100 mL/Hr) IV Continuous <Continuous>  dextrose 50% Injectable 25 Gram(s) IV Push once  dextrose 50% Injectable 12.5 Gram(s) IV Push once  dextrose 50% Injectable 25 Gram(s) IV Push once  doxazosin 4 milliGRAM(s) Oral at bedtime  ferrous    sulfate 325 milliGRAM(s) Oral daily  glucagon  Injectable 1 milliGRAM(s) IntraMuscular once  hydrALAZINE 50 milliGRAM(s) Oral three times a day  insulin lispro (ADMELOG) corrective regimen sliding scale   SubCutaneous three times a day before meals  insulin lispro (ADMELOG) corrective regimen sliding scale   SubCutaneous at bedtime  isosorbide   mononitrate ER Tablet (IMDUR) 30 milliGRAM(s) Oral daily  levothyroxine 50 MICROGram(s) Oral daily  memantine 10 milliGRAM(s) Oral two times a day  metoprolol succinate ER 25 milliGRAM(s) Oral daily  sodium bicarbonate 650 milliGRAM(s) Oral two times a day  torsemide 40 milliGRAM(s) Oral two times a day    MEDICATIONS  (PRN):  ondansetron Injectable 4 milliGRAM(s) IV Push every 6 hours PRN Nausea  senna 2 Tablet(s) Oral at bedtime PRN Constipation      Vital Signs Last 24 Hrs  T(C): 36.8 (21 May 2021 12:52), Max: 37.2 (21 May 2021 04:27)  T(F): 98.2 (21 May 2021 12:52), Max: 98.9 (21 May 2021 04:27)  HR: 83 (21 May 2021 13:30) (66 - 105)  BP: 134/64 (21 May 2021 13:30) (123/53 - 181/73)  BP(mean): --  RR: 18 (21 May 2021 13:30) (18 - 22)  SpO2: 96% (21 May 2021 13:30) (82% - 100%)    GEN: NAD, comfortable, resting in bed  HEENT: NC/AT, EOMI, PERRLA, MMM, clear conjunctiva and sclera, normal hearing, no nasal discharge, throat clear, no thrush, normal dentition.   NECK: supple, no JVD, no LAD, no thyromegaly  BACK:  ROM intact, no spinal/paraspinal tenderness  CV: S1S2, RRR, no mumur  RESP: good air movement, CTABL, no rales, rhonchi or wheezing, respirations unlabored  ABD: +BS, soft, ND, NT, no guarding, no rigidity, no HSM  EXT: +2 radial and pedal pulses, no edema, no calve tenderness  SKIN: No visible Rashes or Ulcers  MSK: ROM intact in all extremities  NEURO:  sensory and CN 2-12 Grossly intact, no motor deficits, no, saddle anesthesia, AOx3  PSYCH: normal behavior         LABS:                          10.4   11.32 )-----------( 155      ( 21 May 2021 07:00 )             32.4     21 May 2021 07:00    135    |  93     |  42.0   ----------------------------<  222    4.2     |  26.0   |  3.45     Ca    8.5        21 May 2021 07:00  Phos  4.8       21 May 2021 07:00  Mg     2.0       21 May 2021 07:00    TPro  6.7    /  Alb  3.4    /  TBili  0.5    /  DBili  x      /  AST  10     /  ALT  5      /  AlkPhos  64     21 May 2021 07:00    LIVER FUNCTIONS - ( 21 May 2021 07:00 )  Alb: 3.4 g/dL / Pro: 6.7 g/dL / ALK PHOS: 64 U/L / ALT: 5 U/L / AST: 10 U/L / GGT: x           PT/INR - ( 20 May 2021 13:12 )   PT: 14.0 sec;   INR: 1.22 ratio         PTT - ( 20 May 2021 13:12 )  PTT:28.8 sec  CAPILLARY BLOOD GLUCOSE      POCT Blood Glucose.: 202 mg/dL (21 May 2021 11:03)  POCT Blood Glucose.: 224 mg/dL (21 May 2021 07:45)  POCT Blood Glucose.: 177 mg/dL (20 May 2021 22:45)    CARDIAC MARKERS ( 20 May 2021 13:12 )  x     / 0.03 ng/mL / 40 U/L / x     / x              RADIOLOGY:         HPI: 79 y/o male with PMHx significant for Chronic Diastolic HFpEF (Echo 12/2019: LVEF 60-65%), grade II diastolic dysfunction, cardiomems placed 1/2020, non-obstructive CAD, bradycardia, ILR in place (placed 1/2016), AS, HTN, HLD, Diabetes Mellitus on Insulin, ESRD on HD on M/W/F, Afib, Hypothyroidism, alzheimer's dementia presented to ER after c/o SOB requiring supplemental O2 while at Hemodialysis. Patient has been experiencing SOB since Monday, had outpatient chest X-ray done revealing moderate b/l pleural effusions, was advised to increase dose of Torsemide by Cardiologist and recommended increased fluid off loading during HD. At Hemodialysis patient was noted with worsening SOB, wife called Cardiologist's office and was advised to bring patient to ER. Patient was seen in ER patient was noted with SpO2 of 90%, EKG with NSR with HR: 73/min. Patient is scheduled for thoracentesis.  (20 May 2021 15:10)    Subjective: Patient seen and examined at bedside this morning, with O2 via NC @ 3L/min with SpO2 90%, tachypneic, awake and alert, c/o worsening SOB, pt was also seen overnight for hypoxia and had a repeat chest X-ray done revealing reaccumulation of pleural fluid. Patient was given torsemide 20mg with improvement. IV furosemide 80mg administered and O2 increased to 4L/min. Patient continued with worsening SOB, labored breathing, and mildly diaphoretic, patient was then started on Bipap, has bedside R pigtail placed with ~1500mls removed. Patient improved and stated that he felt much better, bipap was removed and patient placed on 02 via NC. Scheduled for hemodialysis today.     REVIEW OF SYSTEMS:    CONSTITUTIONAL: No weakness, fevers or chills  EYES/ENT: No visual changes;  No vertigo or throat pain   NECK: No pain or stiffness  RESPIRATORY: No cough, wheezing, hemoptysis; +shortness of breath  CARDIOVASCULAR: No chest pain or palpitations  GASTROINTESTINAL: No abdominal or epigastric pain. No nausea, vomiting, or hematemesis; No diarrhea or constipation. No melena or hematochezia.  GENITOURINARY: No dysuria, frequency or hematuria  NEUROLOGICAL: No numbness or weakness  SKIN: No itching, rashes    Vital Signs Last 24 Hrs  T(C): 36.8 (21 May 2021 12:52), Max: 37.2 (21 May 2021 04:27)  T(F): 98.2 (21 May 2021 12:52), Max: 98.9 (21 May 2021 04:27)  HR: 83 (21 May 2021 13:30) (66 - 105)  BP: 134/64 (21 May 2021 13:30) (123/53 - 181/73)  RR: 18 (21 May 2021 13:30) (18 - 22)  SpO2: 96% (21 May 2021 13:30) (82% - 100%)    PHYSICAL EXAM:  GENERAL: NAD, lying in bed comfortably  HEAD:  Atraumatic, Normocephalic  EYES: conjunctiva and sclera clear  ENT: Moist mucous membranes  NECK: Supple, No JVD  CHEST/LUNG: decreased breath sounds bilaterally, No rales, rhonchi, wheezing, or rubs. Unlabored respirations  HEART: Regular rate and rhythm; No murmurs  ABDOMEN: Bowel sounds present; Soft, Nontender, Nondistended.   EXTREMITIES:  2+ Peripheral Pulses, brisk capillary refill. No clubbing, cyanosis, 2+ bl/le  edema, LUE dialysis fistula  NERVOUS SYSTEM:  Alert & Oriented X3, speech clear. No deficits   MSK: FROM all 4 extremities, full and equal strength    MEDICATIONS  (STANDING):  amLODIPine   Tablet 10 milliGRAM(s) Oral daily  aspirin  chewable 81 milliGRAM(s) Oral daily  atorvastatin 10 milliGRAM(s) Oral at bedtime  cholecalciferol 2000 Unit(s) Oral daily  dextrose 40% Gel 15 Gram(s) Oral once  dextrose 5%. 1000 milliLiter(s) (50 mL/Hr) IV Continuous <Continuous>  dextrose 5%. 1000 milliLiter(s) (100 mL/Hr) IV Continuous <Continuous>  dextrose 50% Injectable 25 Gram(s) IV Push once  dextrose 50% Injectable 12.5 Gram(s) IV Push once  dextrose 50% Injectable 25 Gram(s) IV Push once  doxazosin 4 milliGRAM(s) Oral at bedtime  ferrous    sulfate 325 milliGRAM(s) Oral daily  glucagon  Injectable 1 milliGRAM(s) IntraMuscular once  hydrALAZINE 50 milliGRAM(s) Oral three times a day  insulin lispro (ADMELOG) corrective regimen sliding scale   SubCutaneous three times a day before meals  insulin lispro (ADMELOG) corrective regimen sliding scale   SubCutaneous at bedtime  isosorbide   mononitrate ER Tablet (IMDUR) 30 milliGRAM(s) Oral daily  levothyroxine 50 MICROGram(s) Oral daily  memantine 10 milliGRAM(s) Oral two times a day  metoprolol succinate ER 25 milliGRAM(s) Oral daily  sodium bicarbonate 650 milliGRAM(s) Oral two times a day  torsemide 40 milliGRAM(s) Oral two times a day    MEDICATIONS  (PRN):  ondansetron Injectable 4 milliGRAM(s) IV Push every 6 hours PRN Nausea  senna 2 Tablet(s) Oral at bedtime PRN Constipation      LABS:                          10.4   11.32 )-----------( 155      ( 21 May 2021 07:00 )             32.4     21 May 2021 07:00    135    |  93     |  42.0   ----------------------------<  222    4.2     |  26.0   |  3.45     Ca    8.5        21 May 2021 07:00  Phos  4.8       21 May 2021 07:00  Mg     2.0       21 May 2021 07:00    TPro  6.7    /  Alb  3.4    /  TBili  0.5    /  DBili  x      /  AST  10     /  ALT  5      /  AlkPhos  64     21 May 2021 07:00    LIVER FUNCTIONS - ( 21 May 2021 07:00 )  Alb: 3.4 g/dL / Pro: 6.7 g/dL / ALK PHOS: 64 U/L / ALT: 5 U/L / AST: 10 U/L / GGT: x           PT/INR - ( 20 May 2021 13:12 )   PT: 14.0 sec;   INR: 1.22 ratio         PTT - ( 20 May 2021 13:12 )  PTT:28.8 sec  CAPILLARY BLOOD GLUCOSE      POCT Blood Glucose.: 202 mg/dL (21 May 2021 11:03)  POCT Blood Glucose.: 224 mg/dL (21 May 2021 07:45)  POCT Blood Glucose.: 177 mg/dL (20 May 2021 22:45)    CARDIAC MARKERS ( 20 May 2021 13:12 )  x     / 0.03 ng/mL / 40 U/L / x     / x              RADIOLOGY:    < from: Xray Chest 1 View- PORTABLE-Urgent (Xray Chest 1 View- PORTABLE-Urgent .) (05.21.21 @ 13:01) >    INTERPRETATION:  Clinical history: 80-year-old male, status post right chest tube placement.    Two expiratory/kyphotic views are compared to 20 minutes prior and demonstrate marked improvement/resolution of the right pleural effusion post chest tube placement.    Cardiac silhouette and pulmonary vasculature are within normal limits.    Left pleural effusion/atelectasis/possible consolidation, grossly unchanged.    Cardiac loop recorder is again evident.    IMPRESSION:  Marked improvement/resolution of right pleural effusion post chest tube placement, no pneumothorax.    Effusion/atelectasis/possible consolidation at the left base, grossly unchanged.        < from: Xray Chest 1 View-PORTABLE IMMEDIATE (Xray Chest 1 View-PORTABLE IMMEDIATE .) (05.21.21 @ 11:24) >    INTERPRETATION:  Clinical history: 80-year-old male, increasing shortness of breath.    Two expiratory/portable views are compared to 5/20/2021 and demonstrate increasing bilateral haze consistent with worsening effusion/atelectasis/possible consolidation, right greater than left.    No pneumothorax or acute osseous finding. Normal pulmonary vasculature. Left axillary stent, unchanged    IMPRESSION:  Bilateral pleural effusions/atelectasis/possible consolidation, right greater than left, increasing bilaterally           HPI: 79 y/o male with PMHx significant for Chronic Diastolic HFpEF (Echo 12/2019: LVEF 60-65%), grade II diastolic dysfunction, cardiomems placed 1/2020, non-obstructive CAD, bradycardia, ILR in place (placed 1/2016), AS, HTN, HLD, Diabetes Mellitus on Insulin, ESRD on HD on M/W/F, Afib, Hypothyroidism, alzheimer's dementia presented to ER after c/o SOB requiring supplemental O2 while at Hemodialysis. Patient has been experiencing SOB since Monday, had outpatient chest X-ray done revealing moderate b/l pleural effusions, was advised to increase dose of Torsemide by Cardiologist and recommended increased fluid off loading during HD. At Hemodialysis patient was noted with worsening SOB, wife called Cardiologist's office and was advised to bring patient to ER. Patient was seen in ER patient was noted with SpO2 of 90%, EKG with NSR with HR: 73/min. Patient is scheduled for thoracentesis.  (20 May 2021 15:10)    Subjective: Patient seen and examined at bedside this morning, with O2 via NC @ 3L/min with SpO2 90%, tachypneic, awake and alert, c/o worsening SOB, pt was also seen overnight for hypoxia and had a repeat chest X-ray done revealing possible reaccumulation of pleural fluid. Patient was given torsemide 20mg with improvement.     This morning was found to be labored breathing - received IV furosemide 80mg administered and O2 increased to 4L/min. Patient continued with worsening SOB, labored breathing, and mildly diaphoretic, patient was then started on BIPAP and had bedside R pigtail placed with ~1500mls removed. Patient dramatically improved and stated that he felt much better, BIPAP was removed (on for 2 hours at most) and patient placed on 02 via NC. Scheduled for hemodialysis today.    REVIEW OF SYSTEMS:    CONSTITUTIONAL: No weakness, fevers or chills  EYES/ENT: No visual changes;  No vertigo or throat pain   NECK: No pain or stiffness  RESPIRATORY: No cough, wheezing, hemoptysis; +shortness of breath  CARDIOVASCULAR: No chest pain or palpitations  GASTROINTESTINAL: No abdominal or epigastric pain. No nausea, vomiting, or hematemesis; No diarrhea or constipation. No melena or hematochezia.  GENITOURINARY: No dysuria, frequency or hematuria  NEUROLOGICAL: No numbness or weakness  SKIN: No itching, rashes    Vital Signs Last 24 Hrs  T(C): 36.8 (21 May 2021 12:52), Max: 37.2 (21 May 2021 04:27)  T(F): 98.2 (21 May 2021 12:52), Max: 98.9 (21 May 2021 04:27)  HR: 83 (21 May 2021 13:30) (66 - 105)  BP: 134/64 (21 May 2021 13:30) (123/53 - 181/73)  RR: 18 (21 May 2021 13:30) (18 - 22)  SpO2: 96% (21 May 2021 13:30) (82% - 100%)    PHYSICAL EXAM:  GENERAL: NAD, lying in bed comfortably  HEAD:  Atraumatic, Normocephalic  EYES: conjunctiva and sclera clear  ENT: Moist mucous membranes  NECK: Supple, No JVD  CHEST/LUNG: decreased breath sounds bilaterally, No rales, rhonchi, wheezing, or rubs. Unlabored respirations  HEART: Regular rate and rhythm; No murmurs  ABDOMEN: Bowel sounds present; Soft, Nontender, Nondistended.   EXTREMITIES:  2+ Peripheral Pulses, brisk capillary refill. No clubbing, cyanosis, 2+ bl/le  edema, LUE dialysis fistula  NERVOUS SYSTEM:  Alert & Oriented X3, speech clear. No deficits   MSK: FROM all 4 extremities, full and equal strength    MEDICATIONS  (STANDING):  amLODIPine   Tablet 10 milliGRAM(s) Oral daily  aspirin  chewable 81 milliGRAM(s) Oral daily  atorvastatin 10 milliGRAM(s) Oral at bedtime  cholecalciferol 2000 Unit(s) Oral daily  dextrose 40% Gel 15 Gram(s) Oral once  dextrose 5%. 1000 milliLiter(s) (50 mL/Hr) IV Continuous <Continuous>  dextrose 5%. 1000 milliLiter(s) (100 mL/Hr) IV Continuous <Continuous>  dextrose 50% Injectable 25 Gram(s) IV Push once  dextrose 50% Injectable 12.5 Gram(s) IV Push once  dextrose 50% Injectable 25 Gram(s) IV Push once  doxazosin 4 milliGRAM(s) Oral at bedtime  ferrous    sulfate 325 milliGRAM(s) Oral daily  glucagon  Injectable 1 milliGRAM(s) IntraMuscular once  hydrALAZINE 50 milliGRAM(s) Oral three times a day  insulin lispro (ADMELOG) corrective regimen sliding scale   SubCutaneous three times a day before meals  insulin lispro (ADMELOG) corrective regimen sliding scale   SubCutaneous at bedtime  isosorbide   mononitrate ER Tablet (IMDUR) 30 milliGRAM(s) Oral daily  levothyroxine 50 MICROGram(s) Oral daily  memantine 10 milliGRAM(s) Oral two times a day  metoprolol succinate ER 25 milliGRAM(s) Oral daily  sodium bicarbonate 650 milliGRAM(s) Oral two times a day  torsemide 40 milliGRAM(s) Oral two times a day    MEDICATIONS  (PRN):  ondansetron Injectable 4 milliGRAM(s) IV Push every 6 hours PRN Nausea  senna 2 Tablet(s) Oral at bedtime PRN Constipation      LABS:                          10.4   11.32 )-----------( 155      ( 21 May 2021 07:00 )             32.4     21 May 2021 07:00    135    |  93     |  42.0   ----------------------------<  222    4.2     |  26.0   |  3.45     Ca    8.5        21 May 2021 07:00  Phos  4.8       21 May 2021 07:00  Mg     2.0       21 May 2021 07:00    TPro  6.7    /  Alb  3.4    /  TBili  0.5    /  DBili  x      /  AST  10     /  ALT  5      /  AlkPhos  64     21 May 2021 07:00    LIVER FUNCTIONS - ( 21 May 2021 07:00 )  Alb: 3.4 g/dL / Pro: 6.7 g/dL / ALK PHOS: 64 U/L / ALT: 5 U/L / AST: 10 U/L / GGT: x           PT/INR - ( 20 May 2021 13:12 )   PT: 14.0 sec;   INR: 1.22 ratio         PTT - ( 20 May 2021 13:12 )  PTT:28.8 sec  CAPILLARY BLOOD GLUCOSE      POCT Blood Glucose.: 202 mg/dL (21 May 2021 11:03)  POCT Blood Glucose.: 224 mg/dL (21 May 2021 07:45)  POCT Blood Glucose.: 177 mg/dL (20 May 2021 22:45)    CARDIAC MARKERS ( 20 May 2021 13:12 )  x     / 0.03 ng/mL / 40 U/L / x     / x              RADIOLOGY:    < from: Xray Chest 1 View- PORTABLE-Urgent (Xray Chest 1 View- PORTABLE-Urgent .) (05.21.21 @ 13:01) >    INTERPRETATION:  Clinical history: 80-year-old male, status post right chest tube placement.    Two expiratory/kyphotic views are compared to 20 minutes prior and demonstrate marked improvement/resolution of the right pleural effusion post chest tube placement.    Cardiac silhouette and pulmonary vasculature are within normal limits.    Left pleural effusion/atelectasis/possible consolidation, grossly unchanged.    Cardiac loop recorder is again evident.    IMPRESSION:  Marked improvement/resolution of right pleural effusion post chest tube placement, no pneumothorax.    Effusion/atelectasis/possible consolidation at the left base, grossly unchanged.        < from: Xray Chest 1 View-PORTABLE IMMEDIATE (Xray Chest 1 View-PORTABLE IMMEDIATE .) (05.21.21 @ 11:24) >    INTERPRETATION:  Clinical history: 80-year-old male, increasing shortness of breath.    Two expiratory/portable views are compared to 5/20/2021 and demonstrate increasing bilateral haze consistent with worsening effusion/atelectasis/possible consolidation, right greater than left.    No pneumothorax or acute osseous finding. Normal pulmonary vasculature. Left axillary stent, unchanged    IMPRESSION:  Bilateral pleural effusions/atelectasis/possible consolidation, right greater than left, increasing bilaterally

## 2021-05-21 NOTE — CONSULT NOTE ADULT - PROBLEM SELECTOR RECOMMENDATION 9
Large right pleural effusion on xray.  Called for pigtail.  Plan for pigtail.  Diuresis per primary team.  Plan for HD today for fluid removal.

## 2021-05-21 NOTE — CONSULT NOTE ADULT - SUBJECTIVE AND OBJECTIVE BOX
Surgeon: Galindo    Consult requesting by: Roxie    HISTORY OF PRESENT ILLNESS:  80y Male 79 y/o male with PMHx significant for Chronic Diastolic HFpEF (Echo 12/2019: LVEF 60-65%), grade II diastolic dysfunction, cardiomems placed 1/2020, non-obstructive CAD, bradycardia, ILR in place (placed 1/2016), AS, HTN, HLD, Diabetes Mellitus on Insulin, ESRD on HD on M/W/F, Afib, Hypothyroidism, alzheimer's dementia presented 5/14 to ER after c/o SOB requiring supplemental O2 while at Hemodialysis. Patient has been experiencing SOB since Monday, had outpatient chest X-ray done revealing moderate b/l pleural effusions, was advised to increase dose of Torsemide by Cardiologist Dr. Faustin recommended to nephrology team to increase fluid off loading during dialysis. During dialysis, pt required supplemental o2. Pt wife then contacted outpatient cardiology office saying dyspnea was worsening so patient was advised to come to ED for treatment of pleural effusion and recommended increased fluid off loading during HD.    Of note  Pt had left thoracentesis 5/20 with 1400cc fluid removed,  plan was for patient to get IR thoracentesis 5/21 on right, but became acutely SOB.  Placed on BIPAP and received IV lasix.  Thoracic surgery called for Right pigtail.    Pt currently sitting up in bed.  On bipap.  Increased WOB.      PAST MEDICAL & SURGICAL HISTORY:  BPH (benign prostatic hyperplasia)    Alzheimer&#x27;s dementia  poor historian    Anemia due to chronic kidney disease, on chronic dialysis  m-w-f    (HFpEF) heart failure with preserved ejection fraction    Atrial fibrillation, unspecified type  loop recorder in place    Stage 5 chronic kidney disease on chronic dialysis  on dialysis m-w-f    History of insulin dependent diabetes mellitus    GERD (gastroesophageal reflux disease)    Essential hypertension    Hyperlipidemia, unspecified hyperlipidemia type    Moderate aortic stenosis    Hypothyroid    Ureteral stent retained    S/P right pulmonary artery pressure sensor implant placement    AV fistula    Cataract        MEDICATIONS  (STANDING):  amLODIPine   Tablet 10 milliGRAM(s) Oral daily  aspirin  chewable 81 milliGRAM(s) Oral daily  atorvastatin 10 milliGRAM(s) Oral at bedtime  cholecalciferol 2000 Unit(s) Oral daily  dextrose 40% Gel 15 Gram(s) Oral once  dextrose 5%. 1000 milliLiter(s) (50 mL/Hr) IV Continuous <Continuous>  dextrose 5%. 1000 milliLiter(s) (100 mL/Hr) IV Continuous <Continuous>  dextrose 50% Injectable 25 Gram(s) IV Push once  dextrose 50% Injectable 12.5 Gram(s) IV Push once  dextrose 50% Injectable 25 Gram(s) IV Push once  doxazosin 4 milliGRAM(s) Oral at bedtime  ferrous    sulfate 325 milliGRAM(s) Oral daily  glucagon  Injectable 1 milliGRAM(s) IntraMuscular once  hydrALAZINE 50 milliGRAM(s) Oral three times a day  insulin lispro (ADMELOG) corrective regimen sliding scale   SubCutaneous three times a day before meals  insulin lispro (ADMELOG) corrective regimen sliding scale   SubCutaneous at bedtime  isosorbide   mononitrate ER Tablet (IMDUR) 30 milliGRAM(s) Oral daily  levothyroxine 50 MICROGram(s) Oral daily  memantine 10 milliGRAM(s) Oral two times a day  metoprolol succinate ER 25 milliGRAM(s) Oral daily  sodium bicarbonate 650 milliGRAM(s) Oral two times a day  torsemide 40 milliGRAM(s) Oral two times a day    MEDICATIONS  (PRN):  ondansetron Injectable 4 milliGRAM(s) IV Push every 6 hours PRN Nausea  senna 2 Tablet(s) Oral at bedtime PRN Constipation    Antiplatelet therapy:                           Last dose/amt:    Allergies    No Known Allergies    Intolerances        SOCIAL HISTORY:  Social History:  Smoking          -Former smoker in [employer]     Alcohol  occasionally    Illicit Drug use  -No illicit drug use   _____     ____    Residence  Private home with spouse    ADLs  Drives [yes]     Ambulation    [cane]     Stairs in home? 4 to enter and 12 to get to the bedroom   FAMILY HISTORY:  FH: type 2 diabetes  mother    Family history of coronary artery disease    Family history of cerebrovascular accident (CVA)    Family history of Alzheimer&#x27;s disease        Review of Systems  CONSTITUTIONAL: +fatigue   EYES: denies visual disturbances, or discharge  ENMT:  denies difficulty hearing, tinnitus, vertigo; No sinus or throat pain  RESPIRATORY: +cough, SOB  CARDIOVASCULAR: +DEJESUS  GASTROINTESTINAL: denies abdominal or epigastric pain. No nausea, vomiting, or hematemesis; No diarrhea or constipation. No melena or hematochezia.  GENITOURINARY: denies dysuria, frequency, hematuria, or incontinence  NEUROLOGICAL: denies headaches, memory loss, loss of strength, numbness, or tremors  SKIN: No itching, burning, rashes, or lesions   LYMPH NODES: No enlarged glands  MUSCULOSKELETAL: No joint pain or swelling; No muscle, back, or extremity pain      Neuro: alert to self and oriented X3 .                    Chest: B/L crackles appreciated                                                                          CV: + systolic murmur                                                                  GI: soft, NT, ND, normoactive bowel sounds                                                                                             Extremities: + treace b/l edema  b/l lower extremities   Lower Extremity Pulses: + DP      Vital Signs Last 24 Hrs  T(C): 36.3 (21 May 2021 08:02), Max: 37.2 (21 May 2021 04:27)  T(F): 97.4 (21 May 2021 08:02), Max: 98.9 (21 May 2021 04:27)  HR: 78 (21 May 2021 10:25) (66 - 105)  BP: 132/60 (21 May 2021 10:25) (132/60 - 181/73)  BP(mean): --  RR: 22 (21 May 2021 08:02) (18 - 22)  SpO2: 90% (21 May 2021 08:02) (82% - 100%)                                                            LABS:                        10.4   11.32 )-----------( 155      ( 21 May 2021 07:00 )             32.4     05-21    135  |  93<L>  |  42.0<H>  ----------------------------<  222<H>  4.2   |  26.0  |  3.45<H>    Ca    8.5<L>      21 May 2021 07:00  Phos  4.8     05-21  Mg     2.0     05-21    TPro  6.7  /  Alb  3.4  /  TBili  0.5  /  DBili  x   /  AST  10  /  ALT  5   /  AlkPhos  64  05-21    PT/INR - ( 20 May 2021 13:12 )   PT: 14.0 sec;   INR: 1.22 ratio         PTT - ( 20 May 2021 13:12 )  PTT:28.8 sec    CARDIAC MARKERS ( 20 May 2021 13:12 )  x     / 0.03 ng/mL / 40 U/L / x     / x              < from: Xray Chest 1 View- PORTABLE-Urgent (Xray Chest 1 View- PORTABLE-Urgent .) (05.21.21 @ 01:11) >  ******PRELIMINARY REPORT******          INTERPRETATION:  Mod-large right pleural effusion, not significantly changed.  Small-mod left plerual effusion has intervally increased.    ******PRELIMINARY REPORT******    ******PRELIMINARY REPORT******            ALVINO LEIVA MD; Attending Radiologist    < end of copied text >

## 2021-05-21 NOTE — PROCEDURE NOTE - NSINFORMCONSENT_GEN_A_CORE
Telephone consent from patient's wife Nila Aldana. Consent in chart./Benefits, risks, and possible complications of procedure explained to patient/caregiver who verbalized understanding and gave verbal consent.

## 2021-05-21 NOTE — CONSULT NOTE ADULT - SUBJECTIVE AND OBJECTIVE BOX
[facility] DIVISION OF KIDNEY DISEASES AND HYPERTENSION -- INITIAL CONSULT NOTE  --------------------------------------------------------------------------------  HPI:  80y Male 81 y/o male with PMHx significant for Chronic Diastolic HFpEF (Echo 12/2019: LVEF 60-65%), grade II diastolic dysfunction, cardiomems placed 1/2020, non-obstructive CAD, bradycardia, ILR in place (placed 1/2016), AS, HTN, HLD, Diabetes Mellitus on Insulin, ESRD on HD on M/W/F, Afib, Hypothyroidism, alzheimer's dementia presented 5/14 to ER after c/o SOB requiring supplemental O2 while at Hemodialysis. Patient has been experiencing SOB since Monday, had outpatient chest X-ray done revealing moderate b/l pleural effusions, was advised to increase dose of Torsemide by Cardiologist Dr. Faustin recommended to nephrology team to increase fluid off loading during dialysis. During dialysis, pt required supplemental o2. Pt wife then contacted outpatient cardiology office saying dyspnea was worsening so patient was advised to come to ED for treatment of pleural effusion and recommended increased fluid off loading during HD.  Of note  Pt had left thoracentesis 5/20 with 1400cc fluid removed,  plan was for patient to get IR thoracentesis 5/21 on right, but became acutely SOB.  Placed on BIPAP and received IV lasix.  Thoracic surgery called for Right pigtail.  Pt currently sitting up in bed.  On bipap.  Increased WOB.    Pt seen/examined this AM; follows with Dr Wade at [facility] HD unit. Plan for HD today.        PAST HISTORY  --------------------------------------------------------------------------------  PAST MEDICAL & SURGICAL HISTORY:  BPH (benign prostatic hyperplasia)    Alzheimer&#x27;s dementia  poor historian    Anemia due to chronic kidney disease, on chronic dialysis  m-w-f    (HFpEF) heart failure with preserved ejection fraction    Atrial fibrillation, unspecified type  loop recorder in place    Stage 5 chronic kidney disease on chronic dialysis  on dialysis m-w-f    History of insulin dependent diabetes mellitus    GERD (gastroesophageal reflux disease)    Essential hypertension    Hyperlipidemia, unspecified hyperlipidemia type    Moderate aortic stenosis    Hypothyroid    Ureteral stent retained    S/P right pulmonary artery pressure sensor implant placement    AV fistula    Cataract      FAMILY HISTORY:  FH: type 2 diabetes  mother    Family history of coronary artery disease    Family history of cerebrovascular accident (CVA)    Family history of Alzheimer&#x27;s disease      PAST SOCIAL HISTORY:    ALLERGIES & MEDICATIONS  --------------------------------------------------------------------------------  Allergies    No Known Allergies    Intolerances      Standing Inpatient Medications  amLODIPine   Tablet 10 milliGRAM(s) Oral daily  aspirin  chewable 81 milliGRAM(s) Oral daily  atorvastatin 10 milliGRAM(s) Oral at bedtime  cholecalciferol 2000 Unit(s) Oral daily  dextrose 40% Gel 15 Gram(s) Oral once  dextrose 5%. 1000 milliLiter(s) IV Continuous <Continuous>  dextrose 5%. 1000 milliLiter(s) IV Continuous <Continuous>  dextrose 50% Injectable 25 Gram(s) IV Push once  dextrose 50% Injectable 12.5 Gram(s) IV Push once  dextrose 50% Injectable 25 Gram(s) IV Push once  doxazosin 4 milliGRAM(s) Oral at bedtime  ferrous    sulfate 325 milliGRAM(s) Oral daily  glucagon  Injectable 1 milliGRAM(s) IntraMuscular once  hydrALAZINE 50 milliGRAM(s) Oral three times a day  insulin lispro (ADMELOG) corrective regimen sliding scale   SubCutaneous three times a day before meals  insulin lispro (ADMELOG) corrective regimen sliding scale   SubCutaneous at bedtime  isosorbide   mononitrate ER Tablet (IMDUR) 30 milliGRAM(s) Oral daily  levothyroxine 50 MICROGram(s) Oral daily  memantine 10 milliGRAM(s) Oral two times a day  metoprolol succinate ER 25 milliGRAM(s) Oral daily  sodium bicarbonate 650 milliGRAM(s) Oral two times a day  torsemide 40 milliGRAM(s) Oral two times a day    PRN Inpatient Medications  ondansetron Injectable 4 milliGRAM(s) IV Push every 6 hours PRN  senna 2 Tablet(s) Oral at bedtime PRN      REVIEW OF SYSTEMS  --------------------------------------------------------------------------------  Gen: fatigue  Skin: No rashes  Head/Eyes/Ears/Mouth: No headache; Normal hearing; Normal vision w/o blurriness; No sinus pain/discomfort, sore throat  Respiratory: DEJESUS  CV: No chest pain, PND, orthopnea  GI: No abdominal pain, diarrhea, constipation, nausea, vomiting, melena, hematochezia  : No increased frequency, dysuria, hematuria, nocturia  MSK: No joint pain/swelling; no back pain; no edema  Neuro: No dizziness/lightheadedness, weakness, seizures, numbness, tingling  Heme: No easy bruising or bleeding  Endo: No heat/cold intolerance  Psych: No significant nervousness, anxiety, stress, depression    All other systems were reviewed and are negative, except as noted.    VITALS/PHYSICAL EXAM  --------------------------------------------------------------------------------  T(C): 36.3 (05-21-21 @ 08:02), Max: 37.2 (05-21-21 @ 04:27)  HR: 77 (05-21-21 @ 11:23) (66 - 105)  BP: 132/60 (05-21-21 @ 10:25) (132/60 - 181/73)  RR: 22 (05-21-21 @ 08:02) (18 - 22)  SpO2: 98% (05-21-21 @ 11:23) (82% - 100%)  Wt(kg): --  Height (cm): 162.6 (05-20-21 @ 12:26)  Weight (kg): 73 (05-20-21 @ 12:26)  BMI (kg/m2): 27.6 (05-20-21 @ 12:26)  BSA (m2): 1.78 (05-20-21 @ 12:26)      Physical Exam:  	Gen: NAD   	HEENT: PERRL, supple neck, clear oropharynx  	Pulm: dec BS; pigtail R;  	CV: RRR, S1S2; no rub  	Back: No spinal or CVA tenderness; no sacral edema  	Abd: +BS, soft, nontender/nondistended  	: No suprapubic tenderness  	UE: Warm, FROM, no clubbing, intact strength; no edema; no asterixis  	LE: Warm, FROM, no clubbing, intact strength; no edema  	Neuro: No focal deficits, intact gait  	Psych: Normal affect and mood  	Skin: Warm, without rashes       LABS/STUDIES  --------------------------------------------------------------------------------              10.4   11.32 >-----------<  155      [05-21-21 @ 07:00]              32.4     135  |  93  |  42.0  ----------------------------<  222      [05-21-21 @ 07:00]  4.2   |  26.0  |  3.45        Ca     8.5     [05-21-21 @ 07:00]      Mg     2.0     [05-21-21 @ 07:00]      Phos  4.8     [05-21-21 @ 07:00]    TPro  6.7  /  Alb  3.4  /  TBili  0.5  /  DBili  x   /  AST  10  /  ALT  5   /  AlkPhos  64  [05-21-21 @ 07:00]    PT/INR: PT 14.0 , INR 1.22       [05-20-21 @ 13:12]  PTT: 28.8       [05-20-21 @ 13:12]    Troponin 0.03      [05-20-21 @ 13:12]  CK 40      [05-20-21 @ 13:12]    Creatinine Trend:  SCr 3.45 [05-21 @ 07:00]  SCr 3.12 [05-20 @ 13:12]  SCr 4.00 [05-12 @ 12:56]  SCr 2.47 [05-10 @ 18:50]    Urinalysis - [02-24-20 @ 14:06]      Color Yellow / Appearance Clear / SG 1.010 / pH 6.0      Gluc Negative / Ketone Negative  / Bili Negative / Urobili Negative       Blood Trace / Protein 30 / Leuk Est Moderate / Nitrite Negative      RBC 0-2 / WBC >50 / Hyaline  / Gran  / Sq Epi  / Non Sq Epi Occasional / Bacteria Few      HbA1c 8.1      [12-11-19 @ 06:47]    HBsAb <3.0      [02-24-20 @ 15:46]  HBsAb Nonreact      [12-31-19 @ 08:48]  HBsAg Nonreact      [02-24-20 @ 15:46]  HBcAb Nonreact      [02-24-20 @ 15:46]  HCV 0.12, Nonreact      [02-24-20 @ 15:46]

## 2021-05-22 NOTE — PROGRESS NOTE ADULT - ASSESSMENT
1) ESRD on HD  2) MBD of renal disease  3) Anemia of renal disease  4) Vol/ HTN    No indication for HD today  Continue Torsemide 40 mg bid   1) ESRD on HD  2) Volume/ HTN  3) Anemia of renal disease  4) CKD_ MBD    Off supplemental O2  No indication for HD today  Continue Torsemide 40 mg bid  Hb at goal; KD with HD  BP stable, continue current regimen  Monitor Ca++ and phos

## 2021-05-22 NOTE — PROGRESS NOTE ADULT - SUBJECTIVE AND OBJECTIVE BOX
[facility] DIVISION OF KIDNEY DISEASES AND HYPERTENSION -- HEMODIALYSIS NOTE  --------------------------------------------------------------------------------  Chief Complaint: ESRD/Ongoing hemodialysis requirement    24 hour events/subjective:        PAST HISTORY  --------------------------------------------------------------------------------  No significant changes to PMH, PSH, FHx, SHx, unless otherwise noted    ALLERGIES & MEDICATIONS  --------------------------------------------------------------------------------  Allergies    No Known Allergies    Intolerances      Standing Inpatient Medications  amLODIPine   Tablet 10 milliGRAM(s) Oral daily  aspirin  chewable 81 milliGRAM(s) Oral daily  atorvastatin 10 milliGRAM(s) Oral at bedtime  cholecalciferol 2000 Unit(s) Oral daily  dextrose 40% Gel 15 Gram(s) Oral once  dextrose 5%. 1000 milliLiter(s) IV Continuous <Continuous>  dextrose 5%. 1000 milliLiter(s) IV Continuous <Continuous>  dextrose 50% Injectable 25 Gram(s) IV Push once  dextrose 50% Injectable 12.5 Gram(s) IV Push once  dextrose 50% Injectable 25 Gram(s) IV Push once  doxazosin 4 milliGRAM(s) Oral at bedtime  ferrous    sulfate 325 milliGRAM(s) Oral daily  glucagon  Injectable 1 milliGRAM(s) IntraMuscular once  hydrALAZINE 50 milliGRAM(s) Oral three times a day  insulin lispro (ADMELOG) corrective regimen sliding scale   SubCutaneous three times a day before meals  insulin lispro (ADMELOG) corrective regimen sliding scale   SubCutaneous at bedtime  isosorbide   mononitrate ER Tablet (IMDUR) 30 milliGRAM(s) Oral daily  levothyroxine 50 MICROGram(s) Oral daily  memantine 10 milliGRAM(s) Oral two times a day  metoprolol succinate ER 25 milliGRAM(s) Oral daily  sodium bicarbonate 650 milliGRAM(s) Oral two times a day  torsemide 40 milliGRAM(s) Oral two times a day    PRN Inpatient Medications  ondansetron Injectable 4 milliGRAM(s) IV Push every 6 hours PRN  senna 2 Tablet(s) Oral at bedtime PRN      REVIEW OF SYSTEMS  --------------------------------------------------------------------------------  Gen: No weight changes, fatigue, fevers/chills, weakness  Skin: No rashes  Head/Eyes/Ears/Mouth: No headache  Respiratory: No dyspnea, cough,  CV: No chest pain, orthopnea  GI: No abdominal pain, diarrhea, constipation, nausea, vomiting,  MSK: No joint pain  Neuro: No dizziness/lightheadedness, weakness  Heme: No bleeding  Psych: No significant nervousness, anxiety, stress, depression    All other systems were reviewed and are negative, except as noted.    VITALS/PHYSICAL EXAM  --------------------------------------------------------------------------------  T(C): 36.8 (05-22-21 @ 05:18), Max: 36.8 (05-21-21 @ 12:52)  HR: 78 (05-22-21 @ 05:18) (67 - 92)  BP: 131/48 (05-22-21 @ 05:18) (123/53 - 147/52)  RR: 18 (05-22-21 @ 05:18) (18 - 18)  SpO2: 97% (05-22-21 @ 05:18) (96% - 100%)  Wt(kg): --  Height (cm): 162.6 (05-20-21 @ 12:26)  Weight (kg): 73 (05-20-21 @ 12:26)  BMI (kg/m2): 27.6 (05-20-21 @ 12:26)  BSA (m2): 1.78 (05-20-21 @ 12:26)      05-21-21 @ 07:01  -  05-22-21 @ 07:00  --------------------------------------------------------  IN: 0 mL / OUT: 3750 mL / NET: -3750 mL      Physical Exam:  	Gen: NAD, well-appearing  	HEENT: PERRL, supple neck,  	Pulm: CTA B/L  	CV: RRR, S1S2; no rub  	Abd: +BS, soft, nontender  	UE: Warm, intact strength; no asterixis  	LE: Warm, + edema  	Neuro: No focal deficits  	Psych: Normal affect and mood  	Skin: Warm, without rashes  	Vascular access:    LABS/STUDIES  --------------------------------------------------------------------------------              10.7   10.06 >-----------<  144      [05-22-21 @ 08:04]              33.1     135  |  94  |  29.0  ----------------------------<  92      [05-22-21 @ 08:04]  3.9   |  30.0  |  2.58        Ca     8.4     [05-22-21 @ 08:04]      Mg     2.0     [05-21-21 @ 07:00]      Phos  4.8     [05-21-21 @ 07:00]    TPro  6.7  /  Alb  3.4  /  TBili  0.5  /  DBili  x   /  AST  10  /  ALT  5   /  AlkPhos  64  [05-21-21 @ 07:00]    PT/INR: PT 14.0 , INR 1.22       [05-20-21 @ 13:12]  PTT: 28.8       [05-20-21 @ 13:12]    Troponin 0.04      [05-21-21 @ 15:07]  CK 40      [05-20-21 @ 13:12]        [05-21-21 @ 15:07]    HbA1c 8.1      [12-11-19 @ 06:47]     [facility] DIVISION OF KIDNEY DISEASES AND HYPERTENSION -- HEMODIALYSIS NOTE  --------------------------------------------------------------------------------  Chief Complaint: ESRD/Ongoing hemodialysis requirement    24 hour events/subjective:  s/p HD yesterday with 2.0 L UF  tolerated well  Pt seen and examined; on RA  Feels well      PAST HISTORY  --------------------------------------------------------------------------------  No significant changes to PMH, PSH, FHx, SHx, unless otherwise noted    ALLERGIES & MEDICATIONS  --------------------------------------------------------------------------------  Allergies    No Known Allergies    Intolerances      Standing Inpatient Medications  amLODIPine   Tablet 10 milliGRAM(s) Oral daily  aspirin  chewable 81 milliGRAM(s) Oral daily  atorvastatin 10 milliGRAM(s) Oral at bedtime  cholecalciferol 2000 Unit(s) Oral daily  dextrose 40% Gel 15 Gram(s) Oral once  dextrose 5%. 1000 milliLiter(s) IV Continuous <Continuous>  dextrose 5%. 1000 milliLiter(s) IV Continuous <Continuous>  dextrose 50% Injectable 25 Gram(s) IV Push once  dextrose 50% Injectable 12.5 Gram(s) IV Push once  dextrose 50% Injectable 25 Gram(s) IV Push once  doxazosin 4 milliGRAM(s) Oral at bedtime  ferrous    sulfate 325 milliGRAM(s) Oral daily  glucagon  Injectable 1 milliGRAM(s) IntraMuscular once  hydrALAZINE 50 milliGRAM(s) Oral three times a day  insulin lispro (ADMELOG) corrective regimen sliding scale   SubCutaneous three times a day before meals  insulin lispro (ADMELOG) corrective regimen sliding scale   SubCutaneous at bedtime  isosorbide   mononitrate ER Tablet (IMDUR) 30 milliGRAM(s) Oral daily  levothyroxine 50 MICROGram(s) Oral daily  memantine 10 milliGRAM(s) Oral two times a day  metoprolol succinate ER 25 milliGRAM(s) Oral daily  sodium bicarbonate 650 milliGRAM(s) Oral two times a day  torsemide 40 milliGRAM(s) Oral two times a day    PRN Inpatient Medications  ondansetron Injectable 4 milliGRAM(s) IV Push every 6 hours PRN  senna 2 Tablet(s) Oral at bedtime PRN      REVIEW OF SYSTEMS  --------------------------------------------------------------------------------  Gen: No weight changes, fatigue, fevers/chills, weakness  Skin: No rashes  Head/Eyes/Ears/Mouth: No headache  Respiratory: No dyspnea, cough,  CV: No chest pain, orthopnea  GI: No abdominal pain, diarrhea, constipation, nausea, vomiting,  MSK: No joint pain  Neuro: No dizziness/lightheadedness, weakness  Heme: No bleeding  Psych: No significant nervousness, anxiety, stress, depression    All other systems were reviewed and are negative, except as noted.    VITALS/PHYSICAL EXAM  --------------------------------------------------------------------------------  T(C): 36.8 (05-22-21 @ 05:18), Max: 36.8 (05-21-21 @ 12:52)  HR: 78 (05-22-21 @ 05:18) (67 - 92)  BP: 131/48 (05-22-21 @ 05:18) (123/53 - 147/52)  RR: 18 (05-22-21 @ 05:18) (18 - 18)  SpO2: 97% (05-22-21 @ 05:18) (96% - 100%)  Wt(kg): --  Height (cm): 162.6 (05-20-21 @ 12:26)  Weight (kg): 73 (05-20-21 @ 12:26)  BMI (kg/m2): 27.6 (05-20-21 @ 12:26)  BSA (m2): 1.78 (05-20-21 @ 12:26)      05-21-21 @ 07:01  -  05-22-21 @ 07:00  --------------------------------------------------------  IN: 0 mL / OUT: 3750 mL / NET: -3750 mL      Physical Exam:  	Gen: NAD, well-appearing  	HEENT: PERRL, supple neck,  	Pulm: CTA B/L  	CV: RRR, S1S2; no rub  	Abd: +BS, soft, nontender  	UE: Warm, intact strength; no asterixis  	LE: Warm, no edema  	Neuro: No focal deficits  	Psych: Normal affect and mood  	Skin: Warm, without rashes  	Vascular access: AVF    LABS/STUDIES  --------------------------------------------------------------------------------              10.7   10.06 >-----------<  144      [05-22-21 @ 08:04]              33.1     135  |  94  |  29.0  ----------------------------<  92      [05-22-21 @ 08:04]  3.9   |  30.0  |  2.58        Ca     8.4     [05-22-21 @ 08:04]      Mg     2.0     [05-21-21 @ 07:00]      Phos  4.8     [05-21-21 @ 07:00]    TPro  6.7  /  Alb  3.4  /  TBili  0.5  /  DBili  x   /  AST  10  /  ALT  5   /  AlkPhos  64  [05-21-21 @ 07:00]    PT/INR: PT 14.0 , INR 1.22       [05-20-21 @ 13:12]  PTT: 28.8       [05-20-21 @ 13:12]    Troponin 0.04      [05-21-21 @ 15:07]  CK 40      [05-20-21 @ 13:12]        [05-21-21 @ 15:07]    HbA1c 8.1      [12-11-19 @ 06:47]

## 2021-05-22 NOTE — PROGRESS NOTE ADULT - ASSESSMENT
80y Male 79 y/o male with PMHx significant for Chronic Diastolic HFpEF (Echo 12/2019: LVEF 60-65%), grade II diastolic dysfunction, cardiomems placed 1/2020, non-obstructive CAD, bradycardia, ILR in place (placed 1/2016), AS, HTN, HLD, Diabetes Mellitus on Insulin, ESRD on HD on M/W/F, Afib, Hypothyroidism, alzheimer's dementia presented 5/14 to ER after c/o SOB requiring supplemental O2 while at Hemodialysis. Patient has been experiencing SOB since Monday, had outpatient chest X-ray done revealing moderate b/l pleural effusions, was advised to increase dose of Torsemide by Cardiologist Dr. Faustin recommended to nephrology team to increase fluid off loading during dialysis. During dialysis, pt required supplemental o2. Pt wife then contacted outpatient cardiology office saying dyspnea was worsening so patient was advised to come to ED for treatment of pleural effusion and recommended increased fluid off loading during HD.    Of note  Pt had left thoracentesis 5/20 with 1400cc fluid removed,  plan was for patient to get IR thoracentesis 5/21 on right, but became acutely SOB.  Placed on BIPAP and received IV lasix.  5/21: L thoracentesis by IR, R pigtail placed by Thoracic team

## 2021-05-22 NOTE — PROGRESS NOTE ADULT - SUBJECTIVE AND OBJECTIVE BOX
[place] [facility]                                                               Office: [address] [address]                                                              Telephone: [phone]. Fax:[phone]                                                                             PROGRESS NOTE  Reason for follow up:   Overnight: No new events.   Update:     Subjective: "  ______________________"      Review of symptoms:   Cardiac:  No chest pain. No dyspnea. No palpitations.  Respiratory:no cough. No dyspnea  Gastrointestinal: No diarrhea. No abdominal pain. No bleeding.   Neuro: No focal neuro complaints.      Vitals:  T(C): 36.8 (05-22-21 @ 05:18), Max: 36.8 (05-21-21 @ 12:52)  HR: 78 (05-22-21 @ 05:18) (67 - 92)  BP: 131/48 (05-22-21 @ 05:18) (123/53 - 147/52)  RR: 18 (05-22-21 @ 05:18) (18 - 18)  SpO2: 97% (05-22-21 @ 05:18) (96% - 100%)  Wt(kg): --  I&O's Summary    21 May 2021 07:01  -  22 May 2021 07:00  --------------------------------------------------------  IN: 0 mL / OUT: 3750 mL / NET: -3750 mL      Weight (kg): 73 (05-20 @ 12:26)      PHYSICAL EXAM:  Appearance: Comfortable. No acute distress  HEENT:  Atraumatic. Normocephalic.  Normal oral mucosa, PERRL, Neck is supple. No carotid bruit.   Neurologic: A & O x 3, no focal deficits. EOMI.  Cardiovascular: Normal S1 S2, No murmur, rubs/gallops. No JVD, No edema  Respiratory: Lungs clear to auscultation, unlabored   Gastrointestinal:  Soft, Non-tender, + BS  Lower Extremities: No edema  Psychiatry: Patient is calm. No agitation. Mood & affect appropriate  Skin: No rashes/ ecchymoses/cyanosis/ulcers visualized on the face, hands or feet.      CURRENT MEDICATIONS:  amLODIPine   Tablet 10 milliGRAM(s) Oral daily  doxazosin 4 milliGRAM(s) Oral at bedtime  hydrALAZINE 50 milliGRAM(s) Oral three times a day  isosorbide   mononitrate ER Tablet (IMDUR) 30 milliGRAM(s) Oral daily  metoprolol succinate ER 25 milliGRAM(s) Oral daily  torsemide 40 milliGRAM(s) Oral two times a day    memantine  atorvastatin  dextrose 40% Gel  dextrose 50% Injectable  dextrose 50% Injectable  dextrose 50% Injectable  glucagon  Injectable  insulin lispro (ADMELOG) corrective regimen sliding scale  insulin lispro (ADMELOG) corrective regimen sliding scale  levothyroxine  aspirin  chewable  cholecalciferol  dextrose 5%.  dextrose 5%.  ferrous    sulfate  sodium bicarbonate      DIAGNOSTIC TESTING:  [ ] Echocardiogram:   [ ]  Catheterization:  [ ] Stress Test:    OTHER: 	      LABS:	 	  CARDIAC MARKERS ( 21 May 2021 15:07 )  x     / 0.04 ng/mL / x     / x     / x      p-BNP 21 May 2021 15:07: x    , CARDIAC MARKERS ( 20 May 2021 13:12 )  x     / 0.03 ng/mL / 40 U/L / x     / x      p-BNP 20 May 2021 13:12: 46341 pg/mL                          10.7   10.06 )-----------( 144      ( 22 May 2021 08:04 )             33.1     05-22    135  |  94<L>  |  29.0<H>  ----------------------------<  92  3.9   |  30.0<H>  |  2.58<H>    Ca    8.4<L>      22 May 2021 08:04  Phos  4.8     05-21  Mg     2.0     05-21    TPro  6.7  /  Alb  3.4  /  TBili  0.5  /  DBili  x   /  AST  10  /  ALT  5   /  AlkPhos  64  05-21    proBNP: Serum Pro-Brain Natriuretic Peptide: 07610 pg/mL (05-20 @ 13:12)    Lipid Profile:   HgA1c:   TSH:       TELEMETRY: Reviewed    ECG:  Reviewed by me. 	                                                                      [place] [facility]                                                               Office: [address] [address]                                                              Telephone: [phone]. Fax:[phone]                                                                             PROGRESS NOTE  Reason for follow up: Heart failure  Overnight: No new events.   Update: acute SOB yesterday, BiPAP placed overnight. this am pt without oxygen requirements, states felling much beeter, moving air freely, laying flat, speaking in full sentences       Review of symptoms:   Cardiac:  No chest pain. No dyspnea. No palpitations.  Respiratory: no cough. No dyspnea  Gastrointestinal: No diarrhea. No abdominal pain. No bleeding.   Neuro: No focal neuro complaints.      Vitals:  T(C): 36.8 (05-22-21 @ 05:18), Max: 36.8 (05-21-21 @ 12:52)  HR: 78 (05-22-21 @ 05:18) (67 - 92)  BP: 131/48 (05-22-21 @ 05:18) (123/53 - 147/52)  RR: 18 (05-22-21 @ 05:18) (18 - 18)  SpO2: 97% (05-22-21 @ 05:18) (96% - 100%)      I&O's Summary    21 May 2021 07:01  -  22 May 2021 07:00  --------------------------------------------------------  IN: 0 mL / OUT: 3750 mL / NET: -3750 mL      Weight (kg): 73 (05-20 @ 12:26)      PHYSICAL EXAM:  Appearance: Comfortable. No acute distress  HEENT:  Atraumatic. Normocephalic.  Normal oral mucosa, PERRL  Neurologic: A & O x 3, no focal deficits. EOMI.  Cardiovascular: Normal S1 S2, No murmur, rubs/gallops.   Respiratory: diminished R side, unlabored   Gastrointestinal:  Soft, Non-tender, + BS  Lower Extremities: No edema  Psychiatry: Patient is calm. No agitation. Mood & affect appropriate  Skin: No rashes/ ecchymoses/cyanosis/ulcers visualized on the face, hands or feet.      CURRENT MEDICATIONS:  amLODIPine   Tablet 10 milliGRAM(s) Oral daily  doxazosin 4 milliGRAM(s) Oral at bedtime  hydrALAZINE 50 milliGRAM(s) Oral three times a day  isosorbide   mononitrate ER Tablet (IMDUR) 30 milliGRAM(s) Oral daily  metoprolol succinate ER 25 milliGRAM(s) Oral daily  torsemide 40 milliGRAM(s) Oral two times a day  memantine  atorvastatin  dextrose 40% Gel  dextrose 50% Injectable  glucagon  Injectable  insulin lispro (ADMELOG) corrective regimen sliding scale  levothyroxine  aspirin  chewable  cholecalciferol  dextrose 5%.  ferrous    sulfate  sodium bicarbonate        LABS:	 	  CARDIAC MARKERS ( 21 May 2021 15:07 )  x     / 0.04 ng/mL / x     / x     / x      p-BNP 21 May 2021 15:07: x    , CARDIAC MARKERS ( 20 May 2021 13:12 )  x     / 0.03 ng/mL / 40 U/L / x     / x      p-BNP 20 May 2021 13:12: 40049 pg/mL                          10.7   10.06 )-----------( 144      ( 22 May 2021 08:04 )             33.1     05-22    135  |  94<L>  |  29.0<H>  ----------------------------<  92  3.9   |  30.0<H>  |  2.58<H>    Ca    8.4<L>      22 May 2021 08:04  Phos  4.8     05-21  Mg     2.0     05-21    TPro  6.7  /  Alb  3.4  /  TBili  0.5  /  DBili  x   /  AST  10  /  ALT  5   /  AlkPhos  64  05-21    proBNP: Serum Pro-Brain Natriuretic Peptide: 38158 pg/mL (05-20 @ 13:12)      TELEMETRY: Sr 	                                                                      [place] [facility]                                                               Office: [address] [address]                                                              Telephone: [phone]. Fax:[phone]                                                                             PROGRESS NOTE  Reason for follow up: Heart failure  Overnight: No new events.   Update: acute SOB yesterday, BiPAP placed overnight. this am pt without oxygen requirements, states felling much beeter, moving air freely, laying flat, speaking in full sentences   TTE completed results pending      Review of symptoms:   Cardiac:  No chest pain. No dyspnea. No palpitations.  Respiratory: no cough. No dyspnea  Gastrointestinal: No diarrhea. No abdominal pain. No bleeding.   Neuro: No focal neuro complaints.      Vitals:  T(C): 36.8 (05-22-21 @ 05:18), Max: 36.8 (05-21-21 @ 12:52)  HR: 78 (05-22-21 @ 05:18) (67 - 92)  BP: 131/48 (05-22-21 @ 05:18) (123/53 - 147/52)  RR: 18 (05-22-21 @ 05:18) (18 - 18)  SpO2: 97% (05-22-21 @ 05:18) (96% - 100%)      I&O's Summary    21 May 2021 07:01  -  22 May 2021 07:00  --------------------------------------------------------  IN: 0 mL / OUT: 3750 mL / NET: -3750 mL      Weight (kg): 73 (05-20 @ 12:26)      PHYSICAL EXAM:  Appearance: Comfortable. No acute distress  HEENT:  Atraumatic. Normocephalic.  Normal oral mucosa, PERRL  Neurologic: A & O x 3, no focal deficits. EOMI.  Cardiovascular: Normal S1 S2, No murmur, rubs/gallops.   Respiratory: diminished R side, unlabored   Gastrointestinal:  Soft, Non-tender, + BS  Lower Extremities: No edema  Psychiatry: Patient is calm. No agitation. Mood & affect appropriate  Skin: No rashes/ ecchymoses/cyanosis/ulcers visualized on the face, hands or feet.      CURRENT MEDICATIONS:  amLODIPine Tablet 10 milliGRAM(s) Oral daily  doxazosin 4 milliGRAM(s) Oral at bedtime  hydrALAZINE 50 milliGRAM(s) Oral three times a day  isosorbide   mononitrate ER Tablet (IMDUR) 30 milliGRAM(s) Oral daily  metoprolol succinate ER 25 milliGRAM(s) Oral daily  torsemide 40 milliGRAM(s) Oral two times a day  memantine  atorvastatin  dextrose 40% Gel  dextrose 50% Injectable  glucagon  Injectable  insulin lispro (ADMELOG) corrective regimen sliding scale  levothyroxine  aspirin  chewable  cholecalciferol  dextrose 5%.  ferrous    sulfate  sodium bicarbonate        LABS:	 	  CARDIAC MARKERS ( 21 May 2021 15:07 )  x     / 0.04 ng/mL / x     / x     / x      p-BNP 21 May 2021 15:07: x    , CARDIAC MARKERS ( 20 May 2021 13:12 )  x     / 0.03 ng/mL / 40 U/L / x     / x      p-BNP 20 May 2021 13:12: 04777 pg/mL                          10.7   10.06 )-----------( 144      ( 22 May 2021 08:04 )             33.1     05-22    135  |  94<L>  |  29.0<H>  ----------------------------<  92  3.9   |  30.0<H>  |  2.58<H>    Ca    8.4<L>      22 May 2021 08:04  Phos  4.8     05-21  Mg     2.0     05-21    TPro  6.7  /  Alb  3.4  /  TBili  0.5  /  DBili  x   /  AST  10  /  ALT  5   /  AlkPhos  64  05-21    proBNP: Serum Pro-Brain Natriuretic Peptide: 18474 pg/mL (05-20 @ 13:12)      TELEMETRY: Sr

## 2021-05-22 NOTE — PROVIDER CONTACT NOTE (OTHER) - SITUATION
Monitor Yary Art called regarding 8 beats vtach, non sustained, Now Normal Sinus Rhythm, Patient is asymptomatic, nad noted. O2 sat 95% on RA.

## 2021-05-22 NOTE — PROGRESS NOTE ADULT - SUBJECTIVE AND OBJECTIVE BOX
[facility] Division of Hospital Medicine    Chief Complaint:  CHF exacerbation    SUBJECTIVE: reports feeling better, endorses mild pain in area of chest tube    OVERNIGHT EVENTS: 150 cc out of chest tube over night     Patient denies SOB, abd pain, N/V, fever, chills, dysuria or any other complaints. All remainder ROS negative.     MEDICATIONS  (STANDING):  amLODIPine   Tablet 10 milliGRAM(s) Oral daily  aspirin  chewable 81 milliGRAM(s) Oral daily  atorvastatin 10 milliGRAM(s) Oral at bedtime  cholecalciferol 2000 Unit(s) Oral daily  dextrose 40% Gel 15 Gram(s) Oral once  dextrose 5%. 1000 milliLiter(s) (50 mL/Hr) IV Continuous <Continuous>  dextrose 5%. 1000 milliLiter(s) (100 mL/Hr) IV Continuous <Continuous>  dextrose 50% Injectable 25 Gram(s) IV Push once  dextrose 50% Injectable 12.5 Gram(s) IV Push once  dextrose 50% Injectable 25 Gram(s) IV Push once  doxazosin 4 milliGRAM(s) Oral at bedtime  ferrous    sulfate 325 milliGRAM(s) Oral daily  glucagon  Injectable 1 milliGRAM(s) IntraMuscular once  hydrALAZINE 50 milliGRAM(s) Oral three times a day  insulin lispro (ADMELOG) corrective regimen sliding scale   SubCutaneous three times a day before meals  insulin lispro (ADMELOG) corrective regimen sliding scale   SubCutaneous at bedtime  isosorbide   mononitrate ER Tablet (IMDUR) 30 milliGRAM(s) Oral daily  levothyroxine 50 MICROGram(s) Oral daily  memantine 10 milliGRAM(s) Oral two times a day  metoprolol succinate ER 25 milliGRAM(s) Oral daily  sodium bicarbonate 650 milliGRAM(s) Oral two times a day  torsemide 40 milliGRAM(s) Oral two times a day    MEDICATIONS  (PRN):  ondansetron Injectable 4 milliGRAM(s) IV Push every 6 hours PRN Nausea  senna 2 Tablet(s) Oral at bedtime PRN Constipation        I&O's Summary    21 May 2021 07:01  -  22 May 2021 07:00  --------------------------------------------------------  IN: 0 mL / OUT: 3750 mL / NET: -3750 mL        PHYSICAL EXAM:  Vital Signs Last 24 Hrs  T(C): 36.7 (22 May 2021 11:39), Max: 36.8 (21 May 2021 18:25)  T(F): 98 (22 May 2021 11:39), Max: 98.3 (21 May 2021 18:25)  HR: 74 (22 May 2021 13:56) (67 - 78)  BP: 123/58 (22 May 2021 13:56) (123/56 - 147/52)  BP(mean): --  RR: 18 (22 May 2021 11:39) (18 - 18)  SpO2: 98% (22 May 2021 11:39) (97% - 100%)        CONSTITUTIONAL: NAD, well-developed, well-groomed  ENMT: Moist oral mucosa, no pharyngeal injection or exudates; normal dentition; No JVD  RESPIRATORY: Normal respiratory effort;  crackles on bases, R side chest tube in place. no wheezing  CARDIOVASCULAR: Regular rate and rhythm, normal S1 and S2, no murmur/rub/gallop; No lower extremity edema; Peripheral pulses are 2+ bilaterally  ABDOMEN: Nontender to palpation, normoactive bowel sounds, no rebound/guarding; No hepatosplenomegaly  MUSCLOSKELETAL:  no clubbing or cyanosis of digits; no joint swelling or tenderness to palpation  PSYCH: A+O to person, place, but not time; affect appropriate  NEUROLOGY: CN 2-12 are intact and symmetric; no gross sensory deficits; was observed moving all 4 ext against gravity cooperating with exam.   SKIN: No rashes; no palpable lesions    LABS:                        10.7   10.06 )-----------( 144      ( 22 May 2021 08:04 )             33.1     05-22    135  |  94<L>  |  29.0<H>  ----------------------------<  92  3.9   |  30.0<H>  |  2.58<H>    Ca    8.4<L>      22 May 2021 08:04  Phos  4.8     05-21  Mg     2.0     05-21    TPro  6.7  /  Alb  3.4  /  TBili  0.5  /  DBili  x   /  AST  10  /  ALT  5   /  AlkPhos  64  05-21      CARDIAC MARKERS ( 21 May 2021 15:07 )  x     / 0.04 ng/mL / x     / x     / x              Culture - Body Fluid with Gram Stain (collected 20 May 2021 22:06)  Source: .Body Fluid Pleural Fluid  Gram Stain (21 May 2021 06:18):    polymorphonuclear leukocytes seen    No organisms seen    by cytocentrifuge  Preliminary Report (21 May 2021 20:17):    No growth      CAPILLARY BLOOD GLUCOSE      POCT Blood Glucose.: 110 mg/dL (22 May 2021 11:15)  POCT Blood Glucose.: 111 mg/dL (22 May 2021 07:37)  POCT Blood Glucose.: 149 mg/dL (21 May 2021 22:20)  POCT Blood Glucose.: 131 mg/dL (21 May 2021 16:44)        RADIOLOGY & ADDITIONAL TESTS:  Results Reviewed:   Imaging Personally Reviewed:  Electrocardiogram Personally Reviewed:

## 2021-05-22 NOTE — PROGRESS NOTE ADULT - ASSESSMENT
full note to follow 79 y/o male with medical history of pAF no AC d/t hx of anemia, non-obstructive CAD, chronic bradycardia, ILR placed 1/2016, Chronic Diastolic HFpEF (12/2019 EF 60-65%), Gr. II DD, Cardiomems (placed 1/2020) PA diastolic goal of 16, mod to severe AS, CKD 4, HTN, HLD, b/l carotid bruit, hyperkalemia, IDDMII, dementia who presents to [facility]ED for treatment of pleural effusion.      Acute on Chronic HF  - Xray-Moderate bilateral pleural effusion   - s/p left  thoraocentesis  - Resp distress yesterday afternoon, BiPap- unclear, ?flash pulmonary edema  - off supplemental oxygen this am.   - TTE completed results pending  - cont torsemide BID    pAF  - not on anticoagulation .   -Watchman outpatient evaluation planned    CKD Stage 4  -improving   - BUN/Cr 29/2..58 today    HTN  -Cont. home medication regimen    HLD  -Cont. Pravastatin    Deep venous thrombosis prophylaxis  -heparin or lovenox SQ.

## 2021-05-22 NOTE — PROGRESS NOTE ADULT - ASSESSMENT
81 y/o male with PMHx significant for Chronic Diastolic HFpEF (Echo 12/2019: LVEF 60-65%), grade II diastolic dysfunction, cardiomems placed 1/2020, non-obstructive CAD, bradycardia, ILR in place (placed 1/2016), AS, HTN, HLD, Diabetes Mellitus on Insulin, ESRD on HD on M/W/F, Afib, Hypothyroidism, alzheimer's dementia presented to ER after c/o SOB requiring supplemental O2 while at Hemodialysis. Patient has been experiencing SOB since Monday, had outpatient chest X-ray done revealing moderate b/l pleural effusions, was advised to increase dose of Torsemide by Cardiologist and recommended increased fluid off loading during HD. At Hemodialysis patient was noted with worsening SOB, wife called Cardiologist's office and was advised to bring patient to ER. Patient was seen in ER patient was noted with SpO2 of 90%, EKG with NSR with HR: 73/min. Patient is scheduled for thoracentesis.    #Acute on Chronic HFpEF with bilateral pleural effusions - thoracentesis performed on 5/20 on L (1400cc) with R chest tube placed on 5/21 (1500cc and clamped)  # Now with sever AS on ECHO  - EF preserved, G2DD, mild PHTN, sever EF on echo   -c/w torsemide 40mg BID, metoprolol ER 25mg daily, hydralazine 50mg po q8hrs, imdur 30   - Strict I&Os & daily weights   - DASH/ low Na diet with fluid restriction of 1.2 L  - Keep Mg >2 and K > 4  - cardiology consult noted    #Hypertension - c/w amlodipine and hydralazin     #Diabetes Mellitus type II   - a1c 7.2, c/w mdss , will adjust insulin base on poc    #BPH  - Continue doxazosin 4mg po qhs    #Alzheimer's Dementia  - Continue Memantine 10mg po q12hrs    #ESRD on HD M/W/F  - HD as per  Nephrology team  - c/w Bicarb 650  bid    #Afib, rate controlled off Ac at home  - Not on AC, unable to afford DOAC, warfarin not tolerated   - [id number]: 5   - c/w bb and asa, will need to f/u with cardiology in op for watchman device    #HLD  - Lipitor 40mg po qhs     #VTE prophylaxis - heparin ppx  # CODE - full  # Dispo - currently with chest tube, will need 2-3 more days.

## 2021-05-22 NOTE — PROGRESS NOTE ADULT - SUBJECTIVE AND OBJECTIVE BOX
Subjective: "I feel much better:" NAD denies cp, sob.     Vital Signs:  Vital Signs Last 24 Hrs  T(C): 36.5 (05-22-21 @ 17:26), Max: 36.8 (05-22-21 @ 05:18)  T(F): 97.7 (05-22-21 @ 17:26), Max: 98.2 (05-22-21 @ 05:18)  HR: 73 (05-22-21 @ 20:11) (70 - 78)  BP: 130/44 (05-22-21 @ 17:26) (123/56 - 131/48)  RR: 18 (05-22-21 @ 17:26) (18 - 18)  SpO2: 95% (05-22-21 @ 20:11) (95% - 98%) on (O2)    Telemetry/Alarms:    Relevant labs, radiology and Medications reviewed    Pertinent Physical Exam  Gen: WN/WD NAD  Neuro: A+Ox3, nonfocal  Pulm: diminished bases  CV: irreg + systolic murmur  LE: no edema  R chest tube no air leak, serous drainage    CXR: < from: Xray Chest 1 View- PORTABLE-Routine (Xray Chest 1 View- PORTABLE-Routine in AM.) (05.22.21 @ 06:38) >    Findings/  Impression: Status post right chest pigtail. Status post implantable loop recorder. Cardiomegaly.    Trace left hydropneumothorax. Possible trace right hydropneumothorax.    < end of copied text >

## 2021-05-23 NOTE — PROGRESS NOTE ADULT - SUBJECTIVE AND OBJECTIVE BOX
[facility] Division of Hospital Medicine    Chief Complaint:  chf exacerbation    SUBJECTIVE: reports no complains    OVERNIGHT EVENTS: none reported     Patient denies chest pain, SOB, abd pain, N/V, fever, chills, dysuria or any other complaints. All remainder ROS negative.     MEDICATIONS  (STANDING):  aspirin  chewable 81 milliGRAM(s) Oral daily  atorvastatin 10 milliGRAM(s) Oral at bedtime  cholecalciferol 2000 Unit(s) Oral daily  dextrose 40% Gel 15 Gram(s) Oral once  dextrose 5%. 1000 milliLiter(s) (50 mL/Hr) IV Continuous <Continuous>  dextrose 5%. 1000 milliLiter(s) (100 mL/Hr) IV Continuous <Continuous>  dextrose 50% Injectable 25 Gram(s) IV Push once  dextrose 50% Injectable 12.5 Gram(s) IV Push once  dextrose 50% Injectable 25 Gram(s) IV Push once  doxazosin 4 milliGRAM(s) Oral at bedtime  ferrous    sulfate 325 milliGRAM(s) Oral daily  glucagon  Injectable 1 milliGRAM(s) IntraMuscular once  heparin   Injectable 5000 Unit(s) SubCutaneous every 8 hours  hydrALAZINE 50 milliGRAM(s) Oral three times a day  insulin lispro (ADMELOG) corrective regimen sliding scale   SubCutaneous three times a day before meals  insulin lispro (ADMELOG) corrective regimen sliding scale   SubCutaneous at bedtime  isosorbide   mononitrate ER Tablet (IMDUR) 30 milliGRAM(s) Oral daily  levothyroxine 50 MICROGram(s) Oral daily  memantine 10 milliGRAM(s) Oral two times a day  metoprolol succinate ER 25 milliGRAM(s) Oral daily  sodium bicarbonate 650 milliGRAM(s) Oral two times a day  torsemide 20 milliGRAM(s) Oral two times a day    MEDICATIONS  (PRN):  ondansetron Injectable 4 milliGRAM(s) IV Push every 6 hours PRN Nausea  senna 2 Tablet(s) Oral at bedtime PRN Constipation        I&O's Summary    22 May 2021 07:01  -  23 May 2021 07:00  --------------------------------------------------------  IN: 0 mL / OUT: 775 mL / NET: -775 mL    23 May 2021 07:01  -  23 May 2021 16:15  --------------------------------------------------------  IN: 0 mL / OUT: 550 mL / NET: -550 mL        PHYSICAL EXAM:  Vital Signs Last 24 Hrs  T(C): 37.2 (23 May 2021 05:02), Max: 37.2 (23 May 2021 05:02)  T(F): 98.9 (23 May 2021 05:02), Max: 98.9 (23 May 2021 05:02)  HR: 76 (23 May 2021 13:24) (70 - 80)  BP: 118/45 (23 May 2021 13:24) (112/64 - 134/51)  BP(mean): --  RR: 18 (23 May 2021 11:45) (18 - 18)  SpO2: 96% (23 May 2021 11:45) (95% - 98%)        CONSTITUTIONAL: NAD, well-developed, well-groomed  ENMT: Moist oral mucosa, no pharyngeal injection or exudates; normal dentition; No JVD  RESPIRATORY: Normal respiratory effort;  crackles on bases, R side chest tube in place. no wheezing  CARDIOVASCULAR: Regular rate and rhythm, normal S1 and S2, no murmur/rub/gallop; No lower extremity edema; Peripheral pulses are 2+ bilaterally  ABDOMEN: Nontender to palpation, normoactive bowel sounds, no rebound/guarding; No hepatosplenomegaly  MUSCLOSKELETAL:  no clubbing or cyanosis of digits; no joint swelling or tenderness to palpation  PSYCH: A+O to person, place, but not time; affect appropriate  NEUROLOGY: CN 2-12 are intact and symmetric; no gross sensory deficits; was observed moving all 4 ext against gravity cooperating with exam.   SKIN: No rashes; no palpable lesions    LABS:                        10.5   9.02  )-----------( 158      ( 23 May 2021 07:50 )             31.9     05-23    137  |  95<L>  |  52.0<H>  ----------------------------<  116<H>  3.8   |  28.0  |  3.62<H>    Ca    8.4<L>      23 May 2021 07:50  Phos  3.2     05-23  Mg     2.1     05-23    TPro  6.1<L>  /  Alb  2.8<L>  /  TBili  0.4  /  DBili  x   /  AST  11  /  ALT  6   /  AlkPhos  56  05-22              Culture - Body Fluid with Gram Stain (collected 20 May 2021 22:06)  Source: .Body Fluid Pleural Fluid  Gram Stain (21 May 2021 06:18):    polymorphonuclear leukocytes seen    No organisms seen    by cytocentrifuge  Preliminary Report (21 May 2021 20:17):    No growth      CAPILLARY BLOOD GLUCOSE      POCT Blood Glucose.: 159 mg/dL (23 May 2021 16:09)  POCT Blood Glucose.: 128 mg/dL (23 May 2021 11:13)  POCT Blood Glucose.: 156 mg/dL (23 May 2021 07:32)  POCT Blood Glucose.: 119 mg/dL (22 May 2021 22:31)  POCT Blood Glucose.: 213 mg/dL (22 May 2021 16:38)        RADIOLOGY & ADDITIONAL TESTS:  Results Reviewed:   Imaging Personally Reviewed:  Electrocardiogram Personally Reviewed:

## 2021-05-23 NOTE — PROGRESS NOTE ADULT - ASSESSMENT
79 y/o male with medical history of pAF no AC d/t hx of anemia, non-obstructive CAD, chronic bradycardia, ILR placed 1/2016, Chronic Diastolic HFpEF (12/2019 EF 60-65%), Gr. II DD, Cardiomems (placed 1/2020) PA diastolic goal of 16, mod to severe AS, CKD 4, HTN, HLD, b/l carotid bruit, hyperkalemia, IDDMII, dementia who presents to [facility] for treatment of pleural effusion.      Acute on Chronic HF  - s/p left  thoracocentesis CTsx managing - CT to drainage  - Decresed torsemide to 20mg BID  - Cardiomems PAD 10 yesterday  - TTE EF 60-65%, DDII, Severe AS; plan for TAVR work up in future    pAF  - not on anticoagulation .   -Watchman outpatient evaluation planned    ESRD   - plan for HD tomorrow  - nephrology follwoing    HTN  -Cont. home medication regimen    HLD  -Cont. Pravastatin    Deep venous thrombosis prophylaxis  - lovenox SQ.

## 2021-05-23 NOTE — PROGRESS NOTE ADULT - ASSESSMENT
79 y/o male with PMHx significant for Chronic Diastolic HFpEF (Echo 12/2019: LVEF 60-65%), grade II diastolic dysfunction, cardiomems placed 1/2020, non-obstructive CAD, bradycardia, ILR in place (placed 1/2016), AS, HTN, HLD, Diabetes Mellitus on Insulin, ESRD on HD on M/W/F, Afib, Hypothyroidism, alzheimer's dementia presented to ER after c/o SOB requiring supplemental O2 while at Hemodialysis. Patient has been experiencing SOB since Monday, had outpatient chest X-ray done revealing moderate b/l pleural effusions, was advised to increase dose of Torsemide by Cardiologist and recommended increased fluid off loading during HD. At Hemodialysis patient was noted with worsening SOB, wife called Cardiologist's office and was advised to bring patient to ER. Patient was seen in ER patient was noted with SpO2 of 90%, EKG with NSR with HR: 73/min. Patient is scheduled for thoracentesis.    #Acute on Chronic HFpEF with bilateral pleural effusions - thoracentesis performed on 5/20 on L (1400cc) with R chest tube placed on 5/21 (1500cc and clamped)  # Now with sever AS on ECHO  - EF preserved, G2DD, mild PHTN, sever EF on echo   -c/w torsemide 40mg BID, metoprolol ER 25mg daily, hydralazine 50mg po q8hrs, imdur 30   - Strict I&Os & daily weights   - DASH/ low Na diet with fluid restriction of 1.2 L  - Keep Mg >2 and K > 4  - cardiology consult noted    #Hypertension - c/w amlodipine and hydralazin     #Diabetes Mellitus type II   - a1c 7.2, c/w mdss , will adjust insulin base on poc    #BPH  - Continue doxazosin 4mg po qhs    #Alzheimer's Dementia  - Continue Memantine 10mg po q12hrs    #ESRD on HD M/W/F  - HD as per  Nephrology team  - c/w Bicarb 650  bid    #Afib, rate controlled off Ac at home  - Not on AC, unable to afford DOAC, warfarin not tolerated   - [id number]: 5   - c/w bb and asa, will need to f/u with cardiology in op for watchman device    #HLD  - Lipitor 40mg po qhs     #VTE prophylaxis - heparin ppx  # CODE - full  # Dispo - currently with chest tube, will need 2-3 more days.

## 2021-05-23 NOTE — PROGRESS NOTE ADULT - ASSESSMENT
1) ESRD on HD  2) Volume/ HTN  3) Anemia of renal disease  4) CKD- MBD    Plan for HD tomorrow  Continue Torsemide 40 mg bid  Hb at goal; KD with HD  BP stable, continue current regimen  Monitor Ca++ and phos

## 2021-05-23 NOTE — PROGRESS NOTE ADULT - SUBJECTIVE AND OBJECTIVE BOX
[place] [facility]                                                               Office: [address] [address]                                                              Telephone: [phone]. Fax:[phone]                                                                             PROGRESS NOTE  Reason for follow up: Heart failure, AS  Overnight: No new events.   Update: Pt sitting up in bed, awake alert, appearance overall improved today. States feeling better, denies shortness of breath. Chest to drainage. mems reading yesterday PAD 10, torsemide decreased to 20mg BID. TTE EF65%, DDII, severe AS    Review of symptoms:   Cardiac:  No chest pain. No dyspnea. No palpitations.  Respiratory: no cough. No dyspnea  Gastrointestinal: No diarrhea. No abdominal pain. No bleeding.   Neuro: No focal neuro complaints.      Vitals:  T(C): 37.2 (05-23-21 @ 05:02), Max: 37.2 (05-23-21 @ 05:02)  HR: 80 (05-23-21 @ 05:02) (70 - 80)  BP: 134/51 (05-23-21 @ 11:45) (112/64 - 134/51)  RR: 18 (05-23-21 @ 11:45) (18 - 18)  SpO2: 96% (05-23-21 @ 11:45) (95% - 98%)      I&O's Summary    22 May 2021 07:01  -  23 May 2021 07:00  --------------------------------------------------------  IN: 0 mL / OUT: 775 mL / NET: -775 mL    23 May 2021 07:01  -  23 May 2021 13:17  --------------------------------------------------------  IN: 0 mL / OUT: 550 mL / NET: -550 mL      Weight (kg): 73 (05-20 @ 12:26)      PHYSICAL EXAM:  Appearance: Comfortable. No acute distress  HEENT:  Atraumatic. Normocephalic.  Normal oral mucosa  Neurologic: A & O x 3, no focal deficits. EOMI.  Cardiovascular: Normal S1 S2, No murmur, rubs/gallops. No JVD, No edema  Respiratory: Lungs clear to auscultation, unlabored   Gastrointestinal:  Soft, Non-tender, + BS  Lower Extremities: No edema  Psychiatry: Patient is calm. No agitation.   Skin: No rashes/ ecchymoses/cyanosis/ulcers visualized on the face, hands or feet.      CURRENT MEDICATIONS:  doxazosin 4 milliGRAM(s) Oral at bedtime  hydrALAZINE 50 milliGRAM(s) Oral three times a day  isosorbide   mononitrate ER Tablet (IMDUR) 30 milliGRAM(s) Oral daily  metoprolol succinate ER 25 milliGRAM(s) Oral daily  torsemide 20 milliGRAM(s) Oral two times a day    memantine  atorvastatin  dextrose 40% Gel  dextrose 50% Injectable  dextrose 50% Injectable  dextrose 50% Injectable  glucagon  Injectable  insulin lispro (ADMELOG) corrective regimen sliding scale  insulin lispro (ADMELOG) corrective regimen sliding scale  levothyroxine  aspirin  chewable  cholecalciferol  dextrose 5%.  dextrose 5%.  ferrous    sulfate  heparin   Injectable  sodium bicarbonate      DIAGNOSTIC TESTING:  [ ] Echocardiogram:   < from: TTE Echo Complete w/o Contrast w/ Doppler (05.22.21 @ 08:59) >    PHYSICIAN INTERPRETATION:  Left Ventricle: The left ventricular internal cavity size is normal.  Left ventricular ejection fraction, by visual estimation, is 60 to 65%. Spectral Doppler shows pseudonormal pattern of left ventricular myocardial filling (Grade II diastolic dysfunction). Elevated left atrial and left ventricular end-diastolic pressures.  Right Ventricle: Normal right ventricular size and function. TV S' 0.1 m/s.  Left Atrium: The left atrium is normal in size.  Right Atrium: The right atrium is normal in size.  Pericardium: There is no evidence of pericardial effusion.  Mitral Valve: Thickening and calcification of the anterior and posterior mitral valve leaflets. There is mild to moderate mitral annular calcification. No evidence of mitral valve regurgitation is seen.  Tricuspid Valve: Mild tricuspid regurgitation is visualized. Estimated pulmonary artery systolic pressure is 44.7 mmHg assuming a right atrial pressure of 3 mmHg, which is consistent with mild pulmonary hypertension.  Aortic Valve: Peak transaortic gradient equals 47.7 mmHg, mean transaortic gradient equals 26.7 mmHg, the calculated aortic valve area equals 0.94 cm² by the continuity equation consistent with severe aortic stenosis. Mild to moderate aortic valve regurgitation is seen.  Pulmonic Valve: The pulmonic valve was not well visualized. No indication of pulmonic valve regurgitation.  Aorta: The aortic root is normal in size and structure. There is mild aortic root calcification.  Pulmonary Artery: The pulmonary artery is not well seen.  Venous: The inferior vena cava was normal sized, with respiratory size variation greater than 50%.      Summary:   1. Technically fair study.   2. Left ventricular ejection fraction, by visual estimation, is 60 to 65%.   3. Spectral Doppler shows pseudonormal pattern of left ventricular myocardial filling (Grade II diastolic dysfunction).   4. Elevated left atrial and left ventricular end-diastolic pressures.   5. Estimated pulmonary artery systolic pressure is 44.7 mmHg assuming a right atrial pressure of 3 mmHg, which is consistent with mild pulmonary hypertension.   6. Mild to moderate aortic regurgitation.   7. Peak transaorticgradient equals 47.7 mmHg, mean transaortic gradient equals 26.7 mmHg, the calculated aortic valve area equals 0.94 cm² by the continuity equation consistent with severe aortic stenosis.    MD Hever Electronically signed on 5/22/2021 at 1:13:38 PM      < end of copied text >  	      LABS:	 	  CARDIAC MARKERS ( 21 May 2021 15:07 )  x     / 0.04 ng/mL / x     / x     / x      p-BNP 21 May 2021 15:07: x                              10.5   9.02  )-----------( 158      ( 23 May 2021 07:50 )             31.9     05-23    137  |  95<L>  |  52.0<H>  ----------------------------<  116<H>  3.8   |  28.0  |  3.62<H>    Ca    8.4<L>      23 May 2021 07:50  Phos  3.2     05-23  Mg     2.1     05-23    TPro  6.1<L>  /  Alb  2.8<L>  /  TBili  0.4  /  DBili  x   /  AST  11  /  ALT  6   /  AlkPhos  56  05-22    proBNP: Serum Pro-Brain Natriuretic Peptide: 17408 pg/mL (05-20 @ 13:12)        TELEMETRY: SR NSVT 8 beats, NSVT 4 beats

## 2021-05-23 NOTE — PROGRESS NOTE ADULT - SUBJECTIVE AND OBJECTIVE BOX
[facility] DIVISION OF KIDNEY DISEASES AND HYPERTENSION -- HEMODIALYSIS NOTE  --------------------------------------------------------------------------------  Chief Complaint: ESRD/Ongoing hemodialysis requirement    24 hour events/subjective:  no acute event  pt seen and examined;  feels well      PAST HISTORY  --------------------------------------------------------------------------------  No significant changes to PMH, PSH, FHx, SHx, unless otherwise noted    ALLERGIES & MEDICATIONS  --------------------------------------------------------------------------------  Allergies    No Known Allergies    Intolerances      Standing Inpatient Medications  aspirin  chewable 81 milliGRAM(s) Oral daily  atorvastatin 10 milliGRAM(s) Oral at bedtime  cholecalciferol 2000 Unit(s) Oral daily  dextrose 40% Gel 15 Gram(s) Oral once  dextrose 5%. 1000 milliLiter(s) IV Continuous <Continuous>  dextrose 5%. 1000 milliLiter(s) IV Continuous <Continuous>  dextrose 50% Injectable 25 Gram(s) IV Push once  dextrose 50% Injectable 12.5 Gram(s) IV Push once  dextrose 50% Injectable 25 Gram(s) IV Push once  doxazosin 4 milliGRAM(s) Oral at bedtime  ferrous    sulfate 325 milliGRAM(s) Oral daily  glucagon  Injectable 1 milliGRAM(s) IntraMuscular once  heparin   Injectable 5000 Unit(s) SubCutaneous every 8 hours  hydrALAZINE 50 milliGRAM(s) Oral three times a day  insulin lispro (ADMELOG) corrective regimen sliding scale   SubCutaneous three times a day before meals  insulin lispro (ADMELOG) corrective regimen sliding scale   SubCutaneous at bedtime  isosorbide   mononitrate ER Tablet (IMDUR) 30 milliGRAM(s) Oral daily  levothyroxine 50 MICROGram(s) Oral daily  memantine 10 milliGRAM(s) Oral two times a day  metoprolol succinate ER 25 milliGRAM(s) Oral daily  sodium bicarbonate 650 milliGRAM(s) Oral two times a day  torsemide 20 milliGRAM(s) Oral two times a day    PRN Inpatient Medications  ondansetron Injectable 4 milliGRAM(s) IV Push every 6 hours PRN  senna 2 Tablet(s) Oral at bedtime PRN      REVIEW OF SYSTEMS  --------------------------------------------------------------------------------  Gen: No weight changes, fatigue, fevers/chills, weakness  Skin: No rashes  Head/Eyes/Ears/Mouth: No headache  Respiratory: No dyspnea, cough,  CV: No chest pain, orthopnea  GI: No abdominal pain, diarrhea, constipation, nausea, vomiting,  MSK: No joint pain  Neuro: No dizziness/lightheadedness, weakness  Heme: No bleeding  Psych: No significant nervousness, anxiety, stress, depression    All other systems were reviewed and are negative, except as noted.    VITALS/PHYSICAL EXAM  --------------------------------------------------------------------------------  T(C): 37.2 (05-23-21 @ 05:02), Max: 37.2 (05-23-21 @ 05:02)  HR: 80 (05-23-21 @ 05:02) (70 - 80)  BP: 134/51 (05-23-21 @ 11:45) (112/64 - 134/51)  RR: 18 (05-23-21 @ 11:45) (18 - 18)  SpO2: 96% (05-23-21 @ 11:45) (95% - 98%)  Wt(kg): --        05-22-21 @ 07:01  -  05-23-21 @ 07:00  --------------------------------------------------------  IN: 0 mL / OUT: 775 mL / NET: -775 mL    05-23-21 @ 07:01  -  05-23-21 @ 13:18  --------------------------------------------------------  IN: 0 mL / OUT: 550 mL / NET: -550 mL      Physical Exam:  	Gen: NAD  	HEENT: on supplemental o2 via NC  	Pulm: CTA B/L  	CV: RRR, S1S2; no rub  	Abd: +BS, soft, nontender  	UE: Warm,  	LE: Warm, no edema  	Neuro: No focal deficits  	Psych: Normal affect and mood  	Skin: Warm, without rashes  	Vascular access: AVF    LABS/STUDIES  --------------------------------------------------------------------------------              10.5   9.02  >-----------<  158      [05-23-21 @ 07:50]              31.9     137  |  95  |  52.0  ----------------------------<  116      [05-23-21 @ 07:50]  3.8   |  28.0  |  3.62        Ca     8.4     [05-23-21 @ 07:50]      Mg     2.1     [05-23-21 @ 07:50]      Phos  3.2     [05-23-21 @ 07:50]    TPro  6.1  /  Alb  2.8  /  TBili  0.4  /  DBili  x   /  AST  11  /  ALT  6   /  AlkPhos  56  [05-22-21 @ 22:08]        Troponin 0.04      [05-21-21 @ 15:07]        [05-21-21 @ 15:07]    HbA1c 8.1      [12-11-19 @ 06:47]

## 2021-05-23 NOTE — PROGRESS NOTE ADULT - SUBJECTIVE AND OBJECTIVE BOX
Subjective: "Hello"  OOB chair      V/S  T(C): 37.2 (05-23-21 @ 05:02), Max: 37.2 (05-23-21 @ 05:02)  HR: 80 (05-23-21 @ 05:02) (70 - 80)  BP: 134/51 (05-23-21 @ 11:45) (112/64 - 134/51)  RR: 18 (05-23-21 @ 11:45) (18 - 18)  SpO2: 96% (05-23-21 @ 11:45) (95% - 98%)      CHEST TUBE:     R pigtail waterseal                       OUTPUT:   225      ml x 12 hrs  AIR LEAKS:  [ ] YES [x ] NO      MEDICATIONS  (STANDING):  aspirin  chewable 81 milliGRAM(s) Oral daily  atorvastatin 10 milliGRAM(s) Oral at bedtime  cholecalciferol 2000 Unit(s) Oral daily  dextrose 40% Gel 15 Gram(s) Oral once  dextrose 5%. 1000 milliLiter(s) (50 mL/Hr) IV Continuous <Continuous>  dextrose 5%. 1000 milliLiter(s) (100 mL/Hr) IV Continuous <Continuous>  dextrose 50% Injectable 25 Gram(s) IV Push once  dextrose 50% Injectable 12.5 Gram(s) IV Push once  dextrose 50% Injectable 25 Gram(s) IV Push once  doxazosin 4 milliGRAM(s) Oral at bedtime  ferrous    sulfate 325 milliGRAM(s) Oral daily  glucagon  Injectable 1 milliGRAM(s) IntraMuscular once  heparin   Injectable 5000 Unit(s) SubCutaneous every 8 hours  hydrALAZINE 50 milliGRAM(s) Oral three times a day  insulin lispro (ADMELOG) corrective regimen sliding scale   SubCutaneous three times a day before meals  insulin lispro (ADMELOG) corrective regimen sliding scale   SubCutaneous at bedtime  isosorbide   mononitrate ER Tablet (IMDUR) 30 milliGRAM(s) Oral daily  levothyroxine 50 MICROGram(s) Oral daily  memantine 10 milliGRAM(s) Oral two times a day  metoprolol succinate ER 25 milliGRAM(s) Oral daily  sodium bicarbonate 650 milliGRAM(s) Oral two times a day  torsemide 20 milliGRAM(s) Oral two times a day      05-23    137  |  95<L>  |  52.0<H>  ----------------------------<  116<H>  3.8   |  28.0  |  3.62<H>    Ca    8.4<L>      23 May 2021 07:50  Phos  3.2     05-23  Mg     2.1     05-23    TPro  6.1<L>  /  Alb  2.8<L>  /  TBili  0.4  /  DBili  x   /  AST  11  /  ALT  6   /  AlkPhos  56  05-22                               10.5   9.02  )-----------( 158      ( 23 May 2021 07:50 )             31.9                 CAPILLARY BLOOD GLUCOSE      POCT Blood Glucose.: 128 mg/dL (23 May 2021 11:13)  POCT Blood Glucose.: 156 mg/dL (23 May 2021 07:32)  POCT Blood Glucose.: 119 mg/dL (22 May 2021 22:31)  POCT Blood Glucose.: 213 mg/dL (22 May 2021 16:38)           CXR:      Physical Exam:    Neuro:     Pulm:     CV:    Abd:     Extremities:     Incision:              PAST MEDICAL & SURGICAL HISTORY:  BPH (benign prostatic hyperplasia)    Alzheimer&#x27;s dementia  poor historian    Anemia due to chronic kidney disease, on chronic dialysis  m-w-f    (HFpEF) heart failure with preserved ejection fraction    Atrial fibrillation, unspecified type  loop recorder in place    Stage 5 chronic kidney disease on chronic dialysis  on dialysis m-w-f    History of insulin dependent diabetes mellitus    GERD (gastroesophageal reflux disease)    Essential hypertension    Hyperlipidemia, unspecified hyperlipidemia type    Moderate aortic stenosis    Hypothyroid    Ureteral stent retained    S/P right pulmonary artery pressure sensor implant placement    AV fistula    Cataract             Subjective: "Hello"  OOB chair      V/S  T(C): 37.2 (05-23-21 @ 05:02), Max: 37.2 (05-23-21 @ 05:02)  HR: 80 (05-23-21 @ 05:02) (70 - 80)  BP: 134/51 (05-23-21 @ 11:45) (112/64 - 134/51)  RR: 18 (05-23-21 @ 11:45) (18 - 18)  SpO2: 96% (05-23-21 @ 11:45) (95% - 98%)      CHEST TUBE:     R pigtail waterseal                       OUTPUT:   225      ml x 12 hrs  AIR LEAKS:  [ ] YES [x ] NO      MEDICATIONS  (STANDING):  aspirin  chewable 81 milliGRAM(s) Oral daily  atorvastatin 10 milliGRAM(s) Oral at bedtime  cholecalciferol 2000 Unit(s) Oral daily  dextrose 40% Gel 15 Gram(s) Oral once  dextrose 5%. 1000 milliLiter(s) (50 mL/Hr) IV Continuous <Continuous>  dextrose 5%. 1000 milliLiter(s) (100 mL/Hr) IV Continuous <Continuous>  dextrose 50% Injectable 25 Gram(s) IV Push once  dextrose 50% Injectable 12.5 Gram(s) IV Push once  dextrose 50% Injectable 25 Gram(s) IV Push once  doxazosin 4 milliGRAM(s) Oral at bedtime  ferrous    sulfate 325 milliGRAM(s) Oral daily  glucagon  Injectable 1 milliGRAM(s) IntraMuscular once  heparin   Injectable 5000 Unit(s) SubCutaneous every 8 hours  hydrALAZINE 50 milliGRAM(s) Oral three times a day  insulin lispro (ADMELOG) corrective regimen sliding scale   SubCutaneous three times a day before meals  insulin lispro (ADMELOG) corrective regimen sliding scale   SubCutaneous at bedtime  isosorbide   mononitrate ER Tablet (IMDUR) 30 milliGRAM(s) Oral daily  levothyroxine 50 MICROGram(s) Oral daily  memantine 10 milliGRAM(s) Oral two times a day  metoprolol succinate ER 25 milliGRAM(s) Oral daily  sodium bicarbonate 650 milliGRAM(s) Oral two times a day  torsemide 20 milliGRAM(s) Oral two times a day      05-23    137  |  95<L>  |  52.0<H>  ----------------------------<  116<H>  3.8   |  28.0  |  3.62<H>    Ca    8.4<L>      23 May 2021 07:50  Phos  3.2     05-23  Mg     2.1     05-23    TPro  6.1<L>  /  Alb  2.8<L>  /  TBili  0.4  /  DBili  x   /  AST  11  /  ALT  6   /  AlkPhos  56  05-22                               10.5   9.02  )-----------( 158      ( 23 May 2021 07:50 )             31.9                 CAPILLARY BLOOD GLUCOSE      POCT Blood Glucose.: 128 mg/dL (23 May 2021 11:13)  POCT Blood Glucose.: 156 mg/dL (23 May 2021 07:32)  POCT Blood Glucose.: 119 mg/dL (22 May 2021 22:31)  POCT Blood Glucose.: 213 mg/dL (22 May 2021 16:38)           CXR:      Physical Exam:    Gen: WN/WD NAD    Neuro: A+Ox3, nonfocal    Pulm: diminished bases    CV: irreg + systolic murmur    LE: no edema    R chest tube no air leak, serous drainage              PAST MEDICAL & SURGICAL HISTORY:  BPH (benign prostatic hyperplasia)    Alzheimer&#x27;s dementia  poor historian    Anemia due to chronic kidney disease, on chronic dialysis  m-w-f    (HFpEF) heart failure with preserved ejection fraction    Atrial fibrillation, unspecified type  loop recorder in place    Stage 5 chronic kidney disease on chronic dialysis  on dialysis m-w-f    History of insulin dependent diabetes mellitus    GERD (gastroesophageal reflux disease)    Essential hypertension    Hyperlipidemia, unspecified hyperlipidemia type    Moderate aortic stenosis    Hypothyroid    Ureteral stent retained    S/P right pulmonary artery pressure sensor implant placement    AV fistula    Cataract

## 2021-05-23 NOTE — PROGRESS NOTE ADULT - ASSESSMENT
[age] y/o male with PMHx significant for Chronic Diastolic HFpEF (Echo 12/2019: LVEF 60-65%), grade II diastolic dysfunction, cardiomems placed 1/2020, non-obstructive CAD, bradycardia, ILR in place (placed 1/2016), AS, HTN, HLD, Diabetes Mellitus on Insulin, ESRD on HD on M/W/F, Afib, Hypothyroidism, alzheimer's dementia presented 5/14 to ER after c/o SOB requiring supplemental O2 while at Hemodialysis. Patient has been experiencing SOB since Monday, had outpatient chest X-ray done revealing moderate b/l pleural effusions, was advised to increase dose of Torsemide by Cardiologist Dr. Faustin recommended to nephrology team to increase fluid off loading during dialysis. During dialysis, pt required supplemental o2. Pt wife then contacted outpatient cardiology office saying dyspnea was worsening so patient was advised to come to ED for treatment of pleural effusion and recommended increased fluid off loading during HD.    Of note  Pt had left thoracentesis 5/20 with 1400cc fluid removed,  plan was for patient to get IR thoracentesis 5/21 on right, but became acutely SOB.  Placed on BIPAP and received IV lasix.  5/21: L thoracentesis by IR, R pigtail placed by Thoracic team  5/23 Maintain R pigtail waterseal with high output

## 2021-05-24 NOTE — PROGRESS NOTE ADULT - SUBJECTIVE AND OBJECTIVE BOX
[facility] DIVISION OF KIDNEY DISEASES AND HYPERTENSION -- FOLLOW UP NOTE  --------------------------------------------------------------------------------  Chief Complaint:  ESRD HD  24 hour events/subjective:  Seen/examined  Reevaluated on HD  No complaints;      PAST HISTORY  --------------------------------------------------------------------------------  No significant changes to PMH, PSH, FHx, SHx, unless otherwise noted    ALLERGIES & MEDICATIONS  --------------------------------------------------------------------------------  Allergies    No Known Allergies    Intolerances      Standing Inpatient Medications  aspirin  chewable 81 milliGRAM(s) Oral daily  atorvastatin 10 milliGRAM(s) Oral at bedtime  cholecalciferol 2000 Unit(s) Oral daily  dextrose 40% Gel 15 Gram(s) Oral once  dextrose 5%. 1000 milliLiter(s) IV Continuous <Continuous>  dextrose 5%. 1000 milliLiter(s) IV Continuous <Continuous>  dextrose 50% Injectable 25 Gram(s) IV Push once  dextrose 50% Injectable 12.5 Gram(s) IV Push once  dextrose 50% Injectable 25 Gram(s) IV Push once  doxazosin 4 milliGRAM(s) Oral at bedtime  ferrous    sulfate 325 milliGRAM(s) Oral daily  glucagon  Injectable 1 milliGRAM(s) IntraMuscular once  heparin   Injectable 5000 Unit(s) SubCutaneous every 8 hours  hydrALAZINE 50 milliGRAM(s) Oral three times a day  insulin lispro (ADMELOG) corrective regimen sliding scale   SubCutaneous three times a day before meals  insulin lispro (ADMELOG) corrective regimen sliding scale   SubCutaneous at bedtime  isosorbide   mononitrate ER Tablet (IMDUR) 30 milliGRAM(s) Oral daily  levothyroxine 50 MICROGram(s) Oral daily  memantine 10 milliGRAM(s) Oral two times a day  metoprolol succinate ER 25 milliGRAM(s) Oral daily  sodium bicarbonate 650 milliGRAM(s) Oral two times a day  torsemide 20 milliGRAM(s) Oral two times a day    PRN Inpatient Medications  ondansetron Injectable 4 milliGRAM(s) IV Push every 6 hours PRN  senna 2 Tablet(s) Oral at bedtime PRN      REVIEW OF SYSTEMS  --------------------------------------------------------------------------------  Gen: No weight changes, fatigue, fevers/chills, weakness  Skin: No rashes  Head/Eyes/Ears/Mouth: No headache; Normal hearing; Normal vision w/o blurriness; No sinus pain/discomfort, sore throat  Respiratory: No dyspnea, cough, wheezing, hemoptysis  CV: No chest pain, PND, orthopnea  GI: No abdominal pain, diarrhea, constipation, nausea, vomiting, melena, hematochezia  : No increased frequency, dysuria, hematuria, nocturia  MSK: No joint pain/swelling; no back pain; no edema  Neuro: No dizziness/lightheadedness, weakness, seizures, numbness, tingling  Heme: No easy bruising or bleeding  Endo: No heat/cold intolerance  Psych: No significant nervousness, anxiety, stress, depression    All other systems were reviewed and are negative, except as noted.    VITALS/PHYSICAL EXAM  --------------------------------------------------------------------------------  T(C): 36.6 (05-24-21 @ 09:10), Max: 37 (05-23-21 @ 21:56)  HR: 67 (05-24-21 @ 09:10) (67 - 88)  BP: 110/53 (05-24-21 @ 09:10) (110/53 - 142/84)  RR: 18 (05-24-21 @ 09:10) (18 - 18)  SpO2: 96% (05-24-21 @ 09:10) (96% - 98%)  Wt(kg): --        05-23-21 @ 07:01  -  05-24-21 @ 07:00  --------------------------------------------------------  IN: 0 mL / OUT: 1440 mL / NET: -1440 mL      Physical Exam:  	Gen: NAD   	HEENT: PERRL, supple neck, clear oropharynx  	Pulm: CTA B/L  	CV: RRR, S1S2; no rub  	Back: No spinal or CVA tenderness; no sacral edema  	Abd: +BS, soft, nontender/nondistended  	: No suprapubic tenderness  	UE: Warm, FROM, no clubbing, intact strength; no edema; no asterixis  	LE: Warm, FROM, no clubbing, intact strength; no edema  	Neuro: No focal deficits, intact gait  	Psych: Normal affect and mood  	Skin: Warm, without rashes       LABS/STUDIES  --------------------------------------------------------------------------------              9.9    7.13  >-----------<  165      [05-24-21 @ 10:11]              29.6     130  |  91  |  66.0  ----------------------------<  154      [05-24-21 @ 10:11]  4.4   |  29.0  |  4.17        Ca     8.2     [05-24-21 @ 10:11]      Mg     2.1     [05-23-21 @ 07:50]      Phos  4.0     [05-24-21 @ 10:11]    TPro  6.1  /  Alb  2.8  /  TBili  0.4  /  DBili  x   /  AST  11  /  ALT  6   /  AlkPhos  56  [05-22-21 @ 22:08]          Creatinine Trend:  SCr 4.17 [05-24 @ 10:11]  SCr 3.62 [05-23 @ 07:50]  SCr 3.33 [05-22 @ 22:08]  SCr 2.58 [05-22 @ 08:04]  SCr 3.45 [05-21 @ 07:00]        HbA1c 8.1      [12-11-19 @ 06:47]

## 2021-05-24 NOTE — PROGRESS NOTE ADULT - SUBJECTIVE AND OBJECTIVE BOX
Subjective: "Hi"  Pts dtr on face time, wife at bedside      V/S  T(C): 36.7 (05-24-21 @ 13:09), Max: 37 (05-23-21 @ 21:56)  HR: 72 (05-24-21 @ 13:09) (66 - 88)  BP: 142/70 (05-24-21 @ 13:09) (110/53 - 142/84)  RR: 18 (05-24-21 @ 13:09) (18 - 18)  SpO2: 98% (05-24-21 @ 13:09) (96% - 100%)      CHEST TUBE:   R post pigtail                        OUTPUT:  20 ml overnite           per 24 hours     Drainage:  serous  AIR LEAKS:  [ ] YES [ x] NO      MEDICATIONS  (STANDING):  aspirin  chewable 81 milliGRAM(s) Oral daily  atorvastatin 10 milliGRAM(s) Oral at bedtime  cholecalciferol 2000 Unit(s) Oral daily  dextrose 40% Gel 15 Gram(s) Oral once  dextrose 5%. 1000 milliLiter(s) (50 mL/Hr) IV Continuous <Continuous>  dextrose 5%. 1000 milliLiter(s) (100 mL/Hr) IV Continuous <Continuous>  dextrose 50% Injectable 25 Gram(s) IV Push once  dextrose 50% Injectable 12.5 Gram(s) IV Push once  dextrose 50% Injectable 25 Gram(s) IV Push once  doxazosin 4 milliGRAM(s) Oral at bedtime  ferrous    sulfate 325 milliGRAM(s) Oral daily  glucagon  Injectable 1 milliGRAM(s) IntraMuscular once  heparin   Injectable 5000 Unit(s) SubCutaneous every 8 hours  hydrALAZINE 50 milliGRAM(s) Oral three times a day  insulin lispro (ADMELOG) corrective regimen sliding scale   SubCutaneous three times a day before meals  insulin lispro (ADMELOG) corrective regimen sliding scale   SubCutaneous at bedtime  isosorbide   mononitrate ER Tablet (IMDUR) 30 milliGRAM(s) Oral daily  levothyroxine 50 MICROGram(s) Oral daily  memantine 10 milliGRAM(s) Oral two times a day  metoprolol succinate ER 25 milliGRAM(s) Oral daily  sodium bicarbonate 650 milliGRAM(s) Oral two times a day  torsemide 20 milliGRAM(s) Oral two times a day      05-24    130<L>  |  91<L>  |  66.0<H>  ----------------------------<  154<H>  4.4   |  29.0  |  4.17<H>    Ca    8.2<L>      24 May 2021 10:11  Phos  4.0     05-24  Mg     2.1     05-23    TPro  6.1<L>  /  Alb  2.8<L>  /  TBili  0.4  /  DBili  x   /  AST  11  /  ALT  6   /  AlkPhos  56  05-22                               9.9    7.13  )-----------( 165      ( 24 May 2021 10:11 )             29.6                 CAPILLARY BLOOD GLUCOSE      POCT Blood Glucose.: 133 mg/dL (24 May 2021 07:53)  POCT Blood Glucose.: 133 mg/dL (23 May 2021 21:56)             Physical Exam:    Gen: WN/WD NAD    Neuro: A+Ox3, nonfocal    Pulm: diminished bases    CV: irreg + systolic murmur    LE: no edema    R chest tube no air leak, serous drainage> removed          PAST MEDICAL & SURGICAL HISTORY:  BPH (benign prostatic hyperplasia)    Alzheimer&#x27;s dementia  poor historian    Anemia due to chronic kidney disease, on chronic dialysis  m-w-f    (HFpEF) heart failure with preserved ejection fraction    Atrial fibrillation, unspecified type  loop recorder in place    Stage 5 chronic kidney disease on chronic dialysis  on dialysis m-w-f    History of insulin dependent diabetes mellitus    GERD (gastroesophageal reflux disease)    Essential hypertension    Hyperlipidemia, unspecified hyperlipidemia type    Moderate aortic stenosis    Hypothyroid    Ureteral stent retained    S/P right pulmonary artery pressure sensor implant placement    AV fistula    Cataract

## 2021-05-24 NOTE — PROGRESS NOTE ADULT - SUBJECTIVE AND OBJECTIVE BOX
[facility]                                                               Office: [address] [address]                                                              Telephone: [phone]. Fax:[phone]                                                                             PROGRESS NOTE  Reason for follow up: Heart Failure, severe AS  Overnight: No new events.   Update: seen in dialysis. Denies chest pain sob. Minimal output from CT since yesterday. PAD 5/22 10. cont torsedmide 20mg PO BID.         Review of symptoms:   Cardiac:  No chest pain. No dyspnea. No palpitations.  Respiratory: no cough. No dyspnea  Gastrointestinal: No diarrhea. No abdominal pain. No bleeding.   Neuro: No focal neuro complaints.      Vitals:  T(C): 36.6 (05-24-21 @ 09:10), Max: 37 (05-23-21 @ 21:56)  HR: 67 (05-24-21 @ 09:10) (67 - 88)  BP: 110/53 (05-24-21 @ 09:10) (110/53 - 142/84)  RR: 18 (05-24-21 @ 09:10) (18 - 18)  SpO2: 96% (05-24-21 @ 09:10) (96% - 98%)      I&O's Summary    23 May 2021 07:01  -  24 May 2021 07:00  --------------------------------------------------------  IN: 0 mL / OUT: 1440 mL / NET: -1440 mL      Weight (kg): 73 (05-20 @ 12:26)      PHYSICAL EXAM:  Appearance: Comfortable. No acute distress  HEENT:  Atraumatic. Normocephalic.  Normal oral mucosa  Neurologic: A & O x 3, no focal deficits. EOMI.  Cardiovascular: Normal S1 S2, No murmur, rubs/gallops. No JVD, No edema  Respiratory: Lungs clear to auscultation, unlabored   Gastrointestinal:  Soft, Non-tender, + BS  Lower Extremities: No edema  Psychiatry: Patient is calm. No agitation. Mood & affect appropriate  Skin: No rashes/ ecchymoses/cyanosis/ulcers visualized on the face, hands or feet.      CURRENT MEDICATIONS:  doxazosin 4 milliGRAM(s) Oral at bedtime  hydrALAZINE 50 milliGRAM(s) Oral three times a day  isosorbide   mononitrate ER Tablet (IMDUR) 30 milliGRAM(s) Oral daily  metoprolol succinate ER 25 milliGRAM(s) Oral daily  torsemide 20 milliGRAM(s) Oral two times a day    memantine  atorvastatin  dextrose 40% Gel  dextrose 50% Injectable  glucagon  Injectable  insulin lispro (ADMELOG) corrective regimen sliding scale  levothyroxine  aspirin  chewable  cholecalciferol  dextrose 5%.  ferrous    sulfate  heparin   Injectable  sodium bicarbonate      DIAGNOSTIC TESTING:  [ ] Echocardiogram:   < from: TTE Echo Complete w/o Contrast w/ Doppler (05.22.21 @ 08:59) >    Summary:   1. Technically fair study.   2. Left ventricular ejection fraction, by visual estimation, is 60 to 65%.   3. Spectral Doppler shows pseudonormal pattern of left ventricular myocardial filling (Grade II diastolic dysfunction).   4. Elevated left atrial and left ventricular end-diastolic pressures.   5. Estimated pulmonary artery systolic pressure is 44.7 mmHg assuming a right atrial pressure of 3 mmHg, which is consistent with mild pulmonary hypertension.   6. Mild to moderate aortic regurgitation.   7. Peak transaorticgradient equals 47.7 mmHg, mean transaortic gradient equals 26.7 mmHg, the calculated aortic valve area equals 0.94 cm² by the continuity equation consistent with severe aortic stenosis.    MD Hever Electronically signed on 5/22/2021 at 1:13:38 PM    < end of copied text >  	    LABS:	 	  CARDIAC MARKERS ( 21 May 2021 15:07 )  x     / 0.04 ng/mL / x     / x     / x      p-BNP 21 May 2021 15:07: x                            9.9    7.13  )-----------( 165      ( 24 May 2021 10:11 )             29.6     05-24    130<L>  |  91<L>  |  66.0<H>  ----------------------------<  154<H>  4.4   |  29.0  |  4.17<H>    Ca    8.2<L>      24 May 2021 10:11  Phos  4.0     05-24  Mg     2.1     05-23    TPro  6.1<L>  /  Alb  2.8<L>  /  TBili  0.4  /  DBili  x   /  AST  11  /  ALT  6   /  AlkPhos  56  05-22    proBNP: Serum Pro-Brain Natriuretic Peptide: 45220 pg/mL (05-20 @ 13:12)      TELEMETRY: Reviewed

## 2021-05-24 NOTE — PROGRESS NOTE ADULT - ASSESSMENT
79 y/o male with medical history of pAF no AC d/t hx of anemia, non-obstructive CAD, chronic bradycardia, ILR placed 1/2016, Chronic Diastolic HFpEF (12/2019 EF 60-65%), Gr. II DD, Cardiomems (placed 1/2020) PA diastolic goal of 16, mod to severe AS, CKD 4, HTN, HLD, b/l carotid bruit, hyperkalemia, IDDMII, dementia who presents to [facility] for treatment of pleural effusion.      Acute on Chronic HF  - s/p left  thoracocentesis CTsx managing - CT to drainage  - Decresed torsemide to 20mg BID  - Cardiomems PAD 10 yesterday  - TTE EF 60-65%, DDII, Severe AS; plan for TAVR work up as out patient   - no further cardiac work up at this time. Follow up with Dr proctor in office    Pleural Effusions  - minimal out overnight  - chest tubes to be d/c'd today    pAF  - not on anticoagulation .   -Watchman outpatient evaluation planned    ESRD   - HD today    HTN  -Cont. home medication regimen    HLD  -Cont. Pravastatin      Thank you for allowing me to participate in care of your patient.   Please call as needed.

## 2021-05-24 NOTE — PROGRESS NOTE ADULT - PROBLEM SELECTOR PLAN 1
D/C pigtail  Continue diuresis torsemide BID and optimal medical management  Reconsult if needs arise  D/W Dr Medley and Dr Castro
Maintain chest tube   Continue diuresis torsemide BID and optimal medical management  D/W Dr Medley and Dr Castro
Maintain chest tube today  Drainage slowing, may be d/c'd tomorrow  Continue diuresis torsemide BID and optimal medical management  D/W Dr Medley and Dr Castro

## 2021-05-24 NOTE — PROGRESS NOTE ADULT - ASSESSMENT
80y Male 79 y/o male with PMHx significant for Chronic Diastolic HFpEF (Echo 12/2019: LVEF 60-65%), grade II diastolic dysfunction, cardiomems placed 1/2020, non-obstructive CAD, bradycardia, ILR in place (placed 1/2016), AS, HTN, HLD, Diabetes Mellitus on Insulin, ESRD on HD on M/W/F, Afib, Hypothyroidism, alzheimer's dementia presented 5/14 to ER after c/o SOB requiring supplemental O2 while at Hemodialysis. Patient has been experiencing SOB since Monday, had outpatient chest X-ray done revealing moderate b/l pleural effusions, was advised to increase dose of Torsemide by Cardiologist Dr. Faustin recommended to nephrology team to increase fluid off loading during dialysis. During dialysis, pt required supplemental o2. Pt wife then contacted outpatient cardiology office saying dyspnea was worsening so patient was advised to come to ED for treatment of pleural effusion and recommended increased fluid off loading during HD.    Of note  Pt had left thoracentesis 5/20 with 1400cc fluid removed,  plan was for patient to get IR thoracentesis 5/21 on right, but became acutely SOB.  Placed on BIPAP and received IV lasix.  5/21: L thoracentesis by IR, R pigtail placed by Thoracic team  5/23 Maintain R pigtail waterseal with high output  524 Pigtail removed

## 2021-05-24 NOTE — PROGRESS NOTE ADULT - ASSESSMENT
1) ESRD on HD  2) Volume/ HTN  3) Anemia of renal disease  4) CKD- MBD    MWF  Continue Torsemide 40 mg bid  Hb at goal; KD with HD  BP stable, continue current regimen  Monitor Ca++ and phos

## 2021-05-24 NOTE — PROGRESS NOTE ADULT - ASSESSMENT
79 y/o male with PMHx significant for Chronic Diastolic HFpEF (Echo 12/2019: LVEF 60-65%), grade II diastolic dysfunction, cardiomems placed 1/2020, non-obstructive CAD, bradycardia, ILR in place (placed 1/2016), AS, HTN, HLD, Diabetes Mellitus on Insulin, ESRD on HD on M/W/F, Afib, Hypothyroidism, alzheimer's dementia presented to ER after c/o SOB requiring supplemental O2 while at Hemodialysis. Patient has been experiencing SOB since Monday, had outpatient chest X-ray done revealing moderate b/l pleural effusions, was advised to increase dose of Torsemide by Cardiologist and recommended increased fluid off loading during HD. At Hemodialysis patient was noted with worsening SOB, wife called Cardiologist's office and was advised to bring patient to ER. Patient was seen in ER patient was noted with SpO2 of 90%, EKG with NSR with HR: 73/min. Patient is scheduled for thoracentesis.    #Acute on Chronic HFpEF with bilateral pleural effusions - thoracentesis performed on 5/20 on L (1400cc) with R chest tube placed on 5/21 (1500cc and clamped)  # Now with sever AS on ECHO  - appears euvolemic with minimal output via chest tube >> d/c with ct surgery >> chest tube will be removed today.   - EF preserved, G2DD, mild PHTN, sever EF on echo   -c/w torsemide 20 mg BID, metoprolol ER 25mg daily, hydralazine 50mg po q8hrs, imdur 30   - cardiology consult noted >> TAVR work up in OP setting    #Hypertension - c/w amlodipine and hydralazine     #Diabetes Mellitus type II   - a1c 7.2, c/w mdss , will adjust insulin base on poc    #BPH  - Continue doxazosin 4mg po qhs    #Alzheimer's Dementia  - Continue Memantine 10mg po q12hrs    #ESRD on HD M/W/F  - HD as per  Nephrology team  - c/w Bicarb 650  bid    #Afib, rate controlled off Ac at home  - Not on AC, unable to afford DOAC, warfarin not tolerated   - [id number]: 5   - c/w bb and asa, will need to f/u with cardiology in op for watchman device    #HLD  - Lipitor 40mg po qhs     #VTE prophylaxis - heparin ppx  # CODE - full code, plan was discussed with pt and pt's daughter over phone  # Dispo - needs pt/ot eval and if stable after chest tube removal next 24 hr can be d/c home vs [facility].

## 2021-05-25 NOTE — PHYSICAL THERAPY INITIAL EVALUATION ADULT - ADDITIONAL COMMENTS
Pt lives with spouse in a private house. 4 steps to enter, 8 to bedroom. (+) rails. Independent at baseline, no device. Owns a SAC. Spouse is available to assist as needed.

## 2021-05-25 NOTE — DIETITIAN INITIAL EVALUATION ADULT. - OTHER INFO
81 y/o male with PMHx significant for Chronic Diastolic HFpEF (Echo 12/2019: LVEF 60-65%), grade II diastolic dysfunction, cardiomems placed 1/2020, non-obstructive CAD, bradycardia, ILR in place (placed 1/2016), AS, HTN, HLD, Diabetes Mellitus on Insulin, ESRD on HD on M/W/F, Afib, Hypothyroidism, alzheimer's dementia presented to ER after c/o SOB requiring supplemental O2 while at Hemodialysis. Patient has been experiencing SOB since Monday, had outpatient chest X-ray done revealing moderate b/l pleural effusions, was advised to increase dose of Torsemide by Cardiologist and recommended increased fluid off loading during HD. At Hemodialysis patient was noted with worsening SOB, wife called Cardiologist's office and was advised to bring patient to ER. Patient was seen in ER patient was noted with SpO2 of 90%, EKG with NSR with HR: 73/min. Patient is scheduled for thoracentesis. Pt continues with good po intake. Encouraged HBV protein and renal diet adherence. Pt verbalized understanding. K+/phos WNL.

## 2021-05-25 NOTE — PHYSICAL THERAPY INITIAL EVALUATION ADULT - PERTINENT HX OF CURRENT PROBLEM, REHAB EVAL
81 y/o male with bilateral pleural effusions. Had thoracentesis as outpt. Plan for thoracentesis of right side, cancelled 2/2 respiratory distress. Right pigtail placed. Also noted severe AS, plan for TAVR w/u as an outpatient.

## 2021-05-25 NOTE — DIETITIAN INITIAL EVALUATION ADULT. - PERTINENT LABORATORY DATA
05-24 Na130 mmol/L<L> Glu 154 mg/dL<H> K+ 4.4 mmol/L Cr  4.17 mg/dL<H> BUN 66.0 mg/dL<H> Phos 4.0 mg/dL Alb n/a   PAB n/a

## 2021-05-25 NOTE — PROGRESS NOTE ADULT - SUBJECTIVE AND OBJECTIVE BOX
[facility] DIVISION OF KIDNEY DISEASES AND HYPERTENSION -- FOLLOW UP NOTE  --------------------------------------------------------------------------------  Chief Complaint: in Chair, Not orthopneic,     24 hour events/subjective:    PAST HISTORY  --------------------------------------------------------------------------------  No significant changes to PMH, PSH, FHx, SHx, unless otherwise noted    ALLERGIES & MEDICATIONS  --------------------------------------------------------------------------------  Allergies    No Known Allergies    Standing Inpatient Medications  aspirin  chewable 81 milliGRAM(s) Oral daily  atorvastatin 10 milliGRAM(s) Oral at bedtime  cholecalciferol 2000 Unit(s) Oral daily  dextrose 40% Gel 15 Gram(s) Oral once  dextrose 5%. 1000 milliLiter(s) IV Continuous <Continuous>  dextrose 5%. 1000 milliLiter(s) IV Continuous <Continuous>  dextrose 50% Injectable 25 Gram(s) IV Push once  dextrose 50% Injectable 12.5 Gram(s) IV Push once  dextrose 50% Injectable 25 Gram(s) IV Push once  doxazosin 4 milliGRAM(s) Oral at bedtime  ferrous    sulfate 325 milliGRAM(s) Oral daily  glucagon  Injectable 1 milliGRAM(s) IntraMuscular once  heparin   Injectable 5000 Unit(s) SubCutaneous every 8 hours  hydrALAZINE 50 milliGRAM(s) Oral three times a day  insulin lispro (ADMELOG) corrective regimen sliding scale   SubCutaneous three times a day before meals  insulin lispro (ADMELOG) corrective regimen sliding scale   SubCutaneous at bedtime  isosorbide   mononitrate ER Tablet (IMDUR) 30 milliGRAM(s) Oral daily  levothyroxine 50 MICROGram(s) Oral daily  memantine 10 milliGRAM(s) Oral two times a day  metoprolol succinate ER 25 milliGRAM(s) Oral daily  sodium bicarbonate 650 milliGRAM(s) Oral two times a day  torsemide 20 milliGRAM(s) Oral two times a day    PRN Inpatient Medications  ondansetron Injectable 4 milliGRAM(s) IV Push every 6 hours PRN  senna 2 Tablet(s) Oral at bedtime PRN      REVIEW OF SYSTEMS  --------------------------------------------------------------------------------  Gen: No weight changes, fatigue, fevers/chills, weakness  Skin: No rashes  Head/Eyes/Ears/Mouth: No headache; Normal hearing; Normal vision w/o blurriness; No sinus pain/discomfort, sore throat  Respiratory: No dyspnea, cough, wheezing, hemoptysis  CV: No chest pain, PND, orthopnea  GI: No abdominal pain, diarrhea, constipation, nausea, vomiting, melena, hematochezia  : No increased frequency, dysuria, hematuria, nocturia  MSK: No joint pain/swelling; no back pain; no edema  Neuro: No dizziness/lightheadedness, weakness, seizures, numbness, tingling  Heme: No easy bruising or bleeding  Endo: No heat/cold intolerance  Psych: No significant nervousness, anxiety, stress, depression    All other systems were reviewed and are negative, except as noted.    VITALS/PHYSICAL EXAM  --------------------------------------------------------------------------------  T(C): 36.7 (05-25-21 @ 10:59), Max: 36.9 (05-24-21 @ 17:21)  HR: 81 (05-25-21 @ 10:59) (66 - 82)  BP: 105/45 (05-25-21 @ 10:59) (105/45 - 142/70)  RR: 18 (05-25-21 @ 10:59) (18 - 18)  SpO2: 97% (05-25-21 @ 10:59) (97% - 100%)    05-24-21 @ 07:01  -  05-25-21 @ 07:00  --------------------------------------------------------  IN: 900 mL / OUT: 3850 mL / NET: -2950 mL    Physical Exam:  	Gen: NAD, well-appearing  	HEENT: PERRL, supple neck, clear oropharynx  	Pulm: CTA B/L  	CV: RRR, S1S2; no rub  	Back: No spinal or CVA tenderness; no sacral edema  	Abd: +BS, soft, nontender/nondistended  	: No suprapubic tenderness  	UE: Warm, FROM, no clubbing, intact strength; no edema; no asterixis  	LE: Warm, FROM, no clubbing, intact strength; no edema  	Neuro: No focal deficits, intact gait  	Psych: Normal affect and mood  	Skin: Warm, without rashes  	Vascular access:    LABS/STUDIES  --------------------------------------------------------------------------------              10.7   8.18  >-----------<  179      [05-25-21 @ 07:37]              32.2     136  |  96  |  49.0  ----------------------------<  120      [05-25-21 @ 07:37]  4.1   |  27.0  |  3.29        Ca     8.3     [05-25-21 @ 07:37]      Phos  4.0     [05-24-21 @ 10:11]    Creatinine Trend:  SCr 3.29 [05-25 @ 07:37]  SCr 4.17 [05-24 @ 10:11]  SCr 3.62 [05-23 @ 07:50]  SCr 3.33 [05-22 @ 22:08]  SCr 2.58 [05-22 @ 08:04]    HbA1c 8.1      [12-11-19 @ 06:47]    80y Male 79 y/o male with PMHx significant for Chronic Diastolic HFpEF (Echo 12/2019: LVEF 60-65%), grade II diastolic dysfunction, cardiomems placed 1/2020, non-obstructive CAD, bradycardia, ILR in place (placed 1/2016), AS, HTN, HLD, Diabetes Mellitus on Insulin, ESRD on HD on M/W/F, Afib, Hypothyroidism, alzheimer's dementia presented 5/14 to ER after c/o SOB requiring supplemental O2 while at Hemodialysis. Patient has been experiencing SOB since Monday, had outpatient chest X-ray done revealing moderate b/l pleural effusions, was advised to increase dose of Torsemide by Cardiologist Dr. Faustin recommended to nephrology team to increase fluid off loading during dialysis. During dialysis, pt required supplemental o2. Pt wife then contacted outpatient cardiology office saying dyspnea was worsening so patient was advised to come to ED for treatment of pleural effusion and recommended increased fluid off loading during HD.    Of note  Pt had left thoracentesis 5/20 with 1400cc fluid removed,  plan was for patient to get IR thoracentesis 5/21 on right, but became acutely SOB.  Placed on BIPAP and received IV lasix.  5/21: L thoracentesis by IR, R pigtail placed by Thoracic team  5/23 Maintain R pigtail water-seal with high output  524 Pigtail removed    Problem/Plan - 1:    ·  Problem: Pleural effusion.   ·  Plan: D/C pigtail,    Continue diuresis torsemide BID and optimal medical management    Problem/Plan - 2:    ·  Problem: Moderate aortic stenosis.   ·  Plan: Eventual TAVR as out patient.

## 2021-05-25 NOTE — PROGRESS NOTE ADULT - ASSESSMENT
81 y/o male with PMHx significant for Chronic Diastolic HFpEF (Echo 12/2019: LVEF 60-65%), grade II diastolic dysfunction, cardiomems placed 1/2020, non-obstructive CAD, bradycardia, ILR in place (placed 1/2016), AS, HTN, HLD, Diabetes Mellitus on Insulin, ESRD on HD on M/W/F, Afib, Hypothyroidism, alzheimer's dementia presented to ER after c/o SOB requiring supplemental O2 while at Hemodialysis. Patient has been experiencing SOB since Monday, had outpatient chest X-ray done revealing moderate b/l pleural effusions, was advised to increase dose of Torsemide by Cardiologist and recommended increased fluid off loading during HD. At Hemodialysis patient was noted with worsening SOB, wife called Cardiologist's office and was advised to bring patient to ER. Patient was seen in ER patient was noted with SpO2 of 90%, EKG with NSR with HR: 73/min. Patient is scheduled for thoracentesis.    #Acute on Chronic HFpEF with bilateral pleural effusions - thoracentesis performed on 5/20 on L (1400cc) with R chest tube placed on 5/21 (1500cc and clamped)  # Now with sever AS on ECHO  - appears euvolemic with minimal output via chest tube >> d/c with ct surgery >> chest tube will be removed today.   - EF preserved, G2DD, mild PHTN, sever EF on echo   -c/w torsemide 20 mg BID, metoprolol ER 25mg daily, hydralazine 50mg po q8hrs, imdur 30   - cardiology consult noted >> TAVR work up in OP setting    #Hypertension - c/w amlodipine and hydralazine     #Diabetes Mellitus type II   - a1c 7.2, c/w mdss , will adjust insulin base on poc    #BPH  - Continue doxazosin 4mg po qhs  - d/c [facility]      #Alzheimer's Dementia  - Continue Memantine 10mg po q12hrs    #ESRD on HD M/W/F  - HD as per  Nephrology team  - c/w Bicarb 650  bid    #Afib, rate controlled off Ac at home  - Not on AC, unable to afford DOAC, warfarin not tolerated   - [id number]: 5   - c/w bb and asa, will need to f/u with cardiology in op for watchman device    #HLD  - Lipitor 40mg po qhs     #VTE prophylaxis - heparin ppx  # CODE - full code, plan was discussed with pt and pt's daughter over phone  # Dispo - pending stairs reassessment by pt/ot, may be d/c home tomorrow

## 2021-05-25 NOTE — PROGRESS NOTE ADULT - SUBJECTIVE AND OBJECTIVE BOX
SUBJECTIVE:  Pt oob in the chair, pt states, " I am doing ok"   Patient denies acute pain with radiating or aggravating factors.  He denies chest pain, shortness of breath, palpitations, headache, dizziness, nausea, or vomiting.      Overnight events:  none    PAST MEDICAL & SURGICAL HISTORY:  BPH (benign prostatic hyperplasia)    Alzheimer&#x27;s dementia  poor historian    Anemia due to chronic kidney disease, on chronic dialysis  m-w-f    (HFpEF) heart failure with preserved ejection fraction    Atrial fibrillation, unspecified type  loop recorder in place    Stage 5 chronic kidney disease on chronic dialysis  on dialysis m-w-f    History of insulin dependent diabetes mellitus    GERD (gastroesophageal reflux disease)    Essential hypertension    Hyperlipidemia, unspecified hyperlipidemia type    Moderate aortic stenosis    Hypothyroid    Ureteral stent retained    S/P right pulmonary artery pressure sensor implant placement    AV fistula    Cataract          aspirin  chewable 81 milliGRAM(s) Oral daily  atorvastatin 10 milliGRAM(s) Oral at bedtime  cholecalciferol 2000 Unit(s) Oral daily  dextrose 40% Gel 15 Gram(s) Oral once  dextrose 5%. 1000 milliLiter(s) IV Continuous <Continuous>  dextrose 5%. 1000 milliLiter(s) IV Continuous <Continuous>  dextrose 50% Injectable 25 Gram(s) IV Push once  dextrose 50% Injectable 12.5 Gram(s) IV Push once  dextrose 50% Injectable 25 Gram(s) IV Push once  doxazosin 4 milliGRAM(s) Oral at bedtime  ferrous    sulfate 325 milliGRAM(s) Oral daily  glucagon  Injectable 1 milliGRAM(s) IntraMuscular once  heparin   Injectable 5000 Unit(s) SubCutaneous every 8 hours  hydrALAZINE 50 milliGRAM(s) Oral three times a day  insulin lispro (ADMELOG) corrective regimen sliding scale   SubCutaneous three times a day before meals  insulin lispro (ADMELOG) corrective regimen sliding scale   SubCutaneous at bedtime  isosorbide   mononitrate ER Tablet (IMDUR) 30 milliGRAM(s) Oral daily  levothyroxine 50 MICROGram(s) Oral daily  memantine 10 milliGRAM(s) Oral two times a day  metoprolol succinate ER 25 milliGRAM(s) Oral daily  ondansetron Injectable 4 milliGRAM(s) IV Push every 6 hours PRN  senna 2 Tablet(s) Oral at bedtime PRN  sodium bicarbonate 650 milliGRAM(s) Oral two times a day  torsemide 20 milliGRAM(s) Oral two times a day  MEDICATIONS  (PRN):  ondansetron Injectable 4 milliGRAM(s) IV Push every 6 hours PRN Nausea  senna 2 Tablet(s) Oral at bedtime PRN Constipation      Daily     Daily                               10.7   8.18  )-----------( 179      ( 25 May 2021 07:37 )             32.2   05-25    136  |  96<L>  |  49.0<H>  ----------------------------<  120<H>  4.1   |  27.0  |  3.29<H>    Ca    8.3<L>      25 May 2021 07:37  Phos  4.0     05-24              Objective:  T(C): 36.7 (05-25-21 @ 10:59), Max: 36.9 (05-24-21 @ 17:21)  HR: 81 (05-25-21 @ 10:59) (66 - 82)  BP: 105/45 (05-25-21 @ 10:59) (105/45 - 142/70)  RR: 18 (05-25-21 @ 10:59) (18 - 18)  SpO2: 97% (05-25-21 @ 10:59) (97% - 100%)  Wt(kg): --CAPILLARY BLOOD GLUCOSE      POCT Blood Glucose.: 117 mg/dL (25 May 2021 08:07)  POCT Blood Glucose.: 168 mg/dL (24 May 2021 21:22)  POCT Blood Glucose.: 247 mg/dL (24 May 2021 16:39)  I&O's Summary    24 May 2021 07:01  -  25 May 2021 07:00  --------------------------------------------------------  IN: 900 mL / OUT: 3850 mL / NET: -2950 mL      PHYSICAL EXAM   Neuro: A+O x 3, non-focal, speech clear and intact  Neck: supple, trachea midline  Pulm: CTA, diminished at the bases, no accessory muscle use noted  CV: +S1S2, +systolic murmur  Abd: soft, NT, ND, + BS  Ext: MORGAN x 4, no edema, no cyanosis or clubbing. 2+ DP b/l, distal motor/neuro/circ intact.   Skin: warm, dry, well perfused      Imaging:  CXR:  < from: Xray Chest 1 View- PORTABLE-Routine (Xray Chest 1 View- PORTABLE-Routine in AM.) (05.25.21 @ 06:51) >  FINDINGS:  There is a small RIGHT APICAL 5% PNEUMOTHORAX.  LEFT lung shows residual LEFT basilar airspace consolidation and/or effusion obscuring LEFT diaphragm contour.  There is mild cardiomegaly.    Plate Visualized osseous structures are intact.    IMPRESSION:   RIGHT apical  5% pneumothorax.  Residual LEFT retrocardiac airspace consolidation and/or effusion..    FOLLOW-UP APPORTABLE CHEST RADIOGRAPH 5/25/2021 AT 3:56 AM:  THERE IS A STABLE RIGHT APICAL 5% PNEUMOTHORAX.  Unchanged LEFT of retrocardiac airspace consolidation and/or effusion.  There is mild cardiomegaly.    < end of copied text >      ECG:      < from: 12 Lead ECG (05.21.21 @ 14:04) >    Ventricular Rate 69 BPM    Atrial Rate 69 BPM    P-R Interval 158 ms    QRS Duration 72 ms    Q-T Interval 430 ms    QTC Calculation(Bazett) 460 ms    P Axis 85 degrees    R Axis 0 degrees    T Axis 49 degrees    Diagnosis Line Sinus rhythm with Premature atrial complexes    Confirmed by IVÁN POSADAS ([id number]) on 5/21/2021 2:53:31 PM    < end of copied text >

## 2021-05-25 NOTE — PROGRESS NOTE ADULT - SUBJECTIVE AND OBJECTIVE BOX
[facility] Division of Hospital Medicine    Chief Complaint:  CHF exacerbation    SUBJECTIVE: reports no new complains, reports feeling better     OVERNIGHT EVENTS: CT tube was removed     Patient denies chest pain, SOB, abd pain, N/V, fever, chills, dysuria or any other complaints. All remainder ROS negative.     MEDICATIONS  (STANDING):  aspirin  chewable 81 milliGRAM(s) Oral daily  atorvastatin 10 milliGRAM(s) Oral at bedtime  cholecalciferol 2000 Unit(s) Oral daily  dextrose 40% Gel 15 Gram(s) Oral once  dextrose 5%. 1000 milliLiter(s) (100 mL/Hr) IV Continuous <Continuous>  dextrose 5%. 1000 milliLiter(s) (50 mL/Hr) IV Continuous <Continuous>  dextrose 50% Injectable 25 Gram(s) IV Push once  dextrose 50% Injectable 12.5 Gram(s) IV Push once  dextrose 50% Injectable 25 Gram(s) IV Push once  doxazosin 4 milliGRAM(s) Oral at bedtime  ferrous    sulfate 325 milliGRAM(s) Oral daily  glucagon  Injectable 1 milliGRAM(s) IntraMuscular once  heparin   Injectable 5000 Unit(s) SubCutaneous every 8 hours  hydrALAZINE 50 milliGRAM(s) Oral three times a day  insulin lispro (ADMELOG) corrective regimen sliding scale   SubCutaneous three times a day before meals  insulin lispro (ADMELOG) corrective regimen sliding scale   SubCutaneous at bedtime  isosorbide   mononitrate ER Tablet (IMDUR) 30 milliGRAM(s) Oral daily  levothyroxine 50 MICROGram(s) Oral daily  memantine 10 milliGRAM(s) Oral two times a day  metoprolol succinate ER 25 milliGRAM(s) Oral daily  sodium bicarbonate 650 milliGRAM(s) Oral two times a day  torsemide 20 milliGRAM(s) Oral two times a day    MEDICATIONS  (PRN):  ondansetron Injectable 4 milliGRAM(s) IV Push every 6 hours PRN Nausea  senna 2 Tablet(s) Oral at bedtime PRN Constipation      I&O's Summary    23 May 2021 07:01  -  24 May 2021 07:00  --------------------------------------------------------  IN: 0 mL / OUT: 1440 mL / NET: -1440 mL        PHYSICAL EXAM:  Vital Signs Last 24 Hrs  T(C): 36.7 (25 May 2021 10:59), Max: 36.9 (24 May 2021 17:21)  T(F): 98.1 (25 May 2021 10:59), Max: 98.4 (24 May 2021 17:21)  HR: 81 (25 May 2021 10:59) (68 - 82)  BP: 105/45 (25 May 2021 10:59) (105/45 - 139/93)  BP(mean): --  RR: 18 (25 May 2021 10:59) (18 - 18)  SpO2: 97% (25 May 2021 10:59) (97% - 98%)      CONSTITUTIONAL: NAD, well-developed, well-groomed  ENMT: Moist oral mucosa, no pharyngeal injection or exudates; normal dentition; No JVD  RESPIRATORY: Normal respiratory effort;  crackles on bases, R side chest removed,  no wheezing  CARDIOVASCULAR: Regular rate and rhythm, regular s1s2,  systolic murmur in RUSB 3/6, no delay in carotid upstroke, No lower extremity edema; Peripheral pulses are 2+ bilaterally  ABDOMEN: Nontender to palpation, normoactive bowel sounds, no rebound/guarding; No hepatosplenomegaly  MUSCLOSKELETAL:  no clubbing or cyanosis of digits; no joint swelling or tenderness to palpation  PSYCH: A+O to person, place, but not time; affect appropriate  NEUROLOGY: CN 2-12 are intact and symmetric; no gross sensory deficits; was observed moving all 4 ext against gravity cooperating with exam.   SKIN: No rashes; no palpable lesions    LABS:                        10.7   8.18  )-----------( 179      ( 25 May 2021 07:37 )             32.2     05-25    136  |  96<L>  |  49.0<H>  ----------------------------<  120<H>  4.1   |  27.0  |  3.29<H>    Ca    8.3<L>      25 May 2021 07:37  Phos  4.0     05-24                CAPILLARY BLOOD GLUCOSE      POCT Blood Glucose.: 117 mg/dL (25 May 2021 08:07)  POCT Blood Glucose.: 168 mg/dL (24 May 2021 21:22)  POCT Blood Glucose.: 247 mg/dL (24 May 2021 16:39)        RADIOLOGY & ADDITIONAL TESTS:  Results Reviewed:   Imaging Personally Reviewed:  Electrocardiogram Personally Reviewed:

## 2021-05-25 NOTE — PROGRESS NOTE ADULT - ASSESSMENT
Bilateral pleural effusions,  congestive heart failure and aortic stenosis.    PADP goal 12 Cms.,     Now at 10.  ct torsemide 20 Q12.     No further in-patient cardiac work-up/management is needed.      HD in AM - Maintain Euvolemia,    Plan for TAVR,

## 2021-05-25 NOTE — PHYSICAL THERAPY INITIAL EVALUATION ADULT - GENERAL OBSERVATIONS, REHAB EVAL
Pt received lying in bed on 6 tower, (+) hollis, (+) IV lock, (+) Cardiac monitor, NAD. Agreeable to PT evaluation.

## 2021-05-25 NOTE — CHART NOTE - NSCHARTNOTEFT_GEN_A_CORE
Called by RN for patient with complaint of constipation. Per patient he has not had a bowel movement in 7 days.     -Miralax 17gm PO x1

## 2021-05-25 NOTE — PROGRESS NOTE ADULT - ASSESSMENT
79 y/o male with PMHx significant for Chronic Diastolic HFpEF (Echo 12/2019: LVEF 60-65%), grade II diastolic dysfunction, cardiomems placed 1/2020, non-obstructive CAD, bradycardia, ILR in place (placed 1/2016), AS, HTN, HLD, Diabetes Mellitus on Insulin, ESRD on HD on M/W/F, Afib, Hypothyroidism, alzheimer's dementia presented 5/14 to ER after c/o SOB requiring supplemental O2 while at Hemodialysis. CTS consulted for TAVR evaluation for moderate to severe AS.     PLAN   severe AS   d/w with patient need for TAVR evaluation  Cardiology following   Pt to follow up as outpatient for TAVR evaluation and workup.   plan of care d/w with Dr. Lloyd

## 2021-05-26 NOTE — DISCHARGE NOTE PROVIDER - NSDCMRMEDTOKEN_GEN_ALL_CORE_FT
amLODIPine 10 mg oral tablet: 1 tab(s) orally once a day  aspirin 81 mg oral tablet, chewable: 1 tab(s) orally once a day  ergocalciferol 2000 intl units oral capsule: 1 cap(s) orally once a day  ferrous sulfate 325 mg (65 mg elemental iron) oral tablet: 1 tab(s) orally once a day   hydrALAZINE 50 mg oral tablet: 1 tab(s) orally 3 times a day  isosorbide mononitrate 30 mg oral tablet, extended release: 1 tab(s) orally once a day (in the morning)  Lantus Solostar Pen 100 units/mL subcutaneous solution: 45 unit(s) subcutaneous once a day (at bedtime)  levothyroxine 50 mcg (0.05 mg) oral tablet: 1 tab(s) orally once a day  memantine 10 mg oral tablet: 1 tab(s) orally 2 times a day  Metoprolol Succinate ER 25 mg oral tablet, extended release: 1 tab(s) orally once a day  pravastatin 40 mg oral tablet: 1 tab(s) orally once a day  sodium bicarbonate 650 mg oral tablet: 1 tab(s) orally 2 times a day  tamsulosin 0.4 mg oral capsule: 1 cap(s) orally once a day (at bedtime)  torsemide: 40 milligram(s) orally  torsemide 20 mg oral tablet: 1 tab(s) orally once a day (at bedtime)   aspirin 81 mg oral tablet, chewable: 1 tab(s) orally once a day  ergocalciferol 2000 intl units oral capsule: 1 cap(s) orally once a day  ferrous sulfate 325 mg (65 mg elemental iron) oral tablet: 1 tab(s) orally once a day   hydrALAZINE 50 mg oral tablet: 1 tab(s) orally 3 times a day  isosorbide mononitrate 30 mg oral tablet, extended release: 1 tab(s) orally once a day (in the morning)  Lantus Solostar Pen 100 units/mL subcutaneous solution: 45 unit(s) subcutaneous once a day (at bedtime)  levothyroxine 50 mcg (0.05 mg) oral tablet: 1 tab(s) orally once a day  memantine 10 mg oral tablet: 1 tab(s) orally 2 times a day  metoprolol succinate 25 mg oral tablet, extended release: 1 tab(s) orally once a day  pravastatin 40 mg oral tablet: 1 tab(s) orally once a day  sodium bicarbonate 650 mg oral tablet: 1 tab(s) orally 2 times a day  tamsulosin 0.4 mg oral capsule: 1 cap(s) orally once a day (at bedtime)  torsemide 20 mg oral tablet: 1 tab(s) orally once a day (at bedtime)

## 2021-05-26 NOTE — PROGRESS NOTE ADULT - SUBJECTIVE AND OBJECTIVE BOX
[place] [facility]                                                               Office: [address] [address]                                                              Telephone: [phone]. Fax:[phone]                                                                             PROGRESS NOTE  Reason for follow up: Heart Failure, severe AS  Overnight: No new events.   Up[date : seen in dialysis. Denies chest pain sob.     seen in dialysis. Denies chest pain sob. Minimal output from CT since yesterday. PAD 5/22 10. cont torsedmide 20mg PO BID.         Review of symptoms:   Cardiac:  No chest pain. No dyspnea. No palpitations.  Respiratory: no cough. No dyspnea  Gastrointestinal: No diarrhea. No abdominal pain. No bleeding.   Neuro: No focal neuro complaints.      Vitals:   Orthostatic VS  ICU Vital Signs Last 24 Hrs  T(C): 36.6 (26 May 2021 17:36), Max: 36.6 (26 May 2021 17:36)  T(F): 97.8 (26 May 2021 17:36), Max: 97.8 (26 May 2021 17:36)  HR: 74 (26 May 2021 17:55) (68 - 102)  BP: 129/52 (26 May 2021 17:36) (129/52 - 140/55)  BP(mean): --  ABP: --  ABP(mean): --  RR: 18 (26 May 2021 17:36) (18 - 18)  SpO2: 96% (26 May 2021 15:50) (96% - 96%)      23 May 2021 07:01  -  24 May 2021 07:00  --------------------------------------------------------  IN: 0 mL / OUT: 1440 mL / NET: -1440 mL      Weight (kg): 73 (05-20 @ 12:26)      PHYSICAL EXAM:  Appearance: Comfortable. No acute distress  HEENT:  Atraumatic. Normocephalic.  Normal oral mucosa  Neurologic: A & O x 3, no focal deficits. EOMI.  Cardiovascular: Normal S1 S2, No murmur, rubs/gallops. No JVD, No edema  Respiratory: Lungs clear to auscultation, unlabored   Gastrointestinal:  Soft, Non-tender, + BS  Lower Extremities: No edema  Psychiatry: Patient is calm. No agitation. Mood & affect appropriate  Skin: No rashes/ ecchymoses/cyanosis/ulcers visualized on the face, hands or feet.      CURRENT MEDICATIONS:  doxazosin 4 milliGRAM(s) Oral at bedtime  hydrALAZINE 50 milliGRAM(s) Oral three times a day  isosorbide   mononitrate ER Tablet (IMDUR) 30 milliGRAM(s) Oral daily  metoprolol succinate ER 25 milliGRAM(s) Oral daily  torsemide 20 milliGRAM(s) Oral two times a day    memantine  atorvastatin  dextrose 40% Gel  dextrose 50% Injectable  glucagon  Injectable  insulin lispro (ADMELOG) corrective regimen sliding scale  levothyroxine  aspirin  chewable  cholecalciferol  dextrose 5%.  ferrous    sulfate  heparin   Injectable  sodium bicarbonate      DIAGNOSTIC TESTING:  [ ] Echocardiogram:   < from: TTE Echo Complete w/o Contrast w/ Doppler (05.22.21 @ 08:59) >    Summary:   1. Technically fair study.   2. Left ventricular ejection fraction, by visual estimation, is 60 to 65%.   3. Spectral Doppler shows pseudonormal pattern of left ventricular myocardial filling (Grade II diastolic dysfunction).   4. Elevated left atrial and left ventricular end-diastolic pressures.   5. Estimated pulmonary artery systolic pressure is 44.7 mmHg assuming a right atrial pressure of 3 mmHg, which is consistent with mild pulmonary hypertension.   6. Mild to moderate aortic regurgitation.   7. Peak transaorticgradient equals 47.7 mmHg, mean transaortic gradient equals 26.7 mmHg, the calculated aortic valve area equals 0.94 cm² by the continuity equation consistent with severe aortic stenosis.    MD Hever Electronically signed on 5/22/2021 at 1:13:38 PM    < end of copied text >  	    LABS:	 	  CARDIAC MARKERS ( 21 May 2021 15:07 )  x       26 May 2021 07:11    134    |  94     |  61.0   ----------------------------<  162    4.4     |  26.0   |  3.52     Ca    8.6        26 May 2021 07:11        TELEMETRY: Reviewed

## 2021-05-26 NOTE — DISCHARGE NOTE PROVIDER - HOSPITAL COURSE
Pt is a 81 y/o male with PMHx significant for Chronic Diastolic HFpEF (Echo 12/2019: LVEF 60-65%), grade II diastolic dysfunction, cardiomems placed 1/2020, non-obstructive CAD, bradycardia, ILR in place (placed 1/2016), AS, HTN, HLD, Diabetes Mellitus on Insulin, ESRD on HD on M/W/F, Afib, Hypothyroidism, alzheimer's dementia presented to ER after c/o SOB requiring supplemental O2 while at Hemodialysis. Patient has been experiencing SOB since Monday prior to admission, had outpatient chest X-ray done revealing moderate b/l pleural effusions, was advised to increase dose of Torsemide by Cardiologist and recommended increased fluid off loading during HD. At Hemodialysis patient was noted with worsening SOB, wife called Cardiologist's office and was advised to bring patient to ER. Patient was seen in ER patient was noted with SpO2 of 90%, EKG with NSR with HR: 73/min. Admitted for Acute on Chronic HFpEF with bilateral pleural effusions - thoracentesis performed on 5/20 on L (1400cc) with R chest tube placed on 5/21 (1500cc and clamped). chest tube will be removed 5/24 and Pt is oxygenating well on room air. To continue HD as outpatient. [place] removed and voiding well. Noted to have EF preserved, G2DD, mild PHTN, sever EF on echo. To continue medications as prescribed. Cardiology consult, will plan for a TAVR work up in OP setting.      Disposition: Stable for discharge. Outpatient followup discussed and to take medications as prescribed.  Discharge planning time 35 minutes.   Pt is a 81 y/o male with PMHx significant for Chronic Diastolic HFpEF (Echo 12/2019: LVEF 60-65%), grade II diastolic dysfunction, cardiomems placed 1/2020, non-obstructive CAD, bradycardia, ILR in place (placed 1/2016), AS, HTN, HLD, Diabetes Mellitus on Insulin, ESRD on HD on M/W/F, Afib, Hypothyroidism, alzheimer's dementia presented to ER after c/o SOB requiring supplemental O2 while at Hemodialysis. Patient has been experiencing SOB since Monday prior to admission, had outpatient chest X-ray done revealing moderate b/l pleural effusions, was advised to increase dose of Torsemide by Cardiologist and recommended increased fluid off loading during HD. At Hemodialysis patient was noted with worsening SOB, wife called Cardiologist's office and was advised to bring patient to ER. Patient was seen in ER patient was noted with SpO2 of 90%, EKG with NSR with HR: 73/min. Admitted for Acute on Chronic HFpEF with bilateral pleural effusions - thoracentesis performed on 5/20 on L (1400cc) with R chest tube placed on 5/21 (1500cc and clamped). chest tube was  removed 5/24 and Pt is oxygenating well on room air. He is to continue HD as outpatient and his medication regiment was adjusted.  [place] removed and voiding well. ECHO during this admission noted to have EF preserved, G2DD, mild PHTN, sever EF on echo. Cardiology team SS recommended  OP f/u for further TAVR work up.      Disposition: Stable for discharge. Outpatient followup discussed and to take medications as prescribed.    Vital Signs Last 24 Hrs  T(C): 36.4 (26 May 2021 11:08), Max: 36.7 (25 May 2021 17:16)  T(F): 97.6 (26 May 2021 11:08), Max: 98.1 (25 May 2021 17:16)  HR: 65 (26 May 2021 11:08) (62 - 82)  BP: 142/66 (26 May 2021 11:08) (133/62 - 153/64)  BP(mean): --  RR: 18 (26 May 2021 11:08) (18 - 18)  SpO2: 96% (26 May 2021 11:08) (93% - 99%)    CONSTITUTIONAL: NAD, well-developed, well-groomed  ENMT: Moist oral mucosa, no pharyngeal injection or exudates; normal dentition; No JVD  RESPIRATORY: Normal respiratory effort;  crackles on bases, R side chest removed,  no wheezing  CARDIOVASCULAR: Regular rate and rhythm, regular s1s2,  systolic murmur in RUSB 3/6, no delay in carotid upstroke, No lower extremity edema; Peripheral pulses are 2+ bilaterally  ABDOMEN: Nontender to palpation, normoactive bowel sounds, no rebound/guarding; No hepatosplenomegaly  MUSCLOSKELETAL:  no clubbing or cyanosis of digits; no joint swelling or tenderness to palpation  PSYCH: A+O to person, place, but not time; affect appropriate  NEUROLOGY: CN 2-12 are intact and symmetric; no gross sensory deficits; was observed moving all 4 ext against gravity cooperating with exam.   SKIN: No rashes; no palpable lesions    Discharge planning time 35 minutes.

## 2021-05-26 NOTE — PROGRESS NOTE ADULT - ASSESSMENT
79 y/o male with PMHx significant for Chronic Diastolic HFpEF (Echo 12/2019: LVEF 60-65%), grade II diastolic dysfunction, cardiomems placed 1/2020, non-obstructive CAD, bradycardia, ILR in place (placed 1/2016), AS, HTN, HLD, Diabetes Mellitus on Insulin, ESRD on HD on M/W/F, Afib, Hypothyroidism, alzheimer's dementia presented 5/14 to ER after c/o SOB requiring supplemental O2 while at Hemodialysis. CTS consulted for TAVR evaluation for moderate to severe AS.     PLAN:     Severe AS     Pt to follow up as outpatient for TAVR evaluation and workup.     UF : Tolerated only 2 L :

## 2021-05-26 NOTE — DISCHARGE NOTE PROVIDER - CARE PROVIDER_API CALL
Hong Wade)  Internal Medicine; Nephrology  [address] [place] [address]  Phone: [phone]  Fax: [phone]  Follow Up Time: 1 week    Sue Medley)  Thoracic and Cardiac Surgery  [address] [place] [address]  Phone: [phone]  Fax: [phone]  Follow Up Time: 1 week   Hong Wade)  Internal Medicine; Nephrology  [address] [place] [address]  Phone: [phone]  Fax: [phone]  Follow Up Time: 1 week    Sue Medley)  Thoracic and Cardiac Surgery  [address] [place] [address]  Phone: [phone]  Fax: [phone]  Follow Up Time: 1 week    Roz Faustin)  Cardiology; Internal Medicine  [address] [facility], [address]  [place], [place] [id number]  Phone: [phone]  Fax: [phone]  Established Patient  Follow Up Time: 2 weeks

## 2021-05-26 NOTE — DISCHARGE NOTE PROVIDER - NSDCFUSCHEDAPPT_GEN_ALL_CORE_FT
SANTOS, JEMIMA ; 06/01/2021 ; [facility]ph[facility] [address]  SANTOS, JEMIMA ; 06/02/2021 ; [facility] Cardio [facility] [address]  SANTOS, JEMIMA ; 06/03/2021 ; [facility] Pulm[facility] [address]  SANTOS, JEMIMA ; 06/15/2021 ; [facility] Cardio [address]  SANTOS, JEMIMA ; 07/07/2021 ; [facility] [address]  SANTOSJEMIMA ; 08/11/2021 ; [facility] [address]

## 2021-05-26 NOTE — PROGRESS NOTE ADULT - PROVIDER SPECIALTY LIST ADULT
CT Surgery
Cardiology
Cardiology
Nephrology
Nephrology
Cardiology
Cardiology
Hospitalist
Cardiology
Hospitalist
Nephrology
Hospitalist
Thoracic Surgery

## 2021-05-26 NOTE — DISCHARGE NOTE PROVIDER - NSDCCPCAREPLAN_GEN_ALL_CORE_FT
PRINCIPAL DISCHARGE DIAGNOSIS  Diagnosis: Pleural effusion  Assessment and Plan of Treatment: Due to  Acute on Chronic HFpEF causing bilateral pleural effusions - thoracentesis performed on 5/20 on L (1400cc) with R chest tube placed on 5/21 (1500cc and clamped). chest tube will be removed 5/24 and Pt is oxygenating well on room air. To continue HD as well. Continue medications as prescribed. Please follow up with your primary care physician, renal doctor and cardiologist within 1 week.      SECONDARY DISCHARGE DIAGNOSES  Diagnosis: Shortness of breath at rest  Assessment and Plan of Treatment: improved    Diagnosis: Aortic stenosis  Assessment and Plan of Treatment: Continue medications as prescribed. Please follow up with your primary care physician and cardiologist within 1 week. Plan for outpatient TAVR workup with outpatient cardiologst    Diagnosis: Diabetes mellitus  Assessment and Plan of Treatment: Diabetes Mellitus type II. A1c 7.2. Continue medications as prescribed. Please follow up with your primary care physician within 1 week.    Diagnosis: HTN (hypertension)  Assessment and Plan of Treatment: Continue medications as prescribed. Please follow up with your primary care physician and cardiologist within 1 week.    Diagnosis: BPH (benign prostatic hyperplasia)  Assessment and Plan of Treatment: Continue medications as prescribed. Please follow up with your primary care physician within 1 week.    Diagnosis: ESRD on dialysis  Assessment and Plan of Treatment: continue HD M/W/F as scheduled. Continue medications as prescribed. Please follow up with your primary care physician and nephrologist within 1 week.    Diagnosis: HLD (hyperlipidemia)  Assessment and Plan of Treatment: Continue medications as prescribed. Please follow up with your primary care physician within 1 week.    Diagnosis: Chronic atrial fibrillation  Assessment and Plan of Treatment: Not on AC, unable to afford DOAC, warfarin not tolerated. Continue medications as prescribed. Please follow up with your primary care physician within 1 week.    Diagnosis: Alzheimer disease  Assessment and Plan of Treatment: Continue medications as prescribed. Please follow up with your primary care physician within 1 week.     PRINCIPAL DISCHARGE DIAGNOSIS  Diagnosis: Pleural effusion  Assessment and Plan of Treatment: Due to  Acute on Chronic HFpEF causing bilateral pleural effusions - thoracentesis performed on 5/20 on L (1400cc) with R chest tube placed on 5/21 (1500cc and clamped). chest tube will be removed 5/24 and Pt is oxygenating well on room air. To continue HD as well. Continue medications as prescribed. Please follow up with your primary care physician, renal doctor and cardiologist within 1 week.      SECONDARY DISCHARGE DIAGNOSES  Diagnosis: Shortness of breath at rest  Assessment and Plan of Treatment: improved   - it was related to heart falilure exacerbation which has improved with dialysis adn extra fluid removal, he also got fluid removal with thoracentesis.   - please follow up with cardiologist and take your medications    Diagnosis: Aortic stenosis  Assessment and Plan of Treatment: Continue medications as prescribed. Please follow up with your primary care physician and cardiologist within 1 week. Plan for outpatient TAVR workup with outpatient cardiologst    Diagnosis: Diabetes mellitus  Assessment and Plan of Treatment: Diabetes Mellitus type II. A1c 7.2. Continue medications as prescribed. Please follow up with your primary care physician within 1 week.    Diagnosis: HTN (hypertension)  Assessment and Plan of Treatment: Continue medications as prescribed. Please follow up with your primary care physician and cardiologist within 1 week.    Diagnosis: BPH (benign prostatic hyperplasia)  Assessment and Plan of Treatment: Continue medications as prescribed. Please follow up with your primary care physician within 1 week.    Diagnosis: ESRD on dialysis  Assessment and Plan of Treatment: continue HD M/W/F as scheduled. Continue medications as prescribed. Please follow up with your primary care physician and nephrologist within 1 week.    Diagnosis: HLD (hyperlipidemia)  Assessment and Plan of Treatment: Continue medications as prescribed. Please follow up with your primary care physician within 1 week.    Diagnosis: Chronic atrial fibrillation  Assessment and Plan of Treatment: Not on AC, unable to afford DOAC, warfarin not tolerated. Continue medications as prescribed. Please follow up with your primary care physician within 1 week.    Diagnosis: Alzheimer disease  Assessment and Plan of Treatment: Continue medications as prescribed. Please follow up with your primary care physician within 1 week.    Diagnosis: Severe aortic stenosis  Assessment and Plan of Treatment: - please follow up with cardiologist for further work up for TAVR and take your medications

## 2021-05-26 NOTE — DISCHARGE NOTE PROVIDER - PROVIDER TOKENS
PROVIDER:[TOKEN:[[id number]],FOLLOWUP:[1 week]],PROVIDER:[TOKEN:[[id number]],FOLLOWUP:[1 week]] PROVIDER:[TOKEN:[[id number]],FOLLOWUP:[1 week]],PROVIDER:[TOKEN:[[id number]],FOLLOWUP:[1 week]],PROVIDER:[TOKEN:[[id number]],FOLLOWUP:[2 weeks],ESTABLISHEDPATIENT:[T]]

## 2021-05-26 NOTE — DISCHARGE NOTE PROVIDER - CARE PROVIDERS DIRECT ADDRESSES
,[email or web address]

## 2021-05-26 NOTE — DISCHARGE NOTE NURSING/CASE MANAGEMENT/SOCIAL WORK - PATIENT PORTAL LINK FT
You can access the FollowMyHealth Patient Portal offered by [facility] by registering at the following website: [email or web address]. By joining [employer]’s FollowMyHealth portal, you will also be able to view your health information using other applications (apps) compatible with our system.

## 2021-05-26 NOTE — PROGRESS NOTE ADULT - ASSESSMENT
79 y/o male with medical history of pAF no AC d/t hx of anemia, non-obstructive CAD, chronic bradycardia, ILR placed 1/2016, Chronic Diastolic HFpEF (12/2019 EF 60-65%), Gr. II DD, Cardiomems (placed 1/2020) PA diastolic goal of 16, mod to severe AS, CKD 4, HTN, HLD, b/l carotid bruit, hyperkalemia, IDDMII, dementia who presents to [facility] for treatment of pleural effusion.      Acute on Chronic HF  - s/p left  thoracocentesis CTsx managing -  tubes out. incentive spiromte.   -  ct  torsemide to 20mg BID  - Cardiomems PAD 10        Pleural Effusions  - minimal out overnight  - chest tubes to be d/c'd today    pAF  - not on anticoagulation .   -Watchman outpatient evaluation planned    ESRD   - HD today    HTN  -Cont. home medication regimen    HLD  -Cont. Pravastatin     No further in-patient cardiac work-up/management is needed.  Follow-up in cardiology office in 2 weeks.

## 2021-05-26 NOTE — PROGRESS NOTE ADULT - NSICDXPILOT_GEN_ALL_CORE
[place]

## 2021-05-26 NOTE — PROGRESS NOTE ADULT - REASON FOR ADMISSION
Bilateral pleural effusions

## 2021-05-26 NOTE — PROGRESS NOTE ADULT - SUBJECTIVE AND OBJECTIVE BOX
[facility] DIVISION OF KIDNEY DISEASES AND HYPERTENSION -- HEMODIALYSIS NOTE  --------------------------------------------------------------------------------  Chief Complaint: ESRD/Ongoing hemodialysis requirement    24 hour events/subjective:    PAST HISTORY  --------------------------------------------------------------------------------  No significant changes to PMH, PSH, FHx, SHx, unless otherwise noted    ALLERGIES & MEDICATIONS  --------------------------------------------------------------------------------  Allergies    No Known Allergies    Intolerances: None,     Standing Inpatient Medications  aspirin  chewable 81 milliGRAM(s) Oral daily  atorvastatin 10 milliGRAM(s) Oral at bedtime  chlorhexidine 4% Liquid 1 Application(s) Topical <User Schedule>  cholecalciferol 2000 Unit(s) Oral daily  dextrose 40% Gel 15 Gram(s) Oral once  dextrose 5%. 1000 milliLiter(s) IV Continuous <Continuous>  dextrose 5%. 1000 milliLiter(s) IV Continuous <Continuous>  dextrose 50% Injectable 25 Gram(s) IV Push once  dextrose 50% Injectable 12.5 Gram(s) IV Push once  dextrose 50% Injectable 25 Gram(s) IV Push once  doxazosin 4 milliGRAM(s) Oral at bedtime  ferrous    sulfate 325 milliGRAM(s) Oral daily  glucagon  Injectable 1 milliGRAM(s) IntraMuscular once  heparin   Injectable 5000 Unit(s) SubCutaneous every 8 hours  hydrALAZINE 50 milliGRAM(s) Oral three times a day  insulin lispro (ADMELOG) corrective regimen sliding scale   SubCutaneous three times a day before meals  insulin lispro (ADMELOG) corrective regimen sliding scale   SubCutaneous at bedtime  isosorbide   mononitrate ER Tablet (IMDUR) 30 milliGRAM(s) Oral daily  levothyroxine 50 MICROGram(s) Oral daily  memantine 10 milliGRAM(s) Oral two times a day  metoprolol succinate ER 25 milliGRAM(s) Oral daily  sodium bicarbonate 650 milliGRAM(s) Oral two times a day  torsemide 20 milliGRAM(s) Oral two times a day    PRN Inpatient Medications  ondansetron Injectable 4 milliGRAM(s) IV Push every 6 hours PRN  senna 2 Tablet(s) Oral at bedtime PRN      REVIEW OF SYSTEMS  --------------------------------------------------------------------------------  Gen: No weight changes, fatigue, fevers/chills, weakness  Skin: No rashes  Head/Eyes/Ears/Mouth: No headache; Normal hearing; Normal vision w/o blurriness; No sinus pain/discomfort, sore throat  Respiratory: No dyspnea, cough, wheezing, hemoptysis  CV: No chest pain, PND, orthopnea  GI: No abdominal pain, diarrhea, constipation, nausea, vomiting, melena, hematochezia  : No increased frequency, dysuria, hematuria, nocturia  MSK: No joint pain/swelling; no back pain; no edema  Neuro: No dizziness/lightheadedness, weakness, seizures, numbness, tingling  Heme: No easy bruising or bleeding  Endo: No heat/cold intolerance  Psych: No significant nervousness, anxiety, stress, depression    All other systems were reviewed and are negative, except as noted.    VITALS/PHYSICAL EXAM  --------------------------------------------------------------------------------  T(C): 36.4 (21 @ 11:08), Max: 36.7 (21 @ 17:16)  HR: 65 (21 @ 11:08) (62 - 82)  BP: 142/66 (21 @ 11:08) (133/62 - 153/64)  RR: 18 (21 11:08) (18 - 18)  SpO2: 96% (21 @ 11:08) (93% - 99%)    21 @ 07:01  -  21 @ 07:00  --------------------------------------------------------  IN: 0 mL / OUT: 600 mL / NET: -600 mL    Physical Exam:  	Gen: NAD, well-appearing  	HEENT: PERRL, supple neck, clear oropharynx  	Pulm: CTA B/L  	CV: RRR, S1S2; no rub  	Back: No spinal or CVA tenderness; no sacral edema  	Abd: +BS, soft, nontender/nondistended  	: No suprapubic tenderness  	UE: Warm, FROM, no clubbing, intact strength; no edema; no asterixis  	LE: Warm, FROM, no clubbing, intact strength; no edema  	Neuro: No focal deficits, intact gait  	Psych: Normal affect and mood  	Skin: Warm, without rashes  	Vascular access:    LABS/STUDIES  --------------------------------------------------------------------------------              10.7   8.18  >-----------<  179      [21 @ 07:37]              32.2     134  |  94  |  61.0  ----------------------------<  162      [21 @ 07:11]  4.4   |  26.0  |  3.52        Ca     8.6     [21 @ 07:11]    HbA1c 8.1      [19 @ 06:47]    TTE Echo Complete w/o Contrast w/ Doppler (21 @ 08:59) >    EXAM:  ECHO TTE WO CON COMP W DOPP      PROCEDURE DATE:  May 22 2021     INTERPRETATION:  REPORT:  TRANSTHORACIC ECHOCARDIOGRAM REPORT    Patient Name:   JEMIMA SANTOS Patient Location: Inpatient  Medical Rec #:  [id number]    Accession #:     [id number]  Account #:                    Height:           64.2 in 163.0 cm  YOB: 1940     Weight:           160.9 lb 73.00 kg  Patient Age:    80 years      BSA:              1.79 m²  Patient Gender: M             BP:  132/60 mmHg    Date of Exam:        2021 8:59:33 AM  Sonographer:         Donovan Reyez Jr  Referring Physician: Matthias Piper MD    Procedure:     2D Echo/Doppler/Color Doppler Complete.  Indications:   Shortness of breath - R06.02  Diagnosis:     Shortness of breath - R06.02  Study Details: Technically fair study. Study quality was adversely affected due                 to body habitus and lung interference.    2D AND M-MODE MEASUREMENTS (normal ranges within parentheses):  Left                 Normal   Aorta/Left            Normal  Ventricle:                    Atrium:  IVSd (2D):    0.81  (0.7-1.1) Aortic Root    2.71  (2.4-3.7)                 cm             (2D):           cm  LVPWd (2D):   1.00  (0.7-1.1) Left Atrium  3.65  (1.9-4.0)                 cm             (2D):           cm  LVIDd (2D):   4.79  (3.4-5.7) LA Volume      24.7                 cm             Index         ml/m²  LVIDs (2D):   3.29            Right Ventricle:                 cm             TAPSE:           2.00 cm  LV FS (2D):   31.3   (>25%)                  %  Relative Wall 0.42   (<0.42)  Thickness    LV SYSTOLIC FUNCTION BY 2D PLANIMETRY (MOD):  EF-Biplane: 62 %    LV DIASTOLIC FUNCTION:  MV Peak E: 0.94 m/s E/e' Ratio: 19.10  MV Peak A: 0.76 m/s Decel Time: 275 msec  E/A Ratio: 1.23    SPECTRAL DOPPLER ANALYSIS (where applicable):  Mitral Valve:  MV P1/2 Time: 79.75 msec  MV Area, PHT: 2.76 cm²    Aortic Valve: AoV Max Mohan: 3.45 m/s AoV Peak P.7 mmHg AoV Mean P.7 mmHg    LVOT Vmax: 1.03 m/s LVOT VTI: 0.262 m LVOT Diameter: 2.02 cm    AoV Area, Vmax: 0.96 cm² AoV Area, VTI: 0.94 cm² AoV Area, Vmn: 0.93 cm²  Ao VTI: 0.896  Aortic Insufficiency:  AI Half-time: 410 msec    Tricuspid Valve and PA/RV Systolic Pressure: TR Max Velocity: 3.23 m/s RA Pressure: 3 mmHg RVSP/PASP: 44.7 mmHg    PHYSICIAN INTERPRETATION:  Left Ventricle: The left ventricular internal cavity size is normal.  Left ventricular ejection fraction, by visual estimation, is 60 to 65%. Spectral Doppler shows pseudonormal pattern of left ventricular myocardial filling (Grade II diastolic dysfunction). Elevated left atrial and left ventricular end-diastolic pressures.  Right Ventricle: Normal right ventricular size and function. TV S' 0.1 m/s.  Left Atrium: The left atrium is normal in size.  Right Atrium: The right atrium is normal in size.  Pericardium: There is no evidence of pericardial effusion.  Mitral Valve: Thickening and calcification of the anterior and posterior mitral valve leaflets. There is mild to moderate mitral annular calcification. No evidence of mitral valve regurgitation is seen.  Tricuspid Valve: Mild tricuspid regurgitation is visualized. Estimated pulmonary artery systolic pressure is 44.7 mmHg assuming a right atrial pressure of 3 mmHg, which is consistent with mild pulmonary hypertension.  Aortic Valve: Peak transaortic gradient equals 47.7 mmHg, mean transaortic gradient equals 26.7 mmHg, the calculated aortic valve area equals 0.94 cm² by the continuity equation consistent with severe aortic stenosis. Mild to moderate aortic valve regurgitation is seen.  Pulmonic Valve: The pulmonic valve was not well visualized. No indication of pulmonic valve regurgitation.  Aorta: The aortic root is normal in size and structure. There is mild aortic root calcification.  Pulmonary Artery: The pulmonary artery is not well seen.  Venous: The inferior vena cava was normal sized, with respiratory size variation greater than 50%.    Summary:   1. Technically fair study.   2. Left ventricular ejection fraction, by visual estimation, is 60 to 65%.   3. Spectral Doppler shows pseudonormal pattern of left ventricular myocardial filling (Grade II diastolic dysfunction).   4. Elevated left atrial and left ventricular end-diastolic pressures.   5. Estimated pulmonary artery systolic pressure is 44.7 mmHg assuming a right atrial pressure of 3 mmHg, which is consistent with mild pulmonary hypertension.   6. Mild to moderate aortic regurgitation.   7. Peak transaorticgradient equals 47.7 mmHg, mean transaortic gradient equals 26.7 mmHg, the calculated aortic valve area equals 0.94 cm² by the continuity equation consistent with severe aortic stenosis.    MD Hever Electronically signed on 2021 at 1:13:38 PM    CRESENCIO NORWOOD MD; Attending Cardiologist  This document has been electronically signed. May 22 2021  8:59AM

## 2021-06-08 PROBLEM — E03.9 ACQUIRED HYPOTHYROIDISM: Status: ACTIVE | Noted: 2021-01-01

## 2021-06-08 NOTE — HEALTH RISK ASSESSMENT
[No] : In the past 12 months have you used drugs other than those required for medical reasons? No [No falls in past year] : Patient reported no falls in the past year [Assistive Device] : Patient uses an assistive device [] : No [de-identified] : Cardiology, Vascular, Neurology Pulmunology  [de-identified] : [place]

## 2021-06-08 NOTE — PHYSICAL EXAM
[Normal] : affect was normal and insight and judgment were intact [de-identified] : Lower extremity edema [de-identified] : Alzheimer's, dementia

## 2021-06-08 NOTE — REVIEW OF SYSTEMS
[Fatigue] : fatigue [Recent Change In Weight] : ~T recent weight change [Lower Ext Edema] : lower extremity edema [Shortness Of Breath] : shortness of breath [Dyspnea on Exertion] : dyspnea on exertion [Negative] : Heme/Lymph [de-identified] : Alzheimer's, dementia

## 2021-06-08 NOTE — ASSESSMENT
[FreeTextEntry1] : 79 yo M with PMHx chronic diastolic HFpEF, grade II diastolic dysfunction, bradycardia, AS, HTN, HLD, DM, ESRD on HD, Afib, hypothyroidism, alzheimer's presented to the ED after SOB, requiring O2, while at HD. Patient was admitted with bilateral pleural effusions - thoracentesis performed on 5/20 and 1400cc was removed, chest tube placed on 5/21 which was removed on 5/24. Patient was told to continue HD as outpatient and his medication regimen was adjusted. Cardiology team SS recommended outpatient follow up for further TAVR work up. Today, patient's wife states he's still experiencing SOB but his condition has improved since the hospitalization. Denies fever, chills, cough, sputum, chest pain, palpitations. Patient follows Cardiology, Vascular, Neurology Pulmonology regularly.\par \par Care plan reviewed\par HD continued on MWF\par Cardiology follow up 6/15\par RTC 2 months

## 2021-06-08 NOTE — HISTORY OF PRESENT ILLNESS
[Post-hospitalization from ___ Hospital] : Post-hospitalization from [unfilled] Hospital [Admitted on: ___] : The patient was admitted on [unfilled] [Discharged on ___] : discharged on [unfilled] [Discharge Summary] : discharge summary [Pertinent Labs] : pertinent labs [Radiology Findings] : radiology findings [Discharge Med List] : discharge medication list [Med Reconciliation] : medication reconciliation has been completed [Patient Contacted By: ____] : and contacted by [unfilled] [FreeTextEntry2] : 79 yo M with PMHx chronic diastolic HFpEF, grade II diastolic dysfunction, bradycardia, AS, HTN, HLD, DM, ESRD on HD, Afib, hypothyroidism, alzheimer's presented to the ED after SOB, requiring O2, while at HD. Patient was admitted with bilateral pleural effusions - thoracentesis performed on 5/20 and 1400cc was removed, chest tube placed on 5/21 which was removed on 5/24. Patient was told to continue HD as outpatient and his medication regimen was adjusted. Cardiology team SS recommended outpatient follow up for further TAVR work up. Today, patient's wife states he's still experiencing SOB but his condition has improved since the hospitalization. Denies fever, chills, cough, sputum, chest pain, palpitations. Patient follows Cardiology, Vascular, Neurology Pulmonology regularly.

## 2021-06-08 NOTE — ASSESSMENT
[FreeTextEntry1] : 81 yo M with PMHx chronic diastolic HFpEF, grade II diastolic dysfunction, bradycardia, AS, HTN, HLD, DM, ESRD on HD, Afib, hypothyroidism, alzheimer's presented to the ED after SOB, requiring O2, while at HD. Patient was admitted with bilateral pleural effusions - thoracentesis performed on 5/20 and 1400cc was removed, chest tube placed on 5/21 which was removed on 5/24. Patient was told to continue HD as outpatient and his medication regimen was adjusted. Cardiology team SS recommended outpatient follow up for further TAVR work up. Today, patient's wife states he's still experiencing SOB but his condition has improved since the hospitalization. Denies fever, chills, cough, sputum, chest pain, palpitations. Patient follows Cardiology, Vascular, Neurology Pulmonology regularly.\par \par Care plan reviewed\par HD continued on MWF\par Cardiology follow up 6/15\par RTC 2 months

## 2021-06-08 NOTE — HEALTH RISK ASSESSMENT
[No] : In the past 12 months have you used drugs other than those required for medical reasons? No [No falls in past year] : Patient reported no falls in the past year [Assistive Device] : Patient uses an assistive device [] : No [de-identified] : Cardiology, Vascular, Neurology Pulmunology  [de-identified] : [place]

## 2021-06-08 NOTE — REVIEW OF SYSTEMS
[Fatigue] : fatigue [Recent Change In Weight] : ~T recent weight change [Lower Ext Edema] : lower extremity edema [Shortness Of Breath] : shortness of breath [Dyspnea on Exertion] : dyspnea on exertion [Negative] : Heme/Lymph [de-identified] : Alzheimer's, dementia

## 2021-06-11 NOTE — ASSESSMENT
[FreeTextEntry1] : IDDM (insulin dependent diabetes mellitus) (250.00,V58.67)\par Hyperlipidemia (272.4) (E78.5)\par Hypertension (401.9) (I10)\par Anemia (285.9) (D64.9)\par Alzheimer's dementia (331.0,294.10) (G30.9,F02.80)\par Acquired hypothyroidism (244.9) (E03.9)\par ESRD (end stage renal disease) on dialysis (585.6,V45.11) (N18.6,Z99.2)\par Heart failure with preserved left ventricular function (HFpEF) (428.9) (I50.30)\par BPH without urinary obstruction (600.00) (N40.0)\par Secondary hyperparathyroidism, renal (588.81) (N25.81)\par \par 79 yo M with PMHx chronic diastolic HFpEF, grade II diastolic dysfunction, bradycardia, AS, HTN, HLD, DM, ESRD on HD, Afib, hypothyroidism, alzheimer's presented to the ED after SOB, requiring O2, while at HD. Patient was admitted with bilateral pleural effusions - thoracentesis performed on 5/20 and 1400cc was removed, chest tube placed on 5/21 which was removed on 5/24. Patient was told to continue HD as outpatient and his medication regimen was adjusted. Cardiology team SS recommended outpatient follow up for further TAVR work up. Today, patient's wife states he's still experiencing SOB but his condition has improved since the hospitalization. Denies fever, chills, cough, sputum, chest pain, palpitations. Patient follows Cardiology, Vascular, Neurology Pulmonology regularly.\par \par Care plan reviewed\par HD continued on MWF\par Cardiology follow up 6/15/21\par \par D.C Flomax, NaHC03, Fe,

## 2021-06-11 NOTE — DISCUSSION/SUMMARY
[FreeTextEntry1] : Family Declined to be enrolled in H.T ( Personal Communication guillermina Robbins RN )\par \par Mild dementia,- ( Per Spouse ) \par \par Anemia Management per Hematology,\par \par ? RRT - Will discuss in March,

## 2021-06-15 NOTE — CHART NOTE - NSCHARTNOTEFT_GEN_A_CORE
CXR post thoracentesis reviewed, questionable for pneumothorax vs trapped lung. Discussed with Dr. Anton who reviewed the film and decision was made to keep the patient in ED for tonight with plan to repeat CXR in AM. Patient refused to stay in the hospital, wanted to leave against the medical advice initially. However patient became hypoxic, 81% on room air, that made patient and his family (wife and daughter at the bedside) amenable to stay.     Plan:  Will admit the patient to Dr. Anton  CXR repeated which shows improvement from the prior study, patient with no acute distress at this time.  Will repeat CXR in AM  Continue tele with cont POX  Formal H&P to follow.

## 2021-06-15 NOTE — ED PROVIDER NOTE - NSFOLLOWUPINSTRUCTIONS_ED_ALL_ED_FT
1) Follow up with your doctor in 24 hours  2) Return to the ER for worsening or concerning symptoms      Pleural Effusion    WHAT YOU NEED TO KNOW:    Pleural effusion is fluid buildup in the space between the layers of the pleura. The pleura is a thin piece of tissue with 2 layers. One layer rests directly on the lungs. The other rests on the chest wall. There is normally a small amount of fluid between these layers. This fluid helps your lungs move easily when you breathe.    The Lungs         DISCHARGE INSTRUCTIONS:    Call your doctor if:   •You feel faint, or you cannot think clearly.      •Your lips or fingernails turn blue.      •You find it very hard to breathe.      •You have a fever.       •Your breathing problems do not go away or get worse.      •Your pain does not go away or gets worse.      •You cough up yellow, green, gray, or bloody mucus.      •You have questions or concerns about your condition or care.      Medicines: You may need any of the following:   •Diuretics may help decrease extra fluid caused by heart failure or other problems.      •Antibiotics help prevent or treat an infection caused by bacteria.      •NSAIDs help decrease swelling and pain or fever. This medicine is available with or without a doctor's order. NSAIDs can cause stomach bleeding or kidney problems in certain people. If you take blood thinner medicine, always ask your healthcare provider if NSAIDs are safe for you. Always read the medicine label and follow directions.      •Prescription pain medicine may be given. Ask your healthcare provider how to take this medicine safely. Some prescription pain medicines contain acetaminophen. Do not take other medicines that contain acetaminophen without talking to your healthcare provider. Too much acetaminophen may cause liver damage. Prescription pain medicine may cause constipation. Ask your healthcare provider how to prevent or treat constipation.       •Steroids or other types of medicines may be given to decrease swelling.       •Take your medicine as directed. Contact your healthcare provider if you think your medicine is not helping or if you have side effects. Tell him or her if you are allergic to any medicine. Keep a list of the medicines, vitamins, and herbs you take. Include the amounts, and when and why you take them. Bring the list or the pill bottles to follow-up visits. Carry your medicine list with you in case of an emergency.      Follow up with your healthcare provider as directed: Write down your questions so you remember to ask them during your visits.    Self-care:   •Use pressure to decrease pain. Hold a pillow against your chest when you cough or take a deep breath.      •Do not smoke, and do not allow others to smoke around you. If you smoke, it is never too late to quit. Smoking increases your risk for lung infections such as pneumonia. Smoking also makes it harder for you to get better after having a lung problem. Ask your healthcare provider for information if you need help quitting.      •Drink liquids as directed and rest as needed. Liquids help to keep your air passages moist and better able to get rid of germs and other irritants. Ask your healthcare provider how much liquid to drink each day and which liquids are best for you. You may feel like resting more. Slowly start to do more each day. Rest when you feel it is needed.      •Deep breathing and coughing will decrease your risk for a lung infection. Take a deep breath and hold it for as long as you can. Let the air out and then cough strongly. Deep breaths help open your airway. You may be given an incentive spirometer to help you take deep breaths. Put the plastic piece in your mouth and take a slow, deep breath. Then let the air out and cough. Repeat these steps 10 times every hour.   How to use and Incentive Spirometer         Thoracentesis    WHAT YOU NEED TO KNOW:    A thoracentesis is a procedure to remove extra fluid or air from between your lungs and your inner chest wall. Air or fluid buildup may make it hard for you to breathe. A thoracentesis allows your lungs to expand fully so you can breathe more easily.    DISCHARGE INSTRUCTIONS:    Seek care immediately if:   •Blood soaks through your bandage.      •There is blood in your saliva.      •You had a chest tube removed and your stitches come apart.      •You have sudden trouble breathing.      •You have severe chest pain.      Contact your healthcare provider if:   •You have a fever.      •Your puncture site is red, warm, swollen, or draining pus.      •You have questions or concerns about your procedure, medicine, or care.      Medicines: You may need any of the following:   •Prescription pain medicine may be given. Ask your healthcare provider how to take this medicine safely. Some prescription pain medicines contain acetaminophen. Do not take other medicines that contain acetaminophen without talking to your healthcare provider. Too much acetaminophen may cause liver damage. Prescription pain medicine may cause constipation. Ask your healthcare provider how to prevent or treat constipation.       •Antibiotics help fight or prevent an infection.      •Take your medicine as directed. Contact your healthcare provider if you think your medicine is not helping or if you have side effects. Tell him or her if you are allergic to any medicine. Keep a list of the medicines, vitamins, and herbs you take. Include the amounts, and when and why you take them. Bring the list or the pill bottles to follow-up visits. Carry your medicine list with you in case of an emergency.      Self-care:   •Rest as needed. Return to your daily activities as directed.       •Do not smoke. Nicotine and other chemicals in cigarettes, e-cigarettes, and cigars can cause lung damage. Ask your healthcare provider for information if you currently smoke and need help to quit. Smokeless tobacco still contains nicotine. Talk to your healthcare provider before you use these products.      Follow up with your healthcare provider as directed: Write down your questions so you remember to ask them during your visits.       Heart Failure    WHAT YOU NEED TO KNOW:    Heart failure is a condition that does not allow your heart to fill or pump properly. Not enough oxygen in your blood gets to your organs and tissues. Fluid may not move through your body properly. Fluid builds up and causes swelling and trouble breathing. This is known as congestive heart failure. Heart failure may start in the left or right ventricle. Heart failure is often caused by damage or injury to your heart. The damage may be caused by other heart problems, diabetes, or high blood pressure. The damage may have also been caused by an infection. Heart failure is a long-term condition that tends to get worse over time. It is important to manage your health to improve your quality of life.    Heart Failure         DISCHARGE INSTRUCTIONS:    Call your local emergency number (911 in the ) if:   •You have any of the following signs of a heart attack: ?Squeezing, pressure, or pain in your chest      ?You may also have any of the following: ?Discomfort or pain in your back, neck, jaw, stomach, or arm      ?Shortness of breath      ?Nausea or vomiting      ?Lightheadedness or a sudden cold sweat            Call your doctor if:   •Your heartbeat is fast, slow, or uneven all the time.      •You have symptoms of worsening heart failure: ?Shortness of breath at rest, at night, or that is getting worse in any way      ?Weight gain of 3 or more pounds (1.4 kg) in a day, or more than your healthcare provider says is okay      ?More swelling in your legs or ankles      ?Abdominal pain or swelling      ?More coughing      ?Loss of appetite      ?Feeling tired all the time      •You feel hopeless or depressed, or you have lost interest in things you used to enjoy.      •You often feel worried or afraid.      •You have questions or concerns about your condition or care.      Medicines:   •Medicines may be needed to help regulate your heart rhythm. You may also need medicine to lower your blood pressure, and to decrease extra fluids.      •Take your medicine as directed. Contact your healthcare provider if you think your medicine is not helping or if you have side effects. Tell him of her if you are allergic to any medicine. Keep a list of the medicines, vitamins, and herbs you take. Include the amounts, and when and why you take them. Bring the list or the pill bottles to follow-up visits. Carry your medicine list with you in case of an emergency.      Go to cardiac rehab if directed: Cardiac rehab is a program run by specialists who will help you safely strengthen your heart. The program includes exercise, relaxation, stress management, and heart-healthy nutrition.    Manage swelling from extra fluid:   •Elevate (raise) your legs above the level of your heart. This will help with fluid that builds up in your legs or ankles. Elevate your legs as often as possible during the day. Prop your legs on pillows or blankets to keep them elevated comfortably. Try not to stand for long periods of time during the day. Move around to keep your blood circulating.  Elevate Leg           •Limit sodium (salt). Ask how much sodium you can have each day. Your healthcare provider may give you a limit, such as 2,300 milligrams (mg) a day. Your provider or a dietitian can teach you how to read food labels for the number of mg in a food. He or she can also help you find ways to have less salt. For example, if you add salt to food as you cook, do not add more at the table.             •Drink liquids as directed. You may need to limit the amount of liquid you drink within 24 hours. Your healthcare provider will tell you how much liquid to have and which liquids are best for you. He or she may tell you to limit liquid to 1.5 to 2 liters in a day. He or she will also tell you how often to drink liquid throughout the day.      •Weigh yourself every morning. Use the same scale, in the same spot. Do this after you use the bathroom, but before you eat or drink. Wear the same type of clothing each time. Write down your weight and call your healthcare provider if you have a sudden weight gain. Swelling and weight gain are signs of fluid buildup.  Weight Checks REYMUNDO           Manage heart failure: Your quality of life may improve with treatment and the following:  •Do not smoke. Nicotine and other chemicals in cigarettes and cigars can cause lung and heart damage. Ask your healthcare provider for information if you currently smoke and need help to quit. E-cigarettes or smokeless tobacco still contain nicotine. Talk to your healthcare provider before you use these products.      •Do not drink alcohol or use illegal drugs. Alcohol and drugs can increase your risk for high blood pressure, diabetes, and coronary artery disease.      •Eat heart-healthy foods. Heart-healthy foods include fruits, vegetables, lean meat (such as beef, chicken, or pork), and low-fat dairy products. Fatty fish such as salmon and tuna are also heart healthy. Other heart-healthy foods include walnuts, whole-grain breads, beans, and cooked beans. Replace butter and margarine with heart-healthy oils such as olive oil or canola oil. Your provider or a dietitian can help you create heart-healthy meal plans.             •Manage any chronic health conditions you have. These include high blood pressure, diabetes, obesity, high cholesterol, metabolic syndrome, and COPD. You will have fewer symptoms if you manage these health conditions. Follow your healthcare provider's recommendations and follow up with him or her regularly.      •Maintain a healthy weight. Being overweight can increase your risk for high blood pressure, diabetes, and coronary artery disease. These conditions can make your symptoms worse. Ask your healthcare provider how much you should weigh. Ask him or her to help you create a weight loss plan if you are overweight.      •Stay active. Activity can help keep your symptoms from getting worse. Walking is a type of physical activity that helps maintain your strength and improve your mood. Physical activity also helps you manage your weight. Work with your healthcare provider to create an exercise plan that is right for you.    Family Walking for Exercise           •Get vaccines as directed. The flu and pneumonia can be severe for a person who has heart failure. Vaccines protect you from these infections. Get a flu shot every year as soon as it is recommended, usually in September or October. You may also need the pneumonia vaccine. Your healthcare provider can tell you if you need other vaccines, and when to get them.      Follow up with your doctor within 7 days or as directed: You may need to return for other tests. You may need home health care. A healthcare provider will monitor your vital signs, weight, and make sure your medicines are working. Write down your questions so you remember to ask them during your visits.    Join a support group: Heart failure can be difficult to manage. It may be helpful to talk with others who have heart failure. You may learn how to better manage your condition or get emotional support. For more information:   •[employer]  [address] [place] [address]   Phone: 1-552.159.9172  Web Address: http://www.heart.org

## 2021-06-15 NOTE — ED ADULT TRIAGE NOTE - CHIEF COMPLAINT QUOTE
Patient wheeled into ED by family Pt c/o DEJESUS and SOB pt sent by cardiology [facility] for a chest tube, Pt is HD Tu/Th/Sa, [place]F [place] [facility] [place], Pt denies any chest pain.

## 2021-06-15 NOTE — CONSULT NOTE ADULT - PROBLEM SELECTOR RECOMMENDATION 9
POCUS performed B/L revealing large left and small right pleural effusions.   Thoracentesis completed at bedside. (see other note)  CXR pending  Plan to d/c on TORSEMIDE 20 MG 4 times weekly on NONDIALYSIS DAYS (T-Th-Sat, Sun) per Dr Roxie Faustin also to contact nephrologist regarding increasing dialysis.   Thoracic Surgery (Dr Alton Anton) office to follow up with pleurx plan.  D/W Dr Anton

## 2021-06-15 NOTE — ED ADULT NURSE NOTE - OBJECTIVE STATEMENT
Patient wheeled into ED by family Pt c/o DEJESUS and SOB pt sent by cardiology [facility] for a chest tube, Pt is HD Tu/Th/Sa, [place]F to [facility] [place], Pt denies any chest pain. Patient wheeled into ED by family Pt c/o DEJESUS and SOB pt sent by cardiology [facility] for a chest tube, Pt is HD MWF as per pt, AVF to [facility], Pt denies any chest pain.

## 2021-06-15 NOTE — ED PROVIDER NOTE - CARE PROVIDER_API CALL
Alton Anton)  Thoracic Surgery  [address] [place] [address]  Phone: [phone]  Fax: [phone]  Established Patient  Follow Up Time: Urgent

## 2021-06-15 NOTE — ED PROVIDER NOTE - CARE PLAN
Principal Discharge DX:	Pleural effusion  Secondary Diagnosis:	Chronic congestive heart failure, unspecified heart failure type  Secondary Diagnosis:	Postprocedural pneumothorax

## 2021-06-15 NOTE — ED PROVIDER NOTE - PROGRESS NOTE DETAILS
As per sign-out from Dr. Ibrahim patient pending CT surg for thoracentesis and should remain for admission given signs of pulmonary edema. Patient resting comfortably. Repeat CXR is as noted. Case discussed with the thoracic surg team and they agree patient should remain for admission. Discussed admission with the wife and patient given possible chg exacerbation and pneumothorax.   Pt understands and recalls risks of leaving against medical advice, including death from XX. Pt states that pt will see PMD. Pt advised to return to the ED if pt feels worse.

## 2021-06-15 NOTE — PROCEDURE NOTE - NSPROCDETAILS_GEN_ALL_CORE
location identified, draped/prepped, sterile technique used, needle inserted/introduced/ultrasound assessment of effusion (localization)

## 2021-06-15 NOTE — ED PROVIDER NOTE - CONSTITUTIONAL, MLM
normal... Ill appearing, awake, alert, oriented to person, place, time/situation and in mild respiratory distress.

## 2021-06-15 NOTE — ED ADULT NURSE NOTE - CHIEF COMPLAINT QUOTE
Patient wheeled into ED by family Pt c/o DEJESUS and SOB pt sent by cardiology [facility] for a chest tube, Pt is HD Tu/Th/Sa, [place] [facility] [place], Pt denies any chest pain.

## 2021-06-15 NOTE — ED PROVIDER NOTE - CARE PROVIDERS DIRECT ADDRESSES
[email or web address]

## 2021-06-15 NOTE — ED PROVIDER NOTE - OBJECTIVE STATEMENT
The patient is a 80 year old male presents with worsening of SOB and DEJESUS with history of CHF, CRD and HTN and HLD sent in by Dr Todd of [facility] for pigtail catheter placement for recurrent pleural effusion

## 2021-06-16 NOTE — H&P ADULT - PROBLEM SELECTOR PLAN 7
Monitor urine output, renal function, electrolytes  Continue home dose bicarb (chloride 95/bicarb 31)  Renal consult Monitor urine output, renal function, electrolytes  Continue home dose bicarb (chloride 95/bicarb 31)  Renal consult called, Dr. Wade will see the patient  Currently restarted on home dose Torsemide, consider changing to IV Lasix and or HD today

## 2021-06-16 NOTE — H&P ADULT - ASSESSMENT
80M h/o chronic HFpEF, non obs CAD, bradycardia, HTN, HLD, AS, DM, ESRD on HD M/W/F, AF, hypothyroidism, alzheimers, recurrecurrent pleural effusions presented to ED after referred from Dr Faustin's office.      80M h/o chronic HFpEF, non obs CAD, bradycardia, HTN, HLD, AS, DM, ESRD on HD M/W/F, AF, hypothyroidism, alzheimers, recurrecurrent pleural effusions presented to ED after referred from Dr Faustin's office. Patient had bedside thoracentesis, repeat CXR revealed pneumothorax vs trapped lung, patient became hypoxic 81% on room air. Repeat CXR remain stable.

## 2021-06-16 NOTE — H&P ADULT - PROBLEM SELECTOR PLAN 2
Continue Metoprolol   Received IV Lasix in ED  Continue Imdur  continue ASA  Low salt diet  Cardiology consult Continue Metoprolol   Received IV Lasix in ED  Continue Imdur  continue ASA  Low salt diet  Cardiology consult  Renal consult for fluid removal

## 2021-06-16 NOTE — H&P ADULT - NSHPPHYSICALEXAM_GEN_ALL_CORE
Constitutional: NAD  Neuro: A+O x 3, non-focal, speech clear and intact  Neck: supple, no JVD  CV: regular rate, regular rhythm, +S1S2, +systolic murmurs   Pulm/chest: Bibasilar rales, no accessory muscle use noted  Abd: soft, NT, ND, +BS  Ext: MORGAN x 4, +2 BLE edema, +PP pulses bilaterally  Skin: warm, well perfused  Psych: calm, appropriate affect

## 2021-06-16 NOTE — H&P ADULT - PROBLEM SELECTOR PLAN 1
Now s/p thoracentesis, drained 1110 ml, pleural fluid sent for cytology  Repeat CXR with trap lung vs pneumothorax  Patient became hypoxic 81% on room air, improved with supplemental O2, repeat CXR remains stable  Admit the patient to tele under Dr. Anton  cont Pox monitoring  Repeat CXR in AM  Plan d/w Desean Bell

## 2021-06-16 NOTE — CHART NOTE - NSCHARTNOTEFT_GEN_A_CORE
PT seen in the ER on O2 desating when attempted to wean off O2. Wife expressing would prefer to take patient home and come back, Discussed with wife the need for O2 and would be an unsafe discharge.    VSS.   Patient seen and examined. Notes, flowsheets, medications, radiologic images and labs reviewed.    plan of pleurex on Friday as per Dr. Anton   pt to be transferred to 4 tower later today     PLan of care d/w Dr. Anton and thoracic team in AM rounds PT seen in the ER on O2 desating when attempted to wean off O2. Wife expressing would prefer to take patient home and come back, Discussed with wife the need for O2 and would be an unsafe discharge.    VSS.   Patient seen and examined. Notes, flowsheets, medications, radiologic images and labs reviewed.    plan of pleurex on Friday as per Dr. Anton   pt to be transferred to 4 tower later today   renal consulted patient on HD     PLan of care d/w Dr. Anton and thoracic team in AM rounds

## 2021-06-16 NOTE — PROGRESS NOTE ADULT - SUBJECTIVE AND OBJECTIVE BOX
Patient was seen and evaluated on dialysis.   No c/o CP SOB NV  no F/C  ++ swelling    T(C): 36.8 (06-16-21 @ 15:05), Max: 36.8 (06-15-21 @ 22:20)  HR: 64 (06-16-21 @ 15:05) (58 - 78)  BP: 127/58 (06-16-21 @ 15:05) (118/60 - 138/56)  Wt(kg): --NA  PE ;  NAD  lungs - CTA  CV gr 1 murmur,  No gallop or rub  Abd : soft, NT BS +, No masses  Ext- ++ edema  Neuro : Grossly intact, moving extremities                         8.3    7.79  )-----------( 203      ( 16 Jun 2021 04:22 )             26.2        06-16    135  |  95<L>  |  30.6<H>  ----------------------------<  82  3.7   |  31.0<H>  |  3.13<H>    Ca    8.0<L>      16 Jun 2021 04:22  Mg     2.1     06-15    TPro  6.2<L>  /  Alb  2.9<L>  /  TBili  0.3<L>  /  DBili  x   /  AST  12  /  ALT  5   /  AlkPhos  60  06-16      MEDICATIONS  (STANDING):  aspirin  chewable  atorvastatin  dextrose 40% Gel  dextrose 5%.  dextrose 5%.  dextrose 50% Injectable  dextrose 50% Injectable  dextrose 50% Injectable  ferrous    sulfate  glucagon  Injectable  insulin glargine Injectable (LANTUS)  insulin lispro (ADMELOG) corrective regimen sliding scale  isosorbide   mononitrate ER Tablet (IMDUR)  levothyroxine  memantine  metoprolol succinate ER  sodium bicarbonate  sodium chloride 0.9% lock flush  tamsulosin  torsemide    Patient stable  Yennifer HD easily  Continue

## 2021-06-16 NOTE — H&P ADULT - NSHPSOCIALHISTORY_GEN_ALL_CORE
Patient lives at home with wife, reported worsening activity intolerance, uses walker at home, patient gets DEJESUS from ambulating from living room to kitchen with walker (20feet)  No EtOH use,   Former smoker - smoked cigarettes as a teenager, quit many years ago  Illicit drug use: Denies

## 2021-06-16 NOTE — H&P ADULT - NSHPLABSRESULTS_GEN_ALL_CORE
06-15                  9.1    8.46  )-----------( 231      ( 15 Ayden 2021 17:00 )             29.0         132<L>  |  92<L>  |  27.9<H>  ----------------------------<  226<H>  3.8   |  31.0<H>  |  2.90<H>    Ca    8.3<L>      15 Ayden 2021 17:00  Mg     2.1     06-15    TPro  7.2  /  Alb  3.3  /  TBili  0.4  /  DBili  x   /  AST  13  /  ALT  5   /  AlkPhos  69  06-15      PT/INR - ( 15 Ayden 2021 17:00 )   PT: 13.8 sec;   INR: 1.20 ratio         PTT - ( 15 Ayden 2021 17:00 )  PTT:30.1 sec    Radiology report pending

## 2021-06-16 NOTE — H&P ADULT - PROBLEM SELECTOR PLAN 5
Continue home dose synthroid  Thyroid function test in AM  endo consult if needed  Plan needs to d/w thoracic team in AM

## 2021-06-16 NOTE — H&P ADULT - HISTORY OF PRESENT ILLNESS
80M h/o chronic HFpEF, non obs CAD, bradycardia, HTN, HLD, AS, DM, ESRD on HD M/W/F, AF, hypothyroidism, alzheimers, recurrecurrent pleural effusions presented to ED after referred from Dr Faustin's office.    80M h/o chronic HFpEF, non obs CAD, bradycardia, HTN, HLD, AS, DM, ESRD on HD M/W/F, AF, hypothyroidism, alzheimers, recurrecurrent pleural effusions presented to ED after referred from Dr Faustin's office. As per patient and his wife at the bedside patient c/o worsening shortness of breath, DEJESUS, orthopnea for past 2-3 weeks, had a follow up visit with Dr. Faustin's office there he found to have large left sided pleural effusion and transferred him to [facility] for further evaluation.  Thoracic surgery was consulted for pleurX catheter eval, however patient and his wife want the patient to discharge home today, case discussed with Dr. Anton and Roxie, decision was made to d/c home after thoracentesis return for Pleurx cath tentatively Friday. Thoracentesis was performed, drained 1100 ml, pleural fluid was sent for cytology. Repeat CXR after thoracentesis revealed trap lung Vs pneumothorax, requiring hospitalization, however patient initially refused to stay, but agreed to get admitted after patient became hypoxic 81% on room air, which is now improved with supplemental O2. at the time of examination patient is on 3L O2, no respiratory distress noted. Patient denies chest pian, pressure, palpitation, pleuritic pain, dizziness, diaphoresis.

## 2021-06-16 NOTE — H&P ADULT - PROBLEM SELECTOR PLAN 4
Currently normotensive  continue Metoprolol, Imdur,   Home dose Hydralazine held at this time, will restart as needed  Continue diuretics  DASH diet

## 2021-06-17 NOTE — CONSULT NOTE ADULT - ASSESSMENT
80M h/o chronic HFpEF, non obs CAD, bradycardia, HTN, HLD, AS, DM, ESRD on HD M/W/F, AF, hypothyroidism, alzheimers, recurrent pleural effusions and 13 lb weight loss    1) recurrent pleural effusion: likely due to CKD on hemodialysis and underlyiong heart failure. r/o malignancy  CT scan chest with contrast.   plerual fluid cytology  pleurex catheter.  for home drainage and BISI treatment.  Will maintain cardiomems to PADP 12 discussed with neprhology for aggfressive fluid removal during hemodialysis .  If not able to remove fkluid and maintain PADP on caridomems then consider torsedmie on non dialyses days,.  2) pre-operative cardiovascular risk evaluation and management  optimzied for left pleruex catheter sprocedure. risk outweigh the benefits.  
80M h/o chronic HFpEF, nonobs CAD, bradycardia, HTN, HLD, AS, DM, ESRD on HD M/W/F, AF, hypothyroidism, alzheimers p/w recurrent SOB and pleural effusions from Dr Faustin's office.     Asked to evaluate patient for chest tube and pleurx this admission , however wife became upset and would prefer not to have pt admitted, as he has dementia and doesn't eat when admitted. Pleurx could not happen before Thursday/Friday.     Discussed case with Dr Alton Anton and Dr Faustin. Decision made to do thoracentesis and patient may be discharged after CXR and return for outpatient pleurx catheter when set up by office, hopefully Friday.
ESRD - HD    Anemia,    CHF - Pleural Effusion,    P : HD Today,    Resume KD w. HD,    S/P Thoracentesis.    D/W his wife,

## 2021-06-17 NOTE — PROGRESS NOTE ADULT - SUBJECTIVE AND OBJECTIVE BOX
Subjective: no complaints, reports breathing is improved, denies CP, palpitations, SOB, cough, fever, chills, itchiness/rash, diaphoresis, vision changes, HA, dizziness/lightheadedness, numbness/tingling, abd pain, N/V     Review of Systems: as above       Patient is a 80y old  Male who presents with a chief complaint of Worsening shortness of breath and BLE edema (17 Jun 2021 10:38)    HPI:  80M h/o chronic HFpEF, non obs CAD, bradycardia, HTN, HLD, AS, DM, ESRD on HD M/W/F, AF, hypothyroidism, alzheimers, recurrecurrent pleural effusions presented to ED after referred from Dr Faustin's office. As per patient and his wife at the bedside patient c/o worsening shortness of breath, DEJESUS, orthopnea for past 2-3 weeks, had a follow up visit with Dr. Faustin's office there he found to have large left sided pleural effusion and transferred him to [facility] for further evaluation.  Thoracic surgery was consulted for pleurX catheter eval, however patient and his wife want the patient to discharge home today, case discussed with Dr. Anton and Roxie, decision was made to d/c home after thoracentesis return for Pleurx cath tentatively Friday. Thoracentesis was performed, drained 1100 ml, pleural fluid was sent for cytology. Repeat CXR after thoracentesis revealed trap lung Vs pneumothorax, requiring hospitalization, however patient initially refused to stay, but agreed to get admitted after patient became hypoxic 81% on room air, which is now improved with supplemental O2. at the time of examination patient is on 3L O2, no respiratory distress noted. Patient denies chest pian, pressure, palpitation, pleuritic pain, dizziness, diaphoresis.      (16 Jun 2021 00:01)    PAST MEDICAL & SURGICAL HISTORY:  BPH (benign prostatic hyperplasia)    Alzheimer&#x27;s dementia  poor historian  Anemia due to chronic kidney disease, on chronic dialysis  m-w-f  (HFpEF) heart failure with preserved ejection fraction  Atrial fibrillation, unspecified type  loop recorder in place  Stage 5 chronic kidney disease on chronic dialysis  on dialysis m-w-f  History of insulin dependent diabetes mellitus  GERD (gastroesophageal reflux disease)  Essential hypertension  Hyperlipidemia, unspecified hyperlipidemia type  Moderate aortic stenosis  Hypothyroid  Ureteral stent retained  S/P right pulmonary artery pressure sensor implant placement  AV fistula  Cataract    FAMILY HISTORY:  FH: type 2 diabetes  mother    Family history of coronary artery disease    Family history of cerebrovascular accident (CVA)  Family history of Alzheimer&#x27;s disease    Vitals   ICU Vital Signs Last 24 Hrs  T(C): 36.7 (17 Jun 2021 09:26), Max: 37.3 (16 Jun 2021 22:25)  T(F): 98 (17 Jun 2021 09:26), Max: 99.1 (16 Jun 2021 22:25)  HR: 61 (17 Jun 2021 09:26) (61 - 65)  BP: 125/55 (17 Jun 2021 09:26) (125/55 - 149/50)  BP(mean): 79 (17 Jun 2021 09:26) (79 - 79)  RR: 17 (17 Jun 2021 09:26) (17 - 18)  SpO2: 100% (17 Jun 2021 09:26) (96% - 100%)    I&O's Detail    16 Jun 2021 07:01  -  17 Jun 2021 07:00  --------------------------------------------------------  IN:    Oral Fluid: 360 mL  Total IN: 360 mL    OUT:    Other (mL): 2500 mL  Total OUT: 2500 mL  Total NET: -2140 mL    LABS                        8.4    7.45  )-----------( 216      ( 17 Jun 2021 06:44 )             26.0     06-17    138  |  100  |  17.9  ----------------------------<  44<LL>  4.0   |  31.0<H>  |  2.55<H>    Ca    7.9<L>      17 Jun 2021 06:44  Mg     2.0     06-17    TPro  6.2<L>  /  Alb  2.9<L>  /  TBili  0.3<L>  /  DBili  x   /  AST  12  /  ALT  5   /  AlkPhos  60  06-16    LIVER FUNCTIONS - ( 16 Jun 2021 04:22 )  Alb: 2.9 g/dL / Pro: 6.2 g/dL / ALK PHOS: 60 U/L / ALT: 5 U/L / AST: 12 U/L / GGT: x         PT/INR - ( 15 Ayden 2021 17:00 )   PT: 13.8 sec;   INR: 1.20 ratio    PTT - ( 15 Ayden 2021 17:00 )  PTT:30.1 sec    POCT Blood Glucose.: 94 mg/dL (06-17-21 @ 08:52)  POCT Blood Glucose.: 55 mg/dL (06-17-21 @ 07:54)  POCT Blood Glucose.: 200 mg/dL (06-16-21 @ 22:11)  POCT Blood Glucose.: 125 mg/dL (06-16-21 @ 17:01)  POCT Blood Glucose.: 154 mg/dL (06-16-21 @ 11:27)    MEDICATIONS  (STANDING):  aspirin  chewable 81 milliGRAM(s) Oral daily  atorvastatin 40 milliGRAM(s) Oral at bedtime  dextrose 40% Gel 15 Gram(s) Oral once  dextrose 5%. 1000 milliLiter(s) (50 mL/Hr) IV Continuous <Continuous>  dextrose 5%. 1000 milliLiter(s) (100 mL/Hr) IV Continuous <Continuous>  dextrose 50% Injectable 25 Gram(s) IV Push once  dextrose 50% Injectable 12.5 Gram(s) IV Push once  dextrose 50% Injectable 25 Gram(s) IV Push once  epoetin nereida-epbx (RETACRIT) Injectable 43334 Unit(s) IV Push <User Schedule>  glucagon  Injectable 1 milliGRAM(s) IntraMuscular once  insulin glargine Injectable (LANTUS) 20 Unit(s) SubCutaneous at bedtime  insulin lispro (ADMELOG) corrective regimen sliding scale   SubCutaneous Before meals and at bedtime  iron sucrose IVPB 100 milliGRAM(s) IV Intermittent <User Schedule>  isosorbide   mononitrate ER Tablet (IMDUR) 30 milliGRAM(s) Oral daily  levothyroxine 50 MICROGram(s) Oral daily  memantine 10 milliGRAM(s) Oral two times a day  metoprolol succinate ER 25 milliGRAM(s) Oral daily  sodium chloride 0.9% lock flush 3 milliLiter(s) IV Push every 8 hours  tamsulosin 0.4 milliGRAM(s) Oral at bedtime  torsemide 20 milliGRAM(s) Oral <User Schedule    Allergies:  No Known Allergies    Physical Exam:  Gen: NAD,   Neuro: A&Ox3, nonfocal, speech clear and intact  CV: S1S2 RRR, +ELLEN  Pulm: decreased BS b/l with crackles  Abd:+ BS soft NT ND  Ext: MORGAN, trace b/l LE edema, LUE AVF + thrill

## 2021-06-17 NOTE — BRIEF OPERATIVE NOTE - OPERATION/FINDINGS
Left side Vats with Left side PleurX catheter insertion . Adhesions between lung and pleura appreciated with lung collapsed and pleural fluid noted.

## 2021-06-17 NOTE — PROGRESS NOTE ADULT - SUBJECTIVE AND OBJECTIVE BOX
80M h/o chronic HFpEF, non obs CAD, bradycardia, HTN, HLD, AS, DM, ESRD on HD M/W/F, AF, hypothyroidism, alzheimers p/w recurrent SOB and pleural effusions,    S/P Thoracentesis,    CT Scan -n w. IVc & Pleurx catheter,    Patient seen and examined    I&O's Summary    16 Jun 2021 07:01  -  17 Jun 2021 07:00  --------------------------------------------------------  IN: 360 mL / OUT: 2500 mL / NET: -2140 mL    REVIEW OF SYSTEMS:    CONSTITUTIONAL: No F/C  RESPIRATORY: Less Dyspnea.    CARDIOVASCULAR: No CP/palpitations,    GASTROINTESTINAL: No abdominal pain , NVD   GENITOURINARY: No UTI sx  NEUROLOGICAL: No headaches/wk/numbness  MUSCULOSKELETAL:  No joint pain/swelling; No LBP  EXTREMITIES : no swelling,    Vital Signs Last 24 Hrs,    T(C): 36.7 (17 Jun 2021 09:26), Max: 37.3 (16 Jun 2021 22:25)  T(F): 98 (17 Jun 2021 09:26), Max: 99.1 (16 Jun 2021 22:25)  HR: 61 (17 Jun 2021 09:26) (61 - 65)  BP: 125/55 (17 Jun 2021 09:26) (125/55 - 149/50)  BP(mean): 79 (17 Jun 2021 09:26) (79 - 79)  RR: 17 (17 Jun 2021 09:26) (17 - 18)  SpO2: 100% (17 Jun 2021 09:26) (96% - 100%)    PHYSICAL EXAM:    GENERAL: NAD, Pale,   EYES:  conjunctiva and sclera clear  NECK: Supple, No JVD/Bruit  NERVOUS SYSTEM:  A/O x3,   CHEST:  CTA ,No rales or rhonchi  HEART:  S1 S2,  + Systolic  murmur,   ABDOMEN: Soft, NT/ ND BS+  EXTREMITIES:  Less  Edema;  SKIN: No rashes    LABS:                        8.4    7.45  )-----------( 216      ( 17 Jun 2021 06:44 )             26.0     06-17    138  |  100  |  17.9  ----------------------------<  44<LL>  4.0   |  31.0<H>  |  2.55<H>    Ca    7.9<L>      17 Jun 2021 06:44  Mg     2.0     06-17    TPro  6.2<L>  /  Alb  2.9<L>  /  TBili  0.3<L>  /  DBili  x   /  AST  12  /  ALT  5   /  AlkPhos  60  06-16    MEDICATIONS  (STANDING):  aspirin  chewable  atorvastatin  insulin glargine Injectable (LANTUS)  insulin lispro (ADMELOG) corrective regimen sliding scale  iron sucrose IVPB  isosorbide   mononitrate ER Tablet (IMDUR)  levothyroxine  memantine  metoprolol succinate ER  sodium chloride 0.9% lock flush  tamsulosin  torsemide

## 2021-06-17 NOTE — BRIEF OPERATIVE NOTE - NSICDXBRIEFPROCEDURE_GEN_ALL_CORE_FT
PROCEDURES:  Insertion, PleurX catheter system, pleural 17-Jun-2021 16:40:10  Warren Damon  Video assisted thoracoscopy 17-Jun-2021 16:42:48  Warren Damon

## 2021-06-17 NOTE — CONSULT NOTE ADULT - SUBJECTIVE AND OBJECTIVE BOX
[facility] DIVISION OF KIDNEY DISEASES AND HYPERTENSION -- INITIAL CONSULT NOTE  --------------------------------------------------------------------------------  HPI:    Reason for Admission: Worsening shortness of breath and Bilateral LE edema    	  80M h/o chronic HFpEF, non obs CAD, bradycardia, HTN, HLD, AS, DM, ESRD on HD M/W/F, AF, hypothyroidism, alzheimers, recurrent pleural effusions presented to ED after referred from Dr Faustin's office. Patient had bedside thoracentesis, repeat CXR revealed pneumothorax vs trapped lung, patient became hypoxic 81% on room air.     Repeat CXR remains  stable.     ·  Pleural effusion.     ·  Acute on chronic heart failure with preserved ejection fraction.     ·  Atrial fibrillation, unspecified type.     ·  Essential hypertension.     ·  Hypothyroidism, unspecified type.     Benign prostatic hyperplasia, unspecified whether lower urinary tract symptoms present.    ·  Stage 5 chronic kidney disease on chronic dialysis.     PAST HISTORY  --------------------------------------------------------------------------------  PAST MEDICAL & SURGICAL HISTORY:  BPH (benign prostatic hyperplasia)    Alzheimer&#x27;s dementia  poor historian    Anemia due to chronic kidney disease, on chronic dialysis  m-w-f    (HFpEF) heart failure with preserved ejection fraction    Atrial fibrillation, unspecified type  loop recorder in place    Stage 5 chronic kidney disease on chronic dialysis  on dialysis m-w-f    History of insulin dependent diabetes mellitus    GERD (gastroesophageal reflux disease)    Essential hypertension    Hyperlipidemia, unspecified hyperlipidemia type    Moderate aortic stenosis    Hypothyroid    Ureteral stent retained    S/P right pulmonary artery pressure sensor implant placement    AV fistula    Cataract    FAMILY HISTORY:  FH: type 2 diabetes  mother    Family history of coronary artery disease    Family history of cerebrovascular accident (CVA)    Family history of Alzheimer&#x27;s disease      PAST SOCIAL HISTORY:    ALLERGIES & MEDICATIONS  --------------------------------------------------------------------------------  Allergies    No Known Allergies    Intolerances    Standing Inpatient Medications  aspirin  chewable 81 milliGRAM(s) Oral daily  atorvastatin 40 milliGRAM(s) Oral at bedtime  dextrose 40% Gel 15 Gram(s) Oral once  dextrose 5%. 1000 milliLiter(s) IV Continuous <Continuous>  dextrose 5%. 1000 milliLiter(s) IV Continuous <Continuous>  dextrose 50% Injectable 25 Gram(s) IV Push once  dextrose 50% Injectable 12.5 Gram(s) IV Push once  dextrose 50% Injectable 25 Gram(s) IV Push once  ferrous    sulfate 325 milliGRAM(s) Oral daily  glucagon  Injectable 1 milliGRAM(s) IntraMuscular once  insulin glargine Injectable (LANTUS) 20 Unit(s) SubCutaneous at bedtime  insulin lispro (ADMELOG) corrective regimen sliding scale   SubCutaneous Before meals and at bedtime  isosorbide   mononitrate ER Tablet (IMDUR) 30 milliGRAM(s) Oral daily  levothyroxine 50 MICROGram(s) Oral daily  memantine 10 milliGRAM(s) Oral two times a day  metoprolol succinate ER 25 milliGRAM(s) Oral daily  sodium bicarbonate 650 milliGRAM(s) Oral two times a day  sodium chloride 0.9% lock flush 3 milliLiter(s) IV Push every 8 hours  tamsulosin 0.4 milliGRAM(s) Oral at bedtime  torsemide 20 milliGRAM(s) Oral daily    PRN Inpatient Medications    REVIEW OF SYSTEMS  --------------------------------------------------------------------------------  Gen: No weight changes, fatigue, fevers/chills, weakness  Skin: No rashes  Head/Eyes/Ears/Mouth: No headache; Normal hearing; Normal vision w/o blurriness; No sinus pain/discomfort, sore throat  Respiratory: ++ dyspnea, cough, No  wheezing, hemoptysis  CV: No chest pain, PND, orthopnea  GI: No abdominal pain, diarrhea, constipation, nausea, vomiting, melena, hematochezia  : No increased frequency, dysuria, hematuria, nocturia  MSK: No joint pain/swelling; no back pain; no edema  Neuro: No dizziness/lightheadedness, weakness, seizures, numbness, tingling  Heme: No easy bruising or bleeding  Endo: No heat/cold intolerance  Psych: No significant nervousness, anxiety, stress, depression    All other systems were reviewed and are negative, except as noted.    VITALS/PHYSICAL EXAM  --------------------------------------------------------------------------------  T(C): 36.6 (06-16-21 @ 08:31), Max: 36.9 (06-15-21 @ 16:01)  HR: 64 (06-16-21 @ 08:31) (58 - 78)  BP: 138/56 (06-16-21 @ 08:31) (117/51 - 138/56)  RR: 20 (06-16-21 @ 08:31) (20 - 21)  SpO2: 99% (06-16-21 @ 08:31) (92% - 99%)  Height (cm): 162.6 (06-15-21 @ 16:01)  Weight (kg): 69.2 (06-15-21 @ 16:01)  BMI (kg/m2): 26.2 (06-15-21 @ 16:01)  BSA (m2): 1.74 (06-15-21 @ 16:01)      Physical Exam:  	Gen: NAD, Pale, ill-appearing  	HEENT: PERRL, supple neck,  	Pulm: CTA B/L  	CV: AF,  S1S2; no rub  	Back: No spinal or CVA tenderness; no sacral edema  	Abd: +BS, soft, nontender/nondistended  	: No suprapubic tenderness  	UE: Warm, FROM, no clubbing, intact strength; no edema; no asterixis  	LE: Warm, FROM, no clubbing, intact strength; no edema  	Neuro: No focal deficits, intact gait  	Psych: Normal affect and mood  	Skin: Warm, without rashes  	Vascular access: AVF    LABS/STUDIES  --------------------------------------------------------------------------------              8.3    7.79  >-----------<  203      [06-16-21 @ 04:22]              26.2     135  |  95  |  30.6  ----------------------------<  82      [06-16-21 @ 04:22]  3.7   |  31.0  |  3.13        Ca     8.0     [06-16-21 @ 04:22]      Mg     2.1     [06-15-21 @ 17:00]    TPro  6.2  /  Alb  2.9  /  TBili  0.3  /  DBili  x   /  AST  12  /  ALT  5   /  AlkPhos  60  [06-16-21 @ 04:22]    PT/INR: PT 13.8 , INR 1.20       [06-15-21 @ 17:00]  PTT: 30.1       [06-15-21 @ 17:00]    Troponin 0.06      [06-15-21 @ 17:00]    Creatinine Trend:  SCr 3.13 [06-16 @ 04:22]  SCr 2.90 [06-15 @ 17:00]  SCr 3.52 [05-26 @ 07:11]  SCr 3.29 [05-25 @ 07:37]  SCr 4.17 [05-24 @ 10:11]    Urinalysis - [02-24-20 @ 14:06]      Color Yellow / Appearance Clear / SG 1.010 / pH 6.0      Gluc Negative / Ketone Negative  / Bili Negative / Urobili Negative       Blood Trace / Protein 30 / Leuk Est Moderate / Nitrite Negative      RBC 0-2 / WBC >50 / Hyaline  / Gran  / Sq Epi  / Non Sq Epi Occasional / Bacteria Few    HbA1c 8.1      [12-11-19 @ 06:47]  TSH 3.73      [06-16-21 @ 04:22]    HBsAb <3.0      [02-24-20 @ 15:46]  HBsAb Nonreact      [12-31-19 @ 08:48]  HBsAg Nonreact      [02-24-20 @ 15:46]  HBcAb Nonreact      [02-24-20 @ 15:46]  HCV 0.12, Nonreact      [02-24-20 @ 15:46]    
                                                                    [facility]                                                        Office: [address] [address]                                                       Telephone: [phone]. Fax:[phone]                                                              CARDIOLOGY CONSULTATION NOTE                                                                                             Consult requested by:      Reason for Consultation:     History obtained by: Patient and medical record     obtained: No    Chief complaint:    Patient is a 80y old  Male who presents with a chief complaint of Worsening shortness of breath and BLE edema (17 Jun 2021 11:05)      HPI:  80M h/o chronic HFpEF, non obs CAD, bradycardia, HTN, HLD, AS, DM, ESRD on HD M/W/F, AF, hypothyroidism, alzheimers, recurrecurrent pleural effusions presented to ED after referred from Dr Faustin's office. As per patient and his wife at the bedside patient c/o worsening shortness of breath, DEJESUS, orthopnea for past 2-3 weeks, had a follow up visit with Dr. Faustin's office there he found to have large left sided pleural effusion and transferred him to [facility] for further evaluation.  Thoracic surgery was consulted for pleurX catheter eval, however patient and his wife want the patient to discharge home today, case discussed with Dr. Anton and Roxie, decision was made to d/c home after thoracentesis return for Pleurx cath tentatively Friday. Thoracentesis was performed, drained 1100 ml, pleural fluid was sent for cytology. Repeat CXR after thoracentesis revealed trap lung Vs pneumothorax, requiring hospitalization, however patient initially refused to stay, but agreed to get admitted after patient became hypoxic 81% on room air, which is now improved with supplemental O2. at the time of examination patient is on 3L O2, no respiratory distress noted. Patient denies chest pian, pressure, palpitation, pleuritic pain, dizziness, diaphoresis.      (16 Jun 2021 00:01)    History of present illness: L: Agree with above,.  80M h/o chronic HFpEF, non obs CAD, bradycardia, HTN, HLD, AS, DM, ESRD on HD M/W/F, AF, hypothyroidism, alzheimers, recurrent pleural effusions p/w with dyspnea on exertion and frailty and NYHA IV heart failure symptoms  patietn has cardiomems and renal failure on hemodialysis . He was recently diagnosed with pleural effusion ion office, i had sent him to hospital  for thoracocentesis few days ago. he had effusion drained and then got hemodialysis and got discharged. He was in hospital for few days and was very deconditioned after his hopspital visit.  he ws seen in our office again , after hospitakl discharged. he was not feeling right and could not walk from office ot his car.  His wife and daughter were there.  Quick bedside hand held (point of care ultrasound) POCUS echo : showed large left pleural effusion and smal right effusioin.  I disucssed with his wife to get back to hospital for pleurex catheter.. His wife was concerned that he gets deconditioned in hiospuital. but some how we convinced him.  We decided to change his cardiomems PADP goal to 12. since he has been having recurrent effusion. he is already scheduled for a BISI as outpatient for his severe aortic stenosis.    he got pleural effusion drained on his left lung.  he is now pl;anned to get a pleurex today          REVIEW OF SYMPTOMS: Cardiovascular:  See HPI. No chest pain,  +  dyspnea,  No syncope,  No palpitations, No dizziness, No Orthopnea,      No Paroxsymal nocturnal dyspnea;  Respiratory:  No Dyspnea, No cough,     Genitourinary:  No dysuria, no hematuria; Gastrointestinal:  No nausea, no vomiting. No diarrhea.  No abdominal pain. No dark color stool, no melena ; Neurological: No headache, no dizziness, no slurred speech;  Psychiatric: No agitation, no anxiety.  ALL OTHER REVIEW OF SYSTEMS ARE NEGATIVE.    ALLERGIES: Allergies    No Known Allergies    Intolerances          CURRENT MEDICATIONS:         HOME MEDICATIONS:    PAST MEDICAL HISTORY  Diabetes    Hypertension    Hyperlipemia    CKD (chronic kidney disease)    BPH (benign prostatic hyperplasia)    ADI (acute kidney injury)    Hyperkalemia    Alzheimer&#x27;s dementia    Anemia due to chronic kidney disease, on chronic dialysis    (HFpEF) heart failure with preserved ejection fraction    Atrial fibrillation, unspecified type    Stage 5 chronic kidney disease on chronic dialysis    History of insulin dependent diabetes mellitus    GERD (gastroesophageal reflux disease)    Essential hypertension    Hyperlipidemia, unspecified hyperlipidemia type    Moderate aortic stenosis    Hypothyroid        PAST SURGICAL HISTORY  Ureteral stent retained    S/P right pulmonary artery pressure sensor implant placement    AV fistula    Cataract        FAMILY HISTORY:  FH: type 2 diabetes  mother    Family history of coronary artery disease    Family history of cerebrovascular accident (CVA)    Family history of Alzheimer&#x27;s disease        SOCIAL HISTORY:  Denies smoking/alcohol/drugs         Vital Signs Last 24 Hrs  T(C): 36.7 (17 Jun 2021 14:32), Max: 37.3 (16 Jun 2021 22:25)  T(F): 98 (17 Jun 2021 14:32), Max: 99.1 (16 Jun 2021 22:25)  HR: 66 (17 Jun 2021 14:32) (61 - 66)  BP: 134/50 (17 Jun 2021 14:32) (125/55 - 149/50)  BP(mean): 79 (17 Jun 2021 09:26) (79 - 79)  RR: 15 (17 Jun 2021 14:32) (15 - 18)  SpO2: 99% (17 Jun 2021 14:32) (96% - 100%)      PHYSICAL EXAM:  Constitutional: Comfortable . No acute distress.   HEENT: Atraumatic and normcephalic , neck is supple . no JVD. No carotid bruit. PEERL   CNS: A&Ox3. No focal deficits. EOMI. Cranial nerves II-IX are intact.   Lymph Nodes: Cervical : Not palpable.  Respiratory: bialteral decrease breaths sounde. left is worse. and also some coarse crackles on left.   Cardiovascular: S1S2 RRR. No murmur/rubs or gallop.  Gastrointestinal: Soft non-tender and non distended . +Bowel sounds. negative Shirley's sign.  Extremities: No edema.   Psychiatric: Calm . no agitation.  Skin: No skin rash/ulcers visualized to face, hands or feet.    Intake and output:   06-16 @ 07:01  -  06-17 @ 07:00  --------------------------------------------------------  IN: 360 mL / OUT: 2500 mL / NET: -2140 mL        LABS:                        8.4    7.45  )-----------( 216      ( 17 Jun 2021 06:44 )             26.0     06-17    138  |  100  |  17.9  ----------------------------<  44<LL>  4.0   |  31.0<H>  |  2.55<H>    Ca    7.9<L>      17 Jun 2021 06:44  Mg     2.0     06-17    TPro  6.2<L>  /  Alb  2.9<L>  /  TBili  0.3<L>  /  DBili  x   /  AST  12  /  ALT  5   /  AlkPhos  60  06-16    CARDIAC MARKERS ( 15 Ayden 2021 17:00 )  x     / 0.06 ng/mL / x     / x     / x        ;p-BNP=Serum Pro-Brain Natriuretic Peptide: 47939 pg/mL (06-16 @ 04:22)  Serum Pro-Brain Natriuretic Peptide: 83562 pg/mL (06-15 @ 17:00)    PT/INR - ( 15 Ayden 2021 17:00 )   PT: 13.8 sec;   INR: 1.20 ratio         PTT - ( 15 Ayden 2021 17:00 )  PTT:30.1 sec      INTERPRETATION OF TELEMETRY: Reviewed by me.        RADIOLOGY & ADDITIONAL STUDIES:      
Presentation:  HPI: 80M h/o chronic HFpEF, nonobs CAD, bradycardia, HTN, HLD, AS, DM, ESRD on HD M/W/F, AF, hypothyroidism, alzheimers p/w recurrent SOB and pleural effusions from Dr Faustin's office.     Past Medical History  Diabetes    Hypertension    Hyperlipemia    CKD (chronic kidney disease)    BPH (benign prostatic hyperplasia)    ADI (acute kidney injury)    Hyperkalemia    Alzheimer&#x27;s dementia  poor historian    Anemia due to chronic kidney disease, on chronic dialysis  m-w-f    (HFpEF) heart failure with preserved ejection fraction    Atrial fibrillation, unspecified type  loop recorder in place    Stage 5 chronic kidney disease on chronic dialysis  on dialysis m-w-f    History of insulin dependent diabetes mellitus    GERD (gastroesophageal reflux disease)    Essential hypertension    Hyperlipidemia, unspecified hyperlipidemia type    Moderate aortic stenosis    Hypothyroid        Past Surgical History  Ureteral stent retained    S/P right pulmonary artery pressure sensor implant placement    AV fistula    Cataract    Home Medications:  aspirin 81 mg oral tablet, chewable: 1 tab(s) orally once a day (12 May 2021 14:16)  isosorbide mononitrate 30 mg oral tablet, extended release: 1 tab(s) orally once a day (in the morning) (12 May 2021 14:16)  Lantus Solostar Pen 100 units/mL subcutaneous solution: 45 unit(s) subcutaneous once a day (at bedtime) (12 May 2021 14:16)  levothyroxine 50 mcg (0.05 mg) oral tablet: 1 tab(s) orally once a day (12 May 2021 14:16)  memantine 10 mg oral tablet: 1 tab(s) orally 2 times a day (12 May 2021 14:16)  pravastatin 40 mg oral tablet: 1 tab(s) orally once a day (12 May 2021 14:16)      MEDICATIONS  (STANDING):  aspirin 325 milliGRAM(s) Oral Once    MEDICATIONS  (PRN):    Allergies: No Known Allergies      SOCIAL HISTORY:  Smoker: [ ] Yes  [ ] No        PACK YEARS:                         WHEN QUIT?  ETOH use: [ ] Yes  [ ] No              FREQUENCY / QUANTITY:  Ilicit Drug use:  [ ] Yes  [ ] No  Occupation:  Live with: wife  Assist device use: none    COVID Vaccination:    PMD:  Referring Cardiologist: Roxie    Relevant Family History  FAMILY HISTORY:  FH: type 2 diabetes  mother    Family history of coronary artery disease    Family history of cerebrovascular accident (CVA)    Family history of Alzheimer&#x27;s disease        Review of Systems  GENERAL:  Fevers[] chills[] sweats[] fatigue[x] weight loss[] weight gain []                                      [ ] NEGATIVE  NEURO:  parathesias[] seizures []  syncope []  confusion []                                                                [x ] NEGATIVE                 EYES: glasses[]  blurry vision[]  discharge[] pain[] glaucoma []                                                           [x ] NEGATIVE                 ENMT:  difficulty hearing []  vertigo[]  dysphagia[] epistaxis[] recent dental work []                     [x ] NEGATIVE                 CV:  chest pain[] palpitations[] DEJESUS [x] diaphoresis [] edema[]                                                           [ ] NEGATIVE                                 RESPIRATORY:  wheezing[] SOB[x] cough [] sputum[] hemoptysis[]                                                   [ ] NEGATIVE               GI:  nausea[]  vomiting []  diarrhea[] constipation [] melena []                                                          [x ] NEGATIVE            : hematuria[ ]  dysuria[ ] urgency[] incontinence[]                                                                          [x ] NEGATIVE                    MUSKULOSKELETAL:  arthritis[ ]  joint swelling [ ] muscle weakness [ ]                                            [ x] NEGATIVE                     SKIN/BREAST:  rash[ ] itching [ ]  hair loss[ ] masses[ ]                                                                          [x ] NEGATIVE                     PSYCH:  dementia [ ] depression [ ] anxiety[ ]                                                                                          [x ] NEGATIVE                        HEME/LYMPH:  bruises easily[ ] enlarged lymph nodes[ ] tender lymph nodes[ ]                           [ x] NEGATIVE                      ENDOCRINE:  cold intolerance[ ] heat intolerance[ ] polydipsia[ ]                                                      [x ] NEGATIVE                        PHYSICAL EXAM  Vital Signs Last 24 Hrs  T(C): 36.9 (15 Ayden 2021 16:01), Max: 36.9 (15 Ayden 2021 16:01)  T(F): 98.5 (15 Ayden 2021 16:01), Max: 98.5 (15 Ayden 2021 16:01)  HR: 78 (15 Ayden 2021 16:01) (78 - 78)  BP: 117/51 (15 Ayden 2021 16:01) (117/51 - 117/51)  BP(mean): --  RR: 20 (15 Ayden 2021 16:01) (20 - 20)  SpO2: 92% (15 Ayden 2021 16:01) (92% - 92%)    General: Well nourished, well developed, no acute distress.                                                         Neuro: Normal exam oriented to person/place & time with no focal motor or sensory  deficits.                    Eyes: Normal exam of conjunctiva & lids, pupils equally reactive.   ENT: Normal exam of nasal/oral mucosa with absence of cyanosis.   Neck: Normal exam of jugular veins, trachea & thyroid.   Chest: decreased b/l bases                                                                       CV:  Auscultation: normal [ x] S3[ ] S4[ ] Irregular [ ] Rub[ ] Clicks[ ]  Murmurs none:[ ]systolic [ x]  diastolic [ ] holosystolic [ ]  Carotids: No Bruits[x ] Other____________ Abdominal Aorta: normal [ ] nonpalpable[ ]                                                                         GI: Normal exam of abdomen, liver & spleen with no noted masses or tenderness.                                                                                              Extremities: Normal no evidence of cyanosis or deformity Edema: none[x ]trace[ ]1+[ ]2+[ ]3+[ ]4+[ ]  Lower Extremity Pulses: Right[+ ] Left[+ ]Varicosities[ ]  SKIN : Normal exam to inspection & palpation.                                                           LABS:                        9.1    8.46  )-----------( 231      ( 15 Ayden 2021 17:00 )             29.0     06-15    132<L>  |  92<L>  |  27.9<H>  ----------------------------<  226<H>  3.8   |  31.0<H>  |  2.90<H>    Ca    8.3<L>      15 Ayden 2021 17:00  Mg     2.1     06-15    TPro  7.2  /  Alb  3.3  /  TBili  0.4  /  DBili  x   /  AST  13  /  ALT  5   /  AlkPhos  69  06-15    PT/INR - ( 15 Ayden 2021 17:00 )   PT: 13.8 sec;   INR: 1.20 ratio         PTT - ( 15 Ayden 2021 17:00 )  PTT:30.1 sec    CARDIAC MARKERS ( 15 Ayden 2021 17:00 )  x     / 0.06 ng/mL / x     / x     / x          CXR: B/L pleural effusions, worse on left

## 2021-06-17 NOTE — BRIEF OPERATIVE NOTE - COMMENTS
Plan:   - CXR after the procedure and CXR AM   - Please keep the PleurX catheter on wall suction   - Thoracic Surgery will follow up Plan:   - CXR after the procedure and CXR AM   - Please keep the PleurX catheter on wall suction   -  Patient can be restarted on Diet and on his home medication   - Thoracic Surgery will follow up

## 2021-06-17 NOTE — PROGRESS NOTE ADULT - ATTENDING COMMENTS
I have seen and examined the patient. Consent was obtained from wife. Risks discussed were infection, bleeding, and postop pain.

## 2021-06-17 NOTE — ASU PREOP CHECKLIST - PATIENT PROBLEMS/NEEDS
Neuro: A&Ox4. Pt whispers and can be hard to understand at times. Weakness on right side of body from previous stroke.   Cardiac: SRT. -130s VSS.   Respiratory: Sating low 90s on 6-15L oxymask. Pt requires high O2 when getting up to the commode. RR 20-40s-shallow breathing.   GI/: Adequate urine output. No BM  Diet/appetite: Tolerating regular diet. Eating well.  Activity:  Assist of 1, up to commode.  Pain: PCA is managing pain well. At acceptable level on current regimen.   Skin: No new deficits noted.  LDA's: L port: de-accessed, L PIV with morphine PCA      Plan: Continue with POC. Notify primary team with changes.     Patient expressed no known problems or needs

## 2021-06-17 NOTE — PHYSICAL THERAPY INITIAL EVALUATION ADULT - ADDITIONAL COMMENTS
as per wife Pt lives with spouse in a private house. 4 steps to enter 1 rail, 8 to bedroom 1 rail. wife assists him for all mobility, uses a RW. Owns a SAC. Spouse is available to assist as needed.

## 2021-06-18 NOTE — ED PROVIDER NOTE - PHYSICAL EXAMINATION
Const: Awake, alert and oriented x2. In no acute distress. Well appearing.  HEENT: NC/AT Moist mucous membranes.  Eyes: No scleral icterus. EOMI   Neck:. Soft and supple. Full ROM without pain.  Cardiac: Regular rate and regular rhythm. +S1/S2. Peripheral pulses 2+ and symmetric. No LE edema.  Resp: (+)decreased breath sounds at basis, Speaking in full sentences. No wheezes, rales or rhonchi.  Abd: Soft, non-tender, non-distended. Normal bowel sounds in all 4 quadrants. No guarding or rebound.  Back: Spine midline and non-tender. No CVAT.  Skin: No rashes, abrasions or lacerations.  Lymph: No cervical lymphadenopathy.  Neuro: Awake, alert & oriented x 2 (baseline). Moves all extremities symmetrically. Const: Awake, alert and oriented x2. In no acute distress. Well appearing.  HEENT: NC/AT Moist mucous membranes.  Eyes: No scleral icterus. EOMI   Neck:. Soft and supple. Full ROM without pain.  Cardiac: Regular rate and regular rhythm. +S1/S2. Peripheral pulses 2+ and symmetric. No LE edema.  Resp: (+)decreased breath sounds at basis, Speaking in full sentences. No wheezes, rales or rhonchi.  Abd: Soft, non-tender, non-distended. Normal bowel sounds in all 4 quadrants. No guarding or rebound.  Back: Spine midline and non-tender. No CVAT.  Skin: No rashes, abrasions or lacerations.  Lymph: No cervical lymphadenopathy.  Neuro: Awake, alert & oriented x 2 (baseline), moving all extremities symmetrically, follows commands, left lower facial droop, motor prabhjot upper and lower ext 5/5, sensory symm and intact CN 2-12 grossly intact, no ataxia, no nystagmus, no dysmetria, no ddk, symm prabhjot, no pronator drift Const: Awake, alert and oriented x2. In no acute distress. Well appearing.  HEENT: NC/AT Moist mucous membranes.  Eyes: No scleral icterus. EOMI   Neck:. Soft and supple. Full ROM without pain.  Cardiac: Regular rate and regular rhythm. +S1/S2. Peripheral pulses 2+ and symmetric. No LE edema.  Resp: (+)decreased breath sounds at basis, Speaking in full sentences. No wheezes, rales or rhonchi.  Abd: Soft, non-tender, non-distended. Normal bowel sounds in all 4 quadrants. No guarding or rebound.  Back: Spine midline and non-tender. No CVAT.  Skin: No rashes, abrasions or lacerations.  Lymph: No cervical lymphadenopathy.  Neuro: Awake, alert & oriented x 2 (baseline), moving all extremities symmetrically, follows commands, minimal left lower facial droop at lip when smiling, motor prabhjot upper and lower ext 5/5, sensory symm and intact CN 2-12 grossly intact, no ataxia, no nystagmus, no dysmetria, no ddk, symm prabhjot, no pronator drift

## 2021-06-18 NOTE — ED PROVIDER NOTE - NSFOLLOWUPINSTRUCTIONS_ED_ALL_ED_FT
Shortness of breath    Shortness of breath (dyspnea) means you have trouble breathing and could indicate a medical problem. Causes include lung disease, heart disease, low amount of red blood cells (anemia), poor physical fitness, being overweight, smoking, etc. Your health care provider today may not be able to find a cause for your shortness of breath after your exam. In this case, it is important to have a follow-up exam with your primary care physician as instructed. If medicines were prescribed, take them as directed for the full length of time directed. Refrain from tobacco products.    SEEK IMMEDIATE MEDICAL CARE IF YOU HAVE ANY OF THE FOLLOWING SYMPTOMS: worsening shortness of breath, chest pain, back pain, abdominal pain, fever, coughing up blood, lightheadedness/dizziness.    -Please follow-up with your primary care doctor in the next 2 days.  Please call tomorrow for an appointment.  If you cannot follow-up with your primary care doctor please return to the ED for any urgent issues.  - You were given a copy of the tests performed today.  Please bring the results with you and review them with your primary care doctor.  - If you have any worsening of symptoms or any other concerns please return to the ED immediately.  - Please continue taking your home medications as directed.

## 2021-06-18 NOTE — ED PROVIDER NOTE - OBJECTIVE STATEMENT
80y male with a PMHx of Alzheimer's, atrial fibrillation, BPH, GERD, HLD, hypothyroid, moderate aortic stenosis, stage 5 chronic kidney disease s/p cataract, AV fistula, right pulmonary artery sensor pressure implant, ureteral stent retained c/o shortness of breath onset today. Pt's family reports that he was at the hospital earlier today with complaints of SOB, and was given a pulse oximeter to track heart rate. Pt's family saw unusually low numbers, and the Pt was still complaining of SOB, so they took the Pt back to the ED for possible supplemental oxygen. Pt's family reports he had a dialysis treatment today.     Pt denies fevers/chills, ha, loc, focal neuro deficits, cp/palp, cough, abd pain/nn/v/d, urinary symptoms, recent travel and sick contacts. 80y male with a PMHx of Alzheimer's, atrial fibrillation, BPH, GERD, HLD, hypothyroid, moderate aortic stenosis, stage 5 chronic kidney disease s/p cataract, AV fistula, right pulmonary artery sensor pressure implant, ureteral stent retained c/o shortness of breath onset today. Pt's family reports that he was at the hospital earlier today with complaints of SOB, and was given a pulse oximeter to track heart rate. Pt's family saw unusually low numbers, and the Pt was still complaining of SOB, so they took the Pt back to the ED for possible supplemental oxygen. Pt's family reports he had a dialysis treatment today. As per wife and daughter they have also noticed a left facial droop, unsure since when since he's been hospitalized but noticed it today when pt was discharged.     Pt denies fevers/chills, ha, loc, focal neuro deficits, cp/palp, cough, abd pain/nn/v/d, urinary symptoms, recent travel and sick contacts. 80y male with a PMHx of Alzheimer's, atrial fibrillation, BPH, GERD, HLD, hypothyroid, moderate aortic stenosis, stage 5 chronic kidney disease s/p cataract, AV fistula, right pulmonary artery sensor pressure implant, ureteral stent retained c/o shortness of breath onset today. Pt's family reports that he was at the hospital earlier today with complaints of SOB, and was given a pulse oximeter to track heart rate. Pt's family saw unusually low numbers, and the Pt was still complaining of SOB, so they took the Pt back to the ED for possible supplemental oxygen. Pt's family reports he had a dialysis treatment today. As per daughter she also noticed a left facial droop, unsure since when since he's been hospitalized but noticed it today when pt was discharged when she was looking at him, wife unsure as well.     Pt denies fevers/chills, ha, loc, focal neuro deficits, cp/palp, cough, abd pain/nn/v/d, urinary symptoms, recent travel and sick contacts.

## 2021-06-18 NOTE — ED PROVIDER NOTE - PROGRESS NOTE DETAILS
Family now states this is patient's baseline face, no new findings, wife states she was looking at old pictures and his face always looks like this, from exam it is very minimal asymmetry, when this was initially brought to my attention I questioned family extensively whether this is new they kept being not sure daughter kept stating she thinks it is, wife would say she never looks at him so intensely to notice this, pt denies any complaints. Once they said it was his normal, informed of concern of stroke if not his normal and wife insisted it is his normal and that their daughter just isn't around as much so she doesn't really know. Family offered to stay for further eval but stating pt is and has been at baseline therefore dc. pt nomral oxygen here on RA, and improving cxr

## 2021-06-18 NOTE — DISCHARGE NOTE NURSING/CASE MANAGEMENT/SOCIAL WORK - PATIENT PORTAL LINK FT
You can access the FollowMyHealth Patient Portal offered by [facility] by registering at the following website: [email or web address]. By joining [employer]’s FollowMyHealth portal, you will also be able to view your health information using other applications (apps) compatible with our system.

## 2021-06-18 NOTE — DISCHARGE NOTE PROVIDER - NSDCFUADDINST_GEN_ALL_CORE_FT
Please call the Cardiothoracic Surgery office at [phone] if you are experiencing any shortness of breath, chest pain, fevers or chills, drainage from the incisions, persistent nausea, vomiting or if you have any questions about your medications. If the symptoms are severe, call 911 and go to the nearest hospital. You can also call [phone] for an emergency [facility] ambulance, which will take you to the closest [facility].    If you need any assistance for making any appointments for a new consult or referral in any specialty, please call our [facility] Clinical Coordination Center at [phone].

## 2021-06-18 NOTE — DISCHARGE NOTE PROVIDER - HOSPITAL COURSE
80M, pmhx chronic HFpEF, s/p cardiomems, non obstructive CAD, bradycardia, HTN, HLD, AS MD ESRD on HD M/W/F via LUE AVF, hypothyroidism alzheimer's dz, h/o pleural effusions s/p L thoracentesis by IR 5/7 and R pigtail 5/21, presents to ED 6/15 from cardiologist office with SOB and reaccumulated L pleural effusion, s/p thoracentesis with 1.1 L serous fluid drained, f/u CXR with space, admitted for observation of respiratory status and pleurX catheter placement.  CT chest with IV contrast without definitive malignancy. Now s/p L pleurX catheter insertion 6/17 with Dr. Medley. Pt hemodynamically stable, plan for d/c home today after HD. D/w Dr. Medley

## 2021-06-18 NOTE — DISCHARGE NOTE PROVIDER - NSDCFUADDAPPT_GEN_ALL_CORE_FT
1. Dr. Medley 7/7  2. Dr. Faustin  1. Dr. Medley in 2 weeks. please call [phone] for an appointment.   2. Dr. Faustin Tuesday 7/6 at 1:30 PM

## 2021-06-18 NOTE — DISCHARGE NOTE PROVIDER - PROVIDER TOKENS
PROVIDER:[TOKEN:[[id number]]],FREE:[LAST:[Medley],FIRST:[Sue],PHONE:[phone]],FAX:[(   )    -],ADDRESS:[[address] [place] [address]],SCHEDULEDAPPT:[07/07/2021]] PROVIDER:[TOKEN:[[id number]],FOLLOWUP:[2 weeks],SCHEDULEDAPPTTIME:[01:30 PM]],FREE:[LAST:[Galindo],FIRST:[Sue],PHONE:[phone]],FAX:[(   )    -],ADDRESS:[[address] [place] [address]]

## 2021-06-18 NOTE — PROGRESS NOTE ADULT - SUBJECTIVE AND OBJECTIVE BOX
[facility] DIVISION OF KIDNEY DISEASES AND HYPERTENSION -- HEMODIALYSIS NOTE  --------------------------------------------------------------------------------  Chief Complaint: ESRD/Ongoing hemodialysis requirement    24 hour events/subjective:  Seen/examined on hd; reevaluated      PAST HISTORY  --------------------------------------------------------------------------------  No significant changes to PMH, PSH, FHx, SHx, unless otherwise noted    ALLERGIES & MEDICATIONS  --------------------------------------------------------------------------------  Allergies    No Known Allergies    Intolerances      Standing Inpatient Medications  atorvastatin 40 milliGRAM(s) Oral at bedtime  dextrose 50% Injectable 25 Gram(s) IV Push once  epoetin nereida-epbx (RETACRIT) Injectable 56886 Unit(s) IV Push <User Schedule>  epoetin nereida-epbx (RETACRIT) Injectable 91802 Unit(s) IV Push <User Schedule>  glucagon  Injectable 1 milliGRAM(s) IntraMuscular once  insulin glargine Injectable (LANTUS) 14 Unit(s) SubCutaneous at bedtime  insulin lispro (ADMELOG) corrective regimen sliding scale   SubCutaneous Before meals and at bedtime  iron sucrose IVPB 100 milliGRAM(s) IV Intermittent <User Schedule>  isosorbide   mononitrate ER Tablet (IMDUR) 30 milliGRAM(s) Oral daily  levothyroxine 50 MICROGram(s) Oral daily  memantine 10 milliGRAM(s) Oral two times a day  metoprolol succinate ER 25 milliGRAM(s) Oral daily  sodium chloride 0.9% lock flush 3 milliLiter(s) IV Push every 8 hours  torsemide 20 milliGRAM(s) Oral <User Schedule>    PRN Inpatient Medications      REVIEW OF SYSTEMS  --------------------------------------------------------------------------------  Gen: No weight changes, fatigue, fevers/chills, weakness  Skin: No rashes  Head/Eyes/Ears/Mouth: No headache; Normal hearing; Normal vision w/o blurriness; No sinus pain/discomfort, sore throat  Respiratory: No dyspnea, cough, wheezing, hemoptysis  CV: No chest pain, PND, orthopnea  GI: No abdominal pain, diarrhea, constipation, nausea, vomiting, melena, hematochezia  : No increased frequency, dysuria, hematuria, nocturia  MSK: No joint pain/swelling; no back pain; no edema  Neuro: No dizziness/lightheadedness, weakness, seizures, numbness, tingling  Heme: No easy bruising or bleeding  Endo: No heat/cold intolerance  Psych: No significant nervousness, anxiety, stress, depression    All other systems were reviewed and are negative, except as noted.    VITALS/PHYSICAL EXAM  --------------------------------------------------------------------------------  T(C): 37.2 (06-18-21 @ 14:55), Max: 37.2 (06-18-21 @ 04:34)  HR: 82 (06-18-21 @ 14:55) (58 - 82)  BP: 131/54 (06-18-21 @ 14:55) (111/46 - 146/53)  RR: 18 (06-18-21 @ 14:55) (14 - 20)  SpO2: 94% (06-18-21 @ 14:55) (94% - 100%)  Wt(kg): --  Height (cm): 167.6 (06-17-21 @ 14:32)  Weight (kg): 69 (06-17-21 @ 14:32)  BMI (kg/m2): 24.6 (06-17-21 @ 14:32)  BSA (m2): 1.78 (06-17-21 @ 14:32)      06-17-21 @ 07:01  -  06-18-21 @ 07:00  --------------------------------------------------------  IN: 0 mL / OUT: 200 mL / NET: -200 mL    06-18-21 @ 07:01  -  06-18-21 @ 17:01  --------------------------------------------------------  IN: 0 mL / OUT: 1500 mL / NET: -1500 mL      Physical Exam:  GENERAL: NAD, Pale,   EYES:  conjunctiva and sclera clear  NECK: Supple, No JVD/Bruit  NERVOUS SYSTEM:  A/O x3,   CHEST:  CTA ,No rales or rhonchi  HEART:  S1 S2,  + Systolic  murmur,   ABDOMEN: Soft, NT/ ND BS+  EXTREMITIES:  Edema;  SKIN: No rashes      LABS/STUDIES  --------------------------------------------------------------------------------              9.1    12.45 >-----------<  223      [06-18-21 @ 08:53]              27.2     137  |  99  |  28.0  ----------------------------<  49      [06-18-21 @ 07:35]  4.3   |  28.0  |  3.47        Ca     8.2     [06-18-21 @ 07:35]      Mg     2.1     [06-18-21 @ 07:35]            HbA1c 8.1      [12-11-19 @ 06:47]  TSH 3.73      [06-16-21 @ 04:22]    HBsAb <3.0      [06-17-21 @ 00:33]  HBsAg Nonreact      [06-17-21 @ 00:33]  HCV 0.11, Nonreact      [06-17-21 @ 00:33]

## 2021-06-18 NOTE — ED PROVIDER NOTE - CLINICAL SUMMARY MEDICAL DECISION MAKING FREE TEXT BOX
80y male with a PMHx of Alzheimer's, atrial fibrillation, BPH, GERD, HLD, hypothyroid, moderate aortic stenosis, stage 5 chronic kidney disease s/p cataract, AV fistula, right pulmonary artery sensor pressure implant, ureteral stent retained c/o shortness of breath onset today. 80y male with a PMHx of Alzheimer's, atrial fibrillation, BPH, GERD, HLD, hypothyroid, moderate aortic stenosis, stage 5 chronic kidney disease s/p cataract, AV fistula, right pulmonary artery sensor pressure implant, ureteral stent retained c/o shortness of breath onset today. Pt here normal spO2, possible malfunction of pulse ox monitor at home, no resp distress, improving cxr, neuro intact and at baseline per family, no acute ischemic changes on ekg. stable for dc with follow up

## 2021-06-18 NOTE — DISCHARGE NOTE NURSING/CASE MANAGEMENT/SOCIAL WORK - NSDCFUADDAPPT_GEN_ALL_CORE_FT
1. Dr. Medley in 2 weeks. please call [phone] for an appointment.   2. Dr. Faustin Tuesday 7/6 at 1:30 PM

## 2021-06-18 NOTE — DISCHARGE NOTE PROVIDER - NSDCMRMEDTOKEN_GEN_ALL_CORE_FT
aspirin 81 mg oral tablet, chewable: 1 tab(s) orally once a day  ergocalciferol 2000 intl units oral capsule: 1 cap(s) orally once a day  ferrous sulfate 325 mg (65 mg elemental iron) oral tablet: 1 tab(s) orally once a day   hydrALAZINE 50 mg oral tablet: 1 tab(s) orally 3 times a day  isosorbide mononitrate 30 mg oral tablet, extended release: 1 tab(s) orally once a day (in the morning)  Lantus Solostar Pen 100 units/mL subcutaneous solution: 45 unit(s) subcutaneous once a day (at bedtime)  levothyroxine 50 mcg (0.05 mg) oral tablet: 1 tab(s) orally once a day  memantine 10 mg oral tablet: 1 tab(s) orally 2 times a day  metoprolol succinate 25 mg oral tablet, extended release: 1 tab(s) orally once a day  pravastatin 40 mg oral tablet: 1 tab(s) orally once a day  sodium bicarbonate 650 mg oral tablet: 1 tab(s) orally 2 times a day  tamsulosin 0.4 mg oral capsule: 1 cap(s) orally once a day (at bedtime)  torsemide 20 mg oral tablet: 1 tab(s) orally once a day (at bedtime)   aspirin 81 mg oral tablet, chewable: 1 tab(s) orally once a day  ergocalciferol 2000 intl units oral capsule: 1 cap(s) orally once a day  ferrous sulfate 325 mg (65 mg elemental iron) oral tablet: 1 tab(s) orally once a day   hydrALAZINE 50 mg oral tablet: 1 tab(s) orally 3 times a day  isosorbide mononitrate 30 mg oral tablet, extended release: 1 tab(s) orally once a day (in the morning)  Lantus Solostar Pen 100 units/mL subcutaneous solution: 14 unit(s) subcutaneous once a day (at bedtime)  levothyroxine 50 mcg (0.05 mg) oral tablet: 1 tab(s) orally once a day  memantine 10 mg oral tablet: 1 tab(s) orally 2 times a day  metoprolol succinate 25 mg oral tablet, extended release: 1 tab(s) orally once a day  pravastatin 40 mg oral tablet: 1 tab(s) orally once a day  sodium bicarbonate 650 mg oral tablet: 1 tab(s) orally 2 times a day  tamsulosin 0.4 mg oral capsule: 1 cap(s) orally once a day (at bedtime)  torsemide 20 mg oral tablet: 1 tab(s) orally once a day   take on NON dialysis days

## 2021-06-18 NOTE — ED ADULT TRIAGE NOTE - CHIEF COMPLAINT QUOTE
pt just discharged for plural effusion with complaints of shortness of breath with sitting in chair and unable to walk upstairs without getting more short of breath.

## 2021-06-18 NOTE — DISCHARGE NOTE PROVIDER - CARE PROVIDER_[facility] CALL
Roz Faustin)  Cardiology; Internal Medicine  [address], [address]  [place], [place] [id number]  Phone: [phone]  Fax: [phone]  Follow Up Time:     [place] [address]  Phone: [phone]  Fax: (   )    -  Scheduled Appointment: 07/07/2021   Roz Faustin)  Cardiology; Internal Medicine  [address] [facility], [address] [place] [id number]  Phone: [phone]  Fax: [phone]  Follow Up Time: 2 weeks    Sue Medley [facility] [address]  Phone: [phone]  Fax: (   )    -  Follow Up Time:

## 2021-06-18 NOTE — PROGRESS NOTE ADULT - PROBLEM SELECTOR PROBLEM 2
Acute on chronic heart failure with preserved ejection fraction
Acute on chronic heart failure with preserved ejection fraction

## 2021-06-18 NOTE — ED ADULT NURSE NOTE - OBJECTIVE STATEMENT
received pt alert and orienetedx3, family at bedside, cardiac monitor placed on pt, stable, sating well on 02, IV in place , blood drawn and sent to lab , pt daughter mentioned left facial droop , neuro and NIH assessment done, MD Grant called at bedside , safety maintained, pt is pending CT-scan received pt alert and orienetedx3, family at bedside, cardiac monitor placed on pt, stable, sating well on 02, IV in place , blood drawn and sent to lab , pt daughter mentioned and very concern about pt having left facial droop , family insisting that pt face changes from before,   neuro and NIH assessment done, MD Grant called at bedside , safety maintained, pt is pending CT-scan

## 2021-06-18 NOTE — PROGRESS NOTE ADULT - PROBLEM SELECTOR PLAN 2
h/o cardiomems  cont dialysis per renal and potentially torsemide on non HD days, to be determined by cardiology and renal
h/o cardiomems  cont dialysis per renal and potentially torsemide on non HD days, to be determined by cardiology and renal

## 2021-06-18 NOTE — PROGRESS NOTE ADULT - REASON FOR ADMISSION
Worsening shortness of breath and BLE edema

## 2021-06-18 NOTE — PROGRESS NOTE ADULT - PROBLEM SELECTOR PLAN 1
IR thoracentesis on L 5/7  R pigtail 5/21    this admission now s/p L thoracentesis 6/15  s/p left pleurX 6/18, cap today  HD this AM  start torsemide 20 on non dialysis days as d/w Roxie and Dr. Wade  prior cytology studies negative  CT chest with IV contrast 6/17 without definitive malignancy  hypoglycemic again this morning  d/w Dr. Barreto from endocrine, will decrease lantus to 14 units HS and recommend follow up with PMD or endocrine.       d/c home today   d/w Dr. Medley and Dr. Anton in AM rounds
IR thoracentesis on L 5/7  R pigtail 5/21    this admission now s/p L thoracentesis 6/15  plan for pleurX today  maintain NPO  tentative d/c home tomorrow after HD and home arranged  prior cytology studies negative  CT chest with IV contrast today per cardiology to assess for malignancy    d/w Dr. Medley and Dr. Anton in AM rounds

## 2021-06-18 NOTE — PROGRESS NOTE ADULT - SUBJECTIVE AND OBJECTIVE BOX
Subjective:     Review of Systems: as above        Patient is a 80y old  Male who presents with a chief complaint of Worsening shortness of breath and BLE edema (18 Jun 2021 08:09)    HPI:  80M h/o chronic HFpEF, non obs CAD, bradycardia, HTN, HLD, AS, DM, ESRD on HD M/W/F, AF, hypothyroidism, alzheimers, recurrecurrent pleural effusions presented to ED after referred from Dr Faustin's office. As per patient and his wife at the bedside patient c/o worsening shortness of breath, DEJESUS, orthopnea for past 2-3 weeks, had a follow up visit with Dr. Faustin's office there he found to have large left sided pleural effusion and transferred him to [facility] for further evaluation.  Thoracic surgery was consulted for pleurX catheter eval, however patient and his wife want the patient to discharge home today, case discussed with Dr. Anton and Roxie, decision was made to d/c home after thoracentesis return for Pleurx cath tentatively Friday. Thoracentesis was performed, drained 1100 ml, pleural fluid was sent for cytology. Repeat CXR after thoracentesis revealed trap lung Vs pneumothorax, requiring hospitalization, however patient initially refused to stay, but agreed to get admitted after patient became hypoxic 81% on room air, which is now improved with supplemental O2. at the time of examination patient is on 3L O2, no respiratory distress noted. Patient denies chest pian, pressure, palpitation, pleuritic pain, dizziness, diaphoresis.      (16 Jun 2021 00:01)    PAST MEDICAL & SURGICAL HISTORY:  BPH (benign prostatic hyperplasia)    Alzheimer&#x27;s dementia  poor historian    Anemia due to chronic kidney disease, on chronic dialysis  m-w-f  (HFpEF) heart failure with preserved ejection fraction  Atrial fibrillation, unspecified type  loop recorder in place  Stage 5 chronic kidney disease on chronic dialysis  on dialysis m-w-f  History of insulin dependent diabetes mellitus  GERD (gastroesophageal reflux disease)  Essential hypertension  rlipidemia, unspecified hyperlipidemia type  Moderate aortic stenosis  Hypothyroid  Ureteral stent retained  S/P right pulmonary artery pressure sensor implant placement  AV fistula  Cataract    FAMILY HISTORY:  FH: type 2 diabetes  mother    Family history of coronary artery disease    Family history of cerebrovascular accident (CVA)    Family history of Alzheimer&#x27;s disease    Vitals   ICU Vital Signs Last 24 Hrs  T(C): 37.1 (18 Jun 2021 11:10), Max: 37.2 (18 Jun 2021 04:34)  T(F): 98.8 (18 Jun 2021 11:10), Max: 99 (18 Jun 2021 04:34)  HR: 72 (18 Jun 2021 11:30) (58 - 80)  BP: 146/53 (18 Jun 2021 11:30) (111/46 - 146/53)  RR: 18 (18 Jun 2021 11:30) (14 - 20)  SpO2: 95% (18 Jun 2021 11:30) (94% - 100%)    I&O's Detail    17 Jun 2021 07:01  -  18 Jun 2021 07:00  --------------------------------------------------------  IN:  Total IN: 0 mL    OUT:    Chest Tube (mL): 200 mL  Total OUT: 200 mL  Total NET: -200 mL    18 Jun 2021 07:01  -  18 Jun 2021 12:26  --------------------------------------------------------  IN:  Total IN: 0 mL    OUT:    Other (mL): 1500 mL  Total OUT: 1500 mL  Total NET: -1500 mL    LABS                        9.1    12.45 )-----------( 223      ( 18 Jun 2021 08:53 )             27.2     06-18    137  |  99  |  28.0<H>  ----------------------------<  49<LL>  4.3   |  28.0  |  3.47<H>    Ca    8.2<L>      18 Jun 2021 07:35  Mg     2.1     06-18    POCT Blood Glucose.: 98 mg/dL (06-18-21 @ 12:01)  POCT Blood Glucose.: 84 mg/dL (06-18-21 @ 08:48)  POCT Blood Glucose.: 62 mg/dL (06-18-21 @ 08:18)  POCT Blood Glucose.: 226 mg/dL (06-17-21 @ 21:13)  POCT Blood Glucose.: 211 mg/dL (06-17-21 @ 18:04)  POCT Blood Glucose.: 54 mg/dL (06-17-21 @ 17:34)  POCT Blood Glucose.: 45 mg/dL (06-17-21 @ 17:13)  POCT Blood Glucose.: 75 mg/dL (06-17-21 @ 14:25)  POCT Blood Glucose.: 143 mg/dL (06-17-21 @ 13:17)    MEDICATIONS  (STANDING):  atorvastatin 40 milliGRAM(s) Oral at bedtime  dextrose 50% Injectable 25 Gram(s) IV Push once  epoetin nereida-epbx (RETACRIT) Injectable 34483 Unit(s) IV Push <User Schedule>  epoetin nereida-epbx (RETACRIT) Injectable 33074 Unit(s) IV Push <User Schedule>  glucagon  Injectable 1 milliGRAM(s) IntraMuscular once  insulin glargine Injectable (LANTUS) 14 Unit(s) SubCutaneous at bedtime  insulin lispro (ADMELOG) corrective regimen sliding scale   SubCutaneous Before meals and at bedtime  iron sucrose IVPB 100 milliGRAM(s) IV Intermittent <User Schedule>  isosorbide   mononitrate ER Tablet (IMDUR) 30 milliGRAM(s) Oral daily  levothyroxine 50 MICROGram(s) Oral daily  memantine 10 milliGRAM(s) Oral two times a day  metoprolol succinate ER 25 milliGRAM(s) Oral daily  sodium chloride 0.9% lock flush 3 milliLiter(s) IV Push every 8 hours  torsemide 20 milliGRAM(s) Oral <User Schedule>    Allergies:  No Known Allergies    Physical Exam:  Gen: NAD,   Neuro: A&Ox3, nonfocal, speech clear and intact  CV: S1S2 RRR, +ELLEN  Pulm: decreased BS b/l with crackles  Abd:+ BS soft NT ND  Ext: MORGAN, trace b/l LE edema, LUE AVF + thrill       Subjective: no complaints, "am i going home today?" denies CP, palpitations, SOB, cough, fever, chills, itchiness/rash, diaphoresis, vision changes, HA, dizziness/lightheadedness, numbness/tingling, abd pain, N/V     Review of Systems: as above        Patient is a 80y old  Male who presents with a chief complaint of Worsening shortness of breath and BLE edema (18 Jun 2021 08:09)    HPI:  80M h/o chronic HFpEF, non obs CAD, bradycardia, HTN, HLD, AS, DM, ESRD on HD M/W/F, AF, hypothyroidism, alzheimers, recurrecurrent pleural effusions presented to ED after referred from Dr Faustin's office. As per patient and his wife at the bedside patient c/o worsening shortness of breath, DEJESUS, orthopnea for past 2-3 weeks, had a follow up visit with Dr. Faustin's office there he found to have large left sided pleural effusion and transferred him to [facility] for further evaluation.  Thoracic surgery was consulted for pleurX catheter eval, however patient and his wife want the patient to discharge home today, case discussed with Dr. Anton and Roxie, decision was made to d/c home after thoracentesis return for Pleurx cath tentatively Friday. Thoracentesis was performed, drained 1100 ml, pleural fluid was sent for cytology. Repeat CXR after thoracentesis revealed trap lung Vs pneumothorax, requiring hospitalization, however patient initially refused to stay, but agreed to get admitted after patient became hypoxic 81% on room air, which is now improved with supplemental O2. at the time of examination patient is on 3L O2, no respiratory distress noted. Patient denies chest pian, pressure, palpitation, pleuritic pain, dizziness, diaphoresis.      (16 Jun 2021 00:01)    PAST MEDICAL & SURGICAL HISTORY:  BPH (benign prostatic hyperplasia)    Alzheimer&#x27;s dementia  poor historian    Anemia due to chronic kidney disease, on chronic dialysis  m-w-f  (HFpEF) heart failure with preserved ejection fraction  Atrial fibrillation, unspecified type  loop recorder in place  Stage 5 chronic kidney disease on chronic dialysis  on dialysis m-w-f  History of insulin dependent diabetes mellitus  GERD (gastroesophageal reflux disease)  Essential hypertension  rlipidemia, unspecified hyperlipidemia type  Moderate aortic stenosis  Hypothyroid  Ureteral stent retained  S/P right pulmonary artery pressure sensor implant placement  AV fistula  Cataract    FAMILY HISTORY:  FH: type 2 diabetes  mother    Family history of coronary artery disease    Family history of cerebrovascular accident (CVA)    Family history of Alzheimer&#x27;s disease    Vitals   ICU Vital Signs Last 24 Hrs  T(C): 37.1 (18 Jun 2021 11:10), Max: 37.2 (18 Jun 2021 04:34)  T(F): 98.8 (18 Jun 2021 11:10), Max: 99 (18 Jun 2021 04:34)  HR: 72 (18 Jun 2021 11:30) (58 - 80)  BP: 146/53 (18 Jun 2021 11:30) (111/46 - 146/53)  RR: 18 (18 Jun 2021 11:30) (14 - 20)  SpO2: 95% (18 Jun 2021 11:30) (94% - 100%)    I&O's Detail    17 Jun 2021 07:01  -  18 Jun 2021 07:00  --------------------------------------------------------  IN:  Total IN: 0 mL    OUT:    Chest Tube (mL): 200 mL  Total OUT: 200 mL  Total NET: -200 mL    18 Jun 2021 07:01  -  18 Jun 2021 12:26  --------------------------------------------------------  IN:  Total IN: 0 mL    OUT:    Other (mL): 1500 mL  Total OUT: 1500 mL  Total NET: -1500 mL    LABS                        9.1    12.45 )-----------( 223      ( 18 Jun 2021 08:53 )             27.2     06-18    137  |  99  |  28.0<H>  ----------------------------<  49<LL>  4.3   |  28.0  |  3.47<H>    Ca    8.2<L>      18 Jun 2021 07:35  Mg     2.1     06-18    POCT Blood Glucose.: 98 mg/dL (06-18-21 @ 12:01)  POCT Blood Glucose.: 84 mg/dL (06-18-21 @ 08:48)  POCT Blood Glucose.: 62 mg/dL (06-18-21 @ 08:18)  POCT Blood Glucose.: 226 mg/dL (06-17-21 @ 21:13)  POCT Blood Glucose.: 211 mg/dL (06-17-21 @ 18:04)  POCT Blood Glucose.: 54 mg/dL (06-17-21 @ 17:34)  POCT Blood Glucose.: 45 mg/dL (06-17-21 @ 17:13)  POCT Blood Glucose.: 75 mg/dL (06-17-21 @ 14:25)  POCT Blood Glucose.: 143 mg/dL (06-17-21 @ 13:17)    MEDICATIONS  (STANDING):  atorvastatin 40 milliGRAM(s) Oral at bedtime  dextrose 50% Injectable 25 Gram(s) IV Push once  epoetin nereida-epbx (RETACRIT) Injectable 18434 Unit(s) IV Push <User Schedule>  epoetin nereida-epbx (RETACRIT) Injectable 06575 Unit(s) IV Push <User Schedule>  glucagon  Injectable 1 milliGRAM(s) IntraMuscular once  insulin glargine Injectable (LANTUS) 14 Unit(s) SubCutaneous at bedtime  insulin lispro (ADMELOG) corrective regimen sliding scale   SubCutaneous Before meals and at bedtime  iron sucrose IVPB 100 milliGRAM(s) IV Intermittent <User Schedule>  isosorbide   mononitrate ER Tablet (IMDUR) 30 milliGRAM(s) Oral daily  levothyroxine 50 MICROGram(s) Oral daily  memantine 10 milliGRAM(s) Oral two times a day  metoprolol succinate ER 25 milliGRAM(s) Oral daily  sodium chloride 0.9% lock flush 3 milliLiter(s) IV Push every 8 hours  torsemide 20 milliGRAM(s) Oral <User Schedule>    Allergies:  No Known Allergies    Physical Exam:  Gen: NAD,   Neuro: A&Ox3, nonfocal, speech clear and intact  CV: S1S2 RRR, +ELLEN  Pulm: decreased BS b/l with crackles  Abd:+ BS soft NT ND  Ext: MORGAN, trace b/l LE edema, LUE AVF + thrill

## 2021-06-18 NOTE — ED PROVIDER NOTE - PATIENT PORTAL LINK FT
You can access the FollowMyHealth Patient Portal offered by [facility] by registering at the following website: [email or web address]. By joining [facility]’s FollowMyHealth portal, you will also be able to view your health information using other applications (apps) compatible with our system.

## 2021-06-18 NOTE — DISCHARGE NOTE PROVIDER - NSDCFUSCHEDAPPT_GEN_ALL_CORE_FT
SANTOS, JEMIMA ; 06/18/2021 ; NPP Thor Surg [address]  SANTOS, JEMIMA ; 06/28/2021 ; NPP DisEmerg [address]  SANTOS, JEMIMA ; 07/01/2021 ; NPP PulmMed [address]  SANTOS, JEMIMA ; 07/01/2021 ; NPP PulmMed [address]  SANTOS, JEMIMA ; 07/07/2021 ; NPP Cardio Electro [address]  SANTOS, JEMIMA ; 08/10/2021 ; NPP FamilyMed [address]  SANTOS, JEMIMA ; 08/11/2021 ; NPP Cardio Electro [address]  SANTOS, JEMIMA ; 09/15/2021 ; NPP Cardio Electro [address] SANTOS, JEMIMA ; 06/28/2021 ; NPP DisEmerg [address]  SANTOS, JEMIMA ; 07/01/2021 ; NPP PulmMed [address]  SANTOS, JEMIMA ; 07/01/2021 ; NPP PulmMed [address]  SANTOS, JEMIMA ; 07/06/2021 ; NPP Cardio [address]  SANTOS, JEMIMA ; 07/07/2021 ; NPP Cardio Electro [address]  SANTOS, JEMIMA ; 08/10/2021 ; NPP FamilyMed [address]  SANTOS, JEMIMA ; 08/11/2021 ; NPP Cardio Electro [address]  SANTOS, JEMIMA ; 09/15/2021 ; NPP Cardio Electro [address]

## 2021-06-18 NOTE — PROGRESS NOTE ADULT - ASSESSMENT
80M h/o chronic HFpEF, non obs CAD, bradycardia, HTN, HLD, AS, DM, ESRD on HD M/W/F, AF, hypothyroidism, alzheimers, recurrent pleural effusions and 13 lb weight loss    1) recurrent pleural effusion: likely due to CKD on hemodialysis and underlyiong heart failure.   CT scan chest with contrast: no obvious malignancy suspected. cytology unremarkable   pleurex catheter.  for home drainage and BISI treatment.  Will maintain cardiomems to PADP 12 discussed with neprhology for aggfressive fluid removal during hemodialysis .  If not able to remove fkluid and maintain PADP on caridomems then consider torsedmie on non dialyses days,.  2) pre-operative cardiovascular risk evaluation and management  : left pleurex catheter: toelrated procedure well. no cardiac complications.   No further in-patient cardiac work-up/management is needed.  Follow-up in cardiology office in 2 weeks. 
80M, pmhx chronic HFpEF, s/p cardiomems, non obstructive CAD, bradycardia, HTN, HLD, AS MD ESRD on HD M/W/F via LUE AVF, hypothyroidism alzheimer's dz, h/o pleural effusions s/p L thoracentesis by IR 5/7 and R pigtail 5/21, presents to ED 6/15 from cardiologist office with SOB and reaccumulated L pleural effusion, s/p thoracentesis with 1.1 L serous fluid drained, f/u CXR with space, admitted for observation of respiratory status and pleurX catheter placement.
80M, pmhx chronic HFpEF, s/p cardiomems, non obstructive CAD, bradycardia, HTN, HLD, AS MD ESRD on HD M/W/F via LUE AVF, hypothyroidism alzheimer's dz, h/o pleural effusions s/p L thoracentesis by IR 5/7 and R pigtail 5/21, presents to ED 6/15 from cardiologist office with SOB and reaccumulated L pleural effusion, s/p thoracentesis with 1.1 L serous fluid drained, f/u CXR with space, admitted for observation of respiratory status and pleurX catheter placement.
HD  for 3.5hrs via LT arm AVF uneventful, uf goal 2.5L met, last nite,      mg.,      Rec : Torsemide On Non Dialysis Days,    Evaluate Residual UV,    IV Fe Q W, KD    Maximize UF to achieve Euvolemia,     HD : M W F     D/W CTS
HD  for 3.5hrs via LT arm AVF uneventful, uf goal 2.5L met, last nite,      mg.,      Rec : Torsemide On Non Dialysis Days,    Evaluate Residual UV,    IV Fe Q W, KD    Maximize UF to achieve Euvolemia,     HD : M W F     D/W Dr. Faustin & Chuckie,

## 2021-06-18 NOTE — DISCHARGE NOTE PROVIDER - NSDCCPTREATMENT_GEN_ALL_CORE_FT
PRINCIPAL PROCEDURE  Procedure: Insertion, PleurX catheter system, pleural  Findings and Treatment:

## 2021-06-18 NOTE — DISCHARGE NOTE PROVIDER - NSDCCPCAREPLAN_GEN_ALL_CORE_FT
PRINCIPAL DISCHARGE DIAGNOSIS  Diagnosis: Pleural effusion  Assessment and Plan of Treatment: status post pleurX catether insertion  visiting nurse arranged to come to your house to drain two times a week  no swimming/pools/hot tubs  ok to shower (cover dressing and keep dry)  resume home medications  continue your usual dialysis schedule starting Monday 6/21  per Dr. Wade and Dr. Faustin, you will be taking torsemide on non dialysis days (Tues, Thurs, Sat, Sun) to help with fluid removal   resume your usual activities and diet  follow up with Dr. Medley 7/7      SECONDARY DISCHARGE DIAGNOSES  Diagnosis: Chronic congestive heart failure, unspecified heart failure type  Assessment and Plan of Treatment:     Diagnosis: Postprocedural pneumothorax  Assessment and Plan of Treatment:      PRINCIPAL DISCHARGE DIAGNOSIS  Diagnosis: Pleural effusion  Assessment and Plan of Treatment: status post pleurX catether insertion  visiting nurse arranged to come to your house to drain two times a week  no swimming/pools/hot tubs  ok to shower (cover dressing and keep dry)  resume home medications  continue your usual dialysis schedule starting Monday 6/21  per Dr. Wade and Dr. Faustin, you will be taking torsemide on non dialysis days (Tues, Thurs, Sat, Sun) to help with fluid removal   resume your usual activities and diet  follow up with Dr. Medley 7/7      SECONDARY DISCHARGE DIAGNOSES  Diagnosis: DM (diabetes mellitus)  Assessment and Plan of Treatment: your sugars have been low in the hospital, your lantus dose has been decreased to 14units subcu at night  check your fingersticks in the morning. if your glucoses remain low, please call your primary care doctor for further adjutsment of your insulin

## 2021-06-18 NOTE — PROGRESS NOTE ADULT - SUBJECTIVE AND OBJECTIVE BOX
[facility]                                                        Office: [address] [address]                                                       Telephone: [phone]. Fax:[phone]                                                                             PROGRESS NOTE   Reason for follow up: congestive heart failure adn severe aortic stenosis and pleural effusion                             Overnight: No new events.   Update: s/p pleurex catheter. tolerated procedure well. no cardiac complication  got CT scan done: no obvious malignacny suspected.     Subjective: "  i am ok"   Complains of:  pdenies any new symptoms.   Review of symptoms: Cardiac:  No chest pain. No dyspnea. No palpitations.  Respiratory:no cough. No dyspnea  Gastrointestinal: No diarrhea. No abdominal pain. No bleeding.     Past medical history: No updates.   Chronic conditions:  Hypertension: controlled. CHF: Stable.   	  Vitals:  T(C): 37.2 (06-18-21 @ 14:55), Max: 37.2 (06-18-21 @ 04:34)  HR: 82 (06-18-21 @ 14:55) (63 - 82)  BP: 131/54 (06-18-21 @ 14:55) (111/46 - 146/53)  RR: 18 (06-18-21 @ 14:55) (18 - 18)  SpO2: 94% (06-18-21 @ 14:55) (94% - 99%)  Wt(kg): --  I&O's Summary    17 Jun 2021 07:01  -  18 Jun 2021 07:00  --------------------------------------------------------  IN: 0 mL / OUT: 200 mL / NET: -200 mL    18 Jun 2021 07:01  -  18 Jun 2021 18:53  --------------------------------------------------------  IN: 0 mL / OUT: 1500 mL / NET: -1500 mL      Weight (kg): 69 (06-17 @ 14:32)    PHYSICAL EXAM:  Appearance: Comfortable. No acute distress  HEENT:  Head and neck: Atraumatic. Normocephalic.  Normal oral mucosa, PERRL, Neck is supple. No JVD, No carotid bruit.   Neurologic: A & O x 3, no focal deficits. EOMI , Cranial nerves are intact.  Lymphatic: No cervical lymphadenopathy  Cardiovascular: Normal S1 S2, No murmur, rubs/gallops. No JVD, No edema  Respiratory:  bilateral crackles in the bases.   Gastrointestinal:  Soft, Non-tender, + BS  Lower Extremities: No edema  Psychiatry: Patient is calm. No agitation. Mood & affect appropriate  Skin: No rashes/ ecchymoses/cyanosis/ulcers visualized on the face, hands or feet.    CURRENT MEDICATIONS:  isosorbide   mononitrate ER Tablet (IMDUR) 30 milliGRAM(s) Oral daily  metoprolol succinate ER 25 milliGRAM(s) Oral daily  torsemide 20 milliGRAM(s) Oral <User Schedule>    memantine  atorvastatin  dextrose 50% Injectable  glucagon  Injectable  insulin glargine Injectable (LANTUS)  insulin lispro (ADMELOG) corrective regimen sliding scale  levothyroxine  epoetin nereida-epbx (RETACRIT) Injectable  epoetin nereida-epbx (RETACRIT) Injectable  iron sucrose IVPB  sodium chloride 0.9% lock flush      LABS:	 	  CARDIAC MARKERS ( 16 Jun 2021 04:22 )  x     / x     / x     / x     / x      p-BNP 16 Jun 2021 04:22: 05370 pg/mL                            9.1    12.45 )-----------( 223      ( 18 Jun 2021 08:53 )             27.2     06-18    137  |  99  |  28.0<H>  ----------------------------<  49<LL>  4.3   |  28.0  |  3.47<H>    Ca    8.2<L>      18 Jun 2021 07:35  Mg     2.1     06-18      proBNP: Serum Pro-Brain Natriuretic Peptide: 55408 pg/mL (06-16 @ 04:22)  Serum Pro-Brain Natriuretic Peptide: 31946 pg/mL (06-15 @ 17:00)    Lipid Profile:   HgA1c: TSH: Thyroid Stimulating Hormone, Serum: 3.73 uIU/mL

## 2021-06-18 NOTE — PROGRESS NOTE ADULT - PROBLEM SELECTOR PLAN 3
currently SR  cont toprol  not on AC as outpt due to risk of falls
currently SR  cont toprol  not on AC as outpt due to risk of falls

## 2021-06-19 NOTE — ED ADULT NURSE REASSESSMENT NOTE - NS ED NURSE REASSESS COMMENT FT1
pt's family wanted to try pt without 02, MD Grant made aware. pt siting up without 02, sating above 98%, no distress, stable on monitor , neuro checks done, no weakness, safety maintained, continue to monitor  pt family stated that pt's face does not have droop , stated that pt's face looks skinny due to losing weight on his past hospital stay, pt;s family insist that pt;s face is looking better and want to go home , MD Grant at bedside, assessment done, safety maintained,

## 2021-06-29 PROBLEM — N40.0 BPH WITHOUT URINARY OBSTRUCTION: Status: ACTIVE | Noted: 2020-09-09

## 2021-06-29 NOTE — HISTORY OF PRESENT ILLNESS
[Post-hospitalization from ___ Hospital] : Post-hospitalization from [unfilled] Hospital [Admitted on: ___] : The patient was admitted on [unfilled] [Discharged on ___] : discharged on [unfilled] [Discharge Summary] : discharge summary [FreeTextEntry2] : SOB, Had drain put in for fluid collection in his left side chest.  Still has difficulty breathing.  Going to see Pulmonology on  Thursday 7/1.\par According to patient's wife, he developed a "bed sore" just above the crack of buttocks.  They didn't get him out of bed, make him walk around or perform any activity while he was in there.\par \par Wound measured is 2 cm x 3 cm scabbed over with what appears to have open scratch marks around it.

## 2021-06-29 NOTE — REVIEW OF SYSTEMS
[Shortness Of Breath] : shortness of breath [Dyspnea on Exertion] : dyspnea on exertion [Incontinence] : incontinence [Itching] : itching [Confusion] : confusion [Unsteady Walk] : ataxia [Memory Loss] : memory loss [Fever] : no fever [Chills] : no chills [Fatigue] : no fatigue [Hot Flashes] : no hot flashes [Night Sweats] : no night sweats [Recent Change In Weight] : ~T no recent weight change [Discharge] : no discharge [Pain] : no pain [Redness] : no redness [Dryness] : no dryness [Vision Problems] : no vision problems [Itching] : no itching [Earache] : no earache [Hearing Loss] : no hearing loss [Nosebleeds] : no nosebleeds [Postnasal Drip] : no postnasal drip [Nasal Discharge] : no nasal discharge [Sore Throat] : no sore throat [Hoarseness] : no hoarseness [Chest Pain] : no chest pain [Palpitations] : no palpitations [Claudication] : no  leg claudication [Lower Ext Edema] : no lower extremity edema [Orthopena] : no orthopnea [Paroxysmal Nocturnal Dyspnea] : no paroxysmal nocturnal dyspnea [Wheezing] : no wheezing [Cough] : no cough [Abdominal Pain] : no abdominal pain [Nausea] : no nausea [Constipation] : no constipation [Diarrhea] : no diarrhea [Vomiting] : no vomiting [Heartburn] : no heartburn [Dysuria] : no dysuria [Hesitancy] : no hesitancy [Nocturia] : no nocturia [Hematuria] : no hematuria [Frequency] : no frequency [Joint Pain] : no joint pain [Joint Stiffness] : no joint stiffness [Muscle Pain] : no muscle pain [Muscle Weakness] : no muscle weakness [Back Pain] : no back pain [Joint Swelling] : no joint swelling [Mole Changes] : no mole changes [Nail Changes] : no nail changes [Hair Changes] : no hair changes [Skin Rash] : no skin rash [Headache] : no headache [Dizziness] : no dizziness [Fainting] : no fainting [Suicidal] : not suicidal [Insomnia] : no insomnia [Anxiety] : no anxiety [Depression] : no depression [Easy Bleeding] : no easy bleeding [Easy Bruising] : no easy bruising [Swollen Glands] : no swollen glands [FreeTextEntry7] : Incontinent of bowel  [FreeTextEntry8] : Urinates a lot during the night

## 2021-06-29 NOTE — HEALTH RISK ASSESSMENT
[No] : No [Change in mental status noted] : Change in mental status noted [Language] : difficulty with language [Behavior] : difficulty with behavior [Learning/Retaining New Information] : difficulty learning/retaining new information [Handling Complex Tasks] : difficulty handling complex tasks [Reasoning] : difficulty with reasoning [Spatial Ability and Orientation] : difficulty with spatial ability and orientation [Financial] : financial [With Significant Other] : lives with significant other [# of Members in Household ___] :  household currently consist of [unfilled] member(s) [] :  [# Of Children ___] : has [unfilled] children [Feels Safe at Home] : Feels safe at home [Smoke Detector] : smoke detector [Carbon Monoxide Detector] : carbon monoxide detector [Safety elements used in home] : safety elements used in home [Seat Belt] :  uses seat belt [Sunscreen] : uses sunscreen [Reviewed no changes] : Reviewed no changes [] : No [Sexually Active] : not sexually active [High Risk Behavior] : no high risk behavior [Reports changes in hearing] : Reports no changes in hearing [Reports changes in vision] : Reports no changes in vision [Reports normal functional visual acuity (ie: able to read med bottle)] : Reports poor functional visual acuity.  [Reports changes in dental health] : Reports no changes in dental health [TB Exposure] : is not being exposed to tuberculosis [Caregiver Concerns] : does not have caregiver concerns [ColonoscopyComments] : Decline [HIVComments] : Decline [HepatitisCComments] : Decline [de-identified] : Completely dependent [de-identified] : Completely dependent [AdvancecareDate] : 6/21

## 2021-06-29 NOTE — ASSESSMENT
[FreeTextEntry1] : Plan of Care discussed with spouse\par Diet, hydration, exercise discussed\par She states patient is picky, only eats a few bites\par Safety discussed\par Wound care consult suggested but wife states the co-payments are too high and they can't afford it\par CBC, CMP ordered today\par \par Patient's wife given a sample of Silvadene, advised to start with a box of wound care cover from [facility] and to call us in two weeks to see how he is.  They were advised if anything worsens, please call or go to the ER

## 2021-06-29 NOTE — COUNSELING
[Potential consequences of obesity discussed] : Potential consequences of obesity discussed [Encouraged to use exercise tracking device] : Encouraged to use exercise tracking device

## 2021-06-29 NOTE — PHYSICAL EXAM
[No Acute Distress] : no acute distress [Well Nourished] : well nourished [Well Developed] : well developed [Well-Appearing] : well-appearing [Normal Sclera/Conjunctiva] : normal sclera/conjunctiva [PERRL] : pupils equal round and reactive to light [EOMI] : extraocular movements intact [Normal Outer Ear/Nose] : the outer ears and nose were normal in appearance [Normal Oropharynx] : the oropharynx was normal [No JVD] : no jugular venous distention [No Lymphadenopathy] : no lymphadenopathy [Supple] : supple [Thyroid Normal, No Nodules] : the thyroid was normal and there were no nodules present [No Respiratory Distress] : no respiratory distress  [No Accessory Muscle Use] : no accessory muscle use [Clear to Auscultation] : lungs were clear to auscultation bilaterally [Normal Rate] : normal rate  [Regular Rhythm] : with a regular rhythm [Normal S1, S2] : normal S1 and S2 [No Carotid Bruits] : no carotid bruits [No Murmur] : no murmur heard [No Abdominal Bruit] : a ~M bruit was not heard ~T in the abdomen [No Varicosities] : no varicosities [Pedal Pulses Present] : the pedal pulses are present [No Edema] : there was no peripheral edema [No Palpable Aorta] : no palpable aorta [No Extremity Clubbing/Cyanosis] : no extremity clubbing/cyanosis [Soft] : abdomen soft [Non-distended] : non-distended [Non Tender] : non-tender [No Masses] : no abdominal mass palpated [No HSM] : no HSM [Normal Bowel Sounds] : normal bowel sounds [Normal Posterior Cervical Nodes] : no posterior cervical lymphadenopathy [Normal Anterior Cervical Nodes] : no anterior cervical lymphadenopathy [No CVA Tenderness] : no CVA  tenderness [No Spinal Tenderness] : no spinal tenderness [No Joint Swelling] : no joint swelling [No Rash] : no rash [Coordination Grossly Intact] : coordination grossly intact [No Focal Deficits] : no focal deficits [Normal Gait] : normal gait [Deep Tendon Reflexes (DTR)] : deep tendon reflexes were 2+ and symmetric [Normal Affect] : the affect was normal [Normal Insight/Judgement] : insight and judgment were intact [de-identified] : End Stage Renal Dz [de-identified] : Decreased strength/tone

## 2021-07-01 PROBLEM — Z86.79 HISTORY OF AORTIC VALVE STENOSIS: Status: RESOLVED | Noted: 2021-01-01 | Resolved: 2021-01-01

## 2021-07-01 PROBLEM — R06.02 SOB (SHORTNESS OF BREATH) ON EXERTION: Status: RESOLVED | Noted: 2021-01-01 | Resolved: 2021-01-01

## 2021-07-06 PROBLEM — R06.02 SOB (SHORTNESS OF BREATH): Status: ACTIVE | Noted: 2021-01-01

## 2021-07-06 NOTE — DISCUSSION/SUMMARY
[Patient] : the patient [Risks] : risks [Benefits] : benefits [With Me] : with me [Alternatives] : alternatives [___ Month(s)] : in [unfilled] month(s) [FreeTextEntry1] : 79 M with HTN, DM, HLD, paroxsymal atrial fibrillation with Supra therapeutic INR and acute severe GI bleeding now off coumadin on ILR to evaluate for Afib burden,  Pt is s/p Cardiomems placement 1/3. reports today for lower ext. swelling. denies any SOB, chest pain, palpitations, syncope or near syncope. \par Plan d/w Dr. Faustin. \par \par \par Plan:\par 1). HF- heart failure with preserve ejection fraction   Pt is s/p Cardiomems change PA diastolic goal from 12 to 10.  ct torsemide 40 in am and 20 in evening. . still makes urine. . . on hemodialyses .     Patietn has severe pulm HTN.  \par Bialteral pleural effusion  /p hemodialyses and pleurex cath. now no more fluid drainage. \par 2) Bilateral LE edema:  .  CKD. HFPEF.  Compressions stockings.   diuresis. as above \par 3)  Paroxysmal Afib: s/p cardioversion Jan 2015, CHADSVASC >=3..  =  Xarelto not approved for life long or high co pay.  no coumadin as patient had bleeding on supra therapeutic INR.  ct  aspirin  81 mg.  s/p Loop recorder.  low afib burden so far.  willevaluate for watchman next visit. \par 5) HTN: controlled. ct  current meds. \par 6) CAD evaluation : no significant coronary artery disease on cath \par 7)   severe  aortic stenosis:  CHF.   plan for TAVR.  Dr. Lloyd to get appt for TAVR.\par az\par

## 2021-07-06 NOTE — ED PROVIDER NOTE - ATTENDING CONTRIBUTION TO CARE
80y male with a PMHx of Alzheimer's, atrial fibrillation, BPH, GERD, HLD, hypothyroid, moderate aortic stenosis, stage 5 chronic kidney disease s/p cataract, AV fistula, right pulmonary artery sensor pressure implant, ureteral stent retained presents with hip pain after fall. As per wife pt was at the cardiologist office and had a mechanical fall missed a railing and landed on his left side denies loc. Pt with left hip pain, xray unremarkable therefore CT done which showed no acute traumatic injuries, pt at baseline ambulation status with walker, wife cares for him pt stable for dc with follow up  Const: Awake, alert In no acute distress. Well appearing.  HEENT: NC/AT. Moist mucous membranes.  Eyes: No scleral icterus. EOMI.  Neck:. Soft and supple. Full ROM without pain.  Cardiac: Regular rate and regular rhythm. +S1/S2. Peripheral pulses 2+ and symmetric. No LE edema.  Resp: Speaking in full sentences. No evidence of respiratory distress. No wheezes, rales or rhonchi.  Abd: Soft, non-tender, non-distended. Normal bowel sounds in all 4 quadrants. No guarding or rebound.  Back: Spine midline and non-tender. No CVAT.  Lymph: No cervical lymphadenopathy.  Neuro: Awake, alert  Moves all extremities symmetrically.

## 2021-07-06 NOTE — ED PROVIDER NOTE - PROGRESS NOTE DETAILS
Ming PGY-3: Pt reassessed, pt feeling better at this time, vss, pt able to walk at baseline, talk and vocalized plan of action. Discussed in depth and explained to pt in depth the next steps that need to be taking including proper follow up with PCP or specialists. All incidental findings were discussed with pt as well. Pt verbalized their concerns and all questions were answered. Pt understands dispo and wants discharge. Given good instructions when to return to ED and importance of f/u.

## 2021-07-06 NOTE — ED PROVIDER NOTE - OBJECTIVE STATEMENT
Pt is a 81 y/o M w/PMHxAlzheimer's, atrial fibrillation, BPH, GERD, HLD, hypothyroid, moderate aortic stenosis, stage 5 chronic kidney disease s/p cataract, AV fistula, right pulmonary artery sensor pressure implant, ureteral stent retained presents c/o left hip pain.  Per wife at bedside pt was leaving his cardiologist office around 3pm when he had a mechanical fall after missing the railing, landing on his left side and biting his lower lip.  Pt was unable to get himself up and needed assistance getting into his car.  At home his wife helped him up 4 stairs, and had given him 2 tylenol.  He told his wife that he was having left groin pain and she decided to bring him to the ED for evaluation.

## 2021-07-06 NOTE — ED ADULT TRIAGE NOTE - CHIEF COMPLAINT QUOTE
pt brought in by wife via wheelchair, baseline mental status as per wife, hx dementia, c/o trip and fall at doctor's office today. c/o R LE pain and bottom lip pain. lac noted to L corner of bottom lip. bleeding controlled at this time. denies hitting head. denies thinners. denies loc. Av fistula noted to L arm.

## 2021-07-06 NOTE — REVIEW OF SYSTEMS
[SOB] : shortness of breath [Dyspnea on exertion] : dyspnea during exertion [Chest Discomfort] : chest discomfort [Cough] : cough [Wheezing] : wheezing [Negative] : Heme/Lymph [Leg Claudication] : no intermittent leg claudication

## 2021-07-06 NOTE — ED PROVIDER NOTE - NS ED ROS FT
CONSTITUTIONAL: No fevers, no chills  Eyes: No vision changes  Cardiovascular: No Chest pain  Respiratory: No SOB  Gastrointestinal: No n/v/c/d, no abd pain  Genitourinary: no dysuria, no hematuria  SKIN: no rashes.  MSK: +left hip pain  NEURO: no headache, no weakness, no numbness  PSYCHIATRIC: no SI/HI

## 2021-07-06 NOTE — HISTORY OF PRESENT ILLNESS
[FreeTextEntry1] : HTN, atrial fibrillation, congestive heart failure and heart failure with preserve ejection fraction s/p cardiomem \par \par HPI for today: : HE IS CONSTIPATED.  HE IS BREATHING BETTER.  he has a pig tail tube.  in his pleura cath. left chest . he was draining the fluid twice a week.  and now coming only once a week.\par no headaches. nodizziness. no syncope.\par dyspnea on exertion : improved.\par \par \par OLD NOTE: : during hemodialyses yesterday he was very shortness of breathe . he had received oxygen during hemodialyses . he had a fitulogram and balooning done last week. for his left av fistula.  fo rthe obstruciton.  since the procedure he has been more short of breathe. unclear uif he received IV fluids\par 5/13: his cardiomems reading was very high. \par \par HPI for today: : he sleep s a lot.  No headahces. no dizziness. +_ dyspnea on exertion. getting hemodialyses .\par legs not swollen now.  he is complaitn with meds. \par checks cardiomem every day.\par \par \par old note:  : c/c : when parker the HD catheter will come off.  he has fistual 2 mths ago. getting hemodialyses with catheter. No headaches. Nodizziness. no dyspnea. \par no palpitations,. has cardiomem. at goal. \par  \par \par  \par \par ol dnote: no chest pain. no dyspnea. no headaches. no dizziness. \par old note: no chest pain. no dyspnea. routine follow up. compliant with meds. no fall. no bleeding. \par prior note: last visit, did not bring his pills. BP high, added Benica-HCTZ.  Compliant with emds. Did not bring his pills today again.  Also, said that did not  Benicar-HCTZ due to high co pay. Only taking one BP pill. Not compliant with diet. \par No chest pain. No dyspnea.\par OLD NOTE: This is a 74 year old man with history of HTN, DM, CKD (Stage 3) Atrial fibrillation, prior pneumonia and pleural effusions and pericardial effusion, recently admitted to hospital for rectal bleeding and Elevated INR Hb < 4 , INR =8 and acute on chronic renal failure. CT abd showed, diverticular disease.  Colonoscopy showed, diverticulitis, EGD showed  Patient is off AC. \par \par

## 2021-07-06 NOTE — ED PROVIDER NOTE - PHYSICAL EXAMINATION
General: well appearing, interactive, well nourished, NAD  HEENT: small (.5 cm) bite wound left lower lip, pupils equal and reactive, normal external ears bilaterally   Cardiac: RRR, no MRG appreciated  Resp: lungs clear to auscultation bilaterally, symmetric chest wall rise  Abd: soft, nontender, nondistended,   : no CVA tenderness  Neuro: Moving all extremities  Skin:  normal color for race  MSK: Left leg ROM limited due to pain, slight leg length discrepancy likely due to position

## 2021-07-06 NOTE — ED PROVIDER NOTE - PATIENT PORTAL LINK FT
You can access the FollowMyHealth Patient Portal offered by [facility] by registering at the following website: [email or web address]. By joining [facility]’s FollowMyHealth portal, you will also be able to view your health information using other applications (apps) compatible with our system.

## 2021-07-14 NOTE — PHYSICAL EXAM
[Sclera] : the sclera and conjunctiva were normal [Neck Appearance] : the appearance of the neck was normal [Neck Cervical Mass (___cm)] : no neck mass was observed [Respiration, Rhythm And Depth] : normal respiratory rhythm and effort [Exaggerated Use Of Accessory Muscles For Inspiration] : no accessory muscle use [Auscultation Breath Sounds / Voice Sounds] : lungs were clear to auscultation bilaterally [Examination Of The Chest] : the chest was normal in appearance [Chest Visual Inspection Thoracic Asymmetry] : no chest asymmetry [] : no rash [Skin Lesions] : no skin lesions [Sensation] : the sensory exam was normal to light touch and pinprick [Motor Exam] : the motor exam was normal [Oriented To Time, Place, And Person] : oriented to person, place, and time [FreeTextEntry1] : Left pleural drain dressing intact.

## 2021-07-14 NOTE — DATA REVIEWED
[FreeTextEntry1] : 6.15.21 Pathology Final Diagnosis from [facility] \par PLEURAL FLUID, LEFT\par NEGATIVE FOR MALIGNANT CELLS.\par \par 6.28.21 Chest Xray\par -Residual LEFT loculated effusion and/or airspace consolidation.\par - 3.4 cm air fluid-filled LEFT CP angle cyst\par \par \par \par

## 2021-07-14 NOTE — CONSULT LETTER
[FreeTextEntry3] : Sue Medley MD\par Cardiothoracic Surgery\par [facility]\par [address] [place] [address] \[place] [address]\par [phone]\par [phone]\par \par

## 2021-07-14 NOTE — ASSESSMENT
[FreeTextEntry1] : I had the pleasure of reevaluating Mr. Aldana.  He is accompanied with is wife and daughter. Today I have  discussed  Mr. Aldana progress status post  left video-assisted thoracoscopic surgery, insertion of Pleurx catheter on 6.8.21. His  left pleural fluid was negative for malignant cells and his average Pleurx  output is 1cc  per session 1 times a week. He offers no complaint today and denies  dizziness, shortness of breath with climbing up/down stairs or with exertion, unintentional weight loss, cough, fever or chills. \par \par I have reviewed the patient's medical records and diagnostic images during the time of this office visit, and I have made the following recommendation to  continue recording output of Pleurx catheter once a week  and return to our office in 2 weeks with a chest Xray to evaluate pleural effusion. .Expectations reviewed with patient, wife and daughter.  All questions addressed. Patient,  wife,  and daughter agrees and will adhere to plan. \par \par PLAN:  \par -Return to office in  2 weeks with Chest Xray \par -Continue recording of Pleurx output once a week. \par \par Leonora HAAS NP am scribing for and in the presence of   the following sections HISTORY OF PRESENT ILLNESS, PAST MEDICAL/FAMILY/SOCIAL HISTORY; REVIEW OF SYSTEMS; VITAL SIGNS; PHYSICAL EXAM; DISPOSITION.\par \par "I personally performed the services described in the documentation, reviewed the documentation recorded by the scribe in my presence and accurately and completely records my words and actions."\par

## 2021-07-14 NOTE — HISTORY OF PRESENT ILLNESS
[FreeTextEntry1] : Mr. SANTOS is a 80 year old male who is status post left video-assisted thoracoscopic surgery, insertion of Pleurx catheter on 6.8.21 for a recurrent left sided pleural effusion. His  left pleural fluid was was negative for malignant cells. Past medical history includes chronic pleural effusion dating back to 2015. His average output is  1 cc  per session one times a week. \par \par He offers no complaint and he is accompanied with his wife and daughter.  He is here to discuss his progress s/p Pleurx placement.

## 2021-07-16 NOTE — CONSULT LETTER
[FreeTextEntry2] : Kyle Faustin MD [FreeTextEntry3] : Lobo Lloyd MD\par Cardiothoracic Surgery\par [facility]\[place] [address]\par [phone]\par

## 2021-07-16 NOTE — ASSESSMENT
[FreeTextEntry1] : Severe aortic stenosis. He also has dementia. In view of recent falls, he needs aortic valve replacement. In view of multiple comorbidities, he is a candidate for TAVR. \par \par PLAN\par -Repeat TTE\par -TAVR CT Scan\par -Schedule TAVR

## 2021-07-16 NOTE — REVIEW OF SYSTEMS
[Feeling Poorly] : feeling poorly [Feeling Tired] : feeling tired [Recent Weight Loss (___ Lbs)] : recent [unfilled] ~Ulb weight loss [Nasal Discharge] : nasal discharge [Lower Ext Edema] : lower extremity edema [Shortness Of Breath] : shortness of breath [Incontinence] : incontinence [Difficulty Walking] : difficulty walking [Negative] : Heme/Lymph [Fever] : no fever [Chills] : no chills [Wheezing] : no wheezing [FreeTextEntry7] : decreased appetitie [FreeTextEntry8] : HD

## 2021-07-16 NOTE — HISTORY OF PRESENT ILLNESS
[FreeTextEntry1] : Mr. SANTOS is a 80 year old male referred by Dr. Faustin who presents for consultation. His past medical history includes Alzheimers Dementia, HTN, HLD, pulmonary HTN (Cardiomems Implant), atrial fibrillation, Insulin dependent diabetes, ESRD on HD 9M/W/F via L AVF)  pleural effusions with chest tube placement. \par \par He is here today with his wife.  She reports he is feeling depressed and hasn’t been eating for about 2 weeks.  She states he has no appetite. She reports he still has shortness of breath but its improving since the placement of the chest tube.  He has BL pedal edema, left more then the right.  \par \par He recently fell 2 times while going to different doctors appts so he is ambulating less.  He is hesitant to walk because of the recent falls. \par \par

## 2021-07-26 PROBLEM — Z82.0 FAMILY HISTORY OF ALZHEIMER'S DISEASE: Status: ACTIVE | Noted: 2020-03-09

## 2021-07-26 PROBLEM — Z01.818 PREOPERATIVE CLEARANCE: Status: RESOLVED | Noted: 2021-01-01 | Resolved: 2021-01-01

## 2021-07-26 PROBLEM — N39.0 ACUTE UTI: Status: RESOLVED | Noted: 2021-01-01 | Resolved: 2021-01-01

## 2021-07-26 PROBLEM — Z78.9 DOES NOT USE ILLICIT DRUGS: Status: ACTIVE | Noted: 2021-01-01

## 2021-07-26 PROBLEM — Z09 HOSPITAL DISCHARGE FOLLOW-UP: Status: RESOLVED | Noted: 2021-01-01 | Resolved: 2021-01-01

## 2021-08-04 PROBLEM — J90 BILATERAL PLEURAL EFFUSION: Status: ACTIVE | Noted: 2021-01-01

## 2021-08-04 NOTE — PHYSICAL EXAM
[Sclera] : the sclera and conjunctiva were normal [Neck Appearance] : the appearance of the neck was normal [] : no respiratory distress [Respiration, Rhythm And Depth] : normal respiratory rhythm and effort [Exaggerated Use Of Accessory Muscles For Inspiration] : no accessory muscle use [Auscultation Breath Sounds / Voice Sounds] : lungs were clear to auscultation bilaterally [Heart Rate And Rhythm] : heart rate was normal and rhythm regular [Examination Of The Chest] : the chest was normal in appearance [Chest Visual Inspection Thoracic Asymmetry] : no chest asymmetry [Sensation] : the sensory exam was normal to light touch and pinprick [Motor Exam] : the motor exam was normal [FreeTextEntry1] : Minimial verbal. History of Alzheimers

## 2021-08-04 NOTE — CONSULT LETTER
[FreeTextEntry3] : Sue Medley MD\par [facility] Thoracic Surgery\par [address] \[place] [address]\par Tel: [phone]\par Fax: [phone]\par \par

## 2021-08-04 NOTE — DATA REVIEWED
[FreeTextEntry1] : 7.29.21 Chest Xray from [facility] [place] \par IMPRESSION: Basilar lung and pleural findings again noted, stable on the left and improved on the right. Pulmonary artery branch sensor and loop recorder.\par \par --- End of Report ---\par   \par \par 6.15.21 Pathology Final Diagnosis from [facility] \par PLEURAL FLUID, LEFT\par NETIVE FOR MALIGNANT CELLS.\par \par 6.28.21 Chest Xray from [facility]\par -Residual LEFT loculated effusion and/or airspace consolidation.\par - 3.4 cm air fluid-filled LEFT CP angle cyst\par \par \par \par

## 2021-08-04 NOTE — HISTORY OF PRESENT ILLNESS
[FreeTextEntry1] : Mr. SANTOS is a 80 year old male who is status post left video-assisted thoracoscopic surgery, insertion of Pleurx catheter on 6.8.21 for a recurrent left sided pleural effusion. His left pleural fluid was was negative for malignant cells. Past medical history includes chronic pleural effusion dating back to 2015. His average output is 0 cc per session one time a week. \par \par As per wife her  is minimally verbal due to his history of Alzheimers, however she reports no changes in from  his baseline behavior.  He is accompanied with his wife and he is here to  discuss his progress s/p Pleurx placement and  repeat chest Xray.   \par

## 2021-08-04 NOTE — ASSESSMENT
[FreeTextEntry1] : I had the pleasure of reevaluating Mr. Aldana.He is accompanied with his wife.   Today I have discussed Mr. Aldana progress status post left video-assisted thoracoscopic surgery, insertion of Pleurx catheter on 6.8.21. His left pleural fluid was negative for malignant cells and his average Pleurx output is 0cc per session 1 times a week. As per wife,  her  is minimally verbal, however she reports no changes in from  his baseline behavior.  Recommended during our last visit was for a chest Xray follow up that showed  basilar lung and pleural findings again noted, stable on the left and improved on the right. \par \par At this time it is appropriate to remove his Pleurx catheter given his unremarkable chest Xray and Pleurx output. Pleurx was removed in office without any complication. Dressing (gauze and tape) enforced.Vital signs stable post removal. I  have reviewed the patient's medical records and diagnostic images during the time of this office visit, and I have made the following recommendation  to keep left Pleurx dressing for 2 days, monitor for signs of infection, go to ER for any chest pain or shortness of breath, and return to care as needed.Expectations reviewed with patient. All questions addressed. Patient agrees and will adhere to plan. \par \par PLAN:\par -Return to care as needed \par \par Leonora HAAS NP am scribing for and in the presence of Dr. Medley the following sections HISTORY OF PRESENT ILLNESS, PAST MEDICAL/FAMILY/SOCIAL HISTORY; REVIEW OF SYSTEMS; VITAL SIGNS; PHYSICAL EXAM; DISPOSITION.\par \par "I personally performed the services described in the documentation, reviewed the documentation recorded by the scribe in my presence and accurately and completely records my words and actions."\par  \par

## 2021-08-12 NOTE — PHYSICAL EXAM
[JVD] : no jugular venous distention  [Normal Breath Sounds] : Normal breath sounds [Normal Rate and Rhythm] : normal rate and rhythm [2+] : left 2+ [Ankle Swelling (On Exam)] : not present [Abdomen Masses] : No abdominal masses [Abdomen Tenderness] : ~T ~M No abdominal tenderness [No Rash or Lesion] : No rash or lesion [Alert] : alert [Oriented to Person] : disoriented to person [Oriented to Place] : disoriented to place [Oriented to Time] : disoriented to time [Calm] : calm [de-identified] : Well appearing [FreeTextEntry1] : No left arm edema\par Left arm AVF with good thrill

## 2021-08-12 NOTE — HISTORY OF PRESENT ILLNESS
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

## 2021-08-23 NOTE — CARDIOLOGY SUMMARY
[No Ischemia] : no Ischemia [LVEF ___%] : LVEF [unfilled]% [None] : normal LV function [Enlarged] : enlarged LA size [Mild] : mild mitral regurgitation [___] : [unfilled] [de-identified] : feb 2018 : carotid US: mild atherosclerosis. no stenosis. \par feb 2019: Mild plaque . no stenosis.

## 2021-08-23 NOTE — END OF VISIT
[Time Spent: ___ minutes] : I have spent [unfilled] minutes of time on the encounter. [FreeTextEntry3] : I personally discussed this patient with Nurse Practitioner/Physician Assistant. I agree with the assessment and plan as written, unless noted below.

## 2021-08-23 NOTE — HISTORY OF PRESENT ILLNESS
[FreeTextEntry1] : 79 yo M with PMHx chronic diastolic HFpEF, grade II diastolic dysfunction, bradycardia, AS, HTN, HLD, DM, ESRD on HD, Afib, hypothyroidism, Alzheimer's presented to the ED after SOB, requiring O2, while at HD. Patient was admitted with bilateral pleural effusions - thoracentesis performed on 5/20 and 1400cc was removed, chest tube placed on 5/21 which was removed on 5/24. Patient was told to continue HD as outpatient and his medication regimen was adjusted. Cardiology recommended outpatient TAVR work up. Today, he c/o increasing SOB with minimal exertion. + orthopnea/PND, sleeps with 2 pillows. + b/l LE edema. CardioMEMs PAD 14 today. He was taken off torsemide last week by nephrology. Bedside echo done today by Dr. Faustin showed large left pleural effusion and small to moderate right pleural effusion. \par \par 8/17/2021\par Patient here for follow up. Wife stated Pleurex was removed 2 weeks, no more fluid draining. Exertional dysnea has significantly improved. Denies CP, SOB at rest, orthopnea, edema, near syncope or syncope. CardioMEMs PAD 4 today. He has HD on M, W, F. Wife states he has decreased appetitie and is still losing weight. He saw Dr. Lloyd last month and in process of work up for TAVR.

## 2021-08-23 NOTE — REVIEW OF SYSTEMS
[Dyspnea on exertion] : dyspnea during exertion [Negative] : Heme/Lymph [Change in Appetite] : change in appetite [Weight Loss (___ Lbs)] : [unfilled] ~Ulb weight loss [Chest Discomfort] : no chest discomfort [Lower Ext Edema] : no extremity edema [Leg Claudication] : no intermittent leg claudication [Orthopnea] : no orthopnea [Cough] : no cough [Wheezing] : no wheezing [Abdominal Pain] : no abdominal pain [Nausea] : no nausea [Vomiting] : no vomiting [Diarrhea] : diarrhea

## 2021-08-23 NOTE — PHYSICAL EXAM
[No Deformities] : no deformities [General Appearance - In No Acute Distress] : no acute distress [Normal Conjunctiva] : the conjunctiva exhibited no abnormalities [Eyelids - No Xanthelasma] : the eyelids demonstrated no xanthelasmas [Respiration, Rhythm And Depth] : normal respiratory rhythm and effort [Auscultation Breath Sounds / Voice Sounds] : lungs were clear to auscultation bilaterally [Heart Rate And Rhythm] : heart rate and rhythm were normal [Edema] : no peripheral edema present [Heart Sounds] : normal S1 and S2 [Abdomen Soft] : soft [Abdomen Tenderness] : non-tender [Abdomen Mass (___ Cm)] : no abdominal mass palpated [Skin Color & Pigmentation] : normal skin color and pigmentation [No Skin Ulcers] : no skin ulcer [] : no rash [Oriented To Time, Place, And Person] : oriented to person, place, and time [FreeTextEntry1] : limited, ambulates with walker

## 2021-08-23 NOTE — DISCUSSION/SUMMARY
[FreeTextEntry1] :  Discussed with and seen by Dr. Faustin.\par \par Plan:\par 1. HF- heart failure with preserve ejection fraction, improved SOB, no edema, appears dehydrated lost 19 lbs, Cardiomems PAD 4, decrease torsemide to 10 mg on non HD days, thiago\par 2. PAfib- s/p cardioversion Jan 2015, CHADSVASC >=3.. = Xarelto not approved for life long or high co pay. no coumadin as patient had bleeding on supra therapeutic INR. ct aspirin 81 mg. s/p Loop recorder. low afib burden so far\par 3. HTN- controlled, ct current meds. \par 4. CAD evaluation- no significant coronary artery disease on LHC\par 5. severe aortic stenosis- CHF, planned for TAVR. with Dr. Lloyd\par 6. decreased appetite/weight loss- dronainol prescribed by Dr Faustin, advised to f/u with PMD and GI\par 7. f/u in 8 wks with Dr. Faustin\par \par PHI [place]

## 2021-08-25 NOTE — H&P PST ADULT - ASSESSMENT
A 80 year old male with a PMH of AF,( off VKAs due to severe GI bleeding), ESRD on HD, DM, Grade II diastolic dysfunction, Cardiomems implantation, LVEF-70% mild pulmonary HTN, ILR implant,  severe AS, HTN,HLD,  left carotid artery bruit, presenting for a LHC with Dr. Neo Nunn this am     PRE-PROCEDURE ASSESSMENT  -NPO after midnight confirmed  -IV insert  -Patient seen and examined  -Labs reviewed  -Pre-procedure teaching completed with patient   - consent obtained   -Questions answered    ASA-  MALL-  Creat-  MALL-  GFR-  Creat-      A 80 year old male with a PMH of AF,( off VKAs due to severe GI bleeding), ESRD on HD, DM, Grade II diastolic dysfunction, Cardiomems implantation, LVEF-70% mild pulmonary HTN, ILR implant,  severe AS, HTN,HLD,  left carotid artery bruit, presenting for a LHC with Dr. Neo Nunn this am     PRE-PROCEDURE ASSESSMENT  -NPO after midnight confirmed  -IV insert  -Patient seen and examined  -Labs reviewed  -Pre-procedure teaching completed with patient   - consent obtained   -Questions answered    ASA-  MALL-  Creat-  MALL-  GFR-  COVID Negative-08/25 Pt fully vaccinated 3/2021      A 80 year old male with a PMH of AF,( off VKAs due to severe GI bleeding), ESRD on HD, DM, Grade II diastolic dysfunction, Cardiomems implantation, LVEF-70% mild pulmonary HTN, ILR implant,  severe AS, HTN,HLD,  left carotid artery bruit, presenting for a LHC with Dr. Neo Nunn this am     PRE-PROCEDURE ASSESSMENT  -NPO after midnight confirmed  -IV insert  -Patient seen and examined  -Labs reviewed  -Pre-procedure teaching completed with patient   - consent obtained   -Questions answered    ASA-3  MALL-2  Creat-2.2  GFR-27  BRA-2.5%  COVID Negative-08/25 Pt fully vaccinated 3/2021      A 80 year old male with a PMH of AF,( off VKAs due to severe GI bleeding), ESRD on HD, DM, Grade II diastolic dysfunction, Cardiomems implantation, LVEF-70% mild pulmonary HTN, ILR implant,  severe AS, HTN,HLD,  left carotid artery bruit, presenting for a [facility] with Dr. Neo Nunn this am for evaluation of possible TAVR with Dr Lloyd.        PRE-PROCEDURE ASSESSMENT  -NPO after midnight confirmed  -IV insert  -Patient seen and examined  -Labs reviewed  -Pre-procedure teaching completed with patient   - consent obtained   -Questions answered    ASA-3  MALL-2  Creat-2.2  GFR-27  BRA-2.5%  COVID Negative-08/25 Pt fully vaccinated 3/2021

## 2021-08-25 NOTE — H&P PST ADULT - HISTORY OF PRESENT ILLNESS
Narrative: This is a 80 year old male with a PMH of AF,( off VKAs due to severe GI bleeding),ILR implant,  severe AS, HTN, left carotid artery bruit, presenting for a C with Dr. Neo Nunn this am   MAL  ASA  GFR  Bleeding Risk Assessment  Symptoms:        Angina (Class):        Ischemic Symptoms:     Heart Failure:        Systolic/Diastolic/Combined:        NYHA Class (within 2 weeks):     Assessment of LVEF:       EF:        Assessed by:        Date:     Prior Cardiac Interventions:       PCI's:        CABG:     Noninvasive Testing:   Stress Test: Date:        Protocol:        Duration of Exercise:        Symptoms:        EKG Changes:        DTS:        Myocardial Imaging:        Risk Assessment:     Echo:     Antianginal Therapies:        Beta Blockers:         Calcium Channel Blockers:        Long Acting Nitrates:        Ranexa:     Associated Risk Factors:        Cerebrovascular Disease: N/A       Chronic Lung Disease: N/A       Peripheral Arterial Disease: N/A       Chronic Kidney Disease (if yes, what is GFR): N/A       Uncontrolled Diabetes (if yes, what is HgbA1C or FBS): N/A       Poorly Controlled Hypertension (if yes, what is SBP): N/A       Morbid Obesity (if yes, what is BMI): N/A       History of Recent Ventricular Arrhythmia: N/A       Inability to Ambulate Safely: N/A       Need for Therapeutic Anticoagulation: N/A       Antiplatelet or Contrast Allergy: N/A Narrative: This is a 80 year old male with a PMH of AF,( off VKAs due to severe GI bleeding), ESRD on HD, DM, Grade II diastolic dysfunction, Cardiomems implantation, LVEF-70% mild pulmonary HTN, ILR implant,  severe AS, HTN,HLD,  left carotid artery bruit, presenting for a [facility] with Dr. Neo Nunn this am       Symptoms:        Angina (Class):        Ischemic Symptoms:     Heart Failure: Class II Diastolic Dysfunction       Systolic/Diastolic/Combined:        NYHA Class (within 2 weeks):     Assessment of LVEF:       EF: 60-65%       Assessed by: Echo       Date: 06/03/2021    Prior Cardiac Interventions:        PCI's:        CABG:   Carotid Duplex 2/04/2021-mild athersosclerosis seen bilaterally without hemodynamically significant stenosis  Noninvasive Testing:   Stress Test: Date:        Protocol:        Duration of Exercise:        Symptoms:        EKG Changes:        DTS:        Myocardial Imaging:        Risk Assessment:        Antianginal Therapies:        Beta Blockers:         Calcium Channel Blockers:        Long Acting Nitrates:        Ranexa:     Associated Risk Factors:        Cerebrovascular Disease: N/A       Chronic Lung Disease: N/A       Peripheral Arterial Disease: N/A       Chronic Kidney Disease (if yes, what is GFR): N/A       Uncontrolled Diabetes (if yes, what is HgbA1C or FBS): N/A       Poorly Controlled Hypertension (if yes, what is SBP): N/A       Morbid Obesity (if yes, what is BMI): N/A       History of Recent Ventricular Arrhythmia: N/A       Inability to Ambulate Safely: N/A       Need for Therapeutic Anticoagulation: N/A       Antiplatelet or Contrast Allergy: N/A Narrative: This is a 80 year old male with a PMH of AF,( off VKAs due to severe GI bleeding), ESRD on HD, DM, Grade II diastolic dysfunction, Cardiomems implantation, LVEF-70% mild pulmonary HTN, ILR implant,  severe AS, HTN,HLD,  left carotid artery bruit, presenting for a [facility] with Dr. Neo Nunn this am       Symptoms:        Angina (Class): 4       Ischemic Symptoms: SOB @ rest    Heart Failure: Class II Diastolic Dysfunction       Systolic/Diastolic/Combined:        NYHA Class (within 2 weeks):     Assessment of LVEF:       EF: 60-65%       Assessed by: Echo       Date: 06/03/2021    Prior Cardiac Interventions: No       PCI's:        CABG:   Carotid Duplex 2/04/2021-mild athersosclerosis seen bilaterally without hemodynamically significant stenosis  Noninvasive Testing:   Stress Test: Date:        Protocol:        Duration of Exercise:        Symptoms:        EKG Changes:        DTS:        Myocardial Imaging:        Risk Assessment:        Antianginal Therapies:        Beta Blockers:  metoprolol       Calcium Channel Blockers:        Long Acting Nitrates: isosorbide       Ranexa:     Associated Risk Factors:        Cerebrovascular Disease: N/A       Chronic Lung Disease: N/A       Peripheral Arterial Disease: N/A       Chronic Kidney Disease (if yes, what is GFR): N/A       Uncontrolled Diabetes (if yes, what is HgbA1C or FBS): N/A       Poorly Controlled Hypertension (if yes, what is SBP): N/A       Morbid Obesity (if yes, what is BMI): N/A       History of Recent Ventricular Arrhythmia: N/A       Inability to Ambulate Safely: N/A       Need for Therapeutic Anticoagulation: N/A       Antiplatelet or Contrast Allergy: N/A Narrative: This is a 80 year old male with a PMH of AF,( off VKAs due to severe GI bleeding), ESRD on HD, DM, Grade II diastolic dysfunction, Cardiomems implantation, LVEF-70% mild pulmonary HTN, ILR implant,  severe AS, HTN,HLD,  left carotid artery bruit, presenting for a [facility] with Dr. Neo Nunn this am for evaluation of possible TAVR with Dr Lloyd.      Symptoms:        Angina (Class): 4       Ischemic Symptoms: SOB @ rest    Heart Failure: Class II Diastolic Dysfunction       Systolic/Diastolic/Combined:        NYHA Class (within 2 weeks):     Assessment of LVEF:       EF: 60-65%       Assessed by: Echo       Date: 06/03/2021    Prior Cardiac Interventions: No       PCI's:        CABG:   Carotid Duplex 2/04/2021-mild athersosclerosis seen bilaterally without hemodynamically significant stenosis  Noninvasive Testing:   Stress Test: Date:        Protocol:        Duration of Exercise:        Symptoms:        EKG Changes:        DTS:        Myocardial Imaging:        Risk Assessment:        Antianginal Therapies:        Beta Blockers:  metoprolol       Calcium Channel Blockers:        Long Acting Nitrates: isosorbide       Ranexa:     Associated Risk Factors:        Cerebrovascular Disease: N/A       Chronic Lung Disease: N/A       Peripheral Arterial Disease: N/A       Chronic Kidney Disease (if yes, what is GFR): N/A       Uncontrolled Diabetes (if yes, what is HgbA1C or FBS): N/A       Poorly Controlled Hypertension (if yes, what is SBP): N/A       Morbid Obesity (if yes, what is BMI): N/A       History of Recent Ventricular Arrhythmia: N/A       Inability to Ambulate Safely: N/A       Need for Therapeutic Anticoagulation: N/A       Antiplatelet or Contrast Allergy: N/A

## 2021-08-26 NOTE — DISCHARGE NOTE PROVIDER - NSDCFUSCHEDAPPT_GEN_ALL_CORE_FT
SANTOS, JEMIMA ; 09/09/2021 ; [facility] [address]  SANTOS, JEMIMA ; 09/14/2021 ; NPP Cardio [address]  SANTOS, JEMIMA ; 09/15/2021 ; NPP Cardio [facility] [address]  SANTOS, JEMIMA ; 09/28/2021 ; NPP Gastro [address]  SANTOS, JEMIMA ; 10/12/2021 ; NPP Cardio [address]  SANTOS, JEMIMA ; 10/20/2021 ; [facility]PP Cardio [facility] [address]  SANTOS, JEMIMA ; 11/11/2021 ; NPP Neurology [address]

## 2021-08-26 NOTE — DISCHARGE NOTE PROVIDER - NSDCCPCAREPLAN_GEN_ALL_CORE_FT
PRINCIPAL DISCHARGE DIAGNOSIS  Diagnosis: Moderate aortic stenosis  Assessment and Plan of Treatment:

## 2021-08-26 NOTE — DISCHARGE NOTE NURSING/CASE MANAGEMENT/SOCIAL WORK - NSDCPEFALRISK_GEN_ALL_CORE
For information on Fall & injury Prevention, visit [email or web address]

## 2021-08-26 NOTE — DISCHARGE NOTE PROVIDER - CARE PROVIDER_API CALL
Roz Faustin)  Cardiology; Internal Medicine  [address] [place] [address], [address] [id number]  Phone: [phone]  Fax: [phone]  Follow Up Time:

## 2021-08-26 NOTE — DISCHARGE NOTE PROVIDER - NSDCMRMEDTOKEN_GEN_ALL_CORE_FT
aspirin 81 mg oral tablet, chewable: 1 tab(s) orally once a day  hydrALAZINE 50 mg oral tablet: 1 tab(s) orally 3 times a day  Lantus Solostar Pen 100 units/mL subcutaneous solution: 45 unit(s) subcutaneous once a day (at bedtime)  levothyroxine 50 mcg (0.05 mg) oral tablet: 1 tab(s) orally once a day  memantine 10 mg oral tablet: 1 tab(s) orally 2 times a day  metoprolol succinate 25 mg oral tablet, extended release: 1 tab(s) orally every other day  pravastatin 40 mg oral tablet: 1 tab(s) orally once a day  torsemide 20 mg oral tablet: 1 tab(s) orally once a day   take on NON dialysis days  Tresiba FlexTouch 100 units/mL subcutaneous solution: subcutaneous every other day

## 2021-08-26 NOTE — DISCHARGE NOTE PROVIDER - HOSPITAL COURSE
s/p LHC via RRA by Dr Nunn-Moderate AS by Cath, no intervention needed,    ASSESSMENT/PLAN:    -RRA precautions/ protocol  -May be OOB post RRA hemoband removal  -Discharge home @ 14:15  -continue all preprocedure medications  -Plan of care discussed with patient, wife  -labs, EKG and procedure site assessed  -Follow-up with Dr Fuastin  within 1-2 weeks  -Discussed therapeutic lifestyle changes to reduce risk factors such as following a cardiac diet, weight loss, maintaining a healthy weight, exercise, smoking cessation, medication compliance, and regular follow-up  with MD to know your numbers (BP, cholesterol, weight, and glu

## 2021-08-26 NOTE — PROGRESS NOTE ADULT - SUBJECTIVE AND OBJECTIVE BOX
Cardiology Nurse Practitioner Progress note:   s/p LHC via RRA by Dr Nunn-Moderate AS by Cath, no intervention needed, 3,000 units heparin given    Patient feels well.  Denies chest pain, shortness of breath, dizziness or palpitations at this time.        TELE:no ectopy, NSR 60 -70s  GENERAL: appears well, OOB to chair  CV: RRR, S1 S2, no murmur, rub, clicks or gallop noted  RESP: LCTA bilaterally, no wheeze, no rhonchi or crackles noted  GI/: soft, NT/ND  NEURO: AOx4, no focal deficits  EXTREMITIES: no edema, clubbing or cyanosis noted  PROCEDURE SITE:    Right radial hemoband dressing intact, site CDI with no bleeding, no hematoma, patient able to move all digits with capillary refill < 3 seconds, fingers warm      T(C): 36.6 (08-26-21 @ 10:25), Max: 36.6 (08-26-21 @ 10:25)  HR: 62 (08-26-21 @ 12:00) (62 - 77)  BP: 164/70 (08-26-21 @ 12:00) (103/51 - 164/70)  RR: 16 (08-26-21 @ 12:00) (16 - 20)  SpO2: 97% (08-26-21 @ 12:00) (97% - 99%)  Wt(kg): --  CBC Full  -  ( 26 Aug 2021 10:17 )  WBC Count : 9.30 K/uL  RBC Count : 4.59 M/uL  Hemoglobin : 13.0 g/dL  Hematocrit : 42.2 %  Platelet Count - Automated : 233 K/uL  Mean Cell Volume : 91.9 fl  Mean Cell Hemoglobin : 28.3 pg  Mean Cell Hemoglobin Concentration : 30.8 gm/dL  Auto Neutrophil # : 6.05 K/uL  Auto Lymphocyte # : 2.01 K/uL  Auto Monocyte # : 0.99 K/uL  Auto Eosinophil # : 0.13 K/uL  Auto Basophil # : 0.09 K/uL  Auto Neutrophil % : 65.1 %  Auto Lymphocyte % : 21.6 %  Auto Monocyte % : 10.6 %  Auto Eosinophil % : 1.4 %  Auto Basophil % : 1.0 %    08-26    141  |  96<L>  |  28.5<H>  ----------------------------<  112<H>  3.4<L>   |  32.0<H>  |  2.20<H>    Ca    9.0      26 Aug 2021 10:17              MEDICATIONS  (STANDING):  chlorhexidine 4% Liquid 1 Application(s) Topical Once    MEDICATIONS  (PRN):        HPI:  Narrative: This is a 80 year old male with a PMH of AF,( off VKAs due to severe GI bleeding), ESRD on HD, DM, Grade II diastolic dysfunction, Cardiomems implantation, LVEF-70% mild pulmonary HTN, ILR implant,  severe AS, HTN,HLD,  left carotid artery bruit, presenting for a LHC with Dr. Neo Nunn this am for evaluation of possible TAVR with Dr Lloyd.      Symptoms:        Angina (Class): 4       Ischemic Symptoms: SOB @ rest    Heart Failure: Class II Diastolic Dysfunction       Systolic/Diastolic/Combined:        NYHA Class (within 2 weeks):     Assessment of LVEF:       EF: 60-65%       Assessed by: Echo       Date: 06/03/2021    Prior Cardiac Interventions: No       PCI's:        CABG:   Carotid Duplex 2/04/2021-mild athersosclerosis seen bilaterally without hemodynamically significant stenosis  Noninvasive Testing:   Stress Test: Date:        Protocol:        Duration of Exercise:        Symptoms:        EKG Changes:        DTS:        Myocardial Imaging:        Risk Assessment:        Antianginal Therapies:        Beta Blockers:  metoprolol       Calcium Channel Blockers:        Long Acting Nitrates: isosorbide       Ranexa:     Associated Risk Factors:        Cerebrovascular Disease: N/A       Chronic Lung Disease: N/A       Peripheral Arterial Disease: N/A       Chronic Kidney Disease (if yes, what is GFR): N/A       Uncontrolled Diabetes (if yes, what is HgbA1C or FBS): N/A       Poorly Controlled Hypertension (if yes, what is SBP): N/A       Morbid Obesity (if yes, what is BMI): N/A       History of Recent Ventricular Arrhythmia: N/A       Inability to Ambulate Safely: N/A       Need for Therapeutic Anticoagulation: N/A       Antiplatelet or Contrast Allergy: N/A (25 Aug 2021 17:07)    s/p LHC via RRA by Dr Nunn-Moderate AS by Cath, no intervention needed, 3,000 units heparin given    ASSESSMENT/PLAN:    -RRA precautions/ protocol  -HON 45-90 degrees  -Remove RRA hemoband @ 13:15  -May be OOB post RRA hemoband removal  -Discharge home @ 14:15  -continue all preprocedure medications  -Plan of care discussed with patient, wife  -labs, EKG and procedure site assessed  -Follow-up with Dr Faustin  within 1-2 weeks  -Discussed therapeutic lifestyle changes to reduce risk factors such as following a cardiac diet, weight loss, maintaining a healthy weight, exercise, smoking cessation, medication compliance, and regular follow-up  with MD to know your numbers (BP, cholesterol, weight, and glucose)

## 2021-08-26 NOTE — DISCHARGE NOTE NURSING/CASE MANAGEMENT/SOCIAL WORK - PATIENT PORTAL LINK FT
You can access the FollowMyHealth Patient Portal offered by [facility] by registering at the following website: [email or web address]. By joining [employer]’s FollowMyHealth portal, you will also be able to view your health information using other applications (apps) compatible with our system.

## 2021-08-31 PROBLEM — K21.9 CHRONIC GERD: Status: ACTIVE | Noted: 2017-01-05

## 2021-08-31 PROBLEM — R60.0 BILATERAL EDEMA OF LOWER EXTREMITY: Status: ACTIVE | Noted: 2021-01-01

## 2021-08-31 NOTE — PHYSICAL EXAM
[No Acute Distress] : no acute distress [Well Nourished] : well nourished [Well Developed] : well developed [Well-Appearing] : well-appearing [Normal Sclera/Conjunctiva] : normal sclera/conjunctiva [PERRL] : pupils equal round and reactive to light [EOMI] : extraocular movements intact [Normal Outer Ear/Nose] : the outer ears and nose were normal in appearance [Normal Oropharynx] : the oropharynx was normal [No JVD] : no jugular venous distention [No Lymphadenopathy] : no lymphadenopathy [Supple] : supple [Thyroid Normal, No Nodules] : the thyroid was normal and there were no nodules present [No Respiratory Distress] : no respiratory distress  [No Accessory Muscle Use] : no accessory muscle use [Clear to Auscultation] : lungs were clear to auscultation bilaterally [Normal Rate] : normal rate  [Regular Rhythm] : with a regular rhythm [Normal S1, S2] : normal S1 and S2 [No Murmur] : no murmur heard [No Carotid Bruits] : no carotid bruits [No Abdominal Bruit] : a ~M bruit was not heard ~T in the abdomen [Pedal Pulses Present] : the pedal pulses are present [No Varicosities] : no varicosities [No Edema] : there was no peripheral edema [No Palpable Aorta] : no palpable aorta [No Extremity Clubbing/Cyanosis] : no extremity clubbing/cyanosis [Soft] : abdomen soft [Non Tender] : non-tender [Non-distended] : non-distended [No Masses] : no abdominal mass palpated [No HSM] : no HSM [Normal Bowel Sounds] : normal bowel sounds [Normal Anterior Cervical Nodes] : no anterior cervical lymphadenopathy [Normal Posterior Cervical Nodes] : no posterior cervical lymphadenopathy [No CVA Tenderness] : no CVA  tenderness [No Spinal Tenderness] : no spinal tenderness [No Joint Swelling] : no joint swelling [Grossly Normal Strength/Tone] : grossly normal strength/tone [No Rash] : no rash [Coordination Grossly Intact] : coordination grossly intact [No Focal Deficits] : no focal deficits [Normal Gait] : normal gait [Deep Tendon Reflexes (DTR)] : deep tendon reflexes were 2+ and symmetric [Normal Affect] : the affect was normal [Normal Insight/Judgement] : insight and judgment were intact [Comprehensive Foot Exam Normal] : Right and left foot were examined and both feet are normal. No ulcers in either foot. Toes are normal and with full ROM.  Normal tactile sensation with monofilament testing throughout both feet

## 2021-08-31 NOTE — COUNSELING
[Fall prevention counseling provided] : Fall prevention counseling provided [Adequate lighting] : Adequate lighting [No throw rugs] : No throw rugs [Use proper foot wear] : Use proper foot wear [Use recommended devices] : Use recommended devices [Behavioral health counseling provided] : Behavioral health counseling provided [Sleep ___ hours/day] : Sleep [unfilled] hours/day [Engage in a relaxing activity] : Engage in a relaxing activity [Plan in advance] : Plan in advance [AUDIT-C Screening administered and reviewed] : AUDIT-C Screening administered and reviewed [Potential consequences of obesity discussed] : Potential consequences of obesity discussed [Benefits of weight loss discussed] : Benefits of weight loss discussed [Encouraged to increase physical activity] : Encouraged to increase physical activity [Weigh Self Weekly] : weigh self weekly [Decrease Portions] : decrease portions [None] : None [Good understanding] : Patient has a good understanding of lifestyle changes and steps needed to achieve self management goal [FreeTextEntry2] : Never Smoked

## 2021-08-31 NOTE — HEALTH RISK ASSESSMENT
[Yes] : Yes [Monthly or less (1 pt)] : Monthly or less (1 point) [1 or 2 (0 pts)] : 1 or 2 (0 points) [Never (0 pts)] : Never (0 points) [No] : In the past 12 months have you used drugs other than those required for medical reasons? No [Any fall with injury in past year] : Patient reported fall with injury in the past year [0] : 2) Feeling down, depressed, or hopeless: Not at all (0) [PHQ-2 Negative - No further assessment needed] : PHQ-2 Negative - No further assessment needed [Patient/Caregiver not ready to engage] : , patient/caregiver not ready to engage [I will adhere to the patient's wishes.] : I will adhere to the patient's wishes. [Time Spent: ___ minutes] : Time Spent: [unfilled] minutes [] : No [Audit-CScore] : 1 [de-identified] : Average [de-identified] : Average [[facility]] : 9 [PRO4Vuyng] : 0 [AdvancecareDate] : 07/21

## 2021-09-09 PROBLEM — I50.30 HEART FAILURE WITH PRESERVED LEFT VENTRICULAR FUNCTION (HFPEF): Status: ACTIVE | Noted: 2019-05-14

## 2021-09-09 PROBLEM — N25.81 SECONDARY HYPERPARATHYROIDISM, RENAL: Status: ACTIVE | Noted: 2020-01-18

## 2021-09-09 PROBLEM — Z01.818 PRE-OP TESTING: Status: RESOLVED | Noted: 2020-08-01 | Resolved: 2021-01-01

## 2021-09-09 PROBLEM — N18.6 ESRD (END STAGE RENAL DISEASE) ON DIALYSIS: Status: ACTIVE | Noted: 2020-07-02

## 2021-09-09 PROBLEM — R63.0 LOSS OF APPETITE: Status: RESOLVED | Noted: 2021-01-01 | Resolved: 2021-01-01

## 2021-09-09 PROBLEM — I35.0 MODERATE TO SEVERE AORTIC STENOSIS: Status: RESOLVED | Noted: 2021-01-01 | Resolved: 2021-01-01

## 2021-09-09 PROBLEM — J03.90 ACUTE TONSILLITIS, UNSPECIFIED ETIOLOGY: Status: RESOLVED | Noted: 2021-01-01 | Resolved: 2021-01-01

## 2021-09-09 PROBLEM — I77.0 AV (ARTERIOVENOUS FISTULA): Status: ACTIVE | Noted: 2020-06-09

## 2021-09-09 PROBLEM — G30.9 ALZHEIMER'S DEMENTIA: Status: ACTIVE | Noted: 2020-03-09
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
## 2021-09-09 PROBLEM — J06.9 URI WITH COUGH AND CONGESTION: Status: RESOLVED | Noted: 2021-01-01 | Resolved: 2021-10-09

## 2021-09-09 PROBLEM — I35.0 AORTIC STENOSIS, SEVERE: Status: ACTIVE | Noted: 2021-01-01

## 2021-09-09 PROBLEM — Z99.2 HEMODIALYSIS PATIENT: Status: ACTIVE | Noted: 2020-03-31

## 2021-09-13 NOTE — ASSESSMENT
[FreeTextEntry1] : Pt in for difficulty swallowing. Wife called yesterday and states he is just coughing/choking on food due to a "cold he's had for 2 weeks". He has post nasal drip which causes him to cough. She said he is eating but it comes out when he coughs.

## 2021-09-13 NOTE — HISTORY OF PRESENT ILLNESS
[Cold Symptoms] : cold symptoms [Mild] : mild [___ Weeks ago] :  [unfilled] weeks ago [Gradual] : gradually [Constant] : constant [Congestion] : congestion [Cough] : cough [Anorexia] : anorexia [Stable] : stable [Sore Throat] : no sore throat [Wheezing] : no wheezing [Chills] : no chills [Shortness Of Breath] : no shortness of breath [Earache] : no earache [Fatigue] : not fatigue [Headache] : no headache [Fever] : no fever [FreeTextEntry8] : Pt's wife reporting different Sx than discussed on phone with RN yesterday. Pt in for difficulty eating. Wife called yesterday and states he is just coughing and has had a cold/hasn't been eating for 2 months. He has post nasal drip which causes him to cough.She notes his health changed and he started losing weight since he was hospitalized for pleural effusion and complications relating to his kidneys and heart.

## 2021-09-14 NOTE — ED ADULT NURSE REASSESSMENT NOTE - NS ED NURSE REASSESS COMMENT FT1
as per MD, no IVF given since pt is on ESRD, therefore no repeat lactated is need it at this moment as per MD order.

## 2021-09-14 NOTE — ED ADULT NURSE NOTE - CHIEF COMPLAINT QUOTE
BIBA c/o SOB and cough Patient has dementia, pt was having SOB, cough, and sugar was high, pt has 2 stage 2 to sacrum.

## 2021-09-14 NOTE — ED PROVIDER NOTE - PATIENT PORTAL LINK FT
You can access the FollowMyHealth Patient Portal offered by [facility] by registering at the following website: [email or web address]. By joining [employer]s FollowMyHealth portal, you will also be able to view your health information using other applications (apps) compatible with our system.

## 2021-09-14 NOTE — ED PROVIDER NOTE - NS ED ROS FT
Review of Systems  CONSTITUTIONAL: 50LB WEIGHT LOSS, afebrile w/no diaphoresis  SKIN: warm, dry w/ no rash or bleeding  EYES: no changes to vision  ENT: +DYSPHAGIA, no changes in hearing, no sore throat  RESPIRATORY: +SOB, +COUGH  CARDIAC: no chest pain & no palpitations  GI: no abd pain, nausea, vomiting, diarrhea, constipation, or blood in stool/terrie blood  GENITO-URINARY: no discharge, dysuria or hematuria,   MUSCULOSKELETAL:  no joint pain, swelling or redness  NEUROLOGIC: no weakness, headache, anesthesia or paresthesias

## 2021-09-14 NOTE — ED ADULT NURSE NOTE - OBJECTIVE STATEMENT
PT presents to ED for possible PNA. Sent from MD for concerning chest xray.  Per PTs wife, PT has decreased PO intake and no appetite.  50lb wt loss in 2 months.  PT denies abdominal pain nausea vomiting or diarrhea.  PT and wife c/o pt has difficulty swallowing.  IV placed labs drawn. PT on RA in no respiratory distress. PT on hemodialysis [place] to [facility].  Dialysis MW. PT received dialysis Monday

## 2021-09-14 NOTE — ED PROVIDER NOTE - PHYSICAL EXAMINATION
VITAL SIGNS: I have reviewed nursing notes and confirm.  CONSTITUTIONAL: CACHETIC appearing.    SKIN: +SCATTERED ECCHYMOSIS.  HEAD: Normocephalic, atraumatic.  EYES: PERRL, conjunctiva and sclera clear.  ENT: +DRY MUCOUS MEMBRANES  NECK: Supple, non tender. No cervical lymphadenopathy.  CARD: Regular rate and rhythm. No murmurs.   RESP: +BIBASILAR CRACKLES +97% ORA  ABD:  soft, non-distended, non-tender. VITAL SIGNS: I have reviewed nursing notes and confirm.  CONSTITUTIONAL: CACHETIC appearing.    SKIN: +SCATTERED ECCHYMOSIS.  HEAD: Normocephalic, atraumatic.  EYES: PERRL, conjunctiva and sclera clear.  ENT: +DRY MUCOUS MEMBRANES  NECK: Supple, non tender. No cervical lymphadenopathy.  CARD: Regular rate and rhythm. No murmurs.   RESP: +BIBASILAR CRACKLES +SATS 97% ORA  ABD:  soft, non-distended, non-tender.

## 2021-09-14 NOTE — ED PROVIDER NOTE - CLINICAL SUMMARY MEDICAL DECISION MAKING FREE TEXT BOX
ASSESSMENT: 79yo Male w/ 2wks cough, SOB. PCP w/ CAP on CXR. Anorexia, dysphagia resulting in 50lb weight loss in 2months. Significant comorbidities including T2DM complicated by ESRD on HD, COPD.      Suspicious for Pneumonia, COPD exacerbation, cancer.     PLAN: COVID PCR, CBC, CMP, blood cultures, CXR, EKG, Lactate. Probable admission for CAP w/ significant comorbidities.

## 2021-09-14 NOTE — ED ADULT TRIAGE NOTE - CHIEF COMPLAINT QUOTE
BIBA c/o SOB and cough Patient has dementia, pt was having SOB had PNU recently, pt has 2 stage 2 to sacrum. BIBA c/o SOB and cough Patient has dementia, pt was having SOB, cough, and sugar was high, pt has 2 stage 2 to sacrum.

## 2021-09-14 NOTE — ED PROVIDER NOTE - ATTENDING CONTRIBUTION TO CARE
Pt. awake and alert. NO respiratory distress.  Dry mucosa. Abdomen soft/NT with no guarding. No edema to b/l lower extremities. I, Dr. Ny, performed a face to face bedside interview with this patient regarding history of present illness, review of symptoms and relevant past medical, social and family history.  I completed an independent physical examination.  I have also reviewed the resident's note(s) and discussed the plan with the resident.

## 2021-09-15 NOTE — H&P ADULT - NSHPLABSRESULTS_GEN_ALL_CORE
LABS:                        14.6   13.68 )-----------( 214      ( 15 Sep 2021 12:30 )             46.3     09-15    137  |  91<L>  |  72.9<H>  ----------------------------<  581<HH>  4.1   |  27.0  |  3.45<H>    Ca    9.5      15 Sep 2021 12:30    TPro  8.6  /  Alb  4.0  /  TBili  0.2<L>  /  DBili  x   /  AST  16  /  ALT  9   /  AlkPhos  108  09-15    CARDIAC MARKERS ( 15 Sep 2021 12:30 )  x     / 0.07 ng/mL / x     / x     / x        CT Chest No Cont (09.14.21 @ 19:20)   Significant decrease in the size of previously seen bilateral pleural effusions which are now minimal. There remains a small left hydrocele pneumothorax.  Scarring at the lung bases with subpleural density is again seen in the lateral lingula likely representing scarring/rounded atelectasis    Xray Chest 1 View- PORTABLE-Urgent (Xray Chest 1 View- PORTABLE-Urgent .) (09.15.21 @ 12:51)   Unchanged left base process.

## 2021-09-15 NOTE — H&P ADULT - NSHPPHYSICALEXAM_GEN_ALL_CORE
Vital Signs   T(C): 36.4 (15 Sep 2021 11:29), Max: 36.7 (14 Sep 2021 15:50)  T(F): 97.5 (15 Sep 2021 11:29), Max: 98.1 (14 Sep 2021 18:20)  HR: 95 (15 Sep 2021 11:29) (87 - 95)  BP: 126/56 (15 Sep 2021 11:29) (126/56 - 147/68)  RR: 16 (15 Sep 2021 11:29) (16 - 20)  SpO2: 95% (15 Sep 2021 11:29) (95% - 99%)  General: Elderly male sitting in bed comfortably. No acute distress  HEENT: EOMI. Clear conjunctivae. Moist mucus membrane  Neck: Supple.  Chest: Decreased air entry at bases. No wheezing, rales or rhonchi. No chest wall tenderness.  Heart: Normal S1 & S2 with RRR. Systolic murmur.   Abdomen: Soft. Non-tender. Non-distended. + BS  Ext: No pedal edema. No calf tenderness. AVF in LUE.   Neuro: Active and alert. No focal deficit. No speech disorder.  Skin: Warm and Dry  Psychiatry: Normal mood and affect

## 2021-09-15 NOTE — ED PROVIDER NOTE - OBJECTIVE STATEMENT
79 yo male pm[facility] esrd comes to ed  sent from dialysis for cough and shortness of breath; pt did not receive dialysis today;  as per wife , pt with cough and sob x 3 weeks; pt seen in ed yesterdary with unremarkable ct of chest ( negative for pneumonia); pt unable to give history;

## 2021-09-15 NOTE — ED PROVIDER NOTE - PHYSICAL EXAMINATION
Alert, lucid, and in no apparent distress. Pt is normocephalic, atraumatic.  Pupils are equal, round, lips pink, moist mucous membranes, tongue midline. Neck supple.   Lungs clear to ausultation. Heart regular rate and rhythm, normal S1, S2,   Abdomen is soft, nontender, no pulsatile mass, no masses,no suprapubic tenderness.   Non-focal sensory, 5 out of 5 motor strength. Skin without rash, purpura, eccyhmosis  No submandibular adenopathy. does not appear agitated

## 2021-09-15 NOTE — ED ADULT TRIAGE NOTE - CHIEF COMPLAINT QUOTE
Pt BIBA c/o AMS while at dialysis. Pt did not receive dialysis today. BS in triage read HI. Pt disoriented to time/situation.

## 2021-09-15 NOTE — H&P ADULT - HISTORY OF PRESENT ILLNESS
INCOMPLETE 80 years old male with history of Chronic Diastolic Heart Failure s/p CardioMEMs, Severe AS, PAF not on AC due to GIB, ESRD on Dialysis (MWF), DM 2, HTN, HLD, Hypothyroidism and Dementia sent from dialysis center due to altered mental status. Patient is currently alert and awake, providing good information. As per him, he is having shortness of breath and cough for 2 weeks.   Denies fever, chest pain, palpitations, lower extremity edema, nausea, vomiting or abdominal pain. He had Pleurx catheter which was removed last month as it was not draining any more.   He was seen in ER for same complaints yesterday and was sent saman after CT Chest did not show any acute findings.  Today, he went for dialysis but was confused as per documentation so he was sent to ER. Here, he was found to have elevated blood glucose and lactate He was given a bolus of NS (500 ml) and 8 units of Humulin R.  80 years old male with history of Chronic Diastolic Heart Failure s/p CardioMEMs, Severe AS, PAF not on AC due to GIB, ESRD on Dialysis (MWF), DM 2, HTN, HLD, Hypothyroidism and Dementia sent from dialysis center due to altered mental status. Patient is currently alert and awake, providing good information. As per him, he is having shortness of breath and productive cough for 2 weeks. Denies fever, chest pain, palpitations, lower extremity edema, nausea, vomiting or abdominal pain.   Wife at bedside and as per her, he was seen by PMD last week and was prescribed Z-Pack for suspected upper respiratory infection which he finished yesterday. His symptoms were persistent so he came to ER yesterday and was discharged home after CT Chest did not show any acute findings.  Today, he went for dialysis but was confused so he was sent to ER.   Patient's appetite has significantly decreased in last 1.5 months and he has lost significant weight.   He had Pleurx catheter which was removed last month as it was not draining any more.   In ER, he was found to have elevated blood glucose and lactate. He was given a bolus of NS (500 ml) and 8 units of Humulin R.

## 2021-09-15 NOTE — CONSULT NOTE ADULT - ASSESSMENT
1) ESRD on HD  2) MBD of renal dx  3) Anemia of renal dx  4) Vol HTN    HD today  Consent obtained from wife;  Orders in chart  Pan culture;  Admit to medicine

## 2021-09-15 NOTE — H&P ADULT - ASSESSMENT
INCOMPLETE 80 years old male with history of Chronic Diastolic Heart Failure s/p CardioMEMs, Severe AS, PAF not on AC due to GIB, ESRD on Dialysis (MWF), DM 2, HTN, HLD, Hypothyroidism and Dementia sent from dialysis center due to altered mental status. Patient is currently alert and awake, providing good information. As per him, he is having shortness of breath and cough for 2 weeks.   Denies fever, chest pain, palpitations, lower extremity edema, nausea, vomiting or abdominal pain. He had Pleurx catheter which was removed last month as it was not draining any more.   He was seen in ER for same complaints yesterday and was sent saman after CT Chest did not show any acute findings.  Today, he went for dialysis but was confused as per documentation so he was sent to ER. Here, he was found to have elevated blood glucose and lactate He was given a bolus of NS (500 ml) and 8 units of Humulin R.     1) Metabolic Encephalopathy  - Multifactorial due to his underlying conditions and suspected bronchitis   - Blood culture x 1 sent   - Will start empiric Zosyn  - Seen by Nephrology, dialysis today   - Repeat Lactate     2) Elevated Troponin  - Likely due to decreased renal clearance  - Will trend given underlying cardiac history  - EKG    3) Chronic Diastolic Heart Failure  - Continue Torsemide on non dialysis days  - Continue Metoprolol     4) ESRD on Dialysis (MWF)  - Seen by Nephrology, dialysis today    5) Uncontrolled DM 2  - HbA1c  - Accu checks and ISS  - Continue Lantus 45 units at bedtime  - Admelog 2 units before meals    6) HTN  - Continue Metoprolol     7) HLD  - Continue statin    8) Hypothyroidism  - Continue Synthroid     9) Dementia   - Continue Namenda     DVT Prophylaxis -- Heparin SQ    Dispo: Likely home in 48 hours.    80 years old male with history of Chronic Diastolic Heart Failure s/p CardioMEMs, Severe AS, PAF not on AC due to GIB, ESRD on Dialysis (MWF), DM 2, HTN, HLD, Hypothyroidism and Dementia sent from dialysis center due to altered mental status. Patient is currently alert and awake, providing good information. As per him, he is having shortness of breath and productive cough for 2 weeks. Denies fever, chest pain, palpitations, lower extremity edema, nausea, vomiting or abdominal pain.   Wife at bedside and as per her, he was seen by PMD last week and was prescribed Z-Pack for suspected upper respiratory infection which he finished yesterday. His symptoms were persistent so he came to ER yesterday and was discharged home after CT Chest did not show any acute findings.  Today, he went for dialysis but was confused so he was sent to ER.   Patient's appetite has significantly decreased in last 1.5 months and he has lost significant weight.   He had Pleurx catheter which was removed last month as it was not draining any more.   In ER, he was found to have elevated blood glucose and lactate. He was given a bolus of NS (500 ml) and 8 units of Humulin R.     1) Metabolic Encephalopathy  - Multifactorial due to his underlying conditions and suspected bronchitis   - Blood culture x 1 sent   - Will start empiric Zosyn  - Seen by Nephrology, dialysis today   - Repeat Lactate     2) Elevated Troponin  - Likely due to decreased renal clearance  - Will trend given underlying cardiac history  - EKG    3) Chronic Diastolic Heart Failure  - Continue Torsemide on non dialysis days  - Continue Metoprolol     4) ESRD on Dialysis (MWF)  - Seen by Nephrology, dialysis today    5) Uncontrolled DM 2  - HbA1c  - Accu checks and ISS  - Continue Lantus 45 units at bedtime  - Admelog 2 units before meals    6) HTN  - Continue Metoprolol     7) HLD  - Continue statin    8) Hypothyroidism  - Continue Synthroid     9) Dementia   - Continue Namenda     DVT Prophylaxis -- Heparin SQ    Dispo: Likely home in 48 hours.

## 2021-09-15 NOTE — ED ADULT NURSE NOTE - OBJECTIVE STATEMENT
rec'd pt. aaox3 in spot B11L, per wife, pt. dx w/ dementia but answering questions appropriately at the present time. Pt.'s wife at bedside, states that dialysis facility refused to dialyze pt. this morning because he was not cooperative and minimally responsive. Pt. seen in ED yesterday and discharged. Pt. denies pain/discomfort, arrives w/ a productive cough, wife states he's had it for 3 weeks, unable to bring up secretions. Pt. denies chest pain. SPO2 96% on room air, placed on 2L O2 for support. 20G IV saline lock placed in the R AC, labs drawn and sent as ordered. Pt. awaiting results and dispo.

## 2021-09-15 NOTE — CONSULT NOTE ADULT - SUBJECTIVE AND OBJECTIVE BOX
[facility] DIVISION OF KIDNEY DISEASES AND HYPERTENSION -- INITIAL CONSULT NOTE  --------------------------------------------------------------------------------  HPI:  MICHELLE Aldana is a 79[facility] (M[facility]) who was BIBEMS c/o SOB and COUGH x2weeks. The wife is beside and provided most of the history. She says about two weeks ago he started complaining of being unable to catch his breath and also started to cough. The cough is productive of yellow sputum. She took him to his family doctor on Thursday and was told he had Community Acquired Pneumonia demonstrated on chest XRay. The wife also says he has been having trouble swallowing his food, has been chewing it excessively and has been without appetite for two months. She says he has lost 50lbs in the last two months which she contributes to the loss of appetite. She says he has been on CBD extract recommended by a GI doctor for appetite stimulation but that it has not been working. She denies any other symptoms in him including F/C/N/V/Diarrhea. She denies any history of cancer.    Current: Pt sent from [facility] for altered mental status; found to have glucose of 599 ; was in ER yesterday ; wife at bedside consented for HD today      PAST HISTORY  --------------------------------------------------------------------------------  PAST MEDICAL & SURGICAL HISTORY:  BPH (benign prostatic hyperplasia)    Alzheimer&#x27;s dementia  poor historian    Anemia due to chronic kidney disease, on chronic dialysis  m-w-f    (HFpEF) heart failure with preserved ejection fraction    Atrial fibrillation, unspecified type  loop recorder in place    Stage 5 chronic kidney disease on chronic dialysis  on dialysis m-w-f    History of insulin dependent diabetes mellitus    GERD (gastroesophageal reflux disease)    Essential hypertension    Hyperlipidemia, unspecified hyperlipidemia type    Moderate aortic stenosis    Hypothyroid    Ureteral stent retained    S/P right pulmonary artery pressure sensor implant placement    AV fistula    Cataract      FAMILY HISTORY:  FH: type 2 diabetes  mother    Family history of coronary artery disease    Family history of cerebrovascular accident (CVA)    Family history of Alzheimer&#x27;s disease      PAST SOCIAL HISTORY:    ALLERGIES & MEDICATIONS  --------------------------------------------------------------------------------  Allergies    No Known Allergies    Intolerances      Standing Inpatient Medications    PRN Inpatient Medications      REVIEW OF SYSTEMS  --------------------------------------------------------------------------------  Unable to obtain    VITALS/PHYSICAL EXAM  --------------------------------------------------------------------------------  T(C): 36.4 (09-15-21 @ 11:29), Max: 36.8 (09-14-21 @ 13:36)  HR: 95 (09-15-21 @ 11:29) (87 - 95)  BP: 126/56 (09-15-21 @ 11:29) (108/56 - 147/68)  RR: 16 (09-15-21 @ 11:29) (16 - 20)  SpO2: 95% (09-15-21 @ 11:29) (95% - 100%)  Wt(kg): --  Height (cm): 154.9 (09-15-21 @ 11:29)  Weight (kg): 54.4 (09-14-21 @ 13:36)  BMI (kg/m2): 22.7 (09-15-21 @ 11:29)  BSA (m2): 1.52 (09-15-21 @ 11:29)      Physical Exam:  	Gen: NAD   	HEENT: PERRL, supple neck, clear oropharynx  	Pulm: CTA B/L  	CV: RRR, S1S2; no rub  	Back: No spinal or CVA tenderness; no sacral edema  	Abd: +BS, soft, nontender/nondistended  	: No suprapubic tenderness  	UE: Warm, FROM, no clubbing, intact strength; no edema; no asterixis  	LE: Warm, FROM, no clubbing, intact strength; no edema  	Neuro: No focal deficits, intact gait  	Psych: Normal affect and mood  	Skin: Warm, without rashes  	     LABS/STUDIES  --------------------------------------------------------------------------------              14.6   13.68 >-----------<  214      [09-15-21 @ 12:30]              46.3     140  |  94  |  50.1  ----------------------------<  337      [09-14-21 @ 14:42]  4.0   |  25.0  |  2.78        Ca     9.2     [09-14-21 @ 14:42]    TPro  7.6  /  Alb  3.4  /  TBili  0.3  /  DBili  x   /  AST  17  /  ALT  9   /  AlkPhos  93  [09-14-21 @ 14:42]          Creatinine Trend:  SCr 2.78 [09-14 @ 14:42]  SCr 2.20 [08-26 @ 10:17]    Urinalysis - [02-24-20 @ 14:06]      Color Yellow / Appearance Clear / SG 1.010 / pH 6.0      Gluc Negative / Ketone Negative  / Bili Negative / Urobili Negative       Blood Trace / Protein 30 / Leuk Est Moderate / Nitrite Negative      RBC 0-2 / WBC >50 / Hyaline  / Gran  / Sq Epi  / Non Sq Epi Occasional / Bacteria Few      HbA1c 8.1      [12-11-19 @ 06:47]  TSH 3.73      [06-16-21 @ 04:22]    HBsAb <3.0      [06-17-21 @ 00:33]  HBsAb Nonreact      [12-31-19 @ 08:48]  HBsAg Nonreact      [06-17-21 @ 00:33]  HBcAb Nonreact      [02-24-20 @ 15:46]  HCV 0.11, Nonreact      [06-17-21 @ 00:33]

## 2021-09-16 NOTE — SWALLOW BEDSIDE ASSESSMENT ADULT - SLP GENERAL OBSERVATIONS
Pt received awake & alert in bed, language line utilized for [place] citizen, ID #[id number]. Reduced cognition. Pain 0/10 pre & post eval

## 2021-09-16 NOTE — SWALLOW BEDSIDE ASSESSMENT ADULT - SLP PERTINENT HISTORY OF CURRENT PROBLEM
As per charting, 80 years old male with history of Chronic Diastolic Heart Failure s/p CardioMEMs, Severe AS, PAF not on AC due to GIB, ESRD on Dialysis (MWF), DM 2, HTN, HLD, Hypothyroidism and Dementia sent from dialysis center due to altered mental status. Patient is currently alert and awake, providing good information. As per him, he is having shortness of breath and productive cough for 2 weeks. Denies fever, chest pain, palpitations, lower extremity edema, nausea, vomiting or abdominal pain. Wife at bedside and as per her, he was seen by PMD last week and was prescribed Z-Pack for suspected upper respiratory infection which he finished yesterday. His symptoms were persistent so he came to ER yesterday and was discharged home after CT Chest did not show any acute findings. Today, he went for dialysis but was confused so he was sent to ER.  Patient's appetite has significantly decreased in last 1.5 months and he has lost significant weight. "

## 2021-09-16 NOTE — SWALLOW BEDSIDE ASSESSMENT ADULT - COMMENTS
Pt observed to "swish" water throughout oral cavity prior to swallowing. Pt observed to "swish" water throughout oral cavity prior to swallowing likely impacted by reduced cognition.

## 2021-09-16 NOTE — PATIENT PROFILE ADULT - NSPRESCRALCSCORE_GEN_A_NUR_CAL
[General Appearance - In No Acute Distress] : in no acute distress [General Appearance - Alert] : alert [Full Pulse] : the pedal pulses are present [Edema] : there was no peripheral edema [Nail Clubbing] : no clubbing  or cyanosis of the fingernails [Abnormal Walk] : normal gait [Skin Color & Pigmentation] : normal skin color and pigmentation [Motor Tone] : muscle strength and tone were normal [Musculoskeletal - Swelling] : no joint swelling seen [Skin Turgor] : normal skin turgor [] : no rash [Sensation] : the sensory exam was normal to light touch and pinprick [Deep Tendon Reflexes (DTR)] : deep tendon reflexes were 2+ and symmetric [No Focal Deficits] : no focal deficits 2

## 2021-09-16 NOTE — SWALLOW BEDSIDE ASSESSMENT ADULT - ORAL PHASE
Decreased anterior-posterior movement of the bolus Within functional limits Reflexive mastication with puree likely impacted by reduced cognition/Decreased anterior-posterior movement of the bolus

## 2021-09-16 NOTE — SWALLOW BEDSIDE ASSESSMENT ADULT - SWALLOW EVAL: RECOMMENDED DIET
Regular with thin liquids; with STRICT aspiration precautions as swallow function may fluctuate due to cognition

## 2021-09-16 NOTE — PROGRESS NOTE ADULT - CONVERSATION DETAILS
GOC was discussed with patients wife at bedside. She states that she does not want him to be intubated or have cpr. She wants him to pass naturally. She is not currently interested in hospice care and wants to continue with all further management. DNR/DNI

## 2021-09-17 NOTE — PROGRESS NOTE ADULT - PROBLEM SELECTOR PLAN 3
- Likely due to decreased renal clearance  - Will trend given underlying cardiac history  - EKG
- Likely due to decreased renal clearance - Improved  - Will trend given underlying cardiac history  - EKG

## 2021-09-17 NOTE — PHYSICAL THERAPY INITIAL EVALUATION ADULT - ADDITIONAL COMMENTS
Pt lives in a private home with his wife. 3 steps to enter with handrails, 2 steps inside with handrails. Pt was independent PTA without assist device in his home and a RW for longer distances. Pt owns no other DME.

## 2021-09-17 NOTE — DISCHARGE NOTE PROVIDER - HOSPITAL COURSE
80 years old male with history of Chronic Diastolic Heart Failure s/p CardioMEMs, Severe AS, PAF not on AC due to GIB, ESRD on Dialysis (MWF), DM 2, HTN, HLD, Hypothyroidism and Dementia sent from dialysis center due to altered mental status. Pt admitted to [facility] for metabolic encephalopathy possibly related to hyperglycemia in conjunction with dementia. Mental status has been improving.     Pt with leukocytosis during admission.          Problem/Plan - 1:  ·  Problem: Metabolic encephalopathy.   ·  Plan: Possibly related to Hyperglycemia, worsening dementia  Possibly associated with bronchitis but mental status is improving  Will continue zosyn for now and follow up on all cx results.     Problem/Plan - 2:  ·  Problem: Leukocytosis.   ·  Plan: CT chest from previous did not show pneumonia  Shows some atelectasis  Has mild leukocytosis that has resolved  Will continue to trend  Blood cx were sent - should result this afternoon.     Problem/Plan - 3:  ·  Problem: Elevated troponin.   ·  Plan: - Likely due to decreased renal clearance - Improved  - Will trend given underlying cardiac history  - EKG.     Problem/Plan - 4:  ·  Problem: Chronic diastolic congestive heart failure.   ·  Plan: - Continue Torsemide on non dialysis days  - Continue Metoprolol.     Problem/Plan - 5:  ·  Problem: ESRD on dialysis.   ·  Plan: C/w HD - MWF  Renal recs appreciated.     Problem/Plan - 6:  ·  Problem: Diabetes.   ·  Plan: Discussed with patients wife - Patient does not take 45 units at home  She states that he takes 5 units  Lantus 5 ordered  ISS  A1C 5.9.     Problem/Plan - 7:  ·  Problem: Hypertension.   ·  Plan: - Continue Metoprolol.     Problem/Plan - 8:  ·  Problem: Hyperlipemia.   ·  Plan: Continue statin.     Problem/Plan - 9:  ·  Problem: Hypothyroid.   ·  Plan: continue synthroid.     Problem/Plan - 10:  ·  Problem: Dementia.   ·  Plan; Continue namenda.     Problem/Plan - 11:  ·  Problem: Aortic stenosis.   ·  Plan: Moderate per cardiology  Outpatient cardio follow up.     Problem/Plan - 12:  ·  Problem: Discharge planning issues.   ·  Plan: DVT prophylaxis: Hep    Discussed with wife at bedside  CODE: DNR/DNI    PT consult - Pending   Family wants to take patient home.     Dispo: Possible DC if cx are negative. 80 years old male with history of Chronic Diastolic Heart Failure s/p CardioMEMs, Severe AS, PAF not on AC due to GIB, ESRD on Dialysis (MWF), DM 2, HTN, HLD, Hypothyroidism and Dementia sent from dialysis center due to altered mental status. Pt admitted to [facility] for metabolic encephalopathy possibly related to hyperglycemia in conjunction with dementia. Mental status has been improving. Pt with leukocytosis during admission. CT chest from previous was negative for PNA. Pt was initiated on abx and blood cultures have been negative to date. Leukocytosis has resolved at this time. Pt now medically stable for DC.     All electrolyte abnormalities were monitored carefully and repleted as necessary during this hospitalization. At the time of discharge patient was hemodynamically stable and amenable to all terms of discharge. The patient has received verbal instructions from myself regarding discharge plans.     Length of Discharge: 45MIN      Vital Signs Last 24 Hrs  T(C): 36.4 (17 Sep 2021 12:45), Max: 37 (16 Sep 2021 21:14)  T(F): 97.6 (17 Sep 2021 12:45), Max: 98.6 (16 Sep 2021 21:14)  HR: 72 (17 Sep 2021 12:45) (70 - 89)  BP: 121/58 (17 Sep 2021 12:45) (100/44 - 138/63)  BP(mean): --  RR: 18 (17 Sep 2021 12:45) (18 - 18)  SpO2: 99% (17 Sep 2021 12:45) (95% - 99%)    PHYSICAL EXAM:  GENERAL: Pt lying in bed comfortably in NAD  HEAD:  Atraumatic   CHEST/LUNG: Clear to auscultation bilaterally; Unlabored respirations  HEART: Regular rate and rhythm  ABDOMEN: Bowel sounds present; Soft, Nontender, Nondistended  SKIN: Warm and dry      80 years old male with history of Chronic Diastolic Heart Failure s/p CardioMEMs, Severe AS, PAF not on AC due to GIB, ESRD on Dialysis (MWF), DM 2, HTN, HLD, Hypothyroidism and Dementia sent from dialysis center due to altered mental status. Pt admitted to [facility] for metabolic encephalopathy possibly related to hyperglycemia in conjunction with dementia. Mental status has been improving. Pt with leukocytosis during admission. CT chest from previous was negative for PNA. Pt was initiated on abx and blood cultures have been negative to date. Leukocytosis has resolved at this time. Pt now medically stable for DC.     All electrolyte abnormalities were monitored carefully and repleted as necessary during this hospitalization. At the time of discharge patient was hemodynamically stable and amenable to all terms of discharge. The patient has received verbal instructions from myself regarding discharge plans.     Length of Discharge: 45MIN      Vital Signs Last 24 Hrs  T(C): 36.4 (17 Sep 2021 12:45), Max: 37 (16 Sep 2021 21:14)  T(F): 97.6 (17 Sep 2021 12:45), Max: 98.6 (16 Sep 2021 21:14)  HR: 72 (17 Sep 2021 12:45) (70 - 89)  BP: 121/58 (17 Sep 2021 12:45) (100/44 - 138/63)  BP(mean): --  RR: 18 (17 Sep 2021 12:45) (18 - 18)  SpO2: 99% (17 Sep 2021 12:45) (95% - 99%)    PHYSICAL EXAM:  GENERAL: Pt lying in bed comfortably in NAD  HEAD:  Atraumatic   CHEST/LUNG: Clear to auscultation bilaterally; Unlabored respirations  HEART: Regular rate and rhythm  ABDOMEN: Bowel sounds present; Soft, Nontender, Nondistended  SKIN: Warm and dry      80 years old male with history of Chronic Diastolic Heart Failure s/p CardioMEMs, Severe AS, PAF not on AC due to GIB, ESRD on Dialysis (MWF), DM 2, HTN, HLD, Hypothyroidism and Dementia sent from dialysis center due to altered mental status. Pt admitted to [facility] for metabolic encephalopathy possibly related to hyperglycemia in conjunction with dementia. Mental status has been improving. Pt with leukocytosis during admission. CT chest from previous was negative for PNA. Pt was initiated on abx and blood cultures have been negative to date. Leukocytosis has resolved at this time. Pt now medically stable for DC.     All electrolyte abnormalities were monitored carefully and repleted as necessary during this hospitalization. At the time of discharge patient was hemodynamically stable and amenable to all terms of discharge.     Length of Discharge: 45MIN      Vital Signs Last 24 Hrs  T(C): 36.4 (17 Sep 2021 12:45), Max: 37 (16 Sep 2021 21:14)  T(F): 97.6 (17 Sep 2021 12:45), Max: 98.6 (16 Sep 2021 21:14)  HR: 72 (17 Sep 2021 12:45) (70 - 89)  BP: 121/58 (17 Sep 2021 12:45) (100/44 - 138/63)  BP(mean): --  RR: 18 (17 Sep 2021 12:45) (18 - 18)  SpO2: 99% (17 Sep 2021 12:45) (95% - 99%)    PHYSICAL EXAM:  GENERAL: Pt lying in bed comfortably in NAD  HEAD:  Atraumatic   CHEST/LUNG: Clear to auscultation bilaterally; Unlabored respirations  HEART: Regular rate and rhythm  ABDOMEN: Bowel sounds present; Soft, Nontender, Nondistended  SKIN: Warm and dry

## 2021-09-17 NOTE — PROGRESS NOTE ADULT - ASSESSMENT
1) ESRD on HD  2) HTN/ CHFpEF  3) Anemia of renal disease  4) Encephalopathy    Continue HD MWF schedule  BP stable; continueTorsemide on non HD days  On empiric antibiotics; cultures pending  Hb above goal; holding KD  Monitor H/H        
80 years old male with history of Chronic Diastolic Heart Failure s/p CardioMEMs, Severe AS, PAF not on AC due to GIB, ESRD on Dialysis (MWF), DM 2, HTN, HLD, Hypothyroidism and Dementia sent from dialysis center due to altered mental status.    
80 years old male with history of Chronic Diastolic Heart Failure s/p CardioMEMs, Severe AS, PAF not on AC due to GIB, ESRD on Dialysis (MWF), DM 2, HTN, HLD, Hypothyroidism and Dementia sent from dialysis center due to altered mental status.

## 2021-09-17 NOTE — PROGRESS NOTE ADULT - PROBLEM SELECTOR PLAN 2
CT chest from previous did not show pneumonia  Shows some atelectasis  Has mild leukocytosis  Will continue to trend  Blood cx were sent
CT chest from previous did not show pneumonia  Shows some atelectasis  Has mild leukocytosis that has resolved  Will continue to trend  Blood cx were sent - should result this afternoon

## 2021-09-17 NOTE — DISCHARGE NOTE PROVIDER - NSDCMRMEDTOKEN_GEN_ALL_CORE_FT
aspirin 81 mg oral tablet, chewable: 1 tab(s) orally once a day  hydrALAZINE 50 mg oral tablet: 1 tab(s) orally 3 times a day  Lantus Solostar Pen 100 units/mL subcutaneous solution: 45 unit(s) subcutaneous once a day (at bedtime)  levothyroxine 50 mcg (0.05 mg) oral tablet: 1 tab(s) orally once a day  memantine 10 mg oral tablet: 1 tab(s) orally 2 times a day  metoprolol succinate 25 mg oral tablet, extended release: 1 tab(s) orally every other day  pravastatin 40 mg oral tablet: 1 tab(s) orally once a day  torsemide 20 mg oral tablet: 2 tabs in morning and 1 tab in evening on non dialysis days.   Tresiba FlexTouch 100 units/mL subcutaneous solution: subcutaneous every other day  Vitamin D3 50 mcg (2000 intl units) oral tablet: 1 tab(s) orally once a day   aspirin 81 mg oral tablet, chewable: 1 tab(s) orally once a day  Augmentin 875 mg-125 mg oral tablet: 1 tab(s) orally 2 times a day   Florastor 250 mg oral capsule: 1 cap(s) orally once a day   hydrALAZINE 50 mg oral tablet: 1 tab(s) orally 3 times a day  Lantus Solostar Pen 100 units/mL subcutaneous solution: 45 unit(s) subcutaneous once a day (at bedtime)  levothyroxine 50 mcg (0.05 mg) oral tablet: 1 tab(s) orally once a day  memantine 10 mg oral tablet: 1 tab(s) orally 2 times a day  metoprolol succinate 25 mg oral tablet, extended release: 1 tab(s) orally every other day  pravastatin 40 mg oral tablet: 1 tab(s) orally once a day  torsemide 20 mg oral tablet: 2 tabs in morning and 1 tab in evening on non dialysis days.   Tresiba FlexTouch 100 units/mL subcutaneous solution: subcutaneous every other day  Vitamin D3 50 mcg (2000 intl units) oral tablet: 1 tab(s) orally once a day   aspirin 81 mg oral tablet, chewable: 1 tab(s) orally once a day  Augmentin 500 mg-125 mg oral tablet: 1 tab(s) orally once a day   Florastor 250 mg oral capsule: 1 cap(s) orally once a day   hydrALAZINE 50 mg oral tablet: 1 tab(s) orally 3 times a day  Lantus Solostar Pen 100 units/mL subcutaneous solution: 5 unit(s) subcutaneous once a day (at bedtime)  levothyroxine 50 mcg (0.05 mg) oral tablet: 1 tab(s) orally once a day  memantine 10 mg oral tablet: 1 tab(s) orally 2 times a day  metoprolol succinate 25 mg oral tablet, extended release: 1 tab(s) orally every other day  pravastatin 40 mg oral tablet: 1 tab(s) orally once a day  torsemide 20 mg oral tablet: 2 tabs in morning and 1 tab in evening on non dialysis days.   Vitamin D3 50 mcg (2000 intl units) oral tablet: 1 tab(s) orally once a day

## 2021-09-17 NOTE — PROGRESS NOTE ADULT - PROBLEM SELECTOR PLAN 12
DVT prophylaxis: Hep    Discussed with wife at bedside  CODE: DNR/DNI    PT consult - Pending   Family wants to take patient home.     Dispo: Possible DC if cx are negative
DVT prophylaxis: Hep    Discussed with wife at bedside  CODE: DNR/DNI    PT consult  Family wants to take patient home.     Dispo: Possible DC in 24 to 48  hours pending course

## 2021-09-17 NOTE — DISCHARGE NOTE PROVIDER - NSDCCPCAREPLAN_GEN_ALL_CORE_FT
PRINCIPAL DISCHARGE DIAGNOSIS  Diagnosis: Metabolic encephalopathy  Assessment and Plan of Treatment: - Possibly due to hypgerglycemia in conjuction with dementia  - Mental Status improving  - Supportive care      SECONDARY DISCHARGE DIAGNOSES  Diagnosis: Leukocytosis  Assessment and Plan of Treatment: - Resolved  - Blood cultures were negative  - Follow up with PCP in 1 week    Diagnosis: ESRD on dialysis  Assessment and Plan of Treatment: - Continue with HD as directed  - Follow up with Nephrology in 1 week    Diagnosis: Elevated troponin  Assessment and Plan of Treatment: - Likely due to renal insufficency   - Follow up with PCP in 1 week     PRINCIPAL DISCHARGE DIAGNOSIS  Diagnosis: Metabolic encephalopathy  Assessment and Plan of Treatment: - Possibly due to hypgerglycemia in conjuction with dementia  - Mental Status improving  - Supportive care      SECONDARY DISCHARGE DIAGNOSES  Diagnosis: Leukocytosis  Assessment and Plan of Treatment: - Continue Augmentin x 2 days  - Blood cultures were negative  - Follow up with PCP in 1 week    Diagnosis: ESRD on dialysis  Assessment and Plan of Treatment: - Continue with HD as directed  - Follow up with Nephrology in 1 week    Diagnosis: Elevated troponin  Assessment and Plan of Treatment: - Likely due to renal insufficency   - Follow up with PCP in 1 week

## 2021-09-17 NOTE — PHYSICAL THERAPY INITIAL EVALUATION ADULT - PERTINENT HX OF CURRENT PROBLEM, REHAB EVAL
80 years old male with history of Chronic Diastolic Heart Failure s/p CardioMEMs, Severe AS, PAF not on AC due to GIB, ESRD on Dialysis (MWF), DM 2, HTN, HLD, Hypothyroidism and Dementia sent from dialysis center due to altered mental status. Pt admitted for Metabolic encephalopathy, 2*2 hypoglycemia and leukocytosis likely secondary to bronchitis.

## 2021-09-17 NOTE — PROGRESS NOTE ADULT - PROBLEM SELECTOR PLAN 6
Discussed with patients wife - Patient does not take 45 units at home  She states that he takes 5 units  Lantus 5 ordered  ISS  A1C 5.9
Discussed with patients wife - Patient does not take 45 units at home  She states that he takes 5 units  Lantus 5 ordered  ISS  A1C 5.9

## 2021-09-17 NOTE — PROGRESS NOTE ADULT - PROBLEM SELECTOR PLAN 1
Possibly related to Hyperglycemia, worsening dementia  Possibly associated with bronchitis but mental status is improving  Will continue zosyn for now and follow up on all cx results
Possibly related to Hyperglycemia, worsening dementia  Possibly associated with bronchitis but mental status is improving  Will continue zosyn for now and follow up on all cx results

## 2021-09-17 NOTE — PROGRESS NOTE ADULT - PROBLEM SELECTOR PLAN 4
- Continue Torsemide on non dialysis days  - Continue Metoprolol
- Continue Torsemide on non dialysis days  - Continue Metoprolol

## 2021-09-17 NOTE — DISCHARGE NOTE PROVIDER - NSDCFUSCHEDAPPT_GEN_ALL_CORE_FT
JEMIMA SANTOS ; 09/28/2021 ; [facility] Gastro [address] ; 10/12/2021 ; [facility] Cardio [address] ; 10/20/2021 ; [facility] Cardio Electro [address] ; 11/11/2021 ; NPP Neurology [facility]

## 2021-09-17 NOTE — DISCHARGE NOTE NURSING/CASE MANAGEMENT/SOCIAL WORK - PATIENT PORTAL LINK FT
You can access the FollowMyHealth Patient Portal offered by [facility] by registering at the following website: [email or web address]. By joining [facility]’s FollowMyHealth portal, you will also be able to view your health information using other applications (apps) compatible with our system.

## 2021-09-17 NOTE — PHYSICAL THERAPY INITIAL EVALUATION ADULT - CRITERIA FOR SKILLED THERAPEUTIC INTERVENTIONS
Home with RW, Home PT and assist PRN from family/impairments found/anticipated discharge recommendation

## 2021-09-17 NOTE — DISCHARGE NOTE PROVIDER - CARE PROVIDER_API CALL
Zulma Ledesma)  Internal Medicine; Nephrology  [address] [place] [id number]  Phone: [phone]  Fax: [phone]  Follow Up Time: 1 week    PCP,   Phone: (   )    -  Fax: (   )    -  Follow Up Time: 1 week

## 2021-09-17 NOTE — PHYSICAL THERAPY INITIAL EVALUATION ADULT - ASSISTIVE DEVICE FOR TRANSFER: GAIT, REHAB EVAL
Problem: Adult Inpatient Plan of Care  Goal: Plan of Care Review  Outcome: Ongoing (interventions implemented as appropriate)  Plan of Care reviewed with patient. Pt up ad shaji with steady gait noted. Pt free from falls or injury. Tolerating clear liquids well. Denies any c/o discomfort @ this time. Purposeful rounding done. VSS. Safety maintained. Bed in lowest position and locked. Call light in reach.       rolling walker

## 2021-09-17 NOTE — DISCHARGE NOTE PROVIDER - PROVIDER TOKENS
PROVIDER:[TOKEN:[[id number]],FOLLOWUP:[1 week]],FREE:[LAST:[PCP],PHONE:[(   )    -],FAX:[(   )    -],FOLLOWUP:[1 week]]

## 2021-09-22 NOTE — CDI QUERY NOTE - NSCDIOTHERTXTBX_GEN_ALL_CORE_HH
In H&P Past Medical, Past Surgical, and Family History Alzheimer's dementia was noted, but this type was not mentioned in other notes. Can you please clarify the dementia type and/or confirm if this is Alzheimer’s dementia?  A.	Dementia in Alzheimer’s disease  B.	Other, please specify  C.	Not clinically significant    Chart Documentation:    H&P Adult [Charted Location: [facility] [id number]] [Authored: 15-Sep-2021 15:25]  Patient History:    Past Medical, Past Surgical, and Family History:  PAST MEDICAL HISTORY:  (HFpEF) heart failure with preserved ejection fraction     Alzheimer's dementia poor historian    Assessment and Plan:    Assessment:  • Assessment	  9) Dementia   - Continue Namenda        Progress Note Adult-GO-Hospitalist Attending [Charted Location: [place] [address]] [Authored: 16-Sep-2021 14:57]  Assessment and Plan:   • Assessment	  80 years old male with history of Chronic Diastolic Heart Failure s/p CardioMEMs, Severe AS, PAF not on AC due to GIB, ESRD on Dialysis (MWF), DM 2, HTN, HLD, Hypothyroidism and Dementia sent from dialysis center due to altered mental status.    Problem/Plan - 1:  •? Problem: Metabolic encephalopathy.   •? Plan: Possibly related to Hyperglycemia, worsening dementia    Problem/Plan - 10:  •? Problem: Dementia.   •? Plan; Continue namenda.        Discharge Note Provider [Charted Location: [place] [address]] [Authored: 17-Sep-2021 14:34  Hospital Course	  80 years old male with history of Chronic Diastolic Heart Failure s/p CardioMEMs, Severe AS, PAF not on AC due to GIB, ESRD on Dialysis (MWF), DM 2, HTN, HLD, Hypothyroidism and Dementia sent from dialysis center due to altered mental status.  PRINCIPAL DISCHARGE DIAGNOSIS  Diagnosis: Metabolic encephalopathy  Assessment and Plan of Treatment: - Possibly due to hypgerglycemia in conjuction with dementia  - Mental Status improving  - Supportive care

## 2021-09-24 ENCOUNTER — NON-APPOINTMENT (OUTPATIENT)
Age: 81
End: 2021-09-24

## 2021-09-28 ENCOUNTER — APPOINTMENT (OUTPATIENT)
Dept: GASTROENTEROLOGY | Facility: CLINIC | Age: 81
End: 2021-09-28

## 2021-10-12 ENCOUNTER — APPOINTMENT (OUTPATIENT)
Dept: CARDIOLOGY | Facility: CLINIC | Age: 81
End: 2021-10-12

## 2021-10-18 ENCOUNTER — RX RENEWAL (OUTPATIENT)
Age: 81
End: 2021-10-18

## 2021-10-20 ENCOUNTER — APPOINTMENT (OUTPATIENT)
Dept: ELECTROPHYSIOLOGY | Facility: CLINIC | Age: 81
End: 2021-10-20

## 2021-11-09 ENCOUNTER — RX RENEWAL (OUTPATIENT)
Age: 81
End: 2021-11-09

## 2021-11-11 ENCOUNTER — APPOINTMENT (OUTPATIENT)
Dept: NEUROLOGY | Facility: CLINIC | Age: 81
End: 2021-11-11

## 2021-11-29 NOTE — PROGRESS NOTE ADULT - ATTENDING COMMENTS
Patient seen and examined by me.  I have discussed my recommendation with the PA which are outlined above.  Will follow.
Patient seen and examined by me.  I have discussed my recommendation with the PA which are outlined above.  Will follow.
29-Nov-2021 11:40

## 2022-03-04 NOTE — PHYSICAL THERAPY INITIAL EVALUATION ADULT - SOCIAL CONCERNS
Following instructions given to patient, who states understanding:    NPO after midnight  [employer] and 's license  Wear comfortable clean clothing  Do not bring jewelry   Shower night before and morning of surgery with a liquid antibacterial soap  Bring medications in original bottles  Follow all instructions given by your physician   needed at discharge  Call [phone] for any questions  Report to [facility] on 2nd floor  If you would become ill prior to surgery, please call the surgeon  May have a visitor with you, we request that you limit to 2 visitors in pre-op area  Please bring and wear mask None

## 2022-03-18 NOTE — PROGRESS NOTE ADULT - ATTENDING COMMENTS
ACDHF: Bl pleural eff.  s/p left  thoracocentesis. Decreased torsemide to 20mg BID. Cardiomems PAD 10 yesterday.  TTE EF 60-65%, DDII, Severe AS; plan for further evaluation for AS.  PAF:  not on AC. outpt eval for watchman.   ESRD: Plan for HD tomorrow
Bilateral pleural effusion and congestive heart failure and aortic stenosis.  Plan for outpatietn tavr.   PADP goal 12.   Now at 10.  ct torsemide 20 Q12. and ct aggressive hemodialysis   No further in-patient cardiac work-up/management is needed.  Follow-up in cardiology office in 2 weeks.
Pt is seen, examined, chart reviewed, d/w np/pa.  Management as outlined above.  ACDHF: Bl pleural eff.  s/p left  thoracocentesis. Echo pending.  cont torsemide BID  PAF:  not on AC. outpt eval for watchman.
Attending Attestation:  I personally saw and evaluated the patient and agree with the above assessment and plan  Changes made above where appropriate    EXAM:  General: NAD - appears improved after R chest tube  Lungs: decreased towards bases bilaterally; chest tube non-tender  Heart: nml HS  Abdomen: benign  Ext: less pitting edema  Neuro: alert and oriented  Skin: intact    Brief A/P:  - continue with PO diuretics for now  - s/p chest tube - with CT surgery  - cardiology following  - agree with repeat echo - further work up pending findings  - dialysis today - nephrology following  - BMP daily  - originally upgraded to step down unit this morning when placed on bipap - since removed now can remain on 6 tower
Initiate Treatment: Doxycycline 100 mg BID with food and water, Clindamycin lotion1% QAM to the entire face and Epiduo gel to the entire face qohs increasing to qhs as tolerated. Moisturize after.
Detail Level: Zone
Otc Regimen: Gentle facial cleanser, moisturizer and mineral sunscreen. BPO wash once a day.
Plan: Follow up in 3 months, Unless doing well on topicals then we will refill for a year.

## 2022-05-12 NOTE — ED STATDOCS - DATE/TIME 1
Rapid response called pt having seizure like activity. Unresponsive, not opening eyes, or responding to sternal rub. Dr. Jaden Gaston aware of situation and at bedside. Patient has history of conversion disorder. 20-Mar-2019 13:36

## 2022-05-23 NOTE — ED PROVIDER NOTE - INCLUDE COVID-19 DISCHARGE INSTRUCTIONS
Quality 130: Documentation Of Current Medications In The Medical Record: Current Medications Documented Detail Level: Zone <-------- Click here to INCLUDE CoVID-19 Discharge Instructions

## 2022-07-19 NOTE — ED PROVIDER NOTE - TOBACCO USE
Dad called because he went to the ENT today and they could not get anything out of his ears because it was too hard  He was given drops to use for a couple of days to soften the wax    He would like to come here Thursday or Friday to have you remove the wax as his insurance will not cover this specialist  Unknown if ever smoked

## 2022-07-22 NOTE — PHYSICAL THERAPY INITIAL EVALUATION ADULT - ADL SKILLS, REHAB EVAL
Wound Progress Note                                                    Chief Complaint   Chief Complaint   Patient presents with   • Wound     No issues with doing 2x/day dressing changes as instructed.                                                             Wound Assessments  Pain Intensity  Numeric Rating Scale 0-10: 0   Wound Coccyx Surgical Wound (Active)   Date First Assessed/Time First Assessed: 05/27/22 1435   Location: Coccyx  Level of Skin Injury: Full Thickness  Primary Wound Type: Surgical Wound  Wound Approximate Age at First Assessment (Weeks): 2.5 weeks      Assessments 5/27/2022  2:52 PM 7/22/2022  9:30 AM   Dressing Activity Applied --   Wound Bed/Tissue Type -- Granulated;Epithelialized   Wound Dressing Packing strips-iodoform --   Wound Length (cm) -- 3.4 cm   Wound Width (cm) -- 0.4 cm   Wound Depth (cm) -- 0.3 cm   Wound Surface Area (cm^2) -- 1.36 cm^2   Wound Volume (cm^3) -- 0.408 cm^3   Wound Volume Change (Initial) -- -59.44 cm3   Wound Volume % Change (Initial) -- -99.32 %   Wound Volume Change (30 days) -- -19.59 cm3   Wound Volume % Change (30 days) -- -97.96 %       No Linked orders to display       Negative Pressure Device 06/17/22 (Active)   Placement Date/Time: 06/17/22 1130   Inserted By: Daina Olivier, PT      Assessments 6/17/2022 11:52 AM 7/13/2022 10:17 AM   Setting (mmHg) 125;Continuous 125;Continuous   Dressing Contact layer;Black foam Black foam   Number of Foam Pieces Placed 1 1   Number of Foam Pieces Removed n/a 1   Tubing Bridged To R gluteal R gluteal       No Linked orders to display         Treatment  Wound Location: coccyx  Treatment Type: Debridement  Debridement  Wound Cleansing : Dermal wound cleanser  Antiseptic Dressing: Dakins 1/4 strength gauze  Primary Wound Dressing: Wet to moist dressing  Secondary Wound Dressing: Gauze, sterile 4 X 4 (3 folded layers)  Dressing Securement: Medipore  Dressing Time Spent (min): 12   Percent of wound debrided:  0%      ASSESSMENT  His wound size has further reduced, with a reduction in length from 5.1 cm on 7/18/22 to 3.4 cm measured today. Will have him continue with 2x/day dressing changes with the 1/4 st dakin's solution, with possible transition to 1x/day saline moist gauze and 1x/day dakin's moist gauze next week to reduce any potential cytotoxicity with prolonged use. Educated him to ensure he is familiar the student health services clinic at school to use if he has complications with wound recurrence, especially given that he is going to play football at college (leaves in 3 weeks). He verbalized understanding to all instructions.     Plan  Will see 1x next week, assess response to above.     Daina Olivier, PT, DPT, CWS  7/22/2022                   independent

## 2022-09-22 NOTE — ASSESSMENT
[FreeTextEntry1] : Patient here for 2 week f/u and review of PMH anemia, a-fib, chronic GERD, chronic renal failure, enlarged prostate, HTN, HLD, IDDM. Patient is considering renal replacement therapy. Awaiting results of sonogram from renal. F/u of labs. DM care plan reviewed. Glucose stable as per wife. Patient has f/u with cardiology tomorrow morning. \par \par RTC in 6-8 weeks 22-Sep-2022 16:44

## 2022-09-27 NOTE — DIETITIAN INITIAL EVALUATION ADULT. - SIGNS/SYMPTOMS
As evidenced by pt likely meeting <50% est energy needs >5days, moderate muscle wasting and fat loss
63.2

## 2022-09-28 NOTE — H&P ADULT - REASON FOR ADMISSION
needs HD
LLE limited 2/2 pain/bilateral upper extremity Active ROM was WFL (within functional limits)/Right LE Active ROM was WFL (within functional limits)

## 2022-10-31 NOTE — H&P ADULT - NSCORESITESY/N_GEN_A_CORE_RD
No

## 2022-11-03 NOTE — PROGRESS NOTE ADULT - ATTENDING COMMENTS
No further in-patient cardiac work-up/management is needed.  Follow-up in cardiology office in 2 weeks.
Patient stable. Continued drainage from chest tube.     No further cardiac interventions. No diuretics today. Will consider restarting diuretics tomorrow.
English
got cardiac cath done. no obstructive disease. mild coronary artery disease .  plan for tavr evaln. discharged plan .  outpatient tavr  . discuss with CT surgery if any other work up is pending. and if needs to be done inpatient or outpatient.
patient is euvolemic. PCWP 9.  Patient had Volume removed and hemodialysis yesterday.    Dishcarge on PO torsemide 20 mg Q12.  Check cardiomem at discharge.   Still has significant right plueral drainage. Unclear if transudate or exudate. Persistenly draining chest tube.   Send LDH and protein fluid.    Thoracic following up the patuent.
plan for cardiac cath and hemodialysis prior to cardiac cath  cardiac cath deferred today due to elevated cr.   elevated potassium and elevated bun and creat. will get hemodialysis .   after cardiac cath will assess fillin gpressuires for volume removal.
Pleural effusion s/p drainage.   Unclear trigger: Likely uremia/pleuritis. vs. insufficient diuresis   last admission on discharge diuretic dose was reduced due to concern of pre renal azotemia.  Will  Continue Lasix 40mh IV. opatient needs IV diuretics and BP control adn diet control   Need optimal diuretic dose on discharge .

## 2022-12-09 NOTE — H&P ADULT - BIRTH SEX
Male Subsequent Stages Histo Method Verbiage: Using a similar technique to that described above, a thin layer of tissue was removed from all areas where tumor was visible on the previous stage.  The tissue was again oriented, mapped, dyed, and processed as above.

## 2022-12-14 NOTE — PHYSICAL EXAM
PEDIATRIC ILLNESS VISIT 12/14/2022   Roomed by: Andreina Pedro MA 9:27 AM         Health Maintenance Due   Topic Date Due   • COVID-19 Vaccine (1) Never done       Patient is due for topics as listed above but is not proceeding with Immunization(s) COVID-19 at this time. Education provided for Immunization(s) COVID-19.      Rooming notes:    Accompanied by: mother/Oscar Arguello is a 27 month old male who is complaining of right eye drainage on set 12/12, his eye was crusted shut this morning, no fever. He has had a cold for a couple of weeks            Allergies:   ALLERGIES:  Patient has no known allergies.   Current Outpatient Medications   Medication Sig Dispense Refill   • hydroCORTisone (CORTIZONE) 2.5 % cream Apply 1 application topically 2 times daily until redness disappears. 454 g 0   • acetaminophen (TYLENOL) 160 MG/5ML solution Take by mouth every 4 hours as needed for Fever. teething       No current facility-administered medications for this visit.       The following portions of the patient's history were reviewed including the  nurses notes and the following were updated as appropriate: allergies, current medications, past family history, past medical history, past social history and past surgical history.    Physician Notes  Xi Arguello is a 27 month old male who is complaining of eye discharge.  Has had a runny , stuffy nose for about 2 weeks -  Not really coughing just stuffy nose,  Overall seemed ok but yesterday some discharge from eye and today woke with matted eye  No fever,   No redness or irritation to eyeball or eyelids  Still eating as usual   No cough  Nasal congestion is unchanged  No vomiting, no diarrhea  No rash       History obtained from  Mother     OBJECTIVE:     Physical Exam   Visit Vitals  Pulse 134   Temp 97.6 °F (36.4 °C) (Temporal)   Resp 30   Ht 3' 1\" (0.94 m)   Wt 14.7 kg (32 lb 6.5 oz)   BMI 16.64 kg/m²        Examination of the patient reveals:     GENERAL:  appears stated age and well developed and well nourished  SKIN normal color, normal texture, normal turgor and no skin rashes  EYES: pupils are equal and reactive to light and accommodation, extraocular movements intact, sclerae and conjunctivae are normal- no redness or irritation , lids  are normal, slight crusting of lashes   EARS: pinna and external ear is normal bilaterally, external auditory canals are normal and ABNORMAL FINDINGS>> TM bilateral dull, pus filed   NOSE: external nose is normal to inspection, anterior nares are normal and ABNORMAL FINDINGS>> cloudy nasal discharge   MOUTH/THROAT: tongue is midline and appears normal, oropharynx appears normal, soft palate and uvula are normal, oral mucosa is normal and oral mucosa moist and intact without thrush or mucositis  NECK: neck is supple, no anterior cervical adenopathy and no posterior cervical adenopathy  CHEST: contour is normal with normal AP diameter, normal respiratory excursion and respiratory effort is not labored  LUNGS: lungs are clear to auscultation with normal inspiratory/expiratory sounds, no rales, no rhonchi and no wheezes  HEART: normal PMI, normal rate and rhythm, S1 and S2 normal, no S3 or S4, no murmurs and no extra heart sounds  ABDOMEN: abdomen is soft, normal active bowel sounds and nontender      ASSESSMENT/PLAN     URI   Nasal saline, suction secretions as tolerated  Cool humidifier at bedside, [facility] some [facility]     Otitis media   No drug allergy , no recent antibiotic use  See orders, reviewed possible side effects of medication      Eye discharge -   No conjunctivitis  Wipe discharge as needed with soft moist cloth          See Orders:   Instructed to call if the problem worsens or does not improve within the next 24 to 48 hours.   Schedule follow-up:PRN     [General Appearance - Alert] : alert [General Appearance - In No Acute Distress] : in no acute distress [Sclera] : the sclera and conjunctiva were normal [PERRL With Normal Accommodation] : pupils were equal in size, round, and reactive to light [Extraocular Movements] : extraocular movements were intact [Outer Ear] : the ears and nose were normal in appearance [Oropharynx] : the oropharynx was normal [Neck Appearance] : the appearance of the neck was normal [Neck Cervical Mass (___cm)] : no neck mass was observed [Jugular Venous Distention Increased] : there was no jugular-venous distention [Thyroid Diffuse Enlargement] : the thyroid was not enlarged [Thyroid Nodule] : there were no palpable thyroid nodules [Auscultation Breath Sounds / Voice Sounds] : lungs were clear to auscultation bilaterally [Heart Rate And Rhythm] : heart rate was normal and rhythm regular [Heart Sounds] : normal S1 and S2 [Heart Sounds Gallop] : no gallops [Murmurs] : no murmurs [Heart Sounds Pericardial Friction Rub] : no pericardial rub [Full Pulse] : the pedal pulses are present [Edema] : there was no peripheral edema [Breast Appearance] : normal in appearance [Breast Palpation Mass] : no palpable masses [Bowel Sounds] : normal bowel sounds [Abdomen Soft] : soft [Abdomen Tenderness] : non-tender [Abdomen Mass (___ Cm)] : no abdominal mass palpated [Cervical Lymph Nodes Enlarged Posterior Bilaterally] : posterior cervical [Cervical Lymph Nodes Enlarged Anterior Bilaterally] : anterior cervical [Axillary Lymph Nodes Enlarged Bilaterally] : axillary [Supraclavicular Lymph Nodes Enlarged Bilaterally] : supraclavicular [Femoral Lymph Nodes Enlarged Bilaterally] : femoral [Inguinal Lymph Nodes Enlarged Bilaterally] : inguinal [No CVA Tenderness] : no ~M costovertebral angle tenderness [No Spinal Tenderness] : no spinal tenderness [Abnormal Walk] : normal gait [Nail Clubbing] : no clubbing  or cyanosis of the fingernails [Musculoskeletal - Swelling] : no joint swelling seen [Motor Tone] : muscle strength and tone were normal [___ (cm) Fistula] : [unfilled] (cm) fistula [Bruit] : a bruit was present [Skin Color & Pigmentation] : normal skin color and pigmentation [Skin Turgor] : normal skin turgor [] : no rash [Deep Tendon Reflexes (DTR)] : deep tendon reflexes were 2+ and symmetric [Sensation] : the sensory exam was normal to light touch and pinprick [No Focal Deficits] : no focal deficits [Oriented To Time, Place, And Person] : oriented to person, place, and time [Impaired Insight] : insight and judgment were intact [Affect] : the affect was normal

## 2023-01-02 NOTE — ED ADULT NURSE NOTE - SUICIDE SCREENING QUESTION 1
ANTICOAGULATION MANAGEMENT     Selvin Rockwell 77 year old female is on warfarin with therapeutic INR result. (Goal INR 2.0-3.0)    Recent labs: (last 7 days)     01/02/23  1018   INR 2.8*       ASSESSMENT       Source(s): Chart Review and Patient/Caregiver Call       Warfarin doses taken: Warfarin taken as instructed    Diet: No new diet changes identified    New illness, injury, or hospitalization: No    Medication/supplement changes: None noted    Signs or symptoms of bleeding or clotting: No    Previous INR: Therapeutic last 2(+) visits    Additional findings: None     PLAN     Recommended plan for no diet, medication or health factor changes affecting INR     Dosing Instructions: Continue your current warfarin dose with next INR in 5 weeks       Summary  As of 1/2/2023    Full warfarin instructions:  2 mg every Tue; 3 mg all other days   Next INR check:  2/6/2023             Telephone call with Alicia who verbalizes understanding and agrees to plan    Alicia offered & declined to schedule next visit     Education provided:     Please call back if any changes to your diet, medications or how you've been taking warfarin    Plan made per [facility] anticoagulation protocol    Portia Coronado RN  Anticoagulation Clinic  1/2/2023    _______________________________________________________________________     Anticoagulation Episode Summary     Current INR goal:  2.0-3.0   TTR:  56.0 % (1 y)   Target end date:  Indefinite   Send INR reminders to:  JERRY PEÑA    Indications    Paroxysmal atrial fibrillation (H) [I48.0]  Long term (current) use of anticoagulants [Z79.01]           Comments:           Anticoagulation Care Providers     Provider Role Specialty Phone number    Gurinder Ho MD Referring Family Medicine [phone]             No

## 2023-02-07 NOTE — PHYSICAL THERAPY INITIAL EVALUATION ADULT - PRECAUTIONS/LIMITATIONS, REHAB EVAL
Resent clarification on the B12 injection. Sent in prescription for:     Requested Prescriptions     Signed Prescriptions Disp Refills    cyanocobalamin 1000 MCG/ML injection 6 mL 3     Sig: Inject 1 mL into the muscle every 14 days Do 1 cc IM every 14 days for 2 months then go to every 28 days.      Authorizing Provider: Herberth Shaw cardiac precautions

## 2023-02-08 NOTE — H&P ADULT - CARDIOVASCULAR
Nutrition Assessment   Reason for Consult/Assessment: Follow up, Malnutrition (admission diagnosis), Enteral nutrition      Diagnosis and Hx: Reviewed (PMHx BPH s/p TURP in 2013 with Dr. Alfredo, and CKD, who presents to [facility] ED after being found down during a wellness check.  Ct head with SDH)    Pertinent Nutrition History: Patient seen this AM, able to answer some questions. UBW 116lbs, currently 112 lbs. States he is [employer] and eating 3 meals/day PTA. Unable to provide further hx.    Pertinent Nutrition Information: Failed swallow eval. Nepro TF started via DHT yesterday PM. Labs notable for elevated BUN, Cr, mag, phos. on SSI, renvela, D5 @ 75 ml/hr.                                 Diet Order: Enteral nutrition/tube feeding      Enteral nutrition/tube feeding: Nepro @ 30 ml/hr (x24 hours); does not meet needs.            Diet tolerance: Tolerating nutrition support   Food Allergies: None known    Demographic/Anthropometrics Information  Gender: male   Patient Age: 83 year old  Height:   Ht Readings from Last 1 Encounters:   02/06/23 5' 10\" (1.778 m)      Weight:   Wt Readings from Last 1 Encounters:   02/08/23 47 kg      BMI:   BMI Readings from Last 1 Encounters:   02/08/23 14.87 kg/m²       Usual Weight: 52.7 kg  % Weight Change: -3% x unknown time frame           Estimated Needs:  Calculated Energy Needs: 7964-6439  kcal               Calculated protein needs:   g    Calculated Fluid Needs: Per Provider              NFPE  Nutrition Focused Physical Exam  Physical Exam Completed: Yes  Body Fat  Upper Arm Region (Triceps/biceps): Severe  Muscle Mass  Temporal Region (Temporalis Muscle): Severe  Collarbone Region (Clavicle Bone, Pectoralis Major, Trapezius Muscle): Severe                TREATMENT PLAN: Monitoring & Interventions   Intervention: Coordination of nutrition care by a nutrition professional, Nutrition supplement therapy, Enteral nutrition   Coordination of nutrition care: Diet per SLP recs.         Enteral Nutrition Formula: Nepro Carbsteady      Access Site:   Calories Provided by Tube Feedin calories  Protein Provided by Tube Feedin grams protein  Free Water Provided by Tube Feedin ml free water            Goal rate: Recommend increase TF goal rate to Nepro @ 45 ml/hr to meet needs as above.  Flushes: FWF per NCCU/Nephrology.                                Goal: Tolerate enteral feeding goal   Intervention goal status: Some progress toward goal  Time frame to achieve goal: Ongoing     Dietitian will monitor: Anthropometric Measurements, Diet advancement, Biochemical data, medical tests, procedures, Food, beverage, and nutrient intake      Nutrition Diagnosis / PES  Nutrition Diagnosis: Malnutrition  Malnutrition in the context of chronic illness: Severe          Malnutrition chronic; severe: Severe depletion of body fat, Severe depletion of muscle mass  Primary Nutrition Diagnosis status: Active nutrition diagnosis             Dalila Zarco MS, RD LDN  Clinical Dietitian   or contact via EPIC secure chat          details… Regular rate & rhythm, normal S1, S2; no murmurs, gallops or rubs; no S3, S4

## 2023-03-06 NOTE — H&P PST ADULT - CONSTITUTIONAL
----- Message from Yair Quevedo MD sent at 3/2/2023  7:02 AM CST -----  Let patient know tests show mild carotid stenosis will continue to monitor     detailed exam

## 2023-04-05 NOTE — PATIENT PROFILE ADULT - HAVE YOU EXPERIENCED VIOLENCE OR A TRAUMATIC EVENT?
ondansetron. Allergies:  Penicillins, Penicillins, and Myrbetriq [mirabegron]        Review of Systems :  Constitutional: No fever or chills. No night sweats,fatigued , worsening  HEENT: negative for sore mouth, sore throat, hoarseness and voice change   Respiratory: sob on exertion, no cough    Cardiovascular: negative for chest pain, dyspnea, palpitations, orthopnea, PND   Gastrointestinal: Intermittent nausea and vomiting. No diarrhea.   No abdominal pain  Genitourinary: negative for frequency, dysuria, nocturia, urinary incontinence, and hematuria   Integument:   Lymphedema of legs    Hematologic/Lymphatic: negative for easy bruising, bleeding, lymphadenopathy, petechiae and swelling/edema   Endocrine:morbid obesity   Musculoskeletal: back and knees pain, no swelling   Neurological: negative for headaches, dizziness, seizures, weakness, numbness      Physical Examination :       /77 (Site: Left Lower Arm, Position: Sitting, Cuff Size: Medium Adult)   Pulse 67   Temp 97 °F (36.1 °C) (Temporal)   Resp 18   Ht 5' 6\" (1.676 m)   Wt (!) 408 lb (185.1 kg)   LMP 03/08/2023 (Approximate)   BMI 65.85 kg/m²     Performance Status:Estimated performance status is ECOG 2    General appearance - morbidly obese, 408 pounds very fatigued  Neck - supple, no significant adenopathy ,trach is central   Lymphatics - no palpable lymphadenopathy, no hepatosplenomegaly   Chest - clear to auscultation, limited chest expansion  Heart - normal rate, regular rhythm, distant S1, S2,    Abdomen - Extremely obese abdomen, not tender, it are difficult to assess for hepatosplenomegaly or organomegaly  Neurological - alert, oriented, normal speech, no focal findings or movement disorder noted   Musculoskeletal - no joint tenderness, deformity or swelling   Extremities - Severe lymphedema and is wearing from morbid obesity, venous stasis unchanged  Skin -Venous stasis changes to both lower extremities      Lab Results   Component no

## 2023-05-24 NOTE — ASU PATIENT PROFILE, ADULT - VISION (WITH CORRECTIVE LENSES IF THE PATIENT USUALLY WEARS THEM):
Lab Results   Component Value Date    HGBA1C 6 0 05/24/2023     Controlled  Low carb diet  Continue metformin 500mg bid  Normal vision: sees adequately in most situations; can see medication labels, newsprint

## 2023-06-15 NOTE — ED PROVIDER NOTE - CONTEXT
[FreeTextEntry1] : "SANTHOSH" is a 6 year old M who presents for f/u of R BBFFx sustained on 6/2/23.\par \par He was climbing a playing on the monkey bars when he fell from a fevers and landed on his right forearm.  He had pain and deformity of his right forearm he was brought to the ER.  He presented to [facility] where he underwent a closed reduction and placement of a long-arm cast. He saw Louis Perez from [facility] on 6/3/23.  Recommendation was to follow-up for possible surgical consultation.\par \par I saw him in the office on June 8, 2023.  X-rays were reviewed.  I discussed that he has a complete right both bone forearm fracture with mild early loss of reduction that was acceptable for his age due to fracture remodeling potential.  Recommendation was to return in 1 week for x-rays of the right forearm in cast for an alignment check. unknown

## 2023-08-07 NOTE — DISCHARGE NOTE PROVIDER - REASON FOR ADMISSION
well developed, well nourished , in no acute distress , ambulating without difficulty , normal communication ability
Admission for elective loop recorder removal

## 2023-11-13 NOTE — PROGRESS NOTE ADULT - SUBJECTIVE AND OBJECTIVE BOX
Patient was seen and evaluated on dialysis.   Patient is tolerating the procedure well.   T(C): 36.8 (09-17-21 @ 09:00), Max: 37 (09-16-21 @ 21:14)  HR: 70 (09-17-21 @ 09:00) (70 - 89)  BP: 108/50 (09-17-21 @ 09:00) (100/44 - 138/63)  Continue dialysis:   Dialyzer: Revaclear 300  QB:   400 ml  QD: 500ml.,  Goal UF 1 kg  over 3.5 Hours     
[facility] DIVISION OF KIDNEY DISEASES AND HYPERTENSION -- HEMODIALYSIS NOTE  --------------------------------------------------------------------------------  Chief Complaint: ESRD/Ongoing hemodialysis requirement    24 hour events/subjective:  s/p HD yesterday  Pt seen and examined; nad      PAST HISTORY  --------------------------------------------------------------------------------  No significant changes to PMH, PSH, FHx, SHx, unless otherwise noted    ALLERGIES & MEDICATIONS  --------------------------------------------------------------------------------  Allergies    No Known Allergies    Intolerances      Standing Inpatient Medications  aspirin  chewable 81 milliGRAM(s) Oral daily  atorvastatin 40 milliGRAM(s) Oral at bedtime  cholecalciferol 2000 Unit(s) Oral daily  dextrose 40% Gel 15 Gram(s) Oral once  dextrose 5%. 1000 milliLiter(s) IV Continuous <Continuous>  dextrose 5%. 1000 milliLiter(s) IV Continuous <Continuous>  dextrose 50% Injectable 25 Gram(s) IV Push once  dextrose 50% Injectable 12.5 Gram(s) IV Push once  dextrose 50% Injectable 25 Gram(s) IV Push once  glucagon  Injectable 1 milliGRAM(s) IntraMuscular once  heparin   Injectable 5000 Unit(s) SubCutaneous every 12 hours  hydrALAZINE 50 milliGRAM(s) Oral three times a day  insulin glargine Injectable (LANTUS) 45 Unit(s) SubCutaneous at bedtime  insulin lispro (ADMELOG) corrective regimen sliding scale   SubCutaneous three times a day before meals  insulin lispro (ADMELOG) corrective regimen sliding scale   SubCutaneous at bedtime  insulin lispro Injectable (ADMELOG) 2 Unit(s) SubCutaneous three times a day before meals  levothyroxine 50 MICROGram(s) Oral daily  memantine 10 milliGRAM(s) Oral two times a day  metoprolol succinate ER 25 milliGRAM(s) Oral daily  piperacillin/tazobactam IVPB.. 3.375 Gram(s) IV Intermittent every 12 hours  saccharomyces boulardii 250 milliGRAM(s) Oral two times a day  torsemide 20 milliGRAM(s) Oral <User Schedule>  torsemide 40 milliGRAM(s) Oral <User Schedule>    PRN Inpatient Medications  acetaminophen   Tablet .. 650 milliGRAM(s) Oral every 6 hours PRN  ondansetron Injectable 4 milliGRAM(s) IV Push every 6 hours PRN      REVIEW OF SYSTEMS  --------------------------------------------------------------------------------  limited    VITALS/PHYSICAL EXAM  --------------------------------------------------------------------------------  T(C): 36.7 (09-16-21 @ 11:11), Max: 37.1 (09-16-21 @ 04:21)  HR: 84 (09-16-21 @ 11:11) (84 - 98)  BP: 114/54 (09-16-21 @ 11:11) (110/66 - 125/51)  RR: 20 (09-16-21 @ 11:11) (18 - 20)  SpO2: 97% (09-16-21 @ 11:11) (95% - 100%)  Wt(kg): --  Height (cm): 162.6 (09-15-21 @ 12:40)  Weight (kg): 52.2 (09-15-21 @ 12:40)  BMI (kg/m2): 19.7 (09-15-21 @ 12:40)  BSA (m2): 1.55 (09-15-21 @ 12:40)      09-15-21 @ 07:01  -  09-16-21 @ 07:00  --------------------------------------------------------  IN: 0 mL / OUT: 500 mL / NET: -500 mL      Physical Exam:  	Gen: NAD, well-appearing  	HEENT: PERRL, supple neck,  	Pulm: CTA B/L  	CV: RRR, S1S2; no rub  	Abd: +BS, soft, nontender  	UE: Warm, intact strength; no asterixis  	LE: Warm, no edema  	Neuro: No focal deficits  	Psych: Normal affect and mood  	Skin: Warm, without rashes  	Vascular access: CASSIA AV    LABS/STUDIES  --------------------------------------------------------------------------------              13.6   12.46 >-----------<  188      [09-16-21 @ 06:41]              42.4     137  |  95  |  30.0  ----------------------------<  134      [09-16-21 @ 06:41]  3.7   |  28.0  |  2.20        Ca     9.0     [09-16-21 @ 06:41]      Mg     2.2     [09-16-21 @ 06:41]      Phos  3.3     [09-16-21 @ 06:41]    TPro  8.6  /  Alb  4.0  /  TBili  0.2  /  DBili  x   /  AST  16  /  ALT  9   /  AlkPhos  108  [09-15-21 @ 12:30]        Troponin 0.06      [09-16-21 @ 06:41]  CK 16      [09-16-21 @ 06:41]    HbA1c 8.1      [12-11-19 @ 06:47]  TSH 3.73      [06-16-21 @ 04:22]    
Hospitalist Daily Progress Note    Chief Complaint:  Patient is a 80y old  Male who presents with a chief complaint of AMS (16 Sep 2021 14:57)      SUBJECTIVE / OVERNIGHT EVENTS:  Patient was seen and examined at bedside. No active complaints. Feels well.   Patient denies chest pain, SOB, abd pain, N/V, fever, chills, dysuria or any other complaints. All remainder ROS negative.     Hospital Course:  80 years old male with history of Chronic Diastolic Heart Failure s/p CardioMEMs, Severe AS, PAF not on AC due to GIB, ESRD on Dialysis (MWF), DM 2, HTN, HLD, Hypothyroidism and Dementia sent from dialysis center due to altered mental status. AMS may have been secondary to hyperglycemia vs worsening dementia. Mental status in the hospital was AAOX3. Patient had leukocytosis likely secondary to bronchitis. Was started on empiric zosyn. GOC was discussed with Wife who is the HCP and patient is DNR/DNI. PT to evaluate patient today. Pending blood cx.        MEDICATIONS  (STANDING):  aspirin  chewable 81 milliGRAM(s) Oral daily  atorvastatin 40 milliGRAM(s) Oral at bedtime  cholecalciferol 2000 Unit(s) Oral daily  dextrose 40% Gel 15 Gram(s) Oral once  dextrose 5%. 1000 milliLiter(s) (50 mL/Hr) IV Continuous <Continuous>  dextrose 5%. 1000 milliLiter(s) (100 mL/Hr) IV Continuous <Continuous>  dextrose 50% Injectable 25 Gram(s) IV Push once  dextrose 50% Injectable 12.5 Gram(s) IV Push once  dextrose 50% Injectable 25 Gram(s) IV Push once  glucagon  Injectable 1 milliGRAM(s) IntraMuscular once  heparin   Injectable 5000 Unit(s) SubCutaneous every 12 hours  hydrALAZINE 50 milliGRAM(s) Oral three times a day  insulin glargine Injectable (LANTUS) 5 Unit(s) SubCutaneous at bedtime  insulin lispro (ADMELOG) corrective regimen sliding scale   SubCutaneous three times a day before meals  insulin lispro (ADMELOG) corrective regimen sliding scale   SubCutaneous at bedtime  insulin lispro Injectable (ADMELOG) 2 Unit(s) SubCutaneous three times a day before meals  levothyroxine 50 MICROGram(s) Oral daily  memantine 10 milliGRAM(s) Oral two times a day  metoprolol succinate ER 25 milliGRAM(s) Oral daily  piperacillin/tazobactam IVPB.. 3.375 Gram(s) IV Intermittent every 12 hours  saccharomyces boulardii 250 milliGRAM(s) Oral two times a day  torsemide 20 milliGRAM(s) Oral <User Schedule>  torsemide 40 milliGRAM(s) Oral <User Schedule>    MEDICATIONS  (PRN):  acetaminophen   Tablet .. 650 milliGRAM(s) Oral every 6 hours PRN Temp greater or equal to 38C (100.4F), Mild Pain (1 - 3), Moderate Pain (4 - 6)  ondansetron Injectable 4 milliGRAM(s) IV Push every 6 hours PRN Nausea and/or Vomiting        I&O's Summary      PHYSICAL EXAM:  Vital Signs Last 24 Hrs  T(C): 36.8 (17 Sep 2021 09:00), Max: 37 (16 Sep 2021 21:14)  T(F): 98.2 (17 Sep 2021 09:00), Max: 98.6 (16 Sep 2021 21:14)  HR: 70 (17 Sep 2021 09:00) (70 - 89)  BP: 108/50 (17 Sep 2021 09:00) (100/44 - 138/63)  BP(mean): --  RR: 18 (17 Sep 2021 09:00) (18 - 20)  SpO2: 97% (17 Sep 2021 09:00) (95% - 97%)      Constitutional: NAD, Resting  ENT: Supple, No JVD  Lungs: CTA B/L, Non-labored breathing  Cardio: RRR, S1/S2, No murmur  Abdomen: Soft, Nontender, Nondistended; Bowel sounds present  Extremities: No calf tenderness, No pitting edema  Musculoskeletal:   No clubbing or cyanosis of digits; no joint swelling or tenderness to palpation  Psych: Calm, cooperative affect appropriate  Neuro: Awake and alert, oriented to name, location and place, unaware as to why he is here  Skin: No rashes; no palpable lesions    LABS:                        12.9   9.70  )-----------( 178      ( 17 Sep 2021 06:11 )             40.2     09-17    136  |  94<L>  |  51.9<H>  ----------------------------<  124<H>  4.2   |  28.0  |  3.02<H>    Ca    8.9      17 Sep 2021 06:11  Phos  3.3     09-16  Mg     2.2     09-16    TPro  8.6  /  Alb  4.0  /  TBili  0.2<L>  /  DBili  x   /  AST  16  /  ALT  9   /  AlkPhos  108  09-15      CARDIAC MARKERS ( 16 Sep 2021 06:41 )  x     / 0.06 ng/mL / 16 U/L / x     / x      CARDIAC MARKERS ( 15 Sep 2021 12:30 )  x     / 0.07 ng/mL / x     / x     / x              Culture - Blood (collected 14 Sep 2021 14:48)  Source: .Blood Blood-Peripheral  Preliminary Report (16 Sep 2021 15:00):    No growth at 48 hours    Culture - Blood (collected 14 Sep 2021 14:47)  Source: .Blood Blood-Peripheral  Preliminary Report (16 Sep 2021 15:00):    No growth at 48 hours      CAPILLARY BLOOD GLUCOSE      POCT Blood Glucose.: 159 mg/dL (17 Sep 2021 07:33)  POCT Blood Glucose.: 202 mg/dL (16 Sep 2021 21:16)  POCT Blood Glucose.: 147 mg/dL (16 Sep 2021 17:36)  POCT Blood Glucose.: 386 mg/dL (16 Sep 2021 12:50)        RADIOLOGY REVIEWED  
Hospitalist Daily Progress Note    Chief Complaint:  Patient is a 80y old  Male who presents with a chief complaint of AMS (16 Sep 2021 12:13)      SUBJECTIVE / OVERNIGHT EVENTS:  Patient was seen and examined at bedside. Patient resting comfortably with no active complaints.   Patient denies chest pain, SOB, abd pain, N/V, fever, chills, dysuria or any other complaints. All remainder ROS negative.     MEDICATIONS  (STANDING):  aspirin  chewable 81 milliGRAM(s) Oral daily  atorvastatin 40 milliGRAM(s) Oral at bedtime  cholecalciferol 2000 Unit(s) Oral daily  dextrose 40% Gel 15 Gram(s) Oral once  dextrose 5%. 1000 milliLiter(s) (50 mL/Hr) IV Continuous <Continuous>  dextrose 5%. 1000 milliLiter(s) (100 mL/Hr) IV Continuous <Continuous>  dextrose 50% Injectable 25 Gram(s) IV Push once  dextrose 50% Injectable 12.5 Gram(s) IV Push once  dextrose 50% Injectable 25 Gram(s) IV Push once  glucagon  Injectable 1 milliGRAM(s) IntraMuscular once  heparin   Injectable 5000 Unit(s) SubCutaneous every 12 hours  hydrALAZINE 50 milliGRAM(s) Oral three times a day  insulin glargine Injectable (LANTUS) 5 Unit(s) SubCutaneous at bedtime  insulin lispro (ADMELOG) corrective regimen sliding scale   SubCutaneous three times a day before meals  insulin lispro (ADMELOG) corrective regimen sliding scale   SubCutaneous at bedtime  insulin lispro Injectable (ADMELOG) 2 Unit(s) SubCutaneous three times a day before meals  levothyroxine 50 MICROGram(s) Oral daily  memantine 10 milliGRAM(s) Oral two times a day  metoprolol succinate ER 25 milliGRAM(s) Oral daily  piperacillin/tazobactam IVPB.. 3.375 Gram(s) IV Intermittent every 12 hours  saccharomyces boulardii 250 milliGRAM(s) Oral two times a day  torsemide 20 milliGRAM(s) Oral <User Schedule>  torsemide 40 milliGRAM(s) Oral <User Schedule>    MEDICATIONS  (PRN):  acetaminophen   Tablet .. 650 milliGRAM(s) Oral every 6 hours PRN Temp greater or equal to 38C (100.4F), Mild Pain (1 - 3), Moderate Pain (4 - 6)  ondansetron Injectable 4 milliGRAM(s) IV Push every 6 hours PRN Nausea and/or Vomiting        I&O's Summary    15 Sep 2021 07:01  -  16 Sep 2021 07:00  --------------------------------------------------------  IN: 0 mL / OUT: 500 mL / NET: -500 mL        PHYSICAL EXAM:  Vital Signs Last 24 Hrs  T(C): 36.7 (16 Sep 2021 11:11), Max: 37.1 (16 Sep 2021 04:21)  T(F): 98 (16 Sep 2021 11:11), Max: 98.8 (16 Sep 2021 04:21)  HR: 84 (16 Sep 2021 11:11) (84 - 98)  BP: 114/54 (16 Sep 2021 11:11) (110/66 - 125/51)  BP(mean): --  RR: 20 (16 Sep 2021 11:11) (18 - 20)  SpO2: 97% (16 Sep 2021 11:11) (95% - 100%)      Constitutional: NAD, Resting  ENT: Supple, No JVD  Lungs: CTA B/L, Non-labored breathing  Cardio: RRR, S1/S2, No murmur  Abdomen: Soft, Nontender, Nondistended; Bowel sounds present  Extremities: No calf tenderness, No pitting edema  Musculoskeletal:   No clubbing or cyanosis of digits; no joint swelling or tenderness to palpation  Psych: Calm, cooperative affect appropriate  Neuro: Awake and alert, oriented to name, location and year, but unaware as to why he is here, able to move all extremities   Skin: No rashes; no palpable lesions    LABS:                        13.6   12.46 )-----------( 188      ( 16 Sep 2021 06:41 )             42.4     09-16    137  |  95<L>  |  30.0<H>  ----------------------------<  134<H>  3.7   |  28.0  |  2.20<H>    Ca    9.0      16 Sep 2021 06:41  Phos  3.3     09-16  Mg     2.2     09-16    TPro  8.6  /  Alb  4.0  /  TBili  0.2<L>  /  DBili  x   /  AST  16  /  ALT  9   /  AlkPhos  108  09-15      CARDIAC MARKERS ( 16 Sep 2021 06:41 )  x     / 0.06 ng/mL / 16 U/L / x     / x      CARDIAC MARKERS ( 15 Sep 2021 12:30 )  x     / 0.07 ng/mL / x     / x     / x              CAPILLARY BLOOD GLUCOSE  427 (15 Sep 2021 16:33)      POCT Blood Glucose.: 386 mg/dL (16 Sep 2021 12:50)  POCT Blood Glucose.: 167 mg/dL (16 Sep 2021 08:02)  POCT Blood Glucose.: 94 mg/dL (15 Sep 2021 23:41)  POCT Blood Glucose.: 58 mg/dL (15 Sep 2021 23:15)  POCT Blood Glucose.: 324 mg/dL (15 Sep 2021 17:28)        RADIOLOGY REVIEWED  
(4) walks frequently

## 2024-04-22 NOTE — PHYSICAL EXAM
[JVD] : no jugular venous distention  [Normal Breath Sounds] : Normal breath sounds [Normal Rate and Rhythm] : normal rate and rhythm [2+] : left 2+ [Ankle Swelling (On Exam)] : not present [Abdomen Masses] : No abdominal masses [No Rash or Lesion] : No rash or lesion [Abdomen Tenderness] : ~T ~M No abdominal tenderness [Alert] : alert [Oriented to Person] : oriented to person [Oriented to Time] : oriented to time [Oriented to Place] : oriented to place [Calm] : calm [FreeTextEntry1] : 2+ left arm edema\par Left arm AVF is pulsatile\par There is significant left upper arm echymosis [de-identified] : Well appearing Show Applicator Variable?: Yes Render Note In Bullet Format When Appropriate: No Spray Paint Text: The liquid nitrogen was applied to the skin utilizing a spray paint frosting technique. Medical Necessity Clause: This procedure was medically necessary because the lesions that were treated were: Consent: The patient's consent was obtained including but not limited to risks of crusting, scabbing, blistering, scarring, darker or lighter pigmentary change, recurrence, incomplete removal and infection. Post-Care Instructions: I reviewed with the patient in detail post-care instructions. Patient is to wear sunprotection, and avoid picking at any of the treated lesions. Pt may apply Vaseline to crusted or scabbing areas. Medical Necessity Information: It is in your best interest to select a reason for this procedure from the list below. All of these items fulfill various CMS LCD requirements except the new and changing color options. Detail Level: Detailed Duration Of Freeze Thaw-Cycle (Seconds): 5 Application Tool (Optional): Liquid Nitrogen Sprayer Number Of Freeze-Thaw Cycles: 1 freeze-thaw cycle

## 2024-04-26 NOTE — ED STATDOCS - NSCAREINITIATED _GEN_ER
Irene Soto(Attending) [Consultation] : a consultation visit [FreeTextEntry1] : Excessive weight gain, a acanthosis nigricans

## 2024-09-05 NOTE — ED ADULT NURSE NOTE - CAS TRG GENERAL AIRWAY, MLM
Patent
What Type Of Note Output Would You Prefer (Optional)?: Bullet Format
Hpi Title: Evaluation of Skin Lesions
Additional History: Pt has a place on her face and one of her right toes. Pt denies awareness of any other new or changing lesions since her last visit, but that was 2022 so she isn’t sure.

## 2024-09-11 NOTE — ED ADULT TRIAGE NOTE - BP NONINVASIVE DIASTOLIC (MM HG)
September 11, 2024     Patient: Kamaljit Laura   YOB: 2015   Date of Visit: 9/11/2024       To Whom it May Concern:    Kamaljit Laura was seen in my clinic on 9/11/2024. He may return on 09/12/2024.     If you have any questions or concerns, please don't hesitate to call.         Sincerely,          TIM Aranda        CC: No Recipients  
[phone]

## 2024-09-15 NOTE — H&P ADULT - NSHPSOCIALHISTORY_GEN_ALL_CORE
, lives with wife.   Walks with walker.  Smokes cigarettes while he was teen.   Drinks alcohol socially.  No illicit drug use. Normal volume, rate, productivity, spontaneity and articulation

## 2025-01-18 NOTE — PROGRESS NOTE ADULT - PROBLEM SELECTOR PLAN 3
BIBEMS from home c/o diff breathing all day hx. of COPD. TAVR workup in progress 12/25 Rt PTC (Initial drainage 1L-Transudative)  Maintain CT to H20 seal (No gross airleak noted) and record daily output  ISS/cough and deep breathing exercise  Cont AM CXR 115

## 2025-02-07 NOTE — PROGRESS NOTE ADULT - PROBLEM SELECTOR PLAN 2
Mental status to be reevaluated in out patient setting, now a little slow functioning mentally  for TAVR work up out patient  will need cath, ct scan etc
See HPI

## 2025-02-10 NOTE — H&P PST ADULT - PRO ARRIVE FROM
HPI: 67M w/ afib, nonobstructive CAD, CHF class 3, severe MR, DM, HTN, JIMMY on CPAP, asthma, severe obesity, HL, Madelung's disease, osteoarthritis s/p bilateral hip replacement, peripheral neuropathy presented with increasing dyspnea with exertion found to have severe MR.  He was evaluated for mitral clip and not a candidate.  On prior admission he had FLAKITO with creat up to 2.  Was optimized with diuresis.  He was on AC and developed hemorrhoidal bleeding and was taken off AC.  He was discharged and returned for his procedure on 1/28    OR Data  Procedure: Procedure: MVReplacement (bioprosthetic), SHEILA ligation, MAZE  Bypass: 202  Cross Clamp: 158  Pre LV Fxn: 50-55%      RV Fxn: normal  Post LV Fxn: 45-50%     RV Fxn: normal    moderate TR, MV gradient 3mmHg  Blood Products: 1uPRBC, ddavp 39mcq  Cell Saver: 698  Fluids: NS 2000, albumin 2000  Pacing Wires: V pacing. sinus rhythm  UO: 1100    1/28 - Fany and poor oxygenation in OR. Milrinone, norepi, vaso. Extubation in evening.  1/29 - start aspirin, diuresis, heparin dvt prophylaxis  1/30- d/c pleural ct if drainage dec; wean to dc primacor; wean high flow o2  1/31 - FLAKITO worsening, bradyarrythmias - wenchebach - ep consulted  2/2 - Chest tube removed, ambulating, creat improving, dobutamine weaned  2/3 - Dobut to 1.5, BUN elevated  2/4 - Confused this AM, normal neuro exam, prince removed  2/5 - FLAKITO improving, off dobutamine, mil decreased   2/6 - milrinone stopped, bumex 1mg BID, walked the whole unCTU Attending Progress Daily Note     09 Feb 2025 13:41  Procedure: MV Replacemant Maze  La Ligation  POD#: 12      He has history of Diabetes    HTN (hypertension)    Obstructive sleep apnea on CPAP    Asthma    Obesity    Peripheral neuropathy    Arthritis    Hypercholesteremia    Madelung's disease    FH: mitral regurgitation    CHF NYHA class III    Atrial fibrillation    JIMMY on CPAP      HPI:      Hospital Course:      ================================================  OBJECTIVE:  ICU Vital Signs Last 24 Hrs  T(C): 35.6 (10 Feb 2025 20:00), Max: 37.1 (10 Feb 2025 04:00)  T(F): 96 (10 Feb 2025 20:00), Max: 98.7 (10 Feb 2025 04:00)  HR: 81 (10 Feb 2025 22:00) (78 - 91)  BP: 140/62 (10 Feb 2025 22:00) (128/75 - 177/75)  BP(mean): 89 (10 Feb 2025 22:00) (89 - 119)  ABP: --  ABP(mean): --  RR: 19 (10 Feb 2025 22:00) (16 - 25)  SpO2: 99% (10 Feb 2025 22:00) (91% - 100%)    O2 Parameters below as of 10 Feb 2025 22:00  Patient On (Oxygen Delivery Method): nasal cannula  O2 Flow (L/min): 4        I&O's Summary    09 Feb 2025 07:01  -  10 Feb 2025 07:00  --------------------------------------------------------  IN: 1010 mL / OUT: 2105 mL / NET: -1095 mL    10 Feb 2025 07:01  -  10 Feb 2025 23:07  --------------------------------------------------------  IN: 0 mL / OUT: 1650 mL / NET: -1650 mL      I&O's Detail    09 Feb 2025 07:01  -  10 Feb 2025 07:00  --------------------------------------------------------  IN:    IV PiggyBack: 50 mL    Oral Fluid: 960 mL  Total IN: 1010 mL    OUT:    Voided (mL): 2105 mL  Total OUT: 2105 mL    Total NET: -1095 mL      10 Feb 2025 07:01  -  10 Feb 2025 23:07  --------------------------------------------------------  IN:  Total IN: 0 mL    OUT:    Voided (mL): 1650 mL  Total OUT: 1650 mL    Total NET: -1650 mL        Adult Advanced Hemodynamics Last 24 Hrs  CVP(mm Hg): --  CVP(cm H2O): --  CO: --  CI: --  PA: --  PA(mean): --  PCWP: --  SVR: --  SVRI: --  PVR: --  PVRI: --    CAPILLARY BLOOD GLUCOSE      POCT Blood Glucose.: 165 mg/dL (10 Feb 2025 21:41)  POCT Blood Glucose.: 196 mg/dL (10 Feb 2025 17:52)  POCT Blood Glucose.: 494 mg/dL (10 Feb 2025 16:52)  POCT Blood Glucose.: 213 mg/dL (10 Feb 2025 10:06)  POCT Blood Glucose.: 221 mg/dL (10 Feb 2025 07:41)    LABS:                          9.1    13.16 )-----------( 224      ( 10 Feb 2025 12:00 )             31.4     02-10    142  |  103  |  44[H]  ----------------------------<  228[H]  4.3   |  30  |  1.0    Ca    8.8      10 Feb 2025 12:00  Mg     2.0     02-10    TPro  5.8[L]  /  Alb  3.6  /  TBili  0.4  /  DBili  x   /  AST  37  /  ALT  19  /  AlkPhos  168[H]  02-10      Urinalysis Basic - ( 10 Feb 2025 12:00 )    Color: x / Appearance: x / SG: x / pH: x  Gluc: 228 mg/dL / Ketone: x  / Bili: x / Urobili: x   Blood: x / Protein: x / Nitrite: x   Leuk Esterase: x / RBC: x / WBC x   Sq Epi: x / Non Sq Epi: x / Bacteria: x        Home Medications:  Breo Ellipta 200 mcg-25 mcg/inh inhalation powder: 1 inhaled once a day (28 Jan 2025 06:52)  HYDROcodone 10 mg oral capsule, extended release: 1 cap(s) orally 2 times a day (28 Jan 2025 06:52)  losartan 50 mg oral tablet: 1 tab(s) orally 2 times a day (28 Jan 2025 06:52)  losartan-hydrochlorothiazide 50 mg-12.5 mg oral tablet: 1 tab(s) orally (28 Jan 2025 06:52)  ProAir HFA 90 mcg/inh inhalation aerosol: 2 puff(s) inhaled 2 times a day (28 Jan 2025 06:52)  tujeo: 80 unit(s) subcutaneous once a day (28 Jan 2025 06:52)    HOSPITAL MEDICATIONS:  MEDICATIONS  (STANDING):  albuterol/ipratropium for Nebulization 3 milliLiter(s) Nebulizer every 6 hours  aspirin enteric coated 81 milliGRAM(s) Oral daily  buMETAnide Injectable 1 milliGRAM(s) IV Push every 12 hours  chlorhexidine 2% Cloths 1 Application(s) Topical daily  dextrose 5%. 1000 milliLiter(s) (50 mL/Hr) IV Continuous <Continuous>  dextrose 5%. 1000 milliLiter(s) (100 mL/Hr) IV Continuous <Continuous>  dextrose 50% Injectable 50 milliLiter(s) IV Push every 15 minutes  dextrose 50% Injectable 25 milliLiter(s) IV Push every 15 minutes  finasteride 5 milliGRAM(s) Oral daily  fluticasone propionate 50 MICROgram(s)/spray Nasal Spray 1 Spray(s) Both Nostrils every 12 hours  fluticasone propionate/ salmeterol 250-50 MICROgram(s) Diskus 1 Dose(s) Inhalation two times a day  folic acid 1 milliGRAM(s) Oral daily  gabapentin 100 milliGRAM(s) Oral every 8 hours  glucagon  Injectable 1 milliGRAM(s) IntraMuscular once  guaiFENesin  milliGRAM(s) Oral every 12 hours  heparin   Injectable 5000 Unit(s) SubCutaneous every 8 hours  influenza  Vaccine (HIGH DOSE) 0.5 milliLiter(s) IntraMuscular once  insulin glargine Injectable (LANTUS) 40 Unit(s) SubCutaneous before breakfast  insulin lispro (ADMELOG) corrective regimen sliding scale   SubCutaneous three times a day before meals  insulin lispro (ADMELOG) corrective regimen sliding scale   SubCutaneous at bedtime  insulin lispro Injectable (ADMELOG) 10 Unit(s) SubCutaneous three times a day before meals  metolazone 5 milliGRAM(s) Oral daily  metoprolol tartrate 12.5 milliGRAM(s) Oral every 12 hours  mupirocin 2% Nasal 1 Application(s) Both Nostrils every 12 hours  pantoprazole    Tablet 40 milliGRAM(s) Oral before breakfast  psyllium Powder 1 Packet(s) Oral every 12 hours  rosuvastatin 20 milliGRAM(s) Oral at bedtime  senna 2 Tablet(s) Oral at bedtime  sertraline 25 milliGRAM(s) Oral daily  tamsulosin 0.4 milliGRAM(s) Oral at bedtime    MEDICATIONS  (PRN):  albuterol    90 MICROgram(s) HFA Inhaler 2 Puff(s) Inhalation every 6 hours PRN Shortness of Breath  ALPRAZolam 0.25 milliGRAM(s) Oral at bedtime PRN anxiety  dextrose Oral Gel 15 Gram(s) Oral once PRN Blood Glucose LESS THAN 70 milliGRAM(s)/deciliter  hydrALAZINE Injectable 5 milliGRAM(s) IV Push every 4 hours PRN Systolic > 160  labetalol Injectable 10 milliGRAM(s) IV Push every 6 hours PRN Diastolic blood pressure >155  melatonin 5 milliGRAM(s) Oral at bedtime PRN Insomnia  ondansetron Injectable 4 milliGRAM(s) IV Push every 4 hours PRN Nausea and/or Vomiting  oxyCODONE    IR 5 milliGRAM(s) Oral every 6 hours PRN Moderate Pain (4 - 6)    RADIOLOGY:  Chest X-ray Reviewed    REVIEW OF SYSTEMS:  CONSTITUTIONAL: [X] all negative; [ ] weakness, [ ] fevers, [ ] chills  EYES/ENT: [X] all negative; [ ] visual changes, [ ] vertigo, [ ] throat pain   NECK: [X] all negative; [ ] pain, [ ] stiffness  RESPIRATORY: [] all negative, [ ] cough, [ ] wheezing, [ ] hemoptysis, [ ] shortness of breath  CARDIOVASCULAR: [] all negative; [ ] chest pain, [ ] palpitations, [ ] orthopnea  GASTROINTESTINAL: [X] all negative; [ ]abdominal pain, [ ] nausea, [ ] vomiting, [ ] hematemesis, [ ] diarrhea, [ ] constipation, [ ] melena, [ ] hematochezia.  GENITOURINARY: [X] all negative; [ ] dysuria, [ ] frequency, [ ] hematuria  NEUROLOGICAL: [X] all negative; [ ] numbness, [ ] weakness  SKIN: [X] all negative; [ ] itching, [ ] burning, [ ] rashes, [ ] lesions   All other review of systems is negative unless indicated above.  [  ] Unable to assess ROS because   PHYSICAL EXAM:          CONSTITUTIONAL: Well-developed; well-nourished; in no acute distress.   	SKIN: warm, dry  	HEAD: Normocephalic; atraumatic.  	EYES: PERRL, EOM, no conj injection, sclera clear  	ENT: No nasal discharge; airway clear.  	NECK: Supple; non tender.   	CARD: S1, S2 normal; no murmurs, gallops, or rubs. Regular rate and rhythm.  no carotid bruits  	RESP: CTA B/L; good air movement No wheezes, rales or rhonchi.  	ABD: Soft, not tender, not distended,  no rebound or guarding, bowel sounds present  	EXT: Normal ROM.  No clubbing, cyanosis or edema.   warm and well perfused.  pulses palpable  	NEURO: Alert, awake, motor 5/5 R, 5/5 L      Upon my evaluation, the above patient has a high probabillity of imminent or life threatening deterioration due to a high risk for Shock, Cardiogenic shock, arrythmias, acute blood loss, acute kidney injury and acute pulmonary insufficiency which required my direct attention, intervention, and personal management.  Total time was 55 minutes which includes review of radiology results, ordering, interpreting and reviewing diagnostic studies / lab tests with immediate assessment and treatment, consultant collaboration on findings and treatment options, monitoring for potential decompensation, obtaining history from family, EMT, nursing home staff and/or treating physicians; directing the titration of pressors, oxygen support devices, fluid resucitation, interpretation of cardiac output measurements, interpretation of radiological assessments, interpretation of EKG / rhythm strips, telemetry.  This time did not overlap with the management of other patients or performing separately reportable procedures.      Management of this patient was discussed with the CT surgery attending and mid-level providers.    I ackknowledge the use of copied documentation.  All copy forward documentation is my own and has been reviewed and revised as appropriate.  If no changes were made, it is because that information remains the same. HPI: 67M w/ afib, nonobstructive CAD, CHF class 3, severe MR, DM, HTN, JIMMY on CPAP, asthma, severe obesity, HL, Madelung's disease, osteoarthritis s/p bilateral hip replacement, peripheral neuropathy presented with increasing dyspnea with exertion found to have severe MR.  He was evaluated for mitral clip and not a candidate.  On prior admission he had FLAKITO with creat up to 2.  Was optimized with diuresis.  He was on AC and developed hemorrhoidal bleeding and was taken off AC.  He was discharged and returned for his procedure on 1/28    OR Data  Procedure: Procedure: MVReplacement (bioprosthetic), SHEILA ligation, MAZE  Bypass: 202  Cross Clamp: 158  Pre LV Fxn: 50-55%      RV Fxn: normal  Post LV Fxn: 45-50%     RV Fxn: normal    moderate TR, MV gradient 3mmHg  Blood Products: 1uPRBC, ddavp 39mcq  Cell Saver: 698  Fluids: NS 2000, albumin 2000  Pacing Wires: V pacing. sinus rhythm  UO: 1100    1/28 - OP day :  Fany and poor oxygenation in OR. Milrinone, norepi, vaso. Extubation in evening.  1/29 - start aspirin, diuresis, heparin dvt prophylaxis  1/30- d/c pleural ct if drainage dec; wean to dc primacor; wean high flow o2  1/31 - FLAKITO worsening, bradyarrythmias - wenchebach - ep consulted  2/2 - Chest tube removed, ambulating, creat improving, dobutamine weaned  2/3 - Dobut to 1.5, BUN elevated  2/4 - Confused this AM, normal neuro exam, prince removed  2/5 - FLAKITO improving, off dobutamine, mil decreased   2/6 - milrinone stopped, bumex 1mg BID[facility] Daily [facility]     09 Feb 2025 13:41    Procedure: MV Replacemant Maze  La Ligation  POD#: 14      He has history of Diabetes    HTN (hypertension)    Obstructive sleep apnea on CPAP    Asthma    Obesity    Peripheral neuropathy    Arthritis    Hypercholesteremia    Madelung's disease    FH: mitral regurgitation    CHF NYHA class III    Atrial fibrillation    JIMMY on CPAP      HPI:      Hospital Course:      ================================================  OBJECTIVE:  ICU Vital Signs Last 24 Hrs  T(C): 35.6 (10 Feb 2025 20:00), Max: 37.1 (10 Feb 2025 04:00)  T(F): 96 (10 Feb 2025 20:00), Max: 98.7 (10 Feb 2025 04:00)  HR: 81 (10 Feb 2025 22:00) (78 - 91)  BP: 140/62 (10 Feb 2025 22:00) (128/75 - 177/75)  BP(mean): 89 (10 Feb 2025 22:00) (89 - 119)  ABP: --  ABP(mean): --  RR: 19 (10 Feb 2025 22:00) (16 - 25)  SpO2: 99% (10 Feb 2025 22:00) (91% - 100%)    O2 Parameters below as of 10 Feb 2025 22:00  Patient On (Oxygen Delivery Method): nasal cannula  O2 Flow (L/min): 4        I&O's Summary    09 Feb 2025 07:01  -  10 Feb 2025 07:00  --------------------------------------------------------  IN: 1010 mL / OUT: 2105 mL / NET: -1095 mL    10 Feb 2025 07:01  -  10 Feb 2025 23:07  --------------------------------------------------------  IN: 0 mL / OUT: 1650 mL / NET: -1650 mL      I&O's Detail    09 Feb 2025 07:01  -  10 Feb 2025 07:00  --------------------------------------------------------  IN:    IV PiggyBack: 50 mL    Oral Fluid: 960 mL  Total IN: 1010 mL    OUT:    Voided (mL): 2105 mL  Total OUT: 2105 mL    Total NET: -1095 mL      10 Feb 2025 07:01  -  10 Feb 2025 23:07  --------------------------------------------------------  IN:  Total IN: 0 mL    OUT:    Voided (mL): 1650 mL  Total OUT: 1650 mL    Total NET: -1650 mL    CAPILLARY BLOOD GLUCOSE  POCT Blood Glucose.: 165 mg/dL (10 Feb 2025 21:41)  POCT Blood Glucose.: 196 mg/dL (10 Feb 2025 17:52)  POCT Blood Glucose.: 494 mg/dL (10 Feb 2025 16:52)  POCT Blood Glucose.: 213 mg/dL (10 Feb 2025 10:06)  POCT Blood Glucose.: 221 mg/dL (10 Feb 2025 07:41)    LABS:                          9.1    13.16 )-----------( 224      ( 10 Feb 2025 12:00 )             31.4     02-10    142  |  103  |  44[H]  ----------------------------<  228[H]  4.3   |  30  |  1.0    Ca    8.8      10 Feb 2025 12:00  Mg     2.0     02-10    TPro  5.8[L]  /  Alb  3.6  /  TBili  0.4  /  DBili  x   /  AST  37  /  ALT  19  /  AlkPhos  168[H]  02-10    Home Medications:  Breo Ellipta 200 mcg-25 mcg/inh inhalation powder: 1 inhaled once a day (28 Jan 2025 06:52)  HYDROcodone 10 mg oral capsule, extended release: 1 cap(s) orally 2 times a day (28 Jan 2025 06:52)  losartan 50 mg oral tablet: 1 tab(s) orally 2 times a day (28 Jan 2025 06:52)  losartan-hydrochlorothiazide 50 mg-12.5 mg oral tablet: 1 tab(s) orally (28 Jan 2025 06:52)  ProAir HFA 90 mcg/inh inhalation aerosol: 2 puff(s) inhaled 2 times a day (28 Jan 2025 06:52)  tujeo: 80 unit(s) subcutaneous once a day (28 Jan 2025 06:52)    HOSPITAL MEDICATIONS:  MEDICATIONS  (STANDING):  albuterol/ipratropium for Nebulization 3 milliLiter(s) Nebulizer every 6 hours  aspirin enteric coated 81 milliGRAM(s) Oral daily  buMETAnide Injectable 1 milliGRAM(s) IV Push every 12 hours  chlorhexidine 2% Cloths 1 Application(s) Topical daily  dextrose 5%. 1000 milliLiter(s) (50 mL/Hr) IV Continuous <Continuous>  dextrose 5%. 1000 milliLiter(s) (100 mL/Hr) IV Continuous <Continuous>  dextrose 50% Injectable 50 milliLiter(s) IV Push every 15 minutes  dextrose 50% Injectable 25 milliLiter(s) IV Push every 15 minutes  finasteride 5 milliGRAM(s) Oral daily  fluticasone propionate 50 MICROgram(s)/spray Nasal Spray 1 Spray(s) Both Nostrils every 12 hours  fluticasone propionate/ salmeterol 250-50 MICROgram(s) Diskus 1 Dose(s) Inhalation two times a day  folic acid 1 milliGRAM(s) Oral daily  gabapentin 100 milliGRAM(s) Oral every 8 hours  glucagon  Injectable 1 milliGRAM(s) IntraMuscular once  guaiFENesin  milliGRAM(s) Oral every 12 hours  heparin   Injectable 5000 Unit(s) SubCutaneous every 8 hours  influenza  Vaccine (HIGH DOSE) 0.5 milliLiter(s) IntraMuscular once  insulin glargine Injectable (LANTUS) 40 Unit(s) SubCutaneous before breakfast  insulin lispro (ADMELOG) corrective regimen sliding scale   SubCutaneous three times a day before meals  insulin lispro (ADMELOG) corrective regimen sliding scale   SubCutaneous at bedtime  insulin lispro Injectable (ADMELOG) 10 Unit(s) SubCutaneous three times a day before meals  metolazone 5 milliGRAM(s) Oral daily  metoprolol tartrate 12.5 milliGRAM(s) Oral every 12 hours  mupirocin 2% Nasal 1 Application(s) Both Nostrils every 12 hours  pantoprazole    Tablet 40 milliGRAM(s) Oral before breakfast  psyllium Powder 1 Packet(s) Oral every 12 hours  rosuvastatin 20 milliGRAM(s) Oral at bedtime  senna 2 Tablet(s) Oral at bedtime  sertraline 25 milliGRAM(s) Oral daily  tamsulosin 0.4 milliGRAM(s) Oral at bedtime    MEDICATIONS  (PRN):  albuterol    90 MICROgram(s) HFA Inhaler 2 Puff(s) Inhalation every 6 hours PRN Shortness of Breath  ALPRAZolam 0.25 milliGRAM(s) Oral at bedtime PRN anxiety  dextrose Oral Gel 15 Gram(s) Oral once PRN Blood Glucose LESS THAN 70 milliGRAM(s)/deciliter  hydrALAZINE Injectable 5 milliGRAM(s) IV Push every 4 hours PRN Systolic > 160  labetalol Injectable 10 milliGRAM(s) IV Push every 6 hours PRN Diastolic blood pressure >155  melatonin 5 milliGRAM(s) Oral at bedtime PRN Insomnia  ondansetron Injectable 4 milliGRAM(s) IV Push every 4 hours PRN Nausea and/or Vomiting  oxyCODONE    IR 5 milliGRAM(s) Oral every 6 hours PRN Moderate Pain (4 - 6)    RADIOLOGY:  Chest X-ray Reviewed    REVIEW OF SYSTEMS:  CONSTITUTIONAL: [X] all negative; [ ] weakness, [ ] fevers, [ ] chills  EYES/ENT: [X] all negative; [ ] visual changes, [ ] vertigo, [ ] throat pain   NECK: [X] all negative; [ ] pain, [ ] stiffness  RESPIRATORY: [] all negative, [ ] cough, [ ] wheezing, [ ] hemoptysis, [x ] shortness of breath  CARDIOVASCULAR: [] all negative; [ ] chest pain, [ ] palpitations, [ ] orthopnea  GASTROINTESTINAL: [X] all negative; [ ]abdominal pain, [ ] nausea, [ ] vomiting, [ ] hematemesis, [ ] diarrhea, [ ] constipation, [ ] melena, [ ] hematochezia.  GENITOURINARY: [X] all negative; [ ] dysuria, [ ] frequency, [ ] hematuria  NEUROLOGICAL: [X] all negative; [ ] numbness, [ ] weakness  SKIN: [X] all negative; [ ] itching, [ ] burning, [ ] rashes, [ ] lesions       PHYSICAL EXAM:          CONSTITUTIONAL: Well-developed; well-nourished; in no acute distress.   	SKIN: warm, dry  	HEAD: Normocephalic; atraumatic.  	EYES: PERRL, EOM, no conj injection, sclera clear  	ENT: No nasal discharge; airway clear.  	NECK: Supple; non tender.   	CARD: S1, S2 normal; no murmurs, gallops, or rubs. Regular rate and rhythm.  no carotid bruits  	RESP: CTA B/L; good air movement No wheezes, rales or rhonchi.  	ABD: Soft, not tender, not distended,  no rebound or guarding, bowel sounds present  	EXT:+ Edema  	NEURO: Alert, awake, motor 5/5 R, 5/5 L     home

## 2025-02-17 NOTE — PHYSICAL THERAPY INITIAL EVALUATION ADULT - BALANCE DISTURBANCE, IDENTIFIED IMPAIRMENT CONTRIBUTE, REHAB EVAL
decreased strength Patient is AAOx4, competent to make medical decisions at the time of our discussion. I discussed goals of care, primarily related to CPR including chest compressions, intubation, etc. Patient states he would like an attempt at resuscitation but does not want to be on a ventilator long term. He names his HCP as his wife, Elke.

## 2025-04-30 NOTE — H&P ADULT - PROBLEM/PLAN-7
Telephone call to patient..  Left voicemail of purpose of call with request for return phone call. Call back number was provided.   DISPLAY PLAN FREE TEXT

## 2025-07-08 NOTE — CARDIOLOGY SUMMARY
[No Ischemia] : no Ischemia [LVEF ___%] : LVEF [unfilled]% [None] : normal LV function [Enlarged] : enlarged LA size [Mild] : mild mitral regurgitation [___] : [unfilled] No [de-identified] : 7/6/2021  [de-identified] : feb 2018 : carotid US: mild atherosclerosis. no stenosis. \par feb 2019: Mild plaque . no stenosis.